# Patient Record
Sex: MALE | Race: WHITE | NOT HISPANIC OR LATINO | Employment: OTHER | URBAN - METROPOLITAN AREA
[De-identification: names, ages, dates, MRNs, and addresses within clinical notes are randomized per-mention and may not be internally consistent; named-entity substitution may affect disease eponyms.]

---

## 2017-01-03 ENCOUNTER — ANESTHESIA EVENT (OUTPATIENT)
Dept: ENDOSCOPY | Facility: HOSPITAL | Age: 62
End: 2017-01-03
Payer: COMMERCIAL

## 2017-01-03 ENCOUNTER — SURGERY (OUTPATIENT)
Age: 62
End: 2017-01-03

## 2017-01-03 ENCOUNTER — ANESTHESIA (OUTPATIENT)
Dept: ENDOSCOPY | Facility: HOSPITAL | Age: 62
End: 2017-01-03
Payer: COMMERCIAL

## 2017-01-03 VITALS — RESPIRATION RATE: 15 BRPM

## 2017-01-03 PROBLEM — Z13.9 SCREENING: Status: ACTIVE | Noted: 2017-01-03

## 2017-01-03 PROCEDURE — D9220A PRA ANESTHESIA: Mod: 33,CRNA,, | Performed by: NURSE ANESTHETIST, CERTIFIED REGISTERED

## 2017-01-03 PROCEDURE — D9220A PRA ANESTHESIA: Mod: 33,ANES,, | Performed by: ANESTHESIOLOGY

## 2017-01-03 PROCEDURE — 25000003 PHARM REV CODE 250: Performed by: NURSE ANESTHETIST, CERTIFIED REGISTERED

## 2017-01-03 PROCEDURE — 63600175 PHARM REV CODE 636 W HCPCS: Performed by: NURSE ANESTHETIST, CERTIFIED REGISTERED

## 2017-01-03 RX ORDER — LIDOCAINE HYDROCHLORIDE 10 MG/ML
INJECTION, SOLUTION INTRAVENOUS
Status: DISCONTINUED | OUTPATIENT
Start: 2017-01-03 | End: 2017-01-03

## 2017-01-03 RX ORDER — PROPOFOL 10 MG/ML
VIAL (ML) INTRAVENOUS
Status: DISCONTINUED | OUTPATIENT
Start: 2017-01-03 | End: 2017-01-03

## 2017-01-03 RX ADMIN — PROPOFOL 100 MG: 10 INJECTION, EMULSION INTRAVENOUS at 09:01

## 2017-01-03 RX ADMIN — PROPOFOL 50 MG: 10 INJECTION, EMULSION INTRAVENOUS at 09:01

## 2017-01-03 RX ADMIN — LIDOCAINE HYDROCHLORIDE 50 MG: 10 INJECTION, SOLUTION INTRAVENOUS at 09:01

## 2017-01-03 NOTE — ANESTHESIA PREPROCEDURE EVALUATION
01/03/2017  Cleveland Capps is a 61 y.o., male.    [facility] Anesthesia Evaluation    I have reviewed the Patient Summary Reports.    I have reviewed the Nursing Notes.      Review of Systems  Anesthesia Hx:  No problems with previous Anesthesia Denies Hx of Anesthetic complications    Cardiovascular:   Hypertension, well controlled Past MI CAD asymptomatic CABG/stent  ECG has been reviewed.    Pulmonary:   COPD, moderate    Renal/:   Chronic Renal Disease, ARF    Hepatic/GI:   Bowel Prep.    Neurological:  Neurology Normal    Endocrine:   Hypothyroidism        Physical Exam  General:  Obesity    Airway/Jaw/Neck:  Airway Findings: Mouth Opening: Small, but > 3cm Tongue: Large  General Airway Assessment: Adult, Possible difficult intubation  Mallampati: IV  TM Distance: 4 - 6 cm  Jaw/Neck Findings:  Neck ROM: Extension Decreased, Mild  Neck Findings:  Girth Increased      Dental:  Dental Findings:    Chest/Lungs:  Chest/Lungs Clear    Heart/Vascular:  Heart Findings: Normal            Anesthesia Plan  Type of Anesthesia, risks & benefits discussed:  Anesthesia Type:  general  Patient's Preference:   Intra-op Monitoring Plan:   Intra-op Monitoring Plan Comments:   Post Op Pain Control Plan:   Post Op Pain Control Plan Comments:   Induction:   IV  Beta Blocker:  Patient is on a Beta-Blocker and has received one dose within the past 24 hours (No further documentation required).       Informed Consent: Patient understands risks and agrees with Anesthesia plan.  Questions answered. Anesthesia consent signed with patient.  ASA Score: 3     Day of Surgery Review of History & Physical:    H&P update referred to the provider.         Ready For Surgery From Anesthesia Perspective.

## 2017-01-03 NOTE — TRANSFER OF CARE
"Anesthesia Transfer of Care Note    Patient: Cleveland Capps    Procedure(s) Performed: Procedure(s):  COLONOSCOPY    Patient location: PACU    Anesthesia Type: general    Transport from OR: Transported from OR on room air with adequate spontaneous ventilation    Post pain: adequate analgesia    Post assessment: no apparent anesthetic complications    Post vital signs: stable    Level of consciousness: awake    Nausea/Vomiting: no nausea/vomiting    Complications: none          Last vitals:   Visit Vitals    BP (!) 90/55    Pulse 60    Temp 36.7 °C (98.1 °F) (Oral)    Resp 12    Ht 5' 2" (1.575 m)    Wt 74.8 kg (165 lb)    SpO2 98%    BMI 30.18 kg/m2     "

## 2017-01-03 NOTE — ANESTHESIA POSTPROCEDURE EVALUATION
"Anesthesia Post Evaluation    Patient: Cleveland Capps    Procedure(s) Performed: Procedure(s):  COLONOSCOPY    Final Anesthesia Type: general  Patient location during evaluation: PACU  Patient participation: Yes- Able to Participate  Level of consciousness: awake and alert  Post-procedure vital signs: reviewed and stable  Pain management: adequate  Airway patency: patent  PONV status at discharge: No PONV  Anesthetic complications: no      Cardiovascular status: blood pressure returned to baseline  Respiratory status: unassisted  Hydration status: euvolemic  Follow-up not needed.        Visit Vitals    /65    Pulse (!) 52    Temp 36.7 °C (98.1 °F)    Resp 12    Ht 5' 2" (1.575 m)    Wt 74.8 kg (165 lb)    SpO2 98%    BMI 30.18 kg/m2       Pain/Marilyn Score: Pain Assessment Performed: Yes (1/3/2017  9:40 AM)  Presence of Pain: denies (1/3/2017  9:58 AM)  Marilyn Score: 10 (1/3/2017  9:55 AM)      "

## 2017-01-04 ENCOUNTER — TELEPHONE (OUTPATIENT)
Dept: GASTROENTEROLOGY | Facility: CLINIC | Age: 62
End: 2017-01-04

## 2020-01-06 PROBLEM — Z13.9 ENCOUNTER FOR HEALTH-RELATED SCREENING: Status: RESOLVED | Noted: 2017-01-03 | Resolved: 2020-01-06

## 2020-05-16 ENCOUNTER — HOSPITAL ENCOUNTER (OUTPATIENT)
Facility: HOSPITAL | Age: 65
Discharge: HOME | End: 2020-05-19
Attending: EMERGENCY MEDICINE | Admitting: INTERNAL MEDICINE
Payer: MEDICARE

## 2020-05-16 DIAGNOSIS — R07.9 CHEST PAIN: ICD-10-CM

## 2020-05-16 DIAGNOSIS — R06.00 DYSPNEA, UNSPECIFIED TYPE: ICD-10-CM

## 2020-05-16 DIAGNOSIS — U07.1 COVID-19: ICD-10-CM

## 2020-05-16 DIAGNOSIS — I10 HYPERTENSION, UNSPECIFIED TYPE: ICD-10-CM

## 2020-05-16 DIAGNOSIS — R06.02 SOB (SHORTNESS OF BREATH): Primary | ICD-10-CM

## 2020-05-16 LAB
ALBUMIN SERPL BCP-MCNC: 4 G/DL (ref 3.5–5.2)
ALP SERPL-CCNC: 105 U/L (ref 55–135)
ALT SERPL W/O P-5'-P-CCNC: 29 U/L (ref 10–44)
ANION GAP SERPL CALC-SCNC: 18 MMOL/L (ref 8–16)
AST SERPL-CCNC: 39 U/L (ref 10–40)
BASOPHILS # BLD AUTO: 0.04 K/UL (ref 0–0.2)
BASOPHILS NFR BLD: 0.4 % (ref 0–1.9)
BILIRUB SERPL-MCNC: 1.8 MG/DL (ref 0.1–1)
BNP SERPL-MCNC: 454 PG/ML (ref 0–99)
BUN SERPL-MCNC: 20 MG/DL (ref 8–23)
CALCIUM SERPL-MCNC: 8.5 MG/DL (ref 8.7–10.5)
CHLORIDE SERPL-SCNC: 103 MMOL/L (ref 95–110)
CO2 SERPL-SCNC: 17 MMOL/L (ref 23–29)
CREAT SERPL-MCNC: 1.5 MG/DL (ref 0.5–1.4)
DIFFERENTIAL METHOD: ABNORMAL
EOSINOPHIL # BLD AUTO: 0.2 K/UL (ref 0–0.5)
EOSINOPHIL NFR BLD: 2.1 % (ref 0–8)
ERYTHROCYTE [DISTWIDTH] IN BLOOD BY AUTOMATED COUNT: 13.4 % (ref 11.5–14.5)
EST. GFR  (AFRICAN AMERICAN): 56.5 ML/MIN/1.73 M^2
EST. GFR  (NON AFRICAN AMERICAN): 48.8 ML/MIN/1.73 M^2
GLUCOSE SERPL-MCNC: 270 MG/DL (ref 70–110)
HCT VFR BLD AUTO: 47.2 % (ref 40–54)
HGB BLD-MCNC: 15.5 G/DL (ref 14–18)
IMM GRANULOCYTES # BLD AUTO: 0.11 K/UL (ref 0–0.04)
IMM GRANULOCYTES NFR BLD AUTO: 1 % (ref 0–0.5)
INR PPP: 1.1 (ref 0.8–1.2)
LACTATE SERPL-SCNC: 2.5 MMOL/L (ref 0.5–2.2)
LYMPHOCYTES # BLD AUTO: 2.9 K/UL (ref 1–4.8)
LYMPHOCYTES NFR BLD: 27.4 % (ref 18–48)
MCH RBC QN AUTO: 33.7 PG (ref 27–31)
MCHC RBC AUTO-ENTMCNC: 32.8 G/DL (ref 32–36)
MCV RBC AUTO: 103 FL (ref 82–98)
MONOCYTES # BLD AUTO: 1.2 K/UL (ref 0.3–1)
MONOCYTES NFR BLD: 10.8 % (ref 4–15)
NEUTROPHILS # BLD AUTO: 6.2 K/UL (ref 1.8–7.7)
NEUTROPHILS NFR BLD: 58.3 % (ref 38–73)
NRBC BLD-RTO: 0 /100 WBC
PLATELET # BLD AUTO: 239 K/UL (ref 150–350)
PMV BLD AUTO: 8.9 FL (ref 9.2–12.9)
POTASSIUM SERPL-SCNC: 4.6 MMOL/L (ref 3.5–5.1)
PROT SERPL-MCNC: 7.9 G/DL (ref 6–8.4)
PROTHROMBIN TIME: 11.6 SEC (ref 9–12.5)
RBC # BLD AUTO: 4.6 M/UL (ref 4.6–6.2)
SARS-COV-2 RDRP RESP QL NAA+PROBE: NEGATIVE
SODIUM SERPL-SCNC: 138 MMOL/L (ref 136–145)
TROPONIN I SERPL DL<=0.01 NG/ML-MCNC: 0.04 NG/ML (ref 0.02–0.5)
WBC # BLD AUTO: 10.68 K/UL (ref 3.9–12.7)

## 2020-05-16 PROCEDURE — G0378 HOSPITAL OBSERVATION PER HR: HCPCS

## 2020-05-16 PROCEDURE — 27000190 HC CPAP FULL FACE MASK W/VALVE

## 2020-05-16 PROCEDURE — 96374 THER/PROPH/DIAG INJ IV PUSH: CPT

## 2020-05-16 PROCEDURE — 71045 X-RAY EXAM CHEST 1 VIEW: CPT | Mod: 26,,, | Performed by: RADIOLOGY

## 2020-05-16 PROCEDURE — 96375 TX/PRO/DX INJ NEW DRUG ADDON: CPT

## 2020-05-16 PROCEDURE — 80053 COMPREHEN METABOLIC PANEL: CPT

## 2020-05-16 PROCEDURE — 94660 CPAP INITIATION&MGMT: CPT

## 2020-05-16 PROCEDURE — 36415 COLL VENOUS BLD VENIPUNCTURE: CPT

## 2020-05-16 PROCEDURE — 25000242 PHARM REV CODE 250 ALT 637 W/ HCPCS: Performed by: EMERGENCY MEDICINE

## 2020-05-16 PROCEDURE — 71045 X-RAY EXAM CHEST 1 VIEW: CPT | Mod: TC,FY

## 2020-05-16 PROCEDURE — U0002 COVID-19 LAB TEST NON-CDC: HCPCS

## 2020-05-16 PROCEDURE — 85610 PROTHROMBIN TIME: CPT

## 2020-05-16 PROCEDURE — 71045 XR CHEST AP PORTABLE: ICD-10-PCS | Mod: 26,,, | Performed by: RADIOLOGY

## 2020-05-16 PROCEDURE — 93005 ELECTROCARDIOGRAM TRACING: CPT

## 2020-05-16 PROCEDURE — 87040 BLOOD CULTURE FOR BACTERIA: CPT

## 2020-05-16 PROCEDURE — 84484 ASSAY OF TROPONIN QUANT: CPT

## 2020-05-16 PROCEDURE — 85025 COMPLETE CBC W/AUTO DIFF WBC: CPT

## 2020-05-16 PROCEDURE — 63600175 PHARM REV CODE 636 W HCPCS: Performed by: EMERGENCY MEDICINE

## 2020-05-16 PROCEDURE — 83605 ASSAY OF LACTIC ACID: CPT

## 2020-05-16 PROCEDURE — 83880 ASSAY OF NATRIURETIC PEPTIDE: CPT

## 2020-05-16 PROCEDURE — 99285 EMERGENCY DEPT VISIT HI MDM: CPT | Mod: 25

## 2020-05-16 PROCEDURE — 99900035 HC TECH TIME PER 15 MIN (STAT)

## 2020-05-16 RX ORDER — VANCOMYCIN HCL IN 5 % DEXTROSE 1G/250ML
1000 PLASTIC BAG, INJECTION (ML) INTRAVENOUS
Status: COMPLETED | OUTPATIENT
Start: 2020-05-17 | End: 2020-05-17

## 2020-05-16 RX ORDER — MEROPENEM AND SODIUM CHLORIDE 1 G/50ML
1 INJECTION, SOLUTION INTRAVENOUS
Status: DISCONTINUED | OUTPATIENT
Start: 2020-05-17 | End: 2020-05-19 | Stop reason: HOSPADM

## 2020-05-16 RX ORDER — FUROSEMIDE 10 MG/ML
80 INJECTION INTRAMUSCULAR; INTRAVENOUS
Status: COMPLETED | OUTPATIENT
Start: 2020-05-16 | End: 2020-05-16

## 2020-05-16 RX ORDER — NITROGLYCERIN 0.4 MG/1
0.4 TABLET SUBLINGUAL
Status: COMPLETED | OUTPATIENT
Start: 2020-05-16 | End: 2020-05-16

## 2020-05-16 RX ADMIN — VANCOMYCIN HYDROCHLORIDE 1000 MG: 1 INJECTION, POWDER, LYOPHILIZED, FOR SOLUTION INTRAVENOUS at 11:05

## 2020-05-16 RX ADMIN — NITROGLYCERIN 0.4 MG: 0.4 TABLET SUBLINGUAL at 09:05

## 2020-05-16 RX ADMIN — FUROSEMIDE 80 MG: 10 INJECTION, SOLUTION INTRAMUSCULAR; INTRAVENOUS at 10:05

## 2020-05-17 PROBLEM — J18.9 PNEUMONIA: Status: ACTIVE | Noted: 2020-05-17

## 2020-05-17 PROBLEM — N18.30 CKD (CHRONIC KIDNEY DISEASE) STAGE 3, GFR 30-59 ML/MIN: Status: ACTIVE | Noted: 2020-05-17

## 2020-05-17 LAB
ALBUMIN SERPL BCP-MCNC: 3.9 G/DL (ref 3.5–5.2)
ALP SERPL-CCNC: 81 U/L (ref 55–135)
ALT SERPL W/O P-5'-P-CCNC: 34 U/L (ref 10–44)
ANION GAP SERPL CALC-SCNC: 12 MMOL/L (ref 8–16)
AST SERPL-CCNC: 91 U/L (ref 10–40)
BASOPHILS # BLD AUTO: 0.03 K/UL (ref 0–0.2)
BASOPHILS NFR BLD: 0.3 % (ref 0–1.9)
BILIRUB SERPL-MCNC: 1.4 MG/DL (ref 0.1–1)
BUN SERPL-MCNC: 19 MG/DL (ref 8–23)
CALCIUM SERPL-MCNC: 8.5 MG/DL (ref 8.7–10.5)
CHLORIDE SERPL-SCNC: 100 MMOL/L (ref 95–110)
CO2 SERPL-SCNC: 25 MMOL/L (ref 23–29)
CREAT SERPL-MCNC: 1.1 MG/DL (ref 0.5–1.4)
DIFFERENTIAL METHOD: ABNORMAL
EOSINOPHIL # BLD AUTO: 0 K/UL (ref 0–0.5)
EOSINOPHIL NFR BLD: 0.4 % (ref 0–8)
ERYTHROCYTE [DISTWIDTH] IN BLOOD BY AUTOMATED COUNT: 13.4 % (ref 11.5–14.5)
EST. GFR  (AFRICAN AMERICAN): >60 ML/MIN/1.73 M^2
EST. GFR  (NON AFRICAN AMERICAN): >60 ML/MIN/1.73 M^2
GLUCOSE SERPL-MCNC: 141 MG/DL (ref 70–110)
HCT VFR BLD AUTO: 41.4 % (ref 40–54)
HGB BLD-MCNC: 14 G/DL (ref 14–18)
IMM GRANULOCYTES # BLD AUTO: 0.04 K/UL (ref 0–0.04)
IMM GRANULOCYTES NFR BLD AUTO: 0.4 % (ref 0–0.5)
LACTATE SERPL-SCNC: 1.4 MMOL/L (ref 0.5–2.2)
LYMPHOCYTES # BLD AUTO: 1.1 K/UL (ref 1–4.8)
LYMPHOCYTES NFR BLD: 10.9 % (ref 18–48)
MAGNESIUM SERPL-MCNC: 2.1 MG/DL (ref 1.6–2.6)
MCH RBC QN AUTO: 33.3 PG (ref 27–31)
MCHC RBC AUTO-ENTMCNC: 33.8 G/DL (ref 32–36)
MCV RBC AUTO: 98 FL (ref 82–98)
MONOCYTES # BLD AUTO: 0.9 K/UL (ref 0.3–1)
MONOCYTES NFR BLD: 9 % (ref 4–15)
NEUTROPHILS # BLD AUTO: 7.9 K/UL (ref 1.8–7.7)
NEUTROPHILS NFR BLD: 79 % (ref 38–73)
NRBC BLD-RTO: 0 /100 WBC
PHOSPHATE SERPL-MCNC: 4.4 MG/DL (ref 2.7–4.5)
PLATELET # BLD AUTO: 194 K/UL (ref 150–350)
PMV BLD AUTO: 8.7 FL (ref 9.2–12.9)
POTASSIUM SERPL-SCNC: 3.9 MMOL/L (ref 3.5–5.1)
PROT SERPL-MCNC: 7.1 G/DL (ref 6–8.4)
RBC # BLD AUTO: 4.21 M/UL (ref 4.6–6.2)
SODIUM SERPL-SCNC: 137 MMOL/L (ref 136–145)
WBC # BLD AUTO: 10 K/UL (ref 3.9–12.7)

## 2020-05-17 PROCEDURE — 80053 COMPREHEN METABOLIC PANEL: CPT

## 2020-05-17 PROCEDURE — 84100 ASSAY OF PHOSPHORUS: CPT

## 2020-05-17 PROCEDURE — 63600175 PHARM REV CODE 636 W HCPCS: Performed by: INTERNAL MEDICINE

## 2020-05-17 PROCEDURE — 25000003 PHARM REV CODE 250: Performed by: HOSPITALIST

## 2020-05-17 PROCEDURE — G0378 HOSPITAL OBSERVATION PER HR: HCPCS

## 2020-05-17 PROCEDURE — 83735 ASSAY OF MAGNESIUM: CPT

## 2020-05-17 PROCEDURE — 25000003 PHARM REV CODE 250: Performed by: INTERNAL MEDICINE

## 2020-05-17 PROCEDURE — 25000003 PHARM REV CODE 250: Performed by: EMERGENCY MEDICINE

## 2020-05-17 PROCEDURE — 99220 PR INITIAL OBSERVATION CARE,LEVL III: ICD-10-PCS | Mod: ,,, | Performed by: INTERNAL MEDICINE

## 2020-05-17 PROCEDURE — 96375 TX/PRO/DX INJ NEW DRUG ADDON: CPT

## 2020-05-17 PROCEDURE — 63600175 PHARM REV CODE 636 W HCPCS: Performed by: HOSPITALIST

## 2020-05-17 PROCEDURE — 85025 COMPLETE CBC W/AUTO DIFF WBC: CPT

## 2020-05-17 PROCEDURE — 83605 ASSAY OF LACTIC ACID: CPT

## 2020-05-17 PROCEDURE — 96374 THER/PROPH/DIAG INJ IV PUSH: CPT | Mod: 59

## 2020-05-17 PROCEDURE — 94760 N-INVAS EAR/PLS OXIMETRY 1: CPT

## 2020-05-17 PROCEDURE — 96372 THER/PROPH/DIAG INJ SC/IM: CPT

## 2020-05-17 PROCEDURE — 83036 HEMOGLOBIN GLYCOSYLATED A1C: CPT

## 2020-05-17 PROCEDURE — 96376 TX/PRO/DX INJ SAME DRUG ADON: CPT

## 2020-05-17 PROCEDURE — 63600175 PHARM REV CODE 636 W HCPCS: Performed by: EMERGENCY MEDICINE

## 2020-05-17 PROCEDURE — 36415 COLL VENOUS BLD VENIPUNCTURE: CPT

## 2020-05-17 PROCEDURE — 27000221 HC OXYGEN, UP TO 24 HOURS

## 2020-05-17 PROCEDURE — 99220 PR INITIAL OBSERVATION CARE,LEVL III: CPT | Mod: ,,, | Performed by: INTERNAL MEDICINE

## 2020-05-17 RX ORDER — IPRATROPIUM BROMIDE AND ALBUTEROL SULFATE 2.5; .5 MG/3ML; MG/3ML
3 SOLUTION RESPIRATORY (INHALATION) EVERY 4 HOURS PRN
Status: DISCONTINUED | OUTPATIENT
Start: 2020-05-17 | End: 2020-05-19 | Stop reason: HOSPADM

## 2020-05-17 RX ORDER — LISINOPRIL 10 MG/1
20 TABLET ORAL DAILY
Status: DISCONTINUED | OUTPATIENT
Start: 2020-05-17 | End: 2020-05-19 | Stop reason: HOSPADM

## 2020-05-17 RX ORDER — ATORVASTATIN CALCIUM 40 MG/1
40 TABLET, FILM COATED ORAL DAILY
Status: DISCONTINUED | OUTPATIENT
Start: 2020-05-17 | End: 2020-05-19 | Stop reason: HOSPADM

## 2020-05-17 RX ORDER — SODIUM CHLORIDE 0.9 % (FLUSH) 0.9 %
10 SYRINGE (ML) INJECTION
Status: DISCONTINUED | OUTPATIENT
Start: 2020-05-17 | End: 2020-05-19 | Stop reason: HOSPADM

## 2020-05-17 RX ORDER — CYCLOBENZAPRINE HCL 5 MG
10 TABLET ORAL 2 TIMES DAILY PRN
Status: DISCONTINUED | OUTPATIENT
Start: 2020-05-17 | End: 2020-05-19 | Stop reason: HOSPADM

## 2020-05-17 RX ORDER — CARVEDILOL 12.5 MG/1
12.5 TABLET ORAL 2 TIMES DAILY
Status: DISCONTINUED | OUTPATIENT
Start: 2020-05-17 | End: 2020-05-19 | Stop reason: HOSPADM

## 2020-05-17 RX ORDER — FUROSEMIDE 10 MG/ML
40 INJECTION INTRAMUSCULAR; INTRAVENOUS
Status: DISCONTINUED | OUTPATIENT
Start: 2020-05-17 | End: 2020-05-19 | Stop reason: HOSPADM

## 2020-05-17 RX ORDER — HEPARIN SODIUM 5000 [USP'U]/ML
5000 INJECTION, SOLUTION INTRAVENOUS; SUBCUTANEOUS EVERY 8 HOURS
Status: DISCONTINUED | OUTPATIENT
Start: 2020-05-17 | End: 2020-05-19 | Stop reason: HOSPADM

## 2020-05-17 RX ORDER — CLOPIDOGREL BISULFATE 75 MG/1
75 TABLET ORAL DAILY
Status: DISCONTINUED | OUTPATIENT
Start: 2020-05-17 | End: 2020-05-19 | Stop reason: HOSPADM

## 2020-05-17 RX ORDER — ASPIRIN 81 MG/1
81 TABLET ORAL DAILY
Status: DISCONTINUED | OUTPATIENT
Start: 2020-05-17 | End: 2020-05-19 | Stop reason: HOSPADM

## 2020-05-17 RX ORDER — IBUPROFEN 200 MG
24 TABLET ORAL
Status: DISCONTINUED | OUTPATIENT
Start: 2020-05-17 | End: 2020-05-19 | Stop reason: HOSPADM

## 2020-05-17 RX ORDER — IBUPROFEN 200 MG
16 TABLET ORAL
Status: DISCONTINUED | OUTPATIENT
Start: 2020-05-17 | End: 2020-05-19 | Stop reason: HOSPADM

## 2020-05-17 RX ORDER — ONDANSETRON 4 MG/1
4 TABLET, ORALLY DISINTEGRATING ORAL EVERY 6 HOURS PRN
Status: DISCONTINUED | OUTPATIENT
Start: 2020-05-17 | End: 2020-05-19 | Stop reason: HOSPADM

## 2020-05-17 RX ORDER — ACETAMINOPHEN 325 MG/1
650 TABLET ORAL EVERY 4 HOURS PRN
Status: DISCONTINUED | OUTPATIENT
Start: 2020-05-17 | End: 2020-05-19 | Stop reason: HOSPADM

## 2020-05-17 RX ORDER — GLUCAGON 1 MG
1 KIT INJECTION
Status: DISCONTINUED | OUTPATIENT
Start: 2020-05-17 | End: 2020-05-19 | Stop reason: HOSPADM

## 2020-05-17 RX ADMIN — VANCOMYCIN HYDROCHLORIDE 1250 MG: 1.25 INJECTION, POWDER, LYOPHILIZED, FOR SOLUTION INTRAVENOUS at 09:05

## 2020-05-17 RX ADMIN — MEROPENEM AND SODIUM CHLORIDE 1 G: 1 INJECTION, SOLUTION INTRAVENOUS at 05:05

## 2020-05-17 RX ADMIN — MEROPENEM AND SODIUM CHLORIDE 1 G: 1 INJECTION, SOLUTION INTRAVENOUS at 08:05

## 2020-05-17 RX ADMIN — LISINOPRIL 20 MG: 10 TABLET ORAL at 08:05

## 2020-05-17 RX ADMIN — CYCLOBENZAPRINE HYDROCHLORIDE 10 MG: 5 TABLET, FILM COATED ORAL at 09:05

## 2020-05-17 RX ADMIN — HEPARIN SODIUM 5000 UNITS: 5000 INJECTION, SOLUTION INTRAVENOUS; SUBCUTANEOUS at 06:05

## 2020-05-17 RX ADMIN — HEPARIN SODIUM 5000 UNITS: 5000 INJECTION, SOLUTION INTRAVENOUS; SUBCUTANEOUS at 01:05

## 2020-05-17 RX ADMIN — FUROSEMIDE 40 MG: 10 INJECTION, SOLUTION INTRAMUSCULAR; INTRAVENOUS at 06:05

## 2020-05-17 RX ADMIN — ASPIRIN 81 MG: 81 TABLET, COATED ORAL at 08:05

## 2020-05-17 RX ADMIN — CARVEDILOL 12.5 MG: 12.5 TABLET, FILM COATED ORAL at 08:05

## 2020-05-17 RX ADMIN — FUROSEMIDE 40 MG: 10 INJECTION, SOLUTION INTRAMUSCULAR; INTRAVENOUS at 05:05

## 2020-05-17 RX ADMIN — ATORVASTATIN CALCIUM 40 MG: 40 TABLET, FILM COATED ORAL at 08:05

## 2020-05-17 RX ADMIN — CARVEDILOL 12.5 MG: 12.5 TABLET, FILM COATED ORAL at 09:05

## 2020-05-17 RX ADMIN — CLOPIDOGREL BISULFATE 75 MG: 75 TABLET ORAL at 08:05

## 2020-05-17 RX ADMIN — MEROPENEM AND SODIUM CHLORIDE 1 G: 1 INJECTION, SOLUTION INTRAVENOUS at 01:05

## 2020-05-17 NOTE — ASSESSMENT & PLAN NOTE
CXR concerning for possible infiltrate  Lactic acid of 2.5  Repeat WNL  Continue with vanc and meropenem; de-escalate as appropriate   Afebrile and no leukocytosis  O2 and nebs PRN  Clinically stable

## 2020-05-17 NOTE — HOSPITAL COURSE
"Patient has been comfortable this morning lying supine without difficulty.  Vital signs are stable with glucose 141  Magnesium phosphorus normal and CBC white blood cell count 10 hemoglobin 14 hematocrit 41 lactate was initially 2.5 and this morning is 1.4 normal    BP (!) 143/75 (BP Location: Right arm, Patient Position: Lying)   Pulse 60   Temp 97.2 °F (36.2 °C) (Oral)   Resp 18   Ht 5' 2" (1.575 m)   Wt 77.1 kg (170 lb)   SpO2 99%   BMI 31.09 kg/m²      05/18/2020:  Patient Vitals for the past 24 hrs:   BP Temp Temp src Pulse Resp SpO2 Weight   05/18/20 1042 (!) 111/55 96.6 °F (35.9 °C) Oral (!) 58 16 98 % --   05/18/20 0800 -- -- -- (!) 58 -- -- --   05/18/20 0722 -- -- -- (!) 55 18 100 % --   05/18/20 0719 119/63 96.8 °F (36 °C) Oral (!) 55 18 100 % --   05/18/20 0600 -- -- -- -- -- -- 77.5 kg (170 lb 13.7 oz)   05/18/20 0421 124/67 97.8 °F (36.6 °C) Oral 62 18 99 % --   05/17/20 2349 (!) 110/59 98.1 °F (36.7 °C) Oral (!) 52 16 100 % --   05/17/20 2109 136/66 -- -- 60 -- -- --   05/17/20 2105 118/63 -- -- (!) 57 -- -- --   05/17/20 1932 126/68 97.4 °F (36.3 °C) Oral 60 18 96 % --   05/17/20 1509 (!) 140/71 98.4 °F (36.9 °C) Oral 64 17 98 % --     Patient is lying supine no problems with no orthopnea.  Labs show normal white blood cell count H&H is 14.5 and 44 differential is normal.  Chem profile:  Sodium 137 potassium 3.8 chloride 100 bicarb 29 BUN 30 creatinine 1.3  Echocardiogram is pending on this patient.  Will keep admitted today and follow-up on echocardiogram and recheck BNP in the a.m. otherwise continue current medications  Blood cultures were called today regarding positive g positive cocci in clusters resembling Staph will follow cultures as needed  "

## 2020-05-17 NOTE — ASSESSMENT & PLAN NOTE
Most likely secondary to flash pulmonary edema secondary to uncontrolled BP  Diuresed 1.2 L since admit with lasix 80 mg IV x 1  Continue with diuresis lasix 40 mg IV BID  Strict Is and Os and daily weights  2D echo  Repeat CXR in am   Fluid restriction to 1.5 L  Continue BP management with lasix, carvedilol, and ACEI; titrate as needed  Initially on BIPAP but now on 3L NC and stable; continue to wean as tolerated  Nebs PRN

## 2020-05-17 NOTE — H&P
[facility] Medicine  History & Physical    Patient Name: Cleveland Capps  MRN: [id number]  Admission Date: 5/16/2020  Attending Physician: Durga Chandler MD   Primary Care Provider: Romero Snyder MD         Patient information was obtained from patient, Epic chart review and ER records.     Start time: 12:45 AM  Chief complaint: SOB  The patient location is: [facility]  The patient arrived at: yesterday evening  Present with the patient at the time of the telemed/virtual assessment: OLIVIER Hightower    Subjective:     Principal Problem:SOB (shortness of breath)    Chief Complaint:   Chief Complaint   Patient presents with    Shortness of Breath        HPI: The patient is a 65 y/o male with PMH of CAD, NSTEMI, CABG, and HTN who presented with SOB. The patient reported acute onset about 2 hours prior to ER presentation. He was in his usual state of health but after he woke from a nap at around 5 PM, he started to feel SOB. He appeared cyanotic and quite dyspneic and using accessory muscles per ER reports. He had diminished breath sounds with rales  was immediately placed on BIPAP and given lasix 80 mg IV x 1 with 1.2L UOP. He has since improved and is currently down to 4L NC and reports significant improvement in this symptoms. BP on presentation was in the 170s-190s systolic. He was also given nitro x 2. The patient reports having intermittent spikes in his BPs and this had occurred once before about 6 years ago where he sought hospitalization but he doesn't always become SOB like this. He denies any fevers, chills, sick contacts, chest pain, palpitations, LE swelling, or other symptoms.     Past Medical History:   Diagnosis Date    Acute kidney injury     COPD (chronic obstructive pulmonary disease)     Coronary artery disease     Encounter for blood transfusion     Former smoker     Hypertension     Myocardial infarction     Thyroid disease        Past Surgical  History:   Procedure Laterality Date    COLONOSCOPY N/A 1/3/2017    Procedure: COLONOSCOPY;  Surgeon: Marcus Green MD;  Location: [facility];  Service: Endoscopy;  Laterality: N/A;    CORONARY STENT PLACEMENT      GASTROSTOMY TUBE PLACEMENT      PEG TUBE REMOVAL      TRACHEOSTOMY CLOSURE      TRACHEOSTOMY TUBE PLACEMENT      UPPER GASTROINTESTINAL ENDOSCOPY  05/2016    Dr. Green with PEG tube placement       Review of patient's allergies indicates:   Allergen Reactions    Penicillins Hives       No current facility-administered medications on file prior to encounter.      Current Outpatient Medications on File Prior to Encounter   Medication Sig    aspirin (ECOTRIN) 81 MG EC tablet Take 81 mg by mouth once daily.    atorvastatin (LIPITOR) 40 MG tablet Take 1 tablet (40 mg total) by mouth once daily.    carvedilol (COREG) 6.25 MG tablet Take 2 tablets (12.5 mg total) by mouth 2 (two) times daily.    clopidogrel (PLAVIX) 75 mg tablet Take 75 mg by mouth once daily.    enalapril (VASOTEC) 20 MG tablet TAKE ONE TABLET BY MOUTH EVERYDAY AS NEEDED    travoprost (TRAVATAN Z) 0.004 % Drop Place 1 drop into both eyes every evening. (Patient taking differently: Place 1 drop into both eyes nightly as needed. )    [DISCONTINUED] acetaminophen (TYLENOL) 325 MG tablet Take 2 tablets (650 mg total) by mouth every 6 (six) hours as needed.    [DISCONTINUED] artificial tears (ISOPTO TEARS) 0.5 % ophthalmic solution Place 1 drop into both eyes as needed.    [DISCONTINUED] budesonide-formoterol 160-4.5 mcg (SYMBICORT) 160-4.5 mcg/actuation HFAA Inhale 2 puffs into the lungs every 12 (twelve) hours. This is a a controller medication and should be taken every day as directed by your doctor    [DISCONTINUED] levothyroxine (SYNTHROID) 50 MCG tablet Take 50 mcg by mouth once daily.     Family History     None        Tobacco Use    Smoking status: Former Smoker     Types: Cigarettes     Last attempt to quit:  2005     Years since quittin.4    Tobacco comment: quit    Substance and Sexual Activity    Alcohol use: No    Drug use: No    Sexual activity: Yes     Review of Systems   Constitutional: Negative for activity change, chills and fever.   HENT: Negative.    Eyes: Negative for visual disturbance.   Respiratory:        Per HPI   Cardiovascular: Negative for chest pain, palpitations and leg swelling.   Gastrointestinal: Negative for abdominal pain, diarrhea, nausea and vomiting.   Genitourinary: Negative for difficulty urinating, flank pain and frequency.   Musculoskeletal: Negative.    Skin: Negative.    Neurological: Negative for dizziness and headaches.   Hematological: Does not bruise/bleed easily.   Psychiatric/Behavioral: The patient is not nervous/anxious.      Objective:     Vital Signs (Most Recent):  Temp: 97.7 °F (36.5 °C) (20)  Pulse: 64 (20)  Resp: 14 (20)  BP: 133/74 (20 0047)  SpO2: 95 % (20) Vital Signs (24h Range):  Temp:  [97.7 °F (36.5 °C)] 97.7 °F (36.5 °C)  Pulse:  [] 64  Resp:  [14-28] 14  SpO2:  [40 %-98 %] 95 %  BP: (118-174)/() 133/74     Weight: 77.1 kg (170 lb)  Body mass index is 31.09 kg/m².    Physical Exam   Constitutional: He appears well-developed and well-nourished. No distress.   Obese male sitting upright in stretcher. Appears comfortable.   Cardiovascular: Normal rate.   Pulmonary/Chest:   Appears comfortable on 4L NC  No dyspnea noted with speech; able to complete sentences  Diminished rales on exam per RN reports compared to admit   Abdominal: Soft.   obese   Musculoskeletal: He exhibits no edema.   Psychiatric: He has a normal mood and affect.   Vitals reviewed.          Significant Labs: All pertinent labs within the past 24 hours have been reviewed.    Significant Imaging: I have reviewed all pertinent imaging results/findings within the past 24 hours.    Assessment/Plan:     * SOB (shortness of  breath)  Most likely secondary to flash pulmonary edema secondary to uncontrolled BP  Diuresed 1.2 L since admit with lasix 80 mg IV x 1  Continue with diuresis lasix 40 mg IV BID  Strict Is and Os and daily weights  2D echo  Repeat CXR in am   Fluid restriction to 1.5 L  Continue BP management with lasix, carvedilol, and ACEI; titrate as needed  Initially on BIPAP but now on 3L NC and stable; continue to wean as tolerated  Nebs PRN      Pneumonia  CXR concerning for possible infiltrate  Lactic acid of 2.5  Repeat WNL  Continue with vanc and meropenem; de-escalate as appropriate   Afebrile and no leukocytosis  O2 and nebs PRN  Clinically stable       CKD (chronic kidney disease) stage 3, GFR 30-59 ml/min  Lab Results   Component Value Date    CREATININE 1.5 (H) 05/16/2020     Last set of labs from 2016 when patient was admitted for NSTEMI and CABG; required HD on DC but no longer HD dependent  Unclear of his baseline   Cont to monitor with daily labs   Avoid nephrotoxins.   Renally dose all medications   Monitor events that may lead to decreased renal perfusion (hypovolemia, hypotension, sepsis).   Monitor urine output (goal 0.5 mL/kg/hr) to assure that no obstruction precipitates worsening in GFR.  Cont ACE inhibitor for craig protection.   Serum bicarb level 17. Monitor for need to initiate sodium bicarb.         Benign essential HTN  Patient reports intermittent spikes in BPs but otherwise well controlled  Continue with carvedilol, lasix, and lisinopril (takes enalapril 20 mg daily at home but not on formulary)  Titrate as needed   May need addition of diuretic therapy on DC  Echo pending         Coronary artery disease  Per NSTEMI      NSTEMI (non-ST elevated myocardial infarction)  Continue ASA, plavix, carvedilol, ACEI, and atorvastatin         VTE Risk Mitigation (From admission, onward)    None             End time:  1 AM    Total time spent with patient: 15 mins    The attending portion of this evaluation,  treatment, and documentation was performed per Yris Ferguson MD via audiovisual.      Yris Ferguson MD  Department of [place] [facility]   [facility]

## 2020-05-17 NOTE — HPI
The patient is a 65 y/o male with PMH of CAD, NSTEMI, CABG, and HTN who presented with SOB. The patient reported acute onset about 2 hours prior to ER presentation. He was in his usual state of health but after he woke from a nap at around 5 PM, he started to feel SOB. He appeared cyanotic and quite dyspneic and using accessory muscles per ER reports. He had diminished breath sounds with rales  was immediately placed on BIPAP and given lasix 80 mg IV x 1 with 1.2L UOP. He has since improved and is currently down to 4L NC and reports significant improvement in this symptoms. BP on presentation was in the 170s-190s systolic. He was also given nitro x 2. The patient reports having intermittent spikes in his BPs and this had occurred once before about 6 years ago where he sought hospitalization but he doesn't always become SOB like this. He denies any fevers, chills, sick contacts, chest pain, palpitations, LE swelling, or other symptoms.

## 2020-05-17 NOTE — SUBJECTIVE & OBJECTIVE
Past Medical History:   Diagnosis Date    Acute kidney injury     COPD (chronic obstructive pulmonary disease)     Coronary artery disease     Encounter for blood transfusion     Former smoker     Hypertension     Myocardial infarction     Thyroid disease        Past Surgical History:   Procedure Laterality Date    COLONOSCOPY N/A 1/3/2017    Procedure: COLONOSCOPY;  Surgeon: Marcus Green MD;  Location: [facility];  Service: Endoscopy;  Laterality: N/A;    CORONARY STENT PLACEMENT      GASTROSTOMY TUBE PLACEMENT      PEG TUBE REMOVAL      TRACHEOSTOMY CLOSURE      TRACHEOSTOMY TUBE PLACEMENT      UPPER GASTROINTESTINAL ENDOSCOPY  05/2016    Dr. Green with PEG tube placement       Review of patient's allergies indicates:   Allergen Reactions    Penicillins Hives       No current facility-administered medications on file prior to encounter.      Current Outpatient Medications on File Prior to Encounter   Medication Sig    aspirin (ECOTRIN) 81 MG EC tablet Take 81 mg by mouth once daily.    atorvastatin (LIPITOR) 40 MG tablet Take 1 tablet (40 mg total) by mouth once daily.    carvedilol (COREG) 6.25 MG tablet Take 2 tablets (12.5 mg total) by mouth 2 (two) times daily.    clopidogrel (PLAVIX) 75 mg tablet Take 75 mg by mouth once daily.    enalapril (VASOTEC) 20 MG tablet TAKE ONE TABLET BY MOUTH EVERYDAY AS NEEDED    travoprost (TRAVATAN Z) 0.004 % Drop Place 1 drop into both eyes every evening. (Patient taking differently: Place 1 drop into both eyes nightly as needed. )    [DISCONTINUED] acetaminophen (TYLENOL) 325 MG tablet Take 2 tablets (650 mg total) by mouth every 6 (six) hours as needed.    [DISCONTINUED] artificial tears (ISOPTO TEARS) 0.5 % ophthalmic solution Place 1 drop into both eyes as needed.    [DISCONTINUED] budesonide-formoterol 160-4.5 mcg (SYMBICORT) 160-4.5 mcg/actuation HFAA Inhale 2 puffs into the lungs every 12 (twelve) hours. This is a a controller medication  and should be taken every day as directed by your doctor    [DISCONTINUED] levothyroxine (SYNTHROID) 50 MCG tablet Take 50 mcg by mouth once daily.     Family History     None        Tobacco Use    Smoking status: Former Smoker     Types: Cigarettes     Last attempt to quit: 2005     Years since quittin.4    Tobacco comment: quit    Substance and Sexual Activity    Alcohol use: No    Drug use: No    Sexual activity: Yes     Review of Systems   Constitutional: Positive for fatigue. Negative for activity change, appetite change and fever.   HENT: Positive for congestion and sinus pain. Negative for ear discharge, mouth sores, nosebleeds, rhinorrhea, sinus pressure and tinnitus.    Eyes: Negative.  Negative for pain, redness and itching.   Respiratory: Positive for cough, chest tightness, shortness of breath and wheezing. Negative for apnea, choking and stridor.    Cardiovascular: Positive for leg swelling. Negative for chest pain and palpitations.   Gastrointestinal: Negative for abdominal distention, abdominal pain, anal bleeding, blood in stool, constipation and diarrhea.   Endocrine: Negative.    Genitourinary: Negative for difficulty urinating, flank pain, frequency and urgency.   Musculoskeletal: Negative for arthralgias, back pain, gait problem and myalgias.   Skin: Negative for color change and pallor.   Allergic/Immunologic: Negative.    Neurological: Positive for weakness. Negative for dizziness, facial asymmetry, light-headedness and headaches.   Hematological: Negative for adenopathy. Does not bruise/bleed easily.   Psychiatric/Behavioral: The patient is nervous/anxious.      Objective:     Vital Signs (Most Recent):  Temp: 97.2 °F (36.2 °C) (20 1119)  Pulse: 60 (20 1119)  Resp: 18 (20 111)  BP: (!) 143/75 (20 1119)  SpO2: 99 % (20 111) Vital Signs (24h Range):  Temp:  [97.2 °F (36.2 °C)-98.6 °F (37 °C)] 97.2 °F (36.2 °C)  Pulse:  [] 60  Resp:   [14-28] 18  SpO2:  [40 %-99 %] 99 %  BP: (118-174)/() 143/75     Weight: 77.1 kg (170 lb)  Body mass index is 31.09 kg/m².    Physical Exam   Constitutional: He is oriented to person, place, and time. He appears well-developed and well-nourished.   HENT:   Head: Normocephalic and atraumatic.   Eyes: Pupils are equal, round, and reactive to light. EOM are normal.   Neck: Normal range of motion. Neck supple. No tracheal deviation present. No thyromegaly present.   Cardiovascular: Normal rate, regular rhythm and normal heart sounds.   Pulmonary/Chest: He is in respiratory distress. He has wheezes.   Abdominal: Soft. Bowel sounds are normal. He exhibits no distension. There is no tenderness. There is no rebound and no guarding.   Musculoskeletal: Normal range of motion.   Lymphadenopathy:     He has no cervical adenopathy.   Neurological: He is alert and oriented to person, place, and time.   Skin: Skin is warm and dry. Capillary refill takes less than 2 seconds.   Psychiatric: He has a normal mood and affect. His behavior is normal. Judgment and thought content normal.   Nursing note and vitals reviewed.        CRANIAL NERVES     CN III, IV, VI   Pupils are equal, round, and reactive to light.  Extraocular motions are normal.        Significant Labs:   Recent Lab Results       05/17/20  0647   05/17/20  0646   05/17/20  0026   05/16/20  2220   05/16/20  2209        Albumin   3.9           Alkaline Phosphatase   81           ALT   34           Anion Gap   12           AST   91           Baso # 0.03             Basophil% 0.3             BILIRUBIN TOTAL   1.4  Comment:  For infants and newborns, interpretation of results should be based  on gestational age, weight and in agreement with clinical  observations.  Premature Infant recommended reference ranges:  Up to 24 hours.............<8.0 mg/dL  Up to 48 hours............<12.0 mg/dL  3-5 days..................<15.0 mg/dL  6-29 days.................<15.0 mg/dL              BNP               BUN, Bld   19           Calcium   8.5           Chloride   100           CO2   25           Creatinine   1.1           Differential Method Automated             eGFR if    >60.0           eGFR if non    >60.0  Comment:  Calculation used to obtain the estimated glomerular filtration  rate (eGFR) is the CKD-EPI equation.              Eos # 0.0             Eosinophil% 0.4             Glucose   141           Gran # (ANC) 7.9             Gran% 79.0             Hematocrit 41.4             Hemoglobin 14.0             Immature Grans (Abs) 0.04  Comment:  Mild elevation in immature granulocytes is non specific and   can be seen in a variety of conditions including stress response,   acute inflammation, trauma and pregnancy. Correlation with other   laboratory and clinical findings is essential.               Immature Granulocytes 0.4             INR       1.1  Comment:  Coumadin Therapy:  2.0 - 3.0 for INR for all indicators except mechanical heart valves  and antiphospholipid syndromes which should use 2.5 - 3.5.         Lactate, Kalpesh     1.4  Comment:  Falsely low lactic acid results can be found in samples   containing >=13.0 mg/dL total bilirubin and/or >=3.5 mg/dL   direct bilirubin.     2.5  Comment:  Falsely low lactic acid results can be found in samples   containing >=13.0 mg/dL total bilirubin and/or >=3.5 mg/dL   direct bilirubin.       Lymph # 1.1             Lymph% 10.9             Magnesium   2.1           MCH 33.3             MCHC 33.8             MCV 98             Mono # 0.9             Mono% 9.0             MPV 8.7             nRBC 0             Phosphorus   4.4           Platelets 194             Potassium   3.9           PROTEIN TOTAL   7.1           Protime       11.6       RBC 4.21             RDW 13.4             SARS-CoV-2 RNA, Amplification, Qual               Sodium   137           Troponin I               WBC 10.00                               05/16/20 2134 05/16/20 2122        Albumin 4.0       Alkaline Phosphatase 105       ALT 29       Anion Gap 18       AST 39       Baso # 0.04       Basophil% 0.4       BILIRUBIN TOTAL 1.8  Comment:  For infants and newborns, interpretation of results should be based  on gestational age, weight and in agreement with clinical  observations.  Premature Infant recommended reference ranges:  Up to 24 hours.............<8.0 mg/dL  Up to 48 hours............<12.0 mg/dL  3-5 days..................<15.0 mg/dL  6-29 days.................<15.0 mg/dL           Comment:  Values of less than 100 pg/ml are consistent with non-CHF populations.       BUN, Bld 20       Calcium 8.5       Chloride 103       CO2 17       Creatinine 1.5       Differential Method Automated       eGFR if African American 56.5       eGFR if non  48.8  Comment:  Calculation used to obtain the estimated glomerular filtration  rate (eGFR) is the CKD-EPI equation.          Eos # 0.2       Eosinophil% 2.1       Glucose 270       Gran # (ANC) 6.2       Gran% 58.3       Hematocrit 47.2       Hemoglobin 15.5       Immature Grans (Abs) 0.11  Comment:  Mild elevation in immature granulocytes is non specific and   can be seen in a variety of conditions including stress response,   acute inflammation, trauma and pregnancy. Correlation with other   laboratory and clinical findings is essential.         Immature Granulocytes 1.0       INR         Lactate, Kalpesh         Lymph # 2.9       Lymph% 27.4       Magnesium         MCH 33.7       MCHC 32.8              Mono # 1.2       Mono% 10.8       MPV 8.9       nRBC 0       Phosphorus         Platelets 239       Potassium 4.6       PROTEIN TOTAL 7.9       Protime         RBC 4.60       RDW 13.4       SARS-CoV-2 RNA, Amplification, Qual   Negative  Comment:  This test utilizes isothermal nucleic acid amplification   technology to detect the SARS-CoV-2 RdRp nucleic acid segment.   The analytical  sensitivity (limit of detection) is 125 genome   equivalents/mL.   A POSITIVE result implies infection with the SARS-CoV-2 virus;  the patient is presumed to be contagious.    A NEGATIVE result means that SARS-CoV-2 nucleic acids are not  present above the limit of detection. It does not rule out the   possibility of COVID-19 and should not be the sole basis for   treatment decisions. If COVID-19 is strongly suspected based on  clinical and exposure history, re-testing should be considered.   This test is only for use under the Food and Drug   Administration s Emergency Use Authorization (EUA).   Commercial kits are provided by [employer].   Performance characteristics of the EUA have been independently  verified by [facility] Department of  Pathology and Laboratory Medicine.   _________________________________________________________________  The [employer] NOW COVID-19 Letter of Authorization, along with the   authorized Fact Sheet for Healthcare Providers, the authorized Fact  Sheet for Patients, and authorized labeling are available on the FDA   website:  www.fda.gov/MedicalDevices/Safety/EmergencySituations/jnp398846.htm       Sodium 138       Troponin I 0.04       WBC 10.68           All pertinent labs within the past 24 hours have been reviewed.    Significant Imaging: EKG: I have reviewed all pertinent results/findings within the past 24 hours and my personal findings are: no acute findings

## 2020-05-17 NOTE — PLAN OF CARE
Problem: Fall Injury Risk  Goal: Absence of Fall and Fall-Related Injury  Outcome: Ongoing, Progressing     Problem: Adult Inpatient Plan of Care  Goal: Plan of Care Review  Outcome: Ongoing, Progressing     Problem: Adult Inpatient Plan of Care  Goal: Optimal Comfort and Wellbeing  Outcome: Ongoing, Progressing     Problem: Adult Inpatient Plan of Care  Goal: Rounds/Family Conference  Outcome: Ongoing, Progressing     Problem: Electrolyte Imbalance (Acute Kidney Injury/Impairment)  Goal: Serum Electrolyte Balance  Outcome: Ongoing, Progressing     Problem: Renal Function Impairment (Acute Kidney Injury/Impairment)  Goal: Effective Renal Function  Outcome: Ongoing, Progressing     Problem: Fluid Imbalance (Pneumonia)  Goal: Fluid Balance  Outcome: Ongoing, Progressing     Problem: Infection (Pneumonia)  Goal: Resolution of Infection Signs/Symptoms  Outcome: Ongoing, Progressing     Problem: Respiratory Compromise (Pneumonia)  Goal: Effective Oxygenation and Ventilation  Outcome: Ongoing, Progressing

## 2020-05-17 NOTE — H&P
[facility] Medicine  History & Physical    Patient Name: Cleveland Capps  MRN: [id number]  Admission Date: 5/16/2020  Attending Physician: Durga Chandler MD  Primary Care Provider: Romero Snyder MD         Patient information was obtained from patient and ER records.     Subjective:     Principal Problem:SOB (shortness of breath)    Chief Complaint:   Chief Complaint   Patient presents with    Shortness of Breath        HPI: The patient is a 63 y/o male with PMH of CAD, NSTEMI, CABG, and HTN who presented with SOB. The patient reported acute onset about 2 hours prior to ER presentation. He was in his usual state of health but after he woke from a nap at around 5 PM, he started to feel SOB. He appeared cyanotic and quite dyspneic and using accessory muscles per ER reports. He had diminished breath sounds with rales  was immediately placed on BIPAP and given lasix 80 mg IV x 1 with 1.2L UOP. He has since improved and is currently down to 4L NC and reports significant improvement in this symptoms. BP on presentation was in the 170s-190s systolic. He was also given nitro x 2. The patient reports having intermittent spikes in his BPs and this had occurred once before about 6 years ago where he sought hospitalization but he doesn't always become SOB like this. He denies any fevers, chills, sick contacts, chest pain, palpitations, LE swelling, or other symptoms.     Past Medical History:   Diagnosis Date    Acute kidney injury     COPD (chronic obstructive pulmonary disease)     Coronary artery disease     Encounter for blood transfusion     Former smoker     Hypertension     Myocardial infarction     Thyroid disease        Past Surgical History:   Procedure Laterality Date    COLONOSCOPY N/A 1/3/2017    Procedure: COLONOSCOPY;  Surgeon: Marcus Green MD;  Location: [facility];  Service: Endoscopy;  Laterality: N/A;    CORONARY STENT PLACEMENT      GASTROSTOMY  TUBE PLACEMENT      PEG TUBE REMOVAL      TRACHEOSTOMY CLOSURE      TRACHEOSTOMY TUBE PLACEMENT      UPPER GASTROINTESTINAL ENDOSCOPY  2016    Dr. Zarco with PEG tube placement       Review of patient's allergies indicates:   Allergen Reactions    Penicillins Hives       No current facility-administered medications on file prior to encounter.      Current Outpatient Medications on File Prior to Encounter   Medication Sig    aspirin (ECOTRIN) 81 MG EC tablet Take 81 mg by mouth once daily.    atorvastatin (LIPITOR) 40 MG tablet Take 1 tablet (40 mg total) by mouth once daily.    carvedilol (COREG) 6.25 MG tablet Take 2 tablets (12.5 mg total) by mouth 2 (two) times daily.    clopidogrel (PLAVIX) 75 mg tablet Take 75 mg by mouth once daily.    enalapril (VASOTEC) 20 MG tablet TAKE ONE TABLET BY MOUTH EVERYDAY AS NEEDED    travoprost (TRAVATAN Z) 0.004 % Drop Place 1 drop into both eyes every evening. (Patient taking differently: Place 1 drop into both eyes nightly as needed. )    [DISCONTINUED] acetaminophen (TYLENOL) 325 MG tablet Take 2 tablets (650 mg total) by mouth every 6 (six) hours as needed.    [DISCONTINUED] artificial tears (ISOPTO TEARS) 0.5 % ophthalmic solution Place 1 drop into both eyes as needed.    [DISCONTINUED] budesonide-formoterol 160-4.5 mcg (SYMBICORT) 160-4.5 mcg/actuation HFAA Inhale 2 puffs into the lungs every 12 (twelve) hours. This is a a controller medication and should be taken every day as directed by your doctor    [DISCONTINUED] levothyroxine (SYNTHROID) 50 MCG tablet Take 50 mcg by mouth once daily.     Family History     None        Tobacco Use    Smoking status: Former Smoker     Types: Cigarettes     Last attempt to quit: 2005     Years since quittin.4    Tobacco comment: quit    Substance and Sexual Activity    Alcohol use: No    Drug use: No    Sexual activity: Yes     Review of Systems   Constitutional: Positive for fatigue. Negative for  activity change, appetite change and fever.   HENT: Positive for congestion and sinus pain. Negative for ear discharge, mouth sores, nosebleeds, rhinorrhea, sinus pressure and tinnitus.    Eyes: Negative.  Negative for pain, redness and itching.   Respiratory: Positive for cough, chest tightness, shortness of breath and wheezing. Negative for apnea, choking and stridor.    Cardiovascular: Positive for leg swelling. Negative for chest pain and palpitations.   Gastrointestinal: Negative for abdominal distention, abdominal pain, anal bleeding, blood in stool, constipation and diarrhea.   Endocrine: Negative.    Genitourinary: Negative for difficulty urinating, flank pain, frequency and urgency.   Musculoskeletal: Negative for arthralgias, back pain, gait problem and myalgias.   Skin: Negative for color change and pallor.   Allergic/Immunologic: Negative.    Neurological: Positive for weakness. Negative for dizziness, facial asymmetry, light-headedness and headaches.   Hematological: Negative for adenopathy. Does not bruise/bleed easily.   Psychiatric/Behavioral: The patient is nervous/anxious.      Objective:     Vital Signs (Most Recent):  Temp: 97.2 °F (36.2 °C) (05/17/20 1119)  Pulse: 60 (05/17/20 1119)  Resp: 18 (05/17/20 1119)  BP: (!) 143/75 (05/17/20 1119)  SpO2: 99 % (05/17/20 1119) Vital Signs (24h Range):  Temp:  [97.2 °F (36.2 °C)-98.6 °F (37 °C)] 97.2 °F (36.2 °C)  Pulse:  [] 60  Resp:  [14-28] 18  SpO2:  [40 %-99 %] 99 %  BP: (118-174)/() 143/75     Weight: 77.1 kg (170 lb)  Body mass index is 31.09 kg/m².    Physical Exam   Constitutional: He is oriented to person, place, and time. He appears well-developed and well-nourished.   HENT:   Head: Normocephalic and atraumatic.   Eyes: Pupils are equal, round, and reactive to light. EOM are normal.   Neck: Normal range of motion. Neck supple. No tracheal deviation present. No thyromegaly present.   Cardiovascular: Normal rate, regular rhythm and  normal heart sounds.   Pulmonary/Chest: He is in respiratory distress. He has wheezes.   Abdominal: Soft. Bowel sounds are normal. He exhibits no distension. There is no tenderness. There is no rebound and no guarding.   Musculoskeletal: Normal range of motion.   Lymphadenopathy:     He has no cervical adenopathy.   Neurological: He is alert and oriented to person, place, and time.   Skin: Skin is warm and dry. Capillary refill takes less than 2 seconds.   Psychiatric: He has a normal mood and affect. His behavior is normal. Judgment and thought content normal.   Nursing note and vitals reviewed.        CRANIAL NERVES     CN III, IV, VI   Pupils are equal, round, and reactive to light.  Extraocular motions are normal.        Significant Labs:   Recent Lab Results       05/17/20  0647   05/17/20  0646   05/17/20  0026   05/16/20  2220   05/16/20  2209        Albumin   3.9           Alkaline Phosphatase   81           ALT   34           Anion Gap   12           AST   91           Baso # 0.03             Basophil% 0.3             BILIRUBIN TOTAL   1.4  Comment:  For infants and newborns, interpretation of results should be based  on gestational age, weight and in agreement with clinical  observations.  Premature Infant recommended reference ranges:  Up to 24 hours.............<8.0 mg/dL  Up to 48 hours............<12.0 mg/dL  3-5 days..................<15.0 mg/dL  6-29 days.................<15.0 mg/dL             BNP               BUN, Bld   19           Calcium   8.5           Chloride   100           CO2   25           Creatinine   1.1           Differential Method Automated             eGFR if    >60.0           eGFR if non    >60.0  Comment:  Calculation used to obtain the estimated glomerular filtration  rate (eGFR) is the CKD-EPI equation.              Eos # 0.0             Eosinophil% 0.4             Glucose   141           Gran # (ANC) 7.9             Gran% 79.0              Hematocrit 41.4             Hemoglobin 14.0             Immature Grans (Abs) 0.04  Comment:  Mild elevation in immature granulocytes is non specific and   can be seen in a variety of conditions including stress response,   acute inflammation, trauma and pregnancy. Correlation with other   laboratory and clinical findings is essential.               Immature Granulocytes 0.4             INR       1.1  Comment:  Coumadin Therapy:  2.0 - 3.0 for INR for all indicators except mechanical heart valves  and antiphospholipid syndromes which should use 2.5 - 3.5.         Lactate, Kalpesh     1.4  Comment:  Falsely low lactic acid results can be found in samples   containing >=13.0 mg/dL total bilirubin and/or >=3.5 mg/dL   direct bilirubin.     2.5  Comment:  Falsely low lactic acid results can be found in samples   containing >=13.0 mg/dL total bilirubin and/or >=3.5 mg/dL   direct bilirubin.       Lymph # 1.1             Lymph% 10.9             Magnesium   2.1           MCH 33.3             MCHC 33.8             MCV 98             Mono # 0.9             Mono% 9.0             MPV 8.7             nRBC 0             Phosphorus   4.4           Platelets 194             Potassium   3.9           PROTEIN TOTAL   7.1           Protime       11.6       RBC 4.21             RDW 13.4             SARS-CoV-2 RNA, Amplification, Qual               Sodium   137           Troponin I               WBC 10.00                              05/16/20  2134   05/16/20  2122        Albumin 4.0       Alkaline Phosphatase 105       ALT 29       Anion Gap 18       AST 39       Baso # 0.04       Basophil% 0.4       BILIRUBIN TOTAL 1.8  Comment:  For infants and newborns, interpretation of results should be based  on gestational age, weight and in agreement with clinical  observations.  Premature Infant recommended reference ranges:  Up to 24 hours.............<8.0 mg/dL  Up to 48 hours............<12.0 mg/dL  3-5 days..................<15.0 mg/dL  6-29  days.................<15.0 mg/dL           Comment:  Values of less than 100 pg/ml are consistent with non-CHF populations.       BUN, Bld 20       Calcium 8.5       Chloride 103       CO2 17       Creatinine 1.5       Differential Method Automated       eGFR if African American 56.5       eGFR if non  48.8  Comment:  Calculation used to obtain the estimated glomerular filtration  rate (eGFR) is the CKD-EPI equation.          Eos # 0.2       Eosinophil% 2.1       Glucose 270       Gran # (ANC) 6.2       Gran% 58.3       Hematocrit 47.2       Hemoglobin 15.5       Immature Grans (Abs) 0.11  Comment:  Mild elevation in immature granulocytes is non specific and   can be seen in a variety of conditions including stress response,   acute inflammation, trauma and pregnancy. Correlation with other   laboratory and clinical findings is essential.         Immature Granulocytes 1.0       INR         Lactate, Kalpesh         Lymph # 2.9       Lymph% 27.4       Magnesium         MCH 33.7       MCHC 32.8              Mono # 1.2       Mono% 10.8       MPV 8.9       nRBC 0       Phosphorus         Platelets 239       Potassium 4.6       PROTEIN TOTAL 7.9       Protime         RBC 4.60       RDW 13.4       SARS-CoV-2 RNA, Amplification, Qual   Negative  Comment:  This test utilizes isothermal nucleic acid amplification   technology to detect the SARS-CoV-2 RdRp nucleic acid segment.   The analytical sensitivity (limit of detection) is 125 genome   equivalents/mL.   A POSITIVE result implies infection with the SARS-CoV-2 virus;  the patient is presumed to be contagious.    A NEGATIVE result means that SARS-CoV-2 nucleic acids are not  present above the limit of detection. It does not rule out the   possibility of COVID-19 and should not be the sole basis for   treatment decisions. If COVID-19 is strongly suspected based on  clinical and exposure history, re-testing should be considered.   This test is only  for use under the Food and Drug   Administration s Emergency Use Authorization (EUA).   Commercial kits are provided by [employer].   Performance characteristics of the EUA have been independently  verified by [facility] Department of  Pathology and Laboratory Medicine.   _________________________________________________________________  The ID NOW COVID-19 Letter of Authorization, along with the   authorized Fact Sheet for Healthcare Providers, the authorized Fact  Sheet for Patients, and authorized labeling are available on the FDA   website:  www.fda.gov/MedicalDevices/Safety/EmergencySituations/tyt863664.htm       Sodium 138       Troponin I 0.04       WBC 10.68           All pertinent labs within the past 24 hours have been reviewed.    Significant Imaging: EKG: I have reviewed all pertinent results/findings within the past 24 hours and my personal findings are: no acute findings    Assessment/Plan:     * SOB (shortness of breath)  Most likely secondary to flash pulmonary edema secondary to uncontrolled BP  Diuresed 1.2 L since admit with lasix 80 mg IV x 1  Continue with diuresis lasix 40 mg IV BID  Strict Is and Os and daily weights  2D echo  Repeat CXR in am   Fluid restriction to 1.5 L  Continue BP management with lasix, carvedilol, and ACEI; titrate as needed  Initially on BIPAP but now on 3L NC and stable; continue to wean as tolerated  Nebs PRN      Pneumonia  CXR concerning for possible infiltrate  Lactic acid of 2.5  Repeat WNL  Continue with vanc and meropenem; de-escalate as appropriate   Afebrile and no leukocytosis  O2 and nebs PRN  Clinically stable       CKD (chronic kidney disease) stage 3, GFR 30-59 ml/min  Lab Results   Component Value Date    CREATININE 1.5 (H) 05/16/2020     Last set of labs from 2016 when patient was admitted for NSTEMI and CABG; required HD on DC but no longer HD dependent  Unclear of his baseline   Cont to monitor with daily labs   Avoid nephrotoxins.    Renally dose all medications   Monitor events that may lead to decreased renal perfusion (hypovolemia, hypotension, sepsis).   Monitor urine output (goal 0.5 mL/kg/hr) to assure that no obstruction precipitates worsening in GFR.  Cont ACE inhibitor for craig protection.   Serum bicarb level 17. Monitor for need to initiate sodium bicarb.         Benign essential HTN  Patient reports intermittent spikes in BPs but otherwise well controlled  Continue with carvedilol, lasix, and lisinopril (takes enalapril 20 mg daily at home but not on formulary)  Titrate as needed   May need addition of diuretic therapy on DC  Echo pending         Coronary artery disease  Per NSTEMI      NSTEMI (non-ST elevated myocardial infarction)  Continue ASA, plavix, carvedilol, ACEI, and atorvastatin         VTE Risk Mitigation (From admission, onward)         Ordered     heparin (porcine) injection 5,000 Units  Every 8 hours      05/17/20 0129     IP VTE HIGH RISK PATIENT  Once      05/17/20 0135     Place sequential compression device  Until discontinued      05/17/20 0135     Place KEN hose  Until discontinued      05/17/20 0135                   Durga Chandler MD  Department of Hospital Medicine   [facility]

## 2020-05-17 NOTE — ED PROVIDER NOTES
Encounter Date: 2020       History     Chief Complaint   Patient presents with    Shortness of Breath     Well-developed 64-year-old male was outside the hospital attempting to walk in the hospital when he EMS personnel saw him and helped him get into the hospital.  Patient extremely short of breath.  Cyanotic.  States that he started with shortness of breath approximately 1 and half to 2 hr ago.  Rapid onset.  Denies any chest pain.  No recent fevers or chills.  No cough.  Able to maintain some sentences.  Has had similar episodes in the distant past.        Review of patient's allergies indicates:   Allergen Reactions    Penicillins Hives     Past Medical History:   Diagnosis Date    Acute kidney injury     COPD (chronic obstructive pulmonary disease)     Coronary artery disease     Encounter for blood transfusion     Former smoker     Hypertension     Myocardial infarction     Thyroid disease      Past Surgical History:   Procedure Laterality Date    COLONOSCOPY N/A 1/3/2017    Procedure: COLONOSCOPY;  Surgeon: Marcus Green MD;  Location: [facility];  Service: Endoscopy;  Laterality: N/A;    CORONARY STENT PLACEMENT      GASTROSTOMY TUBE PLACEMENT      PEG TUBE REMOVAL      TRACHEOSTOMY CLOSURE      TRACHEOSTOMY TUBE PLACEMENT      UPPER GASTROINTESTINAL ENDOSCOPY  2016    Dr. Green with PEG tube placement     Family History   Problem Relation Age of Onset    Colon cancer Neg Hx     Colon polyps Neg Hx     Crohn's disease Neg Hx     Ulcerative colitis Neg Hx      Social History     Tobacco Use    Smoking status: Former Smoker     Types: Cigarettes     Last attempt to quit: 2005     Years since quittin.4    Tobacco comment: quit    Substance Use Topics    Alcohol use: No    Drug use: No     Review of Systems   Constitutional: Negative.  Negative for fatigue and fever.   Respiratory: Negative.  Negative for cough, chest tightness, shortness of breath and  wheezing.    Cardiovascular: Negative.  Negative for chest pain.   Gastrointestinal: Negative.  Negative for abdominal distention and abdominal pain.   Genitourinary: Negative for difficulty urinating.   All other systems reviewed and are negative.      Physical Exam     Initial Vitals   BP Pulse Resp Temp SpO2   05/16/20 2115 05/16/20 2113 05/16/20 2113 05/16/20 2113 05/16/20 2113   (!) 174/94 (!) 115 (!) 24 97.7 °F (36.5 °C) (!) 40 %      MAP       --                Physical Exam    Nursing note and vitals reviewed.  Constitutional: He appears well-developed and well-nourished.   Obese.   HENT:   Head: Normocephalic.   Neck: Normal range of motion. Neck supple.   Cardiovascular: Normal rate and normal heart sounds.   Pulmonary/Chest: Accessory muscle usage present. He is in respiratory distress. He has decreased breath sounds in the right middle field and the right lower field. He has no wheezes. He has no rhonchi. He has rales in the right middle field, the right lower field and the left lower field. He exhibits no tenderness, no edema and no swelling.   Abdominal: Soft. He exhibits distension. He exhibits no mass. There is no tenderness. There is no rebound and no guarding.   Musculoskeletal: Normal range of motion.   Neurological: He is alert and oriented to person, place, and time.   Skin: Skin is warm and dry. Capillary refill takes less than 2 seconds.         ED Course   Procedures  Labs Reviewed   CBC W/ AUTO DIFFERENTIAL - Abnormal; Notable for the following components:       Result Value    Mean Corpuscular Volume 103 (*)     Mean Corpuscular Hemoglobin 33.7 (*)     MPV 8.9 (*)     Immature Granulocytes 1.0 (*)     Immature Grans (Abs) 0.11 (*)     Mono # 1.2 (*)     All other components within normal limits   COMPREHENSIVE METABOLIC PANEL - Abnormal; Notable for the following components:    CO2 17 (*)     Glucose 270 (*)     Creatinine 1.5 (*)     Calcium 8.5 (*)     Total Bilirubin 1.8 (*)     Anion  Gap 18 (*)     eGFR if  56.5 (*)     eGFR if non  48.8 (*)     All other components within normal limits   B-TYPE NATRIURETIC PEPTIDE - Abnormal; Notable for the following components:     (*)     All other components within normal limits   LACTIC ACID, PLASMA - Abnormal; Notable for the following components:    Lactate (Lactic Acid) 2.5 (*)     All other components within normal limits   CULTURE, BLOOD   CULTURE, BLOOD   SARS-COV-2 RNA AMPLIFICATION, QUAL    Narrative:     What symptom criteria does the patient meet?->Shortness of  breath or difficulty breathing   TROPONIN I   PROTIME-INR   PROTIME-INR          Imaging Results          X-Ray Chest AP Portable (In process)                  Medical Decision Making:   Differential Diagnosis:   Pneumonia, pulmonary bruising, asthma exacerbation, bronchitis, MI, pneumothorax, foreign body aspiration, CHF, pulmonary edema.    ED Management:  The patient is in moderate distress upon arrival.  100% non-rebreather has helped.  The patient has now been placed on BiPAP with marked improvement.  I have also given the patient some sublingual nitroglycerin x2 which is decreased his hypertension from the 190s down into the 150s.  Also marked breathing improvement with that.  After approximately an hour and a half the patient has settle down substantially.  Vital signs are quite stable.  Blood pressure 124 systolic.  Heart rate normal at a rate of approximately 60 per.  I have spoken with the hospitalist, Dr. Ferguson.  Will admit this patient to her.  His chest x-ray is concerning for what is likely flash pulmonary edema but cannot rule out a infiltrate.  He will be placed on antibiotics hospital-acquired pneumonia, vanc and meropenem.  I explained the plan to the patient and he agrees.                                 Clinical Impression:       ICD-10-CM ICD-9-CM   1. Flash pulmonary edema J81.0 518.4   2. SOB (shortness of breath) R06.02 786.05    3. Dyspnea, unspecified type R06.00 786.09   4. Hypertension, unspecified type I10 401.9         Disposition:   Disposition: Admitted  Condition: Stable     ED Disposition Condition    Observation                           Bryant Loredo MD  05/17/20 0026

## 2020-05-17 NOTE — ED NOTES
Patient resting comfortably on bipap. VSS. Patient reports that he feels better. Able to speak in full sentences with ease at this time.

## 2020-05-17 NOTE — SUBJECTIVE & OBJECTIVE
Past Medical History:   Diagnosis Date    Acute kidney injury     COPD (chronic obstructive pulmonary disease)     Coronary artery disease     Encounter for blood transfusion     Former smoker     Hypertension     Myocardial infarction     Thyroid disease        Past Surgical History:   Procedure Laterality Date    COLONOSCOPY N/A 1/3/2017    Procedure: COLONOSCOPY;  Surgeon: Marcus Green MD;  Location: [facility];  Service: Endoscopy;  Laterality: N/A;    CORONARY STENT PLACEMENT      GASTROSTOMY TUBE PLACEMENT      PEG TUBE REMOVAL      TRACHEOSTOMY CLOSURE      TRACHEOSTOMY TUBE PLACEMENT      UPPER GASTROINTESTINAL ENDOSCOPY  05/2016    Dr. Green with PEG tube placement       Review of patient's allergies indicates:   Allergen Reactions    Penicillins Hives       No current facility-administered medications on file prior to encounter.      Current Outpatient Medications on File Prior to Encounter   Medication Sig    aspirin (ECOTRIN) 81 MG EC tablet Take 81 mg by mouth once daily.    atorvastatin (LIPITOR) 40 MG tablet Take 1 tablet (40 mg total) by mouth once daily.    carvedilol (COREG) 6.25 MG tablet Take 2 tablets (12.5 mg total) by mouth 2 (two) times daily.    clopidogrel (PLAVIX) 75 mg tablet Take 75 mg by mouth once daily.    enalapril (VASOTEC) 20 MG tablet TAKE ONE TABLET BY MOUTH EVERYDAY AS NEEDED    travoprost (TRAVATAN Z) 0.004 % Drop Place 1 drop into both eyes every evening. (Patient taking differently: Place 1 drop into both eyes nightly as needed. )    [DISCONTINUED] acetaminophen (TYLENOL) 325 MG tablet Take 2 tablets (650 mg total) by mouth every 6 (six) hours as needed.    [DISCONTINUED] artificial tears (ISOPTO TEARS) 0.5 % ophthalmic solution Place 1 drop into both eyes as needed.    [DISCONTINUED] budesonide-formoterol 160-4.5 mcg (SYMBICORT) 160-4.5 mcg/actuation HFAA Inhale 2 puffs into the lungs every 12 (twelve) hours. This is a a controller medication  and should be taken every day as directed by your doctor    [DISCONTINUED] levothyroxine (SYNTHROID) 50 MCG tablet Take 50 mcg by mouth once daily.     Family History     None        Tobacco Use    Smoking status: Former Smoker     Types: Cigarettes     Last attempt to quit: 2005     Years since quittin.4    Tobacco comment: quit    Substance and Sexual Activity    Alcohol use: No    Drug use: No    Sexual activity: Yes     Review of Systems   Constitutional: Negative for activity change, chills and fever.   HENT: Negative.    Eyes: Negative for visual disturbance.   Respiratory:        Per HPI   Cardiovascular: Negative for chest pain, palpitations and leg swelling.   Gastrointestinal: Negative for abdominal pain, diarrhea, nausea and vomiting.   Genitourinary: Negative for difficulty urinating, flank pain and frequency.   Musculoskeletal: Negative.    Skin: Negative.    Neurological: Negative for dizziness and headaches.   Hematological: Does not bruise/bleed easily.   Psychiatric/Behavioral: The patient is not nervous/anxious.      Objective:     Vital Signs (Most Recent):  Temp: 97.7 °F (36.5 °C) (20)  Pulse: 64 (20 0052)  Resp: 14 (20 0052)  BP: 133/74 (20 0047)  SpO2: 95 % (20 0052) Vital Signs (24h Range):  Temp:  [97.7 °F (36.5 °C)] 97.7 °F (36.5 °C)  Pulse:  [] 64  Resp:  [14-28] 14  SpO2:  [40 %-98 %] 95 %  BP: (118-174)/() 133/74     Weight: 77.1 kg (170 lb)  Body mass index is 31.09 kg/m².    Physical Exam   Constitutional: He appears well-developed and well-nourished. No distress.   Obese male sitting upright in stretcher. Appears comfortable.   Cardiovascular: Normal rate.   Pulmonary/Chest:   Appears comfortable on 4L NC  No dyspnea noted with speech; able to complete sentences  Diminished rales on exam per RN reports compared to admit   Abdominal: Soft.   obese   Musculoskeletal: He exhibits no edema.   Psychiatric: He has a normal  mood and affect.   Vitals reviewed.          Significant Labs: All pertinent labs within the past 24 hours have been reviewed.    Significant Imaging: I have reviewed all pertinent imaging results/findings within the past 24 hours.

## 2020-05-17 NOTE — ED NOTES
Patient resting comfortably off of bipap. Tolerating NC well at 4 liters/min. O2 saturation 93-97% on 4 liters/min.

## 2020-05-17 NOTE — ED NOTES
Patient presented to ED POV with c/o acute SOB that began 30 minutes PTA. Upon arrival the patient ambulated into the ED. He was noted to be dyspneic and was having difficulty speaking. Skin warm, warm, diaphoretic, pale. Cyanosis noted around lips. The patient was immediately taken to ED room 2 and placed on the cardiac monitor, BP cuff, and continuous pulse ox. His O2 saturation was noted to be 40% on room air. He was placed on a nonrebreather at 15 liters/min and Dr. Esparza was called to the bedside. The patients O2 saturation cailin to 88% on the nonrebreather. RT Amado was called to the bedside and the patient was changed to bipap as ordered by Dr. Esparza. SOB and dyspnea improving. Skin no longer moist and is now pink. The patient reports that he is beginning to feel better. RT Amado remains at bedside. Will continue to monitor.

## 2020-05-17 NOTE — ASSESSMENT & PLAN NOTE
Lab Results   Component Value Date    CREATININE 1.5 (H) 05/16/2020     Last set of labs from 2016 when patient was admitted for NSTEMI and CABG; required HD on DC but no longer HD dependent  Unclear of his baseline   Cont to monitor with daily labs   Avoid nephrotoxins.   Renally dose all medications   Monitor events that may lead to decreased renal perfusion (hypovolemia, hypotension, sepsis).   Monitor urine output (goal 0.5 mL/kg/hr) to assure that no obstruction precipitates worsening in GFR.  Cont ACE inhibitor for craig protection.   Serum bicarb level 17. Monitor for need to initiate sodium bicarb.

## 2020-05-17 NOTE — ASSESSMENT & PLAN NOTE
Patient reports intermittent spikes in BPs but otherwise well controlled  Continue with carvedilol, lasix, and lisinopril (takes enalapril 20 mg daily at home but not on formulary)  Titrate as needed   May need addition of diuretic therapy on DC  Echo pending

## 2020-05-17 NOTE — ED NOTES
Dr. Esparza at bedside. Per Dr. Esparza we are to take the patient off of bipap and place him on NC. RT Amado notified.

## 2020-05-18 LAB
ALBUMIN SERPL BCP-MCNC: 3.8 G/DL (ref 3.5–5.2)
ALP SERPL-CCNC: 61 U/L (ref 55–135)
ALT SERPL W/O P-5'-P-CCNC: 30 U/L (ref 10–44)
ANION GAP SERPL CALC-SCNC: 8 MMOL/L (ref 8–16)
AST SERPL-CCNC: 48 U/L (ref 10–40)
BASOPHILS # BLD AUTO: 0.03 K/UL (ref 0–0.2)
BASOPHILS NFR BLD: 0.4 % (ref 0–1.9)
BILIRUB SERPL-MCNC: 1.4 MG/DL (ref 0.1–1)
BUN SERPL-MCNC: 30 MG/DL (ref 8–23)
CALCIUM SERPL-MCNC: 8.2 MG/DL (ref 8.7–10.5)
CHLORIDE SERPL-SCNC: 100 MMOL/L (ref 95–110)
CO2 SERPL-SCNC: 29 MMOL/L (ref 23–29)
CREAT SERPL-MCNC: 1.3 MG/DL (ref 0.5–1.4)
DIFFERENTIAL METHOD: ABNORMAL
EOSINOPHIL # BLD AUTO: 0.2 K/UL (ref 0–0.5)
EOSINOPHIL NFR BLD: 2.9 % (ref 0–8)
ERYTHROCYTE [DISTWIDTH] IN BLOOD BY AUTOMATED COUNT: 13.3 % (ref 11.5–14.5)
EST. GFR  (AFRICAN AMERICAN): >60 ML/MIN/1.73 M^2
EST. GFR  (NON AFRICAN AMERICAN): 58.1 ML/MIN/1.73 M^2
ESTIMATED AVG GLUCOSE: 126 MG/DL (ref 68–131)
GLUCOSE SERPL-MCNC: 118 MG/DL (ref 70–110)
HBA1C MFR BLD HPLC: 6 % (ref 4.5–6.2)
HCT VFR BLD AUTO: 43.9 % (ref 40–54)
HGB BLD-MCNC: 14.5 G/DL (ref 14–18)
IMM GRANULOCYTES # BLD AUTO: 0.02 K/UL (ref 0–0.04)
IMM GRANULOCYTES NFR BLD AUTO: 0.3 % (ref 0–0.5)
LYMPHOCYTES # BLD AUTO: 1.2 K/UL (ref 1–4.8)
LYMPHOCYTES NFR BLD: 15.8 % (ref 18–48)
MAGNESIUM SERPL-MCNC: 2.2 MG/DL (ref 1.6–2.6)
MCH RBC QN AUTO: 32.7 PG (ref 27–31)
MCHC RBC AUTO-ENTMCNC: 33 G/DL (ref 32–36)
MCV RBC AUTO: 99 FL (ref 82–98)
MONOCYTES # BLD AUTO: 0.6 K/UL (ref 0.3–1)
MONOCYTES NFR BLD: 7.8 % (ref 4–15)
NEUTROPHILS # BLD AUTO: 5.3 K/UL (ref 1.8–7.7)
NEUTROPHILS NFR BLD: 72.8 % (ref 38–73)
NRBC BLD-RTO: 0 /100 WBC
PHOSPHATE SERPL-MCNC: 4.1 MG/DL (ref 2.7–4.5)
PLATELET # BLD AUTO: 173 K/UL (ref 150–350)
PMV BLD AUTO: 8.5 FL (ref 9.2–12.9)
POTASSIUM SERPL-SCNC: 3.8 MMOL/L (ref 3.5–5.1)
PROT SERPL-MCNC: 7.3 G/DL (ref 6–8.4)
RBC # BLD AUTO: 4.43 M/UL (ref 4.6–6.2)
SODIUM SERPL-SCNC: 137 MMOL/L (ref 136–145)
VANCOMYCIN TROUGH SERPL-MCNC: 10.9 UG/ML (ref 10–22)
WBC # BLD AUTO: 7.32 K/UL (ref 3.9–12.7)

## 2020-05-18 PROCEDURE — 80202 ASSAY OF VANCOMYCIN: CPT

## 2020-05-18 PROCEDURE — 36415 COLL VENOUS BLD VENIPUNCTURE: CPT

## 2020-05-18 PROCEDURE — 94761 N-INVAS EAR/PLS OXIMETRY MLT: CPT

## 2020-05-18 PROCEDURE — 85025 COMPLETE CBC W/AUTO DIFF WBC: CPT

## 2020-05-18 PROCEDURE — 27000221 HC OXYGEN, UP TO 24 HOURS

## 2020-05-18 PROCEDURE — 63600175 PHARM REV CODE 636 W HCPCS: Performed by: INTERNAL MEDICINE

## 2020-05-18 PROCEDURE — G0378 HOSPITAL OBSERVATION PER HR: HCPCS

## 2020-05-18 PROCEDURE — 99225 PR SUBSEQUENT OBSERVATION CARE,LEVEL II: CPT | Mod: ,,, | Performed by: INTERNAL MEDICINE

## 2020-05-18 PROCEDURE — 84100 ASSAY OF PHOSPHORUS: CPT

## 2020-05-18 PROCEDURE — 80053 COMPREHEN METABOLIC PANEL: CPT

## 2020-05-18 PROCEDURE — 99225 PR SUBSEQUENT OBSERVATION CARE,LEVEL II: ICD-10-PCS | Mod: ,,, | Performed by: INTERNAL MEDICINE

## 2020-05-18 PROCEDURE — 83735 ASSAY OF MAGNESIUM: CPT

## 2020-05-18 PROCEDURE — 96376 TX/PRO/DX INJ SAME DRUG ADON: CPT | Mod: 59

## 2020-05-18 PROCEDURE — 25000003 PHARM REV CODE 250: Performed by: INTERNAL MEDICINE

## 2020-05-18 RX ADMIN — VANCOMYCIN HYDROCHLORIDE 1250 MG: 1.25 INJECTION, POWDER, LYOPHILIZED, FOR SOLUTION INTRAVENOUS at 09:05

## 2020-05-18 RX ADMIN — MEROPENEM AND SODIUM CHLORIDE 1 G: 1 INJECTION, SOLUTION INTRAVENOUS at 05:05

## 2020-05-18 RX ADMIN — ASPIRIN 81 MG: 81 TABLET, COATED ORAL at 08:05

## 2020-05-18 RX ADMIN — CLOPIDOGREL BISULFATE 75 MG: 75 TABLET ORAL at 08:05

## 2020-05-18 RX ADMIN — MEROPENEM AND SODIUM CHLORIDE 1 G: 1 INJECTION, SOLUTION INTRAVENOUS at 08:05

## 2020-05-18 RX ADMIN — MEROPENEM AND SODIUM CHLORIDE 1 G: 1 INJECTION, SOLUTION INTRAVENOUS at 01:05

## 2020-05-18 RX ADMIN — FUROSEMIDE 40 MG: 10 INJECTION, SOLUTION INTRAMUSCULAR; INTRAVENOUS at 05:05

## 2020-05-18 RX ADMIN — CARVEDILOL 12.5 MG: 12.5 TABLET, FILM COATED ORAL at 08:05

## 2020-05-18 RX ADMIN — FUROSEMIDE 40 MG: 10 INJECTION, SOLUTION INTRAMUSCULAR; INTRAVENOUS at 06:05

## 2020-05-18 RX ADMIN — LISINOPRIL 20 MG: 10 TABLET ORAL at 08:05

## 2020-05-18 RX ADMIN — ATORVASTATIN CALCIUM 40 MG: 40 TABLET, FILM COATED ORAL at 08:05

## 2020-05-18 RX ADMIN — CARVEDILOL 12.5 MG: 12.5 TABLET, FILM COATED ORAL at 09:05

## 2020-05-18 NOTE — SUBJECTIVE & OBJECTIVE
Interval History:     Review of Systems   Constitutional: Negative for activity change, appetite change, fatigue and fever.   HENT: Negative for congestion, ear discharge, mouth sores, nosebleeds, rhinorrhea, sinus pressure, sinus pain and tinnitus.    Eyes: Negative.  Negative for pain, redness and itching.   Respiratory: Positive for cough and shortness of breath. Negative for apnea, choking, chest tightness, wheezing and stridor.    Cardiovascular: Negative for chest pain, palpitations and leg swelling.   Gastrointestinal: Negative for abdominal distention, abdominal pain, anal bleeding, blood in stool, constipation and diarrhea.   Endocrine: Negative.    Genitourinary: Negative for difficulty urinating, flank pain, frequency and urgency.   Musculoskeletal: Positive for myalgias. Negative for arthralgias, back pain and gait problem.   Skin: Negative for color change and pallor.   Allergic/Immunologic: Negative.    Neurological: Positive for dizziness. Negative for facial asymmetry, weakness, light-headedness and headaches.   Hematological: Negative for adenopathy. Does not bruise/bleed easily.   Psychiatric/Behavioral: The patient is nervous/anxious.      Objective:     Vital Signs (Most Recent):  Temp: 96.6 °F (35.9 °C) (05/18/20 1042)  Pulse: (!) 58 (05/18/20 1042)  Resp: 16 (05/18/20 1042)  BP: (!) 111/55 (05/18/20 1042)  SpO2: 98 % (05/18/20 1042) Vital Signs (24h Range):  Temp:  [96.6 °F (35.9 °C)-98.4 °F (36.9 °C)] 96.6 °F (35.9 °C)  Pulse:  [52-64] 58  Resp:  [16-18] 16  SpO2:  [96 %-100 %] 98 %  BP: (110-140)/(55-71) 111/55     Weight: 77.5 kg (170 lb 13.7 oz)  Body mass index is 31.25 kg/m².    Intake/Output Summary (Last 24 hours) at 5/18/2020 1223  Last data filed at 5/18/2020 0819  Gross per 24 hour   Intake 1410 ml   Output 3225 ml   Net -1815 ml      Physical Exam   Constitutional: He is oriented to person, place, and time. He appears well-developed and well-nourished.   HENT:   Head: Normocephalic  and atraumatic.   Eyes: Pupils are equal, round, and reactive to light. EOM are normal.   Neck: Normal range of motion. Neck supple. No tracheal deviation present. No thyromegaly present.   Cardiovascular: Normal rate, regular rhythm and normal heart sounds.   Pulmonary/Chest: He is in respiratory distress. He has wheezes.   Abdominal: Soft. Bowel sounds are normal. He exhibits no distension. There is no tenderness. There is no rebound and no guarding.   Musculoskeletal: Normal range of motion.   Lymphadenopathy:     He has no cervical adenopathy.   Neurological: He is alert and oriented to person, place, and time.   Skin: Skin is warm and dry. Capillary refill takes less than 2 seconds.   Psychiatric: He has a normal mood and affect. His behavior is normal. Judgment and thought content normal.   Nursing note and vitals reviewed.      Significant Labs:   Recent Lab Results       05/18/20  0548        Albumin 3.8     Alkaline Phosphatase 61     ALT 30     Anion Gap 8     AST 48     Baso # 0.03     Basophil% 0.4     BILIRUBIN TOTAL 1.4  Comment:  For infants and newborns, interpretation of results should be based  on gestational age, weight and in agreement with clinical  observations.  Premature Infant recommended reference ranges:  Up to 24 hours.............<8.0 mg/dL  Up to 48 hours............<12.0 mg/dL  3-5 days..................<15.0 mg/dL  6-29 days.................<15.0 mg/dL       BUN, Bld 30     Calcium 8.2     Chloride 100     CO2 29     Creatinine 1.3     Differential Method Automated     eGFR if African American >60.0     eGFR if non  58.1  Comment:  Calculation used to obtain the estimated glomerular filtration  rate (eGFR) is the CKD-EPI equation.        Eos # 0.2     Eosinophil% 2.9     Glucose 118     Gran # (ANC) 5.3     Gran% 72.8     Hematocrit 43.9     Hemoglobin 14.5     Immature Grans (Abs) 0.02  Comment:  Mild elevation in immature granulocytes is non specific and   can be  seen in a variety of conditions including stress response,   acute inflammation, trauma and pregnancy. Correlation with other   laboratory and clinical findings is essential.       Immature Granulocytes 0.3     Lymph # 1.2     Lymph% 15.8     Magnesium 2.2     MCH 32.7     MCHC 33.0     MCV 99     Mono # 0.6     Mono% 7.8     MPV 8.5     nRBC 0     Phosphorus 4.1     Platelets 173     Potassium 3.8     PROTEIN TOTAL 7.3     RBC 4.43     RDW 13.3     Sodium 137     WBC 7.32         All pertinent labs within the past 24 hours have been reviewed.    Significant Imaging: I have reviewed and interpreted all pertinent imaging results/findings within the past 24 hours.

## 2020-05-18 NOTE — PLAN OF CARE
05/18/20 0830   Discharge Assessment   Assessment Type Discharge Planning Assessment   Confirmed/corrected address and phone number on facesheet? Yes   Assessment information obtained from? Patient   Expected Length of Stay (days) 2   Communicated expected length of stay with patient/caregiver yes   Prior to hospitilization cognitive status: Alert/Oriented   Prior to hospitalization functional status: Independent   Current cognitive status: Alert/Oriented   Current Functional Status: Independent   Facility Arrived From: Home   Lives With alone   Able to Return to Prior Arrangements yes   Is patient able to care for self after discharge? Yes   Who are your caregiver(s) and their phone number(s)? Anthony Capps - [employer] [phone]   Patient's perception of discharge disposition home or selfcare   Readmission Within the Last 30 Days no previous admission in last 30 days   Patient currently being followed by outpatient case management? No   Patient currently receives any other outside agency services? No   Equipment Currently Used at Home wheelchair;walker, standard;cane, straight;bedside commode  (Pt has DME at home from open heart surgery in 2016. He does not currently use this equipment. )   Do you have any problems affording any of your prescribed medications? No   Is the patient taking medications as prescribed? yes   Does the patient have transportation home? Yes   Transportation Anticipated car, drives self;family or friend will provide   Dialysis Name and Scheduled days Pt not currently receiving dialysis. Pt states he did have dialysis treatments in 2016 for about 5 weeks.    Does the patient receive services at the [facility]? No   Discharge Plan A Home   DME Needed Upon Discharge  none   Patient/Family in Agreement with Plan yes     Pt is a 65 yo admitted with SOB. He informs SW during the discharge planning assessment he lives home alone and is independent with his care. Pt currently drives and  takes care of household and personal care needed. He did report in 2016 he underwent open heart surgery and received dialysis for approximately 5 weeks. His last dialysis treatment was in May of 2016 and he reports no further problems since this time. Pt does not identify any current discharge needs at the time of this assessment. SW will follow and assist as needed during this admission.

## 2020-05-18 NOTE — PLAN OF CARE
05/18/20 0830   LAW Message   Medicare Outpatient and Observation Notification regarding financial responsibility Given to patient/caregiver;Explained to patient/caregiver;Signed/date by patient/caregiver   Date LAW was signed 05/11/20   Time LAW was signed 0830

## 2020-05-18 NOTE — ASSESSMENT & PLAN NOTE
Most likely secondary to flash pulmonary edema secondary to uncontrolled BP  Diuresed 1.2 L since admit with lasix 80 mg IV x 1  Continue with diuresis lasix 40 mg IV BID  Strict Is and Os and daily weights  2D echo  Repeat CXR in am   Fluid restriction to 1.5 L  Continue BP management with lasix, carvedilol, and ACEI; titrate as needed  Initially on BIPAP but now on 3L NC and stable; continue to wean as tolerated  Nebs PRN    05/18/2020:  Follow-up on echo results.  Repeat labs in the a.m. to include CBC BMP and BNP  Follow-up chest x-ray today.  Blood cultures positive for Staph aureus will await sensitivity and culture results.

## 2020-05-18 NOTE — PLAN OF CARE
Problem: Adult Inpatient Plan of Care  Goal: Absence of Hospital-Acquired Illness or Injury  Outcome: Ongoing, Progressing     Problem: Adult Inpatient Plan of Care  Goal: Optimal Comfort and Wellbeing  Outcome: Ongoing, Progressing     Problem: Adult Inpatient Plan of Care  Goal: Readiness for Transition of Care  Outcome: Ongoing, Progressing     Problem: Fluid Imbalance (Acute Kidney Injury/Impairment)  Goal: Optimal Fluid Balance  Outcome: Ongoing, Progressing

## 2020-05-18 NOTE — PROGRESS NOTES
"[facility] Medicine  Progress Note    Patient Name: Cleveland Capps  MRN: [id number]  Patient Class: OP- Observation   Admission Date: 5/16/2020  Length of Stay: 0 days  Attending Physician: Durga Chandler MD  Primary Care Provider: Romero Snyder MD        Subjective:     Principal Problem:SOB (shortness of breath)        HPI:  The patient is a 63 y/o male with PMH of CAD, NSTEMI, CABG, and HTN who presented with SOB. The patient reported acute onset about 2 hours prior to ER presentation. He was in his usual state of health but after he woke from a nap at around 5 PM, he started to feel SOB. He appeared cyanotic and quite dyspneic and using accessory muscles per ER reports. He had diminished breath sounds with rales  was immediately placed on BIPAP and given lasix 80 mg IV x 1 with 1.2L UOP. He has since improved and is currently down to 4L NC and reports significant improvement in this symptoms. BP on presentation was in the 170s-190s systolic. He was also given nitro x 2. The patient reports having intermittent spikes in his BPs and this had occurred once before about 6 years ago where he sought hospitalization but he doesn't always become SOB like this. He denies any fevers, chills, sick contacts, chest pain, palpitations, LE swelling, or other symptoms.     Overview/Hospital Course:  Patient has been comfortable this morning lying supine without difficulty.  Vital signs are stable with glucose 141  Magnesium phosphorus normal and CBC white blood cell count 10 hemoglobin 14 hematocrit 41 lactate was initially 2.5 and this morning is 1.4 normal    BP (!) 143/75 (BP Location: Right arm, Patient Position: Lying)   Pulse 60   Temp 97.2 °F (36.2 °C) (Oral)   Resp 18   Ht 5' 2" (1.575 m)   Wt 77.1 kg (170 lb)   SpO2 99%   BMI 31.09 kg/m²       05/18/2020:  Patient Vitals for the past 24 hrs:   BP Temp Temp src Pulse Resp SpO2 Weight   05/18/20 1042 (!) 111/55 " 96.6 °F (35.9 °C) Oral (!) 58 16 98 % --   05/18/20 0800 -- -- -- (!) 58 -- -- --   05/18/20 0722 -- -- -- (!) 55 18 100 % --   05/18/20 0719 119/63 96.8 °F (36 °C) Oral (!) 55 18 100 % --   05/18/20 0600 -- -- -- -- -- -- 77.5 kg (170 lb 13.7 oz)   05/18/20 0421 124/67 97.8 °F (36.6 °C) Oral 62 18 99 % --   05/17/20 2349 (!) 110/59 98.1 °F (36.7 °C) Oral (!) 52 16 100 % --   05/17/20 2109 136/66 -- -- 60 -- -- --   05/17/20 2105 118/63 -- -- (!) 57 -- -- --   05/17/20 1932 126/68 97.4 °F (36.3 °C) Oral 60 18 96 % --   05/17/20 1509 (!) 140/71 98.4 °F (36.9 °C) Oral 64 17 98 % --     Patient is lying supine no problems with no orthopnea.  Labs show normal white blood cell count H&H is 14.5 and 44 differential is normal.  Chem profile:  Sodium 137 potassium 3.8 chloride 100 bicarb 29 BUN 30 creatinine 1.3  Echocardiogram is pending on this patient.  Will keep admitted today and follow-up on echocardiogram and recheck BNP in the a.m. otherwise continue current medications  Blood cultures were called today regarding positive g positive cocci in clusters resembling Staph will follow cultures as needed    Interval History:     Review of Systems   Constitutional: Negative for activity change, appetite change, fatigue and fever.   HENT: Negative for congestion, ear discharge, mouth sores, nosebleeds, rhinorrhea, sinus pressure, sinus pain and tinnitus.    Eyes: Negative.  Negative for pain, redness and itching.   Respiratory: Positive for cough and shortness of breath. Negative for apnea, choking, chest tightness, wheezing and stridor.    Cardiovascular: Negative for chest pain, palpitations and leg swelling.   Gastrointestinal: Negative for abdominal distention, abdominal pain, anal bleeding, blood in stool, constipation and diarrhea.   Endocrine: Negative.    Genitourinary: Negative for difficulty urinating, flank pain, frequency and urgency.   Musculoskeletal: Positive for myalgias. Negative for arthralgias, back pain  and gait problem.   Skin: Negative for color change and pallor.   Allergic/Immunologic: Negative.    Neurological: Positive for dizziness. Negative for facial asymmetry, weakness, light-headedness and headaches.   Hematological: Negative for adenopathy. Does not bruise/bleed easily.   Psychiatric/Behavioral: The patient is nervous/anxious.      Objective:     Vital Signs (Most Recent):  Temp: 96.6 °F (35.9 °C) (05/18/20 1042)  Pulse: (!) 58 (05/18/20 1042)  Resp: 16 (05/18/20 1042)  BP: (!) 111/55 (05/18/20 1042)  SpO2: 98 % (05/18/20 1042) Vital Signs (24h Range):  Temp:  [96.6 °F (35.9 °C)-98.4 °F (36.9 °C)] 96.6 °F (35.9 °C)  Pulse:  [52-64] 58  Resp:  [16-18] 16  SpO2:  [96 %-100 %] 98 %  BP: (110-140)/(55-71) 111/55     Weight: 77.5 kg (170 lb 13.7 oz)  Body mass index is 31.25 kg/m².    Intake/Output Summary (Last 24 hours) at 5/18/2020 1223  Last data filed at 5/18/2020 0819  Gross per 24 hour   Intake 1410 ml   Output 3225 ml   Net -1815 ml      Physical Exam   Constitutional: He is oriented to person, place, and time. He appears well-developed and well-nourished.   HENT:   Head: Normocephalic and atraumatic.   Eyes: Pupils are equal, round, and reactive to light. EOM are normal.   Neck: Normal range of motion. Neck supple. No tracheal deviation present. No thyromegaly present.   Cardiovascular: Normal rate, regular rhythm and normal heart sounds.   Pulmonary/Chest: He is in respiratory distress. He has wheezes.   Abdominal: Soft. Bowel sounds are normal. He exhibits no distension. There is no tenderness. There is no rebound and no guarding.   Musculoskeletal: Normal range of motion.   Lymphadenopathy:     He has no cervical adenopathy.   Neurological: He is alert and oriented to person, place, and time.   Skin: Skin is warm and dry. Capillary refill takes less than 2 seconds.   Psychiatric: He has a normal mood and affect. His behavior is normal. Judgment and thought content normal.   Nursing note and  vitals reviewed.      Significant Labs:   Recent Lab Results       05/18/20  0548        Albumin 3.8     Alkaline Phosphatase 61     ALT 30     Anion Gap 8     AST 48     Baso # 0.03     Basophil% 0.4     BILIRUBIN TOTAL 1.4  Comment:  For infants and newborns, interpretation of results should be based  on gestational age, weight and in agreement with clinical  observations.  Premature Infant recommended reference ranges:  Up to 24 hours.............<8.0 mg/dL  Up to 48 hours............<12.0 mg/dL  3-5 days..................<15.0 mg/dL  6-29 days.................<15.0 mg/dL       BUN, Bld 30     Calcium 8.2     Chloride 100     CO2 29     Creatinine 1.3     Differential Method Automated     eGFR if African American >60.0     eGFR if non  58.1  Comment:  Calculation used to obtain the estimated glomerular filtration  rate (eGFR) is the CKD-EPI equation.        Eos # 0.2     Eosinophil% 2.9     Glucose 118     Gran # (ANC) 5.3     Gran% 72.8     Hematocrit 43.9     Hemoglobin 14.5     Immature Grans (Abs) 0.02  Comment:  Mild elevation in immature granulocytes is non specific and   can be seen in a variety of conditions including stress response,   acute inflammation, trauma and pregnancy. Correlation with other   laboratory and clinical findings is essential.       Immature Granulocytes 0.3     Lymph # 1.2     Lymph% 15.8     Magnesium 2.2     MCH 32.7     MCHC 33.0     MCV 99     Mono # 0.6     Mono% 7.8     MPV 8.5     nRBC 0     Phosphorus 4.1     Platelets 173     Potassium 3.8     PROTEIN TOTAL 7.3     RBC 4.43     RDW 13.3     Sodium 137     WBC 7.32         All pertinent labs within the past 24 hours have been reviewed.    Significant Imaging: I have reviewed and interpreted all pertinent imaging results/findings within the past 24 hours.      Assessment/Plan:      * SOB (shortness of breath)  Most likely secondary to flash pulmonary edema secondary to uncontrolled BP  Diuresed 1.2 L since  admit with lasix 80 mg IV x 1  Continue with diuresis lasix 40 mg IV BID  Strict Is and Os and daily weights  2D echo  Repeat CXR in am   Fluid restriction to 1.5 L  Continue BP management with lasix, carvedilol, and ACEI; titrate as needed  Initially on BIPAP but now on 3L NC and stable; continue to wean as tolerated  Nebs PRN    05/18/2020:  Follow-up on echo results.  Repeat labs in the a.m. to include CBC BMP and BNP  Follow-up chest x-ray today.  Blood cultures positive for Staph aureus will await sensitivity and culture results.    Pneumonia  CXR concerning for possible infiltrate  Lactic acid of 2.5  Repeat WNL  Continue with vanc and meropenem; de-escalate as appropriate   Afebrile and no leukocytosis  O2 and nebs PRN  Clinically stable       CKD (chronic kidney disease) stage 3, GFR 30-59 ml/min  Lab Results   Component Value Date    CREATININE 1.5 (H) 05/16/2020     Last set of labs from 2016 when patient was admitted for NSTEMI and CABG; required HD on DC but no longer HD dependent  Unclear of his baseline   Cont to monitor with daily labs   Avoid nephrotoxins.   Renally dose all medications   Monitor events that may lead to decreased renal perfusion (hypovolemia, hypotension, sepsis).   Monitor urine output (goal 0.5 mL/kg/hr) to assure that no obstruction precipitates worsening in GFR.  Cont ACE inhibitor for craig protection.   Serum bicarb level 17. Monitor for need to initiate sodium bicarb.         Benign essential HTN  Patient reports intermittent spikes in BPs but otherwise well controlled  Continue with carvedilol, lasix, and lisinopril (takes enalapril 20 mg daily at home but not on formulary)  Titrate as needed   May need addition of diuretic therapy on DC  Echo pending         Coronary artery disease  Per NSTEMI      NSTEMI (non-ST elevated myocardial infarction)  Continue ASA, plavix, carvedilol, ACEI, and atorvastatin         VTE Risk Mitigation (From admission, onward)         Ordered      heparin (porcine) injection 5,000 Units  Every 8 hours      05/17/20 0129     IP VTE HIGH RISK PATIENT  Once      05/17/20 0135     Place sequential compression device  Until discontinued      05/17/20 0135     Place KEN hose  Until discontinued      05/17/20 0135                      Durga Chandler MD  Department of Hospital Medicine   [facility]

## 2020-05-19 VITALS
SYSTOLIC BLOOD PRESSURE: 135 MMHG | DIASTOLIC BLOOD PRESSURE: 60 MMHG | RESPIRATION RATE: 18 BRPM | OXYGEN SATURATION: 97 % | BODY MASS INDEX: 31.52 KG/M2 | HEIGHT: 62 IN | HEART RATE: 67 BPM | WEIGHT: 171.31 LBS | TEMPERATURE: 99 F

## 2020-05-19 PROBLEM — I50.9 CHF (CONGESTIVE HEART FAILURE): Status: ACTIVE | Noted: 2020-05-19

## 2020-05-19 LAB
ALBUMIN SERPL BCP-MCNC: 3.6 G/DL (ref 3.5–5.2)
ALP SERPL-CCNC: 71 U/L (ref 55–135)
ALT SERPL W/O P-5'-P-CCNC: 26 U/L (ref 10–44)
ANION GAP SERPL CALC-SCNC: 7 MMOL/L (ref 8–16)
AORTIC ROOT ANNULUS: 2.7 CM
AORTIC VALVE CUSP SEPERATION: 1.61 CM
AST SERPL-CCNC: 29 U/L (ref 10–40)
AV INDEX (PROSTH): 0.58
AV MEAN GRADIENT: 4 MMHG
AV PEAK GRADIENT: 7 MMHG
AV VALVE AREA: 2.07 CM2
AV VELOCITY RATIO: 0.6
BASOPHILS # BLD AUTO: 0.02 K/UL (ref 0–0.2)
BASOPHILS NFR BLD: 0.3 % (ref 0–1.9)
BILIRUB SERPL-MCNC: 1.9 MG/DL (ref 0.1–1)
BNP SERPL-MCNC: 85 PG/ML (ref 0–99)
BSA FOR ECHO PROCEDURE: 1.84 M2
BUN SERPL-MCNC: 31 MG/DL (ref 8–23)
CALCIUM SERPL-MCNC: 7.8 MG/DL (ref 8.7–10.5)
CHLORIDE SERPL-SCNC: 100 MMOL/L (ref 95–110)
CO2 SERPL-SCNC: 27 MMOL/L (ref 23–29)
CREAT SERPL-MCNC: 1.3 MG/DL (ref 0.5–1.4)
CV ECHO LV RWT: 0.34 CM
DIFFERENTIAL METHOD: ABNORMAL
DOP CALC AO PEAK VEL: 1.35 M/S
DOP CALC AO VTI: 25.05 CM
DOP CALC LVOT AREA: 3.6 CM2
DOP CALC LVOT DIAMETER: 2.13 CM
DOP CALC LVOT PEAK VEL: 0.81 M/S
DOP CALC LVOT STROKE VOLUME: 51.96 CM3
DOP CALC MV VTI: 154 CM
DOP CALCLVOT PEAK VEL VTI: 14.59 CM
E WAVE DECELERATION TIME: 182.26 MSEC
E/A RATIO: 1.22
E/E' RATIO: 15.8 M/S
ECHO LV POSTERIOR WALL: 1 CM (ref 0.6–1.1)
EOSINOPHIL # BLD AUTO: 0.2 K/UL (ref 0–0.5)
EOSINOPHIL NFR BLD: 2.9 % (ref 0–8)
ERYTHROCYTE [DISTWIDTH] IN BLOOD BY AUTOMATED COUNT: 13.2 % (ref 11.5–14.5)
EST. GFR  (AFRICAN AMERICAN): >60 ML/MIN/1.73 M^2
EST. GFR  (NON AFRICAN AMERICAN): 58.1 ML/MIN/1.73 M^2
FRACTIONAL SHORTENING: 16 % (ref 28–44)
GLUCOSE SERPL-MCNC: 114 MG/DL (ref 70–110)
HCT VFR BLD AUTO: 42.2 % (ref 40–54)
HGB BLD-MCNC: 14.3 G/DL (ref 14–18)
IMM GRANULOCYTES # BLD AUTO: 0.03 K/UL (ref 0–0.04)
IMM GRANULOCYTES NFR BLD AUTO: 0.4 % (ref 0–0.5)
INTERVENTRICULAR SEPTUM: 0.89 CM (ref 0.6–1.1)
IVRT: 57.09 MSEC
LA MAJOR: 4.8 CM
LA MINOR: 3.95 CM
LEFT ATRIUM SIZE: 5 CM
LEFT INTERNAL DIMENSION IN SYSTOLE: 5 CM (ref 2.1–4)
LEFT VENTRICLE DIASTOLIC VOLUME INDEX: 98.96 ML/M2
LEFT VENTRICLE DIASTOLIC VOLUME: 176.54 ML
LEFT VENTRICLE MASS INDEX: 127 G/M2
LEFT VENTRICLE SYSTOLIC VOLUME INDEX: 66.4 ML/M2
LEFT VENTRICLE SYSTOLIC VOLUME: 118.42 ML
LEFT VENTRICULAR INTERNAL DIMENSION IN DIASTOLE: 5.95 CM (ref 3.5–6)
LEFT VENTRICULAR MASS: 226.28 G
LV LATERAL E/E' RATIO: 13.17 M/S
LV SEPTAL E/E' RATIO: 19.75 M/S
LYMPHOCYTES # BLD AUTO: 1 K/UL (ref 1–4.8)
LYMPHOCYTES NFR BLD: 14.3 % (ref 18–48)
MAGNESIUM SERPL-MCNC: 2 MG/DL (ref 1.6–2.6)
MCH RBC QN AUTO: 33 PG (ref 27–31)
MCHC RBC AUTO-ENTMCNC: 33.9 G/DL (ref 32–36)
MCV RBC AUTO: 98 FL (ref 82–98)
MONOCYTES # BLD AUTO: 0.7 K/UL (ref 0.3–1)
MONOCYTES NFR BLD: 9.7 % (ref 4–15)
MV MEAN GRADIENT: 51 MMHG
MV PEAK A VEL: 0.65 M/S
MV PEAK E VEL: 0.79 M/S
MV PEAK GRADIENT: 90 MMHG
MV STENOSIS PRESSURE HALF TIME: 74 MS
MV VALVE AREA BY CONTINUITY EQUATION: 0.34 CM2
MV VALVE AREA P 1/2 METHOD: 2.97 CM2
NEUTROPHILS # BLD AUTO: 5.2 K/UL (ref 1.8–7.7)
NEUTROPHILS NFR BLD: 72.4 % (ref 38–73)
NRBC BLD-RTO: 0 /100 WBC
PHOSPHATE SERPL-MCNC: 3.8 MG/DL (ref 2.7–4.5)
PISA TR MAX VEL: 1.85 M/S
PLATELET # BLD AUTO: 179 K/UL (ref 150–350)
PMV BLD AUTO: 8.2 FL (ref 9.2–12.9)
POTASSIUM SERPL-SCNC: 4.1 MMOL/L (ref 3.5–5.1)
PROT SERPL-MCNC: 6.9 G/DL (ref 6–8.4)
PV MEAN GRADIENT: 2 MMHG
PV PEAK VELOCITY: 0.88 CM/S
RA MAJOR: 4.21 CM
RA PRESSURE: 3 MMHG
RA WIDTH: 1.9 CM
RBC # BLD AUTO: 4.33 M/UL (ref 4.6–6.2)
RIGHT VENTRICULAR END-DIASTOLIC DIMENSION: 2.54 CM
SODIUM SERPL-SCNC: 134 MMOL/L (ref 136–145)
TDI LATERAL: 0.06 M/S
TDI SEPTAL: 0.04 M/S
TDI: 0.05 M/S
TR MAX PG: 14 MMHG
TRICUSPID ANNULAR PLANE SYSTOLIC EXCURSION: 1.08 CM
TV REST PULMONARY ARTERY PRESSURE: 17 MMHG
WBC # BLD AUTO: 7.18 K/UL (ref 3.9–12.7)

## 2020-05-19 PROCEDURE — 85025 COMPLETE CBC W/AUTO DIFF WBC: CPT

## 2020-05-19 PROCEDURE — 36415 COLL VENOUS BLD VENIPUNCTURE: CPT

## 2020-05-19 PROCEDURE — 99217 PR OBSERVATION CARE DISCHARGE: CPT | Mod: ,,, | Performed by: INTERNAL MEDICINE

## 2020-05-19 PROCEDURE — 25000003 PHARM REV CODE 250: Performed by: INTERNAL MEDICINE

## 2020-05-19 PROCEDURE — 83735 ASSAY OF MAGNESIUM: CPT

## 2020-05-19 PROCEDURE — 80053 COMPREHEN METABOLIC PANEL: CPT

## 2020-05-19 PROCEDURE — 96376 TX/PRO/DX INJ SAME DRUG ADON: CPT

## 2020-05-19 PROCEDURE — 99217 PR OBSERVATION CARE DISCHARGE: ICD-10-PCS | Mod: ,,, | Performed by: INTERNAL MEDICINE

## 2020-05-19 PROCEDURE — G0378 HOSPITAL OBSERVATION PER HR: HCPCS

## 2020-05-19 PROCEDURE — 83880 ASSAY OF NATRIURETIC PEPTIDE: CPT

## 2020-05-19 PROCEDURE — 63600175 PHARM REV CODE 636 W HCPCS: Performed by: INTERNAL MEDICINE

## 2020-05-19 PROCEDURE — 84100 ASSAY OF PHOSPHORUS: CPT

## 2020-05-19 RX ORDER — FUROSEMIDE 20 MG/1
20 TABLET ORAL DAILY
Qty: 30 TABLET | Refills: 11 | Status: SHIPPED | OUTPATIENT
Start: 2020-05-19 | End: 2021-05-16 | Stop reason: CLARIF

## 2020-05-19 RX ORDER — POTASSIUM CHLORIDE 750 MG/1
10 CAPSULE, EXTENDED RELEASE ORAL ONCE
Qty: 30 CAPSULE | Refills: 5 | Status: SHIPPED | OUTPATIENT
Start: 2020-05-19 | End: 2020-05-19

## 2020-05-19 RX ADMIN — ASPIRIN 81 MG: 81 TABLET, COATED ORAL at 08:05

## 2020-05-19 RX ADMIN — CARVEDILOL 12.5 MG: 12.5 TABLET, FILM COATED ORAL at 08:05

## 2020-05-19 RX ADMIN — FUROSEMIDE 40 MG: 10 INJECTION, SOLUTION INTRAMUSCULAR; INTRAVENOUS at 05:05

## 2020-05-19 RX ADMIN — LISINOPRIL 20 MG: 10 TABLET ORAL at 08:05

## 2020-05-19 RX ADMIN — MEROPENEM AND SODIUM CHLORIDE 1 G: 1 INJECTION, SOLUTION INTRAVENOUS at 12:05

## 2020-05-19 RX ADMIN — CLOPIDOGREL BISULFATE 75 MG: 75 TABLET ORAL at 08:05

## 2020-05-19 RX ADMIN — MEROPENEM AND SODIUM CHLORIDE 1 G: 1 INJECTION, SOLUTION INTRAVENOUS at 08:05

## 2020-05-19 RX ADMIN — ATORVASTATIN CALCIUM 40 MG: 40 TABLET, FILM COATED ORAL at 08:05

## 2020-05-19 NOTE — PLAN OF CARE
05/19/20 1428   Final Note   Assessment Type Final Discharge Note   Anticipated Discharge Disposition Home   What phone number can be called within the next 1-3 days to see how you are doing after discharge? [phone]   Hospital Follow Up  Appt(s) scheduled? Yes   Discharge plans and expectations educations in teach back method with documentation complete? Yes   Post-Acute Status   Discharge Delays None known at this time     Pt discharged home to self care. Follow up appointments were scheduled with Dr. Florez (cardiology) for 05/27/20 at 10:00am and Dr Snyder (PCP) for 05/28/20 at 10:00am. Pt provided this information prior to discharge. There are no other tasks identified. This plan is complete.

## 2020-05-19 NOTE — DISCHARGE SUMMARY
"[facility] Medicine  Discharge Summary      Patient Name: Cleveland Capps  MRN: [id number]  Admission Date: 5/16/2020  Hospital Length of Stay: 0 days  Discharge Date and Time:  05/19/2020 12:12 PM  Attending Physician: Durga Chandler MD   Discharging Provider: Durga Chandler MD  Primary Care Provider: Romero Snyder MD      HPI:   The patient is a 63 y/o male with PMH of CAD, NSTEMI, CABG, and HTN who presented with SOB. The patient reported acute onset about 2 hours prior to ER presentation. He was in his usual state of health but after he woke from a nap at around 5 PM, he started to feel SOB. He appeared cyanotic and quite dyspneic and using accessory muscles per ER reports. He had diminished breath sounds with rales  was immediately placed on BIPAP and given lasix 80 mg IV x 1 with 1.2L UOP. He has since improved and is currently down to 4L NC and reports significant improvement in this symptoms. BP on presentation was in the 170s-190s systolic. He was also given nitro x 2. The patient reports having intermittent spikes in his BPs and this had occurred once before about 6 years ago where he sought hospitalization but he doesn't always become SOB like this. He denies any fevers, chills, sick contacts, chest pain, palpitations, LE swelling, or other symptoms.     * No surgery found *      Hospital Course:   Patient has been comfortable this morning lying supine without difficulty.  Vital signs are stable with glucose 141  Magnesium phosphorus normal and CBC white blood cell count 10 hemoglobin 14 hematocrit 41 lactate was initially 2.5 and this morning is 1.4 normal    BP (!) 143/75 (BP Location: Right arm, Patient Position: Lying)   Pulse 60   Temp 97.2 °F (36.2 °C) (Oral)   Resp 18   Ht 5' 2" (1.575 m)   Wt 77.1 kg (170 lb)   SpO2 99%   BMI 31.09 kg/m²       05/18/2020:  Patient Vitals for the past 24 hrs:   BP Temp Temp src Pulse Resp SpO2 Weight "   05/18/20 1042 (!) 111/55 96.6 °F (35.9 °C) Oral (!) 58 16 98 % --   05/18/20 0800 -- -- -- (!) 58 -- -- --   05/18/20 0722 -- -- -- (!) 55 18 100 % --   05/18/20 0719 119/63 96.8 °F (36 °C) Oral (!) 55 18 100 % --   05/18/20 0600 -- -- -- -- -- -- 77.5 kg (170 lb 13.7 oz)   05/18/20 0421 124/67 97.8 °F (36.6 °C) Oral 62 18 99 % --   05/17/20 2349 (!) 110/59 98.1 °F (36.7 °C) Oral (!) 52 16 100 % --   05/17/20 2109 136/66 -- -- 60 -- -- --   05/17/20 2105 118/63 -- -- (!) 57 -- -- --   05/17/20 1932 126/68 97.4 °F (36.3 °C) Oral 60 18 96 % --   05/17/20 1509 (!) 140/71 98.4 °F (36.9 °C) Oral 64 17 98 % --     Patient is lying supine no problems with no orthopnea.  Labs show normal white blood cell count H&H is 14.5 and 44 differential is normal.  Chem profile:  Sodium 137 potassium 3.8 chloride 100 bicarb 29 BUN 30 creatinine 1.3  Echocardiogram is pending on this patient.  Will keep admitted today and follow-up on echocardiogram and recheck BNP in the a.m. otherwise continue current medications  Blood cultures were called today regarding positive g positive cocci in clusters resembling Staph will follow cultures as needed     Consults:   Consults (From admission, onward)        Status Ordering Provider     Pharmacy to dose Vancomycin consult  Once     Provider:  (Not yet assigned)    DAQUAN Menjivar          CHF (congestive heart failure)  05/19/2020  Begin Lasix 20 mg p.o. daily  Potassium 10 mEq p.o. daily  Continue home medications as previously prescribed prior to this admission  Echocardiogram reveals ejection fraction 30-35%.  This is consistent with previous readings according this patient.  Patient should follow-up in with his cardiologist Dr. Woods in 1 week        Final Active Diagnoses:    Diagnosis Date Noted POA    PRINCIPAL PROBLEM:  SOB (shortness of breath) [R06.02] 05/16/2020 Yes    CHF (congestive heart failure) [I50.9] 05/19/2020 Yes    Pneumonia [J18.9] 05/17/2020 Yes    CKD  (chronic kidney disease) stage 3, GFR 30-59 ml/min [N18.3] 05/17/2020 Yes    NSTEMI (non-ST elevated myocardial infarction) [I21.4] 04/25/2016 Yes    Coronary artery disease [I25.10] 04/25/2016 Yes    Benign essential HTN [I10] 04/25/2016 Yes      Problems Resolved During this Admission:       Discharged Condition: good    Disposition:     Follow Up:    Patient Instructions:   No discharge procedures on file.    Significant Diagnostic Studies: Labs: All labs within the past 24 hours have been reviewed    Pending Diagnostic Studies:     Procedure Component Value Units Date/Time    EKG 12-lead [[id number]]     Order Status:  Sent Lab Status:  No result          Medications:  Reconciled Home Medications:      Medication List      START taking these medications    furosemide 20 MG tablet  Commonly known as:  LASIX  Take 1 tablet (20 mg total) by mouth once daily.     potassium chloride 10 MEQ Cpsr  Commonly known as:  MICRO-K  Take 1 capsule (10 mEq total) by mouth once. for 1 dose        CHANGE how you take these medications    travoprost 0.004 % ophthalmic solution  Commonly known as:  TRAVATAN Z  Place 1 drop into both eyes every evening.  What changed:    · when to take this  · reasons to take this        CONTINUE taking these medications    aspirin 81 MG EC tablet  Commonly known as:  ECOTRIN  Take 81 mg by mouth once daily.     atorvastatin 40 MG tablet  Commonly known as:  LIPITOR  Take 1 tablet (40 mg total) by mouth once daily.     carvediloL 6.25 MG tablet  Commonly known as:  COREG  Take 2 tablets (12.5 mg total) by mouth 2 (two) times daily.     clopidogreL 75 mg tablet  Commonly known as:  PLAVIX  Take 75 mg by mouth once daily.     enalapril 20 MG tablet  Commonly known as:  VASOTEC  TAKE ONE TABLET BY MOUTH EVERYDAY AS NEEDED            Indwelling Lines/Drains at time of discharge:   Lines/Drains/Airways     None                 Time spent on the discharge of patient: 40 minutes  Patient was seen and  examined on the date of discharge and determined to be suitable for discharge.         Durga Chandler MD  Department of Hospital Medicine  [facility]

## 2020-05-19 NOTE — PROGRESS NOTES
Pharmacokinetic Assessment Follow Up: IV Vancomycin    Vancomycin serum concentration assessment(s):    Vancomycin trough 10.9    Vancomycin Regimen Plan:    Will increase vancomycin to 1250mg q18hrs    Drug levels (last 3 results):  Recent Labs   Lab Result Units 05/18/20 2055   Vancomycin-Trough ug/mL 10.9       Pharmacy will continue to follow and monitor vancomycin.    Please contact pharmacy at extension [phone] for questions regarding this assessment.    Thank you for the consult,   Cobynaima Rivera       Patient brief summary:  Cleveland Capps is a 64 y.o. male initiated on antimicrobial therapy with IV Vancomycin for treatment of pneumonia. The patient's current regimen is 1250mg q18hrs    Drug Allergies:   Review of patient's allergies indicates:   Allergen Reactions    Penicillins Hives       Actual Body Weight:   77.7kg    Renal Function:   Estimated Creatinine Clearance: 52.5 mL/min (based on SCr of 1.3 mg/dL).,         CBC (last 72 hours):  Recent Labs   Lab Result Units 05/16/20 2134 05/17/20 0646 05/17/20 0647 05/18/20  0548 05/19/20  0547   WBC K/uL 10.68  --  10.00 7.32 7.18   Hemoglobin g/dL 15.5  --  14.0 14.5 14.3   Hemoglobin A1C %  --  6.0  --   --   --    Hematocrit % 47.2  --  41.4 43.9 42.2   Platelets K/uL 239  --  194 173 179   Gran% % 58.3  --  79.0* 72.8 72.4   Lymph% % 27.4  --  10.9* 15.8* 14.3*   Mono% % 10.8  --  9.0 7.8 9.7   Eosinophil% % 2.1  --  0.4 2.9 2.9   Basophil% % 0.4  --  0.3 0.4 0.3   Differential Method  Automated  --  Automated Automated Automated       Metabolic Panel (last 72 hours):  Recent Labs   Lab Result Units 05/16/20 2134 05/17/20 0646 05/18/20  0548 05/19/20  0547   Sodium mmol/L 138 137 137 134*   Potassium mmol/L 4.6 3.9 3.8 4.1   Chloride mmol/L 103 100 100 100   CO2 mmol/L 17* 25 29 27   Glucose mg/dL 270* 141* 118* 114*   BUN, Bld mg/dL 20 19 30* 31*   Creatinine mg/dL 1.5* 1.1 1.3 1.3   Albumin g/dL 4.0 3.9 3.8 3.6   Total Bilirubin mg/dL 1.8* 1.4* 1.4*  1.9*   Alkaline Phosphatase U/L 105 81 61 71   AST U/L 39 91* 48* 29   ALT U/L 29 34 30 26   Magnesium mg/dL  --  2.1 2.2 2.0   Phosphorus mg/dL  --  4.4 4.1 3.8       Vancomycin Administrations:  vancomycin given in the last 96 hours                     vancomycin 1.25 g in dextrose 5% 250 mL IVPB (ready to mix) (mg) 1,250 mg New Bag 05/18/20 2135     1,250 mg New Bag 05/17/20 2105                      Microbiologic Results:  Microbiology Results (last 7 days)       Procedure Component Value Units Date/Time    Blood Culture #2 **CANNOT BE ORDERED STAT** [[id number]] Collected:  05/16/20 2205    Order Status:  Completed Specimen:  Blood from Peripheral, Antecubital, Left Updated:  05/19/20 0748     Blood Culture, Routine Gram stain aer bottle: Gram positive cocci in clusters resembling Staph       Results called to and read back by:Ashlyn Diaz 05/18/2020  09:18    Blood Culture #1 **CANNOT BE ORDERED STAT** [[id number]] Collected:  05/16/20 2125    Order Status:  Completed Specimen:  Blood from Peripheral, Hand, Right Updated:  05/18/20 1212     Blood Culture, Routine No Growth to date      No Growth to date

## 2020-05-19 NOTE — PLAN OF CARE
Problem: Fall Injury Risk  Goal: Absence of Fall and Fall-Related Injury  Outcome: Ongoing, Progressing     Problem: Adult Inpatient Plan of Care  Goal: Absence of Hospital-Acquired Illness or Injury  Outcome: Ongoing, Progressing     Problem: Adult Inpatient Plan of Care  Goal: Optimal Comfort and Wellbeing  Outcome: Ongoing, Progressing

## 2020-05-19 NOTE — ASSESSMENT & PLAN NOTE
05/19/2020  Begin Lasix 20 mg p.o. daily  Potassium 10 mEq p.o. daily  Continue home medications as previously prescribed prior to this admission  Echocardiogram reveals ejection fraction 30-35%.  This is consistent with previous readings according this patient.  Patient should follow-up in with his cardiologist Dr. Woods in 1 week

## 2020-05-19 NOTE — NURSING
Discharge packet, new prescriptions, and follow up appointment reviewed with patient, verbalized understanding.  Patient walked out to private vehicle upon discharge, no distress noted or verbalized by patient at this time.

## 2020-05-20 LAB
BACTERIA BLD CULT: ABNORMAL

## 2020-05-21 LAB — BACTERIA BLD CULT: NORMAL

## 2020-10-23 ENCOUNTER — HOSPITAL ENCOUNTER (EMERGENCY)
Facility: HOSPITAL | Age: 65
Discharge: HOME | End: 2020-10-23
Attending: EMERGENCY MEDICINE
Payer: MEDICARE

## 2020-10-23 VITALS
BODY MASS INDEX: 31.47 KG/M2 | HEIGHT: 62 IN | TEMPERATURE: 98 F | OXYGEN SATURATION: 98 % | HEART RATE: 56 BPM | RESPIRATION RATE: 18 BRPM | WEIGHT: 171 LBS | DIASTOLIC BLOOD PRESSURE: 71 MMHG | SYSTOLIC BLOOD PRESSURE: 133 MMHG

## 2020-10-23 DIAGNOSIS — R53.1 GENERALIZED WEAKNESS: Primary | ICD-10-CM

## 2020-10-23 DIAGNOSIS — Z86.73 OLD LACUNAR STROKE WITHOUT LATE EFFECT: ICD-10-CM

## 2020-10-23 DIAGNOSIS — G45.9 TRANSIENT ISCHEMIC ATTACK: ICD-10-CM

## 2020-10-23 DIAGNOSIS — R20.2 PARESTHESIAS: ICD-10-CM

## 2020-10-23 DIAGNOSIS — I10 ESSENTIAL HYPERTENSION: ICD-10-CM

## 2020-10-23 DIAGNOSIS — R29.90 STROKE-LIKE SYMPTOMS: ICD-10-CM

## 2020-10-23 PROBLEM — I63.9 STROKE: Status: ACTIVE | Noted: 2020-10-23

## 2020-10-23 LAB
ALBUMIN SERPL BCP-MCNC: 4.2 G/DL (ref 3.5–5.2)
ALP SERPL-CCNC: 73 U/L (ref 55–135)
ALT SERPL W/O P-5'-P-CCNC: 25 U/L (ref 10–44)
AMPHET+METHAMPHET UR QL: NEGATIVE
ANION GAP SERPL CALC-SCNC: 11 MMOL/L (ref 8–16)
APTT BLDCRRT: 25.5 SEC (ref 21–32)
AST SERPL-CCNC: 23 U/L (ref 10–40)
BARBITURATES UR QL SCN>200 NG/ML: NEGATIVE
BASOPHILS # BLD AUTO: 0.03 K/UL (ref 0–0.2)
BASOPHILS NFR BLD: 0.4 % (ref 0–1.9)
BENZODIAZ UR QL SCN>200 NG/ML: NEGATIVE
BILIRUB SERPL-MCNC: 1.5 MG/DL (ref 0.1–1)
BILIRUB UR QL STRIP: NEGATIVE
BNP SERPL-MCNC: 261 PG/ML (ref 0–99)
BUN SERPL-MCNC: 20 MG/DL (ref 8–23)
BZE UR QL SCN: NEGATIVE
CALCIUM SERPL-MCNC: 8.8 MG/DL (ref 8.7–10.5)
CANNABINOIDS UR QL SCN: NEGATIVE
CHLORIDE SERPL-SCNC: 103 MMOL/L (ref 95–110)
CHOLEST SERPL-MCNC: 123 MG/DL (ref 120–199)
CHOLEST/HDLC SERPL: 4.6 {RATIO} (ref 2–5)
CLARITY UR: CLEAR
CO2 SERPL-SCNC: 25 MMOL/L (ref 23–29)
COLOR UR: YELLOW
CREAT SERPL-MCNC: 1.3 MG/DL (ref 0.5–1.4)
CREAT UR-MCNC: 22.1 MG/DL (ref 23–375)
DIFFERENTIAL METHOD: ABNORMAL
EOSINOPHIL # BLD AUTO: 0.2 K/UL (ref 0–0.5)
EOSINOPHIL NFR BLD: 3.2 % (ref 0–8)
ERYTHROCYTE [DISTWIDTH] IN BLOOD BY AUTOMATED COUNT: 13.5 % (ref 11.5–14.5)
EST. GFR  (AFRICAN AMERICAN): >60 ML/MIN/1.73 M^2
EST. GFR  (NON AFRICAN AMERICAN): 58.1 ML/MIN/1.73 M^2
ETHANOL SERPL-MCNC: <5 MG/DL
GLUCOSE SERPL-MCNC: 141 MG/DL (ref 70–110)
GLUCOSE UR QL STRIP: NEGATIVE
HCT VFR BLD AUTO: 43.3 % (ref 40–54)
HDLC SERPL-MCNC: 27 MG/DL (ref 40–75)
HDLC SERPL: 22 % (ref 20–50)
HGB BLD-MCNC: 14.7 G/DL (ref 14–18)
HGB UR QL STRIP: NEGATIVE
IMM GRANULOCYTES # BLD AUTO: 0.03 K/UL (ref 0–0.04)
IMM GRANULOCYTES NFR BLD AUTO: 0.4 % (ref 0–0.5)
INR PPP: 1.1 (ref 0.8–1.2)
KETONES UR QL STRIP: NEGATIVE
LDLC SERPL CALC-MCNC: 47.2 MG/DL (ref 63–159)
LEUKOCYTE ESTERASE UR QL STRIP: NEGATIVE
LYMPHOCYTES # BLD AUTO: 1.4 K/UL (ref 1–4.8)
LYMPHOCYTES NFR BLD: 19.2 % (ref 18–48)
MAGNESIUM SERPL-MCNC: 2.3 MG/DL (ref 1.6–2.6)
MCH RBC QN AUTO: 33.4 PG (ref 27–31)
MCHC RBC AUTO-ENTMCNC: 33.9 G/DL (ref 32–36)
MCV RBC AUTO: 98 FL (ref 82–98)
MONOCYTES # BLD AUTO: 0.8 K/UL (ref 0.3–1)
MONOCYTES NFR BLD: 10.8 % (ref 4–15)
NEUTROPHILS # BLD AUTO: 4.8 K/UL (ref 1.8–7.7)
NEUTROPHILS NFR BLD: 66 % (ref 38–73)
NITRITE UR QL STRIP: NEGATIVE
NONHDLC SERPL-MCNC: 96 MG/DL
NRBC BLD-RTO: 0 /100 WBC
OPIATES UR QL SCN: NEGATIVE
PCP UR QL SCN>25 NG/ML: NEGATIVE
PH UR STRIP: 7 [PH] (ref 5–8)
PLATELET # BLD AUTO: 218 K/UL (ref 150–350)
PMV BLD AUTO: 8.5 FL (ref 9.2–12.9)
POCT GLUCOSE: 133 MG/DL (ref 70–110)
POTASSIUM SERPL-SCNC: 4.2 MMOL/L (ref 3.5–5.1)
PROT SERPL-MCNC: 7.9 G/DL (ref 6–8.4)
PROT UR QL STRIP: NEGATIVE
PROTHROMBIN TIME: 10.8 SEC (ref 9–12.5)
RBC # BLD AUTO: 4.4 M/UL (ref 4.6–6.2)
SARS-COV-2 RDRP RESP QL NAA+PROBE: NEGATIVE
SODIUM SERPL-SCNC: 139 MMOL/L (ref 136–145)
SP GR UR STRIP: 1.01 (ref 1–1.03)
T4 FREE SERPL-MCNC: 0.79 NG/DL (ref 0.71–1.51)
TOXICOLOGY INFORMATION: ABNORMAL
TRIGL SERPL-MCNC: 244 MG/DL (ref 30–150)
TROPONIN I SERPL DL<=0.01 NG/ML-MCNC: 0.12 NG/ML (ref 0.02–0.5)
TSH SERPL DL<=0.005 MIU/L-ACNC: 10.39 UIU/ML (ref 0.34–5.6)
URN SPEC COLLECT METH UR: NORMAL
UROBILINOGEN UR STRIP-ACNC: NEGATIVE EU/DL
WBC # BLD AUTO: 7.29 K/UL (ref 3.9–12.7)

## 2020-10-23 PROCEDURE — 80320 DRUG SCREEN QUANTALCOHOLS: CPT

## 2020-10-23 PROCEDURE — 84439 ASSAY OF FREE THYROXINE: CPT

## 2020-10-23 PROCEDURE — 93005 ELECTROCARDIOGRAM TRACING: CPT

## 2020-10-23 PROCEDURE — 84443 ASSAY THYROID STIM HORMONE: CPT

## 2020-10-23 PROCEDURE — 82962 GLUCOSE BLOOD TEST: CPT

## 2020-10-23 PROCEDURE — 99291 CRITICAL CARE FIRST HOUR: CPT | Mod: 25

## 2020-10-23 PROCEDURE — 70450 CT HEAD WITHOUT CONTRAST: ICD-10-PCS | Mod: 26,59,, | Performed by: RADIOLOGY

## 2020-10-23 PROCEDURE — 99292 CRITICAL CARE ADDL 30 MIN: CPT

## 2020-10-23 PROCEDURE — 71045 X-RAY EXAM CHEST 1 VIEW: CPT | Mod: 26,,, | Performed by: RADIOLOGY

## 2020-10-23 PROCEDURE — 84484 ASSAY OF TROPONIN QUANT: CPT

## 2020-10-23 PROCEDURE — 96374 THER/PROPH/DIAG INJ IV PUSH: CPT | Mod: 59

## 2020-10-23 PROCEDURE — 80053 COMPREHEN METABOLIC PANEL: CPT

## 2020-10-23 PROCEDURE — 80061 LIPID PANEL: CPT

## 2020-10-23 PROCEDURE — 71045 XR CHEST AP PORTABLE: ICD-10-PCS | Mod: 26,,, | Performed by: RADIOLOGY

## 2020-10-23 PROCEDURE — 81003 URINALYSIS AUTO W/O SCOPE: CPT | Mod: 59

## 2020-10-23 PROCEDURE — 70450 CT HEAD/BRAIN W/O DYE: CPT | Mod: TC,59

## 2020-10-23 PROCEDURE — 70450 CT HEAD/BRAIN W/O DYE: CPT | Mod: 26,59,, | Performed by: RADIOLOGY

## 2020-10-23 PROCEDURE — 93010 EKG 12-LEAD: ICD-10-PCS | Mod: ,,, | Performed by: INTERNAL MEDICINE

## 2020-10-23 PROCEDURE — 85730 THROMBOPLASTIN TIME PARTIAL: CPT

## 2020-10-23 PROCEDURE — 83735 ASSAY OF MAGNESIUM: CPT

## 2020-10-23 PROCEDURE — 63600175 PHARM REV CODE 636 W HCPCS: Performed by: EMERGENCY MEDICINE

## 2020-10-23 PROCEDURE — 93010 ELECTROCARDIOGRAM REPORT: CPT | Mod: ,,, | Performed by: INTERNAL MEDICINE

## 2020-10-23 PROCEDURE — 85025 COMPLETE CBC W/AUTO DIFF WBC: CPT

## 2020-10-23 PROCEDURE — 80307 DRUG TEST PRSMV CHEM ANLYZR: CPT

## 2020-10-23 PROCEDURE — 71045 X-RAY EXAM CHEST 1 VIEW: CPT | Mod: TC,FY

## 2020-10-23 PROCEDURE — U0002 COVID-19 LAB TEST NON-CDC: HCPCS

## 2020-10-23 PROCEDURE — 85610 PROTHROMBIN TIME: CPT

## 2020-10-23 PROCEDURE — 83880 ASSAY OF NATRIURETIC PEPTIDE: CPT

## 2020-10-23 RX ORDER — FUROSEMIDE 10 MG/ML
80 INJECTION INTRAMUSCULAR; INTRAVENOUS
Status: COMPLETED | OUTPATIENT
Start: 2020-10-23 | End: 2020-10-23

## 2020-10-23 RX ADMIN — FUROSEMIDE 80 MG: 10 INJECTION, SOLUTION INTRAMUSCULAR; INTRAVENOUS at 03:10

## 2020-10-23 NOTE — ED PROVIDER NOTES
Encounter Date: 10/23/2020       History     Chief Complaint   Patient presents with    Weakness     left sided, began yesterday at 1700hr     64-year-old male with past medical history significant for CVA on ASA and Plavix, COPD, CAD with MI, hypertension, acute kidney injury, thyroid disease presents to the ED as a walk-in for emergent evaluation of stroke-like symptoms that began yesterday at approximately 1700.  Symptoms include intermittent slurred speech and numbness/tingling/weakness of the left upper extremity.  All symptoms have resolved prior to arrival. Denies any recent falls, head trauma, headache, blurred vision, nausea, incontinence, gait abnormality, chest pain, dyspnea, palpitations diaphoresis.        Review of patient's allergies indicates:   Allergen Reactions    Penicillins Hives     Past Medical History:   Diagnosis Date    Acute kidney injury     COPD (chronic obstructive pulmonary disease)     Coronary artery disease     Encounter for blood transfusion     Former smoker     Hypertension     Myocardial infarction     Thyroid disease      Past Surgical History:   Procedure Laterality Date    COLONOSCOPY N/A 1/3/2017    Procedure: COLONOSCOPY;  Surgeon: Marcus Green MD;  Location: [facility];  Service: Endoscopy;  Laterality: N/A;    CORONARY STENT PLACEMENT      GASTROSTOMY TUBE PLACEMENT      PEG TUBE REMOVAL      TRACHEOSTOMY CLOSURE      TRACHEOSTOMY TUBE PLACEMENT      UPPER GASTROINTESTINAL ENDOSCOPY  2016    Dr. Green with PEG tube placement     Family History   Problem Relation Age of Onset    Colon cancer Neg Hx     Colon polyps Neg Hx     Crohn's disease Neg Hx     Ulcerative colitis Neg Hx      Social History     Tobacco Use    Smoking status: Former Smoker     Types: Cigarettes     Quit date: 2005     Years since quittin.9    Tobacco comment: quit    Substance Use Topics    Alcohol use: No    Drug use: No     Review of Systems    Constitutional: Negative for appetite change, chills, diaphoresis, fatigue and fever.   HENT: Negative for congestion, ear pain, rhinorrhea, sinus pressure, sinus pain, sore throat and tinnitus.    Eyes: Negative for photophobia and visual disturbance.   Respiratory: Negative for cough, chest tightness, shortness of breath and wheezing.    Cardiovascular: Negative for chest pain, palpitations and leg swelling.   Gastrointestinal: Negative for abdominal pain, constipation, diarrhea, nausea and vomiting.   Endocrine: Negative for cold intolerance, heat intolerance, polydipsia, polyphagia and polyuria.   Genitourinary: Negative for decreased urine volume, difficulty urinating, dysuria, flank pain, frequency, hematuria and urgency.   Musculoskeletal: Negative for arthralgias, back pain, gait problem, joint swelling, myalgias, neck pain and neck stiffness.   Skin: Negative for color change, pallor, rash and wound.   Allergic/Immunologic: Negative for immunocompromised state.   Neurological: Positive for speech difficulty, weakness and numbness. Negative for dizziness, syncope, light-headedness and headaches.        Tingling   Hematological: Negative for adenopathy. Does not bruise/bleed easily.   Psychiatric/Behavioral: Negative for decreased concentration, dysphoric mood and sleep disturbance. The patient is not nervous/anxious.    All other systems reviewed and are negative.      Physical Exam     Initial Vitals [10/23/20 1439]   BP Pulse Resp Temp SpO2   (!) 174/104 91 18 98.4 °F (36.9 °C) 95 %      MAP       --         Physical Exam    Nursing note and vitals reviewed.  Constitutional: He appears well-developed and well-nourished. He is not diaphoretic. No distress.   HENT:   Head: Normocephalic and atraumatic.   Right Ear: External ear normal.   Left Ear: External ear normal.   Nose: Nose normal.   Mouth/Throat: Oropharynx is clear and moist.   Eyes: Conjunctivae are normal. Pupils are equal, round, and reactive  to light. No scleral icterus.   Neck: Normal range of motion. Neck supple. No JVD present.   Cardiovascular: Normal rate, regular rhythm, normal heart sounds and intact distal pulses.   Pulmonary/Chest: No respiratory distress. He has no wheezes. He has rhonchi. He has rales. He exhibits no tenderness.   Abdominal: Soft. Bowel sounds are normal. He exhibits no distension. There is no abdominal tenderness. There is no rebound and no guarding.   Musculoskeletal: Normal range of motion. No tenderness or edema.   Lymphadenopathy:     He has no cervical adenopathy.   Neurological: He is alert and oriented to person, place, and time. He has normal strength and normal reflexes. He displays normal reflexes. No cranial nerve deficit or sensory deficit. GCS score is 15. GCS eye subscore is 4. GCS verbal subscore is 5. GCS motor subscore is 6.   Skin: Skin is warm and dry. Capillary refill takes less than 2 seconds. No rash and no abscess noted. No erythema. No pallor.   Psychiatric: He has a normal mood and affect. His behavior is normal. Judgment and thought content normal.         ED Course   Critical Care    Date/Time: 10/23/2020 2:32 PM  Performed by: Linda Graves MD  Authorized by: Linda Graves MD   Direct patient critical care time: 35 minutes  Additional history critical care time: 25 minutes  Ordering / reviewing critical care time: 30 minutes  Documentation critical care time: 35 minutes  Consult with family critical care time: 15 minutes  Total critical care time (exclusive of procedural time) : 140 minutes  Critical care time was exclusive of separately billable procedures and treating other patients and teaching time.  Critical care was necessary to treat or prevent imminent or life-threatening deterioration of the following conditions: CNS failure or compromise.  Critical care was time spent personally by me on the following activities: evaluation of patient's response to treatment, obtaining history from  patient or surrogate, ordering and review of laboratory studies, pulse oximetry, review of old charts, re-evaluation of patient's condition, ordering and review of radiographic studies, ordering and performing treatments and interventions, examination of patient, interpretation of cardiac output measurements and development of treatment plan with patient or surrogate.        Labs Reviewed   CBC W/ AUTO DIFFERENTIAL - Abnormal; Notable for the following components:       Result Value    RBC 4.40 (*)     Mean Corpuscular Hemoglobin 33.4 (*)     MPV 8.5 (*)     All other components within normal limits   COMPREHENSIVE METABOLIC PANEL - Abnormal; Notable for the following components:    Glucose 141 (*)     Total Bilirubin 1.5 (*)     eGFR if non  58.1 (*)     All other components within normal limits   DRUG SCREEN PANEL, URINE EMERGENCY - Abnormal; Notable for the following components:    Creatinine, Random Ur 22.1 (*)     All other components within normal limits    Narrative:     Specimen Source->Urine   TSH - Abnormal; Notable for the following components:    TSH 10.394 (*)     All other components within normal limits   B-TYPE NATRIURETIC PEPTIDE - Abnormal; Notable for the following components:     (*)     All other components within normal limits   LIPID PANEL - Abnormal; Notable for the following components:    Triglycerides 244 (*)     HDL 27 (*)     LDL Cholesterol 47.2 (*)     All other components within normal limits   POCT GLUCOSE - Abnormal; Notable for the following components:    POCT Glucose 133 (*)     All other components within normal limits   APTT   URINALYSIS, REFLEX TO URINE CULTURE    Narrative:     Specimen Source->Urine   TROPONIN I   PROTIME-INR   MAGNESIUM   SARS-COV-2 RNA AMPLIFICATION, QUAL   ALCOHOL,MEDICAL (ETHANOL)   T4, FREE     EKG Readings: (Independently Interpreted)   Initial Reading: No STEMI. Rhythm: Normal Sinus Rhythm. Heart Rate: 87. Ectopy: No Ectopy.  Conduction: Normal. ST Segments: Normal ST Segments. T Waves: Normal. Axis: Normal. Clinical Impression: Normal Sinus Rhythm       Imaging Results          X-Ray Chest AP Portable (Final result)  Result time 10/23/20 15:18:27    Final result by Miracle Domínguez MD (10/23/20 15:18:27)                 Impression:      Stable cardiomegaly.  Mildly improved interstitial opacities, likely on the basis of recurrent or chronic CHF      Electronically signed by: Miracle Domínguez  Date:    10/23/2020  Time:    15:18             Narrative:    EXAMINATION:  XR CHEST AP PORTABLE    CLINICAL HISTORY:  Suspected Covid-19 Virus Infection;    TECHNIQUE:  Single frontal view of the chest was performed.    COMPARISON:  05/17/2020    FINDINGS:  The heart is enlarged and unchanged, status post median sternotomy.  A defibrillator is present.  The interstitial changes throughout the lungs, which were attributed to CHF previously, are mildly improved.  There is no focal consolidation.  No pleural effusion.                               CT Head Without Contrast (Final result)  Result time 10/23/20 15:05:58    Final result by Cathy Castaneda MD (10/23/20 15:05:58)                 Impression:      1. No acute intracranial abnormality appreciated.  2. 6 mm focus of remote lacunar infarction within the right basal ganglia.      Electronically signed by: Nick Castaneda MD  Date:    10/23/2020  Time:    15:05             Narrative:    EXAMINATION:  CT HEAD WITHOUT CONTRAST    CLINICAL HISTORY:  Neuro deficit, acute, stroke suspected;    TECHNIQUE:  5 mm noncontrast axial images were acquired through the head.    COMPARISON:  None    FINDINGS:  The brain is normally formed with preserved gray-white matter junction differentiation. No evidence of acute/recent major vascular territory cerebral infarction, parenchymal hemorrhage, or intra-axial mass.  There is a 6 mm focus of remote lacunar infarction present within the right basal ganglia,  unchanged when compared with the prior maxillofacial CT performed 02/26/2011.  There is age-appropriate cerebral volume loss with associated compensatory enlargement of the ventricular system and widening of the CSF spaces over both cerebral convexities.  There are areas of periventricular white matter hypoattenuation compatible with age-appropriate chronic microvascular ischemic changes.    No hydrocephalus.  No effacement of the skull-base cisterns.  No extra-axial fluid collections or blood products.    The paranasal sinuses and mastoid air cells are clear.  There is leftward bony nasal septal deviation.  The visualized orbits are unremarkable.  The bony calvarium and visualized facial bones show no acute abnormality.  The visualized upper cervical spine/craniocervical junction is not well evaluated due to patient motion.                                 Medical Decision Making:   Differential Diagnosis:   Acute cerebrovascular accident, transient ischemic attack, hypoglycemia, syncope, near syncope, acute renal failure, acute myocardial infarction, coronavirus, pneumonia, sepsis, severe sepsis, septic shock, acute intracranial hemorrhage, increased intracranial pressure, orthostatic hypotension, seizure disorder, hypoxic brain injury  ED Management:  Last known normal: 1500 10/22/2020 but currently asymptomatic  Provider contact time: 1432  Head CT Read by MD: [id number]  Neurology consult ordered by ED MD: Not applicable  VAN positive? No  Weakness: No  Visual changes: No  Aphasia/Dysarthria: No  Neglect: No  NIH score: 0  TPa administered: Not indicated    This is an urgent evaluation a patient with stroke-like symptoms that resolved prior to arrival and not recurred.  Transfer for Neurology consultation offered; however, as the patient is currently asymptomatic he has elected to go home and return if symptoms recur or worsen.  Patient's brother is at bedside and we have discussed very strict return precautions.  He  will be discharged as he is in stable medical condition and ambulatory without any neuro deficits.        Additional MDM:     NIH Stroke Scale:   Interval = baseline (upon arrival/admit)  Level of consciousness = 0 - alert  LOC questions = 0 - answers both correctly  LOC commands = 0 - performs both correctly  Best gaze = 0 - normal  Visual = 0 - no visual loss  Facial palsy = 0 - normal  Motor left arm =  0 - no drift  Motor right arm =  0 - no drift  Motor left leg = 0 - no drift  Motor right leg =  0 - no drift  Limb ataxia = 0 - absent  Sensory = 0 - normal  Best language = 0 - no aphasia  Dysarthria = 0 - normal articulation  Extinction and inattention = 0 - no neglect  NIH Stroke Scale Total = 0                ED Course as of Oct 23 1437   Fri Oct 23, 2020   1433 C/o slurred speech & L sided numbness    I have performed the rapid medical exam (RME) portion of the medical screening exam (MSE) & have determined that this patient requires further evaluation and/or testing to determine if an emergent medical condition exists     [DH]      ED Course User Index  [DH] Huey Lynch NP            Clinical Impression:       ICD-10-CM ICD-9-CM   1. Generalized weakness  R53.1 780.79   2. Stroke-like symptoms  R29.90 781.99   3. Paresthesias  R20.2 782.0   4. Transient ischemic attack  G45.9 435.9   5. Old lacunar stroke without late effect  Z86.73 V12.54   6. Essential hypertension  I10 401.9                      Disposition:   Disposition: Discharged  Condition: Stable                          Linda Graves MD  10/24/20 [id number]

## 2020-10-23 NOTE — ED TRIAGE NOTES
Pt c/o slurred speech, drooling from left side of mouth, and left sided weakness. States symptoms began at 1700hr yesterday.

## 2020-10-23 NOTE — ED NOTES
Pt states that he is feeling better educated on stroke like symptoms and importance is early intervention stated that he understood

## 2020-10-24 ENCOUNTER — HOSPITAL ENCOUNTER (INPATIENT)
Facility: HOSPITAL | Age: 65
LOS: 1 days | Discharge: HOME | DRG: 064 | End: 2020-10-24
Attending: HOSPITALIST | Admitting: HOSPITALIST
Payer: MEDICARE

## 2020-10-24 VITALS
WEIGHT: 167.13 LBS | HEIGHT: 62 IN | OXYGEN SATURATION: 96 % | TEMPERATURE: 98 F | HEART RATE: 70 BPM | SYSTOLIC BLOOD PRESSURE: 142 MMHG | DIASTOLIC BLOOD PRESSURE: 77 MMHG | BODY MASS INDEX: 30.76 KG/M2 | RESPIRATION RATE: 18 BRPM

## 2020-10-24 DIAGNOSIS — R79.89 ELEVATED TROPONIN: ICD-10-CM

## 2020-10-24 DIAGNOSIS — I63.9 STROKE: Primary | ICD-10-CM

## 2020-10-24 LAB
ALBUMIN SERPL BCP-MCNC: 3.7 G/DL (ref 3.5–5.2)
ALP SERPL-CCNC: 78 U/L (ref 55–135)
ALT SERPL W/O P-5'-P-CCNC: 23 U/L (ref 10–44)
ANION GAP SERPL CALC-SCNC: 11 MMOL/L (ref 8–16)
AORTIC ROOT ANNULUS: 2.82 CM
AORTIC VALVE CUSP SEPERATION: 2.11 CM
APTT BLDCRRT: 26.5 SEC (ref 21–32)
AST SERPL-CCNC: 19 U/L (ref 10–40)
AV INDEX (PROSTH): 0.66
AV MEAN GRADIENT: 7 MMHG
AV PEAK GRADIENT: 12 MMHG
AV VALVE AREA: 2.39 CM2
AV VELOCITY RATIO: 0.65
BASOPHILS # BLD AUTO: 0.03 K/UL (ref 0–0.2)
BASOPHILS NFR BLD: 0.4 % (ref 0–1.9)
BILIRUB SERPL-MCNC: 1 MG/DL (ref 0.1–1)
BSA FOR ECHO PROCEDURE: 1.82 M2
BUN SERPL-MCNC: 21 MG/DL (ref 8–23)
CALCIUM SERPL-MCNC: 8.7 MG/DL (ref 8.7–10.5)
CHLORIDE SERPL-SCNC: 102 MMOL/L (ref 95–110)
CO2 SERPL-SCNC: 27 MMOL/L (ref 23–29)
CREAT SERPL-MCNC: 1.3 MG/DL (ref 0.5–1.4)
CV ECHO LV RWT: 0.34 CM
DIFFERENTIAL METHOD: ABNORMAL
DOP CALC AO PEAK VEL: 1.7 M/S
DOP CALC AO VTI: 34.33 CM
DOP CALC LVOT AREA: 3.6 CM2
DOP CALC LVOT DIAMETER: 2.15 CM
DOP CALC LVOT PEAK VEL: 1.11 M/S
DOP CALC LVOT STROKE VOLUME: 81.97 CM3
DOP CALCLVOT PEAK VEL VTI: 22.59 CM
E WAVE DECELERATION TIME: 198.98 MSEC
E/A RATIO: 2.44
E/E' RATIO: 16.15 M/S
ECHO LV POSTERIOR WALL: 1.02 CM (ref 0.6–1.1)
EOSINOPHIL # BLD AUTO: 0.2 K/UL (ref 0–0.5)
EOSINOPHIL NFR BLD: 2.4 % (ref 0–8)
ERYTHROCYTE [DISTWIDTH] IN BLOOD BY AUTOMATED COUNT: 13.8 % (ref 11.5–14.5)
EST. GFR  (AFRICAN AMERICAN): >60 ML/MIN/1.73 M^2
EST. GFR  (NON AFRICAN AMERICAN): 58 ML/MIN/1.73 M^2
ESTIMATED AVG GLUCOSE: 134 MG/DL (ref 68–131)
FRACTIONAL SHORTENING: 19 % (ref 28–44)
GLUCOSE SERPL-MCNC: 149 MG/DL (ref 70–110)
HBA1C MFR BLD HPLC: 6.3 % (ref 4–5.6)
HCT VFR BLD AUTO: 41.6 % (ref 40–54)
HGB BLD-MCNC: 14 G/DL (ref 14–18)
IMM GRANULOCYTES # BLD AUTO: 0.03 K/UL (ref 0–0.04)
IMM GRANULOCYTES NFR BLD AUTO: 0.4 % (ref 0–0.5)
INR PPP: 1 (ref 0.8–1.2)
INTERVENTRICULAR SEPTUM: 1.04 CM (ref 0.6–1.1)
IVRT: 110.37 MSEC
LA MAJOR: 6.41 CM
LA MINOR: 6.73 CM
LA WIDTH: 4.3 CM
LEFT ATRIUM SIZE: 5.12 CM
LEFT ATRIUM VOLUME INDEX: 69.4 ML/M2
LEFT ATRIUM VOLUME: 122.88 CM3
LEFT INTERNAL DIMENSION IN SYSTOLE: 4.81 CM (ref 2.1–4)
LEFT VENTRICLE DIASTOLIC VOLUME INDEX: 100.2 ML/M2
LEFT VENTRICLE DIASTOLIC VOLUME: 177.46 ML
LEFT VENTRICLE MASS INDEX: 143 G/M2
LEFT VENTRICLE SYSTOLIC VOLUME INDEX: 60.9 ML/M2
LEFT VENTRICLE SYSTOLIC VOLUME: 107.93 ML
LEFT VENTRICULAR INTERNAL DIMENSION IN DIASTOLE: 5.96 CM (ref 3.5–6)
LEFT VENTRICULAR MASS: 253.65 G
LV LATERAL E/E' RATIO: 15 M/S
LV SEPTAL E/E' RATIO: 17.5 M/S
LYMPHOCYTES # BLD AUTO: 1.7 K/UL (ref 1–4.8)
LYMPHOCYTES NFR BLD: 24.4 % (ref 18–48)
MAGNESIUM SERPL-MCNC: 2.2 MG/DL (ref 1.6–2.6)
MAGNESIUM SERPL-MCNC: 2.2 MG/DL (ref 1.6–2.6)
MCH RBC QN AUTO: 33.7 PG (ref 27–31)
MCHC RBC AUTO-ENTMCNC: 33.7 G/DL (ref 32–36)
MCV RBC AUTO: 100 FL (ref 82–98)
MONOCYTES # BLD AUTO: 0.6 K/UL (ref 0.3–1)
MONOCYTES NFR BLD: 8.9 % (ref 4–15)
MV A" WAVE DURATION": 145 MSEC
MV PEAK A VEL: 0.43 M/S
MV PEAK E VEL: 1.05 M/S
MV STENOSIS PRESSURE HALF TIME: 56.39 MS
MV VALVE AREA P 1/2 METHOD: 3.9 CM2
NEUTROPHILS # BLD AUTO: 4.4 K/UL (ref 1.8–7.7)
NEUTROPHILS NFR BLD: 63.5 % (ref 38–73)
NRBC BLD-RTO: 0 /100 WBC
PHOSPHATE SERPL-MCNC: 4.2 MG/DL (ref 2.7–4.5)
PISA MRMAX VEL: 0.05 M/S
PLATELET # BLD AUTO: 195 K/UL (ref 150–350)
PMV BLD AUTO: 8.4 FL (ref 9.2–12.9)
POCT GLUCOSE: 118 MG/DL (ref 70–110)
POTASSIUM SERPL-SCNC: 4.2 MMOL/L (ref 3.5–5.1)
PROT SERPL-MCNC: 7.2 G/DL (ref 6–8.4)
PROTHROMBIN TIME: 10.9 SEC (ref 9–12.5)
PULM VEIN A" WAVE DURATION": 118 MSEC
PV PEAK VELOCITY: 0.91 CM/S
RA MAJOR: 4.66 CM
RA PRESSURE: 3 MMHG
RA WIDTH: 3.22 CM
RBC # BLD AUTO: 4.15 M/UL (ref 4.6–6.2)
RIGHT VENTRICULAR END-DIASTOLIC DIMENSION: 1.66 CM
SODIUM SERPL-SCNC: 140 MMOL/L (ref 136–145)
TDI LATERAL: 0.07 M/S
TDI SEPTAL: 0.06 M/S
TDI: 0.07 M/S
TRICUSPID ANNULAR PLANE SYSTOLIC EXCURSION: 1.54 CM
TROPONIN I SERPL DL<=0.01 NG/ML-MCNC: 0.13 NG/ML (ref 0–0.03)
TROPONIN I SERPL DL<=0.01 NG/ML-MCNC: 0.23 NG/ML (ref 0–0.03)
WBC # BLD AUTO: 7 K/UL (ref 3.9–12.7)

## 2020-10-24 PROCEDURE — 84484 ASSAY OF TROPONIN QUANT: CPT

## 2020-10-24 PROCEDURE — 97161 PT EVAL LOW COMPLEX 20 MIN: CPT

## 2020-10-24 PROCEDURE — 99223 1ST HOSP IP/OBS HIGH 75: CPT | Mod: ,,, | Performed by: INTERNAL MEDICINE

## 2020-10-24 PROCEDURE — 25000003 PHARM REV CODE 250: Performed by: NURSE PRACTITIONER

## 2020-10-24 PROCEDURE — 85025 COMPLETE CBC W/AUTO DIFF WBC: CPT

## 2020-10-24 PROCEDURE — 36415 COLL VENOUS BLD VENIPUNCTURE: CPT

## 2020-10-24 PROCEDURE — 93005 ELECTROCARDIOGRAM TRACING: CPT

## 2020-10-24 PROCEDURE — 99223 PR INITIAL HOSPITAL CARE,LEVL III: ICD-10-PCS | Mod: ,,, | Performed by: INTERNAL MEDICINE

## 2020-10-24 PROCEDURE — 85730 THROMBOPLASTIN TIME PARTIAL: CPT

## 2020-10-24 PROCEDURE — 97110 THERAPEUTIC EXERCISES: CPT

## 2020-10-24 PROCEDURE — 92610 EVALUATE SWALLOWING FUNCTION: CPT

## 2020-10-24 PROCEDURE — 94761 N-INVAS EAR/PLS OXIMETRY MLT: CPT

## 2020-10-24 PROCEDURE — 25000003 PHARM REV CODE 250: Performed by: HOSPITALIST

## 2020-10-24 PROCEDURE — 85610 PROTHROMBIN TIME: CPT

## 2020-10-24 PROCEDURE — 80053 COMPREHEN METABOLIC PANEL: CPT

## 2020-10-24 PROCEDURE — 83036 HEMOGLOBIN GLYCOSYLATED A1C: CPT

## 2020-10-24 PROCEDURE — 93010 EKG 12-LEAD: ICD-10-PCS | Mod: ,,, | Performed by: SPECIALIST

## 2020-10-24 PROCEDURE — 92523 SPEECH SOUND LANG COMPREHEN: CPT

## 2020-10-24 PROCEDURE — 97165 OT EVAL LOW COMPLEX 30 MIN: CPT

## 2020-10-24 PROCEDURE — 99900035 HC TECH TIME PER 15 MIN (STAT)

## 2020-10-24 PROCEDURE — 84100 ASSAY OF PHOSPHORUS: CPT

## 2020-10-24 PROCEDURE — 93010 ELECTROCARDIOGRAM REPORT: CPT | Mod: ,,, | Performed by: SPECIALIST

## 2020-10-24 PROCEDURE — 11000001 HC ACUTE MED/SURG PRIVATE ROOM

## 2020-10-24 PROCEDURE — 83735 ASSAY OF MAGNESIUM: CPT | Mod: 91

## 2020-10-24 RX ORDER — ONDANSETRON 2 MG/ML
4 INJECTION INTRAMUSCULAR; INTRAVENOUS EVERY 6 HOURS PRN
Status: DISCONTINUED | OUTPATIENT
Start: 2020-10-24 | End: 2020-10-24 | Stop reason: HOSPADM

## 2020-10-24 RX ORDER — TRAVOPROST OPHTHALMIC SOLUTION 0.04 MG/ML
1 SOLUTION OPHTHALMIC NIGHTLY
Status: DISCONTINUED | OUTPATIENT
Start: 2020-10-24 | End: 2020-10-24 | Stop reason: HOSPADM

## 2020-10-24 RX ORDER — CLOPIDOGREL BISULFATE 75 MG/1
75 TABLET ORAL DAILY
Status: DISCONTINUED | OUTPATIENT
Start: 2020-10-24 | End: 2020-10-24 | Stop reason: HOSPADM

## 2020-10-24 RX ORDER — SODIUM CHLORIDE 0.9 % (FLUSH) 0.9 %
10 SYRINGE (ML) INJECTION
Status: DISCONTINUED | OUTPATIENT
Start: 2020-10-24 | End: 2020-10-24 | Stop reason: HOSPADM

## 2020-10-24 RX ORDER — LABETALOL HYDROCHLORIDE 5 MG/ML
10 INJECTION, SOLUTION INTRAVENOUS EVERY 6 HOURS PRN
Status: DISCONTINUED | OUTPATIENT
Start: 2020-10-24 | End: 2020-10-24 | Stop reason: HOSPADM

## 2020-10-24 RX ORDER — ASPIRIN 81 MG/1
81 TABLET ORAL DAILY
Status: DISCONTINUED | OUTPATIENT
Start: 2020-10-24 | End: 2020-10-24 | Stop reason: HOSPADM

## 2020-10-24 RX ORDER — LEVOTHYROXINE SODIUM 50 UG/1
50 TABLET ORAL
Status: DISCONTINUED | OUTPATIENT
Start: 2020-10-24 | End: 2020-10-24 | Stop reason: HOSPADM

## 2020-10-24 RX ORDER — MUPIROCIN 20 MG/G
OINTMENT TOPICAL 2 TIMES DAILY
Status: DISCONTINUED | OUTPATIENT
Start: 2020-10-24 | End: 2020-10-24 | Stop reason: HOSPADM

## 2020-10-24 RX ORDER — IPRATROPIUM BROMIDE AND ALBUTEROL SULFATE 2.5; .5 MG/3ML; MG/3ML
3 SOLUTION RESPIRATORY (INHALATION) EVERY 4 HOURS PRN
Status: DISCONTINUED | OUTPATIENT
Start: 2020-10-24 | End: 2020-10-24 | Stop reason: HOSPADM

## 2020-10-24 RX ORDER — ATORVASTATIN CALCIUM 40 MG/1
40 TABLET, FILM COATED ORAL DAILY
Status: DISCONTINUED | OUTPATIENT
Start: 2020-10-24 | End: 2020-10-24 | Stop reason: HOSPADM

## 2020-10-24 RX ADMIN — ATORVASTATIN CALCIUM 40 MG: 40 TABLET, FILM COATED ORAL at 09:10

## 2020-10-24 RX ADMIN — LEVOTHYROXINE SODIUM 50 MCG: 50 TABLET ORAL at 06:10

## 2020-10-24 RX ADMIN — MUPIROCIN: 20 OINTMENT TOPICAL at 09:10

## 2020-10-24 RX ADMIN — CLOPIDOGREL BISULFATE 75 MG: 75 TABLET, FILM COATED ORAL at 09:10

## 2020-10-24 RX ADMIN — ASPIRIN 81 MG: 81 TABLET, COATED ORAL at 09:10

## 2020-10-24 NOTE — RESPIRATORY THERAPY
10/24/20 0744   Patient Assessment/Suction   Level of Consciousness (AVPU) alert   PRE-TX-O2   O2 Device (Oxygen Therapy) room air   SpO2 98 %   Pulse Oximetry Type Intermittent   $ Pulse Oximetry - Multiple Charge Pulse Oximetry - Multiple   Aerosol Therapy   $ Aerosol Therapy Charges PRN treatment not required

## 2020-10-24 NOTE — SUBJECTIVE & OBJECTIVE
Past Medical History:   Diagnosis Date    Acute kidney injury     COPD (chronic obstructive pulmonary disease)     Coronary artery disease     Encounter for blood transfusion     Former smoker     Hypertension     Myocardial infarction     Thyroid disease        Past Surgical History:   Procedure Laterality Date    COLONOSCOPY N/A 1/3/2017    Procedure: COLONOSCOPY;  Surgeon: Marcus Green MD;  Location: [facility];  Service: Endoscopy;  Laterality: N/A;    CORONARY STENT PLACEMENT      GASTROSTOMY TUBE PLACEMENT      PEG TUBE REMOVAL      TRACHEOSTOMY CLOSURE      TRACHEOSTOMY TUBE PLACEMENT      UPPER GASTROINTESTINAL ENDOSCOPY  2016    Dr. Green with PEG tube placement       Review of patient's allergies indicates:   Allergen Reactions    Penicillins Hives       Current Facility-Administered Medications on File Prior to Encounter   Medication    [COMPLETED] furosemide injection 80 mg    [COMPLETED] iohexoL (OMNIPAQUE 350) injection 75 mL     Current Outpatient Medications on File Prior to Encounter   Medication Sig    aspirin (ECOTRIN) 81 MG EC tablet Take 81 mg by mouth once daily.    clopidogrel (PLAVIX) 75 mg tablet Take 75 mg by mouth once daily.    enalapril (VASOTEC) 20 MG tablet TAKE ONE TABLET BY MOUTH EVERYDAY AS NEEDED    furosemide (LASIX) 20 MG tablet Take 1 tablet (20 mg total) by mouth once daily.    atorvastatin (LIPITOR) 40 MG tablet Take 1 tablet (40 mg total) by mouth once daily.    carvedilol (COREG) 6.25 MG tablet Take 2 tablets (12.5 mg total) by mouth 2 (two) times daily.    travoprost (TRAVATAN Z) 0.004 % Drop Place 1 drop into both eyes every evening. (Patient taking differently: Place 1 drop into both eyes nightly as needed. )     Family History     None        Tobacco Use    Smoking status: Former Smoker     Types: Cigarettes     Quit date: 2005     Years since quittin.9    Tobacco comment: quit    Substance and Sexual Activity     Alcohol use: No    Drug use: No    Sexual activity: Yes     Review of Systems   Constitutional: Negative for chills and fever.   HENT: Negative for congestion and rhinorrhea.    Eyes: Negative for photophobia and visual disturbance.   Respiratory: Positive for chest tightness (1 week ago; now resolved) and shortness of breath (about 1 week ago; now resolved). Negative for cough.    Cardiovascular: Negative for chest pain and palpitations.   Gastrointestinal: Negative for abdominal pain, diarrhea, nausea and vomiting.   Endocrine: Negative for polydipsia and polyuria.   Genitourinary: Negative for dysuria and flank pain.   Musculoskeletal: Negative for arthralgias and gait problem.   Skin: Negative for rash and wound.   Neurological: Positive for dizziness, speech difficulty (now resolved) and weakness (left upper extremity).   Hematological: Negative for adenopathy.   Psychiatric/Behavioral: Negative for agitation and confusion.   All other systems reviewed and are negative.    Objective:     Vital Signs (Most Recent):  Temp: 96.8 °F (36 °C) (10/24/20 0046)  Pulse: 61 (10/24/20 0231)  Resp: 17 (10/24/20 0231)  BP: 135/74 (10/24/20 0231)  SpO2: 98 % (10/24/20 0231) Vital Signs (24h Range):  Temp:  [96.8 °F (36 °C)-98.7 °F (37.1 °C)] 96.8 °F (36 °C)  Pulse:  [56-91] 61  Resp:  [11-22] 17  SpO2:  [92 %-99 %] 98 %  BP: (109-174)/() 135/74     Weight: 75.8 kg (167 lb 1.7 oz)  Body mass index is 30.56 kg/m².    Physical Exam  Vitals signs and nursing note reviewed.   Constitutional:       General: He is not in acute distress.     Appearance: He is well-developed.   HENT:      Head: Normocephalic and atraumatic.   Eyes:      Conjunctiva/sclera: Conjunctivae normal.      Pupils: Pupils are equal, round, and reactive to light.   Neck:      Musculoskeletal: Normal range of motion and neck supple.   Cardiovascular:      Rate and Rhythm: Normal rate and regular rhythm.      Pulses: Normal pulses.   Pulmonary:       Effort: Pulmonary effort is normal. No respiratory distress.      Breath sounds: Normal breath sounds.   Abdominal:      General: Bowel sounds are normal. There is no distension.      Palpations: Abdomen is soft.      Tenderness: There is no abdominal tenderness.   Genitourinary:     Comments: deferred  Musculoskeletal: Normal range of motion.      Right lower leg: No edema.      Left lower leg: No edema.   Skin:     General: Skin is warm and dry.      Capillary Refill: Capillary refill takes 2 to 3 seconds.   Neurological:      General: No focal deficit present.      Mental Status: He is alert and oriented to person, place, and time.   Psychiatric:         Mood and Affect: Mood normal.         Behavior: Behavior normal.         Thought Content: Thought content normal.         Judgment: Judgment normal.           CRANIAL NERVES     CN III, IV, VI   Pupils are equal, round, and reactive to light.       Significant Labs:   CBC:   Recent Labs   Lab 10/23/20  1441   WBC 7.29   HGB 14.7   HCT 43.3        CMP:   Recent Labs   Lab 10/23/20  1441      K 4.2      CO2 25   *   BUN 20   CREATININE 1.3   CALCIUM 8.8   PROT 7.9   ALBUMIN 4.2   BILITOT 1.5*   ALKPHOS 73   AST 23   ALT 25   ANIONGAP 11   EGFRNONAA 58.1*     Cardiac Markers:   Recent Labs   Lab 10/23/20  1441   *     TSH:   Recent Labs   Lab 10/23/20  1441   TSH 10.394*     Urine Studies:   Recent Labs   Lab 10/23/20  1557   COLORU Yellow   APPEARANCEUA Clear   PHUR 7.0   SPECGRAV 1.015   PROTEINUA Negative   GLUCUA Negative   KETONESU Negative   BILIRUBINUA Negative   OCCULTUA Negative   NITRITE Negative   UROBILINOGEN Negative   LEUKOCYTESUR Negative       Significant Imaging: CXR: Stable cardiomegaly.  Mildly improved interstitial opacities, likely on the basis of recurrent or chronic CHF.

## 2020-10-24 NOTE — NURSING
IV access removed, catheter intact, patient tolerated well, gauze and tape applied to site. Telemetry removed and returned to monitor room. Educated patient on discharge instructions regarding new/change/continued/discontinued medications, follow-up appointments, diet, activity,  patient states understanding, no further questions needing answered. Pt transferred off unit via WC by nurse.

## 2020-10-24 NOTE — PROGRESS NOTES
Evaluated patient.  He is currently undergoing an echocardiogram.  He reports left upper extremity numbness and tingling which has improved.  He had reported previous slurred speech which he states has improved as well.  Awaiting neurological evaluation.  Reviewed all imaging.      Awaiting neurology, PT OT and ST recommendations.

## 2020-10-24 NOTE — CONSULTS
[facility]  Neurology  Consult Note    Patient Name: Cleveland Capps  MRN: [id number]  Admission Date: 10/24/2020  Hospital Length of Stay: 0 days  Code Status: Full Code   Attending Provider: Vane Kumar MD   Consulting NP Radha Ortega NP   Consulting Provider: Jeremie Gomes MD  Primary Care Physician: Romero Snyder MD  Principal Problem:Stroke    Inpatient consult to Neurology  Consult performed by: Radha Ortega NP  Consult ordered by: MK Burroughs        Subjective:     Chief Complaint:    Chief Complaint   Patient presents with    Extremity Weakness      HPI: Cleveland Capps is a 63 y/o male with past medical history significant for CVA on ASA and Plavix, COPD, CAD with MI, hypertension, acute kidney injury, thyroid disease who was transferred from [facility] to [facility] for further evaluation of stroke-like symptoms that began Thursday at approximately 1700.  Pt reports that he had slurred speech and drooling Thursday evening as well as weakness in the LUE.  Pt states that he first noticed symptoms when he went to check his mail and could not hold onto it. Symptoms improved, but returned the following morning.  Pt went to the ED [facility] for evaluation, but symptoms had resolved.  He was discharged home; however, shortly after arriving home, symptoms returned yet again, accompanied by tingling to the LUE.  Denies any recent falls, head trauma, headache, blurred vision, nausea, incontinence, gait abnormality, palpitations or diaphoresis.  Pt reports that he did have some increased SOB and chest tightness about a week ago, but this resolved. CT head was performed at [facility] with no acute process identified.  CTA was later obtained with evidence of acute/subacute CVA now present.  EKG is noted with T wave inversion in the lateral leads; however, pt denies chest pain with normal troponin level.  He is transferred to [facility] for further neurology and  cardiology evaluation.       Neurology consult Note: Patient seen and examined with Dr. Chao. Discussed plan of care, Patient brother at bedside. Patient is a 64 year old white male with a PMHx: CVA, COPD, CAD with MI, with Implantable cardioverter,  hypertension, acute kidney injury, thyroid disease. Patient states that he developed left arm extremity weakness and was unable to control his movement, he states his left arm was just flopping. The patient the patient reported that his s/s resolved after coming to the hospital. Patient not a TPA candidate. The patient is AOC x4, NADN, No SOB noted patient [place] with exception of mild dysarthria. NHISS 1. Patient follow commands, and verbalizes understanding. Patient work up CT of head w/o contrast showed New small focus of edema in the deep white matter of the right frontal parietal lobe which may represent a small ischemic infarct.  Old lacunar infarct of the right basal ganglion.  Mild brain atrophy. CTA Head and Neck Complete occlusion of the right internal carotid artery at its origin secondary to heavily calcified atherosclerotic plaque.  Additional atherosclerotic plaque and stenosis of 64% noted at the origin of the left internal carotid artery.  At the mjargu-ma-Ysiomz no aneurysm is seen and there is flow in the anterior, middle and posterior cerebral arteries without abrupt cut off or stenosis demonstrated peripherally. Recommend consulting Vascular Surgery. Patient unable to have MRI due to Implantable Cardioverter Defibrillator that was placed in 2017.  TTE with bubble pending. TSH level 10.394 ^, LDL 47.2, Hemoglobin A 1c 6.3, CR 1.3, Recommend stroke prevention, PT/OT/ST to evaluate and treat.  Continue ASA 81 mg po daily, Plavix 75 mg po daily. Continue low dose statin. Stroke work up in progress will continue to follow.         Past Medical History:   Diagnosis Date    Acute kidney injury     COPD (chronic obstructive pulmonary disease)      Coronary artery disease     Encounter for blood transfusion     Former smoker     Hypertension     Myocardial infarction     Thyroid disease        Past Surgical History:   Procedure Laterality Date    COLONOSCOPY N/A 1/3/2017    Procedure: COLONOSCOPY;  Surgeon: Marcus Green MD;  Location: [facility];  Service: Endoscopy;  Laterality: N/A;    CORONARY STENT PLACEMENT      GASTROSTOMY TUBE PLACEMENT      PEG TUBE REMOVAL      TRACHEOSTOMY CLOSURE      TRACHEOSTOMY TUBE PLACEMENT      UPPER GASTROINTESTINAL ENDOSCOPY  2016    Dr. Green with PEG tube placement       Review of patient's allergies indicates:   Allergen Reactions    Penicillins Hives       Current Neurological Medications: ASA, Plavix,     Current Facility-Administered Medications on File Prior to Encounter   Medication    [COMPLETED] furosemide injection 80 mg    [COMPLETED] iohexoL (OMNIPAQUE 350) injection 75 mL     Current Outpatient Medications on File Prior to Encounter   Medication Sig    aspirin (ECOTRIN) 81 MG EC tablet Take 81 mg by mouth once daily.    clopidogrel (PLAVIX) 75 mg tablet Take 75 mg by mouth once daily.    enalapril (VASOTEC) 20 MG tablet TAKE ONE TABLET BY MOUTH EVERYDAY AS NEEDED    furosemide (LASIX) 20 MG tablet Take 1 tablet (20 mg total) by mouth once daily.    atorvastatin (LIPITOR) 40 MG tablet Take 1 tablet (40 mg total) by mouth once daily.    carvedilol (COREG) 6.25 MG tablet Take 2 tablets (12.5 mg total) by mouth 2 (two) times daily.    travoprost (TRAVATAN Z) 0.004 % Drop Place 1 drop into both eyes every evening. (Patient taking differently: Place 1 drop into both eyes nightly as needed. )      Family History     None        Tobacco Use    Smoking status: Former Smoker     Types: Cigarettes     Quit date: 2005     Years since quittin.9    Tobacco comment: quit    Substance and Sexual Activity    Alcohol use: No    Drug use: No    Sexual activity: Yes     Review  of Systems   Constitutional: Positive for activity change.   HENT: Negative for congestion, dental problem, drooling, ear discharge, ear pain and facial swelling.    Eyes: Negative.    Respiratory: Negative.  Negative for apnea, cough, choking and chest tightness.    Cardiovascular: Negative.  Negative for chest pain and leg swelling.   Gastrointestinal: Negative.    Endocrine: Negative.  Negative for cold intolerance, heat intolerance and polydipsia.   Genitourinary: Negative.  Negative for difficulty urinating, dysuria, enuresis and frequency.   Musculoskeletal: Negative.  Negative for back pain, gait problem, joint swelling, myalgias and neck pain.   Allergic/Immunologic: Negative.  Negative for environmental allergies and immunocompromised state.   Neurological: Positive for weakness. Negative for dizziness, tremors, seizures, syncope, facial asymmetry, speech difficulty, light-headedness, numbness and headaches.        Uncoordinated movement LUE resolved at this time  Mild dysarthria    Hematological: Negative.  Negative for adenopathy. Does not bruise/bleed easily.   Psychiatric/Behavioral: Negative.  Negative for agitation, behavioral problems, confusion and dysphoric mood. The patient is not hyperactive.      Objective:     Vital Signs (Most Recent):  Temp: 98.1 °F (36.7 °C) (10/24/20 0414)  Pulse: 63 (10/24/20 0414)  Resp: 18 (10/24/20 0414)  BP: 125/68 (10/24/20 0414)  SpO2: (!) 94 % (10/24/20 0414) Vital Signs (24h Range):  Temp:  [96.8 °F (36 °C)-98.7 °F (37.1 °C)] 98.1 °F (36.7 °C)  Pulse:  [56-91] 63  Resp:  [11-22] 18  SpO2:  [92 %-99 %] 94 %  BP: (109-174)/() 125/68     Weight: 75.8 kg (167 lb 1.7 oz)  Body mass index is 30.56 kg/m².    Physical Exam  Vitals signs and nursing note reviewed.   Constitutional:       General: He is awake.      Appearance: Normal appearance. He is well-developed and normal weight.   HENT:      Head: Normocephalic and atraumatic.      Mouth/Throat:      Mouth:  Mucous membranes are moist.      Pharynx: Oropharynx is clear.   Eyes:      General: Lids are normal.      Conjunctiva/sclera: Conjunctivae normal.      Pupils: Pupils are equal, round, and reactive to light.   Neck:      Musculoskeletal: Full passive range of motion without pain, normal range of motion and neck supple.   Cardiovascular:      Rate and Rhythm: Normal rate.      Pulses: Normal pulses.      Heart sounds: Normal heart sounds.   Pulmonary:      Effort: Pulmonary effort is normal.      Breath sounds: Normal breath sounds.   Abdominal:      General: Bowel sounds are normal.      Palpations: Abdomen is soft.   Musculoskeletal: Normal range of motion.   Skin:     General: Skin is warm and dry.      Capillary Refill: Capillary refill takes less than 2 seconds.   Neurological:      General: No focal deficit present.      Mental Status: He is alert and oriented to person, place, and time. Mental status is at baseline.      GCS: GCS eye subscore is 4 - spontaneous. GCS verbal subscore is 5 - oriented. GCS motor subscore is 6 - obeys commands.      Cranial Nerves: Dysarthria present. No cranial nerve deficit or facial asymmetry.      Sensory: Sensation is intact. No sensory deficit.      Motor: No weakness, tremor, atrophy, abnormal muscle tone, seizure activity or pronator drift.      Coordination: Coordination is intact. Coordination normal. Finger-Nose-Finger Test normal.      Deep Tendon Reflexes: Strength normal and reflexes are normal and symmetric. Reflexes normal.      Reflex Scores:       Tricep reflexes are 2+ on the right side and 2+ on the left side.       Bicep reflexes are 2+ on the right side and 2+ on the left side.       Brachioradialis reflexes are 2+ on the right side and 2+ on the left side.       Patellar reflexes are 2+ on the left side.     Comments: Mild dysarthria   ROSARIO gait    Psychiatric:         Attention and Perception: Attention and perception normal.         Mood and Affect: Mood  and affect normal.         Behavior: Behavior normal. Behavior is cooperative.         Thought Content: Thought content normal.         Cognition and Memory: Cognition and memory normal.         Judgment: Judgment normal.         NEUROLOGICAL EXAMINATION:     MENTAL STATUS   Oriented to person, place, and time.   Registration: recalls 3 of 3 objects. Recall at 5 minutes: recalls 3 of 3 objects.   Attention: normal. Concentration: normal.   Level of consciousness: alert  Knowledge: good and consistent with education.   Able to name object. Able to repeat. Normal comprehension. Praxis: normal     CRANIAL NERVES     CN II   Visual fields full to confrontation.     CN III, IV, VI   Pupils are equal, round, and reactive to light.  Right pupil: Shape: regular. Reactivity: brisk. Consensual response: intact.   Left pupil: Size: 3 mm. Shape: regular. Reactivity: brisk. Consensual response: intact.   Nystagmus: none   Diplopia: none  Ophthalmoparesis: none  Upgaze: normal  Downgaze: normal  Conjugate gaze: present  Vestibulo-ocular reflex: present    CN V   Facial sensation intact.   Right facial sensation deficit: none  Left facial sensation deficit: none    CN VII   Facial expression full, symmetric.   Right facial weakness: none  Left facial weakness: none    CN VIII   CN VIII normal.   Hearing: intact    CN IX, X   CN IX normal.   Palate: symmetric    CN XI   CN XI normal.   Right sternocleidomastoid strength: normal    CN XII   Tongue: not atrophic  Fasciculations: absent  Tongue deviation: none    MOTOR EXAM   Muscle bulk: normal  Overall muscle tone: normal  Right arm tone: normal  Left arm tone: normal  Right leg tone: normal  Left leg tone: normal    Strength   Strength 5/5 throughout.     REFLEXES     Reflexes   Right brachioradialis: 2+  Left brachioradialis: 2+  Right biceps: 2+  Left biceps: 2+  Right triceps: 2+  Left triceps: 2+  Left patellar: 2+    SENSORY EXAM   Light touch normal.   Right arm light touch:  normal  Vibration normal.   Proprioception normal.     GAIT AND COORDINATION      Coordination   Finger to nose coordination: normal    Tremor   Resting tremor: absent  Intention tremor: absent  Action tremor: absent       ROSARIO      NIH Stroke Scale:    Level of Consciousness: 0 - alert  LOC Questions: 0 - answers both correctly  LOC Commands: 0 - performs both correctly  Best Gaze: 0 - normal  Visual: 0 - no visual loss  Facial Palsy: 0 - normal  Motor Left Arm: 0 - no drift  Motor Right Arm: 0 - no drift  Motor Left Le - no drift  Motor Right Le - no drift  Limb Ataxia: 0 - absent  Sensory: 0 - normal  Best Language: 0 - no aphasia  Dysarthria: 1 - mild to moderate dysarthria  Extinction and Inattention: 0 - no neglect  NIH Stroke Scale Total: 1  Christal Coma Scale:  Best Eye Response: 4 - spontaneous  Best Motor Response: 6 - obeys commands  Best Verbal Response: 5 - oriented  [place] Coma Scale Total: 15        Significant Labs:   CMP  Sodium   Date Value Ref Range Status   10/24/2020 140 136 - 145 mmol/L Final     Potassium   Date Value Ref Range Status   10/24/2020 4.2 3.5 - 5.1 mmol/L Final     Chloride   Date Value Ref Range Status   10/24/2020 102 95 - 110 mmol/L Final     CO2   Date Value Ref Range Status   10/24/2020 27 23 - 29 mmol/L Final     Glucose   Date Value Ref Range Status   10/24/2020 149 (H) 70 - 110 mg/dL Final     BUN, Bld   Date Value Ref Range Status   10/24/2020 21 8 - 23 mg/dL Final     Creatinine   Date Value Ref Range Status   10/24/2020 1.3 0.5 - 1.4 mg/dL Final     Calcium   Date Value Ref Range Status   10/24/2020 8.7 8.7 - 10.5 mg/dL Final     Total Protein   Date Value Ref Range Status   10/24/2020 7.2 6.0 - 8.4 g/dL Final     Albumin   Date Value Ref Range Status   10/24/2020 3.7 3.5 - 5.2 g/dL Final     Total Bilirubin   Date Value Ref Range Status   10/24/2020 1.0 0.1 - 1.0 mg/dL Final     Comment:     For infants and newborns, interpretation of results should be  based  on gestational age, weight and in agreement with clinical  observations.  Premature Infant recommended reference ranges:  Up to 24 hours.............<8.0 mg/dL  Up to 48 hours............<12.0 mg/dL  3-5 days..................<15.0 mg/dL  6-29 days.................<15.0 mg/dL       Alkaline Phosphatase   Date Value Ref Range Status   10/24/2020 78 55 - 135 U/L Final     AST   Date Value Ref Range Status   10/24/2020 19 10 - 40 U/L Final     ALT   Date Value Ref Range Status   10/24/2020 23 10 - 44 U/L Final     Anion Gap   Date Value Ref Range Status   10/24/2020 11 8 - 16 mmol/L Final     eGFR if    Date Value Ref Range Status   10/24/2020 >60 >60 mL/min/1.73 m^2 Final     eGFR if non    Date Value Ref Range Status   10/24/2020 58 (A) >60 mL/min/1.73 m^2 Final     Comment:     Calculation used to obtain the estimated glomerular filtration  rate (eGFR) is the CKD-EPI equation.        BMP  Lab Results   Component Value Date     10/24/2020    K 4.2 10/24/2020     10/24/2020    CO2 27 10/24/2020    BUN 21 10/24/2020    CREATININE 1.3 10/24/2020    CALCIUM 8.7 10/24/2020    ANIONGAP 11 10/24/2020    ESTGFRAFRICA >60 10/24/2020    EGFRNONAA 58 (A) 10/24/2020     Lab Results   Component Value Date    WBC 7.00 10/24/2020    HGB 14.0 10/24/2020    HCT 41.6 10/24/2020     (H) 10/24/2020     10/24/2020       Lab Results   Component Value Date    HGBA1C 6.3 (H) 10/24/2020       Lab Results   Component Value Date    LDLCALC 47.2 (L) 10/23/2020     Lab Results   Component Value Date    TSH 10.394 (H) 10/23/2020       Significant Imaging:   Narrative & Impression     EXAMINATION:  CT HEAD WITHOUT CONTRAST     CLINICAL HISTORY:  Neuro deficit, acute, stroke suspected;     TECHNIQUE:  5 mm noncontrast axial images were acquired through the head.     COMPARISON:  None     FINDINGS:  The brain is normally formed with preserved gray-white matter junction differentiation.  No evidence of acute/recent major vascular territory cerebral infarction, parenchymal hemorrhage, or intra-axial mass.  There is a 6 mm focus of remote lacunar infarction present within the right basal ganglia, unchanged when compared with the prior maxillofacial CT performed 02/26/2011.  There is age-appropriate cerebral volume loss with associated compensatory enlargement of the ventricular system and widening of the CSF spaces over both cerebral convexities.  There are areas of periventricular white matter hypoattenuation compatible with age-appropriate chronic microvascular ischemic changes.     No hydrocephalus.  No effacement of the skull-base cisterns.  No extra-axial fluid collections or blood products.     The paranasal sinuses and mastoid air cells are clear.  There is leftward bony nasal septal deviation.  The visualized orbits are unremarkable.  The bony calvarium and visualized facial bones show no acute abnormality.  The visualized upper cervical spine/craniocervical junction is not well evaluated due to patient motion.     Impression:     1. No acute intracranial abnormality appreciated.  2. 6 mm focus of remote lacunar infarction within the right basal ganglia.        [phone] 10/24/2020 STAT      Narrative & Impression     EXAMINATION:  CT HEAD WITHOUT CONTRAST     CLINICAL HISTORY:  TIA, initial exam;Neuro deficit, acute, stroke suspected;     TECHNIQUE:  Low dose axial images were obtained through the head.  Coronal and sagittal reformations were also performed. Contrast was not administered.     COMPARISON:  Head CT of 23 October 2020 at 14:50     FINDINGS:  No cranial fracture is identified.  Scalp edema or hematoma is not noted.     The basal cisterns are clear and symmetric.  There is no mass effect or midline shift.  The ventricles are mildly enlarged as are the cerebral sulci consistent with mild brain atrophy.  Mild decreased density of the cerebral white matter is noted.  There is an  old lacunar infarct of the right basal ganglion measuring 5 mm.  In the deep white matter of the right frontal parietal lobe is a focus of hypodensity, a probable area of edema.  This may represent an ischemic infarct.  This is not matched on the left.  Other focal areas of increased or decreased CT density consistent with tumor, edema, CVA or hemorrhage is not seen.  Intracranial hemorrhage or hematoma is not noted.     Impression:     New small focus of edema in the deep white matter of the right frontal parietal lobe which may represent a small ischemic infarct.  Old lacunar infarct of the right basal ganglion.  Mild brain atrophy.        Electronically signed by: Huey Escalante MD  Date:                                            10/24/2020  Time:                                           08:51        Narrative & Impression     EXAMINATION:  CTA HEAD AND NECK (XPD)     CLINICAL HISTORY:  TIA, initial exam;Neuro deficit, acute, stroke suspected;     TECHNIQUE:  Non contrast low dose axial images were obtained through the head.  CT angiogram was performed from the level of the mesha to the top of the head following the IV administration of 75mL of Omnipaque 350.   Sagittal and coronal reconstructions and maximum intensity projection reconstructions were performed. Arterial stenosis percentages are based on NASCET measurement criteria.     COMPARISON:  None     FINDINGS:  Heavy out atherosclerotic calcification in the arch of the aorta or proximal great vessels is not seen.  On the right there is then seen to be a large heavily calcified plaque in the distal common carotid artery and additional severe atherosclerotic plaque and calcification at the carotid bifurcation with occlusion of the right internal carotid artery.  There is flow into the external carotid branches.  Small amount of contrast is seen in the right carotid siphon probably retrograde from the vdzlrc-zf-Ycvplm.  Atherosclerotic calcification is  noted at the carotid siphons bilaterally.     On the left there is atherosclerotic plaque with calcification in the distal common carotid artery and in the carotid bifurcation.  This produces 64% stenosis in the origin of the left internal carotid artery.  There is flow in the left internal carotid artery to the carotid siphon and to the zqnfcv-pu-Vubxnm.     There is flow in both vertebral arteries to the basilar artery and then flow in the basilar artery to the kvflez-ea-Ifmmlc.  At the ipubzq-kd-Jlcpcr an aneurysm is not identified.  There is flow demonstrated in both anterior cerebral arteries, both middle cerebral arteries and both posterior cerebral arteries.  Peripherally an AVM is not identified.  The right middle cerebral artery is smaller than the left.  Abrupt cut off of the trifurcation vessels on either side is not seen.  There is normal flow peripherally in the anterior and posterior circulations as well.     Impression:     Complete occlusion of the right internal carotid artery at its origin secondary to heavily calcified atherosclerotic plaque.  Additional atherosclerotic plaque and stenosis of 64% noted at the origin of the left internal carotid artery.  At the ykzlok-lw-Xbyxwm no aneurysm is seen and there is flow in the anterior, middle and posterior cerebral arteries without abrupt cut off or stenosis demonstrated peripherally.        Electronically signed by: Huey Escalante MD  Date:                                            10/24/2020  Time:                                           08:57             Last         TTE with bubble pending     Assessment and Plan:  64 year old White male with impression  1. Right Frontal Parietal Lobe Ischemic stroke   2. Left sided weakness  3. Ataxia resolved  4. Mild Dysarthria   5. CHF  6. CKD  7. CAD   8. PAF (Paroxysmal Atrial Fibrillation) with Implantable Cardioverter Defibrillator   Patient work up in Progress:  -CT of head w/o contrast showed New  small focus of edema in the deep white matter of the right frontal parietal lobe which may represent a small ischemic infarct.  Old lacunar infarct of the right basal ganglion.  Mild brain atrophy.   -CTA Head and Neck Complete occlusion of the right internal carotid artery at its origin secondary to heavily calcified atherosclerotic plaque.  Additional atherosclerotic plaque and stenosis of 64% noted at the origin of the left internal carotid artery.  At the wozjvf-wh-Uqvbhi no aneurysm is seen and there is flow in the anterior, middle and posterior cerebral arteries without abrupt cut off or stenosis demonstrated peripherally.   -Recommend consulting Vascular Surgery.   -TTE w/bubble pending Echo shows no evidence on intracardiac shunting per Dr. Coppola   -Patient unable to have MRI due to Implantable Cardioverter Defibrillator that was placed in 2017.  NOT tPA candidate s/s resolved   - TSH level 10.394 ^,   -LDL 47.2  - Hemoglobin A 1c 6.3  - CR 1.3  - Recommend stroke prevention  -PT/OT/ST to evaluate and treat.    -Continue ASA 81 mg po daily, Plavix 75 mg po daily. Continue statin. Stroke work up in progress will continue to follow.   -Risk factor modification: HTN, DM   8.Hypertension  -Allow for permissive HTN 24-48 hours  -Management per internal medicine      Plan: Recommend Repeating CT of Head w/o contrast in 2 weeks, if no intracranial hemorrhage recommend starting patient on Eliquis with ASA combination due to cardiac history outpatient. Continue ASA 81 mg po daily, and Plavix 75 mg po daily for now. Continue statin. Follow up with Vascular Surgery as directed. TTE w bubble . Work up in progress.      [phone] within 3 days from discharge.    Stroke education was provided including stroke risk factors modification and any acute neurological changes including weakness, confusion, visual changes to come straight to the ER.  Seizure education was  provided including no driving, no swimming by self, no operation of heavy machinery or climbing on ladders.     All side effects of new medications were discussed with patient and/or next of kin and all questions were answered.                Active Diagnoses:    Diagnosis Date Noted POA    PRINCIPAL PROBLEM:  Stroke [I63.9] 10/23/2020 Yes    CHF (congestive heart failure) [I50.9] 05/19/2020 Yes    CKD (chronic kidney disease) stage 3, GFR 30-59 ml/min [N18.30] 05/17/2020 Yes    Coronary artery disease [I25.10] 04/25/2016 Yes      Problems Resolved During this Admission:       VTE Risk Mitigation (From admission, onward)         Ordered     IP VTE HIGH RISK PATIENT  Once      10/24/20 0033     Place sequential compression device  Until discontinued      10/24/20 0033     Reason for No Pharmacological VTE Prophylaxis  Once     Question:  Reasons:  Answer:  Physician Provided (leave comment)    10/24/20 0033                Thank you for your consult. I will follow-up with patient. Please contact us if you have any additional questions.    Radha Ortega NP  Neurology  [facility]

## 2020-10-24 NOTE — CONSULTS
[facility]  Cardiology  Consult Note    Patient Name: Cleveland Capps  MRN: [id number]  Admission Date: 10/24/2020  Hospital Length of Stay: 0 days  Code Status: Full Code   Attending Provider: Vane Kumar MD   Consulting Provider: Luis Miguel Jarrett MD PhD  Primary Care Physician: Romero Snyder MD  Principal Problem:Stroke    Patient information was obtained from patient, relative(s) and ER records.     Consults  Subjective:     Chief Complaint:  Nonspecific EKG Changes     HPI:   Cleveland Capps is a 65 y/o male with PMH of CAD s/p CABG in 2016, HFrEF (33%) s/p pacemaker/AICD placement, Afib (not on anticoagulation), HTN, CVA, COPD, obesity, who presents with new CVA.  Cardiology consulted for evaluation of non-specific EKG changes in this setting.  Pertinent history/events are as follows:    Mr. Capps was transferred from [facility] to [facility] for further evaluation of stroke-like symptoms that began Thursday at approximately 1700.  Pt reports that he had slurred speech and drooling Thursday evening as well as weakness in the LUE.  Pt states that he first noticed symptoms when he went to check his mail and could not hold onto it. Symptoms improved, but returned the following morning.  Pt went to the ED at [facility] for evaluation, but symptoms had resolved.  He was discharged home; however, shortly after arriving home, symptoms returned yet again, accompanied by tingling to the LUE.  Denies any recent falls, head trauma, headache, blurred vision, nausea, incontinence, gait abnormality, palpitations or diaphoresis.  Pt reports that he did have some increased SOB and chest tightness about a week ago, but this resolved. CT head was performed at [facility] with no acute process identified.  CTA was later obtained with evidence of acute/subacute CVA now present.  EKG is noted with T wave inversion in the inferolateral leads; however, pt denies chest pain with normal troponin level.  He  is transferred to [facility] for further neurology and cardiology evaluation.     Mr. Capps reports no chest pain or lower extremity edema.  States he has noted increased shortness of breath over the past 3 days.  Review of EKG's from current admission show inferolateral TWI's which are present on EKG from 5/15/2016.  No other significant EKG changes noted.  BNP elevated at 261.  Troponin mildly elevated at 0.12-->0.21-->0.225.  Chest x-ray shows mild pulmonary edema.  Echo done today shows:  Conclusion:  · Severe left atrial enlargement.  · There is left ventricular eccentric hypertrophy.  · Moderately decreased left ventricular systolic function. The estimated ejection fraction is 39%.  · Grade III diastolic dysfunction.  · Mildly reduced right ventricular systolic function.  · Mild aortic regurgitation.  · Mild mitral regurgitation.  · Normal central venous pressure (3 mmHg).  · There is no evidence of intracardiac shunting.    Mr. Capps has remained hemodynamically stable.    Past Medical History:   Diagnosis Date    Acute kidney injury     COPD (chronic obstructive pulmonary disease)     Coronary artery disease     Encounter for blood transfusion     Former smoker     Hypertension     Myocardial infarction     Thyroid disease        Past Surgical History:   Procedure Laterality Date    COLONOSCOPY N/A 1/3/2017    Procedure: COLONOSCOPY;  Surgeon: Marcus Green MD;  Location: [facility];  Service: Endoscopy;  Laterality: N/A;    CORONARY STENT PLACEMENT      GASTROSTOMY TUBE PLACEMENT      PEG TUBE REMOVAL      TRACHEOSTOMY CLOSURE      TRACHEOSTOMY TUBE PLACEMENT      UPPER GASTROINTESTINAL ENDOSCOPY  05/2016    Dr. Green with PEG tube placement       Review of patient's allergies indicates:   Allergen Reactions    Penicillins Hives       Current Facility-Administered Medications on File Prior to Encounter   Medication    [COMPLETED] furosemide injection 80 mg    [COMPLETED] iohexoL  (OMNIPAQUE 350) injection 75 mL     Current Outpatient Medications on File Prior to Encounter   Medication Sig    aspirin (ECOTRIN) 81 MG EC tablet Take 81 mg by mouth once daily.    clopidogrel (PLAVIX) 75 mg tablet Take 75 mg by mouth once daily.    enalapril (VASOTEC) 20 MG tablet TAKE ONE TABLET BY MOUTH EVERYDAY AS NEEDED    furosemide (LASIX) 20 MG tablet Take 1 tablet (20 mg total) by mouth once daily.    atorvastatin (LIPITOR) 40 MG tablet Take 1 tablet (40 mg total) by mouth once daily.    carvedilol (COREG) 6.25 MG tablet Take 2 tablets (12.5 mg total) by mouth 2 (two) times daily.    travoprost (TRAVATAN Z) 0.004 % Drop Place 1 drop into both eyes every evening. (Patient taking differently: Place 1 drop into both eyes nightly as needed. )     Family History     None        Tobacco Use    Smoking status: Former Smoker     Types: Cigarettes     Quit date: 2005     Years since quittin.9    Tobacco comment: quit    Substance and Sexual Activity    Alcohol use: No    Drug use: No    Sexual activity: Yes     Review of Systems   Constitution: Negative.   HENT: Negative.    Eyes: Negative.    Cardiovascular: Negative.    Respiratory: Positive for shortness of breath.    Endocrine: Negative.    Skin: Negative.    Musculoskeletal: Positive for muscle weakness.   Gastrointestinal: Negative.    Genitourinary: Negative.    Neurological: Positive for weakness.   Psychiatric/Behavioral: Negative.      Objective:     Vital Signs (Most Recent):  Temp: 98.7 °F (37.1 °C) (10/24/20 1123)  Pulse: 69 (10/24/20 1123)  Resp: 12 (10/24/20 1123)  BP: (!) 151/79 (10/24/20 1123)  SpO2: 95 % (10/24/20 1123) Vital Signs (24h Range):  Temp:  [96.2 °F (35.7 °C)-98.7 °F (37.1 °C)] 98.7 °F (37.1 °C)  Pulse:  [56-91] 69  Resp:  [11-22] 12  SpO2:  [92 %-99 %] 95 %  BP: (109-174)/() 151/79     Weight: 75.8 kg (167 lb 1.7 oz)  Body mass index is 30.56 kg/m².    SpO2: 95 %  O2 Device (Oxygen Therapy): room  air    No intake or output data in the 24 hours ending 10/24/20 1402    Lines/Drains/Airways     Peripheral Intravenous Line                 Peripheral IV - Single Lumen 10/23/20 2050 20 G Left Antecubital less than 1 day                Physical Exam   Constitutional: He is oriented to person, place, and time. He appears well-developed and well-nourished.   HENT:   Head: Normocephalic and atraumatic.   Eyes: Pupils are equal, round, and reactive to light. EOM are normal.   Neck: Normal range of motion. Neck supple. No JVD present.   Cardiovascular: Normal rate and regular rhythm.   Pulmonary/Chest:   Few crackles at right base   Abdominal: Soft. Bowel sounds are normal.   Musculoskeletal:         General: No edema.   Neurological: He is alert and oriented to person, place, and time.   Positive for left-sided weakness and mild facial droop to the left.   Skin: Skin is warm and dry.   Psychiatric: He has a normal mood and affect. His behavior is normal.   Nursing note and vitals reviewed.      Significant Labs:   BMP:   Recent Labs   Lab 10/23/20  1441 10/24/20  0240 10/24/20  0507   * 149*  --     140  --    K 4.2 4.2  --     102  --    CO2 25 27  --    BUN 20 21  --    CREATININE 1.3 1.3  --    CALCIUM 8.8 8.7  --    MG 2.3 2.2 2.2   , CMP   Recent Labs   Lab 10/23/20  1441 10/24/20  0240    140   K 4.2 4.2    102   CO2 25 27   * 149*   BUN 20 21   CREATININE 1.3 1.3   CALCIUM 8.8 8.7   PROT 7.9 7.2   ALBUMIN 4.2 3.7   BILITOT 1.5* 1.0   ALKPHOS 73 78   AST 23 19   ALT 25 23   ANIONGAP 11 11   ESTGFRAFRICA >60.0 >60   EGFRNONAA 58.1* 58*   , CBC   Recent Labs   Lab 10/23/20  1441 10/24/20  0240   WBC 7.29 7.00   HGB 14.7 14.0   HCT 43.3 41.6    195   , INR   Recent Labs   Lab 10/23/20  1441 10/24/20  0240   INR 1.1 1.0   , Lipid Panel   Recent Labs   Lab 10/23/20  1441   CHOL 123   HDL 27*   LDLCALC 47.2*   TRIG 244*   CHOLHDL 22.0   , Troponin   Recent Labs   Lab  10/23/20  2053 10/24/20  0240 10/24/20  1119   TROPONINI 0.21 0.225* 0.132*    and All pertinent lab results from the last 24 hours have been reviewed.    Significant Imaging:     Echo 10/24/2020  Conclusion:  · Severe left atrial enlargement.  · There is left ventricular eccentric hypertrophy.  · Moderately decreased left ventricular systolic   function. The estimated ejection fraction is 39%.  · Grade III diastolic dysfunction.  · Mildly reduced right ventricular systolic function.  · Mild aortic regurgitation.  · Mild mitral regurgitation.  · Normal central venous pressure (3 mmHg).  · There is no evidence of intracardiac shunting.      CTA Head and Neck 10/23/2020:  Complete occlusion of the right internal carotid artery at its origin secondary to heavily calcified atherosclerotic plaque.  Additional atherosclerotic plaque and stenosis of 64% noted at the origin of the left internal carotid artery.  At the [id number] no aneurysm is seen and there is flow in the anterior, middle and posterior cerebral arteries without abrupt cut off or stenosis demonstrated peripherally    CXR 10/23/2020:  Stable cardiomegaly.  Mildly improved interstitial opacities, likely on the basis of recurrent or chronic CHF    Assessment and Plan:     Coronary artery disease with Non-specific EKG Changes  Mr. Capps has no angina currently.  EKG shows no new ischemic changes.  Mild troponin elevation likely due to component of heart failure.  Resume home dose of Lasix 20 mg daily for diuresis when safe from a neuro perspective..  Recommend continued medical management of coronary artery disease with patient's home regimen of aspirin, high-intensity statin, Plavix.  Resume beta-blocker and ACE inhibitor when safe from a neuro perspective.    Acute on chronic combined systolic and diastolic heart failure   has known history of HFrEF.  Elevated BNP and pulmonary edema seen on chest x-ray are consistent with a component of mild  heart failure.  He has no shortness of breath currently and is not acutely decompensated.  Resume home dose of Lasix 20 mg daily when safe from a neuro perspective.  Also currently holding enalapril 20 mg daily and carvedilol 6.25 mg bid due to new stroke.      PAF (paroxysmal atrial fibrillation)  [id number] score is 5, which confers a high risk of stroke.  Echo shows no evidence on intracardiac shunting.  Recommend starting full-dose anticoagulation (DOAC) when safe from a neuro perspective.      VTE Risk Mitigation (From admission, onward)         Ordered     IP VTE HIGH RISK PATIENT  Once      10/24/20 0033     Place sequential compression device  Until discontinued      10/24/20 0033     Reason for No Pharmacological VTE Prophylaxis  Once     Question:  Reasons:  Answer:  Physician Provided (leave comment)    10/24/20 0033                Thank you for your consult. I will follow-up with patient. Please contact us if you have any additional questions.    Luis Miguel Jarrett MD PhD  Cardiology   [facility]

## 2020-10-24 NOTE — PT/OT/SLP EVAL
"Physical Therapy Evaluation and Discharge Note    Patient Name:  Cleveland Capps   MRN:  [id number]    Recommendations:     Discharge Recommendations:  home   Discharge Equipment Recommendations: none   Barriers to discharge: None    Assessment:     Cleveland Capps is a 64 y.o. male admitted with a medical diagnosis of Stroke. .  At this time, patient is functioning at their prior level of function and does not require further acute PT services.     Recent Surgery: * No surgery found *      Plan:     During this hospitalization, patient does not require further acute PT services.  Please re-consult if situation changes.      Subjective     Chief Complaint: none stated  Patient/Family Comments/goals: does not feel any weakness or physical difficulty since stroke    Living Environment:  1 story house with 5 steps (with rail) to enter  Prior to admission, patients level of function was independent.  Equipment used at home: none.  DME owned (not currently used): rolling walker, bedside commode and wheelchair.  Upon discharge, patient will have assistance from "brother lives next door".    Objective:     Communicated with nurse (Nancy) prior to session.  Patient found HOB elevated with telemetry, bed alarm, SCD upon PT entry to room.    General Precautions: Standard,     Orthopedic Precautions:N/A   Braces: N/A     Functional Mobility:  · Bed Mobility:     · Rolling Right: modified independence  · Supine to Sit: modified independence  · Transfers:     · Sit to Stand:  modified independence with no AD  · Gait: 225' with mod Indep.    AM-PAC 6 CLICK MOBILITY  Total Score:24     AM-PAC 6 CLICK MOBILITY  Total Score:24     Patient left patient in bathroom all lines intact and call button in reach.    GOALS:   Multidisciplinary Problems     Physical Therapy Goals     Not on file                History:     Past Medical History:   Diagnosis Date    Acute kidney injury     COPD (chronic obstructive pulmonary disease)     " Coronary artery disease     Encounter for blood transfusion     Former smoker     Hypertension     Myocardial infarction     Thyroid disease        Past Surgical History:   Procedure Laterality Date    COLONOSCOPY N/A 1/3/2017    Procedure: COLONOSCOPY;  Surgeon: Marcus Green MD;  Location: [facility];  Service: Endoscopy;  Laterality: N/A;    CORONARY STENT PLACEMENT      GASTROSTOMY TUBE PLACEMENT      PEG TUBE REMOVAL      TRACHEOSTOMY CLOSURE      TRACHEOSTOMY TUBE PLACEMENT      UPPER GASTROINTESTINAL ENDOSCOPY  05/2016    Dr. Green with PEG tube placement       Time Tracking:     PT Received On: 10/24/20  PT Start Time: 0920     PT Stop Time: 0933  PT Total Time (min): 13 min     Billable Minutes: Evaluation 13      Fausto Slaughter, PT  10/24/2020

## 2020-10-24 NOTE — ASSESSMENT & PLAN NOTE
Patient with known CAD s/p PPM,, stent placement and CABG, which is stable Will continue ASA, Plavix and Statin and monitor for S/Sx of angina/ACS. Continue to monitor on telemetry. Cardiology consult for nonspecific EKG changes as compared to most previous. Trend troponins.

## 2020-10-24 NOTE — NURSING
Notified Neurology physician that CTA of Head and Neck results- Complete occlusion of the right internal carotid artery and stenosis of 64% left internal carotid artery. She ordered to place order for vascular surgery consult. Consult called to Dr Bledsoe and updated him of above, He said he will be by to see pt later today,but if pt becomes symptomatic or new onset of distress or change in status to notify him.

## 2020-10-24 NOTE — SUBJECTIVE & OBJECTIVE
Past Medical History:   Diagnosis Date    Acute kidney injury     COPD (chronic obstructive pulmonary disease)     Coronary artery disease     Encounter for blood transfusion     Former smoker     Hypertension     Myocardial infarction     Thyroid disease        Past Surgical History:   Procedure Laterality Date    COLONOSCOPY N/A 1/3/2017    Procedure: COLONOSCOPY;  Surgeon: Marcus Green MD;  Location: [facility];  Service: Endoscopy;  Laterality: N/A;    CORONARY STENT PLACEMENT      GASTROSTOMY TUBE PLACEMENT      PEG TUBE REMOVAL      TRACHEOSTOMY CLOSURE      TRACHEOSTOMY TUBE PLACEMENT      UPPER GASTROINTESTINAL ENDOSCOPY  2016    Dr. Green with PEG tube placement       Review of patient's allergies indicates:   Allergen Reactions    Penicillins Hives       Current Facility-Administered Medications on File Prior to Encounter   Medication    [COMPLETED] furosemide injection 80 mg    [COMPLETED] iohexoL (OMNIPAQUE 350) injection 75 mL     Current Outpatient Medications on File Prior to Encounter   Medication Sig    aspirin (ECOTRIN) 81 MG EC tablet Take 81 mg by mouth once daily.    clopidogrel (PLAVIX) 75 mg tablet Take 75 mg by mouth once daily.    enalapril (VASOTEC) 20 MG tablet TAKE ONE TABLET BY MOUTH EVERYDAY AS NEEDED    furosemide (LASIX) 20 MG tablet Take 1 tablet (20 mg total) by mouth once daily.    atorvastatin (LIPITOR) 40 MG tablet Take 1 tablet (40 mg total) by mouth once daily.    carvedilol (COREG) 6.25 MG tablet Take 2 tablets (12.5 mg total) by mouth 2 (two) times daily.    travoprost (TRAVATAN Z) 0.004 % Drop Place 1 drop into both eyes every evening. (Patient taking differently: Place 1 drop into both eyes nightly as needed. )     Family History     None        Tobacco Use    Smoking status: Former Smoker     Types: Cigarettes     Quit date: 2005     Years since quittin.9    Tobacco comment: quit    Substance and Sexual Activity     Alcohol use: No    Drug use: No    Sexual activity: Yes     Review of Systems   Constitution: Negative.   HENT: Negative.    Eyes: Negative.    Cardiovascular: Negative.    Respiratory: Positive for shortness of breath.    Endocrine: Negative.    Skin: Negative.    Musculoskeletal: Positive for muscle weakness.   Gastrointestinal: Negative.    Genitourinary: Negative.    Neurological: Positive for weakness.   Psychiatric/Behavioral: Negative.      Objective:     Vital Signs (Most Recent):  Temp: 98.7 °F (37.1 °C) (10/24/20 1123)  Pulse: 69 (10/24/20 1123)  Resp: 12 (10/24/20 1123)  BP: (!) 151/79 (10/24/20 1123)  SpO2: 95 % (10/24/20 1123) Vital Signs (24h Range):  Temp:  [96.2 °F (35.7 °C)-98.7 °F (37.1 °C)] 98.7 °F (37.1 °C)  Pulse:  [56-91] 69  Resp:  [11-22] 12  SpO2:  [92 %-99 %] 95 %  BP: (109-174)/() 151/79     Weight: 75.8 kg (167 lb 1.7 oz)  Body mass index is 30.56 kg/m².    SpO2: 95 %  O2 Device (Oxygen Therapy): room air    No intake or output data in the 24 hours ending 10/24/20 1402    Lines/Drains/Airways     Peripheral Intravenous Line                 Peripheral IV - Single Lumen 10/23/20 2050 20 G Left Antecubital less than 1 day                Physical Exam   Constitutional: He is oriented to person, place, and time. He appears well-developed and well-nourished.   HENT:   Head: Normocephalic and atraumatic.   Eyes: Pupils are equal, round, and reactive to light. EOM are normal.   Neck: Normal range of motion. Neck supple. No JVD present.   Cardiovascular: Normal rate and regular rhythm.   Pulmonary/Chest:   Few crackles at right base   Abdominal: Soft. Bowel sounds are normal.   Musculoskeletal:         General: No edema.   Neurological: He is alert and oriented to person, place, and time.   Positive for left-sided weakness and mild facial droop to the left.   Skin: Skin is warm and dry.   Psychiatric: He has a normal mood and affect. His behavior is normal.   Nursing note and vitals  reviewed.      Significant Labs:   BMP:   Recent Labs   Lab 10/23/20  1441 10/24/20  0240 10/24/20  0507   * 149*  --     140  --    K 4.2 4.2  --     102  --    CO2 25 27  --    BUN 20 21  --    CREATININE 1.3 1.3  --    CALCIUM 8.8 8.7  --    MG 2.3 2.2 2.2   , CMP   Recent Labs   Lab 10/23/20  1441 10/24/20  0240    140   K 4.2 4.2    102   CO2 25 27   * 149*   BUN 20 21   CREATININE 1.3 1.3   CALCIUM 8.8 8.7   PROT 7.9 7.2   ALBUMIN 4.2 3.7   BILITOT 1.5* 1.0   ALKPHOS 73 78   AST 23 19   ALT 25 23   ANIONGAP 11 11   ESTGFRAFRICA >60.0 >60   EGFRNONAA 58.1* 58*   , CBC   Recent Labs   Lab 10/23/20  1441 10/24/20  0240   WBC 7.29 7.00   HGB 14.7 14.0   HCT 43.3 41.6    195   , INR   Recent Labs   Lab 10/23/20  1441 10/24/20  0240   INR 1.1 1.0   , Lipid Panel   Recent Labs   Lab 10/23/20  1441   CHOL 123   HDL 27*   LDLCALC 47.2*   TRIG 244*   CHOLHDL 22.0   , Troponin   Recent Labs   Lab 10/23/20  2053 10/24/20  0240 10/24/20  1119   TROPONINI 0.21 0.225* 0.132*    and All pertinent lab results from the last 24 hours have been reviewed.    Significant Imaging:     CTA Head and Neck 10/23/2020:  Complete occlusion of the right internal carotid artery at its origin secondary to heavily calcified atherosclerotic plaque.  Additional atherosclerotic plaque and stenosis of 64% noted at the origin of the left internal carotid artery.  At the [id number] no aneurysm is seen and there is flow in the anterior, middle and posterior cerebral arteries without abrupt cut off or stenosis demonstrated peripherally    CXR 10/23/2020:  Stable cardiomegaly.  Mildly improved interstitial opacities, likely on the basis of recurrent or chronic CHF

## 2020-10-24 NOTE — PT/OT/SLP EVAL
Occupational Therapy   Evaluation and Discharge Note    Name: Cleveland Capps  MRN: [id number]  Admitting Diagnosis:  Stroke      Recommendations:     Discharge Recommendations: home  Discharge Equipment Recommendations:  none  Barriers to discharge:  None    Assessment:     Cleveland Capps is a 64 y.o. male with a medical diagnosis of Stroke. No neurological deficits noted. Pt did report subjective decreased L pinch strength. L thumb arthritic deformities noted at MCP  & IP joints. Pt given a ball and educated on performance of  pinch and  strength exercises.  At this time, patient is functioning at their prior level of function and does not require further acute OT services.     Plan:     During this hospitalization, patient does not require further acute OT services.  Please re-consult if situation changes.    · Plan of Care Reviewed with: patient, sibling    Subjective     Chief Complaint: I had trouble opening my orange juice this mormning. I couldn't pinch well with my left hand.  Patient/Family Comments/goals: To go home today.    Occupational Profile:  Living Environment: Pt lives alone in a [facility] with 6 [address] and B railings and a tub/shower. His brother lives next door.  Previous level of function: Patient was Independent with all I/ADL's at home and in the community and driving.  Roles and Routines: Pt is retired and enjoys fishing and working on hot pradeep vehicles.  Equipment Used at home:  none  Assistance upon Discharge: Family will assist pt at home if needed.    Pain/Comfort:  · Pain Rating 1: 0/10  · Pain Rating Post-Intervention 1: 0/10    Patients cultural, spiritual, Anabaptism conflicts given the current situation:      Objective:     Communicated with: Azeb nurse prior to session.  Patient found standing in his room with telemetry upon OT entry to room.    General Precautions: Standard,     Orthopedic Precautions:N/A   Braces: N/A     Occupational Performance:    Bed Mobility:    · Patient completed  Supine to Sit with independence  · Patient completed Sit to Supine with independence    Functional Mobility/Transfers:  · Patient completed Sit <> Stand Transfer with independence  with  no assistive device   · Patient completed Toilet Transfer Stand Pivot technique with independence with  no AD  · Functional Mobility: Pt walked independently in room and bathroom with good balance and safety without an assistive device.    Activities of Daily Living:  · Feeding:  modified independence with increased time needed to open juice container  · Grooming: independence to brush teeth and wash hands at sink  · Upper Body Dressing: independence to don and fasten gown standing at bedside  · Lower Body Dressing: independence to don/doff socks at EOB  · Toileting: independence at toilet for all task components    Cognitive/Visual Perceptual:  Cognitive/Psychosocial Skills:     -       Oriented to: Person, Place, Time and Situation   -       Follows Commands/attention:Follows multistep  commands  -       Communication: clear/fluent  -       Safety awareness/insight to disability: intact   -       Mood/Affect/Coping skills/emotional control: Appropriate to situation, Cooperative and Pleasant  Visual/Perceptual:      -Intact  tracking, acuity, R/L discrimination and visual field      Physical Exam:  Postural examination/scapula alignment:    -       Rounded shoulders  Skin integrity: Visible skin intact  Edema:  None noted  Sensation:    -       Intact  light/touch B UE's and proprioception B UE's  Motor Planning:    -       Intact  Dominant hand:    -       right  Upper Extremity Range of Motion:     -       Right Upper Extremity: WFL  -       Left Upper Extremity: WFL  Upper Extremity Strength:    -       Right Upper Extremity: WNL  -       Left Upper Extremity: WNL   Strength:    -       Right Upper Extremity: WNL  -       Left Upper Extremity: WNL  Fine Motor Coordination:    -       Intact  Left hand, finger to nose, Right  hand, finger to nose, Left hand thumb/finger opposition skills, Right hand thumb/finger opposition skills, Left hand, manipulation of objects and Right hand, manipulation of objects  Gross motor coordination:   WFL    AMPAC 6 Click ADL:  AMPAC Total Score: 24    Treatment & Education:  OT ed pt and his brother  on OT role &  discharge recommendations.  OT ed pt on keeping HOB raised and sitting up in chair as pt will tolerate to counteract effects of bedrest.    OT issued rubber squeeze ball to pt & educated pt on hand resistive gross grasp and pinch HEP with instructions to squeeze ball 25 times every two hours while awake. Pt performed 25 reps each exercise with cues for proper form.    Education:    Patient left HOB elevated with all lines intact, call button in reach, Azeb, nurse notified and brother present    GOALS:   Multidisciplinary Problems     Occupational Therapy Goals     Not on file                History:     Past Medical History:   Diagnosis Date    Acute kidney injury     COPD (chronic obstructive pulmonary disease)     Coronary artery disease     Encounter for blood transfusion     Former smoker     Hypertension     Myocardial infarction     Thyroid disease        Past Surgical History:   Procedure Laterality Date    COLONOSCOPY N/A 1/3/2017    Procedure: COLONOSCOPY;  Surgeon: Marcus Green MD;  Location: [facility];  Service: Endoscopy;  Laterality: N/A;    CORONARY STENT PLACEMENT      GASTROSTOMY TUBE PLACEMENT      PEG TUBE REMOVAL      TRACHEOSTOMY CLOSURE      TRACHEOSTOMY TUBE PLACEMENT      UPPER GASTROINTESTINAL ENDOSCOPY  05/2016    Dr. Green with PEG tube placement       Time Tracking:     OT Date of Treatment: 10/24/20  OT Start Time: 0934  OT Stop Time: 0958  OT Total Time (min): 24 min    Billable Minutes:Evaluation 16  Therapeutic Exercise 8    CHEIKH Womack  10/24/2020

## 2020-10-24 NOTE — ASSESSMENT & PLAN NOTE
Patient is identified as having Combined Systolic and Diastolic heart failure that is Chronic. CHF is currently controlled. Latest ECHO performed and demonstrates-   Results for orders placed during the hospital encounter of 05/16/20   Echo Color Flow Doppler? Yes    Narrative · Eccentric left ventricular hypertrophy.  · Mild left ventricular enlargement.  · Severely decreased left ventricular systolic function. The estimated   ejection fraction is 33%.  · Grade I (mild) left ventricular diastolic dysfunction consistent with   impaired relaxation.  · Severely reduced right ventricular systolic function.  · Mild right atrial enlargement.  · Mild aortic regurgitation.  · Mild mitral regurgitation.  · The mean diastolic gradient across the mitral valve is 51 mmHg at a   heart rate of bpm.  · Normal central venous pressure (3 mmHg).  · The estimated PA systolic pressure is 17 mmHg.  · Trivial pericardial effusion.  · Mild left atrial enlargement.      . Hold Beta Blocker ACE/ARB Furosemide 2/2 acute CVA and monitor clinical status closely. Monitor on telemetry. Patient is off CHF pathway.  Monitor strict Is&Os and daily weights.  Place on fluid restriction of 1500. Continue to stress to patient importance of self efficacy and  on diet for CHF. Last BNP reviewed- and noted below   Recent Labs   Lab 10/23/20  1441   *   .  Per report, pt was administered IV lasix 80mg while at [facility] with 3L diuresis noted.

## 2020-10-24 NOTE — ASSESSMENT & PLAN NOTE
Creatine stable for now. BMP reviewed- noted Estimated Creatinine Clearance: 51.9 mL/min (based on SCr of 1.3 mg/dL). according to latest data. Monitor UOP and serial BMP and adjust therapy as needed. Renally dose meds.

## 2020-10-24 NOTE — ASSESSMENT & PLAN NOTE
Consult Neurology  Neuro checks q4h  Permissive HTN <220/<120 - will hold home antihypertensives and restart when clinically appropriate  MRI of brain-defer to neurology/attending as pt has pacemaker.  CTA head and neck  ECHO  Trend CBC, CMP, Mg and Phos  Fasting lipid panel  HgA1C  Consult PT/OT  ASA and Plavix  DVT prophylaxis with KNE's, SCD's.  Consider Lovenox if ok with neurology  Aspiration precautions, fall precautions

## 2020-10-24 NOTE — NURSING
Pt arrived to the floor via [facility] from [facility]. Pt alert and oriented able to make needs and wants known. No complaints or distress noted at this time. Pt ambulated from stretcher to bed with slight shifts in his gait

## 2020-10-24 NOTE — ASSESSMENT & PLAN NOTE
[id number] score is 5, which confers a high risk of stroke.  Echo pending.  Recommend starting full-dose anticoagulation (DOAC) when safe from a neuro perspective.

## 2020-10-24 NOTE — PLAN OF CARE
Pt clear for discharge from case management standpoint. Pt discharging home with no DME/HH/infusion needs. SW remains available.       10/24/20 1555   Final Note   Assessment Type Final Discharge Note   Anticipated Discharge Disposition Home   Right Care Referral Info   Post Acute Recommendation No Care

## 2020-10-24 NOTE — H&P
[facility] Hospital Medicine  History & Physical    Patient Name: Cleveland Capps  MRN: [id number]  Admission Date: 10/24/2020  Attending Physician: Vane Kumar MD   Primary Care Provider: Romero Snyder MD         Patient information was obtained from patient, past medical records and ER records.     Subjective:     Principal Problem:Stroke    Chief Complaint:   Chief Complaint   Patient presents with    Extremity Weakness        HPI: Cleveland Capps is a 63 y/o male with past medical history significant for CVA on ASA and Plavix, COPD, CAD with MI, hypertension, acute kidney injury, thyroid disease who was transferred from [facility] to [facility] for further evaluation of stroke-like symptoms that began Thursday at approximately 1700.  Pt reports that he had slurred speech and drooling Thursday evening as well as weakness in the LUE.  Pt states that he first noticed symptoms when he went to check his mail and could not hold onto it. Symptoms improved, but returned the following morning.  Pt went to the ED at [facility] for evaluation, but symptoms had resolved.  He was discharged home; however, shortly after arriving home, symptoms returned yet again, accompanied by tingling to the LUE.  Denies any recent falls, head trauma, headache, blurred vision, nausea, incontinence, gait abnormality, palpitations or diaphoresis.  Pt reports that he did have some increased SOB and chest tightness about a week ago, but this resolved. CT head was performed at [facility] with no acute process identified.  CTA was later obtained with evidence of acute/subacute CVA now present.  EKG is noted with T wave inversion in the lateral leads; however, pt denies chest pain with normal troponin level.  He is transferred to [facility] for further neurology and cardiology evaluation.      Past Medical History:   Diagnosis Date    Acute kidney injury     COPD (chronic obstructive pulmonary disease)     Coronary  artery disease     Encounter for blood transfusion     Former smoker     Hypertension     Myocardial infarction     Thyroid disease        Past Surgical History:   Procedure Laterality Date    COLONOSCOPY N/A 1/3/2017    Procedure: COLONOSCOPY;  Surgeon: Marcus Green MD;  Location: [facility];  Service: Endoscopy;  Laterality: N/A;    CORONARY STENT PLACEMENT      GASTROSTOMY TUBE PLACEMENT      PEG TUBE REMOVAL      TRACHEOSTOMY CLOSURE      TRACHEOSTOMY TUBE PLACEMENT      UPPER GASTROINTESTINAL ENDOSCOPY  2016    Dr. Green with PEG tube placement       Review of patient's allergies indicates:   Allergen Reactions    Penicillins Hives       Current Facility-Administered Medications on File Prior to Encounter   Medication    [COMPLETED] furosemide injection 80 mg    [COMPLETED] iohexoL (OMNIPAQUE 350) injection 75 mL     Current Outpatient Medications on File Prior to Encounter   Medication Sig    aspirin (ECOTRIN) 81 MG EC tablet Take 81 mg by mouth once daily.    clopidogrel (PLAVIX) 75 mg tablet Take 75 mg by mouth once daily.    enalapril (VASOTEC) 20 MG tablet TAKE ONE TABLET BY MOUTH EVERYDAY AS NEEDED    furosemide (LASIX) 20 MG tablet Take 1 tablet (20 mg total) by mouth once daily.    atorvastatin (LIPITOR) 40 MG tablet Take 1 tablet (40 mg total) by mouth once daily.    carvedilol (COREG) 6.25 MG tablet Take 2 tablets (12.5 mg total) by mouth 2 (two) times daily.    travoprost (TRAVATAN Z) 0.004 % Drop Place 1 drop into both eyes every evening. (Patient taking differently: Place 1 drop into both eyes nightly as needed. )     Family History     None        Tobacco Use    Smoking status: Former Smoker     Types: Cigarettes     Quit date: 2005     Years since quittin.9    Tobacco comment: quit    Substance and Sexual Activity    Alcohol use: No    Drug use: No    Sexual activity: Yes     Review of Systems   Constitutional: Negative for chills and fever.    HENT: Negative for congestion and rhinorrhea.    Eyes: Negative for photophobia and visual disturbance.   Respiratory: Positive for chest tightness (1 week ago; now resolved) and shortness of breath (about 1 week ago; now resolved). Negative for cough.    Cardiovascular: Negative for chest pain and palpitations.   Gastrointestinal: Negative for abdominal pain, diarrhea, nausea and vomiting.   Endocrine: Negative for polydipsia and polyuria.   Genitourinary: Negative for dysuria and flank pain.   Musculoskeletal: Negative for arthralgias and gait problem.   Skin: Negative for rash and wound.   Neurological: Positive for dizziness, speech difficulty (now resolved) and weakness (left upper extremity).   Hematological: Negative for adenopathy.   Psychiatric/Behavioral: Negative for agitation and confusion.   All other systems reviewed and are negative.    Objective:     Vital Signs (Most Recent):  Temp: 96.8 °F (36 °C) (10/24/20 0046)  Pulse: 61 (10/24/20 0231)  Resp: 17 (10/24/20 0231)  BP: 135/74 (10/24/20 0231)  SpO2: 98 % (10/24/20 0231) Vital Signs (24h Range):  Temp:  [96.8 °F (36 °C)-98.7 °F (37.1 °C)] 96.8 °F (36 °C)  Pulse:  [56-91] 61  Resp:  [11-22] 17  SpO2:  [92 %-99 %] 98 %  BP: (109-174)/() 135/74     Weight: 75.8 kg (167 lb 1.7 oz)  Body mass index is 30.56 kg/m².    Physical Exam  Vitals signs and nursing note reviewed.   Constitutional:       General: He is not in acute distress.     Appearance: He is well-developed.   HENT:      Head: Normocephalic and atraumatic.   Eyes:      Conjunctiva/sclera: Conjunctivae normal.      Pupils: Pupils are equal, round, and reactive to light.   Neck:      Musculoskeletal: Normal range of motion and neck supple.   Cardiovascular:      Rate and Rhythm: Normal rate and regular rhythm.      Pulses: Normal pulses.   Pulmonary:      Effort: Pulmonary effort is normal. No respiratory distress.      Breath sounds: Normal breath sounds.   Abdominal:      General:  Bowel sounds are normal. There is no distension.      Palpations: Abdomen is soft.      Tenderness: There is no abdominal tenderness.   Genitourinary:     Comments: deferred  Musculoskeletal: Normal range of motion.      Right lower leg: No edema.      Left lower leg: No edema.   Skin:     General: Skin is warm and dry.      Capillary Refill: Capillary refill takes 2 to 3 seconds.   Neurological:      General: No focal deficit present.      Mental Status: He is alert and oriented to person, place, and time.   Psychiatric:         Mood and Affect: Mood normal.         Behavior: Behavior normal.         Thought Content: Thought content normal.         Judgment: Judgment normal.           CRANIAL NERVES     CN III, IV, VI   Pupils are equal, round, and reactive to light.       Significant Labs:   CBC:   Recent Labs   Lab 10/23/20  1441   WBC 7.29   HGB 14.7   HCT 43.3        CMP:   Recent Labs   Lab 10/23/20  1441      K 4.2      CO2 25   *   BUN 20   CREATININE 1.3   CALCIUM 8.8   PROT 7.9   ALBUMIN 4.2   BILITOT 1.5*   ALKPHOS 73   AST 23   ALT 25   ANIONGAP 11   EGFRNONAA 58.1*     Cardiac Markers:   Recent Labs   Lab 10/23/20  1441   *     TSH:   Recent Labs   Lab 10/23/20  1441   TSH 10.394*     Urine Studies:   Recent Labs   Lab 10/23/20  1557   COLORU Yellow   APPEARANCEUA Clear   PHUR 7.0   SPECGRAV 1.015   PROTEINUA Negative   GLUCUA Negative   KETONESU Negative   BILIRUBINUA Negative   OCCULTUA Negative   NITRITE Negative   UROBILINOGEN Negative   LEUKOCYTESUR Negative       Significant Imaging: CXR: Stable cardiomegaly.  Mildly improved interstitial opacities, likely on the basis of recurrent or chronic CHF.    Assessment/Plan:     * Stroke  Consult Neurology  Neuro checks q4h  Permissive HTN <220/<120 - will hold home antihypertensives and restart when clinically appropriate  MRI of brain-defer to neurology/attending as pt has pacemaker.  CTA head and neck  ECHO  Trend CBC,  CMP, Mg and Phos  Fasting lipid panel  HgA1C  Consult PT/OT  ASA and Plavix  DVT prophylaxis with KEN's, SCD's.  Consider Lovenox if ok with neurology  Aspiration precautions, fall precautions        CHF (congestive heart failure)  Patient is identified as having Combined Systolic and Diastolic heart failure that is Chronic. CHF is currently controlled. Latest ECHO performed and demonstrates-   Results for orders placed during the hospital encounter of 05/16/20   Echo Color Flow Doppler? Yes    Narrative · Eccentric left ventricular hypertrophy.  · Mild left ventricular enlargement.  · Severely decreased left ventricular systolic function. The estimated   ejection fraction is 33%.  · Grade I (mild) left ventricular diastolic dysfunction consistent with   impaired relaxation.  · Severely reduced right ventricular systolic function.  · Mild right atrial enlargement.  · Mild aortic regurgitation.  · Mild mitral regurgitation.  · The mean diastolic gradient across the mitral valve is 51 mmHg at a   heart rate of bpm.  · Normal central venous pressure (3 mmHg).  · The estimated PA systolic pressure is 17 mmHg.  · Trivial pericardial effusion.  · Mild left atrial enlargement.      . Hold Beta Blocker ACE/ARB Furosemide 2/2 acute CVA and monitor clinical status closely. Monitor on telemetry. Patient is off CHF pathway.  Monitor strict Is&Os and daily weights.  Place on fluid restriction of 1500. Continue to stress to patient importance of self efficacy and  on diet for CHF. Last BNP reviewed- and noted below   Recent Labs   Lab 10/23/20  1441   *   .  Per report, pt was administered IV lasix 80mg while at [facility] with 3L diuresis noted.          CKD (chronic kidney disease) stage 3, GFR 30-59 ml/min  Creatine stable for now. BMP reviewed- noted Estimated Creatinine Clearance: 51.9 mL/min (based on SCr of 1.3 mg/dL). according to latest data. Monitor UOP and serial BMP and adjust therapy as needed. Renally dose  meds.            Coronary artery disease  Patient with known CAD s/p PPM,, stent placement and CABG, which is stable Will continue ASA, Plavix and Statin and monitor for S/Sx of angina/ACS. Continue to monitor on telemetry. Cardiology consult for nonspecific EKG changes as compared to most previous.  Trend troponins.        VTE Risk Mitigation (From admission, onward)         Ordered     IP VTE HIGH RISK PATIENT  Once      10/24/20 0033     Place sequential compression device  Until discontinued      10/24/20 0033     Reason for No Pharmacological VTE Prophylaxis  Once     Question:  Reasons:  Answer:  Physician Provided (leave comment)    10/24/20 0033                   MK Christie  Department of Hospital Medicine   [facility]

## 2020-10-24 NOTE — ASSESSMENT & PLAN NOTE
Mr. Capps has no angina currently.  EKG shows no new ischemic changes.  Mild troponin elevation likely due to component of heart failure.  Resume home dose of Lasix 20 mg daily for diuresis when safe from a neuro perspective..  Recommend continued medical management of coronary artery disease with patient's home regimen of aspirin, high-intensity statin, Plavix.  Resume beta-blocker and ACE inhibitor when safe from a neuro perspective.

## 2020-10-24 NOTE — HPI
Cleveland Capps is a 63 y/o male with PMH of CAD s/p CABG in 2016, HFrEF (33%) s/p pacemaker/AICD placement, Afib (not on anticoagulation), HTN, CVA, COPD, obesity, who presents with new CVA.  Cardiology consulted for evaluation of non-specific EKG changes.  Pertinent history/events are as follows:    Mr. Capps was transferred from [facility] to [facility] for further evaluation of stroke-like symptoms that began Thursday at approximately 1700.  Pt reports that he had slurred speech and drooling Thursday evening as well as weakness in the LUE.  Pt states that he first noticed symptoms when he went to check his mail and could not hold onto it. Symptoms improved, but returned the following morning.  Pt went to [place] [facility] [place], but symptoms had resolved.  He was discharged home; however, shortly after arriving home, symptoms returned yet again, accompanied by tingling to the LUE.  Denies any recent falls, head trauma, headache, blurred vision, nausea, incontinence, gait abnormality, palpitations or diaphoresis.  Pt reports that he did have some increased SOB and chest tightness about a week ago, but this resolved. CT head was performed at [facility] with no acute process identified.  CTA was later obtained with evidence of acute/subacute CVA now present.  EKG is noted with T wave inversion in the inferolateral leads; however, pt denies chest pain with normal troponin level.  He is transferred to [facility] for further neurology and cardiology evaluation.     Mr. Capps reports no chest pain or lower extremity edema.  States he has noted increased shortness of breath over the past 3 days.  Review of EKG's from current admission show inferolateral TWI's which are present on EKG from 5/15/2016.  No other significant EKG changes noted.  BNP elevated at 261.  Troponin mildly elevated at 0.12-->0.21-->0.225.  Chest x-ray shows mild pulmonary edema.  Echo pending.  Mr. Capps has remained hemodynamically  stable.

## 2020-10-24 NOTE — ASSESSMENT & PLAN NOTE
Patient with known CAD s/p PPM,, stent placement and CABG, which is stable Will continue ASA, Plavix and Statin and monitor for S/Sx of angina/ACS. Continue to monitor on telemetry. Cardiology consult for nonspecific EKG changes as compared to most previous.

## 2020-10-24 NOTE — ASSESSMENT & PLAN NOTE
has known history of HFrEF.  Elevated BNP and pulmonary edema seen on chest x-ray are consistent with a component of mild heart failure.  He has no shortness of breath currently and is not acutely decompensated.  Resume home dose of Lasix 20 mg daily when safe from a neuro perspective.  Also currently holding enalapril 20 mg daily and carvedilol 6.25 mg bid due to new stroke.

## 2020-10-24 NOTE — PLAN OF CARE
Pt in bed resting i with eyes closed. Pt alert and oriented. Able to make needs and wants known. No complaints or distress noted at this time. Pt denies feeling any chest pain nor headache at this time.  Pt ambulatory with stand by assist. Bed low, call light in reach, free of falls, safety maintained, VSS, will continue to monitor.

## 2020-10-24 NOTE — PLAN OF CARE
SW spoke with patient to complete a discharge planning assessment. Patient presents as alert and oriented x 4, pleasant, good eye contact, well groomed, recall good, concentration/judgement good, average intelligence, calm, communicative, cooperative, and asking and answering questions appropriately. SW verified all information on demographic sheet is correct. Patient reports living with  self  and that he is independent with ADLs at this time and does drive. Patient reports brother will transport pt home.    Patient reports does not have home health. Patient reports that he is not receiving outside resources. Patient reports having a rolling walker, wheelchair, and nebulizer. Patient reports Romero Snyder MD as pt's PCP and has [employer] Medicare and Medicaid as their insurance. Patient reports receiving medications from [facility] in [place], [place] and reports that he is able to afford medications at this time and at time of discharge. Patient reports that he is not on dialysis at this time. Patient reports that he is not receiving services with the coumadin clinic. Patient reports has not been admitted to the hospital within the last 30 days.    Patient reports does not have a Living Will or Healthcare Power of . Patient denies mental health issues.    Patient expressed no other needs at this time. SW remains available.         10/24/20 6383   Discharge Assessment   Assessment Type Discharge Planning Assessment   Confirmed/corrected address and phone number on facesheet? Yes   Assessment information obtained from? Patient   Communicated expected length of stay with patient/caregiver no   Prior to hospitilization cognitive status: Alert/Oriented   Prior to hospitalization functional status: Independent   Current cognitive status: Alert/Oriented   Current Functional Status: Independent   Lives With alone   Able to Return to Prior Arrangements yes   Is patient able to care for self after  discharge? Yes   Who are your caregiver(s) and their phone number(s)? Anthony Capps (brother) [phone]   Patient's perception of discharge disposition home or selfcare   Readmission Within the Last 30 Days no previous admission in last 30 days   Patient currently being followed by outpatient case management? No   Patient currently receives any other outside agency services? No   Equipment Currently Used at Home walker, rolling;wheelchair;nebulizer   Part D Coverage Pt has Managed Medicare and Medicaid   Do you have any problems affording any of your prescribed medications? No   Is the patient taking medications as prescribed? yes   Does the patient have transportation home? Yes   Transportation Anticipated car, drives self;family or friend will provide   Dialysis Name and Scheduled days N/A   Does the patient receive services at the Coumadin Clinic? No   Discharge Plan A Home   Discharge Plan B Home   Patient/Family in Agreement with Plan yes

## 2020-10-24 NOTE — PT/OT/SLP EVAL
Speech Language Pathology Evaluation  Cognitive/Bedside Swallow    Patient Name:  Cleveland Capps   MRN:  [id number]  Admitting Diagnosis: Stroke    Recommendations:                  General Recommendations:  Cognitive-linguistic therapy  Diet recommendations:  Regular, Thin   Aspiration Precautions: Standard aspiration precautions   General Precautions: Standard, fall  Communication strategies:  none    History:     Past Medical History:   Diagnosis Date    Acute kidney injury     COPD (chronic obstructive pulmonary disease)     Coronary artery disease     Encounter for blood transfusion     Former smoker     Hypertension     Myocardial infarction     Thyroid disease        Past Surgical History:   Procedure Laterality Date    COLONOSCOPY N/A 1/3/2017    Procedure: COLONOSCOPY;  Surgeon: Marcus Green MD;  Location: [facility];  Service: Endoscopy;  Laterality: N/A;    CORONARY STENT PLACEMENT      GASTROSTOMY TUBE PLACEMENT      PEG TUBE REMOVAL      TRACHEOSTOMY CLOSURE      TRACHEOSTOMY TUBE PLACEMENT      UPPER GASTROINTESTINAL ENDOSCOPY  05/2016    Dr. Green with PEG tube placement       Social History: Patient lives in the community.    Prior Intubation HX:  None this admission.  Hx trach placement 4 yrs ago; removed    Modified Barium Swallow: none in Epic hx    Chest X-Rays: Mildly improved interstitial opacities, likely on the basis of recurrent or chronic CHF    CT Head - New small focus of edema in the deep white matter of the right frontal parietal lobe which may represent a small ischemic infarct.  Old lacunar infarct of the right basal ganglion.  Mild brain atrophy.     Prior diet: regular textures & liquids.    Subjective     I feel the same as before.  Patient goals: get some sleep      Objective:     Cognitive Status:    Arousal/Alertness Appropriate response to stimuli  Attention Sustained attention deficit mild  Orientation Oriented x4  Memory Immediate Recall mild deficit with  increasing complexity of info, Delayedmoderate-severe deficit, long term recall intact and recall general information intact  Problem Solving Conclusions intact, Categories intact and Sequencing mild deficit  Safety awareness intact  Simple calculation mild deficit      Jalil Cognitive Assessment (MoCA) score - 15 out of 30 indicating moderate cognitive-linguistic deficits    Receptive Language/Comprehension:    mild deficit with increasing complexity of info    Pragmatics:  intact    Expressive Language: moderate divergent naming deficit, all other tasks WFL      Motor Speech: WFL    Voice:  WFL    Visual-Spatial: WFL    Reading:  WFL     Written Expression:  WFL    Oral Musculature Evaluation  · Oral Musculature: WNL  · Dentition: present and adequate, other (see comments)(a few in poor condition)  · Secretion Management: adequate  · Mucosal Quality: good  · Mandibular Strength and Mobility: WNL  · Oral Labial Strength and Mobility: WNL  · Lingual Strength and Mobility: WNL(slight deviation to left on protrusion)  · Velar Elevation: WNL  · Buccal Strength and Mobility: WNL  · Voice Prior to PO Intake: clear, no dysarthria    Bedside Swallow Eval:   Consistencies Assessed:  · Thin liquids via cup & straw sip, & 3 oz water challenge  · Puree via spoon  · Mixed consistencies peaches in thin juice  · Solids cookie     Oral Phase:   · WNL    Pharyngeal Phase:   · no overt clinical signs/symptoms of aspiration  · no overt clinical signs/symptoms of pharyngeal dysphagia    Compensatory Strategies  · None    Treatment: Education re impressions & recommendations.  Pt & friend stated understanding.    Assessment:     Cleveland Capps is a 64 y.o. male with an SLP diagnosis of mild Cognitive-Linguistic Impairment.  He presented with problems with sustained attention to complex info, immediate & delayed memory & need for extra time to process.  He would benefit from outpatient cognitive-linguistic tx if deficits persist  after d/c.       Goals:   Multidisciplinary Problems     SLP Goals     Not on file                Plan:     · Patient to be seen: as outpatient     · Plan of Care expires:     · Plan of Care reviewed with:  patient   · SLP Follow-Up:  Yes       Discharge recommendations:  Discharge Facility/Level of Care Needs: outpatient speech therapy   Barriers to Discharge:  None    Time Tracking:     SLP Treatment Date:   10/24/20  Speech Start Time:  1020  Speech Stop Time:  1104     Speech Total Time (min):  44 min    Billable Minutes: Eval 35  and Eval Swallow and Oral Function 9    Claudine Zeng CCC-SLP/A  10/24/2020

## 2020-10-24 NOTE — CONSULTS
Food & Nutrition  Education    Diet Education: Cardiac diet       Nutrition Education provided with handouts:   Therapeutic lifestyle changes      Comments:  Admits with stroke. RD covering remotely (due to pink eye). Educated patient on cardiac diet (omega 3 fats, increasing fiber, limiting sodium, ect) via telephone - cell phone number found in chart. Pt verbalized understanding. Answered all questions.

## 2020-10-24 NOTE — HPI
Cleveland Capps is a 65 y/o male with past medical history significant for CVA on ASA and Plavix, COPD, CAD with MI, hypertension, acute kidney injury, thyroid disease who was transferred from [facility] to [facility] for further evaluation of stroke-like symptoms that began Thursday at approximately 1700.  Pt reports that he had slurred speech and drooling Thursday evening as well as weakness in the LUE.  Pt states that he first noticed symptoms when he went to check his mail and could not hold onto it. Symptoms improved, but returned the following morning.  Pt went to the ED at [facility] for evaluation, but symptoms had resolved.  He was discharged home; however, shortly after arriving home, symptoms returned yet again, accompanied by tingling to the LUE.  Denies any recent falls, head trauma, headache, blurred vision, nausea, incontinence, gait abnormality, palpitations or diaphoresis.  Pt reports that he did have some increased SOB and chest tightness about a week ago, but this resolved. CT head was performed at [facility] with no acute process identified.  CTA was later obtained with evidence of acute/subacute CVA now present.  EKG is noted with T wave inversion in the lateral leads; however, pt denies chest pain with normal troponin level.  He is transferred to [facility] for further neurology and cardiology evaluation.

## 2020-10-25 NOTE — HOSPITAL COURSE
Patient monitor closely during hospitalization.  He underwent CTA of the head the neck which demonstrated right parietal subacute CVA.  He underwent echocardiogram which was negative for shunting and EF approximately 45%.  Neurology consulted.  Patient underwent complete neurological workup including PT OT and ST.  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  Vascular surgery consulted.  Discussed with Dr. Bledsoe who recommended outpatient follow-up next week for possible endarterectomy in the near future.  Discussed results of all imaging with patient and brother.  States understanding.  Patient to continue aspirin and Plavix upon discharge.  Cardiology was consulted for history of paroxysmal atrial fibrillation and is recommended he start oral anticoagulation.  Per Neurology recommendations will wait 2 weeks for initiation of oral anticoagulation.  He will follow-up with neurology next week.  Patient is medically stable for discharge from a cardiology and neurology standpoint.    Physical exam  Chest clear auscultation heart regular rate and rhythm telemetry sinus rhythm  Patient with right hand weakness 4+ out of 5.  Intermittent dysarthria during conversation.

## 2020-10-25 NOTE — DISCHARGE SUMMARY
[facility] Hospital Medicine  Discharge Summary      Patient Name: Cleveland Capps  MRN: [id number]  Admission Date: 10/24/2020  Hospital Length of Stay: 1 days  Discharge Date and Time: 10/24/2020  6:19 PM  Attending Physician: No att. providers found   Discharging Provider: Cheryl Henderson NP  Primary Care Provider: Romero Snyder MD      HPI:   Cleveland Capps is a 63 y/o male with past medical history significant for CVA on ASA and Plavix, COPD, CAD with MI, hypertension, acute kidney injury, thyroid disease who was transferred from [facility] to [facility] for further evaluation of stroke-like symptoms that began Thursday at approximately 1700.  Pt reports that he had slurred speech and drooling Thursday evening as well as weakness in the LUE.  Pt states that he first noticed symptoms when he went to check his mail and could not hold onto it. Symptoms improved, but returned the following morning.  Pt went to the ED at [facility] for evaluation, but symptoms had resolved.  He was discharged home; however, shortly after arriving home, symptoms returned yet again, accompanied by tingling to the LUE.  Denies any recent falls, head trauma, headache, blurred vision, nausea, incontinence, gait abnormality, palpitations or diaphoresis.  Pt reports that he did have some increased SOB and chest tightness about a week ago, but this resolved. CT head was performed at [facility] with no acute process identified.  CTA was later obtained with evidence of acute/subacute CVA now present.  EKG is noted with T wave inversion in the lateral leads; however, pt denies chest pain with normal troponin level.  [facility] is [facility] further neurology and cardiology evaluation.      * No surgery found *      Hospital Course:   Patient monitor closely during hospitalization.  He underwent CTA of the head the neck which demonstrated right parietal subacute CVA.  He underwent echocardiogram which was  negative for shunting and EF approximately 45%.  Neurology consulted.  Patient underwent complete neurological workup including PT OT and ST.  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  Vascular surgery consulted.  Discussed with Dr. Bledsoe who recommended outpatient follow-up next week for possible endarterectomy in the near future.  Discussed results of all imaging with patient and brother.  States understanding.  Patient to continue aspirin and Plavix upon discharge.  Cardiology was consulted for history of paroxysmal atrial fibrillation and is recommended he start oral anticoagulation.  Per Neurology recommendations will wait 2 weeks for initiation of oral anticoagulation.  He will follow-up with neurology next week.  Patient is medically stable for discharge from a cardiology and neurology standpoint.    Physical exam  Chest clear auscultation heart regular rate and rhythm telemetry sinus rhythm  Patient with right hand weakness 4+ out of 5.  Intermittent dysarthria during conversation.     Consults:   Consults (From admission, onward)        Status Ordering Provider     Inpatient consult to Neurology  Once     Provider:  Vikas Richards MD    Completed YEHUDA ROA     Inpatient consult to Registered Dietitian/Nutritionist  Once     Provider:  (Not yet assigned)    YEHUDA Nash     IP consult to case management/social work  Once     Provider:  (Not yet assigned)    YEHUDA Nash          Coronary artery disease  Patient with known CAD s/p PPM,, stent placement and CABG, which is stable Will continue ASA, Plavix and Statin and monitor for S/Sx of angina/ACS. Continue to monitor on telemetry. Cardiology consult for nonspecific EKG changes as compared to most previous. Trend troponins.          CTA Head and neck Complete occlusion of the right internal carotid artery at its  origin secondary to heavily calcified atherosclerotic plaque.  Additional atherosclerotic plaque and stenosis of 64% noted at the origin of the left internal carotid artery.  At the agipju-[id number] no aneurysm is seen and there is flow in the anterior, middle and posterior cerebral arteries without abrupt cut off or stenosis demonstrated peripherally.       CT head New small focus of edema in the deep white matter of the right frontal parietal lobe which may represent a small ischemic infarct.  Old lacunar infarct of the right basal ganglion.  Mild brain atrophy.    Final Active Diagnoses:    Diagnosis Date Noted POA    PRINCIPAL PROBLEM:  Stroke [I63.9] 10/23/2020 Yes    CHF (congestive heart failure) [I50.9] 05/19/2020 Yes    CKD (chronic kidney disease) stage 3, GFR 30-59 ml/min [N18.30] 05/17/2020 Yes    Coronary artery disease [I25.10] 04/25/2016 Yes    PAF (paroxysmal atrial fibrillation) [I48.0] 04/25/2016 Yes      Problems Resolved During this Admission:       Discharged Condition: stable    Disposition: Home or Self Care    Follow Up:  Follow-up Information     Romero Snyder MD In 1 week.    Specialty: Family Medicine  Contact information:  [address]  [facility] [address]  [phone]             Vikas Richards MD In 1 week.    Specialties: Vascular Neurology, Neurology  Contact information:  [address]  [address]  [facility] [address]  [phone]             Andrea Bledsoe MD In 1 week.    Specialty: Cardiothoracic Surgery  Contact information:  [address] [facility] [address]  [phone]                 Patient Instructions:      Diet Cardiac   Order Comments: See Stroke Patient Education Guide Booklet for details.     Call 911 for any of the following:   Order Comments: Call 911  right away if any of the following warning signs come on suddenly, even if the symptoms only last for a few minutes. With stroke, timing is very important.   - Warning Signs of Stroke:  - Weakness: You may  feel a sudden weakness, tingling or loss of feeling on one side of your face or body.  - Vision Problems: You may have sudden double vision or trouble seeing in one or both eyes.  - Speech Problems: You may have sudden trouble talking, slured speech, or problems understanding others.  - Headache: You may have sudden, severe headache.  - Movement Problems: You may experience dizziness, a feeling of spinning, a loss of balance, a feeling of falling or blackouts.       Significant Diagnostic Studies: Labs:   BMP:   Recent Labs   Lab 10/23/20  1441 10/24/20  0240 10/24/20  0507   * 149*  --     140  --    K 4.2 4.2  --     102  --    CO2 25 27  --    BUN 20 21  --    CREATININE 1.3 1.3  --    CALCIUM 8.8 8.7  --    MG 2.3 2.2 2.2   , CMP   Recent Labs   Lab 10/23/20  1441 10/24/20  0240    140   K 4.2 4.2    102   CO2 25 27   * 149*   BUN 20 21   CREATININE 1.3 1.3   CALCIUM 8.8 8.7   PROT 7.9 7.2   ALBUMIN 4.2 3.7   BILITOT 1.5* 1.0   ALKPHOS 73 78   AST 23 19   ALT 25 23   ANIONGAP 11 11   ESTGFRAFRICA >60.0 >60   EGFRNONAA 58.1* 58*   , Lipid Panel   Lab Results   Component Value Date    CHOL 123 10/23/2020    HDL 27 (L) 10/23/2020    LDLCALC 47.2 (L) 10/23/2020    TRIG 244 (H) 10/23/2020    CHOLHDL 22.0 10/23/2020    and Troponin   Recent Labs   Lab 10/23/20  2053 10/24/20  0240 10/24/20  1119   TROPONINI 0.21 0.225* 0.132*       Pending Diagnostic Studies:     Procedure Component Value Units Date/Time    CBC auto differential [[id number]] Collected: 10/24/20 0239    Order Status: Sent Lab Status: In process Updated: 10/24/20 0240    Specimen: Blood     EKG 12-lead [[id number]]     Order Status: Sent Lab Status: No result          Medications:  Reconciled Home Medications:      Medication List      CHANGE how you take these medications    travoprost 0.004 % ophthalmic solution  Commonly known as: TRAVATAN Z  Place 1 drop into both eyes every evening.  What changed:   · when to  take this  · reasons to take this        CONTINUE taking these medications    aspirin 81 MG EC tablet  Commonly known as: ECOTRIN  Take 81 mg by mouth once daily.     atorvastatin 40 MG tablet  Commonly known as: LIPITOR  Take 1 tablet (40 mg total) by mouth once daily.     carvediloL 6.25 MG tablet  Commonly known as: COREG  Take 2 tablets (12.5 mg total) by mouth 2 (two) times daily.     clopidogreL 75 mg tablet  Commonly known as: PLAVIX  Take 75 mg by mouth once daily.     enalapril 20 MG tablet  Commonly known as: VASOTEC  TAKE ONE TABLET BY MOUTH EVERYDAY AS NEEDED     furosemide 20 MG tablet  Commonly known as: LASIX  Take 1 tablet (20 mg total) by mouth once daily.            Indwelling Lines/Drains at time of discharge:   Lines/Drains/Airways     None                 Time spent on the discharge of patient: 60 minutes  Patient was seen and examined on the date of discharge and determined to be suitable for discharge.         Cheryl Henderson NP  Department of Hospital Medicine  [facility]

## 2020-10-26 ENCOUNTER — PATIENT OUTREACH (OUTPATIENT)
Dept: ADMINISTRATIVE | Facility: CLINIC | Age: 65
End: 2020-10-26

## 2020-10-26 ENCOUNTER — TELEPHONE (OUTPATIENT)
Dept: MEDSURG UNIT | Facility: HOSPITAL | Age: 65
End: 2020-10-26

## 2020-10-26 RX ORDER — ISOSORBIDE MONONITRATE 30 MG/1
30 TABLET, EXTENDED RELEASE ORAL DAILY
Status: ON HOLD | COMMUNITY
End: 2022-11-28 | Stop reason: HOSPADM

## 2020-10-26 RX ORDER — POTASSIUM CHLORIDE 750 MG/1
10 CAPSULE, EXTENDED RELEASE ORAL ONCE
COMMUNITY
End: 2021-03-29 | Stop reason: ALTCHOICE

## 2020-10-26 NOTE — PATIENT INSTRUCTIONS
Stroke (Completed)    You have had a mild stroke, or cerebrovascular accident (CVA). This is caused by a loss of blood flow to part of your brain. This can occur when a blood clot forms inside the carotid artery (main artery from the heart to the brain) or inside the heart. When the clot travels to the brain, it can lodge in a blood vessel and block blood flow. The other common cause of stroke is a gradual narrowing of the arteries in the brain due to buildup of fatty deposits (plaque).  Symptoms  Blocked blood flow in different areas of the brain can cause different symptoms. If you have had a stroke before, a new one may be different. A memory aid for the basic signs of a stroke is F.A.S.T.  F.A.S.T.  · F: Face drooping, or numbness on one side. This may be more noticeable when you ask the affected person to smile.  · A: Arm weakness or numbness. The affected person may have trouble using or lifting one side.  · S: Speech difficulty. Speech may be slurred or hard to understand. The affected person may also use the wrong words.  · T: Time to call 911. Time is critical in treating a stroke. Call 911 as soon as you suspect a stroke has happened--even a small one. The sooner treatment is started the better, even if the symptoms go away.  Other common symptoms of a stroke include:  · Having difficulty getting the right words to come out  · Weakness in one leg  · Numbness on one side  · Difficulty walking  · Trouble with coordination  · Trouble with vision  · Headache  · Confusion  · Dizziness  Treatment  After you have had a stroke, you are at risk of having another. Be sure to follow up with your healthcare provider for further evaluation and treatment. If problems are found, your healthcare provider will recommend treatment with medicines and/or procedures.  To reduce your chance of having another stroke, you may be prescribed medicines. These include medicines to prevent blood clots, such as antiplatelet or  anticoagulant medicines.  Home care  · Rest at home and avoid exertion for the next few days.  · If your healthcare provider has prescribed medicines, take them as directed.  Follow-up care  Follow up with your healthcare provider, or as advised. Additional tests may be needed. If you had an X-ray, CT scan, MRI, or ECG (electrocardiogram), it will be reviewed by a specialist. You will be notified of any new findings that will affect your care.  Call 911  Contact emergency services right away if any of these occur:  · Any of your stroke symptoms worsen  · New problems with speech, confusion, vision, walking, coordination, facial droop, or weakness or numbness on one side of your body  · Severe headache, fainting spell, dizziness, or seizure  · Chest pain or shortness of breath  Remember F.A.S.T. (described above). If you notice warning signs and symptoms of stroke, CALL 911 without delay.  Date Last Reviewed: 9/21/2015  © 4888-2925 [employer]. [address] [place] [address], [place] [address]. All rights reserved. This information is not intended as a substitute for professional medical care. Always follow your healthcare professional's instructions.

## 2020-11-09 ENCOUNTER — HOSPITAL ENCOUNTER (OUTPATIENT)
Dept: RADIOLOGY | Facility: HOSPITAL | Age: 65
Discharge: HOME | End: 2020-11-09
Attending: PSYCHIATRY & NEUROLOGY
Payer: MEDICARE

## 2020-11-09 DIAGNOSIS — I63.9 CARDIOEMBOLIC STROKE: ICD-10-CM

## 2020-11-09 DIAGNOSIS — I63.9 CARDIOEMBOLIC STROKE: Primary | ICD-10-CM

## 2020-11-09 PROCEDURE — 70450 CT HEAD WITHOUT CONTRAST: ICD-10-PCS | Mod: 26,,, | Performed by: RADIOLOGY

## 2020-11-09 PROCEDURE — 70450 CT HEAD/BRAIN W/O DYE: CPT | Mod: TC

## 2020-11-09 PROCEDURE — 70450 CT HEAD/BRAIN W/O DYE: CPT | Mod: 26,,, | Performed by: RADIOLOGY

## 2020-11-19 ENCOUNTER — CLINICAL SUPPORT (OUTPATIENT)
Dept: REHABILITATION | Facility: HOSPITAL | Age: 65
End: 2020-11-19
Payer: MEDICARE

## 2020-11-19 DIAGNOSIS — I63.9 CEREBROVASCULAR ACCIDENT (CVA), UNSPECIFIED MECHANISM: ICD-10-CM

## 2020-11-19 DIAGNOSIS — I63.9 CEREBRAL VASCULAR ACCIDENT: Primary | ICD-10-CM

## 2020-11-19 PROCEDURE — 97161 PT EVAL LOW COMPLEX 20 MIN: CPT | Mod: PN

## 2020-11-19 NOTE — PLAN OF CARE
[facility]  Physical Therapy Initial Evaluation    Name: Cleveland Capps  Clinic Number: [id number]    Therapy Diagnosis:   Encounter Diagnosis   Name Primary?    Cerebral vascular accident Yes     Physician: Kwasi Chao MD    Physician Orders: PT Eval and Treat   Medical Diagnosis: CVA with LUE weakness   Evaluation Date: 11/19/2020  Authorization period Expiration: 12/31/2020  Plan of Care Certification Period: 12/31/2020    Visit #: 1/ Visits authorized: up to 6  Time In:8:30 am   Time Out: 9:15 am   Total Billable Time: 45 minutes    Precautions: Standard    Subjective   Date of onset: 10/24/2020   Date of Surgery: n/a    Past Medical History:   Diagnosis Date    Acute kidney injury     COPD (chronic obstructive pulmonary disease)     Coronary artery disease     Encounter for blood transfusion     Former smoker     Hypertension     Myocardial infarction     Thyroid disease      Cleveland Capps  has a past surgical history that includes Coronary stent placement; Gastrostomy tube placement; Tracheostomy tube placement; Upper gastrointestinal endoscopy (05/2016); Tracheostomy closure; PEG tube removal; and Colonoscopy (N/A, 1/3/2017).    Cleveland has a current medication list which includes the following prescription(s): aspirin, atorvastatin, carvedilol, clopidogrel, enalapril, furosemide, isosorbide mononitrate, potassium chloride, and travoprost.    Review of patient's allergies indicates:   Allergen Reactions    Penicillins Hives        Imaging, : CT Scan - Carotid's - stated that he has 100% blockage on Right; 65% on Left - plans for surgery on Left sometime next year. Currently on Elliquis; quit taking Plavix.     Prior Therapy: No physical therapy for current condition following his CVA; he did have therapy in Acute setting; Physical Therapy did not pick-up this patient has he was at his PLOF.   Social History: Patient lives in a elevated  home with 10 steps to enter;  "has a ramp; house is handicapped accessible; lives alone; family next door;    Occupation: retired; was a  - last 10 years he worked in [employer] in [employer] department;   Prior Level of Function: Independent ,fishing is a hobby - makes his own flys,  CABG in 2016 - developed organ failure - spent multiple days in ICU - trach, feeding tube;  Recovered from this - had Pacemaker in 2017   Current Level of Function: Independent at home; manages all of his own needs at home;       Pain:  Current 0/10, worst 0/10, best 0/10     Onset/REBECCA: sudden 10/23/2020 -     History of current condition - Cleveland reports: about 1 month history of symptoms, including recent CVA resulting in Left UE weakness and coordination deficits.  Patient is now feeling like he is "back to normal". He stated that he still gets tired easily, but finds that he is now able to do everything around the house that he was able to do before his stroke.  Cleveland has significant cardiac and organ failure history following his CABG.  He has made a great recovery from this.  Cleveland would like physical therapy to develop a good HEP for him so that he can continue with this progress.     Pts goals: To improve my strength and endurance         Objective   Posture:  No acute postural deficits noted     Gait: Normal  Turns: able to make 360 degree turns without any LOB     Range of Motion:   BUE:  WNL  BLE: WFL  Spine: able to bend forward and touch hands to feet; can perform some extension in standing, lateral bending good;   Cervical: no pain, no deficits noted in ROM     Upper Extremity Strength  (R) UE  (L) UE    Shoulder flexion: 5/5 Shoulder flexion: 5/5   Shoulder Abduction: 5/5 Shoulder abduction: 5/5   Shoulder ER 5/5 Shoulder ER 5/5   Shoulder IR 5/5 Shoulder IR 5/5   Elbow Flexion 5/5 Elbow Flexion 5/5   Elbow Extension 5/5 Elbow Extension 5/5   Wrist Flexion 5/5 Wrist Flexion 5/5   Wrist Extension 5/5 Wrist Extension 5/5       Left: 65#    " Right: 80#    Patient is right handed    Lower Extremity Strength  Right LE  Left LE    Knee extension: 5/5 Knee extension: 5/5   Knee flexion: 5/5 Knee flexion: 5/5   Hip flexion: 5/5 Hip flexion: 5/5   Hip extension:  5/5 Hip extension: 5/5   Hip abduction: 5/5 Hip abduction: 5/5   Hip adduction: 5/5 Hip adduction 5/5   Ankle dorsiflexion: 5/5 Ankle dorsiflexion: 5/5   Ankle plantarflexion: 5/5 Ankle plantarflexion: 5/5     Sensation: Intact to light touch BUE/BLE    Flexibility:     Hamstring = R moderate restriction, L moderate restriction   Dillan: R no restriction, L no restriction    Functional Mobility:  30 sec sit <>stand: 19 reps  SLS: able to perform with light hand touch on rail x 30 secs each leg  Rhomberg: negative on firm and foam   Toe-Taps: firm and foam: able to perform x 8 reps in 20 secs; slight need for hand hold while standing on foam  Step-Ups: able to step up/down 6 inch step without use of hands x 3 mins with slight SOB noted; required seated rest-break following.      PT Evaluation Completed? Yes  Discussed Plan of Care with patient: Yes    Home Exercises and Patient Education Provided    Education provided re:   - progress towards goals   - role of therapy in multi - disciplinary team, goals for therapy  Pt educated on condition, POC, and expectations in therapy.  No spiritual or educational barriers to learning provided    Home exercises:  Pt will be provided HEP during course of treatment with progressions as appropriate. Pt was advised to perform these exercises free of pain, and to stop performing them if pain occurs.   Cleveland demonstrated good  understanding of the education provided.       Functional Limitations Reports -   Tool: LEFS   Score: 19% limitation in function         Assessment   Cleveland is a 65 y.o. male referred to outpatient physical therapy with a medical diagnosis of CVA with LUE weakness , and presents to PT with only very mild endurance deficits, no strength deficits,  patient appears to be at his PLOF.  Patient would like a good HEP so that he can continue to exercise at home.  He would benefit from 1-6 visits to establish a good HEP to address strength, endurance and balance following his recent CVA.  The following goals were discussed with the patient and patient is in agreement with them as to be addressed in the treatment plan.   Pt prognosis is Good.   Pt will benefit from skilled outpatient Physical Therapy to address the deficits stated above and in the chart below, provide pt/family education, and to maximize pt's level of independence.     Plan of care discussed with patient: Yes  Pt's spiritual, cultural and educational needs considered and pt agreeable to plan of care and goals as stated below:     Anticipated Barriers for therapy: none    Medical necessity is demonstrated by the following IMPAIRMENTS/PROBLEM LIST:    impaired endurance and impaired balance    Low  complexity      Goals     Long Term Goals: 4 weeks  Walking: Increase walking distance/duration to 1/4 mile without SOB and on variety of surfaces without LOB   Exercise: demonstrate independence with home exercise program to maintain gains made in therapy.          Plan   Certification Period: 11/19/2020 to 12/31/2020.    Outpatient Physical Therapy 1-2 times weekly for 4 weeks to include the following interventions: patient education, Patient Education and Therapeutic Exercise and development of HEP.   Pt may be seen by PTA as part of the rehabilitation team.     I certify the need for these services furnished under this plan of treatment and while under my care.    Brissa Buck, PT        Attestation:   I have seen the patient, reviewed the therapist's plan of care, and I agree with the plan of care.   I certify the need for these services furnished under this plan of treatment and while under my care.         _______________            ________                                                _____________________  Physician/Referring Practitioner                                                            Date of Signature

## 2020-12-02 ENCOUNTER — CLINICAL SUPPORT (OUTPATIENT)
Dept: REHABILITATION | Facility: HOSPITAL | Age: 65
End: 2020-12-02
Payer: MEDICARE

## 2020-12-02 DIAGNOSIS — I63.89 CEREBROVASCULAR ACCIDENT (CVA) DUE TO OTHER MECHANISM: Primary | ICD-10-CM

## 2020-12-02 PROCEDURE — 97110 THERAPEUTIC EXERCISES: CPT | Mod: PN

## 2020-12-02 NOTE — PROGRESS NOTES
"                                                    Physical Therapy Daily Note     Name: Cleveland Capps  Clinic Number: [id number]  Diagnosis:   Encounter Diagnosis   Name Primary?    Cerebrovascular accident (CVA) due to other mechanism Yes     Physician: Kwasi Chao MD  Precautions: standard   Visit #: 2 of 6  PTA Visit #: 0  Time In: 8:30 am   Time Out: 9:30 am     Subjective     Pt reports: I feel like I'm getting better; I keep practicing with my bow - can't quite do a full "pull" on it; I'm just not strong enough.   Pain Scale: Cleveland rates pain on a scale of 0-10 to be n/a currently.    Objective     Cleveland received individual therapeutic exercises to develop strength, endurance and core stabilization for 40 minutes including:  Nu-Step - Level 6 x 10 mins   Bridges x 20  SLR x 15 each  Ball Squeezes x 3 mins   S/L hip abduction x 20   Sit <> Stand x 15  Scapular retraction with gray/handle band x 30  Standing PNF diagonal with Red band x 20 each arm   Step-ups: green with 2 risers x 3 mins - front and lateral  Heel Raises:  Single x 15 each     Written Home Exercises Provided: Jeanne was given theraband for exercises above;   Pt demo good understanding of the education provided. Cleveland demonstrated good return demonstration of activities.     Education provided re:  Cleveland verbalized good understanding of education provided.   No spiritual or educational barriers to learning provided    Assessment     Patient tolerated treatment very well; He fells like he is improving every day; wants to start exercising at home when PT is finished.  Will work toward HEP over the next couple of visits.   This is a 65 y.o. male referred to outpatient physical therapy and presents with a medical diagnosis of CVA with Left joss and demonstrates limitations as described in the problem list. Pt prognosis is Excellent. Pt will continue to benefit from skilled outpatient physical therapy to address the deficits listed in the " problem list, provide pt/family education and to maximize pt's level of independence in the home and community environment.     Goals as follows:    Long Term Goals: 4 weeks  Walking: Increase walking distance/duration to 1/4 mile without SOB and on variety of surfaces without LOB   Exercise: demonstrate independence with home exercise program to maintain gains made in therapy.          Plan     Continue with established Plan of Care towards PT goals.    Therapist: Brissa Buck, PT  12/2/2020

## 2020-12-07 ENCOUNTER — CLINICAL SUPPORT (OUTPATIENT)
Dept: REHABILITATION | Facility: HOSPITAL | Age: 65
End: 2020-12-07
Payer: MEDICARE

## 2020-12-07 DIAGNOSIS — M62.81 MUSCLE WEAKNESS (GENERALIZED): Primary | ICD-10-CM

## 2020-12-07 PROCEDURE — 97110 THERAPEUTIC EXERCISES: CPT | Mod: PN

## 2020-12-07 NOTE — PROGRESS NOTES
Physical Therapy Daily Note     Name: Cleveland Capps  Clinic Number: [id number]  Diagnosis:   Encounter Diagnosis   Name Primary?    Muscle weakness (generalized) Yes     Physician: Kwasi Chao MD  Precautions: standard   Visit #: 3 of 6  PTA Visit #: 0  Time In: 8:45 am   Time Out: 9:35 am     Subjective     Pt reports: I feel like I'm getting better; Pain on a scale of 0-10 to be n/a currently.    Objective     Cleveland received individual therapeutic exercises to develop strength, endurance and core stabilization for 40 minutes including:  Nu-Step - Level 6 x 10 mins   Bridges x 20  SLR x 20 each  Ball Squeezes x 3 mins   S/L hip abduction x 20   Sit <> Stand x 20  Scapular retraction with gray/handle band x 30  Standing PNF diagonal with Red band x 20 each arm   Step-ups: green with 2 risers x 3 mins - front and lateral  Heel Raises:  Single x 20 each   ADD next visit*- prone hip extension SLR, shoulder horizontal abduction, ER Tband  Written Home Exercises Provided: Jeanne was given theraband for exercises above;   Pt demo good understanding of the education provided. Cleveland demonstrated good return demonstration of activities.     Education provided re:  Cleveland verbalized good understanding of education provided. Home exercise program provided today.  No spiritual or educational barriers to learning provided    Assessment     Patient tolerated treatment very well; He fells like he is improving every day; wants to start exercising at home when PT is finished.  Pt becomes fatigued easily during therex [place] fwd and lat step ups but is motivated to push himself. Pt demonstrates improvements in strength as per increase in therex.   This is a 65 y.o. male referred to outpatient physical therapy and presents with a medical diagnosis of CVA with Left joss and demonstrates limitations as described in the problem list. Pt prognosis is Excellent. Pt will continue to  benefit from skilled outpatient physical therapy to address the deficits listed in the problem list, provide pt/family education and to maximize pt's level of independence in the home and community environment.     Goals as follows:    Long Term Goals: 4 weeks  Walking: Increase walking distance/duration to 1/4 mile without SOB and on variety of surfaces without LOB   Exercise: demonstrate independence with home exercise program to maintain gains made in therapy.          Plan     Continue with established Plan of Care towards PT goals.    Therapist: Gabrielle Gaffney, PT  12/7/2020

## 2020-12-07 NOTE — PATIENT INSTRUCTIONS
Strengthening: External Rotation - in 90° of Abduction        Facing anchor, tubing around right hand, elbow bent 90°, forearm forward, pull forearm back, keeping elbow bent.  Repeat ____ times per set. Do ____ sets per session. Do ____ sessions per day.     [email or web address]     Copyright © [employer]. All rights reserved.   PNF Strengthening: Resisted        Standing with resistive band around each hand, bring right arm up and away, thumb back.  Repeat ____ times per set. Do ____ sets per session. Do ____ sessions per day.     [email or web address]://[email or web address]     Copyright © [employer]. All rights reserved.   Resisted Horizontal Abduction: Bilateral        Sit or stand, tubing in both hands, arms out in front. Keeping arms straight, pinch shoulder blades together and stretch arms out.  Repeat ____ times per set. Do ____ sets per session. Do ____ sessions per day.     [email or web address]://[email or web address]     Copyright © [employer]. All rights reserved.   Sitting to Standing        With straight back, tighten stomach, place right leg back under chair, lean slightly forward and stand.  Repeat ____ times per set. Do ____ sets per session. Do ____ sessions per day.     https://[email or web address]     Copyright © [employer]. All rights reserved.   Bridging        Slowly raise buttocks from floor, keeping stomach tight.  Repeat ____ times per set. Do ____ sets per session. Do ____ sessions per day.     https://[employer]/1096     Copyright © [employer]. All rights reserved.   Straight Leg Raise (Prone)        Abdomen and head supported, keep left knee locked and raise leg at hip. Avoid arching low back.  Repeat ____ times per set. Do ____ sets per session. Do ____ sessions per day.     [email or web address]://[email or web address]     Copyright © [employer]. All rights reserved.   Straight Leg Raise        Tighten stomach and slowly raise locked right leg ____ inches from floor.  Repeat ____ times per set. Do ____ sets per session. Do ____ sessions per day.     https://[employer]/1102      Copyright © [employer]. All rights reserved.   (Clinic) Retraction: Row - Bilateral (Pulley)        Facing pulley, arms reaching forward, pull hands toward stomach, pinching shoulder blades together.  Repeat ____ times per set. Do ____ sets per session. Do ____ sessions per week.  Use ____ lb weights.    Copyright © [employer]. All rights reserved.   PNF Strengthening: Resisted        Standing with resistive band around each hand, bring right arm up and away, thumb back.  Repeat ____ times per set. Do ____ sets per session. Do ____ sessions per day.     [email or web address]     Copyright © [employer]. All rights reserved.   Arm / Leg Lift: Opposite (Prone)        Lift right leg and opposite arm ____ inches from floor, keeping knee locked.  Repeat ____ times per set. Do ____ sets per session. Do ____ sessions per day.     [email or web address]     Copyright © [employer]. All rights reserved.

## 2020-12-11 ENCOUNTER — CLINICAL SUPPORT (OUTPATIENT)
Dept: REHABILITATION | Facility: HOSPITAL | Age: 65
End: 2020-12-11
Attending: PSYCHIATRY & NEUROLOGY
Payer: MEDICARE

## 2020-12-11 DIAGNOSIS — M62.81 MUSCLE WEAKNESS (GENERALIZED): Primary | ICD-10-CM

## 2020-12-11 DIAGNOSIS — Z86.73 STATUS POST CVA: ICD-10-CM

## 2020-12-11 PROCEDURE — 97110 THERAPEUTIC EXERCISES: CPT | Mod: PN

## 2020-12-11 NOTE — PROGRESS NOTES
Physical Therapy Daily Note     Name: Cleveland Capps  Clinic Number: [id number]  Diagnosis:   Encounter Diagnoses   Name Primary?    Muscle weakness (generalized) Yes    Status post CVA      Physician: Kwasi Chao MD  Precautions: standard   Visit #: 4 of 6  PTA Visit #: 0  Time In: 8:55 am   Time Out: 9:55 am     Subjective     Pt reports: I feel like I'm getting better; Pain on a scale of 0-10 to be n/a currently. I know I am getting stronger because I am sore after therapy.     Objective     Cleveland received individual therapeutic exercises to develop strength, endurance and core stabilization for 60 minutes including:  Nu-Step - Level 7 x 15mins   Bridges x 25  SLR x 20 each  Ball Squeezes x 3 mins   S/L hip abduction x 20 1.5#  S/LProne hip extension x 20 1.5#  Sit <> Stand x 20  Scapular retraction with gray/handle band x 30  Shoulder horizontal abduction B TB x 20  ER Tband B TB x 20  Standing PNF diagonal R TB x 20 each arm   Step-ups: green with 2 risers x 3 mins each - front and lateral  Heel Raises:  Single x 20 each       Written Home Exercises Provided: Jeanne was given theraband for exercises above;   Pt demo good understanding of the education provided. Cleveland demonstrated good return demonstration of activities.     Education provided re:  Cleveland verbalized good understanding of education provided. Home exercise program provided today.  No spiritual or educational barriers to learning provided    Assessment     Pt tolerated treatment well with fatigue during therapeutic exercises. Pt was able to complete knew therex including prone hip extension and resisted shoulder horizontal abduction and ER without difficulty or pain. Nustep duration increased to 15 min and pt was very tired after completion. Pt has been compliant with HEP and reports it is helping to get him stronger due to soreness after completion.     This is a 65 y.o. male referred to  outpatient physical therapy and presents with a medical diagnosis of CVA with Left joss and demonstrates limitations as described in the problem list. Pt prognosis is Excellent. Pt will continue to benefit from skilled outpatient physical therapy to address the deficits listed in the problem list, provide pt/family education and to maximize pt's level of independence in the home and community environment.     Goals as follows:    Long Term Goals: 4 weeks  Walking: Increase walking distance/duration to 1/4 mile without SOB and on variety of surfaces without LOB   Exercise: demonstrate independence with home exercise program to maintain gains made in therapy.          Plan     Continue with established Plan of Care towards PT goals.    Therapist: Gabrielle Gaffney, PT  12/11/2020

## 2020-12-29 ENCOUNTER — CLINICAL SUPPORT (OUTPATIENT)
Dept: REHABILITATION | Facility: HOSPITAL | Age: 65
End: 2020-12-29
Payer: MEDICARE

## 2020-12-29 DIAGNOSIS — I63.89 CEREBROVASCULAR ACCIDENT (CVA) DUE TO OTHER MECHANISM: Primary | ICD-10-CM

## 2020-12-29 DIAGNOSIS — R06.02 SOB (SHORTNESS OF BREATH): ICD-10-CM

## 2020-12-29 DIAGNOSIS — M62.81 MUSCLE WEAKNESS (GENERALIZED): ICD-10-CM

## 2020-12-29 PROCEDURE — 97110 THERAPEUTIC EXERCISES: CPT | Mod: PN,CQ

## 2020-12-29 NOTE — PROGRESS NOTES
Physical Therapy Daily Note     Name: Cleveland Capps  Clinic Number: [id number]  Diagnosis:   Encounter Diagnoses   Name Primary?    Cerebrovascular accident (CVA) due to other mechanism Yes    SOB (shortness of breath)     Muscle weakness (generalized)      Physician: Kwasi Chao MD  Precautions: standard   Visit #: 5 of 6  PTA Visit #: 1  Time In: 8:45 am   Time Out: 9:35 am     Subjective     Pt reports: that he went hunting yesterday and walked 2 miles. Patient reports being sore; Pain on a scale of 0-10 to be n/a currently.     Objective     Cleveland received individual therapeutic exercises to develop strength, endurance and core stabilization for 60 minutes including:  Nu-Step - Level 7 x 15mins ( rest break every 5 min)  Bridges x 25  SLR x 20 each  Ball Squeezes x 3 mins   S/L hip abduction x 20 1.5#  S/LProne hip extension x 20 1.5#  Sit <> Stand x 20  Scapular retraction with gray/handle band x 30  Shoulder horizontal abduction B TB x 20  ER Tband B TB x 20  Standing PNF diagonal R TB x 20 each arm   Step-ups: green with 2 risers x 3 mins each - front and lateral  Heel Raises:  Single x 20 each       Written Home Exercises Provided: Jeanne was given theraband for exercises above;   Pt demo good understanding of the education provided. [place] demonstrated good return demonstration of activities.     Education provided re:  Cleveland verbalized good understanding of education provided. Home exercise program provided today.  No spiritual or educational barriers to learning provided    Assessment     Pt tolerated treatment well with fatigue during therapeutic exercises. Pt was able to complete knew therex including prone hip extension and resisted shoulder horizontal abduction and ER without difficulty or pain. Nustep duration increased to 15 min and pt was very tired after completion. Pt has been compliant with HEP and reports it is helping to get him stronger  due to soreness after completion.     This is a 65 y.o. male referred to outpatient physical therapy and presents with a medical diagnosis of CVA with Left joss and demonstrates limitations as described in the problem list. Pt prognosis is Excellent. Pt will continue to benefit from skilled outpatient physical therapy to address the deficits listed in the problem list, provide pt/family education and to maximize pt's level of independence in the home and community environment.     Goals as follows:    Long Term Goals: 4 weeks  Walking: Increase walking distance/duration to 1/4 mile without SOB and on variety of surfaces without LOB   Exercise: demonstrate independence with home exercise program to maintain gains made in therapy.          Plan     Continue with established Plan of Care towards PT goals.    Therapist: Javid Strong, PTA  12/29/2020

## 2020-12-31 ENCOUNTER — CLINICAL SUPPORT (OUTPATIENT)
Dept: REHABILITATION | Facility: HOSPITAL | Age: 65
End: 2020-12-31
Attending: PSYCHIATRY & NEUROLOGY
Payer: MEDICARE

## 2020-12-31 DIAGNOSIS — I63.89 CEREBROVASCULAR ACCIDENT (CVA) DUE TO OTHER MECHANISM: Primary | ICD-10-CM

## 2020-12-31 DIAGNOSIS — M62.81 MUSCLE WEAKNESS OF LOWER EXTREMITY: ICD-10-CM

## 2020-12-31 PROCEDURE — 97110 THERAPEUTIC EXERCISES: CPT | Mod: PN,CQ

## 2020-12-31 NOTE — PROGRESS NOTES
Physical Therapy Daily Note     Name: Cleveland Capps  Clinic Number: [id number]  Diagnosis:   Encounter Diagnoses   Name Primary?    Cerebrovascular accident (CVA) due to other mechanism Yes    Muscle weakness of lower extremity      Physician: Kwasi Chao MD  Precautions: standard   Visit #: 6 of 6  PTA Visit #: 2  Time In: 8:5o am   Time Out: 9:35 am     Subjective     Pt reports Patient reports being sore; Pain on a scale of 0-10 to be 3 currently.     Objective     Cleveland received individual therapeutic exercises to develop strength, endurance and core stabilization for 60 minutes including:  Nu-Step - Level 7 x 15mins ( rest break every 5 min)  Bridges x 25  SLR x 20 each  Ball Squeezes x 3 mins   S/L hip abduction x 20 1.5#  S/LProne hip extension x 20 1.5#  Sit <> Stand x 20  Scapular retraction with gray/handle band x 30  Shoulder horizontal abduction B TB x 20  ER Tband B TB x 20  Standing PNF diagonal R TB x 20 each arm   Step-ups: green with 2 risers x 3 mins each - front and lateral  Heel Raises:  Single x 20 each       Written Home Exercises Provided: Jeanne was given theraband for exercises above;   Pt demo good understanding of the education provided. Cleveland demonstrated good return demonstration of activities.     Education provided re:  Cleveland verbalized good understanding of education provided. Home exercise program provided today.  No spiritual or educational barriers to learning provided    Assessment     Pt tolerated treatment well with fatigue during therapeutic exercises. Pt was able to complete knew therex including prone hip extension and resisted shoulder horizontal abduction and ER without difficulty or pain. Nustep duration increased to 15 min and pt was very tired after completion. Pt has been compliant with HEP and reports it is helping to get him stronger due to soreness after completion.     This is a 65 y.o. male referred to  outpatient physical therapy and presents with a medical diagnosis of CVA with Left joss and demonstrates limitations as described in the problem list. Pt prognosis is Excellent. Pt will continue to benefit from skilled outpatient physical therapy to address the deficits listed in the problem list, provide pt/family education and to maximize pt's level of independence in the home and community environment.     Goals as follows:    Long Term Goals: 4 weeks  Walking: Increase walking distance/duration to 1/4 mile without SOB and on variety of surfaces without LOB   Exercise: demonstrate independence with home exercise program to maintain gains made in therapy.          Plan     Continue with established Plan of Care towards PT goals.    Therapist: Javid Strong, PTA  12/31/2020

## 2021-01-05 ENCOUNTER — CLINICAL SUPPORT (OUTPATIENT)
Dept: REHABILITATION | Facility: HOSPITAL | Age: 66
End: 2021-01-05
Payer: MEDICARE

## 2021-01-05 DIAGNOSIS — M54.50 LUMBAR PAIN: ICD-10-CM

## 2021-01-05 DIAGNOSIS — I63.9 CEREBROVASCULAR ACCIDENT (CVA), UNSPECIFIED MECHANISM: Primary | ICD-10-CM

## 2021-01-05 DIAGNOSIS — M62.81 MUSCLE WEAKNESS (GENERALIZED): ICD-10-CM

## 2021-01-05 PROCEDURE — 97110 THERAPEUTIC EXERCISES: CPT | Mod: PN,CQ

## 2021-01-12 ENCOUNTER — CLINICAL SUPPORT (OUTPATIENT)
Dept: REHABILITATION | Facility: HOSPITAL | Age: 66
End: 2021-01-12
Payer: MEDICARE

## 2021-01-12 DIAGNOSIS — M62.81 MUSCLE WEAKNESS (GENERALIZED): ICD-10-CM

## 2021-01-12 DIAGNOSIS — I63.89 CEREBROVASCULAR ACCIDENT (CVA) DUE TO OTHER MECHANISM: Primary | ICD-10-CM

## 2021-01-14 ENCOUNTER — CLINICAL SUPPORT (OUTPATIENT)
Dept: REHABILITATION | Facility: HOSPITAL | Age: 66
End: 2021-01-14
Payer: MEDICARE

## 2021-01-14 DIAGNOSIS — I63.9 CEREBROVASCULAR ACCIDENT (CVA), UNSPECIFIED MECHANISM: Primary | ICD-10-CM

## 2021-01-14 DIAGNOSIS — M62.81 MUSCLE WEAKNESS (GENERALIZED): ICD-10-CM

## 2021-01-14 PROCEDURE — 97110 THERAPEUTIC EXERCISES: CPT | Mod: PN,CQ

## 2021-01-19 ENCOUNTER — CLINICAL SUPPORT (OUTPATIENT)
Dept: REHABILITATION | Facility: HOSPITAL | Age: 66
End: 2021-01-19
Payer: MEDICARE

## 2021-01-19 DIAGNOSIS — I63.9 CEREBROVASCULAR ACCIDENT (CVA), UNSPECIFIED MECHANISM: Primary | ICD-10-CM

## 2021-01-19 DIAGNOSIS — M62.81 MUSCLE WEAKNESS (GENERALIZED): ICD-10-CM

## 2021-01-19 PROCEDURE — 97110 THERAPEUTIC EXERCISES: CPT | Mod: PN,CQ

## 2021-03-17 DIAGNOSIS — I50.43 ACUTE ON CHRONIC COMBINED SYSTOLIC AND DIASTOLIC CONGESTIVE HEART FAILURE: ICD-10-CM

## 2021-03-17 DIAGNOSIS — R94.39 ABNORMAL CARDIOVASCULAR STRESS TEST: ICD-10-CM

## 2021-03-17 DIAGNOSIS — I25.810 CORONARY ARTERY DISEASE INVOLVING CORONARY BYPASS GRAFT OF NATIVE HEART WITHOUT ANGINA PECTORIS: ICD-10-CM

## 2021-03-17 DIAGNOSIS — I25.10 CORONARY ARTERY DISEASE, ANGINA PRESENCE UNSPECIFIED, UNSPECIFIED VESSEL OR LESION TYPE, UNSPECIFIED WHETHER NATIVE OR TRANSPLANTED HEART: Primary | ICD-10-CM

## 2021-03-17 DIAGNOSIS — I21.4 NSTEMI (NON-ST ELEVATED MYOCARDIAL INFARCTION): Primary | ICD-10-CM

## 2021-03-17 RX ORDER — DIPHENHYDRAMINE HCL 50 MG
50 CAPSULE ORAL ONCE
Status: CANCELLED | OUTPATIENT
Start: 2021-03-17 | End: 2021-03-17

## 2021-03-17 RX ORDER — SODIUM CHLORIDE 9 MG/ML
INJECTION, SOLUTION INTRAVENOUS CONTINUOUS
Status: CANCELLED | OUTPATIENT
Start: 2021-03-17 | End: 2021-03-17

## 2021-03-29 ENCOUNTER — LAB VISIT (OUTPATIENT)
Dept: LAB | Facility: HOSPITAL | Age: 66
End: 2021-03-29
Attending: INTERNAL MEDICINE
Payer: MEDICARE

## 2021-03-29 ENCOUNTER — TELEPHONE (OUTPATIENT)
Dept: CARDIOLOGY | Facility: CLINIC | Age: 66
End: 2021-03-29

## 2021-03-29 ENCOUNTER — OFFICE VISIT (OUTPATIENT)
Dept: CARDIOLOGY | Facility: CLINIC | Age: 66
End: 2021-03-29
Payer: MEDICARE

## 2021-03-29 ENCOUNTER — LAB VISIT (OUTPATIENT)
Dept: INTERNAL MEDICINE | Facility: CLINIC | Age: 66
End: 2021-03-29
Payer: MEDICARE

## 2021-03-29 VITALS
SYSTOLIC BLOOD PRESSURE: 139 MMHG | HEIGHT: 62 IN | WEIGHT: 169.75 LBS | DIASTOLIC BLOOD PRESSURE: 65 MMHG | HEART RATE: 55 BPM | OXYGEN SATURATION: 99 % | BODY MASS INDEX: 31.24 KG/M2

## 2021-03-29 DIAGNOSIS — I25.810 CORONARY ARTERY DISEASE INVOLVING CORONARY BYPASS GRAFT OF NATIVE HEART WITHOUT ANGINA PECTORIS: ICD-10-CM

## 2021-03-29 DIAGNOSIS — I21.4 NSTEMI (NON-ST ELEVATED MYOCARDIAL INFARCTION): ICD-10-CM

## 2021-03-29 DIAGNOSIS — I50.43 ACUTE ON CHRONIC COMBINED SYSTOLIC AND DIASTOLIC CONGESTIVE HEART FAILURE: ICD-10-CM

## 2021-03-29 DIAGNOSIS — N18.9 CHRONIC KIDNEY DISEASE, UNSPECIFIED CKD STAGE: ICD-10-CM

## 2021-03-29 DIAGNOSIS — I25.10 CORONARY ARTERY DISEASE, ANGINA PRESENCE UNSPECIFIED, UNSPECIFIED VESSEL OR LESION TYPE, UNSPECIFIED WHETHER NATIVE OR TRANSPLANTED HEART: Primary | ICD-10-CM

## 2021-03-29 DIAGNOSIS — I48.0 PAROXYSMAL ATRIAL FIBRILLATION: Primary | ICD-10-CM

## 2021-03-29 LAB
ANION GAP SERPL CALC-SCNC: 9 MMOL/L (ref 8–16)
APTT BLDCRRT: 27 SEC (ref 21–32)
BUN SERPL-MCNC: 19 MG/DL (ref 8–23)
CALCIUM SERPL-MCNC: 9.5 MG/DL (ref 8.7–10.5)
CHLORIDE SERPL-SCNC: 104 MMOL/L (ref 95–110)
CO2 SERPL-SCNC: 30 MMOL/L (ref 23–29)
CREAT SERPL-MCNC: 1.2 MG/DL (ref 0.5–1.4)
ERYTHROCYTE [DISTWIDTH] IN BLOOD BY AUTOMATED COUNT: 13.5 % (ref 11.5–14.5)
EST. GFR  (AFRICAN AMERICAN): >60 ML/MIN/1.73 M^2
EST. GFR  (NON AFRICAN AMERICAN): >60 ML/MIN/1.73 M^2
GLUCOSE SERPL-MCNC: 115 MG/DL (ref 70–110)
HCT VFR BLD AUTO: 44.1 % (ref 40–54)
HGB BLD-MCNC: 14.2 G/DL (ref 14–18)
INR PPP: 1 (ref 0.8–1.2)
MCH RBC QN AUTO: 31.9 PG (ref 27–31)
MCHC RBC AUTO-ENTMCNC: 32.2 G/DL (ref 32–36)
MCV RBC AUTO: 99 FL (ref 82–98)
PLATELET # BLD AUTO: 199 K/UL (ref 150–450)
PMV BLD AUTO: 8.5 FL (ref 9.2–12.9)
POTASSIUM SERPL-SCNC: 4.3 MMOL/L (ref 3.5–5.1)
PROTHROMBIN TIME: 11 SEC (ref 9–12.5)
RBC # BLD AUTO: 4.45 M/UL (ref 4.6–6.2)
SODIUM SERPL-SCNC: 143 MMOL/L (ref 136–145)
WBC # BLD AUTO: 7.48 K/UL (ref 3.9–12.7)

## 2021-03-29 PROCEDURE — 1126F AMNT PAIN NOTED NONE PRSNT: CPT | Mod: S$GLB,,, | Performed by: INTERNAL MEDICINE

## 2021-03-29 PROCEDURE — 1126F PR PAIN SEVERITY QUANTIFIED, NO PAIN PRESENT: ICD-10-PCS | Mod: S$GLB,,, | Performed by: INTERNAL MEDICINE

## 2021-03-29 PROCEDURE — 80048 BASIC METABOLIC PNL TOTAL CA: CPT | Performed by: INTERNAL MEDICINE

## 2021-03-29 PROCEDURE — 85027 COMPLETE CBC AUTOMATED: CPT | Performed by: INTERNAL MEDICINE

## 2021-03-29 PROCEDURE — 3078F DIAST BP <80 MM HG: CPT | Mod: CPTII,S$GLB,, | Performed by: INTERNAL MEDICINE

## 2021-03-29 PROCEDURE — 99999 PR PBB SHADOW E&M-EST. PATIENT-LVL III: ICD-10-PCS | Mod: PBBFAC,,, | Performed by: INTERNAL MEDICINE

## 2021-03-29 PROCEDURE — 3008F BODY MASS INDEX DOCD: CPT | Mod: CPTII,S$GLB,, | Performed by: INTERNAL MEDICINE

## 2021-03-29 PROCEDURE — 99215 OFFICE O/P EST HI 40 MIN: CPT | Mod: S$GLB,,, | Performed by: INTERNAL MEDICINE

## 2021-03-29 PROCEDURE — 85610 PROTHROMBIN TIME: CPT | Performed by: INTERNAL MEDICINE

## 2021-03-29 PROCEDURE — 85730 THROMBOPLASTIN TIME PARTIAL: CPT | Performed by: INTERNAL MEDICINE

## 2021-03-29 PROCEDURE — U0005 INFEC AGEN DETEC AMPLI PROBE: HCPCS | Performed by: INTERNAL MEDICINE

## 2021-03-29 PROCEDURE — 3075F SYST BP GE 130 - 139MM HG: CPT | Mod: CPTII,S$GLB,, | Performed by: INTERNAL MEDICINE

## 2021-03-29 PROCEDURE — U0003 INFECTIOUS AGENT DETECTION BY NUCLEIC ACID (DNA OR RNA); SEVERE ACUTE RESPIRATORY SYNDROME CORONAVIRUS 2 (SARS-COV-2) (CORONAVIRUS DISEASE [COVID-19]), AMPLIFIED PROBE TECHNIQUE, MAKING USE OF HIGH THROUGHPUT TECHNOLOGIES AS DESCRIBED BY CMS-2020-01-R: HCPCS | Performed by: INTERNAL MEDICINE

## 2021-03-29 PROCEDURE — 36415 COLL VENOUS BLD VENIPUNCTURE: CPT | Performed by: INTERNAL MEDICINE

## 2021-03-29 PROCEDURE — 99999 PR PBB SHADOW E&M-EST. PATIENT-LVL III: CPT | Mod: PBBFAC,,, | Performed by: INTERNAL MEDICINE

## 2021-03-29 PROCEDURE — 3078F PR MOST RECENT DIASTOLIC BLOOD PRESSURE < 80 MM HG: ICD-10-PCS | Mod: CPTII,S$GLB,, | Performed by: INTERNAL MEDICINE

## 2021-03-29 PROCEDURE — 3008F PR BODY MASS INDEX (BMI) DOCUMENTED: ICD-10-PCS | Mod: CPTII,S$GLB,, | Performed by: INTERNAL MEDICINE

## 2021-03-29 PROCEDURE — 99215 PR OFFICE/OUTPT VISIT, EST, LEVL V, 40-54 MIN: ICD-10-PCS | Mod: S$GLB,,, | Performed by: INTERNAL MEDICINE

## 2021-03-29 PROCEDURE — 3075F PR MOST RECENT SYSTOLIC BLOOD PRESS GE 130-139MM HG: ICD-10-PCS | Mod: CPTII,S$GLB,, | Performed by: INTERNAL MEDICINE

## 2021-03-29 RX ORDER — ATORVASTATIN CALCIUM 80 MG/1
80 TABLET, FILM COATED ORAL DAILY
Status: ON HOLD | COMMUNITY
End: 2023-04-05 | Stop reason: HOSPADM

## 2021-03-29 RX ORDER — CLOPIDOGREL BISULFATE 75 MG/1
TABLET ORAL
Qty: 30 TABLET | Refills: 11 | Status: ON HOLD | OUTPATIENT
Start: 2021-03-29 | End: 2021-03-30 | Stop reason: HOSPADM

## 2021-03-30 ENCOUNTER — CLINICAL SUPPORT (OUTPATIENT)
Dept: CARDIOLOGY | Facility: HOSPITAL | Age: 66
End: 2021-03-30
Attending: INTERNAL MEDICINE
Payer: MEDICARE

## 2021-03-30 ENCOUNTER — HOSPITAL ENCOUNTER (OUTPATIENT)
Facility: HOSPITAL | Age: 66
Discharge: HOME | End: 2021-03-30
Attending: INTERNAL MEDICINE | Admitting: INTERNAL MEDICINE
Payer: MEDICARE

## 2021-03-30 VITALS
OXYGEN SATURATION: 97 % | TEMPERATURE: 98 F | HEIGHT: 62 IN | DIASTOLIC BLOOD PRESSURE: 67 MMHG | BODY MASS INDEX: 31.05 KG/M2 | RESPIRATION RATE: 18 BRPM | HEART RATE: 56 BPM | SYSTOLIC BLOOD PRESSURE: 139 MMHG

## 2021-03-30 DIAGNOSIS — R94.39 ABNORMAL CARDIOVASCULAR STRESS TEST: ICD-10-CM

## 2021-03-30 DIAGNOSIS — I48.0 PAROXYSMAL ATRIAL FIBRILLATION: ICD-10-CM

## 2021-03-30 DIAGNOSIS — I25.10 CORONARY ARTERY DISEASE, ANGINA PRESENCE UNSPECIFIED, UNSPECIFIED VESSEL OR LESION TYPE, UNSPECIFIED WHETHER NATIVE OR TRANSPLANTED HEART: ICD-10-CM

## 2021-03-30 DIAGNOSIS — I50.43 ACUTE ON CHRONIC COMBINED SYSTOLIC AND DIASTOLIC CONGESTIVE HEART FAILURE: ICD-10-CM

## 2021-03-30 LAB
ABO + RH BLD: NORMAL
ANION GAP SERPL CALC-SCNC: 8 MMOL/L (ref 8–16)
APTT BLDCRRT: 27.4 SEC (ref 21–32)
BLD GP AB SCN CELLS X3 SERPL QL: NORMAL
BUN SERPL-MCNC: 24 MG/DL (ref 8–23)
CALCIUM SERPL-MCNC: 8.9 MG/DL (ref 8.7–10.5)
CHLORIDE SERPL-SCNC: 108 MMOL/L (ref 95–110)
CO2 SERPL-SCNC: 25 MMOL/L (ref 23–29)
CREAT SERPL-MCNC: 1.2 MG/DL (ref 0.5–1.4)
ERYTHROCYTE [DISTWIDTH] IN BLOOD BY AUTOMATED COUNT: 13.2 % (ref 11.5–14.5)
EST. GFR  (AFRICAN AMERICAN): >60 ML/MIN/1.73 M^2
EST. GFR  (NON AFRICAN AMERICAN): >60 ML/MIN/1.73 M^2
GLUCOSE SERPL-MCNC: 120 MG/DL (ref 70–110)
HCT VFR BLD AUTO: 39.4 % (ref 40–54)
HGB BLD-MCNC: 13.1 G/DL (ref 14–18)
INR PPP: 1 (ref 0.8–1.2)
MCH RBC QN AUTO: 32.6 PG (ref 27–31)
MCHC RBC AUTO-ENTMCNC: 33.2 G/DL (ref 32–36)
MCV RBC AUTO: 98 FL (ref 82–98)
PLATELET # BLD AUTO: 175 K/UL (ref 150–450)
PLATELET RESPONSE PLAVIX: 223 PRU (ref 194–418)
PMV BLD AUTO: 8.7 FL (ref 9.2–12.9)
POTASSIUM SERPL-SCNC: 4.6 MMOL/L (ref 3.5–5.1)
PROTHROMBIN TIME: 10.8 SEC (ref 9–12.5)
RBC # BLD AUTO: 4.02 M/UL (ref 4.6–6.2)
SARS-COV-2 RDRP RESP QL NAA+PROBE: NEGATIVE
SARS-COV-2 RNA RESP QL NAA+PROBE: NOT DETECTED
SODIUM SERPL-SCNC: 141 MMOL/L (ref 136–145)
WBC # BLD AUTO: 6.68 K/UL (ref 3.9–12.7)

## 2021-03-30 PROCEDURE — 25000003 PHARM REV CODE 250: Performed by: STUDENT IN AN ORGANIZED HEALTH CARE EDUCATION/TRAINING PROGRAM

## 2021-03-30 PROCEDURE — 85730 THROMBOPLASTIN TIME PARTIAL: CPT | Performed by: INTERNAL MEDICINE

## 2021-03-30 PROCEDURE — 63600175 PHARM REV CODE 636 W HCPCS: Performed by: INTERNAL MEDICINE

## 2021-03-30 PROCEDURE — 99152 MOD SED SAME PHYS/QHP 5/>YRS: CPT | Performed by: INTERNAL MEDICINE

## 2021-03-30 PROCEDURE — 93455 CORONARY ART/GRFT ANGIO S&I: CPT | Mod: GC | Performed by: INTERNAL MEDICINE

## 2021-03-30 PROCEDURE — 93455 CORONARY ART/GRFT ANGIO S&I: CPT | Mod: 26,GC,, | Performed by: INTERNAL MEDICINE

## 2021-03-30 PROCEDURE — 93271 ECG/MONITORING AND ANALYSIS: CPT

## 2021-03-30 PROCEDURE — C1769 GUIDE WIRE: HCPCS | Performed by: INTERNAL MEDICINE

## 2021-03-30 PROCEDURE — 86900 BLOOD TYPING SEROLOGIC ABO: CPT | Performed by: INTERNAL MEDICINE

## 2021-03-30 PROCEDURE — 85576 BLOOD PLATELET AGGREGATION: CPT | Performed by: INTERNAL MEDICINE

## 2021-03-30 PROCEDURE — 99152 PR MOD CONSCIOUS SEDATION, SAME PHYS, 5+ YRS, FIRST 15 MIN: ICD-10-PCS | Mod: GC,,, | Performed by: INTERNAL MEDICINE

## 2021-03-30 PROCEDURE — 93010 EKG 12-LEAD: ICD-10-PCS | Mod: ,,, | Performed by: INTERNAL MEDICINE

## 2021-03-30 PROCEDURE — 93272 CARDIAC EVENT MONITOR (CUPID ONLY): ICD-10-PCS | Mod: ,,, | Performed by: INTERNAL MEDICINE

## 2021-03-30 PROCEDURE — 93005 ELECTROCARDIOGRAM TRACING: CPT

## 2021-03-30 PROCEDURE — 80048 BASIC METABOLIC PNL TOTAL CA: CPT | Performed by: INTERNAL MEDICINE

## 2021-03-30 PROCEDURE — 93455 PR CATH PLACE/CORONARY ANGIO, IMG SUPER/INTERP, BYPASS ANGIO: ICD-10-PCS | Mod: 26,GC,, | Performed by: INTERNAL MEDICINE

## 2021-03-30 PROCEDURE — U0002 COVID-19 LAB TEST NON-CDC: HCPCS | Performed by: INTERNAL MEDICINE

## 2021-03-30 PROCEDURE — 25500020 PHARM REV CODE 255: Performed by: INTERNAL MEDICINE

## 2021-03-30 PROCEDURE — C1894 INTRO/SHEATH, NON-LASER: HCPCS | Performed by: INTERNAL MEDICINE

## 2021-03-30 PROCEDURE — 85027 COMPLETE CBC AUTOMATED: CPT | Performed by: INTERNAL MEDICINE

## 2021-03-30 PROCEDURE — 93272 ECG/REVIEW INTERPRET ONLY: CPT | Mod: ,,, | Performed by: INTERNAL MEDICINE

## 2021-03-30 PROCEDURE — 85610 PROTHROMBIN TIME: CPT | Performed by: INTERNAL MEDICINE

## 2021-03-30 PROCEDURE — 93010 ELECTROCARDIOGRAM REPORT: CPT | Mod: ,,, | Performed by: INTERNAL MEDICINE

## 2021-03-30 PROCEDURE — 99153 MOD SED SAME PHYS/QHP EA: CPT | Performed by: INTERNAL MEDICINE

## 2021-03-30 PROCEDURE — 99152 MOD SED SAME PHYS/QHP 5/>YRS: CPT | Mod: GC,,, | Performed by: INTERNAL MEDICINE

## 2021-03-30 PROCEDURE — 25000003 PHARM REV CODE 250: Performed by: INTERNAL MEDICINE

## 2021-03-30 PROCEDURE — C1760 CLOSURE DEV, VASC: HCPCS | Performed by: INTERNAL MEDICINE

## 2021-03-30 RX ORDER — HEPARIN SOD,PORCINE/0.9 % NACL 1000/500ML
INTRAVENOUS SOLUTION INTRAVENOUS
Status: DISCONTINUED | OUTPATIENT
Start: 2021-03-30 | End: 2021-03-30 | Stop reason: HOSPADM

## 2021-03-30 RX ORDER — FENTANYL CITRATE 50 UG/ML
INJECTION, SOLUTION INTRAMUSCULAR; INTRAVENOUS
Status: DISCONTINUED | OUTPATIENT
Start: 2021-03-30 | End: 2021-03-30 | Stop reason: HOSPADM

## 2021-03-30 RX ORDER — CLINDAMYCIN PHOSPHATE 900 MG/50ML
INJECTION, SOLUTION INTRAVENOUS
Status: DISCONTINUED | OUTPATIENT
Start: 2021-03-30 | End: 2021-03-30 | Stop reason: HOSPADM

## 2021-03-30 RX ORDER — MIDAZOLAM HYDROCHLORIDE 1 MG/ML
INJECTION, SOLUTION INTRAMUSCULAR; INTRAVENOUS
Status: DISCONTINUED | OUTPATIENT
Start: 2021-03-30 | End: 2021-03-30 | Stop reason: HOSPADM

## 2021-03-30 RX ORDER — ACETAMINOPHEN 325 MG/1
650 TABLET ORAL EVERY 4 HOURS PRN
Status: DISCONTINUED | OUTPATIENT
Start: 2021-03-30 | End: 2021-03-30 | Stop reason: HOSPADM

## 2021-03-30 RX ORDER — ONDANSETRON 8 MG/1
8 TABLET, ORALLY DISINTEGRATING ORAL EVERY 8 HOURS PRN
Status: DISCONTINUED | OUTPATIENT
Start: 2021-03-30 | End: 2021-03-30 | Stop reason: HOSPADM

## 2021-03-30 RX ORDER — SODIUM CHLORIDE 9 MG/ML
INJECTION, SOLUTION INTRAVENOUS CONTINUOUS
Status: ACTIVE | OUTPATIENT
Start: 2021-03-30 | End: 2021-03-30

## 2021-03-30 RX ORDER — NITROGLYCERIN 5 MG/ML
INJECTION, SOLUTION INTRAVENOUS
Status: DISCONTINUED | OUTPATIENT
Start: 2021-03-30 | End: 2021-03-30 | Stop reason: HOSPADM

## 2021-03-30 RX ORDER — DIPHENHYDRAMINE HCL 50 MG
50 CAPSULE ORAL ONCE
Status: COMPLETED | OUTPATIENT
Start: 2021-03-30 | End: 2021-03-30

## 2021-03-30 RX ORDER — LIDOCAINE HYDROCHLORIDE 20 MG/ML
INJECTION, SOLUTION EPIDURAL; INFILTRATION; INTRACAUDAL; PERINEURAL
Status: DISCONTINUED | OUTPATIENT
Start: 2021-03-30 | End: 2021-03-30 | Stop reason: HOSPADM

## 2021-03-30 RX ADMIN — SODIUM CHLORIDE: 0.9 INJECTION, SOLUTION INTRAVENOUS at 03:03

## 2021-03-30 RX ADMIN — SODIUM CHLORIDE: 0.9 INJECTION, SOLUTION INTRAVENOUS at 08:03

## 2021-03-30 RX ADMIN — DIPHENHYDRAMINE HYDROCHLORIDE 50 MG: 50 CAPSULE ORAL at 08:03

## 2021-04-13 ENCOUNTER — TELEPHONE (OUTPATIENT)
Dept: CARDIOLOGY | Facility: HOSPITAL | Age: 66
End: 2021-04-13

## 2021-05-13 ENCOUNTER — DOCUMENTATION ONLY (OUTPATIENT)
Dept: CARDIOLOGY | Facility: CLINIC | Age: 66
End: 2021-05-13

## 2021-05-16 ENCOUNTER — HOSPITAL ENCOUNTER (EMERGENCY)
Facility: HOSPITAL | Age: 66
Discharge: HOME | End: 2021-05-17
Attending: EMERGENCY MEDICINE
Payer: MEDICARE

## 2021-05-16 DIAGNOSIS — I48.91 ATRIAL FIBRILLATION WITH RVR: Primary | ICD-10-CM

## 2021-05-16 DIAGNOSIS — R07.9 CHEST PAIN, UNSPECIFIED TYPE: ICD-10-CM

## 2021-05-16 DIAGNOSIS — R07.9 CHEST PAIN: ICD-10-CM

## 2021-05-16 DIAGNOSIS — R79.89 ABNORMAL THYROID BLOOD TEST: ICD-10-CM

## 2021-05-16 LAB
BASOPHILS # BLD AUTO: 0.04 K/UL (ref 0–0.2)
BASOPHILS NFR BLD: 0.4 % (ref 0–1.9)
DIFFERENTIAL METHOD: ABNORMAL
EOSINOPHIL # BLD AUTO: 0.2 K/UL (ref 0–0.5)
EOSINOPHIL NFR BLD: 2.5 % (ref 0–8)
ERYTHROCYTE [DISTWIDTH] IN BLOOD BY AUTOMATED COUNT: 14.2 % (ref 11.5–14.5)
HCT VFR BLD AUTO: 42 % (ref 40–54)
HGB BLD-MCNC: 13.7 G/DL (ref 14–18)
IMM GRANULOCYTES # BLD AUTO: 0.02 K/UL (ref 0–0.04)
IMM GRANULOCYTES NFR BLD AUTO: 0.2 % (ref 0–0.5)
LYMPHOCYTES # BLD AUTO: 1.5 K/UL (ref 1–4.8)
LYMPHOCYTES NFR BLD: 16.6 % (ref 18–48)
MCH RBC QN AUTO: 32.3 PG (ref 27–31)
MCHC RBC AUTO-ENTMCNC: 32.6 G/DL (ref 32–36)
MCV RBC AUTO: 99 FL (ref 82–98)
MONOCYTES # BLD AUTO: 0.7 K/UL (ref 0.3–1)
MONOCYTES NFR BLD: 7.6 % (ref 4–15)
NEUTROPHILS # BLD AUTO: 6.7 K/UL (ref 1.8–7.7)
NEUTROPHILS NFR BLD: 72.7 % (ref 38–73)
NRBC BLD-RTO: 0 /100 WBC
PLATELET # BLD AUTO: 223 K/UL (ref 150–450)
PMV BLD AUTO: 8.6 FL (ref 9.2–12.9)
RBC # BLD AUTO: 4.24 M/UL (ref 4.6–6.2)
WBC # BLD AUTO: 9.2 K/UL (ref 3.9–12.7)

## 2021-05-16 PROCEDURE — 93010 EKG 12-LEAD: ICD-10-PCS | Mod: ,,, | Performed by: INTERNAL MEDICINE

## 2021-05-16 PROCEDURE — 25000003 PHARM REV CODE 250: Performed by: EMERGENCY MEDICINE

## 2021-05-16 PROCEDURE — 84443 ASSAY THYROID STIM HORMONE: CPT | Performed by: EMERGENCY MEDICINE

## 2021-05-16 PROCEDURE — 83735 ASSAY OF MAGNESIUM: CPT | Performed by: EMERGENCY MEDICINE

## 2021-05-16 PROCEDURE — 71045 X-RAY EXAM CHEST 1 VIEW: CPT | Mod: 26,,, | Performed by: RADIOLOGY

## 2021-05-16 PROCEDURE — 85025 COMPLETE CBC W/AUTO DIFF WBC: CPT | Performed by: EMERGENCY MEDICINE

## 2021-05-16 PROCEDURE — 93010 ELECTROCARDIOGRAM REPORT: CPT | Mod: ,,, | Performed by: INTERNAL MEDICINE

## 2021-05-16 PROCEDURE — 96374 THER/PROPH/DIAG INJ IV PUSH: CPT

## 2021-05-16 PROCEDURE — 84439 ASSAY OF FREE THYROXINE: CPT | Performed by: EMERGENCY MEDICINE

## 2021-05-16 PROCEDURE — 71045 X-RAY EXAM CHEST 1 VIEW: CPT | Mod: TC,FY

## 2021-05-16 PROCEDURE — 83880 ASSAY OF NATRIURETIC PEPTIDE: CPT | Performed by: EMERGENCY MEDICINE

## 2021-05-16 PROCEDURE — 80053 COMPREHEN METABOLIC PANEL: CPT | Performed by: EMERGENCY MEDICINE

## 2021-05-16 PROCEDURE — 93005 ELECTROCARDIOGRAM TRACING: CPT

## 2021-05-16 PROCEDURE — 85610 PROTHROMBIN TIME: CPT | Performed by: EMERGENCY MEDICINE

## 2021-05-16 PROCEDURE — 99285 EMERGENCY DEPT VISIT HI MDM: CPT | Mod: 25

## 2021-05-16 PROCEDURE — 71045 XR CHEST AP PORTABLE: ICD-10-PCS | Mod: 26,,, | Performed by: RADIOLOGY

## 2021-05-16 PROCEDURE — 84484 ASSAY OF TROPONIN QUANT: CPT | Performed by: EMERGENCY MEDICINE

## 2021-05-16 RX ORDER — DILTIAZEM HYDROCHLORIDE 5 MG/ML
15 INJECTION INTRAVENOUS
Status: COMPLETED | OUTPATIENT
Start: 2021-05-16 | End: 2021-05-16

## 2021-05-16 RX ADMIN — DILTIAZEM HYDROCHLORIDE 15 MG: 5 INJECTION, SOLUTION INTRAVENOUS at 11:05

## 2021-05-17 VITALS
TEMPERATURE: 98 F | HEIGHT: 62 IN | RESPIRATION RATE: 13 BRPM | WEIGHT: 169 LBS | SYSTOLIC BLOOD PRESSURE: 114 MMHG | OXYGEN SATURATION: 97 % | BODY MASS INDEX: 31.1 KG/M2 | DIASTOLIC BLOOD PRESSURE: 89 MMHG | HEART RATE: 81 BPM

## 2021-05-17 LAB
ALBUMIN SERPL BCP-MCNC: 4.3 G/DL (ref 3.5–5.2)
ALP SERPL-CCNC: 85 U/L (ref 55–135)
ALT SERPL W/O P-5'-P-CCNC: 39 U/L (ref 10–44)
ANION GAP SERPL CALC-SCNC: 12 MMOL/L (ref 8–16)
AST SERPL-CCNC: 28 U/L (ref 10–40)
BILIRUB SERPL-MCNC: 2.1 MG/DL (ref 0.1–1)
BNP SERPL-MCNC: 485 PG/ML (ref 0–99)
BUN SERPL-MCNC: 23 MG/DL (ref 8–23)
CALCIUM SERPL-MCNC: 9 MG/DL (ref 8.7–10.5)
CHLORIDE SERPL-SCNC: 106 MMOL/L (ref 95–110)
CO2 SERPL-SCNC: 21 MMOL/L (ref 23–29)
CREAT SERPL-MCNC: 1.2 MG/DL (ref 0.5–1.4)
EST. GFR  (AFRICAN AMERICAN): >60 ML/MIN/1.73 M^2
EST. GFR  (NON AFRICAN AMERICAN): >60 ML/MIN/1.73 M^2
GLUCOSE SERPL-MCNC: 145 MG/DL (ref 70–110)
INR PPP: 1.1 (ref 0.8–1.2)
MAGNESIUM SERPL-MCNC: 2.1 MG/DL (ref 1.6–2.6)
POTASSIUM SERPL-SCNC: 4.4 MMOL/L (ref 3.5–5.1)
PROT SERPL-MCNC: 7.7 G/DL (ref 6–8.4)
PROTHROMBIN TIME: 11.1 SEC (ref 9–12.5)
SODIUM SERPL-SCNC: 139 MMOL/L (ref 136–145)
T4 FREE SERPL-MCNC: 0.72 NG/DL (ref 0.71–1.51)
TROPONIN I SERPL DL<=0.01 NG/ML-MCNC: 0.05 NG/ML (ref 0.02–0.5)
TROPONIN I SERPL DL<=0.01 NG/ML-MCNC: 0.14 NG/ML (ref 0.02–0.5)
TSH SERPL DL<=0.005 MIU/L-ACNC: 23.12 UIU/ML (ref 0.34–5.6)

## 2021-05-17 PROCEDURE — 84484 ASSAY OF TROPONIN QUANT: CPT | Performed by: EMERGENCY MEDICINE

## 2021-05-17 RX ORDER — NITROGLYCERIN 0.4 MG/1
0.4 TABLET SUBLINGUAL
Status: CANCELLED | OUTPATIENT
Start: 2021-05-17 | End: 2021-05-17

## 2021-05-17 RX ORDER — FUROSEMIDE 20 MG/1
20 TABLET ORAL DAILY
Qty: 10 TABLET | Refills: 0 | Status: ON HOLD | OUTPATIENT
Start: 2021-05-17 | End: 2022-11-28

## 2021-05-26 ENCOUNTER — LAB VISIT (OUTPATIENT)
Dept: LAB | Facility: HOSPITAL | Age: 66
End: 2021-05-26
Attending: FAMILY MEDICINE
Payer: MEDICARE

## 2021-05-26 DIAGNOSIS — R07.89 OTHER CHEST PAIN: Primary | ICD-10-CM

## 2021-05-26 LAB
ALBUMIN SERPL BCP-MCNC: 3.8 G/DL (ref 3.5–5.2)
ALP SERPL-CCNC: 88 U/L (ref 55–135)
ALT SERPL W/O P-5'-P-CCNC: 18 U/L (ref 10–44)
ANION GAP SERPL CALC-SCNC: 10 MMOL/L (ref 8–16)
AST SERPL-CCNC: 14 U/L (ref 10–40)
BILIRUB SERPL-MCNC: 1.4 MG/DL (ref 0.1–1)
BNP SERPL-MCNC: 159 PG/ML (ref 0–99)
BUN SERPL-MCNC: 15 MG/DL (ref 8–23)
CALCIUM SERPL-MCNC: 8.8 MG/DL (ref 8.7–10.5)
CHLORIDE SERPL-SCNC: 104 MMOL/L (ref 95–110)
CO2 SERPL-SCNC: 23 MMOL/L (ref 23–29)
CREAT SERPL-MCNC: 1.1 MG/DL (ref 0.5–1.4)
EST. GFR  (AFRICAN AMERICAN): >60 ML/MIN/1.73 M^2
EST. GFR  (NON AFRICAN AMERICAN): >60 ML/MIN/1.73 M^2
GLUCOSE SERPL-MCNC: 157 MG/DL (ref 70–110)
MAGNESIUM SERPL-MCNC: 2 MG/DL (ref 1.6–2.6)
POTASSIUM SERPL-SCNC: 4.3 MMOL/L (ref 3.5–5.1)
PROT SERPL-MCNC: 7 G/DL (ref 6–8.4)
SODIUM SERPL-SCNC: 137 MMOL/L (ref 136–145)

## 2021-05-26 PROCEDURE — 36415 COLL VENOUS BLD VENIPUNCTURE: CPT | Performed by: SPECIALIST

## 2021-05-26 PROCEDURE — 83735 ASSAY OF MAGNESIUM: CPT | Performed by: SPECIALIST

## 2021-05-26 PROCEDURE — 83880 ASSAY OF NATRIURETIC PEPTIDE: CPT | Performed by: SPECIALIST

## 2021-05-26 PROCEDURE — 80053 COMPREHEN METABOLIC PANEL: CPT | Performed by: SPECIALIST

## 2021-05-28 ENCOUNTER — LAB VISIT (OUTPATIENT)
Dept: LAB | Facility: HOSPITAL | Age: 66
End: 2021-05-28
Attending: SPECIALIST
Payer: MEDICARE

## 2021-05-28 DIAGNOSIS — R06.02 SHORTNESS OF BREATH: ICD-10-CM

## 2021-05-28 DIAGNOSIS — R07.89 OTHER CHEST PAIN: Primary | ICD-10-CM

## 2021-05-28 DIAGNOSIS — E87.4 MIXED ACID-BASE BALANCE DISORDER: ICD-10-CM

## 2021-05-28 LAB
ALBUMIN SERPL BCP-MCNC: 4 G/DL (ref 3.5–5.2)
ALP SERPL-CCNC: 84 U/L (ref 55–135)
ALT SERPL W/O P-5'-P-CCNC: 19 U/L (ref 10–44)
ANION GAP SERPL CALC-SCNC: 10 MMOL/L (ref 8–16)
AST SERPL-CCNC: 17 U/L (ref 10–40)
BILIRUB SERPL-MCNC: 1.3 MG/DL (ref 0.1–1)
BNP SERPL-MCNC: 207 PG/ML (ref 0–99)
BUN SERPL-MCNC: 16 MG/DL (ref 8–23)
CALCIUM SERPL-MCNC: 8.5 MG/DL (ref 8.7–10.5)
CHLORIDE SERPL-SCNC: 106 MMOL/L (ref 95–110)
CO2 SERPL-SCNC: 22 MMOL/L (ref 23–29)
CREAT SERPL-MCNC: 1.3 MG/DL (ref 0.5–1.4)
EST. GFR  (AFRICAN AMERICAN): >60 ML/MIN/1.73 M^2
EST. GFR  (NON AFRICAN AMERICAN): 57.7 ML/MIN/1.73 M^2
GLUCOSE SERPL-MCNC: 113 MG/DL (ref 70–110)
MAGNESIUM SERPL-MCNC: 2 MG/DL (ref 1.6–2.6)
POTASSIUM SERPL-SCNC: 4.4 MMOL/L (ref 3.5–5.1)
PROT SERPL-MCNC: 7.1 G/DL (ref 6–8.4)
SODIUM SERPL-SCNC: 138 MMOL/L (ref 136–145)

## 2021-05-28 PROCEDURE — 83880 ASSAY OF NATRIURETIC PEPTIDE: CPT | Performed by: SPECIALIST

## 2021-05-28 PROCEDURE — 83735 ASSAY OF MAGNESIUM: CPT | Performed by: SPECIALIST

## 2021-05-28 PROCEDURE — 80053 COMPREHEN METABOLIC PANEL: CPT | Performed by: SPECIALIST

## 2021-05-28 PROCEDURE — 36415 COLL VENOUS BLD VENIPUNCTURE: CPT | Performed by: SPECIALIST

## 2022-04-14 DIAGNOSIS — Q20.8 ABNORMALITY OF LEFT ATRIAL APPENDAGE: Primary | ICD-10-CM

## 2022-04-27 ENCOUNTER — LAB VISIT (OUTPATIENT)
Dept: LAB | Facility: HOSPITAL | Age: 67
End: 2022-04-27
Attending: FAMILY MEDICINE
Payer: MEDICARE

## 2022-04-27 DIAGNOSIS — Q20.8 ABNORMALITY OF LEFT ATRIAL APPENDAGE: Primary | ICD-10-CM

## 2022-04-27 DIAGNOSIS — R93.1 ABNORMAL FINDINGS ON DIAGNOSTIC IMAGING OF HEART AND CORONARY CIRCULATION: ICD-10-CM

## 2022-04-27 DIAGNOSIS — I25.10 CORONARY ARTERY DISEASE, UNSPECIFIED VESSEL OR LESION TYPE, UNSPECIFIED WHETHER ANGINA PRESENT, UNSPECIFIED WHETHER NATIVE OR TRANSPLANTED HEART: ICD-10-CM

## 2022-04-27 DIAGNOSIS — I25.10 CORONARY ARTERY DISEASE: ICD-10-CM

## 2022-04-27 DIAGNOSIS — N18.9 CHRONIC KIDNEY DISEASE, UNSPECIFIED CKD STAGE: ICD-10-CM

## 2022-04-27 LAB — APTT BLDCRRT: 29.9 SEC (ref 21–32)

## 2022-04-27 PROCEDURE — 85730 THROMBOPLASTIN TIME PARTIAL: CPT | Performed by: INTERNAL MEDICINE

## 2022-04-27 PROCEDURE — 36415 COLL VENOUS BLD VENIPUNCTURE: CPT | Performed by: INTERNAL MEDICINE

## 2022-05-02 ENCOUNTER — EDUCATION (OUTPATIENT)
Dept: CARDIOLOGY | Facility: CLINIC | Age: 67
End: 2022-05-02
Payer: MEDICARE

## 2022-05-02 ENCOUNTER — HOSPITAL ENCOUNTER (OUTPATIENT)
Dept: RADIOLOGY | Facility: HOSPITAL | Age: 67
Discharge: HOME | End: 2022-05-02
Attending: INTERNAL MEDICINE
Payer: MEDICARE

## 2022-05-02 ENCOUNTER — OFFICE VISIT (OUTPATIENT)
Dept: CARDIOLOGY | Facility: CLINIC | Age: 67
End: 2022-05-02
Payer: MEDICARE

## 2022-05-02 VITALS
DIASTOLIC BLOOD PRESSURE: 66 MMHG | OXYGEN SATURATION: 98 % | HEIGHT: 62 IN | WEIGHT: 147.5 LBS | HEART RATE: 91 BPM | SYSTOLIC BLOOD PRESSURE: 122 MMHG | BODY MASS INDEX: 27.14 KG/M2

## 2022-05-02 DIAGNOSIS — I10 BENIGN ESSENTIAL HTN: ICD-10-CM

## 2022-05-02 DIAGNOSIS — I50.42 CHRONIC COMBINED SYSTOLIC AND DIASTOLIC CONGESTIVE HEART FAILURE: ICD-10-CM

## 2022-05-02 DIAGNOSIS — I25.10 CORONARY ARTERY DISEASE INVOLVING NATIVE CORONARY ARTERY OF NATIVE HEART WITHOUT ANGINA PECTORIS: ICD-10-CM

## 2022-05-02 DIAGNOSIS — Q20.8 ABNORMALITY OF LEFT ATRIAL APPENDAGE: ICD-10-CM

## 2022-05-02 DIAGNOSIS — I48.0 PAF (PAROXYSMAL ATRIAL FIBRILLATION): Primary | ICD-10-CM

## 2022-05-02 DIAGNOSIS — Z95.5 HISTORY OF CORONARY ARTERY STENT PLACEMENT: ICD-10-CM

## 2022-05-02 DIAGNOSIS — K92.1 MELENA: ICD-10-CM

## 2022-05-02 DIAGNOSIS — N18.30 STAGE 3 CHRONIC KIDNEY DISEASE, UNSPECIFIED WHETHER STAGE 3A OR 3B CKD: ICD-10-CM

## 2022-05-02 DIAGNOSIS — I48.11 LONGSTANDING PERSISTENT ATRIAL FIBRILLATION: Primary | ICD-10-CM

## 2022-05-02 DIAGNOSIS — D64.9 ANEMIA, UNSPECIFIED TYPE: ICD-10-CM

## 2022-05-02 LAB
CREAT SERPL-MCNC: 1.1 MG/DL (ref 0.5–1.4)
SAMPLE: NORMAL

## 2022-05-02 PROCEDURE — 3078F PR MOST RECENT DIASTOLIC BLOOD PRESSURE < 80 MM HG: ICD-10-PCS | Mod: CPTII,S$GLB,, | Performed by: INTERNAL MEDICINE

## 2022-05-02 PROCEDURE — 3288F PR FALLS RISK ASSESSMENT DOCUMENTED: ICD-10-PCS | Mod: CPTII,S$GLB,, | Performed by: INTERNAL MEDICINE

## 2022-05-02 PROCEDURE — 1100F PR PT FALLS ASSESS DOC 2+ FALLS/FALL W/INJURY/YR: ICD-10-PCS | Mod: CPTII,S$GLB,, | Performed by: INTERNAL MEDICINE

## 2022-05-02 PROCEDURE — 71275 CT ANGIOGRAPHY CHEST: CPT | Mod: TC

## 2022-05-02 PROCEDURE — 3074F PR MOST RECENT SYSTOLIC BLOOD PRESSURE < 130 MM HG: ICD-10-PCS | Mod: CPTII,S$GLB,, | Performed by: INTERNAL MEDICINE

## 2022-05-02 PROCEDURE — 99999 PR PBB SHADOW E&M-EST. PATIENT-LVL III: CPT | Mod: PBBFAC,,, | Performed by: INTERNAL MEDICINE

## 2022-05-02 PROCEDURE — 71275 CT ANGIOGRAPHY CHEST: CPT | Mod: 26,,, | Performed by: RADIOLOGY

## 2022-05-02 PROCEDURE — 3008F PR BODY MASS INDEX (BMI) DOCUMENTED: ICD-10-PCS | Mod: CPTII,S$GLB,, | Performed by: INTERNAL MEDICINE

## 2022-05-02 PROCEDURE — 1126F PR PAIN SEVERITY QUANTIFIED, NO PAIN PRESENT: ICD-10-PCS | Mod: CPTII,S$GLB,, | Performed by: INTERNAL MEDICINE

## 2022-05-02 PROCEDURE — 99214 PR OFFICE/OUTPT VISIT, EST, LEVL IV, 30-39 MIN: ICD-10-PCS | Mod: S$GLB,,, | Performed by: INTERNAL MEDICINE

## 2022-05-02 PROCEDURE — 1126F AMNT PAIN NOTED NONE PRSNT: CPT | Mod: CPTII,S$GLB,, | Performed by: INTERNAL MEDICINE

## 2022-05-02 PROCEDURE — 3008F BODY MASS INDEX DOCD: CPT | Mod: CPTII,S$GLB,, | Performed by: INTERNAL MEDICINE

## 2022-05-02 PROCEDURE — 1159F MED LIST DOCD IN RCRD: CPT | Mod: CPTII,S$GLB,, | Performed by: INTERNAL MEDICINE

## 2022-05-02 PROCEDURE — 1159F PR MEDICATION LIST DOCUMENTED IN MEDICAL RECORD: ICD-10-PCS | Mod: CPTII,S$GLB,, | Performed by: INTERNAL MEDICINE

## 2022-05-02 PROCEDURE — 74174 CTA ABD&PLVS W/CONTRAST: CPT | Mod: 26,,, | Performed by: RADIOLOGY

## 2022-05-02 PROCEDURE — 99214 OFFICE O/P EST MOD 30 MIN: CPT | Mod: S$GLB,,, | Performed by: INTERNAL MEDICINE

## 2022-05-02 PROCEDURE — 74174 CTA CARDIAC TAVR_PARTNERS (XPD): ICD-10-PCS | Mod: 26,,, | Performed by: RADIOLOGY

## 2022-05-02 PROCEDURE — 71275 CTA CARDIAC TAVR_PARTNERS (XPD): ICD-10-PCS | Mod: 26,,, | Performed by: RADIOLOGY

## 2022-05-02 PROCEDURE — 4010F PR ACE/ARB THEARPY RXD/TAKEN: ICD-10-PCS | Mod: CPTII,S$GLB,, | Performed by: INTERNAL MEDICINE

## 2022-05-02 PROCEDURE — 99999 PR PBB SHADOW E&M-EST. PATIENT-LVL III: ICD-10-PCS | Mod: PBBFAC,,, | Performed by: INTERNAL MEDICINE

## 2022-05-02 PROCEDURE — 4010F ACE/ARB THERAPY RXD/TAKEN: CPT | Mod: CPTII,S$GLB,, | Performed by: INTERNAL MEDICINE

## 2022-05-02 PROCEDURE — 3078F DIAST BP <80 MM HG: CPT | Mod: CPTII,S$GLB,, | Performed by: INTERNAL MEDICINE

## 2022-05-02 PROCEDURE — 25500020 PHARM REV CODE 255: Performed by: INTERNAL MEDICINE

## 2022-05-02 PROCEDURE — 3288F FALL RISK ASSESSMENT DOCD: CPT | Mod: CPTII,S$GLB,, | Performed by: INTERNAL MEDICINE

## 2022-05-02 PROCEDURE — 3074F SYST BP LT 130 MM HG: CPT | Mod: CPTII,S$GLB,, | Performed by: INTERNAL MEDICINE

## 2022-05-02 PROCEDURE — 1100F PTFALLS ASSESS-DOCD GE2>/YR: CPT | Mod: CPTII,S$GLB,, | Performed by: INTERNAL MEDICINE

## 2022-05-02 RX ORDER — SODIUM CHLORIDE 9 MG/ML
INJECTION, SOLUTION INTRAVENOUS CONTINUOUS
Status: CANCELLED | OUTPATIENT
Start: 2022-05-02 | End: 2022-05-02

## 2022-05-02 RX ORDER — DIPHENHYDRAMINE HCL 50 MG
50 CAPSULE ORAL ONCE
Status: CANCELLED | OUTPATIENT
Start: 2022-05-02 | End: 2022-05-02

## 2022-05-02 RX ADMIN — IOHEXOL 85 ML: 350 INJECTION, SOLUTION INTRAVENOUS at 09:05

## 2022-05-02 NOTE — PROGRESS NOTES
You have been scheduled for your Watchman procedure on Tuesday, May 17, 2022.  Please report to the Cardiology Waiting Area on the Third floor of the hospital and check in at 10 AM. You will then be taken to the SSCU (Short Stay Cardiac Unit) and prepared for your procedure. Please be aware that this is not the time of your procedure but the time that you are to arrive.     Preperations for your procedure:  1. Shower with Dial soap the night before and the morning of your procedure.  2. No solid foods 8 hours prior to your procedure.  You may have clear liquids until the time of your admission which should be 2 hours prior to your procedure.  You are encouraged to have at least 8 ounces of clear liquids prior to your admission to SSCU.  Patients with gastric emptying issues should be fasting for 6- 8 hours prior to the procedure.  Clear liquids include water, black coffee, clear juices, and performance drinks - no pulp or milk.    3. Heart failure or dialysis patients will be limited to 8 ounces (1 cup) of clear liquids up until 2 hours of the procedure.  You may take your regular morning medications         with as much water as necessary.   4. Bring your cane and/or walker with you to the hospital.  5. Bring CPAP/BiPAP, or oxygen if you are on oxygen at home.  6. Call the office for any signs of infection ( fever, cough, pneumonia, urinary tract, etc.) We cannot implant a device in an infected patient.  7. If you are on Pradaxa, Eliquis, Xeralto, or Coumadin, you do not have to stop them.    The entire procedure may take up to three hours. After the procedure, you will return to your room on the third floor where you will be monitored closely for the next several hours.     Your length of stay in the hospital will be determined by your physician. You may be discharged the same day if your physician is satisfied with your progress.     Follow up After Your Procedure  About 45 days after your procedure you will be  required to have a Transesophageal echo and a clinic visit with your physician at [facility] in [place].   At 6 months, 1 year, and 2 years after your procedure, you will receive an appointment for a virtual visit for your follow up.  You can follow up with your regular Cardiologist regarding any other heart issues.     If you should have any questions, concerns, or need to change the date of your procedure, please call  OLIVIER Vu @ [phone]    Special Instructions:  Continue on meds      THE ABOVE INSTRUCTIONS WERE GIVEN TO THE PATIENT VERBALLY AND THEY VERBALIZED UNDERSTANDING.  THEY DO NOT REQUIRE ANY SPECIAL NEEDS AND DO NOT HAVE ANY LEARNING BARRIERS.          Directions for Reporting to Cardiology Waiting Area in the Hospital  If you park in the Parking Garage:  Take elevators to the1st floor of the parking garage.  Continue past the gift shop, coffee shop, and piano.  Take a right and go to the gold elevators. (Elevator B)  Take the elevator to the 3rd floor.  Follow the arrow on the sign on the wall that says Cath Lab Registration/EP Lab Registration.  Follow the long hallway all the way around until you come to a big open area.  This is the registration area.  Check in at Reception Desk.    OR    If family is dropping you off:  Have them drop you off at the front of the Hospital under the green overhang.  Enter through the doors and take a right.  Take the E elevators to the 3rd floor Cardiology Waiting Area.  Check in at the Reception Desk in the waiting room.

## 2022-05-02 NOTE — ASSESSMENT & PLAN NOTE
65 yo M with pAF and prior history of stroke who presents today for watchman evaluation after having frequent melena on eliquis. Watchman procedure was explained to him in detail, and he wishes to proceed with watchman procedure.     The patient has undergone the following work-up:    [id number] 6 (9.8%/yr) and HAS-BLED of 5 (12.5%/yr)   STANLEY measurements by CTA (Date:05/02/22): Avg Diameter 26.8mm, width 25.1-26.1mmm, depth 25.8-25.7mm.   Current anticoagulation/antiplatelet regimen: Aspirin and Eliquis   Increased risk of falls: No   Clinically relevant bleeding event: yes Gastrointestinal   Attempt at atrial fibrillation termination: no   Additional history/risk factors: Cardiomyopathy (Ischemic), Chronic Lung Disease and Coronary Artery Disease   Modified Verdi Scale: : Vic Score of 1 - No significant disability despite symptoms; able to carry out all usual duties and activities      Plans for STANLEY closure with Watchman Device:   Device: Watchman FLX 35mm   Post procedure antithrombotic plans: Resume ASA/eliquis. SANTHOSH after 45 days, if < 5 mm rose-device leak, then discontinue eliquis and start ASA/Plavix. Discontinue Plavix 4.5 months following SANTHOSH and continue on ASA therapy alone.

## 2022-05-02 NOTE — H&P (VIEW-ONLY)
"Referring physician: Dr. Arturo Grimaldo     Patient ID:  Cleveland Capps is a 66 y.o. y.o. male who presents for evaluation No chief complaint on file.    Cleveland Capps is a 66 y.o. M retired  with a history of non valvular, Paroxysmal Atrial Fibrillation referred by Dr. Ynug Grimaldo for evaluation of LAAO with Watchman Device. He has a history of stroke in 2019, carotid artery disease, HLD, HTN, pAF, ischemic CHF, CAD s/p cabg/pci with chronic stable angina, previous silvestre requiring dialysis before renal recovery and previous tracheostomy. He states he has been having melena for the past 1-2 months and is currently undergoing GI workup. He has still been compliant with eliquis, however, he went to see his primary cardiologist who then referred him here as there was concern he would need to come off eliquis due to melena. Per outside report, his last device interrogation showed he was in AF infrequently and currently is in NSR. Denies any other complaints.      The patient has undergone the following work-up:    [id number] 6 (9.8%/yr) and HAS-BLED of 5 (12.5%/yr)   STANLEY measurements by CTA (Date:05/02/22): Avg Diameter 26.8mm, width 25.1-26.1mmm, depth 25.8-25.7mm.   Current anticoagulation/antiplatelet regimen: Aspirin and Eliquis   Increased risk of falls: No   Clinically relevant bleeding event: yes Gastrointestinal   Attempt at atrial fibrillation termination: no   Additional history/risk factors: Cardiomyopathy (Ischemic), Chronic Lung Disease and Coronary Artery Disease   Modified Dol[place] Scale: : [place] Score of 1 - No significant disability despite symptoms; able to carry out all usual duties and activities          Review of Systems   Cardiovascular: Negative for chest pain, orthopnea, leg swelling and PND.   Gastrointestinal: Positive for abdominal pain, blood in stool, diarrhea and melena.   All other systems reviewed and are negative.       Objective:     Ht 5' 2" (1.575 m)   Wt " 66.9 kg (147 lb 7.8 oz)   BMI 26.98 kg/m²     Physical Exam  Vitals and nursing note reviewed.   Constitutional:       Appearance: Normal appearance.   HENT:      Head: Normocephalic and atraumatic.      Right Ear: External ear normal.      Left Ear: External ear normal.      Nose: Nose normal.      Mouth/Throat:      Mouth: Mucous membranes are moist.   Eyes:      Pupils: Pupils are equal, round, and reactive to light.   Cardiovascular:      Rate and Rhythm: Normal rate and regular rhythm.      Pulses: Normal pulses.   Pulmonary:      Effort: Pulmonary effort is normal.   Abdominal:      General: Abdomen is flat.   Musculoskeletal:         General: Normal range of motion.      Cervical back: Normal range of motion.      Right lower leg: No edema.      Left lower leg: No edema.   Skin:     General: Skin is warm and dry.      Capillary Refill: Capillary refill takes less than 2 seconds.   Neurological:      General: No focal deficit present.      Mental Status: He is alert and oriented to person, place, and time.         Labs:     Lab Results   Component Value Date     05/28/2021    K 4.4 05/28/2021     05/28/2021    CO2 22 (L) 05/28/2021    BUN 16 05/28/2021    CREATININE 1.3 05/28/2021    ANIONGAP 10 05/28/2021     Lab Results   Component Value Date    HGBA1C 6.3 (H) 10/24/2020     Lab Results   Component Value Date     (H) 05/28/2021     (H) 05/26/2021     (H) 05/16/2021       Lab Results   Component Value Date    WBC 8.90 05/28/2021    HGB 13.4 (L) 05/28/2021    HCT 39.7 (L) 05/28/2021    HCT 22 (L) 04/26/2016     05/28/2021    GRAN 6.7 05/16/2021    GRAN 72.7 05/16/2021     Lab Results   Component Value Date    CHOL 123 10/23/2020    HDL 27 (L) 10/23/2020    LDLCALC 47.2 (L) 10/23/2020    TRIG 244 (H) 10/23/2020       Meds:     Current Outpatient Medications:     apixaban (ELIQUIS) 5 mg Tab, Take 5 mg by mouth 2 (two) times daily., Disp: , Rfl:     aspirin (ECOTRIN) 81  MG EC tablet, Take 81 mg by mouth once daily., Disp: , Rfl:     atorvastatin (LIPITOR) 80 MG tablet, Take 80 mg by mouth once daily., Disp: , Rfl:     carvedilol (COREG) 6.25 MG tablet, Take 2 tablets (12.5 mg total) by mouth 2 (two) times daily., Disp: 120 tablet, Rfl: 11    enalapril (VASOTEC) 20 MG tablet, 20 mg 2 (two) times daily. , Disp: , Rfl: 5    furosemide (LASIX) 20 MG tablet, Take 1 tablet (20 mg total) by mouth once daily., Disp: 10 tablet, Rfl: 0    isosorbide mononitrate (IMDUR) 30 MG 24 hr tablet, Take 30 mg by mouth once daily., Disp: , Rfl:   No current facility-administered medications for this visit.      Assessment & Plan:     PAF (paroxysmal atrial fibrillation)  65 yo M with pAF and prior history of stroke who presents today for watchman evaluation after having frequent melena on eliquis. Watchman procedure was explained to him in detail, and he wishes to proceed with watchman procedure.     The patient has undergone the following work-up:    [id number] 6 (9.8%/yr) and HAS-BLED of 5 (12.5%/yr)   STANLEY measurements by CTA (Date:05/02/22): Avg Diameter 26.8mm, width 25.1-26.1mmm, depth 25.8-25.7mm.   Current anticoagulation/antiplatelet regimen: Aspirin and Eliquis   Increased risk of falls: No   Clinically relevant bleeding event: yes Gastrointestinal   Attempt at atrial fibrillation termination: no   Additional history/risk factors: Cardiomyopathy (Ischemic), Chronic Lung Disease and Coronary Artery Disease   Modified Vic Scale: : [place] Score of 1 - No significant disability despite symptoms; able to carry out all usual duties and activities      Plans for STANLEY closure with Watchman Device:   Device: Watchman FLX 35mm   Post procedure antithrombotic plans: Resume ASA/eliquis. SANTHOSH after 45 days, if < 5 mm rose-device leak, then discontinue eliquis and start ASA/Plavix. Discontinue Plavix 4.5 months following SANTHOSH and continue on ASA therapy alone.         History of coronary  artery stent placement  -asa, statin     Coronary artery disease  -s/p CABG and pci  -asa, lipitor     CHF (congestive heart failure)  -compensated   -continue GDMT per primary cardiologist     Benign essential HTN  -well-controlled     CKD (chronic kidney disease) stage 3, GFR 30-59 ml/min  -stable and appears to have improved  -most recent creatinine of 1 at OSH    Anemia  -see melena     Melena  -complicating compliance with DOAC  -see AF     Staff:  I have personally taken the history and examined this patient and agree with the fellow's note as stated above and amended it accordingly :-)  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  He has a large STANLEY by CTA and will need a 31-35mm Watchman device.

## 2022-05-02 NOTE — PROGRESS NOTES
"Referring physician: Dr. Arturo Grimaldo     Patient ID:  Cleveland Capps is a 66 y.o. y.o. male who presents for evaluation No chief complaint on file.    Cleveland Capps is a 66 y.o. M retired  with a history of non valvular, Paroxysmal Atrial Fibrillation referred by Dr. Yung Grimaldo for evaluation of LAAO with Watchman Device. He has a history of stroke in 2019, carotid artery disease, HLD, HTN, pAF, ischemic CHF, CAD s/p cabg/pci with chronic stable angina, previous silvestre requiring dialysis before renal recovery and previous tracheostomy. He states he has been having melena for the past 1-2 months and is currently undergoing GI workup. He has still been compliant with eliquis, however, he went to see his primary cardiologist who then referred him here as there was concern he would need to come off eliquis due to melena. Per outside report, his last device interrogation showed he was in AF infrequently and currently is in NSR. Denies any other complaints.      The patient has undergone the following work-up:    [id number] 6 (9.8%/yr) and HAS-BLED of 5 (12.5%/yr)   STANLEY measurements by CTA (Date:05/02/22): Avg Diameter 26.8mm, width 25.1-26.1mmm, depth 25.8-25.7mm.   Current anticoagulation/antiplatelet regimen: Aspirin and Eliquis   Increased risk of falls: No   Clinically relevant bleeding event: yes Gastrointestinal   Attempt at atrial fibrillation termination: no   Additional history/risk factors: Cardiomyopathy (Ischemic), Chronic Lung Disease and Coronary Artery Disease   Modified [place] Scale: : [place] Score of 1 - No significant disability despite symptoms; able to carry out all usual duties and activities          Review of Systems   Cardiovascular: Negative for chest pain, orthopnea, leg swelling and PND.   Gastrointestinal: Positive for abdominal pain, blood in stool, diarrhea and melena.   All other systems reviewed and are negative.       Objective:     Ht 5' 2" (1.575 m)   Wt " 66.9 kg (147 lb 7.8 oz)   BMI 26.98 kg/m²     Physical Exam  Vitals and nursing note reviewed.   Constitutional:       Appearance: Normal appearance.   HENT:      Head: Normocephalic and atraumatic.      Right Ear: External ear normal.      Left Ear: External ear normal.      Nose: Nose normal.      Mouth/Throat:      Mouth: Mucous membranes are moist.   Eyes:      Pupils: Pupils are equal, round, and reactive to light.   Cardiovascular:      Rate and Rhythm: Normal rate and regular rhythm.      Pulses: Normal pulses.   Pulmonary:      Effort: Pulmonary effort is normal.   Abdominal:      General: Abdomen is flat.   Musculoskeletal:         General: Normal range of motion.      Cervical back: Normal range of motion.      Right lower leg: No edema.      Left lower leg: No edema.   Skin:     General: Skin is warm and dry.      Capillary Refill: Capillary refill takes less than 2 seconds.   Neurological:      General: No focal deficit present.      Mental Status: He is alert and oriented to person, place, and time.         Labs:     Lab Results   Component Value Date     05/28/2021    K 4.4 05/28/2021     05/28/2021    CO2 22 (L) 05/28/2021    BUN 16 05/28/2021    CREATININE 1.3 05/28/2021    ANIONGAP 10 05/28/2021     Lab Results   Component Value Date    HGBA1C 6.3 (H) 10/24/2020     Lab Results   Component Value Date     (H) 05/28/2021     (H) 05/26/2021     (H) 05/16/2021       Lab Results   Component Value Date    WBC 8.90 05/28/2021    HGB 13.4 (L) 05/28/2021    HCT 39.7 (L) 05/28/2021    HCT 22 (L) 04/26/2016     05/28/2021    GRAN 6.7 05/16/2021    GRAN 72.7 05/16/2021     Lab Results   Component Value Date    CHOL 123 10/23/2020    HDL 27 (L) 10/23/2020    LDLCALC 47.2 (L) 10/23/2020    TRIG 244 (H) 10/23/2020       Meds:     Current Outpatient Medications:     apixaban (ELIQUIS) 5 mg Tab, Take 5 mg by mouth 2 (two) times daily., Disp: , Rfl:     aspirin (ECOTRIN) 81  MG EC tablet, Take 81 mg by mouth once daily., Disp: , Rfl:     atorvastatin (LIPITOR) 80 MG tablet, Take 80 mg by mouth once daily., Disp: , Rfl:     carvedilol (COREG) 6.25 MG tablet, Take 2 tablets (12.5 mg total) by mouth 2 (two) times daily., Disp: 120 tablet, Rfl: 11    enalapril (VASOTEC) 20 MG tablet, 20 mg 2 (two) times daily. , Disp: , Rfl: 5    furosemide (LASIX) 20 MG tablet, Take 1 tablet (20 mg total) by mouth once daily., Disp: 10 tablet, Rfl: 0    isosorbide mononitrate (IMDUR) 30 MG 24 hr tablet, Take 30 mg by mouth once daily., Disp: , Rfl:   No current facility-administered medications for this visit.      Assessment & Plan:     PAF (paroxysmal atrial fibrillation)  67 yo M with pAF and prior history of stroke who presents today for watchman evaluation after having frequent melena on eliquis. Watchman procedure was explained to him in detail, and he wishes to proceed with watchman procedure.     The patient has undergone the following work-up:    [id number] 6 (9.8%/yr) and HAS-BLED of 5 (12.5%/yr)   STANLEY measurements by CTA (Date:05/02/22): Avg Diameter 26.8mm, width 25.1-26.1mmm, depth 25.8-25.7mm.   Current anticoagulation/antiplatelet regimen: Aspirin and Eliquis   Increased risk of falls: No   Clinically relevant bleeding event: yes Gastrointestinal   Attempt at atrial fibrillation termination: no   Additional history/risk factors: Cardiomyopathy (Ischemic), Chronic Lung Disease and Coronary Artery Disease   Modified Vic Scale: : [place] Score of 1 - No significant disability despite symptoms; able to carry out all usual duties and activities      Plans for STANLEY closure with Watchman Device:   Device: Watchman FLX 35mm   Post procedure antithrombotic plans: Resume ASA/eliquis. SANTHOSH after 45 days, if < 5 mm rose-device leak, then discontinue eliquis and start ASA/Plavix. Discontinue Plavix 4.5 months following SANTHOSH and continue on ASA therapy alone.         History of coronary  artery stent placement  -asa, statin     Coronary artery disease  -s/p CABG and pci  -asa, lipitor     CHF (congestive heart failure)  -compensated   -continue GDMT per primary cardiologist     Benign essential HTN  -well-controlled     CKD (chronic kidney disease) stage 3, GFR 30-59 ml/min  -stable and appears to have improved  -most recent creatinine of 1 at OSH    Anemia  -see melena     Melena  -complicating compliance with DOAC  -see AF     Staff:  I have personally taken the history and examined this patient and agree with the fellow's note as stated above and amended it accordingly :-)  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  He has a large STANLEY by CTA and will need a 31-35mm Watchman device.

## 2022-05-16 ENCOUNTER — ANESTHESIA EVENT (OUTPATIENT)
Dept: MEDSURG UNIT | Facility: HOSPITAL | Age: 67
DRG: 274 | End: 2022-05-16
Payer: MEDICARE

## 2022-05-17 ENCOUNTER — HOSPITAL ENCOUNTER (INPATIENT)
Facility: HOSPITAL | Age: 67
LOS: 1 days | Discharge: HOME | DRG: 274 | End: 2022-05-17
Attending: INTERNAL MEDICINE | Admitting: INTERNAL MEDICINE
Payer: MEDICARE

## 2022-05-17 ENCOUNTER — ANESTHESIA (OUTPATIENT)
Dept: MEDSURG UNIT | Facility: HOSPITAL | Age: 67
DRG: 274 | End: 2022-05-17
Payer: MEDICARE

## 2022-05-17 VITALS
HEIGHT: 61 IN | DIASTOLIC BLOOD PRESSURE: 76 MMHG | RESPIRATION RATE: 16 BRPM | TEMPERATURE: 98 F | BODY MASS INDEX: 27.56 KG/M2 | WEIGHT: 146 LBS | HEART RATE: 87 BPM | OXYGEN SATURATION: 96 % | SYSTOLIC BLOOD PRESSURE: 108 MMHG

## 2022-05-17 DIAGNOSIS — I48.91 ATRIAL FIBRILLATION: ICD-10-CM

## 2022-05-17 DIAGNOSIS — I48.11 LONGSTANDING PERSISTENT ATRIAL FIBRILLATION: ICD-10-CM

## 2022-05-17 LAB
ABO + RH BLD: NORMAL
ANION GAP SERPL CALC-SCNC: 8 MMOL/L (ref 8–16)
APTT BLDCRRT: 29.5 SEC (ref 21–32)
BLD GP AB SCN CELLS X3 SERPL QL: NORMAL
BUN SERPL-MCNC: 13 MG/DL (ref 8–23)
CALCIUM SERPL-MCNC: 9.4 MG/DL (ref 8.7–10.5)
CHLORIDE SERPL-SCNC: 106 MMOL/L (ref 95–110)
CO2 SERPL-SCNC: 24 MMOL/L (ref 23–29)
CREAT SERPL-MCNC: 1 MG/DL (ref 0.5–1.4)
ERYTHROCYTE [DISTWIDTH] IN BLOOD BY AUTOMATED COUNT: 14.2 % (ref 11.5–14.5)
EST. GFR  (AFRICAN AMERICAN): >60 ML/MIN/1.73 M^2
EST. GFR  (NON AFRICAN AMERICAN): >60 ML/MIN/1.73 M^2
GLUCOSE SERPL-MCNC: 114 MG/DL (ref 70–110)
HCT VFR BLD AUTO: 35.4 % (ref 40–54)
HGB BLD-MCNC: 11.7 G/DL (ref 14–18)
INR PPP: 1.1 (ref 0.8–1.2)
MCH RBC QN AUTO: 31.8 PG (ref 27–31)
MCHC RBC AUTO-ENTMCNC: 33.1 G/DL (ref 32–36)
MCV RBC AUTO: 96 FL (ref 82–98)
PLATELET # BLD AUTO: 211 K/UL (ref 150–450)
PMV BLD AUTO: 8.6 FL (ref 9.2–12.9)
POCT GLUCOSE: 128 MG/DL (ref 70–110)
POTASSIUM SERPL-SCNC: 4 MMOL/L (ref 3.5–5.1)
PROTHROMBIN TIME: 11.4 SEC (ref 9–12.5)
RBC # BLD AUTO: 3.68 M/UL (ref 4.6–6.2)
SODIUM SERPL-SCNC: 138 MMOL/L (ref 136–145)
WBC # BLD AUTO: 5.09 K/UL (ref 3.9–12.7)

## 2022-05-17 PROCEDURE — 93005 ELECTROCARDIOGRAM TRACING: CPT

## 2022-05-17 PROCEDURE — 25000003 PHARM REV CODE 250: Performed by: INTERNAL MEDICINE

## 2022-05-17 PROCEDURE — 12000002 HC ACUTE/MED SURGE SEMI-PRIVATE ROOM

## 2022-05-17 PROCEDURE — 25000003 PHARM REV CODE 250: Performed by: STUDENT IN AN ORGANIZED HEALTH CARE EDUCATION/TRAINING PROGRAM

## 2022-05-17 PROCEDURE — 33340 PR TRANSCATH CLOSURE, PERC, LEFT ATRIAL APPENDAGE, W/IMPLANT: ICD-10-PCS | Mod: Q0,,, | Performed by: INTERNAL MEDICINE

## 2022-05-17 PROCEDURE — D9220A PRA ANESTHESIA: Mod: Q0,,, | Performed by: ANESTHESIOLOGY

## 2022-05-17 PROCEDURE — 25500020 PHARM REV CODE 255: Performed by: INTERNAL MEDICINE

## 2022-05-17 PROCEDURE — 85027 COMPLETE CBC AUTOMATED: CPT | Performed by: INTERNAL MEDICINE

## 2022-05-17 PROCEDURE — 33340 PERQ CLSR TCAT L ATR APNDGE: CPT | Mod: Q0,,, | Performed by: INTERNAL MEDICINE

## 2022-05-17 PROCEDURE — 36620 INSERTION CATHETER ARTERY: CPT | Performed by: STUDENT IN AN ORGANIZED HEALTH CARE EDUCATION/TRAINING PROGRAM

## 2022-05-17 PROCEDURE — 85610 PROTHROMBIN TIME: CPT | Performed by: INTERNAL MEDICINE

## 2022-05-17 PROCEDURE — 27201423 OPTIME MED/SURG SUP & DEVICES STERILE SUPPLY: Performed by: INTERNAL MEDICINE

## 2022-05-17 PROCEDURE — 37000008 HC ANESTHESIA 1ST 15 MINUTES: Performed by: INTERNAL MEDICINE

## 2022-05-17 PROCEDURE — 93010 ELECTROCARDIOGRAM REPORT: CPT | Mod: ,,, | Performed by: INTERNAL MEDICINE

## 2022-05-17 PROCEDURE — 63600175 PHARM REV CODE 636 W HCPCS: Performed by: STUDENT IN AN ORGANIZED HEALTH CARE EDUCATION/TRAINING PROGRAM

## 2022-05-17 PROCEDURE — 80048 BASIC METABOLIC PNL TOTAL CA: CPT | Performed by: INTERNAL MEDICINE

## 2022-05-17 PROCEDURE — C1769 GUIDE WIRE: HCPCS | Performed by: INTERNAL MEDICINE

## 2022-05-17 PROCEDURE — 93010 EKG 12-LEAD: ICD-10-PCS | Mod: ,,, | Performed by: INTERNAL MEDICINE

## 2022-05-17 PROCEDURE — 37000009 HC ANESTHESIA EA ADD 15 MINS: Performed by: INTERNAL MEDICINE

## 2022-05-17 PROCEDURE — 85730 THROMBOPLASTIN TIME PARTIAL: CPT | Performed by: INTERNAL MEDICINE

## 2022-05-17 PROCEDURE — 27800903 OPTIME MED/SURG SUP & DEVICES OTHER IMPLANTS: Performed by: INTERNAL MEDICINE

## 2022-05-17 PROCEDURE — C1894 INTRO/SHEATH, NON-LASER: HCPCS | Performed by: INTERNAL MEDICINE

## 2022-05-17 PROCEDURE — C1760 CLOSURE DEV, VASC: HCPCS | Performed by: INTERNAL MEDICINE

## 2022-05-17 PROCEDURE — 36620 INSERTION CATHETER ARTERY: CPT | Mod: 59,,, | Performed by: ANESTHESIOLOGY

## 2022-05-17 PROCEDURE — D9220A PRA ANESTHESIA: ICD-10-PCS | Mod: Q0,,, | Performed by: ANESTHESIOLOGY

## 2022-05-17 PROCEDURE — C1753 CATH, INTRAVAS ULTRASOUND: HCPCS | Performed by: INTERNAL MEDICINE

## 2022-05-17 PROCEDURE — 33340 PERQ CLSR TCAT L ATR APNDGE: CPT | Performed by: INTERNAL MEDICINE

## 2022-05-17 PROCEDURE — 36620 ARTERIAL: ICD-10-PCS | Mod: 59,,, | Performed by: ANESTHESIOLOGY

## 2022-05-17 PROCEDURE — 99499 NO LOS: ICD-10-PCS | Mod: ,,, | Performed by: INTERNAL MEDICINE

## 2022-05-17 PROCEDURE — 99499 UNLISTED E&M SERVICE: CPT | Mod: ,,, | Performed by: INTERNAL MEDICINE

## 2022-05-17 PROCEDURE — 86901 BLOOD TYPING SEROLOGIC RH(D): CPT | Performed by: INTERNAL MEDICINE

## 2022-05-17 DEVICE — LEFT ATRIAL APPENDAGE CLOSURE DEVICE WITH DELIVERY SYSTEM
Type: IMPLANTABLE DEVICE | Site: HEART | Status: FUNCTIONAL
Brand: WATCHMAN FLX™

## 2022-05-17 RX ORDER — HYDROMORPHONE HYDROCHLORIDE 1 MG/ML
0.2 INJECTION, SOLUTION INTRAMUSCULAR; INTRAVENOUS; SUBCUTANEOUS EVERY 5 MIN PRN
Status: DISCONTINUED | OUTPATIENT
Start: 2022-05-17 | End: 2022-05-17 | Stop reason: HOSPADM

## 2022-05-17 RX ORDER — FENTANYL CITRATE 50 UG/ML
INJECTION, SOLUTION INTRAMUSCULAR; INTRAVENOUS
Status: DISCONTINUED | OUTPATIENT
Start: 2022-05-17 | End: 2022-05-17

## 2022-05-17 RX ORDER — FENTANYL CITRATE 50 UG/ML
25 INJECTION, SOLUTION INTRAMUSCULAR; INTRAVENOUS EVERY 5 MIN PRN
Status: DISCONTINUED | OUTPATIENT
Start: 2022-05-17 | End: 2022-05-17 | Stop reason: HOSPADM

## 2022-05-17 RX ORDER — ACETAMINOPHEN 325 MG/1
650 TABLET ORAL EVERY 4 HOURS PRN
Status: DISCONTINUED | OUTPATIENT
Start: 2022-05-17 | End: 2022-05-17 | Stop reason: HOSPADM

## 2022-05-17 RX ORDER — CEFAZOLIN SODIUM 1 G/3ML
INJECTION, POWDER, FOR SOLUTION INTRAMUSCULAR; INTRAVENOUS
Status: DISCONTINUED | OUTPATIENT
Start: 2022-05-17 | End: 2022-05-17

## 2022-05-17 RX ORDER — DIPHENHYDRAMINE HCL 50 MG
50 CAPSULE ORAL ONCE
Status: COMPLETED | OUTPATIENT
Start: 2022-05-17 | End: 2022-05-17

## 2022-05-17 RX ORDER — SODIUM CHLORIDE 9 MG/ML
INJECTION, SOLUTION INTRAVENOUS CONTINUOUS
Status: ACTIVE | OUTPATIENT
Start: 2022-05-17 | End: 2022-05-17

## 2022-05-17 RX ORDER — MIDAZOLAM HYDROCHLORIDE 5 MG/ML
INJECTION INTRAMUSCULAR; INTRAVENOUS
Status: DISCONTINUED | OUTPATIENT
Start: 2022-05-17 | End: 2022-05-17

## 2022-05-17 RX ORDER — DEXMEDETOMIDINE HYDROCHLORIDE 100 UG/ML
INJECTION, SOLUTION INTRAVENOUS CONTINUOUS PRN
Status: DISCONTINUED | OUTPATIENT
Start: 2022-05-17 | End: 2022-05-17

## 2022-05-17 RX ORDER — PROTAMINE SULFATE 10 MG/ML
INJECTION, SOLUTION INTRAVENOUS
Status: DISCONTINUED | OUTPATIENT
Start: 2022-05-17 | End: 2022-05-17

## 2022-05-17 RX ORDER — HEPARIN SODIUM 1000 [USP'U]/ML
INJECTION, SOLUTION INTRAVENOUS; SUBCUTANEOUS
Status: DISCONTINUED | OUTPATIENT
Start: 2022-05-17 | End: 2022-05-17

## 2022-05-17 RX ORDER — LEVOTHYROXINE SODIUM 25 UG/1
25 TABLET ORAL
Status: ON HOLD | COMMUNITY
End: 2023-02-12 | Stop reason: SDUPTHER

## 2022-05-17 RX ORDER — KETAMINE HCL IN 0.9 % NACL 50 MG/5 ML
SYRINGE (ML) INTRAVENOUS
Status: DISCONTINUED | OUTPATIENT
Start: 2022-05-17 | End: 2022-05-17

## 2022-05-17 RX ORDER — DIPHENHYDRAMINE HYDROCHLORIDE 50 MG/ML
25 INJECTION INTRAMUSCULAR; INTRAVENOUS EVERY 6 HOURS PRN
Status: DISCONTINUED | OUTPATIENT
Start: 2022-05-17 | End: 2022-05-17 | Stop reason: HOSPADM

## 2022-05-17 RX ORDER — LIDOCAINE HYDROCHLORIDE 20 MG/ML
INJECTION, SOLUTION INFILTRATION; PERINEURAL
Status: DISCONTINUED | OUTPATIENT
Start: 2022-05-17 | End: 2022-05-17 | Stop reason: HOSPADM

## 2022-05-17 RX ORDER — LIDOCAINE HYDROCHLORIDE 20 MG/ML
SOLUTION OROPHARYNGEAL
Status: DISCONTINUED | OUTPATIENT
Start: 2022-05-17 | End: 2022-05-17 | Stop reason: HOSPADM

## 2022-05-17 RX ORDER — ONDANSETRON 8 MG/1
8 TABLET, ORALLY DISINTEGRATING ORAL EVERY 8 HOURS PRN
Status: DISCONTINUED | OUTPATIENT
Start: 2022-05-17 | End: 2022-05-17 | Stop reason: HOSPADM

## 2022-05-17 RX ORDER — ONDANSETRON 2 MG/ML
4 INJECTION INTRAMUSCULAR; INTRAVENOUS ONCE AS NEEDED
Status: DISCONTINUED | OUTPATIENT
Start: 2022-05-17 | End: 2022-05-17 | Stop reason: HOSPADM

## 2022-05-17 RX ADMIN — FENTANYL CITRATE 25 MCG: 50 INJECTION, SOLUTION INTRAMUSCULAR; INTRAVENOUS at 01:05

## 2022-05-17 RX ADMIN — MIDAZOLAM 2 MG: 5 INJECTION INTRAMUSCULAR; INTRAVENOUS at 01:05

## 2022-05-17 RX ADMIN — FENTANYL CITRATE 25 MCG: 50 INJECTION, SOLUTION INTRAMUSCULAR; INTRAVENOUS at 02:05

## 2022-05-17 RX ADMIN — DIPHENHYDRAMINE HYDROCHLORIDE 50 MG: 50 CAPSULE ORAL at 10:05

## 2022-05-17 RX ADMIN — MIDAZOLAM 1 MG: 5 INJECTION INTRAMUSCULAR; INTRAVENOUS at 01:05

## 2022-05-17 RX ADMIN — SODIUM CHLORIDE, SODIUM GLUCONATE, SODIUM ACETATE, POTASSIUM CHLORIDE, MAGNESIUM CHLORIDE, SODIUM PHOSPHATE, DIBASIC, AND POTASSIUM PHOSPHATE: .53; .5; .37; .037; .03; .012; .00082 INJECTION, SOLUTION INTRAVENOUS at 01:05

## 2022-05-17 RX ADMIN — DEXMEDETOMIDINE HYDROCHLORIDE 0.5 MCG/KG/HR: 100 INJECTION, SOLUTION INTRAVENOUS at 01:05

## 2022-05-17 RX ADMIN — SODIUM CHLORIDE: 0.9 INJECTION, SOLUTION INTRAVENOUS at 09:05

## 2022-05-17 RX ADMIN — Medication 40 MG: at 02:05

## 2022-05-17 RX ADMIN — PROTAMINE SULFATE 50 MG: 10 INJECTION, SOLUTION INTRAVENOUS at 02:05

## 2022-05-17 RX ADMIN — Medication 10 MG: at 03:05

## 2022-05-17 RX ADMIN — HEPARIN SODIUM 5000 UNITS: 1000 INJECTION INTRAVENOUS; SUBCUTANEOUS at 02:05

## 2022-05-17 RX ADMIN — CEFAZOLIN SODIUM 2 G: 1 INJECTION, POWDER, FOR SOLUTION INTRAMUSCULAR; INTRAVENOUS at 01:05

## 2022-05-17 NOTE — INTERVAL H&P NOTE
The patient has been examined and the H&P has been reviewed:    I concur with the findings and no changes have occurred since H&P was written.    Procedure risks, benefits and alternative options discussed and understood by patient/family.          Active Hospital Problems    Diagnosis  POA    PAF (paroxysmal atrial fibrillation) [I48.0]  Yes      Resolved Hospital Problems   No resolved problems to display.

## 2022-05-17 NOTE — PROGRESS NOTES
Patient ambulated around unit with standby assist. R groin remained CDI. No bleeding or hematoma noted. Patient tolerated well.

## 2022-05-17 NOTE — PLAN OF CARE
Received report from Frances. Patient s/p Watchman device, AAOx3. VSS, no c/o pain or discomfort at this time, resp even and unlabored. Gauze/tegaderm dressing to R groin is CDI. No active bleeding. No hematoma noted. Post procedure protocol reviewed with patient and patient's family. Understanding verbalized. Family members at bedside. Nurse call bell within reach. Will continue to monitor per post procedure protocol.

## 2022-05-17 NOTE — Clinical Note
5 ml of contrast were injected throughout the case. 5 mL of contrast was the total wasted during the case. 10 mL was the total amount used during the case.

## 2022-05-17 NOTE — ANESTHESIA PROCEDURE NOTES
Arterial    Diagnosis: afib    Patient location during procedure: done out of OR    Staffing  Authorizing Provider: Aníbal Stratton MD  Performing Provider: Gisel Francisco MD    Anesthesiologist was present at the time of the procedure.    Preanesthetic Checklist  Completed: patient identified, IV checked, site marked, risks and benefits discussed, surgical consent, monitors and equipment checked, pre-op evaluation, timeout performed and anesthesia consent givenArterial  Skin Prep: chlorhexidine gluconate  Local Infiltration: lidocaine  Orientation: right  Location: radial    Catheter Size: 20 G  Catheter placement by Ultrasound guidance. Heme positive aspiration all ports.   Vessel Caliber: patent  Needle advanced into vessel with real time Ultrasound guidance.Insertion Attempts: 1  Assessment  Dressing: secured with tape and tegaderm  Patient: Tolerated well

## 2022-05-17 NOTE — TRANSFER OF CARE
"Anesthesia Transfer of Care Note    Patient: Cleveland Capps    Procedure(s) Performed: Procedure(s) (LRB):  Left atrial appendage closure device (N/A)  ECHOCARDIOGRAM, TRANSESOPHAGEAL (N/A)  Intravascular Ultrasound, Non-Coronary    Patient location: PACU    Anesthesia Type: MAC    Transport from OR: Transported from OR on 6-10 L/min O2 by face mask with adequate spontaneous ventilation    Post pain: adequate analgesia    Post assessment: no apparent anesthetic complications    Post vital signs: stable    Level of consciousness: awake    Nausea/Vomiting: no nausea/vomiting    Complications: none    Transfer of care protocol was followed      Last vitals:   Visit Vitals  /72 (BP Location: Left arm, Patient Position: Lying)   Pulse 79   Temp 37.1 °C (98.8 °F) (Temporal)   Resp 16   Ht 5' 1" (1.549 m)   Wt 66.2 kg (146 lb)   SpO2 96%   BMI 27.59 kg/m²     "

## 2022-05-17 NOTE — Clinical Note
The catheter was inserted into the and was removed from the LA APPENDAGE . An angiography was performed of the LA. The angiography was performed via hand injection with 5 mL of contrast.

## 2022-05-17 NOTE — ASSESSMENT & PLAN NOTE
Here today for [facility] for LAAO/Watchman with plan for 31 - 35 FLX.      -No absolute contraindications of esophageal stricture, tumor, perforation, laceration,or diverticulum and/or active GI bleed  -The risks, benefits & alternatives of the procedure were explained to the patient.   -The risks of transesophageal echo include but are not limited to:  Dental trauma, esophageal trauma/perforation, bleeding, laryngospasm/brochospasm, aspiration, sore throat/hoarseness, & dislodgement of the endotracheal tube/nasogastric tube (where applicable).    -The risks of ANES monitored sedation include hypotension, respiratory depression, arrhythmias, bronchospasm, & death.    -Informed consent was obtained. The patient is agreeable to proceed with the procedure and all questions and concerns addressed.    Case discussed with an attending in echocardiography lab.    Further recommendations per attending addendum

## 2022-05-17 NOTE — NURSING TRANSFER
Nursing Transfer Note      5/17/2022     Reason patient is being transferred: post procedure    Transfer To: [facility]    Transfer via stretcher    Transfer with cardiac monitoring    Transported by pct    Medicines sent: none    Any special needs or follow-up needed: routine/discharge    Chart send with patient: Yes    Notified: Anthony    Patient reassessed at: 3965 5/17/2022

## 2022-05-17 NOTE — DISCHARGE INSTRUCTIONS
Watchman (Left Atrial Appendage Closure)  Discharge Instructions    Preventing Infection of Your Left Atrial Appendage device:  - You have been given a card:  PREVENTION OF INFECTIVE BACTERIAL ENDOCARDITIS  -Provide a copy of this card to your Dentist and Gastroenterologist to keep in   your chart.  - You DO need to take antibiotics prior to any routine dental cleaning, GI   procedures (colonoscopy, endoscopy),  (cystoscopy, bladder procedures),   or respiratory procedures (bronchoscopy).  - You need antibiotics if you are having a procedure that requires cutting into   infected skin(lancing a boil).  - Your Dentist or Gastroenterologist will prescribe these for you prior to your   appointment. Should you have any issues getting these prescribed, please   call our office.  2. General Post-op Instructions:   - Continue on aspirin and Eliquis (for 45 days). Do not stop either   medicine unless you speak with our office.   - No lifting >5 pounds for 5 days.   - No driving for 5 days   - You may shower as soon as you get home but no bathing or submerging in   water (lakes, pools, tub, etc.) for 1 week. You may remove bandage and   replace it with a band-aid.   - Keep incision clean and dry. No lotions, oils or creams. You may change the   band-aid daily until a scab has formed over.  3. Follow Up:   You can continue to follow up with your general cardiologist.   The following are requirements after your procedure:    1. In 45 days, we will see you at the [facility] and you will need a       Transesophageal echo. After it is reviewed by your physician, we       will call you with the results to inform you if you can switch from       Eliquis to Plavix.    2. In 6 months, we will call you to stop your Plavix but you must       remain on aspirin.  4. Discharge:  - If you have any questions or concerns after discharge, please do not hesitate   to call our office and speak with a health care representative.  [phone]  - if you have any problems or concerns related to your procedure, please call our office. Please address any other concerns with your primary Cardiologist.

## 2022-05-17 NOTE — CONSULTS
[facility] - Short Stay Cardiac Unit  [facility]  Consult Note    Patient Name: Cleveland Capps  MRN: [id number]  Admission Date: 5/17/2022  Hospital Length of Stay: 0 days  Code Status: Prior   Attending Provider: Tre Schmidt MD   Consulting Provider: Teresita Scott MD  Primary Care Physician: Romero Snyder MD  Principal Problem:<principal problem not specified>    Patient information was obtained from patient and ER records.     Consults  Subjective:         HPI:      Cleveland Capps is a 66 y.o. M retired  with a history of non valvular, Paroxysmal Atrial Fibrillation referred by Dr. Yung Grimaldo for evaluation of LAAO with Watchman Device. He has a history of stroke in 2019, carotid artery disease, HLD, HTN, pAF, ischemic CHF, CAD s/p cabg/pci with chronic stable angina, previous silvestre requiring dialysis before renal recovery and previous tracheostomy. He states he has been having melena for the past 1-2 months and is currently undergoing GI workup. He has still been compliant with eliquis, however, he went to see his primary cardiologist who then referred him here as there was concern he would need to come off eliquis due to melena. Per outside report, his last device interrogation showed he was in AF infrequently and currently is in NSR. Denies any other complaints.       The patient has undergone the following work-up:   · [id number] 6 (9.8%/yr) and HAS-BLED of 5 (12.5%/yr)  · STANLEY measurements by CTA (Date:05/02/22): Avg Diameter 26.8mm, width 25.1-26.1mmm, depth 25.8-25.7mm.  · Current anticoagulation/antiplatelet regimen: Aspirin and Eliquis  · Increased risk of falls: No  · Clinically relevant bleeding event: yes Gastrointestinal  · Attempt at atrial fibrillation termination: no  · Additional history/risk factors: Cardiomyopathy (Ischemic), Chronic Lung Disease and Coronary Artery Disease  · Modified Iron Scale: : Iron Score of 1 - No significant disability despite  symptoms; able to carry out all usual duties and activities      TTE 2020  · Severe left atrial enlargement.  · There is left ventricular eccentric hypertrophy.  · Moderately decreased left ventricular systolic function. The estimated ejection fraction is 39%.  · Grade III diastolic dysfunction.  · Mildly reduced right ventricular systolic function.  · Mild aortic regurgitation.  · Mild mitral regurgitation.  · Normal central venous pressure (3 mmHg).  · There is no evidence of intracardiac shunting.    ECG: pending    Dysphagia or odynophagia:  No  Liver Disease, esophageal disease, or known varices:  No  Upper GI Bleeding: No  Snoring:  Yes  Sleep Apnea:  No  Prior neck surgery or radiation:  No  History of anesthetic difficulties:  No  Family history of anesthetic difficulties:  No  Last oral intake: yesterday before midnight  Able to move neck in all directions:  Yes             Past Medical History:   Diagnosis Date    A-fib     Acute kidney injury     COPD (chronic obstructive pulmonary disease)     Coronary artery disease     Encounter for blood transfusion     Former smoker     Hypertension     Myocardial infarction     Thyroid disease        Past Surgical History:   Procedure Laterality Date    COLONOSCOPY N/A 1/3/2017    Procedure: COLONOSCOPY;  Surgeon: Marcus Green MD;  Location: [facility];  Service: Endoscopy;  Laterality: N/A;    CORONARY BYPASS GRAFT ANGIOGRAPHY  3/30/2021    Procedure: Bypass graft study;  Surgeon: Tre Schmidt MD;  Location: [facility] CATH LAB;  Service: Cardiology;;    CORONARY STENT PLACEMENT      GASTROSTOMY TUBE PLACEMENT      INSERTION OF PACEMAKER  04/2017    LEFT HEART CATHETERIZATION N/A 3/30/2021    Procedure: Left heart cath;  Surgeon: Tre Schmidt MD;  Location: [facility] CATH LAB;  Service: Cardiology;  Laterality: N/A;    PEG TUBE REMOVAL      TRACHEOSTOMY CLOSURE      TRACHEOSTOMY TUBE PLACEMENT      UPPER GASTROINTESTINAL ENDOSCOPY  05/2016     Dr. Zarco with PEG tube placement       Review of patient's allergies indicates:   Allergen Reactions    Penicillins Hives       No current facility-administered medications on file prior to encounter.     Current Outpatient Medications on File Prior to Encounter   Medication Sig    apixaban (ELIQUIS) 5 mg Tab Take 5 mg by mouth 2 (two) times daily.    aspirin (ECOTRIN) 81 MG EC tablet Take 81 mg by mouth once daily.    atorvastatin (LIPITOR) 80 MG tablet Take 80 mg by mouth once daily.    carvedilol (COREG) 6.25 MG tablet Take 2 tablets (12.5 mg total) by mouth 2 (two) times daily.    enalapril (VASOTEC) 20 MG tablet 20 mg 2 (two) times daily.     furosemide (LASIX) 20 MG tablet Take 1 tablet (20 mg total) by mouth once daily.    isosorbide mononitrate (IMDUR) 30 MG 24 hr tablet Take 30 mg by mouth once daily.    levothyroxine (SYNTHROID) 25 MCG tablet Take 25 mcg by mouth before breakfast.     Family History       Problem Relation (Age of Onset)    Diabetes Mother, Brother    Glaucoma Father, Brother    Heart disease Mother    Macular degeneration Father, Brother    No Known Problems Sister, Maternal Aunt, Maternal Uncle, Paternal Aunt, Paternal Uncle, Maternal Grandmother, Maternal Grandfather, Paternal Grandmother, Paternal Grandfather          Tobacco Use    Smoking status: Former Smoker     Types: Cigarettes     Quit date: 2005     Years since quittin.4    Smokeless tobacco: Never Used    Tobacco comment: quit    Substance and Sexual Activity    Alcohol use: No    Drug use: No    Sexual activity: Yes     ROS see HPI   Objective:     Vital Signs (Most Recent):  BP: 116/72 (22 0951) Vital Signs (24h Range):  BP: (116)/(72) 116/72        There is no height or weight on file to calculate BMI.            No intake or output data in the 24 hours ending 22 1008    Lines/Drains/Airways       None                   Physical Exam    Significant Labs: All pertinent lab results  from the last 24 hours have been reviewed.    Significant Imaging: CT scan: CT ABDOMEN PELVIS WITH CONTRAST: No results found for this visit on 05/17/22. and CT ABDOMEN PELVIS WITHOUT CONTRAST: No results found for this visit on 05/17/22. and Echocardiogram: 2D echo with color flow doppler:   Results for orders placed or performed during the hospital encounter of 04/25/16   2D echo with color flow doppler    Narrative    This study is in progress....    and Transthoracic echo (TTE) complete (Cupid Only):   Results for orders placed or performed during the hospital encounter of 10/24/20   Echo Color Flow Doppler? Yes   Result Value Ref Range    AV mean gradient 7 mmHg    Ao peak nikhil 1.70 m/s    Ao VTI 34.33 cm    IVRT 110.37 msec    IVS 1.04 0.6 - 1.1 cm    LA size 5.12 cm    Left Atrium Major Axis 6.41 cm    Left Atrium Minor Axis 6.73 cm    LVIDd 5.96 3.5 - 6.0 cm    LVIDs 4.81 (A) 2.1 - 4.0 cm    LVOT diameter 2.15 cm    LVOT peak VTI 22.59 cm    Posterior Wall 1.02 0.6 - 1.1 cm    MV Peak A Nikhil 0.43 m/s    E wave deceleration time 198.98 msec    MV Peak E Nikhil 1.05 m/s    RA Major Axis 4.66 cm    RA Width 3.22 cm    RVDD 1.66 cm    TAPSE 1.54 cm    LA WIDTH 4.30 cm    Ao root annulus 2.82 cm    AORTIC VALVE CUSP SEPERATION 2.11 cm    PV PEAK VELOCITY 0.91 cm/s    MV stenosis pressure 1/2 time 56.39 ms    LV Diastolic Volume 177.46 mL    LV Systolic Volume 107.93 mL    LVOT peak nikhil 1.11 m/s    Mr max nikhil 0.05 m/s    FS 19 %    LA volume 122.88 cm3    LV mass 253.65 g    Left Ventricle Relative Wall Thickness 0.34 cm    AV valve area 2.39 cm2    AV Velocity Ratio 0.65     AV index (prosthetic) 0.66     MV valve area p 1/2 method 3.90 cm2    E/A ratio 2.44     LVOT area 3.6 cm2    LVOT stroke volume 81.97 cm3    AV peak gradient 12 mmHg    LV Systolic Volume Index 60.9 mL/m2    LV Diastolic Volume Index 100.20 mL/m2    LA Volume Index 69.4 mL/m2    LV Mass Index 143 g/m2    BSA 1.82 m2    TDI SEPTAL 0.06 m/s    LV  "LATERAL E/E' RATIO 15.00 m/s    LV SEPTAL E/E' RATIO 17.50 m/s    TDI LATERAL 0.07 m/s    Mean e' 0.07 m/s    MV "A" wave duration 145.00 msec    Pulm vein "A" wave 118.00 msec    E/E' ratio 16.15 m/s    Right Atrial Pressure (from IVC) 3 mmHg    Narrative    · Severe left atrial enlargement.  · There is left ventricular eccentric hypertrophy.  · Moderately decreased left ventricular systolic function. The estimated   ejection fraction is 39%.  · Grade III diastolic dysfunction.  · Mildly reduced right ventricular systolic function.  · Mild aortic regurgitation.  · Mild mitral regurgitation.  · Normal central venous pressure (3 mmHg).  · There is no evidence of intracardiac shunting.        Assessment and Plan:     PAF (paroxysmal atrial fibrillation)  Here today for [facility] for LAAO/Watchman with plan for 31 - 35 FLX.      -No absolute contraindications of esophageal stricture, tumor, perforation, laceration,or diverticulum and/or active GI bleed  -The risks, benefits & alternatives of the procedure were explained to the patient.   -The risks of transesophageal echo include but are not limited to:  Dental trauma, esophageal trauma/perforation, bleeding, laryngospasm/brochospasm, aspiration, sore throat/hoarseness, & dislodgement of the endotracheal tube/nasogastric tube (where applicable).    -The risks of ANES monitored sedation include hypotension, respiratory depression, arrhythmias, bronchospasm, & death.    -Informed consent was obtained. The patient is agreeable to proceed with the procedure and all questions and concerns addressed.    Case discussed with an attending in echocardiography lab.    Further recommendations per attending addendum            VTE Risk Mitigation (From admission, onward)    None          Thank you for your consult.     Teresita Scott MD  Cardiology   [facility] - Short Stay Cardiac Unit    "

## 2022-05-17 NOTE — Clinical Note
The catheter was inserted into the right atrium. DILATOR FROM SHEATH INSERTED INTO RA WITH TRANSEPTAL NEEDLE

## 2022-05-17 NOTE — HPI
Cleveland Capps is a 66 y.o. M retired  with a history of non valvular, Paroxysmal Atrial Fibrillation referred by Dr. Yung Grimaldo for evaluation of LAAO with Watchman Device. He has a history of stroke in 2019, carotid artery disease, HLD, HTN, pAF, ischemic CHF, CAD s/p cabg/pci with chronic stable angina, previous silvestre requiring dialysis before renal recovery and previous tracheostomy. He states he has been having melena for the past 1-2 months and is currently undergoing GI workup. He has still been compliant with eliquis, however, he went to see his primary cardiologist who then referred him here as there was concern he would need to come off eliquis due to melena. Per outside report, his last device interrogation showed he was in AF infrequently and currently is in NSR. Denies any other complaints.       The patient has undergone the following work-up:   [id number] 6 (9.8%/yr) and HAS-BLED of 5 (12.5%/yr)  STANLEY measurements by CTA (Date:05/02/22): Avg Diameter 26.8mm, width 25.1-26.1mmm, depth 25.8-25.7mm.  Current anticoagulation/antiplatelet regimen: Aspirin and Eliquis  Increased risk of falls: No  Clinically relevant bleeding event: yes Gastrointestinal  Attempt at atrial fibrillation termination: no  Additional history/risk factors: Cardiomyopathy (Ischemic), Chronic Lung Disease and Coronary Artery Disease  Modified Vic Scale: : [place] Score of 1 - No significant disability despite symptoms; able to carry out all usual duties and activities      TTE 2020  Severe left atrial enlargement.  There is left ventricular eccentric hypertrophy.  Moderately decreased left ventricular systolic function. The estimated ejection fraction is 39%.  Grade III diastolic dysfunction.  Mildly reduced right ventricular systolic function.  Mild aortic regurgitation.  Mild mitral regurgitation.  Normal central venous pressure (3 mmHg).  There is no evidence of intracardiac shunting.    ECG:  pending    Dysphagia or odynophagia:  No  Liver Disease, esophageal disease, or known varices:  No  Upper GI Bleeding: No  Snoring:  Yes  Sleep Apnea:  No  Prior neck surgery or radiation:  No  History of anesthetic difficulties:  No  Family history of anesthetic difficulties:  No  Last oral intake: yesterday before midnight  Able to move neck in all directions:  Yes

## 2022-05-17 NOTE — OP NOTE
"    Post Cath Note  Referring Physician: Tre Schmidt MD  Procedure: Left atrial appendage closure device (N/A), ECHOCARDIOGRAM, TRANSESOPHAGEAL (N/A), Intravascular Ultrasound, Non-Coronary      : Tre Schmidt MD     Referring Physician: Romero Snyder     All Operators: Surgeon(s):  MD Tre Espinoza MD  [facility] Diagnostic Provider     Preoperative Diagnosis: Longstanding persistent atrial fibrillation [I48.11]     Postop Diagnosis: Longstanding persistent atrial fibrillation [I48.11]    Treatments/Procedures: Procedure(s) (LRB):  Left atrial appendage closure device (N/A)  ECHOCARDIOGRAM, TRANSESOPHAGEAL (N/A)  Intravascular Ultrasound, Non-Coronary    Estimated Blood loss: <50 cc         Access: Right CFV      See full report for further details    Intervention:   S/p 31 MM Watchman Device  Closure device: Perclosure    Post Cath Exam:   /72 (BP Location: Left arm, Patient Position: Lying)   Pulse 79   Temp 98.8 °F (37.1 °C) (Temporal)   Resp 16   Ht 5' 1" (1.549 m)   Wt 66.2 kg (146 lb)   SpO2 96%   BMI 27.59 kg/m²   No unusual pain, hematoma, thrill or bruit at vascular access site.  Distal pulse present without signs of ischemia.    Recommendations:   - Routine post-cath care  - IVF at 150 cc/hr for 2 hrs  -Patient with severe MR-Will see in clinic for Mitraclip evaluation    Guzman Calderon  "

## 2022-05-17 NOTE — ANESTHESIA PREPROCEDURE EVALUATION
05/17/2022  Cleveland Capps is a 66 y.o., male.  Patient Active Problem List   Diagnosis    NSTEMI (non-ST elevated myocardial infarction)    Coronary artery disease    Benign essential HTN    History of coronary artery stent placement    PAF (paroxysmal atrial fibrillation)    MEIR (acute kidney injury)    Anemia    Thrombocytopenia due to blood loss    Acute respiratory failure with hypoxia and hypercarbia    Tracheostomy infection    Oral phase dysphagia    SOB (shortness of breath)    CKD (chronic kidney disease) stage 3, GFR 30-59 ml/min    Pneumonia    CHF (congestive heart failure)    Stroke    Melena           Pre-op Assessment          Review of Systems      Physical Exam    Airway:  Mallampati: II / II  Mouth Opening: Small, but > 3cm  Tongue: Normal  Large beard  Dental:No active dental issues noted  Chest/Lungs:  Clear to auscultation    Heart:  Rate: Normal  Rhythm: Regular Rhythm  Sounds: Normal        Anesthesia Plan  Type of Anesthesia, risks & benefits discussed:    Anesthesia Type: Gen ETT, Gen Natural Airway  Intra-op Monitoring Plan: Art Line  Informed Consent: Informed consent signed with the Patient and all parties understand the risks and agree with anesthesia plan.  All questions answered.   ASA Score: 4  Anesthesia Plan Notes: Chart reviewed. Patient seen and examined. Anesthesia plan discussed and questions answered. E-consent signed. Aníbal Stratton MD    Ready For Surgery From Anesthesia Perspective.     .

## 2022-05-17 NOTE — SUBJECTIVE & OBJECTIVE
Past Medical History:   Diagnosis Date    A-fib     Acute kidney injury     COPD (chronic obstructive pulmonary disease)     Coronary artery disease     Encounter for blood transfusion     Former smoker     Hypertension     Myocardial infarction     Thyroid disease        Past Surgical History:   Procedure Laterality Date    COLONOSCOPY N/A 1/3/2017    Procedure: COLONOSCOPY;  Surgeon: Marcus Green MD;  Location: [facility] ENDO;  Service: Endoscopy;  Laterality: N/A;    CORONARY BYPASS GRAFT ANGIOGRAPHY  3/30/2021    Procedure: Bypass graft study;  Surgeon: Tre Schmidt MD;  Location: [facility] CATH LAB;  Service: Cardiology;;    CORONARY STENT PLACEMENT      GASTROSTOMY TUBE PLACEMENT      INSERTION OF PACEMAKER  04/2017    LEFT HEART CATHETERIZATION N/A 3/30/2021    Procedure: Left heart cath;  Surgeon: Tre Schmidt MD;  Location: [facility] CATH LAB;  Service: Cardiology;  Laterality: N/A;    PEG TUBE REMOVAL      TRACHEOSTOMY CLOSURE      TRACHEOSTOMY TUBE PLACEMENT      UPPER GASTROINTESTINAL ENDOSCOPY  05/2016    Dr. Green with PEG tube placement       Review of patient's allergies indicates:   Allergen Reactions    Penicillins Hives       No current facility-administered medications on file prior to encounter.     Current Outpatient Medications on File Prior to Encounter   Medication Sig    apixaban (ELIQUIS) 5 mg Tab Take 5 mg by mouth 2 (two) times daily.    aspirin (ECOTRIN) 81 MG EC tablet Take 81 mg by mouth once daily.    atorvastatin (LIPITOR) 80 MG tablet Take 80 mg by mouth once daily.    carvedilol (COREG) 6.25 MG tablet Take 2 tablets (12.5 mg total) by mouth 2 (two) times daily.    enalapril (VASOTEC) 20 MG tablet 20 mg 2 (two) times daily.     furosemide (LASIX) 20 MG tablet Take 1 tablet (20 mg total) by mouth once daily.    isosorbide mononitrate (IMDUR) 30 MG 24 hr tablet Take 30 mg by mouth once daily.    levothyroxine (SYNTHROID) 25 MCG tablet Take 25 mcg by mouth before breakfast.      Family History       Problem Relation (Age of Onset)    Diabetes Mother, Brother    Glaucoma Father, Brother    Heart disease Mother    Macular degeneration Father, Brother    No Known Problems Sister, Maternal Aunt, Maternal Uncle, Paternal Aunt, Paternal Uncle, Maternal Grandmother, Maternal Grandfather, Paternal Grandmother, Paternal Grandfather          Tobacco Use    Smoking status: Former Smoker     Types: Cigarettes     Quit date: 2005     Years since quittin.4    Smokeless tobacco: Never Used    Tobacco comment: quit    Substance and Sexual Activity    Alcohol use: No    Drug use: No    Sexual activity: Yes     ROS see HPI   Objective:     Vital Signs (Most Recent):  BP: 116/72 (22 0951) Vital Signs (24h Range):  BP: (116)/(72) 116/72        There is no height or weight on file to calculate BMI.            No intake or output data in the 24 hours ending 22 1008    Lines/Drains/Airways       None                   Physical Exam    Significant Labs: All pertinent lab results from the last 24 hours have been reviewed.    Significant Imaging: CT scan: CT ABDOMEN PELVIS WITH CONTRAST: No results found for this visit on 22. and CT ABDOMEN PELVIS WITHOUT CONTRAST: No results found for this visit on 22. and Echocardiogram: 2D echo with color flow doppler:   Results for orders placed or performed during the hospital encounter of 16   2D echo with color flow doppler    Narrative    This study is in progress....    and Transthoracic echo (TTE) complete (Cupid Only):   Results for orders placed or performed during the hospital encounter of 10/24/20   Echo Color Flow Doppler? Yes   Result Value Ref Range    AV mean gradient 7 mmHg    Ao peak shonda 1.70 m/s    Ao VTI 34.33 cm    IVRT 110.37 msec    IVS 1.04 0.6 - 1.1 cm    LA size 5.12 cm    Left Atrium Major Axis 6.41 cm    Left Atrium Minor Axis 6.73 cm    LVIDd 5.96 3.5 - 6.0 cm    LVIDs 4.81 (A) 2.1 - 4.0 cm    LVOT  "diameter 2.15 cm    LVOT peak VTI 22.59 cm    Posterior Wall 1.02 0.6 - 1.1 cm    MV Peak A Nikhil 0.43 m/s    E wave deceleration time 198.98 msec    MV Peak E Nikhil 1.05 m/s    RA Major Axis 4.66 cm    RA Width 3.22 cm    RVDD 1.66 cm    TAPSE 1.54 cm    LA WIDTH 4.30 cm    Ao root annulus 2.82 cm    AORTIC VALVE CUSP SEPERATION 2.11 cm    PV PEAK VELOCITY 0.91 cm/s    MV stenosis pressure 1/2 time 56.39 ms    LV Diastolic Volume 177.46 mL    LV Systolic Volume 107.93 mL    LVOT peak nikhil 1.11 m/s    Mr max nikhil 0.05 m/s    FS 19 %    LA volume 122.88 cm3    LV mass 253.65 g    Left Ventricle Relative Wall Thickness 0.34 cm    AV valve area 2.39 cm2    AV Velocity Ratio 0.65     AV index (prosthetic) 0.66     MV valve area p 1/2 method 3.90 cm2    E/A ratio 2.44     LVOT area 3.6 cm2    LVOT stroke volume 81.97 cm3    AV peak gradient 12 mmHg    LV Systolic Volume Index 60.9 mL/m2    LV Diastolic Volume Index 100.20 mL/m2    LA Volume Index 69.4 mL/m2    LV Mass Index 143 g/m2    BSA 1.82 m2    TDI SEPTAL 0.06 m/s    LV LATERAL E/E' RATIO 15.00 m/s    LV SEPTAL E/E' RATIO 17.50 m/s    TDI LATERAL 0.07 m/s    Mean e' 0.07 m/s    MV "A" wave duration 145.00 msec    Pulm vein "A" wave 118.00 msec    E/E' ratio 16.15 m/s    Right Atrial Pressure (from IVC) 3 mmHg    Narrative    · Severe left atrial enlargement.  · There is left ventricular eccentric hypertrophy.  · Moderately decreased left ventricular systolic function. The estimated   ejection fraction is 39%.  · Grade III diastolic dysfunction.  · Mildly reduced right ventricular systolic function.  · Mild aortic regurgitation.  · Mild mitral regurgitation.  · Normal central venous pressure (3 mmHg).  · There is no evidence of intracardiac shunting.        "

## 2022-05-17 NOTE — PLAN OF CARE
Patient arrived to room. PIV placed x2. Admit assessment completed. Plan of care discussed with patient. Will monitor. Call light within reach, instructed in use

## 2022-05-17 NOTE — Clinical Note
An airway assessment has been completed by rn, md and anesthesia. All medications an vital signs taken per anesthesia

## 2022-05-18 DIAGNOSIS — Q20.8 ABNORMALITY OF LEFT ATRIAL APPENDAGE: Primary | ICD-10-CM

## 2022-05-18 DIAGNOSIS — I50.42 CHRONIC COMBINED SYSTOLIC AND DIASTOLIC CONGESTIVE HEART FAILURE: Primary | ICD-10-CM

## 2022-05-18 NOTE — ANESTHESIA POSTPROCEDURE EVALUATION
Anesthesia Post Evaluation    Patient: Cleveland Capps    Procedure(s) Performed: Procedure(s) (LRB):  Left atrial appendage closure device (N/A)  ECHOCARDIOGRAM, TRANSESOPHAGEAL (N/A)  Intravascular Ultrasound, Non-Coronary    Final Anesthesia Type: general      Patient location during evaluation: PACU  Patient participation: Yes- Able to Participate  Level of consciousness: awake and alert, awake and oriented  Post-procedure vital signs: reviewed and stable  Pain management: adequate  Airway patency: patent    PONV status at discharge: No PONV  Anesthetic complications: no      Cardiovascular status: blood pressure returned to baseline, hemodynamically stable and stable  Respiratory status: unassisted, spontaneous ventilation and room air  Hydration status: euvolemic  Follow-up not needed.          Vitals Value Taken Time   /76 05/17/22 1730   Temp 36.7 °C (98.1 °F) 05/17/22 1700   Pulse 87 05/17/22 1730   Resp 16 05/17/22 1700   SpO2 96 % 05/17/22 1700         Event Time   Out of Recovery 16:53:00         Pain/Marilyn Score: Marilyn Score: 10 (5/17/2022  5:30 PM)

## 2022-05-19 NOTE — PHYSICIAN QUERY
PT Name: Cleveland Capps  MR #: [id number]     DOCUMENTATION CLARIFICATION     CDS/: Raegan Holly RN CDIS           Contact information: [email or web address]    This form is a permanent document in the medical record.     Query Date: May 19, 2022    By submitting this query, we are merely seeking further clarification of documentation.  Please utilize your independent clinical judgment when addressing the question(s) below.    The Medical Record contains the following   Indicators Supporting Clinical Findings Location in Medical Record   x Heart Failure documented Past medical history -ischemic CHF H&P     BNP     x EF/Echo · SANTHOSH for intra-procedural guidance during Watchman implantation.  · S/p placement of 31mm Watchman FLX. The device is well seated without rose-device leak. There is a small iatrogenic ASD in the superior interatrial septum due to trans-septal puncture. No pericardial effusion.  · Normal appearing left atrial appendage. No thrombus is present in the appendage. Abnormal appendage velocities of 0.15m/s.  · The left ventricle is normal in size with severely decreased systolic function.  · The estimated ejection fraction is 20%.  · Severe functional mitral regurgitation with largest MR jet between A2-P2.  · Normal right ventricular size with mildly reduced right ventricular systolic function.  · Mild tricuspid regurgitation.  · Grade 2 plaque present in the descending aorta.  · Biatrial enlargement.     TTE 5/17     Radiology findings      Subjective/Objective Respiratory Conditions      Recent/Current MI      Heart Transplant, LVAD      Edema, JVD      Ascites      Diuretics/Meds      Other Treatment      Other       Heart failure is a clinical diagnosis which includes symptomatic fluid retention, elevated intracardiac pressures, and/or the inability of the heart to deliver adequate blood flow.     Heart Failure with reduced Ejection Fraction (HFrEF) or Systolic Heart Failure (loses  ability to contract normally, EF is <40%)     Heart Failure with preserved Ejection Fraction (HFpEF) or Diastolic Heart Failure (stiff ventricles, does not relax properly, EF is >50%)      Heart Failure with Combined Systolic and Diastolic Failure (stiff ventricles, does not relax properly and EF is <50%)     Mid-range or mildly reduced ejection fraction (HFmrEF) is classified as systolic heart failure.   Common clues to acute exacerbation:  Rapidly progressive symptoms (w/in 2 weeks of presentation), using IV diuretics, using supplemental O2, pulmonary edema on Xray, new or worsening pleural effusion, +JVD or other signs of volume overload, MI w/in 4 weeks, and/or BNP >500  The clinical guidelines noted are only system guidelines, and do not replace the providers clinical judgment.    Provider, please specify the diagnosis associated with the above clinical findings.    [  x ]  Chronic Systolic Heart Failure (HFrEF or HFmrEF) - preexisting and stable   [   ]  Chronic Combined Systolic and Diastolic Heart Failure - pre-existing and stable   [   ]  Other (please specify): ___________________________________   [  ]  Clinically Undetermined       Please document in your progress notes daily for the duration of treatment until resolved and include in your discharge summary.    References:  American Heart Association editorial staff. (2017, May). Ejection Fraction Heart Failure Measurement. American Heart Association. https://www.heart.org/en/health-topics/heart-failure/diagnosing-heart-failure/ejection-fraction-heart-failure-measurement#:~:text=Ejection%20fraction%20(EF)%20is%20a,pushed%20out%20with%20each%20heartbeat  KYLE Andrade (2020, December 15). Heart failure with preserved ejection fraction: Clinical manifestations and diagnosis. [employer]. https://www.[employer].com/contents/heart-failure-with-preserved-ejection-fraction-clinical-manifestations-and-diagnosis.  ICD-10-CM/PCS Coding Clinic Third Quarter  ICD-10, Effective with discharges: September 8, 2020 [facility] § Heart failure with mid-range or mildly reduced ejection fraction (2020).  Form No. 90244

## 2022-05-19 NOTE — PHYSICIAN QUERY
PT Name: Cleveland Capps  MR #: [id number]    DOCUMENTATION CLARIFICATION     CDS/: Raegan Holly RN CDIS            Contact information: [email or web address]       This form is a permanent document in the medical record.     Query Date: May 19, 2022    By submitting this query, we are merely seeking further clarification of documentation. Please utilize your independent clinical judgment when addressing the question(s) below.    The Medical Record contains the following:   Indicators Supporting Clinical Findings Location in Medical Record   x Atrial Fibrillation Postop Diagnosis: Longstanding persistent atrial fibrillation    PAF (paroxysmal atrial fibrillation)  Here today for SANTHOSH for LAAO/Watchman with plan for 31 - 35 FLX. Op note 5/17     Cards consult    x EKG Results Atrial fibrillation with occasional ventricular-paced complexes   Anterolateral infarct ,age undetermined   Abnormal ECG   When compared with ECG of 17-MAY-2021 02:44,   Some pacing present   Confirmed by Rodger ARECHIGA MD ([id number]) on 5/17/2022 11:57:24 AM  EKG 5/17     Medication     x Treatment Treatments/Procedures: Procedure(s) (LRB):  Left atrial appendage closure device (N/A)  ECHOCARDIOGRAM, TRANSESOPHAGEAL (N/A)  Intravascular Ultrasound, Non-Coronary Op note     Other       The clinical guidelines noted are only a system guideline. It does not replace the provider's clinical judgment.    Please clarify  the type of Atrial Fibrillation (AF) due to conflicting documentation:    [ x ] Paroxysmal - defined as AF that terminates spontaneously or with intervention within < 7 days of onset, episodes may recur with variable frequency   [  ] Long-standing persistent - AF that has lasted for > 12 months    [  ] Other cardiac diagnosis (please specify): ____________   [  ] Clinically Undetermined           Please document in your progress notes daily for the duration of treatment until resolved, and include in your discharge  summary.    Reference:  HERNANDEZ Li MD. (2020, September 14). Overview of atrial fibrillation (ONDINA Renee MD & BRIAN Dhaliwal MD, Eds.). Retrieved October 22, 2020, from https://www.[employer]ify/contents/dyjcccaq-cl-xrtxbm-fibrillation?search=atrial%20fibrillation&source=search_result&selectedTitle=1~150&usage_type=default&display_rank=1    Form No. 18784

## 2022-05-20 NOTE — HOSPITAL COURSE
Indication     66 y.o. male referred by Arturo Grimaldo for Watchman Device implantation for permanent afib. Patient underwent successful implantation of 31 mm Watchman device. Difficult esophageal intubation requiring a scope because of prior throat surgery.     Recommendations:     1.  Consider surface echo instead of SANTHOSH at one month because of difficult intubation with the SANTHOSH probe requiring anesthesia scope support.  2.  Continue anticoagulation until then.  3.  Consider mitraclip for FMR if the patient still has severe MR and has failed OMT.  Dr. Wiggins did all of the measurements for Mitraclip during this SANTHOSH today. Patient currently denies any symptoms of heart failure and is able to do all the activities he wants to. He will follow up in clinic and will evaluate further.

## 2022-05-20 NOTE — DISCHARGE SUMMARY
[facility] - Short Stay Cardiac Unit  Interventional Cardiology  Discharge Summary      Patient Name: Cleveland Capps  MRN: [id number]  Admission Date: 5/17/2022  Hospital Length of Stay: 1 days  Discharge Date and Time: 5/17/2022  6:15 PM  Attending Physician: No att. providers found  Discharging Provider: Guzman Calderon MD  Primary Care Physician: Romero Snyder MD    HPI:  No notes on file    Procedure(s) (LRB):  Left atrial appendage closure device (N/A)  ECHOCARDIOGRAM, TRANSESOPHAGEAL (N/A)  Intravascular Ultrasound, Non-Coronary     Indwelling Lines/Drains at time of discharge:  Lines/Drains/Airways     None                 Hospital Course:  Indication     66 y.o. male referred by Arturo Grimaldo for Watchman Device implantation for permanent afib. Patient underwent successful implantation of 31 mm Watchman device. Difficult esophageal intubation requiring a scope because of prior throat surgery.     Recommendations:     1.  Consider surface echo instead of SANTHOSH at one month because of difficult intubation with the SANTHOSH probe requiring anesthesia scope support.  2.  Continue anticoagulation until then.  3.  Consider mitraclip for FMR if the patient still has severe MR and has failed OMT.  Dr. Wiggins did all of the measurements for Mitraclip during this SANTHOSH today. Patient currently denies any symptoms of heart failure and is able to do all the activities he wants to. He will follow up in clinic and will evaluate further.          Goals of Care Treatment Preferences:  Code Status: Full Code          Significant Diagnostic Studies: Labs: BMP: No results for input(s): GLU, NA, K, CL, CO2, BUN, CREATININE, CALCIUM, MG in the last 48 hours. and CBC No results for input(s): WBC, HGB, HCT, PLT in the last 48 hours.    Pending Diagnostic Studies:     None        No new Assessment & Plan notes have been filed under this hospital service since the last note was generated.  Service: Interventional  Cardiology      Discharged Condition: good    Follow Up:   Follow-up Information     Tre Schmidt MD Follow up.    Specialty: Interventional Cardiology  Why: As needed  Contact information:  [address]  [facility] [id number] [phone]                       Patient Instructions:   No discharge procedures on file.  Medications:  Reconciled Home Medications:      Medication List      CONTINUE taking these medications    apixaban 5 mg Tab  Commonly known as: ELIQUIS  Take 5 mg by mouth 2 (two) times daily.     aspirin 81 MG EC tablet  Commonly known as: ECOTRIN  Take 81 mg by mouth once daily.     atorvastatin 80 MG tablet  Commonly known as: LIPITOR  Take 80 mg by mouth once daily.     carvediloL 6.25 MG tablet  Commonly known as: COREG  Take 2 tablets (12.5 mg total) by mouth 2 (two) times daily.     enalapril 20 MG tablet  Commonly known as: VASOTEC  20 mg 2 (two) times daily.     furosemide 20 MG tablet  Commonly known as: LASIX  Take 1 tablet (20 mg total) by mouth once daily.     isosorbide mononitrate 30 MG 24 hr tablet  Commonly known as: IMDUR  Take 30 mg by mouth once daily.     levothyroxine 25 MCG tablet  Commonly known as: SYNTHROID  Take 25 mcg by mouth before breakfast.            Time spent on the discharge of patient: 35 minutes    Guzman Calderon MD  Interventional Cardiology  [facility] - Short Stay Cardiac Unit

## 2022-05-26 LAB
POC ACTIVATED CLOTTING TIME K: 166 SEC (ref 74–137)
POC ACTIVATED CLOTTING TIME K: 255 SEC (ref 74–137)
SAMPLE: ABNORMAL
SAMPLE: ABNORMAL

## 2022-06-23 PROBLEM — I34.0 MITRAL REGURGITATION: Status: ACTIVE | Noted: 2022-06-23

## 2022-06-23 PROBLEM — I70.0 AORTIC ATHEROSCLEROSIS: Status: ACTIVE | Noted: 2022-06-23

## 2022-06-23 PROBLEM — N18.30 CKD (CHRONIC KIDNEY DISEASE) STAGE 3, GFR 30-59 ML/MIN: Status: RESOLVED | Noted: 2020-05-17 | Resolved: 2022-06-23

## 2022-06-23 PROBLEM — I50.42 CHRONIC COMBINED SYSTOLIC AND DIASTOLIC CONGESTIVE HEART FAILURE: Status: ACTIVE | Noted: 2020-05-19

## 2022-06-23 NOTE — ASSESSMENT & PLAN NOTE
S/p Watchman device with Dr Schmidt on 5/17/22. Consider surface echo instead of SANTHOSH at 45 days because of difficult intubation with the SANTHOSH probe requiring anesthesia scope support.

## 2022-06-23 NOTE — PROGRESS NOTES
Subjective:    Patient ID:  Cleveland Capps is a 66 y.o. male who presents for evaluation of mitral regurgitation.     Referring Physician: Dr. Arturo Grimaldo     HPI  Cleveland Capps is a 66 y.o. male  retired  with a history of non valvular, Paroxysmal Atrial Fibrillation originally referred by Dr. Yung Grimaldo for evaluation of severe mitral regurgitation. He has a history of stroke in 2019, carotid artery disease, HLD, HTN, pAF, ischemic CHF, CAD s/p cabg/pci with chronic stable angina, previous silvestre requiring dialysis before renal recovery and previous tracheostomy.  He was originally sent to Dr Schmidt in 2021 for a postivie stress test. LHC showed Severe three-vessel coronary disease.  Status post CABG with incomplete but excellent revascularization. Holter monitor He underwent successful Watchman device with Dr Schmidt on 5/17/22. It was decided to consider surface echo instead of SANTHOSH at 45 days because of difficult intubation with the SANTHOSH probe requiring anesthesia scope support. SANTHOSH showed severe FMR which has failed medical therapy. He presents today for reevaluation of MR and possible MitraClip.     Today he is feeling well. He does not experience SOB with ADLs. He is very active during the day. He only notices SOB if he pushes himself heavily.      Cleveland Capps is a 66 y.o. male referred by Dr Grimaldo for evaluation of severe MR (NYHA Class II sx).    Mitral Valve Disease Etiology: FMR    The patient has undergone the following MitraClip work-up:    SANTHOSH (Date 5/17/22): Severe functional mitral insufficiency with the largest jet being between A2-P2 EF= 20%.    LHC (Date 3/30/21): Severe three-vessel coronary disease.  Status post CABG with incomplete but excellent revascularization.   STS: 4%    Frailty: 2/3 (failed  and walk, needs albumin)    CT Surgery risk assessment: not needed, functional MR   PFTs: not done   Comorbidities: Afib, CVA hx,        NYHA: II CCS:  0    Review of Systems   Constitutional: Negative for chills and fever.   HENT: Negative for sore throat.    Eyes: Negative for blurred vision.   Cardiovascular: Negative for chest pain, claudication, cyanosis, dyspnea on exertion, irregular heartbeat, leg swelling, near-syncope, orthopnea, palpitations, paroxysmal nocturnal dyspnea and syncope.   Respiratory: Negative for cough and sputum production.    Hematologic/Lymphatic: Does not bruise/bleed easily.   Skin: Negative for itching, rash and suspicious lesions.   Musculoskeletal: Negative for falls.   Gastrointestinal: Negative for abdominal pain and change in bowel habit.   Genitourinary: Negative for dysuria.   Neurological: Negative for disturbances in coordination, dizziness and loss of balance.   Psychiatric/Behavioral: Negative for altered mental status.          Past Medical History:   Diagnosis Date    A-fib     Acute kidney injury     COPD (chronic obstructive pulmonary disease)     Coronary artery disease     Diabetes mellitus     Encounter for blood transfusion     Former smoker     Hypertension     Myocardial infarction     Thyroid disease        Current Outpatient Medications:     apixaban (ELIQUIS) 5 mg Tab, Take 5 mg by mouth 2 (two) times daily., Disp: , Rfl:     aspirin (ECOTRIN) 81 MG EC tablet, Take 81 mg by mouth once daily., Disp: , Rfl:     atorvastatin (LIPITOR) 80 MG tablet, Take 80 mg by mouth once daily., Disp: , Rfl:     carvedilol (COREG) 6.25 MG tablet, Take 2 tablets (12.5 mg total) by mouth 2 (two) times daily., Disp: 120 tablet, Rfl: 11    enalapril (VASOTEC) 20 MG tablet, 20 mg 2 (two) times daily. , Disp: , Rfl: 5    furosemide (LASIX) 20 MG tablet, Take 1 tablet (20 mg total) by mouth once daily., Disp: 10 tablet, Rfl: 0    isosorbide mononitrate (IMDUR) 30 MG 24 hr tablet, Take 30 mg by mouth once daily., Disp: , Rfl:     levothyroxine (SYNTHROID) 25 MCG tablet, Take 25 mcg by mouth before breakfast., Disp: ,  "Rfl:     Objective:    Physical Exam  Vitals reviewed.   Constitutional:       General: He is not in acute distress.     Appearance: He is well-developed. He is not diaphoretic.   HENT:      Head: Normocephalic and atraumatic.   Neck:      Vascular: No JVD.   Cardiovascular:      Rate and Rhythm: Normal rate and regular rhythm.      Pulses: Intact distal pulses.      Heart sounds: Murmur heard.   High-pitched blowing holosystolic murmur is present at the apex.  Pulmonary:      Effort: Pulmonary effort is normal. No respiratory distress.      Breath sounds: Normal breath sounds.   Musculoskeletal:      Cervical back: Normal range of motion.      Right lower leg: No edema.      Left lower leg: No edema.   Skin:     General: Skin is warm and dry.   Neurological:      Mental Status: He is alert and oriented to person, place, and time.             Vitals:    06/27/22 1412 06/27/22 1419   BP: 115/69 117/71   BP Location: Right arm Left arm   Patient Position: Sitting Sitting   BP Method: Large (Automatic) Large (Automatic)   Pulse: 93 93   SpO2: 99%    Weight: 67 kg (147 lb 11.3 oz)    Height: 5' 3.39" (1.61 m)      Body mass index is 25.85 kg/m².    Test(s) Reviewed  I have reviewed the following in detail:  [] Stress test   [] Angiography   [] Echocardiogram   [] Labs   [] Other       Chemistry        Component Value Date/Time     05/17/2022 0945    K 4.0 05/17/2022 0945     05/17/2022 0945    CO2 24 05/17/2022 0945    BUN 13 05/17/2022 0945    CREATININE 1.0 05/17/2022 0945     (H) 05/17/2022 0945        Component Value Date/Time    CALCIUM 9.4 05/17/2022 0945    ALKPHOS 84 05/28/2021 1604    AST 17 05/28/2021 1604    ALT 19 05/28/2021 1604    BILITOT 1.3 (H) 05/28/2021 1604    ESTGFRAFRICA >60.0 05/17/2022 0945    EGFRNONAA >60.0 05/17/2022 0945            TTE 6/27/22  · The left ventricle is severely enlarged with mild eccentric hypertrophy and severely decreased systolic function.  · The estimated " ejection fraction is 25%.  · There are segmental left ventricular wall motion abnormalities.  · Left ventricular diastolic dysfunction.  · Mild right ventricular enlargement with mildly to moderately reduced right ventricular systolic function.  · Severe left atrial enlargement.  · Mild right atrial enlargement.  · The MR is moderate to moderate -severe ( 2-3+).  · Mild to moderate tricuspid regurgitation.  · Mild pulmonic regurgitation.  · Intermediate central venous pressure (8 mmHg).  · The estimated PA systolic pressure is 47 mmHg.  · There is pulmonary hypertension.      Assessment:   Mitral regurgitation  Cleveland Capps is a 66 y.o. male referred by Dr Grimaldo for evaluation of severe MR (NYHA Class II sx).    Mitral Valve Disease Etiology: FMR    The patient has undergone the following MitraClip work-up:    SANTHOSH (Date 5/17/22): Severe functional mitral insufficiency with the largest jet being between A2-P2 EF= 20%.    LHC (Date 3/30/21): Severe three-vessel coronary disease.  Status post CABG with incomplete but excellent revascularization.   STS: 4%    Frailty: 2/3 (failed  and walk, needs albumin)    CT Surgery risk assessment: not needed, functional MR   PFTs: not done   Comorbidities: Afib, CVA hx,     PAF (paroxysmal atrial fibrillation)  S/p Watchman device with Dr Schmidt on 5/17/22. No need for 45 day SANTHOSH per Dr Schmidt due to difficult intubation. OK to stop Eliquis now and start ASA/Plavix.      Coronary artery disease  S/p CABG and PCI. Asymptomatic.     Chronic combined systolic and diastolic congestive heart failure  NYHA    Aortic atherosclerosis  See CTA    Plan:     1. Patient's symptoms are not yet severe enough to warrant MitraClip. If he develops NYHA IV symptoms refractory to medical therapy in the future, we can offer him MitraClip.  2. Regarding his Watchman, OK to discontinue Eliquis today and start ASA/Plavix. He is a difficult SANTHOSH due to history of tracheostomy complication.  Will stop Plavix after 4.5 months.   3. Virtual Watchman follow up at 6 months, 1 year and 2 years.          Ani Aleman PA-C  Valve and Structural Heart Disease  [facility]    Shared Decision Making:  This patient was seen by a multidisciplinary heart team involving both a Structural Heart Specialist (Interventional Cardiologist) and a Cardiothoracic Surgeon. The patient was involved in shared decision-making to define clearer goals for treatment and align health decisions with their current values.  The patient understands the risks and expected benefits of their treatment options.    Staff:  I have personally taken the history and examined this patient and agree with the fellow's note as stated above and amended it accordingly :-)  He has mild RUDOLPH which rarely occurs and is not symptom-limiting.  Agree with above.

## 2022-06-27 ENCOUNTER — OFFICE VISIT (OUTPATIENT)
Dept: CARDIOLOGY | Facility: CLINIC | Age: 67
End: 2022-06-27
Payer: MEDICARE

## 2022-06-27 ENCOUNTER — HOSPITAL ENCOUNTER (OUTPATIENT)
Dept: CARDIOLOGY | Facility: HOSPITAL | Age: 67
Discharge: HOME | End: 2022-06-27
Attending: INTERNAL MEDICINE
Payer: MEDICARE

## 2022-06-27 VITALS
HEIGHT: 63 IN | SYSTOLIC BLOOD PRESSURE: 117 MMHG | WEIGHT: 147.69 LBS | HEART RATE: 93 BPM | OXYGEN SATURATION: 99 % | BODY MASS INDEX: 26.17 KG/M2 | DIASTOLIC BLOOD PRESSURE: 71 MMHG

## 2022-06-27 VITALS
SYSTOLIC BLOOD PRESSURE: 100 MMHG | WEIGHT: 146 LBS | HEART RATE: 89 BPM | BODY MASS INDEX: 27.56 KG/M2 | HEIGHT: 61 IN | DIASTOLIC BLOOD PRESSURE: 62 MMHG

## 2022-06-27 DIAGNOSIS — I70.0 AORTIC ATHEROSCLEROSIS: ICD-10-CM

## 2022-06-27 DIAGNOSIS — I50.42 CHRONIC COMBINED SYSTOLIC AND DIASTOLIC CONGESTIVE HEART FAILURE: ICD-10-CM

## 2022-06-27 DIAGNOSIS — I25.10 CORONARY ARTERY DISEASE INVOLVING NATIVE CORONARY ARTERY OF NATIVE HEART WITHOUT ANGINA PECTORIS: ICD-10-CM

## 2022-06-27 DIAGNOSIS — I34.0 MITRAL VALVE INSUFFICIENCY, UNSPECIFIED ETIOLOGY: ICD-10-CM

## 2022-06-27 DIAGNOSIS — I48.0 PAF (PAROXYSMAL ATRIAL FIBRILLATION): ICD-10-CM

## 2022-06-27 LAB
ASCENDING AORTA: 2.85 CM
AV INDEX (PROSTH): 0.65
AV MEAN GRADIENT: 3 MMHG
AV PEAK GRADIENT: 5 MMHG
AV VALVE AREA: 2.05 CM2
AV VELOCITY RATIO: 0.61
BSA FOR ECHO PROCEDURE: 1.69 M2
CV ECHO LV RWT: 0.29 CM
DOP CALC AO PEAK VEL: 1.11 M/S
DOP CALC AO VTI: 18.66 CM
DOP CALC LVOT AREA: 3.2 CM2
DOP CALC LVOT DIAMETER: 2.01 CM
DOP CALC LVOT PEAK VEL: 0.68 M/S
DOP CALC LVOT STROKE VOLUME: 38.34 CM3
DOP CALC MV VTI: 23.74 CM
DOP CALCLVOT PEAK VEL VTI: 12.09 CM
E/E' RATIO: 22.2 M/S
ECHO LV POSTERIOR WALL: 0.94 CM (ref 0.6–1.1)
EJECTION FRACTION: 25 %
FRACTIONAL SHORTENING: 11 % (ref 28–44)
INTERVENTRICULAR SEPTUM: 0.55 CM (ref 0.6–1.1)
LA MAJOR: 6.25 CM
LA MINOR: 6.15 CM
LA WIDTH: 4.77 CM
LEFT ATRIUM SIZE: 5.32 CM
LEFT ATRIUM VOLUME INDEX MOD: 62.5 ML/M2
LEFT ATRIUM VOLUME INDEX: 81 ML/M2
LEFT ATRIUM VOLUME MOD: 103.17 CM3
LEFT ATRIUM VOLUME: 133.72 CM3
LEFT INTERNAL DIMENSION IN SYSTOLE: 5.78 CM (ref 2.1–4)
LEFT VENTRICLE DIASTOLIC VOLUME INDEX: 132.17 ML/M2
LEFT VENTRICLE DIASTOLIC VOLUME: 218.08 ML
LEFT VENTRICLE MASS INDEX: 120 G/M2
LEFT VENTRICLE SYSTOLIC VOLUME INDEX: 100 ML/M2
LEFT VENTRICLE SYSTOLIC VOLUME: 164.96 ML
LEFT VENTRICULAR INTERNAL DIMENSION IN DIASTOLE: 6.53 CM (ref 3.5–6)
LEFT VENTRICULAR MASS: 198.12 G
LV LATERAL E/E' RATIO: 15.86 M/S
LV SEPTAL E/E' RATIO: 37 M/S
MV MEAN GRADIENT: 1 MMHG
MV PEAK E VEL: 1.11 M/S
MV PEAK GRADIENT: 7 MMHG
MV VALVE AREA BY CONTINUITY EQUATION: 1.62 CM2
PISA MRMAX VEL: 0.04 M/S
PISA RADIUS: 0.92 CM
PISA TR MAX VEL: 3.13 M/S
PISA TR VN NYQUIST: 0 M/S
RA MAJOR: 5.19 CM
RA PRESSURE: 8 MMHG
RA WIDTH: 4.17 CM
RIGHT VENTRICULAR END-DIASTOLIC DIMENSION: 4.49 CM
RV TISSUE DOPPLER FREE WALL SYSTOLIC VELOCITY 1 (APICAL 4 CHAMBER VIEW): 5.4 CM/S
SINUS: 2.86 CM
STJ: 2.45 CM
TDI LATERAL: 0.07 M/S
TDI SEPTAL: 0.03 M/S
TDI: 0.05 M/S
TR MAX PG: 39 MMHG
TRICUSPID ANNULAR PLANE SYSTOLIC EXCURSION: 0.89 CM
TV REST PULMONARY ARTERY PRESSURE: 47 MMHG

## 2022-06-27 PROCEDURE — 1126F AMNT PAIN NOTED NONE PRSNT: CPT | Mod: CPTII,S$GLB,, | Performed by: INTERNAL MEDICINE

## 2022-06-27 PROCEDURE — 1159F MED LIST DOCD IN RCRD: CPT | Mod: CPTII,S$GLB,, | Performed by: INTERNAL MEDICINE

## 2022-06-27 PROCEDURE — 3074F PR MOST RECENT SYSTOLIC BLOOD PRESSURE < 130 MM HG: ICD-10-PCS | Mod: CPTII,S$GLB,, | Performed by: INTERNAL MEDICINE

## 2022-06-27 PROCEDURE — 99214 OFFICE O/P EST MOD 30 MIN: CPT | Mod: S$GLB,,, | Performed by: INTERNAL MEDICINE

## 2022-06-27 PROCEDURE — 4010F ACE/ARB THERAPY RXD/TAKEN: CPT | Mod: CPTII,S$GLB,, | Performed by: INTERNAL MEDICINE

## 2022-06-27 PROCEDURE — 99999 PR PBB SHADOW E&M-EST. PATIENT-LVL III: ICD-10-PCS | Mod: PBBFAC,,, | Performed by: INTERNAL MEDICINE

## 2022-06-27 PROCEDURE — 3008F PR BODY MASS INDEX (BMI) DOCUMENTED: ICD-10-PCS | Mod: CPTII,S$GLB,, | Performed by: INTERNAL MEDICINE

## 2022-06-27 PROCEDURE — 3078F DIAST BP <80 MM HG: CPT | Mod: CPTII,S$GLB,, | Performed by: INTERNAL MEDICINE

## 2022-06-27 PROCEDURE — 3078F PR MOST RECENT DIASTOLIC BLOOD PRESSURE < 80 MM HG: ICD-10-PCS | Mod: CPTII,S$GLB,, | Performed by: INTERNAL MEDICINE

## 2022-06-27 PROCEDURE — 1126F PR PAIN SEVERITY QUANTIFIED, NO PAIN PRESENT: ICD-10-PCS | Mod: CPTII,S$GLB,, | Performed by: INTERNAL MEDICINE

## 2022-06-27 PROCEDURE — 93306 TTE W/DOPPLER COMPLETE: CPT

## 2022-06-27 PROCEDURE — 3008F BODY MASS INDEX DOCD: CPT | Mod: CPTII,S$GLB,, | Performed by: INTERNAL MEDICINE

## 2022-06-27 PROCEDURE — 4010F PR ACE/ARB THEARPY RXD/TAKEN: ICD-10-PCS | Mod: CPTII,S$GLB,, | Performed by: INTERNAL MEDICINE

## 2022-06-27 PROCEDURE — 1159F PR MEDICATION LIST DOCUMENTED IN MEDICAL RECORD: ICD-10-PCS | Mod: CPTII,S$GLB,, | Performed by: INTERNAL MEDICINE

## 2022-06-27 PROCEDURE — 99999 PR PBB SHADOW E&M-EST. PATIENT-LVL III: CPT | Mod: PBBFAC,,, | Performed by: INTERNAL MEDICINE

## 2022-06-27 PROCEDURE — 3074F SYST BP LT 130 MM HG: CPT | Mod: CPTII,S$GLB,, | Performed by: INTERNAL MEDICINE

## 2022-06-27 PROCEDURE — 93306 TTE W/DOPPLER COMPLETE: CPT | Mod: 26,,, | Performed by: INTERNAL MEDICINE

## 2022-06-27 PROCEDURE — 99214 PR OFFICE/OUTPT VISIT, EST, LEVL IV, 30-39 MIN: ICD-10-PCS | Mod: S$GLB,,, | Performed by: INTERNAL MEDICINE

## 2022-06-27 PROCEDURE — 93306 ECHO (CUPID ONLY): ICD-10-PCS | Mod: 26,,, | Performed by: INTERNAL MEDICINE

## 2022-06-27 RX ORDER — CLOPIDOGREL BISULFATE 75 MG/1
75 TABLET ORAL DAILY
Qty: 30 TABLET | Refills: 11 | Status: ON HOLD | OUTPATIENT
Start: 2022-06-27 | End: 2023-02-12 | Stop reason: HOSPADM

## 2022-07-27 ENCOUNTER — HOSPITAL ENCOUNTER (EMERGENCY)
Facility: HOSPITAL | Age: 67
Discharge: HOME | End: 2022-07-27
Attending: EMERGENCY MEDICINE
Payer: MEDICARE

## 2022-07-27 VITALS
HEIGHT: 61 IN | RESPIRATION RATE: 16 BRPM | WEIGHT: 146 LBS | BODY MASS INDEX: 27.56 KG/M2 | OXYGEN SATURATION: 95 % | TEMPERATURE: 98 F | HEART RATE: 92 BPM | DIASTOLIC BLOOD PRESSURE: 76 MMHG | SYSTOLIC BLOOD PRESSURE: 125 MMHG

## 2022-07-27 DIAGNOSIS — R06.02 SHORTNESS OF BREATH: ICD-10-CM

## 2022-07-27 DIAGNOSIS — J44.1 COPD WITH ACUTE EXACERBATION: Primary | ICD-10-CM

## 2022-07-27 LAB
ALBUMIN SERPL BCP-MCNC: 3.4 G/DL (ref 3.5–5.2)
ALP SERPL-CCNC: 101 U/L (ref 55–135)
ALT SERPL W/O P-5'-P-CCNC: 26 U/L (ref 10–44)
ANION GAP SERPL CALC-SCNC: 13 MMOL/L (ref 8–16)
AST SERPL-CCNC: 20 U/L (ref 10–40)
BASOPHILS # BLD AUTO: 0.03 K/UL (ref 0–0.2)
BASOPHILS NFR BLD: 0.5 % (ref 0–1.9)
BILIRUB SERPL-MCNC: 2.7 MG/DL (ref 0.1–1)
BNP SERPL-MCNC: 473 PG/ML (ref 0–99)
BUN SERPL-MCNC: 21 MG/DL (ref 8–23)
CALCIUM SERPL-MCNC: 8.6 MG/DL (ref 8.7–10.5)
CHLORIDE SERPL-SCNC: 104 MMOL/L (ref 95–110)
CO2 SERPL-SCNC: 22 MMOL/L (ref 23–29)
CREAT SERPL-MCNC: 1.4 MG/DL (ref 0.5–1.4)
DIFFERENTIAL METHOD: ABNORMAL
EOSINOPHIL # BLD AUTO: 0.1 K/UL (ref 0–0.5)
EOSINOPHIL NFR BLD: 2.2 % (ref 0–8)
ERYTHROCYTE [DISTWIDTH] IN BLOOD BY AUTOMATED COUNT: 16.2 % (ref 11.5–14.5)
EST. GFR  (AFRICAN AMERICAN): >60 ML/MIN/1.73 M^2
EST. GFR  (NON AFRICAN AMERICAN): 52.4 ML/MIN/1.73 M^2
GLUCOSE SERPL-MCNC: 125 MG/DL (ref 70–110)
HCT VFR BLD AUTO: 36 % (ref 40–54)
HGB BLD-MCNC: 11.9 G/DL (ref 14–18)
IMM GRANULOCYTES # BLD AUTO: 0.02 K/UL (ref 0–0.04)
IMM GRANULOCYTES NFR BLD AUTO: 0.3 % (ref 0–0.5)
INFLUENZA A, MOLECULAR: NEGATIVE
INFLUENZA B, MOLECULAR: NEGATIVE
LYMPHOCYTES # BLD AUTO: 1.3 K/UL (ref 1–4.8)
LYMPHOCYTES NFR BLD: 21.3 % (ref 18–48)
MCH RBC QN AUTO: 32.7 PG (ref 27–31)
MCHC RBC AUTO-ENTMCNC: 33.1 G/DL (ref 32–36)
MCV RBC AUTO: 99 FL (ref 82–98)
MONOCYTES # BLD AUTO: 0.6 K/UL (ref 0.3–1)
MONOCYTES NFR BLD: 9.7 % (ref 4–15)
NEUTROPHILS # BLD AUTO: 4.2 K/UL (ref 1.8–7.7)
NEUTROPHILS NFR BLD: 66 % (ref 38–73)
NRBC BLD-RTO: 0 /100 WBC
PLATELET # BLD AUTO: 181 K/UL (ref 150–450)
PMV BLD AUTO: 8.6 FL (ref 9.2–12.9)
POTASSIUM SERPL-SCNC: 4.2 MMOL/L (ref 3.5–5.1)
PROT SERPL-MCNC: 7.2 G/DL (ref 6–8.4)
RBC # BLD AUTO: 3.64 M/UL (ref 4.6–6.2)
SARS-COV-2 RDRP RESP QL NAA+PROBE: NEGATIVE
SODIUM SERPL-SCNC: 139 MMOL/L (ref 136–145)
SPECIMEN SOURCE: NORMAL
TROPONIN I SERPL DL<=0.01 NG/ML-MCNC: 0.02 NG/ML (ref 0–0.03)
WBC # BLD AUTO: 6.29 K/UL (ref 3.9–12.7)

## 2022-07-27 PROCEDURE — 84484 ASSAY OF TROPONIN QUANT: CPT | Performed by: EMERGENCY MEDICINE

## 2022-07-27 PROCEDURE — 71045 XR CHEST AP PORTABLE: ICD-10-PCS | Mod: 26,,, | Performed by: RADIOLOGY

## 2022-07-27 PROCEDURE — 71045 X-RAY EXAM CHEST 1 VIEW: CPT | Mod: TC

## 2022-07-27 PROCEDURE — 93010 EKG 12-LEAD: ICD-10-PCS | Mod: ,,, | Performed by: INTERNAL MEDICINE

## 2022-07-27 PROCEDURE — 93010 ELECTROCARDIOGRAM REPORT: CPT | Mod: ,,, | Performed by: INTERNAL MEDICINE

## 2022-07-27 PROCEDURE — 80053 COMPREHEN METABOLIC PANEL: CPT | Performed by: EMERGENCY MEDICINE

## 2022-07-27 PROCEDURE — U0002 COVID-19 LAB TEST NON-CDC: HCPCS | Performed by: EMERGENCY MEDICINE

## 2022-07-27 PROCEDURE — 85025 COMPLETE CBC W/AUTO DIFF WBC: CPT | Performed by: EMERGENCY MEDICINE

## 2022-07-27 PROCEDURE — 99285 EMERGENCY DEPT VISIT HI MDM: CPT | Mod: 25

## 2022-07-27 PROCEDURE — 93005 ELECTROCARDIOGRAM TRACING: CPT

## 2022-07-27 PROCEDURE — 87502 INFLUENZA DNA AMP PROBE: CPT | Performed by: EMERGENCY MEDICINE

## 2022-07-27 PROCEDURE — 83880 ASSAY OF NATRIURETIC PEPTIDE: CPT | Performed by: EMERGENCY MEDICINE

## 2022-07-27 PROCEDURE — 71045 X-RAY EXAM CHEST 1 VIEW: CPT | Mod: 26,,, | Performed by: RADIOLOGY

## 2022-07-27 RX ORDER — DOXYCYCLINE 100 MG/1
100 CAPSULE ORAL 2 TIMES DAILY
Qty: 14 CAPSULE | Refills: 0 | Status: SHIPPED | OUTPATIENT
Start: 2022-07-27 | End: 2022-08-03

## 2022-07-28 NOTE — DISCHARGE INSTRUCTIONS
Continue previously prescribed medications and treatments.  Take doxycycline for COPD exacerbation.  Follow-up with Dr. Snyder within the next several days, and return here for any worsening signs or symptoms.

## 2022-08-18 NOTE — Clinical Note
History: This is a patient with the following problems. 1. Mitral valve repair for acute mitral regurgitation in 1995. Surgery was performed at [facility].  2. Extremely irregular cardiology follow-up thereafter. 3. Left bundle-branch block on electrocardiogram now, which is possibly a year old. Transurethral resection procedure performed in July 2018 with recurrent complications related to persistent stricture at the bladder neck and multiple UTIs. Most recent urological procedure was performed in October 2018 at [facility].  4. Multiple episodes of ventricular ectopy were noted during that procedure. The patient was thereafter seen by his primary care physician who performed a Holter monitor, which revealed frequent premature ventricular contractions, but there was also nonsustained ventricular tachycardia with one episode lasting for 6 beats noted. The patient was therefore referred to my office urgently for evaluation. 5. The patient has had episodes of near syncope, several weeks prior to his last evaluation by me in December 2018. 6.   Implant of a Medtronic [id number]  Evera [id number], atrial lead is a Medtronic [id number], ventricular lead is a Medtronic [id number] on 4-2-2019. PT brought in due to alert transmission showing atrial fibrillation. Pt is not on any anticoagulation. Patient will start Eliquis 5 mg BID        Vaccinated        ICD INTERROGATION:  MVPR   1. Battery voltage: 2.97 volts    4.9 years  2. Charge time: 3.8 seconds  3. Impedances:  A= 399 ohms    RV= 342 ohms    HV= 65 ohms  4. P-wave:  2.9 mV        RV-wave:  13 mV  5. Thresholds:   A= 0.75 V @ 0.4 ms     RV= 1.0V @ 0.4  ms   6. AP 68.2 %        2.0 %  7. Atrial fibrillation episode detected lasting 6 hours. 8. Vascular Hemodynamic Monitor:  No indication of fluid retention     ASSESSMENT:  Appropriate ICD function. Reprogrammed lower rate to 50/minute. PLAN:  1. The wire was removed from the LA. ICD follow up in 3 months. 2.   Carelink transmission in 6 months. 3.  Pt is aware that continuous home monitoring is strongly recommended. Adrian Huitron M.D., F.A.C.C.

## 2022-09-19 ENCOUNTER — HOSPITAL ENCOUNTER (EMERGENCY)
Facility: HOSPITAL | Age: 67
Discharge: HOME | End: 2022-09-19
Attending: EMERGENCY MEDICINE
Payer: MEDICARE

## 2022-09-19 VITALS
RESPIRATION RATE: 22 BRPM | DIASTOLIC BLOOD PRESSURE: 99 MMHG | SYSTOLIC BLOOD PRESSURE: 126 MMHG | HEIGHT: 61 IN | WEIGHT: 146 LBS | TEMPERATURE: 98 F | OXYGEN SATURATION: 93 % | HEART RATE: 99 BPM | BODY MASS INDEX: 27.56 KG/M2

## 2022-09-19 DIAGNOSIS — R06.02 SHORTNESS OF BREATH: ICD-10-CM

## 2022-09-19 DIAGNOSIS — I50.9 CONGESTIVE HEART FAILURE, UNSPECIFIED HF CHRONICITY, UNSPECIFIED HEART FAILURE TYPE: Primary | ICD-10-CM

## 2022-09-19 LAB
ALBUMIN SERPL BCP-MCNC: 3.3 G/DL (ref 3.5–5.2)
ALP SERPL-CCNC: 125 U/L (ref 55–135)
ALT SERPL W/O P-5'-P-CCNC: 50 U/L (ref 10–44)
ANION GAP SERPL CALC-SCNC: 16 MMOL/L (ref 8–16)
AST SERPL-CCNC: 43 U/L (ref 10–40)
BASOPHILS # BLD AUTO: 0.03 K/UL (ref 0–0.2)
BASOPHILS NFR BLD: 0.4 % (ref 0–1.9)
BILIRUB SERPL-MCNC: 2.7 MG/DL (ref 0.1–1)
BNP SERPL-MCNC: 576 PG/ML (ref 0–99)
BUN SERPL-MCNC: 19 MG/DL (ref 8–23)
CALCIUM SERPL-MCNC: 8.7 MG/DL (ref 8.7–10.5)
CHLORIDE SERPL-SCNC: 100 MMOL/L (ref 95–110)
CO2 SERPL-SCNC: 23 MMOL/L (ref 23–29)
CREAT SERPL-MCNC: 1.2 MG/DL (ref 0.5–1.4)
DIFFERENTIAL METHOD: ABNORMAL
EOSINOPHIL # BLD AUTO: 0.2 K/UL (ref 0–0.5)
EOSINOPHIL NFR BLD: 2.6 % (ref 0–8)
ERYTHROCYTE [DISTWIDTH] IN BLOOD BY AUTOMATED COUNT: 15.9 % (ref 11.5–14.5)
EST. GFR  (NO RACE VARIABLE): >60 ML/MIN/1.73 M^2
GLUCOSE SERPL-MCNC: 167 MG/DL (ref 70–110)
HCT VFR BLD AUTO: 41.2 % (ref 40–54)
HGB BLD-MCNC: 13 G/DL (ref 14–18)
IMM GRANULOCYTES # BLD AUTO: 0.04 K/UL (ref 0–0.04)
IMM GRANULOCYTES NFR BLD AUTO: 0.5 % (ref 0–0.5)
LYMPHOCYTES # BLD AUTO: 1.3 K/UL (ref 1–4.8)
LYMPHOCYTES NFR BLD: 15.8 % (ref 18–48)
MCH RBC QN AUTO: 32.7 PG (ref 27–31)
MCHC RBC AUTO-ENTMCNC: 31.6 G/DL (ref 32–36)
MCV RBC AUTO: 104 FL (ref 82–98)
MONOCYTES # BLD AUTO: 0.7 K/UL (ref 0.3–1)
MONOCYTES NFR BLD: 8.2 % (ref 4–15)
NEUTROPHILS # BLD AUTO: 5.8 K/UL (ref 1.8–7.7)
NEUTROPHILS NFR BLD: 72.5 % (ref 38–73)
NRBC BLD-RTO: 0 /100 WBC
PLATELET # BLD AUTO: 190 K/UL (ref 150–450)
PMV BLD AUTO: 8.9 FL (ref 9.2–12.9)
POTASSIUM SERPL-SCNC: 4.2 MMOL/L (ref 3.5–5.1)
PROT SERPL-MCNC: 7.2 G/DL (ref 6–8.4)
RBC # BLD AUTO: 3.98 M/UL (ref 4.6–6.2)
SARS-COV-2 RDRP RESP QL NAA+PROBE: NEGATIVE
SODIUM SERPL-SCNC: 139 MMOL/L (ref 136–145)
TROPONIN I SERPL DL<=0.01 NG/ML-MCNC: 0.01 NG/ML (ref 0–0.03)
WBC # BLD AUTO: 7.96 K/UL (ref 3.9–12.7)

## 2022-09-19 PROCEDURE — U0002 COVID-19 LAB TEST NON-CDC: HCPCS | Performed by: EMERGENCY MEDICINE

## 2022-09-19 PROCEDURE — 93010 EKG 12-LEAD: ICD-10-PCS | Mod: ,,, | Performed by: INTERNAL MEDICINE

## 2022-09-19 PROCEDURE — 85025 COMPLETE CBC W/AUTO DIFF WBC: CPT | Performed by: EMERGENCY MEDICINE

## 2022-09-19 PROCEDURE — 93005 ELECTROCARDIOGRAM TRACING: CPT

## 2022-09-19 PROCEDURE — 93010 ELECTROCARDIOGRAM REPORT: CPT | Mod: ,,, | Performed by: INTERNAL MEDICINE

## 2022-09-19 PROCEDURE — 71045 XR CHEST 1 VIEW: ICD-10-PCS | Mod: 26,,, | Performed by: RADIOLOGY

## 2022-09-19 PROCEDURE — 99285 EMERGENCY DEPT VISIT HI MDM: CPT | Mod: 25

## 2022-09-19 PROCEDURE — 84484 ASSAY OF TROPONIN QUANT: CPT | Performed by: EMERGENCY MEDICINE

## 2022-09-19 PROCEDURE — 83880 ASSAY OF NATRIURETIC PEPTIDE: CPT | Performed by: EMERGENCY MEDICINE

## 2022-09-19 PROCEDURE — 71045 X-RAY EXAM CHEST 1 VIEW: CPT | Mod: TC

## 2022-09-19 PROCEDURE — 71045 X-RAY EXAM CHEST 1 VIEW: CPT | Mod: 26,,, | Performed by: RADIOLOGY

## 2022-09-19 PROCEDURE — 80053 COMPREHEN METABOLIC PANEL: CPT | Performed by: EMERGENCY MEDICINE

## 2022-09-19 RX ORDER — POTASSIUM CHLORIDE 750 MG/1
10 TABLET, EXTENDED RELEASE ORAL DAILY
Status: ON HOLD | COMMUNITY
Start: 2022-08-03 | End: 2022-11-28 | Stop reason: HOSPADM

## 2022-09-19 NOTE — ED PROVIDER NOTES
Encounter Date: 9/19/2022       History     Chief Complaint   Patient presents with    Shortness of Breath     Shortness of breath that has been getting worse over the past 3 days.       66-year-old male with extensive cardiac history, also with COPD and CHF, as well as paroxysmal atrial fibrillation comes complaining of worsening shortness of breath for the last several days.  He states he became significantly short of breath when descending some stairs recently.  Denies chest pain or palpitations.  He has been taking his medications, including diuretics, but has noted some mild increase in his chronic lower leg edema.  He has been coughing occasionally, but denies any increase in sputum production.  No fever or chills.  O2 saturations here 97% on room air.    Review of patient's allergies indicates:   Allergen Reactions    Penicillins Hives     Past Medical History:   Diagnosis Date    A-fib     Acute kidney injury     COPD (chronic obstructive pulmonary disease)     Coronary artery disease     Diabetes mellitus     Encounter for blood transfusion     Former smoker     Hypertension     Myocardial infarction     Thyroid disease      Past Surgical History:   Procedure Laterality Date    CLOSURE OF LEFT ATRIAL APPENDAGE USING DEVICE N/A 5/17/2022    Procedure: Left atrial appendage closure device;  Surgeon: Tre Schmidt MD;  Location: [facility] CATH LAB;  Service: Cardiology;  Laterality: N/A;    COLONOSCOPY N/A 1/3/2017    Procedure: COLONOSCOPY;  Surgeon: Marcus Green MD;  Location: [facility];  Service: Endoscopy;  Laterality: N/A;    CORONARY BYPASS GRAFT ANGIOGRAPHY  3/30/2021    Procedure: Bypass graft study;  Surgeon: Tre Schmidt MD;  Location: [facility] CATH LAB;  Service: Cardiology;;    CORONARY STENT PLACEMENT      GASTROSTOMY TUBE PLACEMENT      INSERTION OF PACEMAKER  04/2017    INTRAVASCULAR ULTRASOUND, NON-CORONARY  5/17/2022    Procedure: Intravascular Ultrasound, Non-Coronary;  Surgeon: Tre  KWESI Schmidt MD;  Location: [facility] CATH LAB;  Service: Cardiology;;    LEFT HEART CATHETERIZATION N/A 3/30/2021    Procedure: Left heart cath;  Surgeon: Tre Schmidt MD;  Location: [facility] CATH LAB;  Service: Cardiology;  Laterality: N/A;    PEG TUBE REMOVAL      TRACHEOSTOMY CLOSURE      TRACHEOSTOMY TUBE PLACEMENT      TRANSESOPHAGEAL ECHOCARDIOGRAPHY N/A 2022    Procedure: ECHOCARDIOGRAM, TRANSESOPHAGEAL;  Surgeon: [facility] Diagnostic Provider;  Location: [facility] CATH LAB;  Service: Anesthesiology;  Laterality: N/A;    UPPER GASTROINTESTINAL ENDOSCOPY  2016    Dr. Zarco with PEG tube placement     Family History   Problem Relation Age of Onset    Diabetes Mother     Heart disease Mother     Glaucoma Father     Macular degeneration Father     No Known Problems Sister     Diabetes Brother     Glaucoma Brother     Macular degeneration Brother     No Known Problems Maternal Aunt     No Known Problems Maternal Uncle     No Known Problems Paternal Aunt     No Known Problems Paternal Uncle     No Known Problems Maternal Grandmother     No Known Problems Maternal Grandfather     No Known Problems Paternal Grandmother     No Known Problems Paternal Grandfather     Colon cancer Neg Hx     Colon polyps Neg Hx     Crohn's disease Neg Hx     Ulcerative colitis Neg Hx     Anemia Neg Hx     Arrhythmia Neg Hx     Asthma Neg Hx     Clotting disorder Neg Hx     Fainting Neg Hx     Heart attack Neg Hx     Heart failure Neg Hx     Hyperlipidemia Neg Hx     Hypertension Neg Hx     Stroke Neg Hx     Atrial Septal Defect Neg Hx      Social History     Tobacco Use    Smoking status: Former     Types: Cigarettes     Quit date: 2005     Years since quittin.8    Smokeless tobacco: Never    Tobacco comments:     quit    Substance Use Topics    Alcohol use: No    Drug use: No     Review of Systems   Constitutional: Negative.    HENT: Negative.     Eyes: Negative.    Respiratory:  Positive for cough and shortness of breath.     Cardiovascular:  Positive for leg swelling.   Gastrointestinal: Negative.    Endocrine: Negative.    Genitourinary: Negative.    Musculoskeletal: Negative.    Allergic/Immunologic: Negative.    Neurological: Negative.    Psychiatric/Behavioral: Negative.       Physical Exam     Initial Vitals [09/19/22 0752]   BP Pulse Resp Temp SpO2   (!) 124/94 108 (!) 28 97.8 °F (36.6 °C) 97 %      MAP       --         Physical Exam    Nursing note and vitals reviewed.  Constitutional: He appears well-developed and well-nourished. He is not diaphoretic. No distress.   HENT:   Head: Normocephalic and atraumatic.   Nose: Nose normal.   Mouth/Throat: Oropharynx is clear and moist. No oropharyngeal exudate.   Eyes: Conjunctivae and EOM are normal. No scleral icterus.   Neck: Neck supple. No JVD present.   Normal range of motion.  Cardiovascular:  Normal rate, normal heart sounds and intact distal pulses.     Exam reveals no gallop.       No murmur heard.  Irregularly irregular   Pulmonary/Chest: No stridor. No respiratory distress. He has no wheezes. He has rhonchi (slight, scattered). He has no rales.   Midline sternotomy scar, pacemaker/defibrilator device left upper chest wall. Non-tender.    Abdominal: Abdomen is soft. Bowel sounds are normal. He exhibits no distension. There is no abdominal tenderness. There is no rebound and no guarding.   Musculoskeletal:         General: No tenderness or edema. Normal range of motion.      Cervical back: Normal range of motion and neck supple.     Neurological: He is alert and oriented to person, place, and time. He has normal strength and normal reflexes. He displays normal reflexes. No cranial nerve deficit. GCS score is 15. GCS eye subscore is 4. GCS verbal subscore is 5. GCS motor subscore is 6.   Skin: Skin is warm and dry. Capillary refill takes less than 2 seconds. No rash noted. No erythema.   Psychiatric: He has a normal mood and affect. His behavior is normal.       ED Course    Procedures  Labs Reviewed   CBC W/ AUTO DIFFERENTIAL - Abnormal; Notable for the following components:       Result Value    RBC 3.98 (*)     Hemoglobin 13.0 (*)      (*)     MCH 32.7 (*)     MCHC 31.6 (*)     RDW 15.9 (*)     MPV 8.9 (*)     Lymph % 15.8 (*)     All other components within normal limits   COMPREHENSIVE METABOLIC PANEL - Abnormal; Notable for the following components:    Glucose 167 (*)     Albumin 3.3 (*)     Total Bilirubin 2.7 (*)     AST 43 (*)     ALT 50 (*)     All other components within normal limits   B-TYPE NATRIURETIC PEPTIDE - Abnormal; Notable for the following components:     (*)     All other components within normal limits   SARS-COV-2 RNA AMPLIFICATION, QUAL    Narrative:     Is the patient symptomatic?->No   TROPONIN I     EKG Readings: (Independently Interpreted)   EKG personally reviewed by me shows atrial fibrillation at 99 beats per minute.  Evidence for old infarct, no acute ST and T-wave changes, QRS 92, .      Imaging Results               X-Ray Chest 1 View (Final result)  Result time 09/19/22 09:28:25      Final result by Travon Campbell MD (09/19/22 09:28:25)                   Impression:      1. Findings consistent with congestive heart failure with small bilateral pleural effusions and bibasilar dependent atelectasis.  2. Prior CABG.  3. Pacemaker.  Close interval follow-up is recommended.  This report was flagged in Epic as abnormal.      Electronically signed by: Travon Campbell  Date:    09/19/2022  Time:    09:28               Narrative:    EXAMINATION:  XR CHEST 1 VIEW    CLINICAL HISTORY:  shortness of breath;    TECHNIQUE:  Single frontal view of the chest was performed.    COMPARISON:  07/27/2022.    FINDINGS:  There are diffuse bilateral pulmonary infiltrates suspicious for pulmonary edema.  Small bilateral pleural effusions with dependent atelectasis at the lung bases.    Heart size is enlarged.  Prior CABG.  Trachea midline.    Bony  thorax intact.  Left axillary pacemaker in place.                                    X-Rays:   Independently Interpreted Readings:   Other Readings:  Chest x-ray personally reviewed by me shows increased interstitial markings consistent with pulmonary edema, with bilateral pleural effusions.  Cardiac silhouette slightly enlarged, pacemaker device in place, leads in place.  Normal skeletal structures.  Medications - No data to display  Medical Decision Making:   Differential Diagnosis:   Congestive heart failure, COPD, pneumonia, pneumothorax, etc.  ED Management:  Lab work shows elevated BNP, slightly above what appears to be his baseline.  Chest x-ray shows bilateral pulmonary edema.  While here, patient's O2 saturations have been good, above 95% for most time.  No evidence of infectious process.  Troponin normal.  I believe patient is safe for discharge home  for few days ago, and has been putting salt on his food.  In the meantime he will follow-up with his PCP and his cardiologist.  Return here for any where he will take extra doses of Lasix for the next 5 days.  He will also limit his oral intake of liquids and also sodium intake.  He admits that he ate some boiled shrimp a few days ago, and has not been watching how much he drinks.  He will return here for any worsening signs or symptoms.                          Clinical Impression:   Final diagnoses:  [R06.02] Shortness of breath  [I50.9] Congestive heart failure, unspecified HF chronicity, unspecified heart failure type (Primary)        ED Disposition Condition    Discharge Stable          ED Prescriptions    None       Follow-up Information       Follow up With Specialties Details Why Contact Info    Romero Snyder MD Family Medicine Call today  [phone]  [facility] [place] [address]  [phone]      Your cardiologist  Call today      [place] - Emergency Dept Emergency Medicine  As needed, If symptoms worsen [address]  [facility]  [address]  [phone]             Richie Umaña MD  09/19/22 0950       Richie Umaña MD  09/19/22 0956

## 2022-09-19 NOTE — DISCHARGE INSTRUCTIONS
The worsening shortness of breath is likely because of increased fluid retention, which builds up in your lungs.  Take an extra Lasix tablet daily for the next 4 or 5 days.  Call your primary care provider and your cardiologist today after leaving here.  Return here if worse in any way.

## 2022-09-27 ENCOUNTER — TELEPHONE (OUTPATIENT)
Dept: ELECTROPHYSIOLOGY | Facility: CLINIC | Age: 67
End: 2022-09-27
Payer: MEDICARE

## 2022-09-27 NOTE — TELEPHONE ENCOUNTER
----- Message from Lidya Ortega MA sent at 9/27/2022  3:04 PM CDT -----  Please call Jan Hurtado at [phone]  primary care she need to talk to you about schedule an appointment  for the patient Dx. Afib .  Thank you.

## 2022-09-27 NOTE — TELEPHONE ENCOUNTER
Called Jan Hurtado as stated below for this pt. No answer, left a message with this information and call back #.

## 2022-09-28 ENCOUNTER — TELEPHONE (OUTPATIENT)
Dept: ELECTROPHYSIOLOGY | Facility: CLINIC | Age: 67
End: 2022-09-28
Payer: MEDICARE

## 2022-09-28 NOTE — TELEPHONE ENCOUNTER
----- Message from Vane Castro sent at 9/28/2022  1:42 PM CDT -----  Contact: self  Type: Needs Medical Advice    Who Called:  patient    Best Call Back Number: [phone]   Additional Information: The pt stated that he would like to speak with someone in the office regarding an appt. The pt stated that Dr. Grimaldo referred him to get his heart checked by Dr. Frazier because it was out of rhythm. Please call pt back to verify.  Thanks.

## 2022-09-29 ENCOUNTER — TELEPHONE (OUTPATIENT)
Dept: ELECTROPHYSIOLOGY | Facility: CLINIC | Age: 67
End: 2022-09-29
Payer: MEDICARE

## 2022-09-29 NOTE — TELEPHONE ENCOUNTER
Called pt to nighat a NP appointment with Dr. James. I offered pt to be seen sooner in [place], but he wanted to see Dr. Frazier in [place]. I asked NP questions, pt has a BIO/ICD, outside records requested from [facility] in [place] with Dr. Grimaldo, pt had prior surgery for Watchman implant. After asking these questions, pt verbally confirmed appointment date, time, and location, and thanked me for calling.

## 2022-10-19 ENCOUNTER — TELEPHONE (OUTPATIENT)
Dept: CARDIAC CATH/INVASIVE PROCEDURES | Facility: HOSPITAL | Age: 67
End: 2022-10-19
Payer: MEDICARE

## 2022-10-19 NOTE — TELEPHONE ENCOUNTER
Patient called 6 months s/p Watchman. No answer. Left voicemail instructing him to stop taking Plavix.

## 2022-11-13 ENCOUNTER — HOSPITAL ENCOUNTER (EMERGENCY)
Facility: HOSPITAL | Age: 67
Discharge: HOME | End: 2022-11-13
Attending: FAMILY MEDICINE
Payer: MEDICARE

## 2022-11-13 VITALS
DIASTOLIC BLOOD PRESSURE: 83 MMHG | RESPIRATION RATE: 16 BRPM | HEART RATE: 102 BPM | BODY MASS INDEX: 25.03 KG/M2 | TEMPERATURE: 98 F | SYSTOLIC BLOOD PRESSURE: 116 MMHG | WEIGHT: 136 LBS | HEIGHT: 62 IN | OXYGEN SATURATION: 96 %

## 2022-11-13 DIAGNOSIS — R06.02 SOB (SHORTNESS OF BREATH): ICD-10-CM

## 2022-11-13 DIAGNOSIS — U07.1 COVID-19: Primary | ICD-10-CM

## 2022-11-13 DIAGNOSIS — J98.4 PNEUMONITIS: ICD-10-CM

## 2022-11-13 DIAGNOSIS — U07.1 COVID-19 VIRUS DETECTED: ICD-10-CM

## 2022-11-13 LAB
ALBUMIN SERPL BCP-MCNC: 3.1 G/DL (ref 3.5–5.2)
ALP SERPL-CCNC: 110 U/L (ref 55–135)
ALT SERPL W/O P-5'-P-CCNC: 35 U/L (ref 10–44)
ANION GAP SERPL CALC-SCNC: 13 MMOL/L (ref 8–16)
AST SERPL-CCNC: 46 U/L (ref 10–40)
BASOPHILS # BLD AUTO: 0.02 K/UL (ref 0–0.2)
BASOPHILS NFR BLD: 0.3 % (ref 0–1.9)
BILIRUB SERPL-MCNC: 1.8 MG/DL (ref 0.1–1)
BNP SERPL-MCNC: 610 PG/ML (ref 0–99)
BUN SERPL-MCNC: 19 MG/DL (ref 8–23)
CALCIUM SERPL-MCNC: 8.4 MG/DL (ref 8.7–10.5)
CHLORIDE SERPL-SCNC: 98 MMOL/L (ref 95–110)
CO2 SERPL-SCNC: 23 MMOL/L (ref 23–29)
CREAT SERPL-MCNC: 1.2 MG/DL (ref 0.5–1.4)
D DIMER PPP IA.FEU-MCNC: 1.2 MG/L FEU
DIFFERENTIAL METHOD: ABNORMAL
EOSINOPHIL # BLD AUTO: 0.1 K/UL (ref 0–0.5)
EOSINOPHIL NFR BLD: 1.3 % (ref 0–8)
ERYTHROCYTE [DISTWIDTH] IN BLOOD BY AUTOMATED COUNT: 16.3 % (ref 11.5–14.5)
EST. GFR  (NO RACE VARIABLE): >60 ML/MIN/1.73 M^2
GLUCOSE SERPL-MCNC: 176 MG/DL (ref 70–110)
GROUP A STREP, MOLECULAR: NEGATIVE
HCT VFR BLD AUTO: 38.7 % (ref 40–54)
HCV AB SERPL QL IA: NORMAL
HGB BLD-MCNC: 12.7 G/DL (ref 14–18)
HIV 1+2 AB+HIV1 P24 AG SERPL QL IA: NORMAL
IMM GRANULOCYTES # BLD AUTO: 0.03 K/UL (ref 0–0.04)
IMM GRANULOCYTES NFR BLD AUTO: 0.5 % (ref 0–0.5)
INFLUENZA A, MOLECULAR: NEGATIVE
INFLUENZA B, MOLECULAR: NEGATIVE
LYMPHOCYTES # BLD AUTO: 1.2 K/UL (ref 1–4.8)
LYMPHOCYTES NFR BLD: 19.7 % (ref 18–48)
MCH RBC QN AUTO: 33.2 PG (ref 27–31)
MCHC RBC AUTO-ENTMCNC: 32.8 G/DL (ref 32–36)
MCV RBC AUTO: 101 FL (ref 82–98)
MONOCYTES # BLD AUTO: 0.3 K/UL (ref 0.3–1)
MONOCYTES NFR BLD: 5.2 % (ref 4–15)
NEUTROPHILS # BLD AUTO: 4.4 K/UL (ref 1.8–7.7)
NEUTROPHILS NFR BLD: 73 % (ref 38–73)
NRBC BLD-RTO: 0 /100 WBC
PLATELET # BLD AUTO: 145 K/UL (ref 150–450)
PMV BLD AUTO: 9.4 FL (ref 9.2–12.9)
POTASSIUM SERPL-SCNC: 4.8 MMOL/L (ref 3.5–5.1)
PROT SERPL-MCNC: 6.5 G/DL (ref 6–8.4)
RBC # BLD AUTO: 3.83 M/UL (ref 4.6–6.2)
SARS-COV-2 RDRP RESP QL NAA+PROBE: POSITIVE
SODIUM SERPL-SCNC: 134 MMOL/L (ref 136–145)
SPECIMEN SOURCE: NORMAL
WBC # BLD AUTO: 6 K/UL (ref 3.9–12.7)

## 2022-11-13 PROCEDURE — 85379 FIBRIN DEGRADATION QUANT: CPT | Performed by: FAMILY MEDICINE

## 2022-11-13 PROCEDURE — 80053 COMPREHEN METABOLIC PANEL: CPT | Performed by: FAMILY MEDICINE

## 2022-11-13 PROCEDURE — 83880 ASSAY OF NATRIURETIC PEPTIDE: CPT | Performed by: FAMILY MEDICINE

## 2022-11-13 PROCEDURE — 71045 XR CHEST AP PORTABLE: ICD-10-PCS | Mod: 26,,, | Performed by: RADIOLOGY

## 2022-11-13 PROCEDURE — 36000 PLACE NEEDLE IN VEIN: CPT

## 2022-11-13 PROCEDURE — 25000242 PHARM REV CODE 250 ALT 637 W/ HCPCS: Performed by: FAMILY MEDICINE

## 2022-11-13 PROCEDURE — 87389 HIV-1 AG W/HIV-1&-2 AB AG IA: CPT | Performed by: FAMILY MEDICINE

## 2022-11-13 PROCEDURE — 71045 X-RAY EXAM CHEST 1 VIEW: CPT | Mod: TC

## 2022-11-13 PROCEDURE — 71045 X-RAY EXAM CHEST 1 VIEW: CPT | Mod: 26,,, | Performed by: RADIOLOGY

## 2022-11-13 PROCEDURE — 93010 ELECTROCARDIOGRAM REPORT: CPT | Mod: ,,, | Performed by: INTERNAL MEDICINE

## 2022-11-13 PROCEDURE — 85025 COMPLETE CBC W/AUTO DIFF WBC: CPT | Performed by: FAMILY MEDICINE

## 2022-11-13 PROCEDURE — 99285 EMERGENCY DEPT VISIT HI MDM: CPT | Mod: 25

## 2022-11-13 PROCEDURE — 86803 HEPATITIS C AB TEST: CPT | Performed by: FAMILY MEDICINE

## 2022-11-13 PROCEDURE — 87651 STREP A DNA AMP PROBE: CPT | Performed by: FAMILY MEDICINE

## 2022-11-13 PROCEDURE — 87502 INFLUENZA DNA AMP PROBE: CPT | Performed by: FAMILY MEDICINE

## 2022-11-13 PROCEDURE — U0002 COVID-19 LAB TEST NON-CDC: HCPCS | Performed by: FAMILY MEDICINE

## 2022-11-13 PROCEDURE — 93005 ELECTROCARDIOGRAM TRACING: CPT

## 2022-11-13 PROCEDURE — 93010 EKG 12-LEAD: ICD-10-PCS | Mod: ,,, | Performed by: INTERNAL MEDICINE

## 2022-11-13 PROCEDURE — 94640 AIRWAY INHALATION TREATMENT: CPT | Mod: XB

## 2022-11-13 RX ORDER — IPRATROPIUM BROMIDE AND ALBUTEROL SULFATE 2.5; .5 MG/3ML; MG/3ML
3 SOLUTION RESPIRATORY (INHALATION)
Status: COMPLETED | OUTPATIENT
Start: 2022-11-13 | End: 2022-11-13

## 2022-11-13 RX ORDER — AZITHROMYCIN 250 MG/1
TABLET, FILM COATED ORAL
Qty: 6 TABLET | Refills: 0 | Status: SHIPPED | OUTPATIENT
Start: 2022-11-13 | End: 2022-11-13 | Stop reason: SDUPTHER

## 2022-11-13 RX ORDER — AZITHROMYCIN 250 MG/1
TABLET, FILM COATED ORAL
Qty: 6 TABLET | Refills: 0 | Status: ON HOLD | OUTPATIENT
Start: 2022-11-13 | End: 2022-11-20 | Stop reason: HOSPADM

## 2022-11-13 RX ADMIN — IPRATROPIUM BROMIDE AND ALBUTEROL SULFATE 3 ML: .5; 2.5 SOLUTION RESPIRATORY (INHALATION) at 12:11

## 2022-11-13 RX ADMIN — IPRATROPIUM BROMIDE AND ALBUTEROL SULFATE 3 ML: .5; 2.5 SOLUTION RESPIRATORY (INHALATION) at 10:11

## 2022-11-13 NOTE — ED PROVIDER NOTES
"Encounter Date: 5/16/2020       History     Chief Complaint   Patient presents with    Shortness of Breath     67-year-old male presents to the ED complaining of shortness of breath per EMS, he has a history of MEIR AFib COPD is not a current cigarette smoker has a past history diabetes states he is had nasal congestion cough sneezing myalgias the past 48 hours has 2 family members who have confirmed COVID-19 cases "I have not been over to visit"    Review of patient's allergies indicates:   Allergen Reactions    Penicillins Hives     Past Medical History:   Diagnosis Date    A-fib     Acute kidney injury     COPD (chronic obstructive pulmonary disease)     Coronary artery disease     Diabetes mellitus     Encounter for blood transfusion     Former smoker     Hypertension     Myocardial infarction     Thyroid disease      Past Surgical History:   Procedure Laterality Date    CLOSURE OF LEFT ATRIAL APPENDAGE USING DEVICE N/A 5/17/2022    Procedure: Left atrial appendage closure device;  Surgeon: Tre Schmidt MD;  Location: [facility] CATH LAB;  Service: Cardiology;  Laterality: N/A;    COLONOSCOPY N/A 1/3/2017    Procedure: COLONOSCOPY;  Surgeon: Marcus Green MD;  Location: [facility] ENDO;  Service: Endoscopy;  Laterality: N/A;    CORONARY BYPASS GRAFT ANGIOGRAPHY  3/30/2021    Procedure: Bypass graft study;  Surgeon: Tre Schmidt MD;  Location: [facility] CATH LAB;  Service: Cardiology;;    CORONARY STENT PLACEMENT      GASTROSTOMY TUBE PLACEMENT      INSERTION OF PACEMAKER  04/2017    INTRAVASCULAR ULTRASOUND, NON-CORONARY  5/17/2022    Procedure: Intravascular Ultrasound, Non-Coronary;  Surgeon: Tre Schmidt MD;  Location: [facility] CATH LAB;  Service: Cardiology;;    LEFT HEART CATHETERIZATION N/A 3/30/2021    Procedure: Left heart cath;  Surgeon: Tre Schmidt MD;  Location: [facility] CATH LAB;  Service: Cardiology;  Laterality: N/A;    PEG TUBE REMOVAL      TRACHEOSTOMY CLOSURE      TRACHEOSTOMY TUBE PLACEMENT      " TRANSESOPHAGEAL ECHOCARDIOGRAPHY N/A 2022    Procedure: ECHOCARDIOGRAM, TRANSESOPHAGEAL;  Surgeon: Yudi Diagnostic Provider;  Location: [facility] CATH LAB;  Service: Anesthesiology;  Laterality: N/A;    UPPER GASTROINTESTINAL ENDOSCOPY  2016    Dr. Zarco with PEG tube placement     Family History   Problem Relation Age of Onset    Diabetes Mother     Heart disease Mother     Glaucoma Father     Macular degeneration Father     No Known Problems Sister     Diabetes Brother     Glaucoma Brother     Macular degeneration Brother     No Known Problems Maternal Aunt     No Known Problems Maternal Uncle     No Known Problems Paternal Aunt     No Known Problems Paternal Uncle     No Known Problems Maternal Grandmother     No Known Problems Maternal Grandfather     No Known Problems Paternal Grandmother     No Known Problems Paternal Grandfather     Colon cancer Neg Hx     Colon polyps Neg Hx     Crohn's disease Neg Hx     Ulcerative colitis Neg Hx     Anemia Neg Hx     Arrhythmia Neg Hx     Asthma Neg Hx     Clotting disorder Neg Hx     Fainting Neg Hx     Heart attack Neg Hx     Heart failure Neg Hx     Hyperlipidemia Neg Hx     Hypertension Neg Hx     Stroke Neg Hx     Atrial Septal Defect Neg Hx      Social History     Tobacco Use    Smoking status: Former     Types: Cigarettes     Quit date: 2005     Years since quittin.9    Smokeless tobacco: Never    Tobacco comments:     quit    Substance Use Topics    Alcohol use: No    Drug use: No     Review of Systems   Constitutional:  Positive for fatigue and fever.   HENT:  Negative for sore throat.    Respiratory:  Positive for cough and shortness of breath.    Cardiovascular:  Negative for chest pain.   Gastrointestinal:  Negative for nausea.   Genitourinary:  Negative for dysuria.   Musculoskeletal:  Negative for back pain.   Skin:  Negative for rash.   Neurological:  Negative for weakness.   Hematological:  Does not bruise/bleed easily.     Physical Exam      Initial Vitals   BP Pulse Resp Temp SpO2   05/16/20 2115 05/16/20 2113 05/16/20 2113 05/16/20 2113 05/16/20 2113   (!) 174/94 (!) 115 (!) 24 97.7 °F (36.5 °C) (!) 40 %      MAP       --                Physical Exam    Nursing note and vitals reviewed.  Constitutional: He appears well-developed and well-nourished. He is not diaphoretic. No distress.   HENT:   Head: Normocephalic and atraumatic.   Nose: Nose normal.   Mouth/Throat: Oropharynx is clear and moist. No oropharyngeal exudate.   Eyes: EOM are normal.   Neck: Neck supple. No tracheal deviation present.   Normal range of motion.  Cardiovascular:  Normal rate and regular rhythm.           No murmur heard.  Pulmonary/Chest: No stridor. No respiratory distress. He has wheezes. He has no rales.   Abdominal: Abdomen is soft. He exhibits no distension and no mass. no abdominal tenderness There is no rebound.   Musculoskeletal:         General: No edema. Normal range of motion.      Cervical back: Normal range of motion and neck supple.     Lymphadenopathy:     He has no cervical adenopathy.   Neurological: He is alert and oriented to person, place, and time. He has normal strength.   Skin: Skin is warm and dry. Capillary refill takes less than 2 seconds. No pallor.   Psychiatric: He has a normal mood and affect.       ED Course   Procedures  Labs Reviewed   CBC W/ AUTO DIFFERENTIAL - Abnormal; Notable for the following components:       Result Value     (*)     MCH 33.7 (*)     MPV 8.9 (*)     Immature Granulocytes 1.0 (*)     Immature Grans (Abs) 0.11 (*)     Mono # 1.2 (*)     All other components within normal limits   COMPREHENSIVE METABOLIC PANEL - Abnormal; Notable for the following components:    CO2 17 (*)     Glucose 270 (*)     Creatinine 1.5 (*)     Calcium 8.5 (*)     Total Bilirubin 1.8 (*)     Anion Gap 18 (*)     eGFR if  56.5 (*)     eGFR if non  48.8 (*)     All other components within normal limits    B-TYPE NATRIURETIC PEPTIDE - Abnormal; Notable for the following components:     (*)     All other components within normal limits   LACTIC ACID, PLASMA - Abnormal; Notable for the following components:    Lactate (Lactic Acid) 2.5 (*)     All other components within normal limits   SARS-COV-2 RNA AMPLIFICATION, QUAL    Narrative:     What symptom criteria does the patient meet?->Shortness of  breath or difficulty breathing   TROPONIN I   PROTIME-INR   PROTIME-INR   LACTIC ACID, PLASMA        ECG Results              EKG 12-lead (Final result)  Result time 05/19/20 06:56:29      Final result by Interface, Lab In [facility] (05/19/20 06:56:29)                   Narrative:    Test Reason : R06.02,    Vent. Rate : 088 BPM     Atrial Rate : 088 BPM     P-R Int : 180 ms          QRS Dur : 098 ms      QT Int : 372 ms       P-R-T Axes : 055 071 060 degrees     QTc Int : 450 ms    Normal sinus rhythm  Possible Left atrial enlargement  Anterolateral infarct (cited on or before 01-MAY-2016)  Abnormal ECG  When compared with ECG of 16-MAY-2016 18:10,  Premature ventricular complexes are no longer Present  Questionable change in initial forces of Lateral leads  T wave inversion no longer evident in Inferior leads  Nonspecific T wave abnormality, improved in Anterior-lateral leads  QT has lengthened  Confirmed by Uma RICE, Jaguar ([id number]) on 5/19/2020 6:56:19 AM    Referred By: [email or web address]   SELF           Confirmed By:Jaguar Eaton MD                                  Imaging Results              X-Ray Chest AP Portable (Final result)  Result time 05/17/20 09:30:48      Final result by Travon Campbell MD (05/17/20 09:30:48)                   Impression:      1. Findings consistent with congestive heart failure.  Superimposed pulmonary infiltrate is not excludable.  2. Prior CABG.  3. Pacemaker.  Close interval follow-up is recommended.      Electronically signed by: Travon Campbell  Date:    05/17/2020  Time:    09:30                Narrative:    EXAMINATION:  XR CHEST AP PORTABLE    CLINICAL HISTORY:  Shortness of breath    TECHNIQUE:  Single frontal view of the chest was performed.    COMPARISON:  04/10/2017.    FINDINGS:  There is been interval development of diffuse bilateral interstitial and alveolar pulmonary infiltrates.  This is suspicious for pulmonary edema.  There are small bilateral pleural effusions with bibasilar dependent atelectasis.    Heart size is enlarged.  Mediastinal contours unremarkable.  Trachea midline.  Prior CABG.    Bony thorax intact.  Left axillary pacemaker in place.                                       Medications   nitroGLYCERIN SL tablet 0.4 mg (0.4 mg Sublingual Given 5/16/20 2115)   furosemide injection 80 mg (80 mg Intravenous Given 5/16/20 2206)   vancomycin in dextrose 5 % 1 gram/250 mL IVPB 1,000 mg (1,000 mg Intravenous Handoff 5/17/20 0127)     Medical Decision Making:   ED Management:  Patient did well in the ED maintaining his O2 sat in the mid to upper 90% range on room air throughout his ED stay                        Clinical Impression:   Final diagnoses:  [R06.02] SOB (shortness of breath)  [J81.0] Flash pulmonary edema (Primary)  [R06.00] Dyspnea, unspecified type  [I10] Hypertension, unspecified type        ED Disposition Condition    Observation                 Luis Miguel Diaz MD  11/14/22 1503

## 2022-11-16 ENCOUNTER — HOSPITAL ENCOUNTER (INPATIENT)
Facility: HOSPITAL | Age: 67
LOS: 7 days | Discharge: HOME | DRG: 291 | End: 2022-11-28
Attending: EMERGENCY MEDICINE | Admitting: HOSPITALIST
Payer: MEDICARE

## 2022-11-16 DIAGNOSIS — U07.1 COVID-19: Primary | ICD-10-CM

## 2022-11-16 DIAGNOSIS — R06.02 SHORTNESS OF BREATH: ICD-10-CM

## 2022-11-16 DIAGNOSIS — I48.91 A-FIB: ICD-10-CM

## 2022-11-16 DIAGNOSIS — I48.91 ATRIAL FIBRILLATION: ICD-10-CM

## 2022-11-16 DIAGNOSIS — I95.9 HYPOTENSION, UNSPECIFIED HYPOTENSION TYPE: ICD-10-CM

## 2022-11-16 DIAGNOSIS — E86.0 DEHYDRATION: ICD-10-CM

## 2022-11-16 DIAGNOSIS — I48.91 ATRIAL FIBRILLATION WITH RVR: ICD-10-CM

## 2022-11-16 DIAGNOSIS — I50.43 ACUTE ON CHRONIC COMBINED SYSTOLIC AND DIASTOLIC CONGESTIVE HEART FAILURE: ICD-10-CM

## 2022-11-16 DIAGNOSIS — I49.9 ABNORMAL HEART RHYTHM: ICD-10-CM

## 2022-11-16 LAB
ALBUMIN SERPL BCP-MCNC: 2.9 G/DL (ref 3.5–5.2)
ALP SERPL-CCNC: 97 U/L (ref 55–135)
ALT SERPL W/O P-5'-P-CCNC: 645 U/L (ref 10–44)
ANION GAP SERPL CALC-SCNC: 12 MMOL/L (ref 8–16)
ANION GAP SERPL CALC-SCNC: 12 MMOL/L (ref 8–16)
AST SERPL-CCNC: 479 U/L (ref 10–40)
BASOPHILS # BLD AUTO: 0.01 K/UL (ref 0–0.2)
BASOPHILS NFR BLD: 0.1 % (ref 0–1.9)
BILIRUB SERPL-MCNC: 2.1 MG/DL (ref 0.1–1)
BUN SERPL-MCNC: 34 MG/DL (ref 8–23)
BUN SERPL-MCNC: 34 MG/DL (ref 8–23)
CALCIUM SERPL-MCNC: 7.7 MG/DL (ref 8.7–10.5)
CALCIUM SERPL-MCNC: 7.7 MG/DL (ref 8.7–10.5)
CHLORIDE SERPL-SCNC: 97 MMOL/L (ref 95–110)
CHLORIDE SERPL-SCNC: 98 MMOL/L (ref 95–110)
CO2 SERPL-SCNC: 20 MMOL/L (ref 23–29)
CO2 SERPL-SCNC: 22 MMOL/L (ref 23–29)
CREAT SERPL-MCNC: 1.3 MG/DL (ref 0.5–1.4)
CREAT SERPL-MCNC: 1.4 MG/DL (ref 0.5–1.4)
DIFFERENTIAL METHOD: ABNORMAL
EOSINOPHIL # BLD AUTO: 0 K/UL (ref 0–0.5)
EOSINOPHIL NFR BLD: 0 % (ref 0–8)
ERYTHROCYTE [DISTWIDTH] IN BLOOD BY AUTOMATED COUNT: 16.2 % (ref 11.5–14.5)
EST. GFR  (NO RACE VARIABLE): 55 ML/MIN/1.73 M^2
EST. GFR  (NO RACE VARIABLE): >60 ML/MIN/1.73 M^2
GLUCOSE SERPL-MCNC: 85 MG/DL (ref 70–110)
GLUCOSE SERPL-MCNC: 97 MG/DL (ref 70–110)
HCT VFR BLD AUTO: 36.9 % (ref 40–54)
HGB BLD-MCNC: 12.1 G/DL (ref 14–18)
IMM GRANULOCYTES # BLD AUTO: 0.13 K/UL (ref 0–0.04)
IMM GRANULOCYTES NFR BLD AUTO: 1.3 % (ref 0–0.5)
LACTATE SERPL-SCNC: 1.7 MMOL/L (ref 0.5–2.2)
LACTATE SERPL-SCNC: 2.6 MMOL/L (ref 0.5–2.2)
LYMPHOCYTES # BLD AUTO: 1.2 K/UL (ref 1–4.8)
LYMPHOCYTES NFR BLD: 11.6 % (ref 18–48)
MCH RBC QN AUTO: 33.3 PG (ref 27–31)
MCHC RBC AUTO-ENTMCNC: 32.8 G/DL (ref 32–36)
MCV RBC AUTO: 102 FL (ref 82–98)
MONOCYTES # BLD AUTO: 0.8 K/UL (ref 0.3–1)
MONOCYTES NFR BLD: 7.8 % (ref 4–15)
NEUTROPHILS # BLD AUTO: 8 K/UL (ref 1.8–7.7)
NEUTROPHILS NFR BLD: 79.2 % (ref 38–73)
NRBC BLD-RTO: 1 /100 WBC
PLATELET # BLD AUTO: 177 K/UL (ref 150–450)
PMV BLD AUTO: 10.2 FL (ref 9.2–12.9)
POCT GLUCOSE: 97 MG/DL (ref 70–110)
POTASSIUM SERPL-SCNC: 5.2 MMOL/L (ref 3.5–5.1)
POTASSIUM SERPL-SCNC: 5.9 MMOL/L (ref 3.5–5.1)
PROT SERPL-MCNC: 6 G/DL (ref 6–8.4)
RBC # BLD AUTO: 3.63 M/UL (ref 4.6–6.2)
SODIUM SERPL-SCNC: 130 MMOL/L (ref 136–145)
SODIUM SERPL-SCNC: 131 MMOL/L (ref 136–145)
TROPONIN I SERPL DL<=0.01 NG/ML-MCNC: 0.03 NG/ML (ref 0–0.03)
WBC # BLD AUTO: 10.14 K/UL (ref 3.9–12.7)

## 2022-11-16 PROCEDURE — G0378 HOSPITAL OBSERVATION PER HR: HCPCS

## 2022-11-16 PROCEDURE — 83605 ASSAY OF LACTIC ACID: CPT | Performed by: EMERGENCY MEDICINE

## 2022-11-16 PROCEDURE — 25000003 PHARM REV CODE 250: Performed by: EMERGENCY MEDICINE

## 2022-11-16 PROCEDURE — 85025 COMPLETE CBC W/AUTO DIFF WBC: CPT | Performed by: EMERGENCY MEDICINE

## 2022-11-16 PROCEDURE — 93010 ELECTROCARDIOGRAM REPORT: CPT | Mod: ,,, | Performed by: SPECIALIST

## 2022-11-16 PROCEDURE — 84484 ASSAY OF TROPONIN QUANT: CPT | Performed by: EMERGENCY MEDICINE

## 2022-11-16 PROCEDURE — 83605 ASSAY OF LACTIC ACID: CPT | Mod: 91 | Performed by: NURSE PRACTITIONER

## 2022-11-16 PROCEDURE — 93005 ELECTROCARDIOGRAM TRACING: CPT

## 2022-11-16 PROCEDURE — 36415 COLL VENOUS BLD VENIPUNCTURE: CPT | Performed by: EMERGENCY MEDICINE

## 2022-11-16 PROCEDURE — 80048 BASIC METABOLIC PNL TOTAL CA: CPT | Mod: XB | Performed by: EMERGENCY MEDICINE

## 2022-11-16 PROCEDURE — 25000003 PHARM REV CODE 250: Performed by: HOSPITALIST

## 2022-11-16 PROCEDURE — 99285 EMERGENCY DEPT VISIT HI MDM: CPT | Mod: 25

## 2022-11-16 PROCEDURE — 25000242 PHARM REV CODE 250 ALT 637 W/ HCPCS: Performed by: HOSPITALIST

## 2022-11-16 PROCEDURE — 96360 HYDRATION IV INFUSION INIT: CPT

## 2022-11-16 PROCEDURE — 25000003 PHARM REV CODE 250: Performed by: NURSE PRACTITIONER

## 2022-11-16 PROCEDURE — 80053 COMPREHEN METABOLIC PANEL: CPT | Performed by: NURSE PRACTITIONER

## 2022-11-16 PROCEDURE — 63600175 PHARM REV CODE 636 W HCPCS: Performed by: NURSE PRACTITIONER

## 2022-11-16 PROCEDURE — 94760 N-INVAS EAR/PLS OXIMETRY 1: CPT

## 2022-11-16 PROCEDURE — 36415 COLL VENOUS BLD VENIPUNCTURE: CPT | Performed by: NURSE PRACTITIONER

## 2022-11-16 PROCEDURE — 93010 EKG 12-LEAD: ICD-10-PCS | Mod: ,,, | Performed by: SPECIALIST

## 2022-11-16 PROCEDURE — 94640 AIRWAY INHALATION TREATMENT: CPT

## 2022-11-16 PROCEDURE — 96372 THER/PROPH/DIAG INJ SC/IM: CPT | Performed by: NURSE PRACTITIONER

## 2022-11-16 RX ORDER — IPRATROPIUM BROMIDE AND ALBUTEROL SULFATE 2.5; .5 MG/3ML; MG/3ML
3 SOLUTION RESPIRATORY (INHALATION)
Status: DISCONTINUED | OUTPATIENT
Start: 2022-11-16 | End: 2022-11-21

## 2022-11-16 RX ORDER — TALC
6 POWDER (GRAM) TOPICAL NIGHTLY PRN
Status: DISCONTINUED | OUTPATIENT
Start: 2022-11-16 | End: 2022-11-28 | Stop reason: HOSPADM

## 2022-11-16 RX ORDER — CARVEDILOL 6.25 MG/1
25 TABLET ORAL 2 TIMES DAILY
Status: DISCONTINUED | OUTPATIENT
Start: 2022-11-16 | End: 2022-11-24

## 2022-11-16 RX ORDER — CLOPIDOGREL BISULFATE 75 MG/1
75 TABLET ORAL DAILY
Status: DISCONTINUED | OUTPATIENT
Start: 2022-11-17 | End: 2022-11-28 | Stop reason: HOSPADM

## 2022-11-16 RX ORDER — SODIUM CHLORIDE 9 MG/ML
INJECTION, SOLUTION INTRAVENOUS CONTINUOUS
Status: DISCONTINUED | OUTPATIENT
Start: 2022-11-16 | End: 2022-11-17

## 2022-11-16 RX ORDER — LEVOTHYROXINE SODIUM 25 UG/1
25 TABLET ORAL
Status: DISCONTINUED | OUTPATIENT
Start: 2022-11-17 | End: 2022-11-20

## 2022-11-16 RX ORDER — IBUPROFEN 200 MG
24 TABLET ORAL
Status: DISCONTINUED | OUTPATIENT
Start: 2022-11-16 | End: 2022-11-28 | Stop reason: HOSPADM

## 2022-11-16 RX ORDER — TRAVOPROST OPHTHALMIC SOLUTION 0.04 MG/ML
1 SOLUTION OPHTHALMIC NIGHTLY
Status: DISCONTINUED | OUTPATIENT
Start: 2022-11-16 | End: 2022-11-28 | Stop reason: HOSPADM

## 2022-11-16 RX ORDER — ONDANSETRON 4 MG/1
8 TABLET, ORALLY DISINTEGRATING ORAL EVERY 8 HOURS PRN
Status: DISCONTINUED | OUTPATIENT
Start: 2022-11-16 | End: 2022-11-28 | Stop reason: HOSPADM

## 2022-11-16 RX ORDER — ASPIRIN 81 MG/1
81 TABLET ORAL DAILY
Status: DISCONTINUED | OUTPATIENT
Start: 2022-11-17 | End: 2022-11-28

## 2022-11-16 RX ORDER — GLUCAGON 1 MG
1 KIT INJECTION
Status: DISCONTINUED | OUTPATIENT
Start: 2022-11-16 | End: 2022-11-28 | Stop reason: HOSPADM

## 2022-11-16 RX ORDER — SODIUM CHLORIDE 0.9 % (FLUSH) 0.9 %
10 SYRINGE (ML) INJECTION EVERY 12 HOURS PRN
Status: DISCONTINUED | OUTPATIENT
Start: 2022-11-16 | End: 2022-11-28 | Stop reason: HOSPADM

## 2022-11-16 RX ORDER — INSULIN ASPART 100 [IU]/ML
0-5 INJECTION, SOLUTION INTRAVENOUS; SUBCUTANEOUS
Status: DISCONTINUED | OUTPATIENT
Start: 2022-11-16 | End: 2022-11-28 | Stop reason: HOSPADM

## 2022-11-16 RX ORDER — ACETAMINOPHEN 500 MG
1000 TABLET ORAL EVERY 6 HOURS PRN
Status: DISCONTINUED | OUTPATIENT
Start: 2022-11-16 | End: 2022-11-28 | Stop reason: HOSPADM

## 2022-11-16 RX ORDER — PROMETHAZINE HYDROCHLORIDE 25 MG/1
25 TABLET ORAL EVERY 6 HOURS PRN
Status: DISCONTINUED | OUTPATIENT
Start: 2022-11-16 | End: 2022-11-28 | Stop reason: HOSPADM

## 2022-11-16 RX ORDER — ENOXAPARIN SODIUM 150 MG/ML
1 INJECTION SUBCUTANEOUS
Status: DISCONTINUED | OUTPATIENT
Start: 2022-11-16 | End: 2022-11-17

## 2022-11-16 RX ORDER — ATORVASTATIN CALCIUM 40 MG/1
80 TABLET, FILM COATED ORAL DAILY
Status: DISCONTINUED | OUTPATIENT
Start: 2022-11-17 | End: 2022-11-28 | Stop reason: HOSPADM

## 2022-11-16 RX ORDER — IBUPROFEN 200 MG
16 TABLET ORAL
Status: DISCONTINUED | OUTPATIENT
Start: 2022-11-16 | End: 2022-11-28 | Stop reason: HOSPADM

## 2022-11-16 RX ORDER — ISOSORBIDE MONONITRATE 30 MG/1
30 TABLET, EXTENDED RELEASE ORAL DAILY
Status: DISCONTINUED | OUTPATIENT
Start: 2022-11-17 | End: 2022-11-18

## 2022-11-16 RX ADMIN — IPRATROPIUM BROMIDE AND ALBUTEROL SULFATE 3 ML: 2.5; .5 SOLUTION RESPIRATORY (INHALATION) at 07:11

## 2022-11-16 RX ADMIN — TRAVOPROST 1 DROP: 0.04 SOLUTION/ DROPS OPHTHALMIC at 09:11

## 2022-11-16 RX ADMIN — ENOXAPARIN SODIUM 60 MG: 150 INJECTION SUBCUTANEOUS at 10:11

## 2022-11-16 RX ADMIN — SODIUM CHLORIDE: 0.9 INJECTION, SOLUTION INTRAVENOUS at 08:11

## 2022-11-16 RX ADMIN — SODIUM CHLORIDE 500 ML: 0.9 INJECTION, SOLUTION INTRAVENOUS at 04:11

## 2022-11-16 RX ADMIN — SODIUM ZIRCONIUM CYCLOSILICATE 5 G: 5 POWDER, FOR SUSPENSION ORAL at 08:11

## 2022-11-16 RX ADMIN — CARVEDILOL 25 MG: 25 TABLET, FILM COATED ORAL at 08:11

## 2022-11-16 NOTE — ED PROVIDER NOTES
Chief complaint:  Shortness of Breath (Started at noon today, has COPD and is covid positive )      HPI:  C[place] Capps is a 67 y.o. male with hx htn, CAD, prior tobacco use, COPD, CAD s/p MI, DM, and known COVID-19 dx 11/13/22 d/c on paxlovid presenting with shortness of breath now improved.  It started earlier today at rest.  It was not associated with exertion or position.  He has had symptoms for 3 days since day of diagnosis.  Associated symptoms including nonproductive cough as well as congestion.  Denies hemoptysis.  He denies chest pain.  No new leg swelling.  He is taking antiviral medication recently prescribed other medicines unchanged.    ROS: As per HPI and below:  No recent measured fever, headache, confusion, chest pain, hemoptysis, abdominal pain, vomiting, diarrhea, blood in the stools, dark stools, oliguria, dysuria, new swelling, rashes. The patient/family denies blurry vision, dysphagia,  joint swelling, easy bruising.    Review of patient's allergies indicates:   Allergen Reactions    Penicillins Hives       Current Discharge Medication List        CONTINUE these medications which have NOT CHANGED    Details   apixaban (ELIQUIS) 5 mg Tab Take 5 mg by mouth 2 (two) times daily.      aspirin (ECOTRIN) 81 MG EC tablet Take 81 mg by mouth once daily.      atorvastatin (LIPITOR) 80 MG tablet Take 80 mg by mouth once daily.      azithromycin (Z-ARNOLDO) 250 MG tablet Take 2 tablets by mouth on day 1; Take 1 tablet by mouth on days 2-5  Qty: 6 tablet, Refills: 0    Associated Diagnoses: Pneumonitis      carvedilol (COREG) 6.25 MG tablet Take 2 tablets (12.5 mg total) by mouth 2 (two) times daily.  Qty: 120 tablet, Refills: 11      clopidogreL (PLAVIX) 75 mg tablet Take 1 tablet (75 mg total) by mouth once daily.  Qty: 30 tablet, Refills: 11      enalapril (VASOTEC) 20 MG tablet 20 mg 2 (two) times daily.   Refills: 5      furosemide (LASIX) 20 MG tablet Take 1 tablet (20 mg total) by mouth once  daily.  Qty: 10 tablet, Refills: 0    Associated Diagnoses: Atrial fibrillation with RVR      isosorbide mononitrate (IMDUR) 30 MG 24 hr tablet Take 30 mg by mouth once daily.      levothyroxine (SYNTHROID) 25 MCG tablet Take 25 mcg by mouth before breakfast.      nirmatrelvir-ritonavir 300 mg (150 mg x 2)-100 mg copackaged tablets (EUA) Take 3 tablets by mouth 2 (two) times daily for 5 days. Each dose contains 2 nirmatrelvir (pink tablets) and 1 ritonavir (white tablet). Take all 3 tablets together  Qty: 30 tablet, Refills: 0    Associated Diagnoses: COVID-19      potassium chloride SA (K-DUR,KLOR-CON M) 10 MEQ tablet Take 10 mEq by mouth once daily.      travoprost, benzalkonium, (TRAVATAN) 0.004 % ophthalmic solution Place 1 drop into both eyes nightly.             PMH:  As per HPI and below:  Past Medical History:   Diagnosis Date    A-fib     Acute kidney injury     COPD (chronic obstructive pulmonary disease)     Coronary artery disease     Diabetes mellitus     Encounter for blood transfusion     Former smoker     Hypertension     Myocardial infarction     Thyroid disease      Past Surgical History:   Procedure Laterality Date    CLOSURE OF LEFT ATRIAL APPENDAGE USING DEVICE N/A 5/17/2022    Procedure: Left atrial appendage closure device;  Surgeon: Tre Schmidt MD;  Location: [facility] CATH LAB;  Service: Cardiology;  Laterality: N/A;    COLONOSCOPY N/A 1/3/2017    Procedure: COLONOSCOPY;  Surgeon: Marcus Green MD;  Location: [facility] ENDO;  Service: Endoscopy;  Laterality: N/A;    CORONARY BYPASS GRAFT ANGIOGRAPHY  3/30/2021    Procedure: Bypass graft study;  Surgeon: Tre Schmidt MD;  Location: [facility] CATH LAB;  Service: Cardiology;;    CORONARY STENT PLACEMENT      GASTROSTOMY TUBE PLACEMENT      INSERTION OF PACEMAKER  04/2017    INTRAVASCULAR ULTRASOUND, NON-CORONARY  5/17/2022    Procedure: Intravascular Ultrasound, Non-Coronary;  Surgeon: Tre Schmidt MD;  Location: [facility] CATH LAB;  Service:  Cardiology;;    LEFT HEART CATHETERIZATION N/A 3/30/2021    Procedure: Left heart cath;  Surgeon: Tre Schmidt MD;  Location: [facility] CATH LAB;  Service: Cardiology;  Laterality: N/A;    PEG TUBE REMOVAL      TRACHEOSTOMY CLOSURE      TRACHEOSTOMY TUBE PLACEMENT      TRANSESOPHAGEAL ECHOCARDIOGRAPHY N/A 2022    Procedure: ECHOCARDIOGRAM, TRANSESOPHAGEAL;  Surgeon: Yudi Diagnostic Provider;  Location: [facility] CATH LAB;  Service: Anesthesiology;  Laterality: N/A;    UPPER GASTROINTESTINAL ENDOSCOPY  2016    Dr. Zarco with PEG tube placement       Social History     Socioeconomic History    Marital status: Single   Tobacco Use    Smoking status: Former     Types: Cigarettes     Quit date: 2005     Years since quittin.9    Smokeless tobacco: Never    Tobacco comments:     quit    Substance and Sexual Activity    Alcohol use: No    Drug use: No    Sexual activity: Yes       Family History   Problem Relation Age of Onset    Diabetes Mother     Heart disease Mother     Glaucoma Father     Macular degeneration Father     No Known Problems Sister     Diabetes Brother     Glaucoma Brother     Macular degeneration Brother     No Known Problems Maternal Aunt     No Known Problems Maternal Uncle     No Known Problems Paternal Aunt     No Known Problems Paternal Uncle     No Known Problems Maternal Grandmother     No Known Problems Maternal Grandfather     No Known Problems Paternal Grandmother     No Known Problems Paternal Grandfather     Colon cancer Neg Hx     Colon polyps Neg Hx     Crohn's disease Neg Hx     Ulcerative colitis Neg Hx     Anemia Neg Hx     Arrhythmia Neg Hx     Asthma Neg Hx     Clotting disorder Neg Hx     Fainting Neg Hx     Heart attack Neg Hx     Heart failure Neg Hx     Hyperlipidemia Neg Hx     Hypertension Neg Hx     Stroke Neg Hx     Atrial Septal Defect Neg Hx        Physical Exam:    Vitals:    22   BP: 111/74   Pulse: (!) 116   Resp: 15   Temp:      GENERAL:  No  apparent distress.  Alert.    HEENT:  Moist mucous membranes.  Normocephalic and atraumatic.    NECK:  No swelling.  Midline trachea.   CARDIOVASCULAR:  Regular rate and rhythm.  2+ radial pulses.  No murmur auscultated.  PULMONARY:  Prominent upper airway sounds.  No wheezes, rales, or rhonci.  Unlabored respirations.  Speaks in complete sentences without difficulty.  ABDOMEN:  Non-tender and non-distended.    EXTREMITIES:  Warm and well perfused.  Brisk capillary refill.  Trace pitting pedal edema.  Legs are symmetric and nontender to palpation.  NEUROLOGICAL:  Normal mental status.  Appropriate and conversant.    SKIN:  No rashes or ecchymoses.    BACK:  Atraumatic.  No CVA tenderness to palpation.      Labs Reviewed   CBC W/ AUTO DIFFERENTIAL - Abnormal; Notable for the following components:       Result Value    RBC 3.63 (*)     Hemoglobin 12.1 (*)     Hematocrit 36.9 (*)      (*)     MCH 33.3 (*)     RDW 16.2 (*)     Immature Granulocytes 1.3 (*)     Gran # (ANC) 8.0 (*)     Immature Grans (Abs) 0.13 (*)     nRBC 1 (*)     Gran % 79.2 (*)     Lymph % 11.6 (*)     All other components within normal limits   BASIC METABOLIC PANEL - Abnormal; Notable for the following components:    Sodium 130 (*)     Potassium 5.9 (*)     CO2 20 (*)     BUN 34 (*)     Calcium 7.7 (*)     All other components within normal limits   TROPONIN I - Abnormal; Notable for the following components:    Troponin I 0.028 (*)     All other components within normal limits   LACTIC ACID, PLASMA - Abnormal; Notable for the following components:    Lactate (Lactic Acid) 2.6 (*)     All other components within normal limits   POCT GLUCOSE, HAND-HELD DEVICE       Current Discharge Medication List        CONTINUE these medications which have NOT CHANGED    Details   apixaban (ELIQUIS) 5 mg Tab Take 5 mg by mouth 2 (two) times daily.      aspirin (ECOTRIN) 81 MG EC tablet Take 81 mg by mouth once daily.      atorvastatin (LIPITOR) 80 MG  tablet Take 80 mg by mouth once daily.      azithromycin (Z-ARNOLDO) 250 MG tablet Take 2 tablets by mouth on day 1; Take 1 tablet by mouth on days 2-5  Qty: 6 tablet, Refills: 0    Associated Diagnoses: Pneumonitis      carvedilol (COREG) 6.25 MG tablet Take 2 tablets (12.5 mg total) by mouth 2 (two) times daily.  Qty: 120 tablet, Refills: 11      clopidogreL (PLAVIX) 75 mg tablet Take 1 tablet (75 mg total) by mouth once daily.  Qty: 30 tablet, Refills: 11      enalapril (VASOTEC) 20 MG tablet 20 mg 2 (two) times daily.   Refills: 5      furosemide (LASIX) 20 MG tablet Take 1 tablet (20 mg total) by mouth once daily.  Qty: 10 tablet, Refills: 0    Associated Diagnoses: Atrial fibrillation with RVR      isosorbide mononitrate (IMDUR) 30 MG 24 hr tablet Take 30 mg by mouth once daily.      levothyroxine (SYNTHROID) 25 MCG tablet Take 25 mcg by mouth before breakfast.      nirmatrelvir-ritonavir 300 mg (150 mg x 2)-100 mg copackaged tablets (EUA) Take 3 tablets by mouth 2 (two) times daily for 5 days. Each dose contains 2 nirmatrelvir (pink tablets) and 1 ritonavir (white tablet). Take all 3 tablets together  Qty: 30 tablet, Refills: 0    Associated Diagnoses: COVID-19      potassium chloride SA (K-DUR,KLOR-CON M) 10 MEQ tablet Take 10 mEq by mouth once daily.      travoprost, benzalkonium, (TRAVATAN) 0.004 % ophthalmic solution Place 1 drop into both eyes nightly.             Orders Placed This Encounter   Procedures    X-Ray Chest 1 View    CBC Auto Differential    Basic Metabolic Panel    Troponin I    Lactic Acid, Plasma    Basic Metabolic Panel (BMP)    Comprehensive Metabolic Panel    Lactic Acid, Plasma    Diet diabetic [facility]; 2000 Calorie; [facility]    Cardiac Monitoring - Adult    Vital Signs    Notify Physician    Place sequential compression device    Recheck Blood Glucose:    Full code    Airborne and Contact and Droplet Isolation Status    Inhalation Treatment Q4H WAKE    Pulse  Oximetry Q Shift    POCT glucose    EKG 12-lead    Place in Observation       Imaging Results              X-Ray Chest 1 View (Final result)  Result time 11/16/22 16:46:12      Final result by Huey Escalante Jr., MD (11/16/22 16:46:12)                   Impression:      Cardiomegaly, prominence of the pulmonary vasculature and possible mild pulmonary edema suggesting congestive heart failure.  Small right pleural effusion.  Prior sternotomy.  AICD in position.      Electronically signed by: Huey Escalante MD  Date:    11/16/2022  Time:    16:46               Narrative:    EXAMINATION:  XR CHEST 1 VIEW    CLINICAL HISTORY:  SOB;    TECHNIQUE:  Single frontal view of the chest was performed.    COMPARISON:  Chest x-ray of November 13, 2022    FINDINGS:  An AICD is noted in place on the left.  Patient has had a prior sternotomy.  There is mild cardiomegaly in a left ventricular predominant pattern.  There is prominence of the pulmonary vasculature and possible mild pulmonary edema.  A small right pleural effusion is noted.  No pneumothorax is seen.                                  (radiology reading, visualized by me)    ED Course as of 11/16/22 2159 Wed Nov 16, 2022   1642 CXR:  Cardiomegaly, NAD. (my read) [MR]   1643 EKG:  Atrial fibrillation, rate of 83, R axis, narrow QRS.  LPFB.  There are no acute ST or T wave changes suggestive of acute ischemia or infarction.  No U-waves or sign of hyperkalemia. [MR]      ED Course User Index  [MR] Sixto Avila MD       MDM:    67 y.o. male with improved shortness of breath in the setting of known COVID-19 as well as multiple other comorbidities.  Possible mild COPD exacerbation considered although no wheezing audible here and no increased work of breathing including no tachypnea.  He is not tachycardic or hypoxic here.  I have very low suspicion for PE and do not think D-dimer or CT angiography of the chest are indicated.  Low suspicion for ACS with EKG and  cardiac biomarker sent for further risk stratification.  No initial sign of pulmonary edema on auscultation with chest x-ray further ordered to exclude pulmonary edema related to CHF.  Initial hypotension in triage resolved at the time of my initial evaluation without intervention with small fluid bolus ordered here with consideration of possible increased insensible osses with viral illness.  Laboratories reviewed with mildly elevated lactic acid, possibly related to dehydration that may be trended with further resuscitation.  Hypotension is not recurrent.  Minimally positive troponin that may be trended as well with overall low suspicion for ACS.  I have discussed with hospital medicine who will assume care.    Diagnoses:    1. COVID-19  2. SOB       Sixto Avila MD  11/16/22 [id number]

## 2022-11-16 NOTE — ED PROVIDER NOTES
Encounter Date: 11/13/2022       History     Chief Complaint   Patient presents with    Cough     Cough x 3 days    Shortness of Breath     Since this morning    Nasal Congestion     67-year-old male presents the ED complaining of cough shortness of breath myalgias nasal congestion he has a history of COPD atrial fib and MEIR no history of nausea vomiting he has been fatigued    Review of patient's allergies indicates:   Allergen Reactions    Penicillins Hives     Past Medical History:   Diagnosis Date    A-fib     Acute kidney injury     COPD (chronic obstructive pulmonary disease)     Coronary artery disease     Diabetes mellitus     Encounter for blood transfusion     Former smoker     Hypertension     Myocardial infarction     Thyroid disease      Past Surgical History:   Procedure Laterality Date    CLOSURE OF LEFT ATRIAL APPENDAGE USING DEVICE N/A 5/17/2022    Procedure: Left atrial appendage closure device;  Surgeon: Tre Schmidt MD;  Location: [facility] CATH LAB;  Service: Cardiology;  Laterality: N/A;    COLONOSCOPY N/A 1/3/2017    Procedure: COLONOSCOPY;  Surgeon: Marcus Green MD;  Location: [facility] ENDO;  Service: Endoscopy;  Laterality: N/A;    CORONARY BYPASS GRAFT ANGIOGRAPHY  3/30/2021    Procedure: Bypass graft study;  Surgeon: Tre cShmidt MD;  Location: [facility] CATH LAB;  Service: Cardiology;;    CORONARY STENT PLACEMENT      GASTROSTOMY TUBE PLACEMENT      INSERTION OF PACEMAKER  04/2017    INTRAVASCULAR ULTRASOUND, NON-CORONARY  5/17/2022    Procedure: Intravascular Ultrasound, Non-Coronary;  Surgeon: Tre Schmidt MD;  Location: [facility] CATH LAB;  Service: Cardiology;;    LEFT HEART CATHETERIZATION N/A 3/30/2021    Procedure: Left heart cath;  Surgeon: Tre Schmidt MD;  Location: [facility] CATH LAB;  Service: Cardiology;  Laterality: N/A;    PEG TUBE REMOVAL      TRACHEOSTOMY CLOSURE      TRACHEOSTOMY TUBE PLACEMENT      TRANSESOPHAGEAL ECHOCARDIOGRAPHY N/A 5/17/2022    Procedure:  ECHOCARDIOGRAM, TRANSESOPHAGEAL;  Surgeon: [facility] Diagnostic Provider;  Location: [facility] CATH LAB;  Service: Anesthesiology;  Laterality: N/A;    UPPER GASTROINTESTINAL ENDOSCOPY  2016    Dr. Zarco with PEG tube placement     Family History   Problem Relation Age of Onset    Diabetes Mother     Heart disease Mother     Glaucoma Father     Macular degeneration Father     No Known Problems Sister     Diabetes Brother     Glaucoma Brother     Macular degeneration Brother     No Known Problems Maternal Aunt     No Known Problems Maternal Uncle     No Known Problems Paternal Aunt     No Known Problems Paternal Uncle     No Known Problems Maternal Grandmother     No Known Problems Maternal Grandfather     No Known Problems Paternal Grandmother     No Known Problems Paternal Grandfather     Colon cancer Neg Hx     Colon polyps Neg Hx     Crohn's disease Neg Hx     Ulcerative colitis Neg Hx     Anemia Neg Hx     Arrhythmia Neg Hx     Asthma Neg Hx     Clotting disorder Neg Hx     Fainting Neg Hx     Heart attack Neg Hx     Heart failure Neg Hx     Hyperlipidemia Neg Hx     Hypertension Neg Hx     Stroke Neg Hx     Atrial Septal Defect Neg Hx      Social History     Tobacco Use    Smoking status: Former     Types: Cigarettes     Quit date: 2005     Years since quittin.9    Smokeless tobacco: Never    Tobacco comments:     quit    Substance Use Topics    Alcohol use: No    Drug use: No     Review of Systems   Constitutional:  Positive for fatigue. Negative for fever.   HENT:  Negative for sore throat.    Respiratory:  Negative for shortness of breath.    Cardiovascular:  Negative for chest pain.   Gastrointestinal:  Negative for nausea.   Genitourinary:  Negative for dysuria.   Musculoskeletal:  Negative for back pain.   Skin:  Negative for rash.   Neurological:  Negative for weakness.   Hematological:  Does not bruise/bleed easily.     Physical Exam     Initial Vitals [22 0958]   BP Pulse Resp Temp SpO2    116/83 94 20 97.7 °F (36.5 °C) 98 %      MAP       --         Physical Exam    Nursing note and vitals reviewed.  Constitutional: He appears well-developed and well-nourished. He is not diaphoretic. No distress.   HENT:   Head: Normocephalic and atraumatic.   Right Ear: External ear normal.   Left Ear: External ear normal.   Nose: Nose normal.   Mouth/Throat: Oropharynx is clear and moist. No oropharyngeal exudate.   Eyes: EOM are normal.   Neck: Neck supple. No tracheal deviation present.   Normal range of motion.  Cardiovascular:  Normal rate and regular rhythm.           No murmur heard.  Pulmonary/Chest: Breath sounds normal. No stridor. No respiratory distress. He has no rales.   Abdominal: Abdomen is soft. He exhibits no distension and no mass. There is no abdominal tenderness. There is no rebound.   Musculoskeletal:         General: No edema. Normal range of motion.      Cervical back: Normal range of motion and neck supple.     Lymphadenopathy:     He has no cervical adenopathy.   Neurological: He is alert and oriented to person, place, and time. He has normal strength.   Skin: Skin is warm and dry. Capillary refill takes less than 2 seconds. No pallor.   Psychiatric: He has a normal mood and affect.       ED Course   Procedures  Labs Reviewed   CBC W/ AUTO DIFFERENTIAL - Abnormal; Notable for the following components:       Result Value    RBC 3.83 (*)     Hemoglobin 12.7 (*)     Hematocrit 38.7 (*)      (*)     MCH 33.2 (*)     RDW 16.3 (*)     Platelets 145 (*)     All other components within normal limits    Narrative:     Release to patient->Immediate   COMPREHENSIVE METABOLIC PANEL - Abnormal; Notable for the following components:    Sodium 134 (*)     Glucose 176 (*)     Calcium 8.4 (*)     Albumin 3.1 (*)     Total Bilirubin 1.8 (*)     AST 46 (*)     All other components within normal limits    Narrative:     Release to patient->Immediate   B-TYPE NATRIURETIC PEPTIDE - Abnormal; Notable for  the following components:     (*)     All other components within normal limits    Narrative:     Release to patient->Immediate   D DIMER, QUANTITATIVE - Abnormal; Notable for the following components:    D-Dimer 1.20 (*)     All other components within normal limits    Narrative:     Release to patient->Immediate   SARS-COV-2 RNA AMPLIFICATION, QUAL - Abnormal; Notable for the following components:    SARS-CoV-2 RNA, Amplification, Qual Positive (*)     All other components within normal limits    Narrative:     Is the patient symptomatic?->Yes   INFLUENZA A & B BY MOLECULAR   GROUP A STREP, MOLECULAR   HIV 1 / 2 ANTIBODY    Narrative:     Release to patient->Immediate   HEPATITIS C ANTIBODY    Narrative:     Release to patient->Immediate        ECG Results              EKG 12-lead (Final result)  Result time 11/14/22 08:22:29      Final result by Interface, Lab In [facility] (11/14/22 08:22:29)                   Narrative:    Test Reason : R06.02,    Vent. Rate : 101 BPM     Atrial Rate : 096 BPM     P-R Int : 000 ms          QRS Dur : 094 ms      QT Int : 348 ms       P-R-T Axes : 000 132 061 degrees     QTc Int : 451 ms    Atrial fibrillation with rapid ventricular response  Anterolateral infarct (cited on or before 01-MAY-2016)  Abnormal ECG  When compared with ECG of 13-NOV-2022 10:01,  No significant change was found  Confirmed by Jaguar Lennon MD ([id number]) on 11/14/2022 8:22:23 AM    Referred By: [email or web address]   SELF           Confirmed By:Jaguar Lennon MD                                  Imaging Results              X-Ray Chest AP Portable (Final result)  Result time 11/13/22 10:36:01      Final result by Sixto Ordonez MD (11/13/22 10:36:01)                   Impression:      Cardiomegaly.  No new airspace disease.      Electronically signed by: Sixto Ordonez  Date:    11/13/2022  Time:    10:36               Narrative:    EXAMINATION:  XR CHEST AP PORTABLE    CLINICAL HISTORY:  sob;    TECHNIQUE:  Single  frontal view of the chest was performed.    COMPARISON:  09/19/2022    FINDINGS:  There is scar or platelike atelectasis in the right midlung zone.  Mild chronic elevation right hemidiaphragm.  Remaining lungs clear.  Enlarged cardiac silhouette with conduction device.  No pleural effusion or pneumothorax.  Sternal wires.                                       Medications   albuterol-ipratropium 2.5 mg-0.5 mg/3 mL nebulizer solution 3 mL (3 mLs Nebulization Given 11/13/22 1011)   albuterol-ipratropium 2.5 mg-0.5 mg/3 mL nebulizer solution 3 mL (3 mLs Nebulization Given 11/13/22 1236)                              Clinical Impression:   Final diagnoses:  [R06.02] SOB (shortness of breath)  [U07.1] COVID-19 (Primary)  [J18.9] Pneumonitis        ED Disposition Condition    Discharge Stable          ED Prescriptions       Medication Sig Dispense Start Date End Date Auth. Provider    azithromycin (Z-ARNOLDO) 250 MG tablet  (Status: Discontinued) Take 2 tablets by mouth on day 1; Take 1 tablet by mouth on days 2-5 6 tablet 11/13/2022 11/13/2022 Luis Miguel Diaz MD    nirmatrelvir-ritonavir 300 mg (150 mg x 2)-100 mg copackaged tablets (EUA)  (Status: Discontinued) Take 3 tablets by mouth 2 (two) times daily for 5 days. Each dose contains 2 nirmatrelvir (pink tablets) and 1 ritonavir (white tablet). Take all 3 tablets together 30 tablet 11/13/2022 11/13/2022 Luis Miguel Diaz MD    nirmatrelvir-ritonavir 300 mg (150 mg x 2)-100 mg copackaged tablets (EUA) Take 3 tablets by mouth 2 (two) times daily for 5 days. Each dose contains 2 nirmatrelvir (pink tablets) and 1 ritonavir (white tablet). Take all 3 tablets together 30 tablet 11/13/2022 11/18/2022 Luis Miguel Diaz MD    azithromycin (Z-ARNOLDO) 250 MG tablet Take 2 tablets by mouth on day 1; Take 1 tablet by mouth on days 2-5 6 tablet 11/13/2022 11/18/2022 Luis Miguel Diaz MD          Follow-up Information    None          Luis Miguel Diaz MD  11/16/22  1617       Luis Miguel Diaz MD  12/02/22 4037

## 2022-11-17 PROBLEM — J44.1 COPD EXACERBATION: Status: ACTIVE | Noted: 2022-11-17

## 2022-11-17 PROBLEM — E11.9 TYPE 2 DIABETES MELLITUS WITHOUT COMPLICATION, WITHOUT LONG-TERM CURRENT USE OF INSULIN: Status: ACTIVE | Noted: 2022-11-17

## 2022-11-17 PROBLEM — E87.1 HYPONATREMIA: Status: ACTIVE | Noted: 2022-11-17

## 2022-11-17 PROBLEM — E87.5 HYPERKALEMIA: Status: ACTIVE | Noted: 2022-11-17

## 2022-11-17 PROBLEM — E03.9 HYPOTHYROID: Status: ACTIVE | Noted: 2022-11-17

## 2022-11-17 PROBLEM — J98.4: Status: ACTIVE | Noted: 2022-11-17

## 2022-11-17 PROBLEM — U07.1: Status: ACTIVE | Noted: 2022-11-17

## 2022-11-17 LAB
ALBUMIN SERPL BCP-MCNC: 2.8 G/DL (ref 3.5–5.2)
ALP SERPL-CCNC: 94 U/L (ref 55–135)
ALT SERPL W/O P-5'-P-CCNC: 580 U/L (ref 10–44)
ANION GAP SERPL CALC-SCNC: 11 MMOL/L (ref 8–16)
AST SERPL-CCNC: 386 U/L (ref 10–40)
BILIRUB DIRECT SERPL-MCNC: 0.9 MG/DL (ref 0.1–0.3)
BILIRUB SERPL-MCNC: 1.4 MG/DL (ref 0.1–1)
BUN SERPL-MCNC: 33 MG/DL (ref 8–23)
CALCIUM SERPL-MCNC: 7.4 MG/DL (ref 8.7–10.5)
CHLORIDE SERPL-SCNC: 101 MMOL/L (ref 95–110)
CO2 SERPL-SCNC: 21 MMOL/L (ref 23–29)
CREAT SERPL-MCNC: 1.3 MG/DL (ref 0.5–1.4)
EST. GFR  (NO RACE VARIABLE): >60 ML/MIN/1.73 M^2
GLUCOSE SERPL-MCNC: 103 MG/DL (ref 70–110)
POCT GLUCOSE: 116 MG/DL (ref 70–110)
POCT GLUCOSE: 145 MG/DL (ref 70–110)
POTASSIUM SERPL-SCNC: 4.5 MMOL/L (ref 3.5–5.1)
PROT SERPL-MCNC: 5.7 G/DL (ref 6–8.4)
SODIUM SERPL-SCNC: 133 MMOL/L (ref 136–145)

## 2022-11-17 PROCEDURE — G0378 HOSPITAL OBSERVATION PER HR: HCPCS

## 2022-11-17 PROCEDURE — 80076 HEPATIC FUNCTION PANEL: CPT | Performed by: HOSPITALIST

## 2022-11-17 PROCEDURE — 94640 AIRWAY INHALATION TREATMENT: CPT | Mod: XB

## 2022-11-17 PROCEDURE — 96372 THER/PROPH/DIAG INJ SC/IM: CPT | Performed by: NURSE PRACTITIONER

## 2022-11-17 PROCEDURE — 25000003 PHARM REV CODE 250: Performed by: HOSPITALIST

## 2022-11-17 PROCEDURE — 25000242 PHARM REV CODE 250 ALT 637 W/ HCPCS: Performed by: HOSPITALIST

## 2022-11-17 PROCEDURE — 63600175 PHARM REV CODE 636 W HCPCS: Performed by: HOSPITALIST

## 2022-11-17 PROCEDURE — 25000003 PHARM REV CODE 250: Performed by: NURSE PRACTITIONER

## 2022-11-17 PROCEDURE — 94761 N-INVAS EAR/PLS OXIMETRY MLT: CPT

## 2022-11-17 PROCEDURE — 36415 COLL VENOUS BLD VENIPUNCTURE: CPT | Performed by: HOSPITALIST

## 2022-11-17 PROCEDURE — 63600175 PHARM REV CODE 636 W HCPCS: Performed by: NURSE PRACTITIONER

## 2022-11-17 PROCEDURE — 80048 BASIC METABOLIC PNL TOTAL CA: CPT | Performed by: HOSPITALIST

## 2022-11-17 RX ORDER — BENZONATATE 100 MG/1
100 CAPSULE ORAL 3 TIMES DAILY PRN
Status: DISCONTINUED | OUTPATIENT
Start: 2022-11-17 | End: 2022-11-28 | Stop reason: HOSPADM

## 2022-11-17 RX ORDER — PREDNISONE 20 MG/1
40 TABLET ORAL DAILY
Status: DISCONTINUED | OUTPATIENT
Start: 2022-11-17 | End: 2022-11-18

## 2022-11-17 RX ORDER — GUAIFENESIN 100 MG/5ML
200 SOLUTION ORAL EVERY 4 HOURS PRN
Status: DISCONTINUED | OUTPATIENT
Start: 2022-11-17 | End: 2022-11-28 | Stop reason: HOSPADM

## 2022-11-17 RX ADMIN — CLOPIDOGREL BISULFATE 75 MG: 75 TABLET, FILM COATED ORAL at 09:11

## 2022-11-17 RX ADMIN — IPRATROPIUM BROMIDE AND ALBUTEROL SULFATE 3 ML: 2.5; .5 SOLUTION RESPIRATORY (INHALATION) at 07:11

## 2022-11-17 RX ADMIN — ISOSORBIDE MONONITRATE 30 MG: 30 TABLET, EXTENDED RELEASE ORAL at 09:11

## 2022-11-17 RX ADMIN — APIXABAN 5 MG: 2.5 TABLET, FILM COATED ORAL at 09:11

## 2022-11-17 RX ADMIN — LEVOTHYROXINE SODIUM 25 MCG: 25 TABLET ORAL at 05:11

## 2022-11-17 RX ADMIN — ATORVASTATIN CALCIUM 80 MG: 40 TABLET, FILM COATED ORAL at 09:11

## 2022-11-17 RX ADMIN — PREDNISONE 40 MG: 20 TABLET ORAL at 01:11

## 2022-11-17 RX ADMIN — ASPIRIN 81 MG: 81 TABLET, COATED ORAL at 09:11

## 2022-11-17 RX ADMIN — IPRATROPIUM BROMIDE AND ALBUTEROL SULFATE 3 ML: 2.5; .5 SOLUTION RESPIRATORY (INHALATION) at 03:11

## 2022-11-17 RX ADMIN — TRAVOPROST 1 DROP: 0.04 SOLUTION/ DROPS OPHTHALMIC at 09:11

## 2022-11-17 RX ADMIN — GUAIFENESIN 200 MG: 200 SOLUTION ORAL at 04:11

## 2022-11-17 RX ADMIN — IPRATROPIUM BROMIDE AND ALBUTEROL SULFATE 3 ML: 2.5; .5 SOLUTION RESPIRATORY (INHALATION) at 11:11

## 2022-11-17 RX ADMIN — BENZONATATE 100 MG: 100 CAPSULE ORAL at 09:11

## 2022-11-17 RX ADMIN — ENOXAPARIN SODIUM 60 MG: 150 INJECTION SUBCUTANEOUS at 09:11

## 2022-11-17 RX ADMIN — CARVEDILOL 25 MG: 25 TABLET, FILM COATED ORAL at 09:11

## 2022-11-17 RX ADMIN — BENZONATATE 100 MG: 100 CAPSULE ORAL at 01:11

## 2022-11-17 NOTE — CARE UPDATE
11/16/22 1948   Patient Assessment/Suction   Level of Consciousness (AVPU) alert   Respiratory Effort Normal;Unlabored   Expansion/Accessory Muscles/Retractions expansion symmetric;no retractions;no use of accessory muscles   All Lung Fields Breath Sounds wheezes, expiratory   Cough Frequency infrequent   Cough Type good;nonproductive   PRE-TX-O2   O2 Device (Oxygen Therapy) room air   SpO2 98 %   Pulse Oximetry Type Continuous   $ Pulse Oximetry - Single Charge Pulse Oximetry - Single   Pulse 84   Resp 20   Aerosol Therapy   $ Aerosol Therapy Charges Aerosol Treatment   Respiratory Treatment Status (SVN) given   Treatment Route (SVN) mask;oxygen   Patient Position (SVN) HOB elevated   Signs of Intolerance (SVN) none   Breath Sounds Post-Respiratory Treatment   Throughout All Fields Post-Treatment All Fields   Throughout All Fields Post-Treatment no change   Post-treatment Heart Rate (beats/min) 91   Post-treatment Resp Rate (breaths/min) 18

## 2022-11-17 NOTE — PLAN OF CARE
[facility]  Initial Discharge Assessment       Primary Care Provider: Romero Snyder MD    Admission Diagnosis: Shortness of breath [R06.02]  Dehydration [E86.0]  Hypotension, unspecified hypotension type [I95.9]  COVID-19 [U07.1]    Admission Date: 11/16/2022  Expected Discharge Date:     DC assessment completed with pt over the phone. Pt in covid iso. Pt confirms demographics are current. Pt lives at listed address alone but his friend lives next door. PCP Dr Synder and pharmacy [facility]. DME rw, wc, shower chair, pt states his entire home is handicap accessible. Denies HD/DM/coumadin. Pt takes eliquis. Denies POA/LW. Denies recent admission. Anthony to give ride home. No new needs anticipated at DC. CM following.      Discharge Barriers Identified: None    Payor: HU[employer] MANAGED MEDICARE / Plan: [employer] SNP (SPECIAL NEEDS PLAN) / Product Type: Medicare Advantage /     Extended Emergency Contact Information  Primary Emergency Contact: Anthony Capps  Address: Unknown   [place]  Mobile Phone: [phone]  Relation: Relative  Preferred language: English   needed? No    Discharge Plan A: Home  Discharge Plan B: Home with [facility] - [facility] [address]  [address] [address]  Phone: [phone] Fax: [phone]    [id number] - [place], [place] - [address]  [address] [address]  Phone: [phone] Fax: [phone]    [facility] [address] [facility], [place] - [address] [address]  Phone: [phone] Fax: [phone]      Initial Assessment (most recent)       Adult Discharge Assessment - 11/17/22 1504          Discharge Assessment    Assessment Type Discharge Planning Assessment     Confirmed/corrected address, phone number and insurance Yes     Confirmed Demographics Correct on Facesheet     Source of Information patient     Lives  With alone     Do you expect to return to your current living situation? Yes     Do you have help at home or someone to help you manage your care at home? No     Prior to hospitilization cognitive status: Alert/Oriented     Current cognitive status: Alert/Oriented     Walking or Climbing Stairs Difficulty ambulation difficulty, requires equipment     Dressing/Bathing Difficulty bathing difficulty, requires equipment     Equipment Currently Used at Home walker, rolling;wheelchair;bath bench;bedside commode     Readmission within 30 days? No     Patient currently being followed by outpatient case management? No     Do you currently have service(s) that help you manage your care at home? No     Do you take prescription medications? Yes     Do you have prescription coverage? Yes     Do you have any problems affording any of your prescribed medications? No     Is the patient taking medications as prescribed? yes     How do you get to doctors appointments? family or friend will provide     Are you on dialysis? No     Do you take coumadin? No     Discharge Plan A Home     Discharge Plan B Home with family     DME Needed Upon Discharge  none     Discharge Plan discussed with: Patient     Discharge Barriers Identified None

## 2022-11-17 NOTE — PLAN OF CARE
POC reviewed with pt, verbalized understanding. Pt AAO x 4, able to make needs known. Meds given per MAR. IVF infusing as ordered. Tele in place and being monitored. Cough managed with PRN medications. Safety maintained with side rails up x2, bed locked and in lowest positioned, call light in reach. Pt educated to call for assistance with ambulation, verbalized understanding. Pt remains free of falls. No further needs expressed at this time. Will continue to monitor.

## 2022-11-17 NOTE — ED NOTES
Report called to ISAAK Kemp. Floor needs to clarify orders with hospital medicine r/t pt's potassium prior to accepting transfer.

## 2022-11-17 NOTE — PLAN OF CARE
Verbal obtained for LAW. Pt in covid isolation       11/17/22 1502   LAW Message   Medicare Outpatient and Observation Notification regarding financial responsibility Explained to patient/caregiver;Signed/date by patient/caregiver   Date LAW was signed 11/17/22   Time LAW was signed 1507

## 2022-11-17 NOTE — ED NOTES
Dieter notified pt on his way to the floor. Potassium treated. Tele [phone] called to monitor room. Pt transported via wheelchair.

## 2022-11-17 NOTE — PLAN OF CARE
11/17/22 0716   Patient Assessment/Suction   Level of Consciousness (AVPU) alert   Respiratory Effort Normal;Unlabored   Expansion/Accessory Muscles/Retractions no retractions;no use of accessory muscles   All Lung Fields Breath Sounds coarse;Anterior:;Lateral:   Rhythm/Pattern, Respiratory depth regular;pattern regular;unlabored   Cough Frequency infrequent   Cough Type productive   PRE-TX-O2   O2 Device (Oxygen Therapy) room air   SpO2 (!) 94 %   Pulse Oximetry Type Intermittent   $ Pulse Oximetry - Multiple Charge Pulse Oximetry - Multiple   Pulse 84   Resp 18   Aerosol Therapy   $ Aerosol Therapy Charges Aerosol Treatment   Daily Review of Necessity (SVN) completed   Respiratory Treatment Status (SVN) given   Treatment Route (SVN) mask   Patient Position (SVN) HOB elevated   Post Treatment Assessment (SVN) breath sounds unchanged   Signs of Intolerance (SVN) none   Breath Sounds Post-Respiratory Treatment   Throughout All Fields Post-Treatment All Fields   Throughout All Fields Post-Treatment no change   Post-treatment Heart Rate (beats/min) 86   Post-treatment Resp Rate (breaths/min) 18

## 2022-11-18 PROBLEM — J18.9 PNEUMONIA: Status: RESOLVED | Noted: 2020-05-17 | Resolved: 2022-11-18

## 2022-11-18 PROBLEM — R79.89 ELEVATED LFTS: Status: ACTIVE | Noted: 2022-11-18

## 2022-11-18 PROBLEM — K92.1 MELENA: Status: RESOLVED | Noted: 2022-05-02 | Resolved: 2022-11-18

## 2022-11-18 PROBLEM — I50.43 ACUTE ON CHRONIC COMBINED SYSTOLIC AND DIASTOLIC CONGESTIVE HEART FAILURE: Status: ACTIVE | Noted: 2020-05-19

## 2022-11-18 PROBLEM — E87.5 HYPERKALEMIA: Status: RESOLVED | Noted: 2022-11-17 | Resolved: 2022-11-18

## 2022-11-18 PROBLEM — Z95.810 AICD (AUTOMATIC CARDIOVERTER/DEFIBRILLATOR) PRESENT: Status: ACTIVE | Noted: 2022-11-18

## 2022-11-18 LAB
ANION GAP SERPL CALC-SCNC: 8 MMOL/L (ref 8–16)
BUN SERPL-MCNC: 23 MG/DL (ref 8–23)
CALCIUM SERPL-MCNC: 7.5 MG/DL (ref 8.7–10.5)
CHLORIDE SERPL-SCNC: 104 MMOL/L (ref 95–110)
CO2 SERPL-SCNC: 22 MMOL/L (ref 23–29)
CREAT SERPL-MCNC: 1 MG/DL (ref 0.5–1.4)
CRP SERPL-MCNC: 7.2 MG/L (ref 0–8.2)
D DIMER PPP IA.FEU-MCNC: 1.35 MG/L FEU
EST. GFR  (NO RACE VARIABLE): >60 ML/MIN/1.73 M^2
FERRITIN SERPL-MCNC: 338 NG/ML (ref 20–300)
GLUCOSE SERPL-MCNC: 126 MG/DL (ref 70–110)
LDH SERPL L TO P-CCNC: 307 U/L (ref 110–260)
POCT GLUCOSE: 123 MG/DL (ref 70–110)
POTASSIUM SERPL-SCNC: 4.4 MMOL/L (ref 3.5–5.1)
SODIUM SERPL-SCNC: 134 MMOL/L (ref 136–145)

## 2022-11-18 PROCEDURE — 63600175 PHARM REV CODE 636 W HCPCS: Performed by: STUDENT IN AN ORGANIZED HEALTH CARE EDUCATION/TRAINING PROGRAM

## 2022-11-18 PROCEDURE — 97116 GAIT TRAINING THERAPY: CPT

## 2022-11-18 PROCEDURE — 94640 AIRWAY INHALATION TREATMENT: CPT | Mod: XB

## 2022-11-18 PROCEDURE — 25000003 PHARM REV CODE 250: Performed by: NURSE PRACTITIONER

## 2022-11-18 PROCEDURE — 83615 LACTATE (LD) (LDH) ENZYME: CPT | Performed by: STUDENT IN AN ORGANIZED HEALTH CARE EDUCATION/TRAINING PROGRAM

## 2022-11-18 PROCEDURE — 85379 FIBRIN DEGRADATION QUANT: CPT | Performed by: STUDENT IN AN ORGANIZED HEALTH CARE EDUCATION/TRAINING PROGRAM

## 2022-11-18 PROCEDURE — 80048 BASIC METABOLIC PNL TOTAL CA: CPT | Performed by: HOSPITALIST

## 2022-11-18 PROCEDURE — 36415 COLL VENOUS BLD VENIPUNCTURE: CPT | Performed by: STUDENT IN AN ORGANIZED HEALTH CARE EDUCATION/TRAINING PROGRAM

## 2022-11-18 PROCEDURE — 25000003 PHARM REV CODE 250: Performed by: HOSPITALIST

## 2022-11-18 PROCEDURE — 94761 N-INVAS EAR/PLS OXIMETRY MLT: CPT

## 2022-11-18 PROCEDURE — 25000242 PHARM REV CODE 250 ALT 637 W/ HCPCS: Performed by: HOSPITALIST

## 2022-11-18 PROCEDURE — 86140 C-REACTIVE PROTEIN: CPT | Performed by: STUDENT IN AN ORGANIZED HEALTH CARE EDUCATION/TRAINING PROGRAM

## 2022-11-18 PROCEDURE — G0378 HOSPITAL OBSERVATION PER HR: HCPCS

## 2022-11-18 PROCEDURE — 82728 ASSAY OF FERRITIN: CPT | Performed by: STUDENT IN AN ORGANIZED HEALTH CARE EDUCATION/TRAINING PROGRAM

## 2022-11-18 PROCEDURE — 63600175 PHARM REV CODE 636 W HCPCS: Performed by: HOSPITALIST

## 2022-11-18 PROCEDURE — 97161 PT EVAL LOW COMPLEX 20 MIN: CPT

## 2022-11-18 PROCEDURE — 36415 COLL VENOUS BLD VENIPUNCTURE: CPT | Performed by: HOSPITALIST

## 2022-11-18 RX ORDER — FUROSEMIDE 10 MG/ML
20 INJECTION INTRAMUSCULAR; INTRAVENOUS
Status: DISCONTINUED | OUTPATIENT
Start: 2022-11-18 | End: 2022-11-19

## 2022-11-18 RX ORDER — LISINOPRIL 40 MG/1
40 TABLET ORAL DAILY
Status: DISCONTINUED | OUTPATIENT
Start: 2022-11-19 | End: 2022-11-21

## 2022-11-18 RX ADMIN — IPRATROPIUM BROMIDE AND ALBUTEROL SULFATE 3 ML: 2.5; .5 SOLUTION RESPIRATORY (INHALATION) at 07:11

## 2022-11-18 RX ADMIN — ISOSORBIDE MONONITRATE 30 MG: 30 TABLET, EXTENDED RELEASE ORAL at 08:11

## 2022-11-18 RX ADMIN — FUROSEMIDE 20 MG: 10 INJECTION, SOLUTION INTRAMUSCULAR; INTRAVENOUS at 12:11

## 2022-11-18 RX ADMIN — PREDNISONE 40 MG: 20 TABLET ORAL at 08:11

## 2022-11-18 RX ADMIN — ATORVASTATIN CALCIUM 80 MG: 40 TABLET, FILM COATED ORAL at 08:11

## 2022-11-18 RX ADMIN — LEVOTHYROXINE SODIUM 25 MCG: 25 TABLET ORAL at 05:11

## 2022-11-18 RX ADMIN — GUAIFENESIN 200 MG: 200 SOLUTION ORAL at 08:11

## 2022-11-18 RX ADMIN — APIXABAN 5 MG: 2.5 TABLET, FILM COATED ORAL at 08:11

## 2022-11-18 RX ADMIN — BENZONATATE 100 MG: 100 CAPSULE ORAL at 08:11

## 2022-11-18 RX ADMIN — CARVEDILOL 25 MG: 25 TABLET, FILM COATED ORAL at 08:11

## 2022-11-18 RX ADMIN — IPRATROPIUM BROMIDE AND ALBUTEROL SULFATE 3 ML: 2.5; .5 SOLUTION RESPIRATORY (INHALATION) at 03:11

## 2022-11-18 RX ADMIN — GUAIFENESIN 200 MG: 200 SOLUTION ORAL at 01:11

## 2022-11-18 RX ADMIN — TRAVOPROST 1 DROP: 0.04 SOLUTION/ DROPS OPHTHALMIC at 08:11

## 2022-11-18 RX ADMIN — BENZONATATE 100 MG: 100 CAPSULE ORAL at 11:11

## 2022-11-18 RX ADMIN — ASPIRIN 81 MG: 81 TABLET, COATED ORAL at 08:11

## 2022-11-18 RX ADMIN — FUROSEMIDE 20 MG: 10 INJECTION, SOLUTION INTRAMUSCULAR; INTRAVENOUS at 11:11

## 2022-11-18 RX ADMIN — CLOPIDOGREL BISULFATE 75 MG: 75 TABLET, FILM COATED ORAL at 08:11

## 2022-11-18 RX ADMIN — IPRATROPIUM BROMIDE AND ALBUTEROL SULFATE 3 ML: 2.5; .5 SOLUTION RESPIRATORY (INHALATION) at 11:11

## 2022-11-18 NOTE — SUBJECTIVE & OBJECTIVE
Past Medical History:   Diagnosis Date    A-fib     Acute kidney injury     COPD (chronic obstructive pulmonary disease)     Coronary artery disease     Diabetes mellitus     Encounter for blood transfusion     Former smoker     Hypertension     Myocardial infarction     Thyroid disease        Past Surgical History:   Procedure Laterality Date    CLOSURE OF LEFT ATRIAL APPENDAGE USING DEVICE N/A 5/17/2022    Procedure: Left atrial appendage closure device;  Surgeon: Tre Schmidt MD;  Location: [facility] CATH LAB;  Service: Cardiology;  Laterality: N/A;    COLONOSCOPY N/A 1/3/2017    Procedure: COLONOSCOPY;  Surgeon: Marcus Zarco MD;  Location: [facility] ENDO;  Service: Endoscopy;  Laterality: N/A;    CORONARY BYPASS GRAFT ANGIOGRAPHY  3/30/2021    Procedure: Bypass graft study;  Surgeon: Tre Schmidt MD;  Location: [facility] CATH LAB;  Service: Cardiology;;    CORONARY STENT PLACEMENT      GASTROSTOMY TUBE PLACEMENT      INSERTION OF PACEMAKER  04/2017    INTRAVASCULAR ULTRASOUND, NON-CORONARY  5/17/2022    Procedure: Intravascular Ultrasound, Non-Coronary;  Surgeon: Tre Schmidt MD;  Location: [facility] CATH LAB;  Service: Cardiology;;    LEFT HEART CATHETERIZATION N/A 3/30/2021    Procedure: Left heart cath;  Surgeon: Tre Schmidt MD;  Location: [facility] CATH LAB;  Service: Cardiology;  Laterality: N/A;    PEG TUBE REMOVAL      TRACHEOSTOMY CLOSURE      TRACHEOSTOMY TUBE PLACEMENT      TRANSESOPHAGEAL ECHOCARDIOGRAPHY N/A 5/17/2022    Procedure: ECHOCARDIOGRAM, TRANSESOPHAGEAL;  Surgeon: [facility] Provider;  Location: [facility] CATH LAB;  Service: Anesthesiology;  Laterality: N/A;    UPPER GASTROINTESTINAL ENDOSCOPY  05/2016    Dr. Zarco with PEG tube placement       Review of patient's allergies indicates:   Allergen Reactions    Penicillins Hives       No current facility-administered medications on file prior to encounter.     Current Outpatient Medications on File Prior to Encounter   Medication Sig     apixaban (ELIQUIS) 5 mg Tab Take 5 mg by mouth 2 (two) times daily.    aspirin (ECOTRIN) 81 MG EC tablet Take 81 mg by mouth once daily.    atorvastatin (LIPITOR) 80 MG tablet Take 80 mg by mouth once daily.    azithromycin (Z-ARNOLDO) 250 MG tablet Take 2 tablets by mouth on day 1; Take 1 tablet by mouth on days 2-5    carvedilol (COREG) 6.25 MG tablet Take 2 tablets (12.5 mg total) by mouth 2 (two) times daily.    clopidogreL (PLAVIX) 75 mg tablet Take 1 tablet (75 mg total) by mouth once daily.    enalapril (VASOTEC) 20 MG tablet 20 mg 2 (two) times daily.     furosemide (LASIX) 20 MG tablet Take 1 tablet (20 mg total) by mouth once daily.    isosorbide mononitrate (IMDUR) 30 MG 24 hr tablet Take 30 mg by mouth once daily.    levothyroxine (SYNTHROID) 25 MCG tablet Take 25 mcg by mouth before breakfast.    nirmatrelvir-ritonavir 300 mg (150 mg x 2)-100 mg copackaged tablets (EUA) Take 3 tablets by mouth 2 (two) times daily for 5 days. Each dose contains 2 nirmatrelvir (pink tablets) and 1 ritonavir (white tablet). Take all 3 tablets together    potassium chloride SA (K-DUR,KLOR-CON M) 10 MEQ tablet Take 10 mEq by mouth once daily.    travoprost, benzalkonium, (TRAVATAN) 0.004 % ophthalmic solution Place 1 drop into both eyes nightly.     Family History       Problem Relation (Age of Onset)    Diabetes Mother, Brother    Glaucoma Father, Brother    Heart disease Mother    Macular degeneration Father, Brother    No Known Problems Sister, Maternal Aunt, Maternal Uncle, Paternal Aunt, Paternal Uncle, Maternal Grandmother, Maternal Grandfather, Paternal Grandmother, Paternal Grandfather          Tobacco Use    Smoking status: Former     Types: Cigarettes     Quit date: 2005     Years since quittin.9    Smokeless tobacco: Never    Tobacco comments:     quit    Substance and Sexual Activity    Alcohol use: No    Drug use: No    Sexual activity: Yes     Review of Systems   Constitutional:  Negative for chills,  fatigue and fever.   HENT:  Positive for congestion. Negative for sinus pressure.    Eyes:  Negative for pain and visual disturbance.   Respiratory:  Positive for cough, shortness of breath and wheezing.    Cardiovascular:  Negative for chest pain, palpitations and leg swelling.   Gastrointestinal:  Negative for abdominal pain, diarrhea, nausea and vomiting.   Genitourinary:  Negative for difficulty urinating, dysuria and hematuria.   Musculoskeletal:  Negative for arthralgias and myalgias.   Skin:  Negative for rash and wound.   Neurological:  Negative for dizziness, weakness, light-headedness and headaches.   Hematological:  Negative for adenopathy. Does not bruise/bleed easily.   Psychiatric/Behavioral:  Negative for confusion and dysphoric mood. The patient is not nervous/anxious.    Objective:     Vital Signs (Most Recent):  Temp: 97.3 °F (36.3 °C) (11/17/22 1124)  Pulse: 103 (11/17/22 1942)  Resp: 20 (11/17/22 1942)  BP: 115/74 (11/17/22 1124)  SpO2: (!) 94 % (11/17/22 1942)   Vital Signs (24h Range):  Temp:  [97 °F (36.1 °C)-98.5 °F (36.9 °C)] 97.3 °F (36.3 °C)  Pulse:  [] 103  Resp:  [15-20] 20  SpO2:  [94 %-98 %] 94 %  BP: (104-117)/(57-89) 115/74     Weight: 60.6 kg (133 lb 9.6 oz)  Body mass index is 25.24 kg/m².    Physical Exam  Constitutional:       General: He is not in acute distress.     Appearance: He is not ill-appearing.   HENT:      Head: Normocephalic and atraumatic.      Right Ear: External ear normal.      Left Ear: External ear normal.      Mouth/Throat:      Pharynx: No oropharyngeal exudate.   Eyes:      General: No scleral icterus.        Right eye: No discharge.         Left eye: No discharge.      Conjunctiva/sclera: Conjunctivae normal.   Neck:      Thyroid: No thyromegaly.      Vascular: No JVD.   Cardiovascular:      Rate and Rhythm: Normal rate and regular rhythm.      Heart sounds:     No gallop.   Pulmonary:      Effort: Pulmonary effort is normal.      Breath sounds:  Transmitted upper airway sounds present. Wheezing present.   Abdominal:      General: Bowel sounds are normal. There is no distension.      Palpations: Abdomen is soft. There is no mass.      Tenderness: There is no abdominal tenderness.   Musculoskeletal:         General: No deformity.      Cervical back: Normal range of motion and neck supple.   Lymphadenopathy:      Cervical: No cervical adenopathy.   Skin:     General: Skin is warm and dry.      Capillary Refill: Capillary refill takes less than 2 seconds.      Findings: No rash.   Neurological:      Mental Status: He is alert and oriented to person, place, and time.   Psychiatric:         Behavior: Behavior normal.           Significant Labs: BMP:   Recent Labs   Lab 11/17/22  0355      *   K 4.5      CO2 21*   BUN 33*   CREATININE 1.3   CALCIUM 7.4*     CBC:   Recent Labs   Lab 11/16/22  1648   WBC 10.14   HGB 12.1*   HCT 36.9*            Significant Imaging:   Results for orders placed during the hospital encounter of 11/16/22    X-Ray Chest 1 View    Narrative  EXAMINATION:  XR CHEST 1 VIEW    CLINICAL HISTORY:  SOB;    TECHNIQUE:  Single frontal view of the chest was performed.    COMPARISON:  Chest x-ray of November 13, 2022    FINDINGS:  An AICD is noted in place on the left.  Patient has had a prior sternotomy.  There is mild cardiomegaly in a left ventricular predominant pattern.  There is prominence of the pulmonary vasculature and possible mild pulmonary edema.  A small right pleural effusion is noted.  No pneumothorax is seen.    Impression  Cardiomegaly, prominence of the pulmonary vasculature and possible mild pulmonary edema suggesting congestive heart failure.  Small right pleural effusion.  Prior sternotomy.  AICD in position.      Electronically signed by: Huey Escalante MD  Date:    11/16/2022  Time:    16:46

## 2022-11-18 NOTE — PROGRESS NOTES
"[facility] Hospital Medicine  Progress Note    Patient Name: Cleveland Capps  MRN: [id number]  Patient Class: OP- Observation   Admission Date: 11/16/2022  Length of Stay: 0 days  Attending Physician: Fernando Bryson MD  Primary Care Provider: Romero Snyder MD        Subjective:     Principal Problem:COPD exacerbation        HPI:  Pt presented to ED with shortness of breath.  Began a few days prior.  Associated with cough and congestion.   He sought evaluation from his primary care provider and was diagnosed with SARS-CoV-2 infection.  He was put on Paxlovid, of which he took a couple of days' worth.  He didn't improve.  Cough is nonproductive.  No chest pain.  No fevers.  No chills. No orthopnea.  He has CAD, COPD, and is a former smoker.  Upon presenting to the ED, he was hypotensive but responded to crystalloid infusions.  Hospital observation requested for more hydration and treatment of mild COPD exacerbation.      Overview/Hospital Course:  Cleveland Capps is a 67 year old male with a past medical history of combined CHF s/p AICD, CAD s/p PCI, Afib, HTN, HLD, DM, and hypothyroidism, who presented with shortness of breath secondary to an acute COPD exacerbation in the setting of COVID-19 infection as well a mild acute on chronic CHF exacerbation. He is stable on room air. He is on Decadron and scheduled Duonebs. He is on IV Lasix. Remdesivir has not been started given elevated LFTs for which a RUQ ultrasound has been ordered. Inflammatory markers are being trended.      Interval History: see "Hospital Course"    Review of Systems   Constitutional:  Negative for chills, fatigue and fever.   HENT:  Positive for congestion. Negative for sinus pressure.    Eyes:  Negative for pain and visual disturbance.   Respiratory:  Positive for cough, shortness of breath and wheezing.    Cardiovascular:  Negative for chest pain, palpitations and leg swelling.   Gastrointestinal:  Negative for abdominal " pain, diarrhea, nausea and vomiting.   Genitourinary:  Negative for difficulty urinating, dysuria and hematuria.   Musculoskeletal:  Negative for arthralgias and myalgias.   Skin:  Negative for rash and wound.   Allergic/Immunologic: Positive for immunocompromised state.   Neurological:  Negative for dizziness, weakness, light-headedness and headaches.   Hematological:  Negative for adenopathy. Does not bruise/bleed easily.   Psychiatric/Behavioral:  Negative for confusion and dysphoric mood. The patient is not nervous/anxious.    All other systems reviewed and are negative.  Objective:     Vital Signs (Most Recent):  Temp: 98.4 °F (36.9 °C) (11/18/22 0812)  Pulse: 101 (11/18/22 0812)  Resp: 18 (11/18/22 0812)  BP: 129/79 (11/18/22 0812)  SpO2: 95 % (11/18/22 0812) Vital Signs (24h Range):  Temp:  [97.1 °F (36.2 °C)-98.4 °F (36.9 °C)] 98.4 °F (36.9 °C)  Pulse:  [] 101  Resp:  [18-20] 18  SpO2:  [94 %-97 %] 95 %  BP: (101-129)/(59-79) 129/79     Weight: 60.6 kg (133 lb 9.6 oz)  Body mass index is 25.24 kg/m².    Intake/Output Summary (Last 24 hours) at 11/18/2022 0948  Last data filed at 11/18/2022 0547  Gross per 24 hour   Intake 1260.83 ml   Output 900 ml   Net 360.83 ml      Physical Exam  Vitals and nursing note reviewed.   Constitutional:       General: He is not in acute distress.     Appearance: He is ill-appearing.   HENT:      Head: Normocephalic and atraumatic.      Right Ear: External ear normal.      Left Ear: External ear normal.      Nose: Nose normal.      Mouth/Throat:      Mouth: Mucous membranes are moist.      Pharynx: Oropharynx is clear. No oropharyngeal exudate.   Eyes:      General: No scleral icterus.        Right eye: No discharge.         Left eye: No discharge.      Extraocular Movements: Extraocular movements intact.      Conjunctiva/sclera: Conjunctivae normal.   Neck:      Thyroid: No thyromegaly.      Vascular: No JVD.   Cardiovascular:      Rate and Rhythm: Normal rate and  regular rhythm.      Heart sounds: Normal heart sounds.     No gallop.   Pulmonary:      Effort: Pulmonary effort is normal. No respiratory distress.      Breath sounds: Transmitted upper airway sounds present. Wheezing present.   Abdominal:      General: Bowel sounds are normal. There is no distension.      Palpations: Abdomen is soft. There is no mass.      Tenderness: There is no abdominal tenderness.   Musculoskeletal:         General: No deformity. Normal range of motion.      Cervical back: Normal range of motion and neck supple.   Lymphadenopathy:      Cervical: No cervical adenopathy.   Skin:     General: Skin is warm and dry.      Findings: No rash.   Neurological:      General: No focal deficit present.      Mental Status: He is alert and oriented to person, place, and time. Mental status is at baseline.   Psychiatric:         Mood and Affect: Mood normal.         Behavior: Behavior normal.       Significant Labs: All pertinent labs within the past 24 hours have been reviewed.    Significant Imaging: I have reviewed all pertinent imaging results/findings within the past 24 hours.      Assessment/Plan:      * COPD exacerbation  In setting of COVID-19 infection.  -Decadron 6 daily  -Hold remdesivir given LFT elevation  -Trend inflammatory markers  -Airborne and isolation precautions  -O2 PRN  -Duonebs Q4H WA      Acute on chronic combined systolic and diastolic congestive heart failure  -Telemetry  -Lisinopril  -Coreg  -IV Lasix  -Monitor I's and O's  -Keep K > 4 and Mg > 2    Elevated LFTs  Unclear etiology. Possible hepatic congestion.  -Follow up RUQ ultrasound      AICD (automatic cardioverter/defibrillator) present  Noted.      Hyponatremia  -Continue IV diuresis  -Trend Na      Hypothyroid  -Continue home Synthroid    Type 2 diabetes mellitus without complication, without long-term current use of insulin  -SSI  -Hypoglycemic precautions  -AC HS glucose checks  -Diabetic diet    COVID-19  -Airborne and  isolation precautions  -Decadron 6  -Eliquis  -Hold remdesivir  -Trend inflammatory markers    PAF (paroxysmal atrial fibrillation)  -Eliquis  -Coreg  -Telemetry        Benign essential HTN  -Continue Coreg and lisinopril  -Hold Imdur; consider Aldactone if possible  -Continue to monitor    Coronary artery disease  -Telemetry  -Coreg  -ASA and Plavix  -Statin        VTE Risk Mitigation (From admission, onward)         Ordered     apixaban tablet 5 mg  2 times daily         11/17/22 2011     IP VTE HIGH RISK PATIENT  Once         11/16/22 1935     Place sequential compression device  Until discontinued         11/16/22 1935     Reason for No Pharmacological VTE Prophylaxis  Once        Question:  Reasons:  Answer:  Already adequately anticoagulated on oral Anticoagulants    11/16/22 1935                Discharge Planning   MACKENZIE: 11/20/2022     Code Status: Full Code   Is the patient medically ready for discharge?:     Reason for patient still in hospital (select all that apply): Patient trending condition, Laboratory test, Treatment, Imaging and PT / OT recommendations  Discharge Plan A: Home                  Fernando Bryson MD  Department of Hospital Medicine   [facility]

## 2022-11-18 NOTE — ASSESSMENT & PLAN NOTE
He has chronic atrial fibrillation.  Right now, his rhythm is atrial fibrillation.  Rate is controlled.  Continue BB.  Continue apixaban.

## 2022-11-18 NOTE — ASSESSMENT & PLAN NOTE
Monitor potassium    Potassium   Date Value Ref Range Status   11/17/2022 4.5 3.5 - 5.1 mmol/L Final   11/16/2022 5.2 (H) 3.5 - 5.1 mmol/L Final

## 2022-11-18 NOTE — HOSPITAL COURSE
Cleveland Capps is a 67 year old male with a past medical history of combined CHF s/p AICD, CAD s/p PCI, Afib, HTN, HLD, DM, and hypothyroidism who presented with shortness of breath secondary to an acute COPD exacerbation in the setting of COVID-19 infection as well an acute on chronic CHF exacerbation. He is currently on one liter NC. He completed a course of prednisone; levalbuterol and ipratropium nebulizers are ordered PRN. He was also placed on IV Lasix which has since been changed to PO dosing. Remdesivir was not started given elevated LFTs for which a RUQ ultrasound was unremarkable for acute pathology. His liver enzymes improved with diuresis as they were likely elevated secondary to hepatic congestion. He was monitored for an episode of NSVT without any shock of his AICD. He was given a 250 cc NS bolus. He developed Afib with RVR 11/22 for which Cardiology has been consulted. Entresto and Aldactone have also been started; Entresto was held for hypotension. Patient started on amiodarone and was cardioverted 11/25/22.  Amiodarone discontinued 11/26/22. Patient remains on isolation per infection control policy (x 20 days), no fever an no symptoms. PT/OT consulted for assistance in dc planning. Bloody sputum and blood streaked bowel movement reported. Plavix and eliquis held.    11/28- patient did not have bloody BM or hemoptysis, eliquis and and plavix resumed; aspirin dc'd. Afib rate 60-70s. He will continue on coreg, entresto and aldactone. Lasix 3 times a week if needed. He did not qualify for home oxygen. He was counseled on low salt diet. Follow up PCP and cardiology as scheduled.

## 2022-11-18 NOTE — CARE UPDATE
11/17/22 1942   Patient Assessment/Suction   Level of Consciousness (AVPU) alert   Respiratory Effort Normal;Unlabored   Expansion/Accessory Muscles/Retractions no retractions;no use of accessory muscles   All Lung Fields Breath Sounds wheezes, expiratory;wheezes, inspiratory   Rhythm/Pattern, Respiratory no shortness of breath reported;depth regular;pattern regular   Cough Frequency frequent   Cough Type nonproductive   PRE-TX-O2   O2 Device (Oxygen Therapy) room air   SpO2 (!) 94 %   Pulse Oximetry Type Intermittent   Pulse 103   Resp 20   Aerosol Therapy   $ Aerosol Therapy Charges Aerosol Treatment   Daily Review of Necessity (SVN) completed   Respiratory Treatment Status (SVN) given   Treatment Route (SVN) mask   Patient Position (SVN) HOB elevated   Post Treatment Assessment (SVN) breath sounds unchanged   Signs of Intolerance (SVN) none   Breath Sounds Post-Respiratory Treatment   Throughout All Fields Post-Treatment All Fields   Throughout All Fields Post-Treatment no change   Post-treatment Heart Rate (beats/min) 102   Post-treatment Resp Rate (breaths/min) 20

## 2022-11-18 NOTE — ASSESSMENT & PLAN NOTE
Monitor sodium.    Sodium   Date Value Ref Range Status   11/17/2022 133 (L) 136 - 145 mmol/L Final   11/16/2022 131 (L) 136 - 145 mmol/L Final

## 2022-11-18 NOTE — ASSESSMENT & PLAN NOTE
In setting of COVID-19 infection.  -Decadron 6 daily  -Hold remdesivir given LFT elevation  -Trend inflammatory markers  -Airborne and isolation precautions  -O2 PRN  -Duonebs Q4H WA

## 2022-11-18 NOTE — PLAN OF CARE
POC reviewed with pt, verbalized understanding. Pt AAO x 4, able to make needs known. Meds given per MAR. IV intact and patent. Cough managed with prn medications. VSS. Patient states hi SOB is getting better and he is feeling a lot better today. Tele in place and being monitored. Safety maintained with side rails up x2, bed locked and in lowest positioned, call light in reach. Pt educated to call for assistance with ambulation, verbalized understanding. Pt remains free of falls. No further needs expressed at this time. Will continue to monitor.

## 2022-11-18 NOTE — SUBJECTIVE & OBJECTIVE
"Interval History: see "Hospital Course"    Review of Systems   Constitutional:  Negative for chills, fatigue and fever.   HENT:  Positive for congestion. Negative for sinus pressure.    Eyes:  Negative for pain and visual disturbance.   Respiratory:  Positive for cough, shortness of breath and wheezing.    Cardiovascular:  Negative for chest pain, palpitations and leg swelling.   Gastrointestinal:  Negative for abdominal pain, diarrhea, nausea and vomiting.   Genitourinary:  Negative for difficulty urinating, dysuria and hematuria.   Musculoskeletal:  Negative for arthralgias and myalgias.   Skin:  Negative for rash and wound.   Allergic/Immunologic: Positive for immunocompromised state.   Neurological:  Negative for dizziness, weakness, light-headedness and headaches.   Hematological:  Negative for adenopathy. Does not bruise/bleed easily.   Psychiatric/Behavioral:  Negative for confusion and dysphoric mood. The patient is not nervous/anxious.    All other systems reviewed and are negative.  Objective:     Vital Signs (Most Recent):  Temp: 98.4 °F (36.9 °C) (11/18/22 0812)  Pulse: 101 (11/18/22 0812)  Resp: 18 (11/18/22 0812)  BP: 129/79 (11/18/22 0812)  SpO2: 95 % (11/18/22 0812) Vital Signs (24h Range):  Temp:  [97.1 °F (36.2 °C)-98.4 °F (36.9 °C)] 98.4 °F (36.9 °C)  Pulse:  [] 101  Resp:  [18-20] 18  SpO2:  [94 %-97 %] 95 %  BP: (101-129)/(59-79) 129/79     Weight: 60.6 kg (133 lb 9.6 oz)  Body mass index is 25.24 kg/m².    Intake/Output Summary (Last 24 hours) at 11/18/2022 0948  Last data filed at 11/18/2022 0547  Gross per 24 hour   Intake 1260.83 ml   Output 900 ml   Net 360.83 ml      Physical Exam  Vitals and nursing note reviewed.   Constitutional:       General: He is not in acute distress.     Appearance: He is ill-appearing.   HENT:      Head: Normocephalic and atraumatic.      Right Ear: External ear normal.      Left Ear: External ear normal.      Nose: Nose normal.      Mouth/Throat:      " Mouth: Mucous membranes are moist.      Pharynx: Oropharynx is clear. No oropharyngeal exudate.   Eyes:      General: No scleral icterus.        Right eye: No discharge.         Left eye: No discharge.      Extraocular Movements: Extraocular movements intact.      Conjunctiva/sclera: Conjunctivae normal.   Neck:      Thyroid: No thyromegaly.      Vascular: No JVD.   Cardiovascular:      Rate and Rhythm: Normal rate and regular rhythm.      Heart sounds: Normal heart sounds.     No gallop.   Pulmonary:      Effort: Pulmonary effort is normal. No respiratory distress.      Breath sounds: Transmitted upper airway sounds present. Wheezing present.   Abdominal:      General: Bowel sounds are normal. There is no distension.      Palpations: Abdomen is soft. There is no mass.      Tenderness: There is no abdominal tenderness.   Musculoskeletal:         General: No deformity. Normal range of motion.      Cervical back: Normal range of motion and neck supple.   Lymphadenopathy:      Cervical: No cervical adenopathy.   Skin:     General: Skin is warm and dry.      Findings: No rash.   Neurological:      General: No focal deficit present.      Mental Status: He is alert and oriented to person, place, and time. Mental status is at baseline.   Psychiatric:         Mood and Affect: Mood normal.         Behavior: Behavior normal.       Significant Labs: All pertinent labs within the past 24 hours have been reviewed.    Significant Imaging: I have reviewed all pertinent imaging results/findings within the past 24 hours.

## 2022-11-18 NOTE — HPI
Pt presented to ED with shortness of breath.  Began a few days prior.  Associated with cough and congestion.   He sought evaluation from his primary care provider and was diagnosed with SARS-CoV-2 infection.  He was put on Paxlovid, of which he took a couple of days' worth.  He didn't improve.  Cough is nonproductive.  No chest pain.  No fevers.  No chills. No orthopnea.  He has CAD, COPD, and is a former smoker.  Upon presenting to the ED, he was hypotensive but responded to crystalloid infusions.  Hospital observation requested for more hydration and treatment of mild COPD exacerbation.

## 2022-11-18 NOTE — ASSESSMENT & PLAN NOTE
-Airborne and isolation precautions  -Decadron 6  -Eliquis  -Hold remdesivir  -Trend inflammatory markers

## 2022-11-18 NOTE — ASSESSMENT & PLAN NOTE
Patient's FSGs will be monitored.  Last A1c reviewed-   Lab Results   Component Value Date    HGBA1C 6.3 (H) 10/24/2020     Most recent fingerstick glucose reviewed-   Recent Labs   Lab 11/16/22 2117 11/17/22  1725   POCTGLUCOSE 97 116*     Current correctional scale  Low  Maintain anti-hyperglycemic dose as follows-   Antihyperglycemics (From admission, onward)    Start     Stop Route Frequency Ordered    11/16/22 1935  insulin aspart U-100 pen 0-5 Units         -- SubQ Before meals & nightly PRN 11/16/22 1935        Hold Oral hypoglycemics while patient is in the hospital.

## 2022-11-18 NOTE — PT/OT/SLP EVAL
Physical Therapy Evaluation    Patient Name:  Cleveland Capps   MRN:  [id number]    Recommendations:     Discharge Recommendations:  home   Discharge Equipment Recommendations: none   Barriers to discharge: None    Assessment:     Cleveland Capps is a 67 y.o. 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  He presents with the following impairments/functional limitations:  weakness, gait instability, impaired balance, impaired cardiopulmonary response to activity .  Patient agreeable to a PT evaluation this afternoon.  Patient presented supine in bed and was able to transfer to sitting and then standing with SBA.  Patient then ambulated x 50 feet in room with no AD with SBA.    Rehab Prognosis: Good; patient would benefit from acute skilled PT services to address these deficits and reach maximum level of function.    Recent Surgery: * No surgery found *      Plan:     During this hospitalization, patient to be seen 5 x/week to address the identified rehab impairments via gait training, therapeutic activities, therapeutic exercises and progress toward the following goals:    Plan of Care Expires:  12/16/22    Subjective     Chief Complaint: being thirsty and hungary  Patient/Family Comments/goals: get something to eat and drink  Pain/Comfort:       Patients cultural, spiritual, Yazidism conflicts given the current situation:      Living Environment:  Currently lives alone in a 1 story home elevated but with a ramp as well as stairs.  Prior to admission, patients level of function was independent.  Equipment used at home: none.  DME owned (not currently used): rolling walker, single point cane, bedside commode, and wheelchair.  Upon discharge, patient will have assistance from friends.    Objective:     Communicated with nurse prior to session.  Patient found supine with bed alarm  upon PT entry to room.    General Precautions: Standard,  fall, droplet, airborne, contact   Orthopedic Precautions:N/A   Braces: N/A  Respiratory Status: Room air    Exams:  RLE ROM: WFL  RLE Strength: WNL  LLE ROM: WFL  LLE Strength: WNL    Functional Mobility:  Bed Mobility:     Supine to Sit: stand by assistance  Sit to Supine: stand by assistance  Transfers:     Sit to Stand:  stand by assistance with no AD  Gait: x 50 feet no AD SBA      AM-PAC 6 CLICK MOBILITY  Total Score:        Treatment & Education:  Gait training x 50 feet no AD SBA.    Patient left supine with call button in reach, bed alarm on, and nurse notified.    GOALS:   Multidisciplinary Problems       Physical Therapy Goals          Problem: Physical Therapy    Goal Priority Disciplines Outcome Goal Variances Interventions   Physical Therapy Goal     PT, PT/OT Ongoing, Progressing     Description: Goals to be met by: 22     Patient will increase functional independence with mobility by performin. Supine to sit with [place]  2. Sit to supine with [place]  3. Sit to stand transfer with [place]  4. Bed to chair transfer with [place] using No Assistive Device  5. Gait  x 150 feet with [place] using No Assistive Device.                          History:     Past Medical History:   Diagnosis Date    A-fib     Acute kidney injury     COPD (chronic obstructive pulmonary disease)     Coronary artery disease     Diabetes mellitus     Encounter for blood transfusion     Former smoker     Hypertension     Myocardial infarction     Thyroid disease        Past Surgical History:   Procedure Laterality Date    CLOSURE OF LEFT ATRIAL APPENDAGE USING DEVICE N/A 2022    Procedure: Left atrial appendage closure device;  Surgeon: Tre Schmidt MD;  Location: [facility] CATH LAB;  Service: Cardiology;  Laterality: N/A;    COLONOSCOPY N/A 1/3/2017    Procedure: COLONOSCOPY;  Surgeon: Marcus Green MD;  Location: [facility];  Service: Endoscopy;  Laterality: N/A;    CORONARY BYPASS  GRAFT ANGIOGRAPHY  3/30/2021    Procedure: Bypass graft study;  Surgeon: Tre Schmidt MD;  Location: [facility] CATH LAB;  Service: Cardiology;;    CORONARY STENT PLACEMENT      GASTROSTOMY TUBE PLACEMENT      INSERTION OF PACEMAKER  04/2017    INTRAVASCULAR ULTRASOUND, NON-CORONARY  5/17/2022    Procedure: Intravascular Ultrasound, Non-Coronary;  Surgeon: Tre Schmidt MD;  Location: [facility] CATH LAB;  Service: Cardiology;;    LEFT HEART CATHETERIZATION N/A 3/30/2021    Procedure: Left heart cath;  Surgeon: Tre Schmidt MD;  Location: [facility] CATH LAB;  Service: Cardiology;  Laterality: N/A;    PEG TUBE REMOVAL      TRACHEOSTOMY CLOSURE      TRACHEOSTOMY TUBE PLACEMENT      TRANSESOPHAGEAL ECHOCARDIOGRAPHY N/A 5/17/2022    Procedure: ECHOCARDIOGRAM, TRANSESOPHAGEAL;  Surgeon: [facility] Diagnostic Provider;  Location: [facility] CATH LAB;  Service: Anesthesiology;  Laterality: N/A;    UPPER GASTROINTESTINAL ENDOSCOPY  05/2016    Dr. Zarco with PEG tube placement       Time Tracking:     PT Received On: 11/18/22  PT Start Time: 1231     PT Stop Time: 1258  PT Total Time (min): 27 min     Billable Minutes: Evaluation 18 and Gait Training 9      11/22/2022

## 2022-11-18 NOTE — H&P
[facility] Hospital Medicine  History & Physical    Patient Name: Cleveland Capps  MRN: [id number]  Patient Class: OP- Observation  Admission Date: 11/16/2022  Attending Physician: Adriano Suero MD   Primary Care Provider: Romero Snyder MD         Patient information was obtained from patient, past medical records and ER records.     Subjective:     Principal Problem:COPD exacerbation    Chief Complaint:   Chief Complaint   Patient presents with    Shortness of Breath     Started at noon today, has COPD and is covid positive         HPI: Pt presented to ED with shortness of breath.  Began a few days prior.  Associated with cough and congestion.   He sought evaluation from his primary care provider and was diagnosed with SARS-CoV-2 infection.  He was put on Paxlovid, of which he took a couple of days' worth.  He didn't improve.  Cough is nonproductive.  No chest pain.  No fevers.  No chills. No orthopnea.  He has CAD, COPD, and is a former smoker.  Upon presenting to the ED, he was hypotensive but responded to crystalloid infusions.  Hospital observation requested for more hydration and treatment of mild COPD exacerbation.      Past Medical History:   Diagnosis Date    A-fib     Acute kidney injury     COPD (chronic obstructive pulmonary disease)     Coronary artery disease     Diabetes mellitus     Encounter for blood transfusion     Former smoker     Hypertension     Myocardial infarction     Thyroid disease        Past Surgical History:   Procedure Laterality Date    CLOSURE OF LEFT ATRIAL APPENDAGE USING DEVICE N/A 5/17/2022    Procedure: Left atrial appendage closure device;  Surgeon: Tre Schmidt MD;  Location: [facility] CATH LAB;  Service: Cardiology;  Laterality: N/A;    COLONOSCOPY N/A 1/3/2017    Procedure: COLONOSCOPY;  Surgeon: Marcus Green MD;  Location: [facility];  Service: Endoscopy;  Laterality: N/A;    CORONARY BYPASS GRAFT ANGIOGRAPHY  3/30/2021     Procedure: Bypass graft study;  Surgeon: Tre Schmidt MD;  Location: [facility] CATH LAB;  Service: Cardiology;;    CORONARY STENT PLACEMENT      GASTROSTOMY TUBE PLACEMENT      INSERTION OF PACEMAKER  04/2017    INTRAVASCULAR ULTRASOUND, NON-CORONARY  5/17/2022    Procedure: Intravascular Ultrasound, Non-Coronary;  Surgeon: Tre Schmidt MD;  Location: [facility] CATH LAB;  Service: Cardiology;;    LEFT HEART CATHETERIZATION N/A 3/30/2021    Procedure: Left heart cath;  Surgeon: Tre Schmidt MD;  Location: [facility] CATH LAB;  Service: Cardiology;  Laterality: N/A;    PEG TUBE REMOVAL      TRACHEOSTOMY CLOSURE      TRACHEOSTOMY TUBE PLACEMENT      TRANSESOPHAGEAL ECHOCARDIOGRAPHY N/A 5/17/2022    Procedure: ECHOCARDIOGRAM, TRANSESOPHAGEAL;  Surgeon: [facility] Diagnostic Provider;  Location: [facility] CATH LAB;  Service: Anesthesiology;  Laterality: N/A;    UPPER GASTROINTESTINAL ENDOSCOPY  05/2016    Dr. Zarco with PEG tube placement       Review of patient's allergies indicates:   Allergen Reactions    Penicillins Hives       No current facility-administered medications on file prior to encounter.     Current Outpatient Medications on File Prior to Encounter   Medication Sig    apixaban (ELIQUIS) 5 mg Tab Take 5 mg by mouth 2 (two) times daily.    aspirin (ECOTRIN) 81 MG EC tablet Take 81 mg by mouth once daily.    atorvastatin (LIPITOR) 80 MG tablet Take 80 mg by mouth once daily.    azithromycin (Z-ARNOLDO) 250 MG tablet Take 2 tablets by mouth on day 1; Take 1 tablet by mouth on days 2-5    carvedilol (COREG) 6.25 MG tablet Take 2 tablets (12.5 mg total) by mouth 2 (two) times daily.    clopidogreL (PLAVIX) 75 mg tablet Take 1 tablet (75 mg total) by mouth once daily.    enalapril (VASOTEC) 20 MG tablet 20 mg 2 (two) times daily.     furosemide (LASIX) 20 MG tablet Take 1 tablet (20 mg total) by mouth once daily.    isosorbide mononitrate (IMDUR) 30 MG 24 hr tablet Take 30 mg by mouth once daily.     levothyroxine (SYNTHROID) 25 MCG tablet Take 25 mcg by mouth before breakfast.    nirmatrelvir-ritonavir 300 mg (150 mg x 2)-100 mg copackaged tablets (EUA) Take 3 tablets by mouth 2 (two) times daily for 5 days. Each dose contains 2 nirmatrelvir (pink tablets) and 1 ritonavir (white tablet). Take all 3 tablets together    potassium chloride SA (K-DUR,KLOR-CON M) 10 MEQ tablet Take 10 mEq by mouth once daily.    travoprost, benzalkonium, (TRAVATAN) 0.004 % ophthalmic solution Place 1 drop into both eyes nightly.     Family History       Problem Relation (Age of Onset)    Diabetes Mother, Brother    Glaucoma Father, Brother    Heart disease Mother    Macular degeneration Father, Brother    No Known Problems Sister, Maternal Aunt, Maternal Uncle, Paternal Aunt, Paternal Uncle, Maternal Grandmother, Maternal Grandfather, Paternal Grandmother, Paternal Grandfather          Tobacco Use    Smoking status: Former     Types: Cigarettes     Quit date: 2005     Years since quittin.9    Smokeless tobacco: Never    Tobacco comments:     quit    Substance and Sexual Activity    Alcohol use: No    Drug use: No    Sexual activity: Yes     Review of Systems   Constitutional:  Negative for chills, fatigue and fever.   HENT:  Positive for congestion. Negative for sinus pressure.    Eyes:  Negative for pain and visual disturbance.   Respiratory:  Positive for cough, shortness of breath and wheezing.    Cardiovascular:  Negative for chest pain, palpitations and leg swelling.   Gastrointestinal:  Negative for abdominal pain, diarrhea, nausea and vomiting.   Genitourinary:  Negative for difficulty urinating, dysuria and hematuria.   Musculoskeletal:  Negative for arthralgias and myalgias.   Skin:  Negative for rash and wound.   Neurological:  Negative for dizziness, weakness, light-headedness and headaches.   Hematological:  Negative for adenopathy. Does not bruise/bleed easily.   Psychiatric/Behavioral:  Negative  for confusion and dysphoric mood. The patient is not nervous/anxious.    Objective:     Vital Signs (Most Recent):  Temp: 97.3 °F (36.3 °C) (11/17/22 1124)  Pulse: 103 (11/17/22 1942)  Resp: 20 (11/17/22 1942)  BP: 115/74 (11/17/22 1124)  SpO2: (!) 94 % (11/17/22 1942)   Vital Signs (24h Range):  Temp:  [97 °F (36.1 °C)-98.5 °F (36.9 °C)] 97.3 °F (36.3 °C)  Pulse:  [] 103  Resp:  [15-20] 20  SpO2:  [94 %-98 %] 94 %  BP: (104-117)/(57-89) 115/74     Weight: 60.6 kg (133 lb 9.6 oz)  Body mass index is 25.24 kg/m².    Physical Exam  Constitutional:       General: He is not in acute distress.     Appearance: He is not ill-appearing.   HENT:      Head: Normocephalic and atraumatic.      Right Ear: External ear normal.      Left Ear: External ear normal.      Mouth/Throat:      Pharynx: No oropharyngeal exudate.   Eyes:      General: No scleral icterus.        Right eye: No discharge.         Left eye: No discharge.      Conjunctiva/sclera: Conjunctivae normal.   Neck:      Thyroid: No thyromegaly.      Vascular: No JVD.   Cardiovascular:      Rate and Rhythm: Normal rate and regular rhythm.      Heart sounds:     No gallop.   Pulmonary:      Effort: Pulmonary effort is normal.      Breath sounds: Transmitted upper airway sounds present. Wheezing present.   Abdominal:      General: Bowel sounds are normal. There is no distension.      Palpations: Abdomen is soft. There is no mass.      Tenderness: There is no abdominal tenderness.   Musculoskeletal:         General: No deformity.      Cervical back: Normal range of motion and neck supple.   Lymphadenopathy:      Cervical: No cervical adenopathy.   Skin:     General: Skin is warm and dry.      Capillary Refill: Capillary refill takes less than 2 seconds.      Findings: No rash.   Neurological:      Mental Status: He is alert and oriented to person, place, and time.   Psychiatric:         Behavior: Behavior normal.           Significant Labs: BMP:   Recent Labs   Lab  11/17/22  0355      *   K 4.5      CO2 21*   BUN 33*   CREATININE 1.3   CALCIUM 7.4*     CBC:   Recent Labs   Lab 11/16/22  1648   WBC 10.14   HGB 12.1*   HCT 36.9*            Significant Imaging:   Results for orders placed during the hospital encounter of 11/16/22    X-Ray Chest 1 View    Narrative  EXAMINATION:  XR CHEST 1 VIEW    CLINICAL HISTORY:  SOB;    TECHNIQUE:  Single frontal view of the chest was performed.    COMPARISON:  Chest x-ray of November 13, 2022    FINDINGS:  An AICD is noted in place on the left.  Patient has had a prior sternotomy.  There is mild cardiomegaly in a left ventricular predominant pattern.  There is prominence of the pulmonary vasculature and possible mild pulmonary edema.  A small right pleural effusion is noted.  No pneumothorax is seen.    Impression  Cardiomegaly, prominence of the pulmonary vasculature and possible mild pulmonary edema suggesting congestive heart failure.  Small right pleural effusion.  Prior sternotomy.  AICD in position.      Electronically signed by: Huey Escalante MD  Date:    11/16/2022  Time:    16:46      Assessment/Plan:     * COPD exacerbation  Aerosol treatments.  Prednisone 40 mg daily.  No need for NIPPV.  No need for antibiotics.      Hyperkalemia  Monitor potassium    Potassium   Date Value Ref Range Status   11/17/2022 4.5 3.5 - 5.1 mmol/L Final   11/16/2022 5.2 (H) 3.5 - 5.1 mmol/L Final           Hyponatremia  Monitor sodium.    Sodium   Date Value Ref Range Status   11/17/2022 133 (L) 136 - 145 mmol/L Final   11/16/2022 131 (L) 136 - 145 mmol/L Final           Hypothyroid  Continue usual dose of levothyroxine.    Type 2 diabetes mellitus without complication, without long-term current use of insulin  Patient's FSGs will be monitored.  Last A1c reviewed-   Lab Results   Component Value Date    HGBA1C 6.3 (H) 10/24/2020     Most recent fingerstick glucose reviewed-   Recent Labs   Lab 11/16/22 2117 11/17/22  5    POCTGLUCOSE 97 116*     Current correctional scale  Low  Maintain anti-hyperglycemic dose as follows-   Antihyperglycemics (From admission, onward)    Start     Stop Route Frequency Ordered    11/16/22 1935  insulin aspart U-100 pen 0-5 Units         -- SubQ Before meals & nightly PRN 11/16/22 1935        Hold Oral hypoglycemics while patient is in the hospital.    Disease due to severe acute respiratory syndrome coronavirus 2 (SARS-CoV-2) with comorbid pulmonary disease  Not requiring oxygen.  Monitor symptoms and respiratory effort.    PAF (paroxysmal atrial fibrillation)  He has chronic atrial fibrillation.  Right now, his rhythm is atrial fibrillation.  Rate is controlled.  Continue BB.  Continue apixaban.        Benign essential HTN  Monitor blood pressure.  Continue outpatient BP meds except for diuretics.    Coronary artery disease  Stable.  Continue aspirin, beta blocker, and statin.        VTE Risk Mitigation (From admission, onward)         Ordered     apixaban tablet 5 mg  2 times daily         11/17/22 2011     IP VTE HIGH RISK PATIENT  Once         11/16/22 1935     Place sequential compression device  Until discontinued         11/16/22 1935     Reason for No Pharmacological VTE Prophylaxis  Once        Question:  Reasons:  Answer:  Already adequately anticoagulated on oral Anticoagulants    11/16/22 1935                   Adriano Suero MD  Department of Hospital Medicine   [facility]

## 2022-11-18 NOTE — ASSESSMENT & PLAN NOTE
Monitor potassium    Potassium   Date Value Ref Range Status   11/18/2022 4.4 3.5 - 5.1 mmol/L Final   11/17/2022 4.5 3.5 - 5.1 mmol/L Final

## 2022-11-19 PROBLEM — Z95.1 STATUS POST CORONARY ARTERY BYPASS GRAFT: Status: ACTIVE | Noted: 2022-11-19

## 2022-11-19 PROBLEM — Z95.818 PRESENCE OF WATCHMAN LEFT ATRIAL APPENDAGE CLOSURE DEVICE: Status: ACTIVE | Noted: 2022-11-19

## 2022-11-19 PROBLEM — D68.59 HYPERCOAGULABLE STATE: Status: ACTIVE | Noted: 2022-11-19

## 2022-11-19 PROBLEM — I65.21 OCCLUSION OF RIGHT CAROTID ARTERY: Status: ACTIVE | Noted: 2022-11-19

## 2022-11-19 PROBLEM — E78.5 DYSLIPIDEMIA: Status: ACTIVE | Noted: 2022-11-19

## 2022-11-19 PROBLEM — E87.1 HYPONATREMIA: Status: RESOLVED | Noted: 2022-11-17 | Resolved: 2022-11-19

## 2022-11-19 LAB
ALBUMIN SERPL BCP-MCNC: 2.7 G/DL (ref 3.5–5.2)
ALP SERPL-CCNC: 77 U/L (ref 55–135)
ALT SERPL W/O P-5'-P-CCNC: 355 U/L (ref 10–44)
ANION GAP SERPL CALC-SCNC: 8 MMOL/L (ref 8–16)
AST SERPL-CCNC: 132 U/L (ref 10–40)
BASOPHILS # BLD AUTO: 0 K/UL (ref 0–0.2)
BASOPHILS NFR BLD: 0 % (ref 0–1.9)
BILIRUB SERPL-MCNC: 0.8 MG/DL (ref 0.1–1)
BUN SERPL-MCNC: 22 MG/DL (ref 8–23)
CALCIUM SERPL-MCNC: 7.5 MG/DL (ref 8.7–10.5)
CHLORIDE SERPL-SCNC: 105 MMOL/L (ref 95–110)
CO2 SERPL-SCNC: 23 MMOL/L (ref 23–29)
CREAT SERPL-MCNC: 1 MG/DL (ref 0.5–1.4)
DIFFERENTIAL METHOD: ABNORMAL
EOSINOPHIL # BLD AUTO: 0 K/UL (ref 0–0.5)
EOSINOPHIL NFR BLD: 0 % (ref 0–8)
ERYTHROCYTE [DISTWIDTH] IN BLOOD BY AUTOMATED COUNT: 17.1 % (ref 11.5–14.5)
EST. GFR  (NO RACE VARIABLE): >60 ML/MIN/1.73 M^2
GLUCOSE SERPL-MCNC: 157 MG/DL (ref 70–110)
HCT VFR BLD AUTO: 31.6 % (ref 40–54)
HGB BLD-MCNC: 10.1 G/DL (ref 14–18)
IMM GRANULOCYTES # BLD AUTO: 0.04 K/UL (ref 0–0.04)
IMM GRANULOCYTES NFR BLD AUTO: 0.6 % (ref 0–0.5)
LYMPHOCYTES # BLD AUTO: 0.5 K/UL (ref 1–4.8)
LYMPHOCYTES NFR BLD: 6.9 % (ref 18–48)
MAGNESIUM SERPL-MCNC: 2.2 MG/DL (ref 1.6–2.6)
MCH RBC QN AUTO: 33.3 PG (ref 27–31)
MCHC RBC AUTO-ENTMCNC: 32 G/DL (ref 32–36)
MCV RBC AUTO: 104 FL (ref 82–98)
MONOCYTES # BLD AUTO: 0.4 K/UL (ref 0.3–1)
MONOCYTES NFR BLD: 6.8 % (ref 4–15)
NEUTROPHILS # BLD AUTO: 5.6 K/UL (ref 1.8–7.7)
NEUTROPHILS NFR BLD: 85.7 % (ref 38–73)
NRBC BLD-RTO: 1 /100 WBC
PHOSPHATE SERPL-MCNC: 2.6 MG/DL (ref 2.7–4.5)
PLATELET # BLD AUTO: 148 K/UL (ref 150–450)
PMV BLD AUTO: 9.5 FL (ref 9.2–12.9)
POCT GLUCOSE: 133 MG/DL (ref 70–110)
POCT GLUCOSE: 149 MG/DL (ref 70–110)
POCT GLUCOSE: 157 MG/DL (ref 70–110)
POCT GLUCOSE: 173 MG/DL (ref 70–110)
POTASSIUM SERPL-SCNC: 4.3 MMOL/L (ref 3.5–5.1)
PROT SERPL-MCNC: 5.4 G/DL (ref 6–8.4)
RBC # BLD AUTO: 3.03 M/UL (ref 4.6–6.2)
SODIUM SERPL-SCNC: 136 MMOL/L (ref 136–145)
WBC # BLD AUTO: 6.5 K/UL (ref 3.9–12.7)

## 2022-11-19 PROCEDURE — 84100 ASSAY OF PHOSPHORUS: CPT | Performed by: STUDENT IN AN ORGANIZED HEALTH CARE EDUCATION/TRAINING PROGRAM

## 2022-11-19 PROCEDURE — 25000003 PHARM REV CODE 250: Performed by: HOSPITALIST

## 2022-11-19 PROCEDURE — 83735 ASSAY OF MAGNESIUM: CPT | Mod: 91 | Performed by: NURSE PRACTITIONER

## 2022-11-19 PROCEDURE — 93005 ELECTROCARDIOGRAM TRACING: CPT

## 2022-11-19 PROCEDURE — 83735 ASSAY OF MAGNESIUM: CPT | Performed by: STUDENT IN AN ORGANIZED HEALTH CARE EDUCATION/TRAINING PROGRAM

## 2022-11-19 PROCEDURE — 63600175 PHARM REV CODE 636 W HCPCS: Performed by: STUDENT IN AN ORGANIZED HEALTH CARE EDUCATION/TRAINING PROGRAM

## 2022-11-19 PROCEDURE — 36415 COLL VENOUS BLD VENIPUNCTURE: CPT | Performed by: NURSE PRACTITIONER

## 2022-11-19 PROCEDURE — 94761 N-INVAS EAR/PLS OXIMETRY MLT: CPT

## 2022-11-19 PROCEDURE — 93010 EKG 12-LEAD: ICD-10-PCS | Mod: ,,, | Performed by: SPECIALIST

## 2022-11-19 PROCEDURE — G0378 HOSPITAL OBSERVATION PER HR: HCPCS

## 2022-11-19 PROCEDURE — 85025 COMPLETE CBC W/AUTO DIFF WBC: CPT | Performed by: STUDENT IN AN ORGANIZED HEALTH CARE EDUCATION/TRAINING PROGRAM

## 2022-11-19 PROCEDURE — 25000003 PHARM REV CODE 250: Performed by: STUDENT IN AN ORGANIZED HEALTH CARE EDUCATION/TRAINING PROGRAM

## 2022-11-19 PROCEDURE — 80053 COMPREHEN METABOLIC PANEL: CPT | Mod: 91 | Performed by: NURSE PRACTITIONER

## 2022-11-19 PROCEDURE — 94640 AIRWAY INHALATION TREATMENT: CPT | Mod: XB

## 2022-11-19 PROCEDURE — 84100 ASSAY OF PHOSPHORUS: CPT | Mod: 91 | Performed by: NURSE PRACTITIONER

## 2022-11-19 PROCEDURE — 25000003 PHARM REV CODE 250: Performed by: NURSE PRACTITIONER

## 2022-11-19 PROCEDURE — 36415 COLL VENOUS BLD VENIPUNCTURE: CPT | Performed by: STUDENT IN AN ORGANIZED HEALTH CARE EDUCATION/TRAINING PROGRAM

## 2022-11-19 PROCEDURE — 93010 ELECTROCARDIOGRAM REPORT: CPT | Mod: ,,, | Performed by: SPECIALIST

## 2022-11-19 PROCEDURE — 80053 COMPREHEN METABOLIC PANEL: CPT | Performed by: STUDENT IN AN ORGANIZED HEALTH CARE EDUCATION/TRAINING PROGRAM

## 2022-11-19 PROCEDURE — 25000242 PHARM REV CODE 250 ALT 637 W/ HCPCS: Performed by: HOSPITALIST

## 2022-11-19 RX ORDER — SODIUM,POTASSIUM PHOSPHATES 280-250MG
1 POWDER IN PACKET (EA) ORAL
Status: DISCONTINUED | OUTPATIENT
Start: 2022-11-19 | End: 2022-11-21

## 2022-11-19 RX ORDER — FUROSEMIDE 10 MG/ML
40 INJECTION INTRAMUSCULAR; INTRAVENOUS
Status: DISCONTINUED | OUTPATIENT
Start: 2022-11-19 | End: 2022-11-20

## 2022-11-19 RX ADMIN — TRAVOPROST 1 DROP: 0.04 SOLUTION/ DROPS OPHTHALMIC at 09:11

## 2022-11-19 RX ADMIN — CARVEDILOL 25 MG: 25 TABLET, FILM COATED ORAL at 09:11

## 2022-11-19 RX ADMIN — BENZONATATE 100 MG: 100 CAPSULE ORAL at 09:11

## 2022-11-19 RX ADMIN — POTASSIUM & SODIUM PHOSPHATES POWDER PACK 280-160-250 MG 1 PACKET: 280-160-250 PACK at 05:11

## 2022-11-19 RX ADMIN — APIXABAN 5 MG: 2.5 TABLET, FILM COATED ORAL at 09:11

## 2022-11-19 RX ADMIN — POTASSIUM & SODIUM PHOSPHATES POWDER PACK 280-160-250 MG 1 PACKET: 280-160-250 PACK at 11:11

## 2022-11-19 RX ADMIN — CLOPIDOGREL BISULFATE 75 MG: 75 TABLET, FILM COATED ORAL at 09:11

## 2022-11-19 RX ADMIN — LEVOTHYROXINE SODIUM 25 MCG: 25 TABLET ORAL at 06:11

## 2022-11-19 RX ADMIN — GUAIFENESIN 200 MG: 200 SOLUTION ORAL at 09:11

## 2022-11-19 RX ADMIN — FUROSEMIDE 40 MG: 10 INJECTION, SOLUTION INTRAMUSCULAR; INTRAVENOUS at 11:11

## 2022-11-19 RX ADMIN — FUROSEMIDE 40 MG: 10 INJECTION, SOLUTION INTRAMUSCULAR; INTRAVENOUS at 10:11

## 2022-11-19 RX ADMIN — DEXAMETHASONE 6 MG: 4 TABLET ORAL at 09:11

## 2022-11-19 RX ADMIN — IPRATROPIUM BROMIDE AND ALBUTEROL SULFATE 3 ML: 2.5; .5 SOLUTION RESPIRATORY (INHALATION) at 11:11

## 2022-11-19 RX ADMIN — ASPIRIN 81 MG: 81 TABLET, COATED ORAL at 09:11

## 2022-11-19 RX ADMIN — IPRATROPIUM BROMIDE AND ALBUTEROL SULFATE 3 ML: 2.5; .5 SOLUTION RESPIRATORY (INHALATION) at 03:11

## 2022-11-19 RX ADMIN — LISINOPRIL 40 MG: 40 TABLET ORAL at 09:11

## 2022-11-19 RX ADMIN — IPRATROPIUM BROMIDE AND ALBUTEROL SULFATE 3 ML: 2.5; .5 SOLUTION RESPIRATORY (INHALATION) at 07:11

## 2022-11-19 RX ADMIN — ATORVASTATIN CALCIUM 80 MG: 40 TABLET, FILM COATED ORAL at 09:11

## 2022-11-19 RX ADMIN — IPRATROPIUM BROMIDE AND ALBUTEROL SULFATE 3 ML: 2.5; .5 SOLUTION RESPIRATORY (INHALATION) at 06:11

## 2022-11-19 RX ADMIN — POTASSIUM & SODIUM PHOSPHATES POWDER PACK 280-160-250 MG 1 PACKET: 280-160-250 PACK at 09:11

## 2022-11-19 NOTE — PLAN OF CARE
Pt AAOx4. VSS on RA. Pt remained free of falls this shift. No complaints of pain or discomfort. Medications administered as ordered. Pt is a-fib on monitor. Cont pulse ox monitor in use.  PIV intact, flushed and SL. Purposeful hourly rounding completed. Isolation precautions in place. Pt instructed to call for assistance. POC updated/reviewed. Pt verbalized understanding. No further needs expressed at this time.       Problem: Adult Inpatient Plan of Care  Goal: Plan of Care Review  Outcome: Ongoing, Progressing     Problem: Respiratory Compromise (Pneumonia)  Goal: Effective Oxygenation and Ventilation  Outcome: Ongoing, Progressing     Problem: Diabetes Comorbidity  Goal: Blood Glucose Level Within Targeted Range  Outcome: Ongoing, Progressing

## 2022-11-19 NOTE — PLAN OF CARE
POC reviewed w/ patient. Verbalized understanding. Remains in isolation for COVID. RA. Telemetry w/ continous pulse ox remains on A-FIB controlled rate. 20 beat run of V-tach overnight with no symptoms. Labs ordered for this AM. BG being monitored. No SSI needed. Cough medicines PRN given with effectiveness. U/S abd done yesterday d/t elevated LFT. Slowly progressing toward d/c goals.

## 2022-11-19 NOTE — ASSESSMENT & PLAN NOTE
Monitor potassium    Potassium   Date Value Ref Range Status   11/19/2022 4.3 3.5 - 5.1 mmol/L Final   11/18/2022 4.4 3.5 - 5.1 mmol/L Final

## 2022-11-19 NOTE — PROGRESS NOTES
"[facility] Medicine  Progress Note    Patient Name: Cleveland Capps  MRN: [id number]  Patient Class: OP- Observation   Admission Date: 11/16/2022  Length of Stay: 0 days  Attending Physician: Fernando Bryson MD  Primary Care Provider: Romero Snyder MD        Subjective:     Principal Problem:COPD exacerbation        HPI:  Pt presented to ED with shortness of breath.  Began a few days prior.  Associated with cough and congestion.   He sought evaluation from his primary care provider and was diagnosed with SARS-CoV-2 infection.  He was put on Paxlovid, of which he took a couple of days' worth.  He didn't improve.  Cough is nonproductive.  No chest pain.  No fevers.  No chills. No orthopnea.  He has CAD, COPD, and is a former smoker.  Upon presenting to the ED, he was hypotensive but responded to crystalloid infusions.  Hospital observation requested for more hydration and treatment of mild COPD exacerbation.      Overview/Hospital Course:  Cleveland Capps is a 67 year old male with a past medical history of combined CHF s/p AICD, CAD s/p PCI, Afib, HTN, HLD, DM, and hypothyroidism, who presented with shortness of breath secondary to an acute COPD exacerbation in the setting of COVID-19 infection as well a mild acute on chronic CHF exacerbation. He is stable on room air. He is on Decadron and scheduled Duonebs. He is on IV Lasix. Remdesivir has not been started given elevated LFTs for which a RUQ ultrasound was unremarkable for acute pathology. Inflammatory markers are being trended.      Interval History: see "Hospital Course"    Review of Systems   Constitutional:  Negative for chills, fatigue and fever.   HENT:  Positive for congestion. Negative for sinus pressure.    Eyes:  Negative for pain and visual disturbance.   Respiratory:  Positive for cough, shortness of breath and wheezing.    Cardiovascular:  Negative for chest pain, palpitations and leg swelling.   Gastrointestinal:  " Negative for abdominal pain, diarrhea, nausea and vomiting.   Genitourinary:  Negative for difficulty urinating, dysuria and hematuria.   Musculoskeletal:  Negative for arthralgias and myalgias.   Skin:  Negative for rash and wound.   Allergic/Immunologic: Positive for immunocompromised state.   Neurological:  Negative for dizziness, weakness, light-headedness and headaches.   Hematological:  Negative for adenopathy. Does not bruise/bleed easily.   Psychiatric/Behavioral:  Negative for confusion and dysphoric mood. The patient is not nervous/anxious.    All other systems reviewed and are negative.  Objective:     Vital Signs (Most Recent):  Temp: 97.2 °F (36.2 °C) (11/19/22 0700)  Pulse: 80 (11/19/22 0700)  Resp: 18 (11/19/22 0700)  BP: 116/76 (11/19/22 0700)  SpO2: (!) 91 % (11/19/22 0700)   Vital Signs (24h Range):  Temp:  [97.2 °F (36.2 °C)-98.4 °F (36.9 °C)] 97.2 °F (36.2 °C)  Pulse:  [] 80  Resp:  [16-20] 18  SpO2:  [91 %-99 %] 91 %  BP: ()/(55-76) 116/76     Weight: 60.6 kg (133 lb 9.6 oz)  Body mass index is 25.24 kg/m².    Intake/Output Summary (Last 24 hours) at 11/19/2022 1014  Last data filed at 11/19/2022 0714  Gross per 24 hour   Intake 900 ml   Output 1825 ml   Net -925 ml      Physical Exam  Vitals and nursing note reviewed.   Constitutional:       General: He is not in acute distress.     Appearance: He is ill-appearing.   HENT:      Head: Normocephalic and atraumatic.      Right Ear: External ear normal.      Left Ear: External ear normal.      Nose: Nose normal.      Mouth/Throat:      Mouth: Mucous membranes are moist.      Pharynx: Oropharynx is clear. No oropharyngeal exudate.   Eyes:      General: No scleral icterus.        Right eye: No discharge.         Left eye: No discharge.      Extraocular Movements: Extraocular movements intact.      Conjunctiva/sclera: Conjunctivae normal.   Neck:      Thyroid: No thyromegaly.      Vascular: No JVD.   Cardiovascular:      Rate and Rhythm:  Normal rate and regular rhythm.      Heart sounds: Normal heart sounds.     No gallop.   Pulmonary:      Effort: Pulmonary effort is normal. No respiratory distress.      Breath sounds: Wheezing and rales present.   Abdominal:      General: Bowel sounds are normal. There is no distension.      Palpations: Abdomen is soft. There is no mass.      Tenderness: There is no abdominal tenderness.   Musculoskeletal:         General: No deformity. Normal range of motion.      Cervical back: Normal range of motion and neck supple.   Lymphadenopathy:      Cervical: No cervical adenopathy.   Skin:     General: Skin is warm and dry.      Findings: No rash.   Neurological:      General: No focal deficit present.      Mental Status: He is alert and oriented to person, place, and time. Mental status is at baseline.   Psychiatric:         Mood and Affect: Mood normal.         Behavior: Behavior normal.       Significant Labs: All pertinent labs within the past 24 hours have been reviewed.    Significant Imaging: I have reviewed all pertinent imaging results/findings within the past 24 hours.      Assessment/Plan:      * COPD exacerbation  In setting of COVID-19 infection.  -Decadron 6 daily  -Hold remdesivir given LFT elevation  -Trend inflammatory markers  -Airborne and isolation precautions  -O2 PRN  -Duonebs Q4H WA      Acute on chronic combined systolic and diastolic congestive heart failure  -Telemetry  -Lisinopril  -Coreg  -IV Lasix  -Monitor I's and O's  -Keep K > 4 and Mg > 2    Dyslipidemia  -Continue ASA and statin      Elevated LFTs  Unclear etiology. Possible hepatic congestion. Improving.  -Trend LFTs with CMP    AICD (automatic cardioverter/defibrillator) present  Noted.      Hypothyroid  -Continue home Synthroid    Type 2 diabetes mellitus without complication, without long-term current use of insulin  -SSI  -Hypoglycemic precautions  -AC HS glucose checks  -Diabetic diet    COVID-19  -Airborne and isolation  precautions  -Decadron 6  -Eliquis  -Hold remdesivir  -Trend inflammatory markers    PAF (paroxysmal atrial fibrillation)  -Eliquis  -Coreg  -Telemetry        Benign essential HTN  -Continue Coreg and lisinopril  -Hold Imdur; consider Aldactone if possible  -Continue to monitor    Coronary artery disease  -Telemetry  -Coreg  -ASA and Plavix  -Statin        VTE Risk Mitigation (From admission, onward)         Ordered     apixaban tablet 5 mg  2 times daily         11/17/22 2011     IP VTE HIGH RISK PATIENT  Once         11/16/22 1935     Place sequential compression device  Until discontinued         11/16/22 1935     Reason for No Pharmacological VTE Prophylaxis  Once        Question:  Reasons:  Answer:  Already adequately anticoagulated on oral Anticoagulants    11/16/22 1935                Discharge Planning   MACKENZIE: 11/19/2022     Code Status: Full Code   Is the patient medically ready for discharge?:     Reason for patient still in hospital (select all that apply): Patient trending condition and Treatment  Discharge Plan A: Home                  Fernando Bryson MD  Department of Hospital Medicine   [facility]

## 2022-11-19 NOTE — PLAN OF CARE
11/18/22 1914   Patient Assessment/Suction   Level of Consciousness (AVPU) alert   Respiratory Effort Normal;Unlabored   Expansion/Accessory Muscles/Retractions no retractions;no use of accessory muscles   All Lung Fields Breath Sounds coarse;Anterior:;Lateral:   Rhythm/Pattern, Respiratory depth regular;pattern regular;unlabored   Cough Frequency no cough   PRE-TX-O2   O2 Device (Oxygen Therapy) room air   SpO2 95 %   Pulse Oximetry Type Continuous   $ Pulse Oximetry - Multiple Charge Pulse Oximetry - Multiple   Pulse 95   Resp 18   Aerosol Therapy   $ Aerosol Therapy Charges Aerosol Treatment   Daily Review of Necessity (SVN) completed   Respiratory Treatment Status (SVN) given   Treatment Route (SVN) mask   Patient Position (SVN) HOB elevated   Post Treatment Assessment (SVN) breath sounds unchanged   Signs of Intolerance (SVN) none   Breath Sounds Post-Respiratory Treatment   Throughout All Fields Post-Treatment All Fields   Throughout All Fields Post-Treatment no change   Post-treatment Heart Rate (beats/min) 96   Post-treatment Resp Rate (breaths/min) 18

## 2022-11-19 NOTE — NURSING
Pt had 20 beat run of v-tach. Asymptomatic. NP, Aleyda notified. Labs already ordered for AM. WCTM.

## 2022-11-19 NOTE — PLAN OF CARE
11/19/22 0645   Patient Assessment/Suction   Level of Consciousness (AVPU) alert   Respiratory Effort Normal;Unlabored   Expansion/Accessory Muscles/Retractions no use of accessory muscles;no retractions   All Lung Fields Breath Sounds coarse;wheezes, expiratory   Rhythm/Pattern, Respiratory unlabored;pattern regular;depth regular;no shortness of breath reported   PRE-TX-O2   O2 Device (Oxygen Therapy) room air   SpO2 99 %   Pulse Oximetry Type Continuous   $ Pulse Oximetry - Multiple Charge Pulse Oximetry - Multiple   Pulse 80   Resp 20   Aerosol Therapy   $ Aerosol Therapy Charges Aerosol Treatment   Daily Review of Necessity (SVN) completed   Respiratory Treatment Status (SVN) given   Treatment Route (SVN) mask;oxygen   Patient Position (SVN) semi-Basilio's   Post Treatment Assessment (SVN) patient reports breathing improved;vital signs unchanged   Signs of Intolerance (SVN) none

## 2022-11-19 NOTE — SUBJECTIVE & OBJECTIVE
"Interval History: see "Hospital Course"    Review of Systems   Constitutional:  Negative for chills, fatigue and fever.   HENT:  Positive for congestion. Negative for sinus pressure.    Eyes:  Negative for pain and visual disturbance.   Respiratory:  Positive for cough, shortness of breath and wheezing.    Cardiovascular:  Negative for chest pain, palpitations and leg swelling.   Gastrointestinal:  Negative for abdominal pain, diarrhea, nausea and vomiting.   Genitourinary:  Negative for difficulty urinating, dysuria and hematuria.   Musculoskeletal:  Negative for arthralgias and myalgias.   Skin:  Negative for rash and wound.   Allergic/Immunologic: Positive for immunocompromised state.   Neurological:  Negative for dizziness, weakness, light-headedness and headaches.   Hematological:  Negative for adenopathy. Does not bruise/bleed easily.   Psychiatric/Behavioral:  Negative for confusion and dysphoric mood. The patient is not nervous/anxious.    All other systems reviewed and are negative.  Objective:     Vital Signs (Most Recent):  Temp: 97.2 °F (36.2 °C) (11/19/22 0700)  Pulse: 80 (11/19/22 0700)  Resp: 18 (11/19/22 0700)  BP: 116/76 (11/19/22 0700)  SpO2: (!) 91 % (11/19/22 0700)   Vital Signs (24h Range):  Temp:  [97.2 °F (36.2 °C)-98.4 °F (36.9 °C)] 97.2 °F (36.2 °C)  Pulse:  [] 80  Resp:  [16-20] 18  SpO2:  [91 %-99 %] 91 %  BP: ()/(55-76) 116/76     Weight: 60.6 kg (133 lb 9.6 oz)  Body mass index is 25.24 kg/m².    Intake/Output Summary (Last 24 hours) at 11/19/2022 1014  Last data filed at 11/19/2022 0714  Gross per 24 hour   Intake 900 ml   Output 1825 ml   Net -925 ml      Physical Exam  Vitals and nursing note reviewed.   Constitutional:       General: He is not in acute distress.     Appearance: He is ill-appearing.   HENT:      Head: Normocephalic and atraumatic.      Right Ear: External ear normal.      Left Ear: External ear normal.      Nose: Nose normal.      Mouth/Throat:      Mouth: " Mucous membranes are moist.      Pharynx: Oropharynx is clear. No oropharyngeal exudate.   Eyes:      General: No scleral icterus.        Right eye: No discharge.         Left eye: No discharge.      Extraocular Movements: Extraocular movements intact.      Conjunctiva/sclera: Conjunctivae normal.   Neck:      Thyroid: No thyromegaly.      Vascular: No JVD.   Cardiovascular:      Rate and Rhythm: Normal rate and regular rhythm.      Heart sounds: Normal heart sounds.     No gallop.   Pulmonary:      Effort: Pulmonary effort is normal. No respiratory distress.      Breath sounds: Wheezing and rales present.   Abdominal:      General: Bowel sounds are normal. There is no distension.      Palpations: Abdomen is soft. There is no mass.      Tenderness: There is no abdominal tenderness.   Musculoskeletal:         General: No deformity. Normal range of motion.      Cervical back: Normal range of motion and neck supple.   Lymphadenopathy:      Cervical: No cervical adenopathy.   Skin:     General: Skin is warm and dry.      Findings: No rash.   Neurological:      General: No focal deficit present.      Mental Status: He is alert and oriented to person, place, and time. Mental status is at baseline.   Psychiatric:         Mood and Affect: Mood normal.         Behavior: Behavior normal.       Significant Labs: All pertinent labs within the past 24 hours have been reviewed.    Significant Imaging: I have reviewed all pertinent imaging results/findings within the past 24 hours.

## 2022-11-20 PROBLEM — E11.9 TYPE 2 DIABETES MELLITUS WITHOUT COMPLICATION, WITHOUT LONG-TERM CURRENT USE OF INSULIN: Status: RESOLVED | Noted: 2022-11-17 | Resolved: 2022-11-20

## 2022-11-20 PROBLEM — I50.43 ACUTE ON CHRONIC COMBINED SYSTOLIC AND DIASTOLIC CONGESTIVE HEART FAILURE: Status: RESOLVED | Noted: 2020-05-19 | Resolved: 2022-11-20

## 2022-11-20 PROBLEM — J44.1 COPD EXACERBATION: Status: RESOLVED | Noted: 2022-11-17 | Resolved: 2022-11-20

## 2022-11-20 PROBLEM — I50.42 CHRONIC COMBINED SYSTOLIC AND DIASTOLIC CONGESTIVE HEART FAILURE: Status: RESOLVED | Noted: 2020-05-19 | Resolved: 2022-11-20

## 2022-11-20 LAB
ALBUMIN SERPL BCP-MCNC: 3 G/DL (ref 3.5–5.2)
ALBUMIN SERPL BCP-MCNC: 3 G/DL (ref 3.5–5.2)
ALP SERPL-CCNC: 82 U/L (ref 55–135)
ALP SERPL-CCNC: 85 U/L (ref 55–135)
ALT SERPL W/O P-5'-P-CCNC: 308 U/L (ref 10–44)
ALT SERPL W/O P-5'-P-CCNC: 332 U/L (ref 10–44)
ANION GAP SERPL CALC-SCNC: 11 MMOL/L (ref 8–16)
ANION GAP SERPL CALC-SCNC: 9 MMOL/L (ref 8–16)
AST SERPL-CCNC: 85 U/L (ref 10–40)
AST SERPL-CCNC: 99 U/L (ref 10–40)
BASOPHILS # BLD AUTO: 0 K/UL (ref 0–0.2)
BASOPHILS NFR BLD: 0 % (ref 0–1.9)
BILIRUB SERPL-MCNC: 1 MG/DL (ref 0.1–1)
BILIRUB SERPL-MCNC: 1.1 MG/DL (ref 0.1–1)
BUN SERPL-MCNC: 26 MG/DL (ref 8–23)
BUN SERPL-MCNC: 29 MG/DL (ref 8–23)
CALCIUM SERPL-MCNC: 7.7 MG/DL (ref 8.7–10.5)
CALCIUM SERPL-MCNC: 7.9 MG/DL (ref 8.7–10.5)
CHLORIDE SERPL-SCNC: 101 MMOL/L (ref 95–110)
CHLORIDE SERPL-SCNC: 102 MMOL/L (ref 95–110)
CO2 SERPL-SCNC: 25 MMOL/L (ref 23–29)
CO2 SERPL-SCNC: 27 MMOL/L (ref 23–29)
CREAT SERPL-MCNC: 1.1 MG/DL (ref 0.5–1.4)
CREAT SERPL-MCNC: 1.2 MG/DL (ref 0.5–1.4)
DIFFERENTIAL METHOD: ABNORMAL
EOSINOPHIL # BLD AUTO: 0 K/UL (ref 0–0.5)
EOSINOPHIL NFR BLD: 0 % (ref 0–8)
ERYTHROCYTE [DISTWIDTH] IN BLOOD BY AUTOMATED COUNT: 17.8 % (ref 11.5–14.5)
EST. GFR  (NO RACE VARIABLE): >60 ML/MIN/1.73 M^2
EST. GFR  (NO RACE VARIABLE): >60 ML/MIN/1.73 M^2
GLUCOSE SERPL-MCNC: 162 MG/DL (ref 70–110)
GLUCOSE SERPL-MCNC: 173 MG/DL (ref 70–110)
HCT VFR BLD AUTO: 33.7 % (ref 40–54)
HGB BLD-MCNC: 11.1 G/DL (ref 14–18)
IMM GRANULOCYTES # BLD AUTO: 0.05 K/UL (ref 0–0.04)
IMM GRANULOCYTES NFR BLD AUTO: 0.6 % (ref 0–0.5)
LYMPHOCYTES # BLD AUTO: 0.4 K/UL (ref 1–4.8)
LYMPHOCYTES NFR BLD: 5 % (ref 18–48)
MAGNESIUM SERPL-MCNC: 2.2 MG/DL (ref 1.6–2.6)
MAGNESIUM SERPL-MCNC: 2.3 MG/DL (ref 1.6–2.6)
MCH RBC QN AUTO: 33.5 PG (ref 27–31)
MCHC RBC AUTO-ENTMCNC: 32.9 G/DL (ref 32–36)
MCV RBC AUTO: 102 FL (ref 82–98)
MONOCYTES # BLD AUTO: 0.7 K/UL (ref 0.3–1)
MONOCYTES NFR BLD: 7.3 % (ref 4–15)
NEUTROPHILS # BLD AUTO: 7.7 K/UL (ref 1.8–7.7)
NEUTROPHILS NFR BLD: 87.1 % (ref 38–73)
NRBC BLD-RTO: 0 /100 WBC
PHOSPHATE SERPL-MCNC: 3.8 MG/DL (ref 2.7–4.5)
PHOSPHATE SERPL-MCNC: 4.4 MG/DL (ref 2.7–4.5)
PLATELET # BLD AUTO: 177 K/UL (ref 150–450)
PMV BLD AUTO: 9.7 FL (ref 9.2–12.9)
POCT GLUCOSE: 133 MG/DL (ref 70–110)
POCT GLUCOSE: 153 MG/DL (ref 70–110)
POCT GLUCOSE: 154 MG/DL (ref 70–110)
POCT GLUCOSE: 160 MG/DL (ref 70–110)
POTASSIUM SERPL-SCNC: 4.2 MMOL/L (ref 3.5–5.1)
POTASSIUM SERPL-SCNC: 4.5 MMOL/L (ref 3.5–5.1)
PROT SERPL-MCNC: 6 G/DL (ref 6–8.4)
PROT SERPL-MCNC: 6.1 G/DL (ref 6–8.4)
RBC # BLD AUTO: 3.31 M/UL (ref 4.6–6.2)
SODIUM SERPL-SCNC: 136 MMOL/L (ref 136–145)
SODIUM SERPL-SCNC: 139 MMOL/L (ref 136–145)
WBC # BLD AUTO: 8.88 K/UL (ref 3.9–12.7)

## 2022-11-20 PROCEDURE — 25000003 PHARM REV CODE 250: Performed by: HOSPITALIST

## 2022-11-20 PROCEDURE — 83735 ASSAY OF MAGNESIUM: CPT | Performed by: STUDENT IN AN ORGANIZED HEALTH CARE EDUCATION/TRAINING PROGRAM

## 2022-11-20 PROCEDURE — 94640 AIRWAY INHALATION TREATMENT: CPT | Mod: XB

## 2022-11-20 PROCEDURE — 84100 ASSAY OF PHOSPHORUS: CPT | Performed by: STUDENT IN AN ORGANIZED HEALTH CARE EDUCATION/TRAINING PROGRAM

## 2022-11-20 PROCEDURE — 25000003 PHARM REV CODE 250: Performed by: STUDENT IN AN ORGANIZED HEALTH CARE EDUCATION/TRAINING PROGRAM

## 2022-11-20 PROCEDURE — 80053 COMPREHEN METABOLIC PANEL: CPT | Performed by: STUDENT IN AN ORGANIZED HEALTH CARE EDUCATION/TRAINING PROGRAM

## 2022-11-20 PROCEDURE — 36415 COLL VENOUS BLD VENIPUNCTURE: CPT | Performed by: STUDENT IN AN ORGANIZED HEALTH CARE EDUCATION/TRAINING PROGRAM

## 2022-11-20 PROCEDURE — 85025 COMPLETE CBC W/AUTO DIFF WBC: CPT | Performed by: STUDENT IN AN ORGANIZED HEALTH CARE EDUCATION/TRAINING PROGRAM

## 2022-11-20 PROCEDURE — 63600175 PHARM REV CODE 636 W HCPCS: Performed by: STUDENT IN AN ORGANIZED HEALTH CARE EDUCATION/TRAINING PROGRAM

## 2022-11-20 PROCEDURE — 27000221 HC OXYGEN, UP TO 24 HOURS

## 2022-11-20 PROCEDURE — G0378 HOSPITAL OBSERVATION PER HR: HCPCS

## 2022-11-20 PROCEDURE — 25000242 PHARM REV CODE 250 ALT 637 W/ HCPCS: Performed by: HOSPITALIST

## 2022-11-20 PROCEDURE — 94761 N-INVAS EAR/PLS OXIMETRY MLT: CPT

## 2022-11-20 RX ORDER — FUROSEMIDE 20 MG/1
20 TABLET ORAL DAILY
Status: DISCONTINUED | OUTPATIENT
Start: 2022-11-20 | End: 2022-11-21

## 2022-11-20 RX ORDER — CALCIUM GLUCONATE 20 MG/ML
1 INJECTION, SOLUTION INTRAVENOUS
Status: COMPLETED | OUTPATIENT
Start: 2022-11-20 | End: 2022-11-20

## 2022-11-20 RX ORDER — PREDNISONE 20 MG/1
40 TABLET ORAL DAILY
Qty: 4 TABLET | Refills: 0 | Status: SHIPPED | OUTPATIENT
Start: 2022-11-20 | End: 2022-11-28 | Stop reason: HOSPADM

## 2022-11-20 RX ORDER — PREDNISONE 20 MG/1
40 TABLET ORAL DAILY
Status: DISCONTINUED | OUTPATIENT
Start: 2022-11-20 | End: 2022-11-24

## 2022-11-20 RX ADMIN — POTASSIUM & SODIUM PHOSPHATES POWDER PACK 280-160-250 MG 1 PACKET: 280-160-250 PACK at 05:11

## 2022-11-20 RX ADMIN — CALCIUM GLUCONATE 1 G: 20 INJECTION, SOLUTION INTRAVENOUS at 10:11

## 2022-11-20 RX ADMIN — FUROSEMIDE 20 MG: 20 TABLET ORAL at 09:11

## 2022-11-20 RX ADMIN — POTASSIUM & SODIUM PHOSPHATES POWDER PACK 280-160-250 MG 1 PACKET: 280-160-250 PACK at 01:11

## 2022-11-20 RX ADMIN — ASPIRIN 81 MG: 81 TABLET, COATED ORAL at 09:11

## 2022-11-20 RX ADMIN — PREDNISONE 40 MG: 20 TABLET ORAL at 09:11

## 2022-11-20 RX ADMIN — IPRATROPIUM BROMIDE AND ALBUTEROL SULFATE 3 ML: 2.5; .5 SOLUTION RESPIRATORY (INHALATION) at 08:11

## 2022-11-20 RX ADMIN — LEVOTHYROXINE SODIUM 25 MCG: 25 TABLET ORAL at 06:11

## 2022-11-20 RX ADMIN — POTASSIUM & SODIUM PHOSPHATES POWDER PACK 280-160-250 MG 1 PACKET: 280-160-250 PACK at 09:11

## 2022-11-20 RX ADMIN — IPRATROPIUM BROMIDE AND ALBUTEROL SULFATE 3 ML: 2.5; .5 SOLUTION RESPIRATORY (INHALATION) at 12:11

## 2022-11-20 RX ADMIN — TRAVOPROST 1 DROP: 0.04 SOLUTION/ DROPS OPHTHALMIC at 09:11

## 2022-11-20 RX ADMIN — APIXABAN 5 MG: 2.5 TABLET, FILM COATED ORAL at 09:11

## 2022-11-20 RX ADMIN — CLOPIDOGREL BISULFATE 75 MG: 75 TABLET, FILM COATED ORAL at 09:11

## 2022-11-20 RX ADMIN — CARVEDILOL 25 MG: 25 TABLET, FILM COATED ORAL at 09:11

## 2022-11-20 RX ADMIN — IPRATROPIUM BROMIDE AND ALBUTEROL SULFATE 3 ML: 2.5; .5 SOLUTION RESPIRATORY (INHALATION) at 06:11

## 2022-11-20 RX ADMIN — LISINOPRIL 40 MG: 40 TABLET ORAL at 09:11

## 2022-11-20 RX ADMIN — CALCIUM GLUCONATE 1 G: 20 INJECTION, SOLUTION INTRAVENOUS at 09:11

## 2022-11-20 RX ADMIN — IPRATROPIUM BROMIDE AND ALBUTEROL SULFATE 3 ML: 2.5; .5 SOLUTION RESPIRATORY (INHALATION) at 04:11

## 2022-11-20 RX ADMIN — ATORVASTATIN CALCIUM 80 MG: 40 TABLET, FILM COATED ORAL at 09:11

## 2022-11-20 NOTE — ASSESSMENT & PLAN NOTE
-Continue Coreg and lisinopril  -Continue Imdur on discharge  -Patient may benefit from Aldactone if BP can tolerate  -Continue to monitor

## 2022-11-20 NOTE — PLAN OF CARE
Problem: Adult Inpatient Plan of Care  Goal: Plan of Care Review  Outcome: Ongoing, Progressing  Goal: Patient-Specific Goal (Individualized)  Outcome: Ongoing, Progressing  Goal: Absence of Hospital-Acquired Illness or Injury  Outcome: Ongoing, Progressing  Goal: Optimal Comfort and Wellbeing  Outcome: Ongoing, Progressing  Goal: Readiness for Transition of Care  Outcome: Ongoing, Progressing     Problem: Fluid Imbalance (Pneumonia)  Goal: Fluid Balance  Outcome: Ongoing, Progressing     Problem: Infection (Pneumonia)  Goal: Resolution of Infection Signs and Symptoms  Outcome: Ongoing, Progressing     Problem: Respiratory Compromise (Pneumonia)  Goal: Effective Oxygenation and Ventilation  Outcome: Ongoing, Progressing     Problem: Fall Injury Risk  Goal: Absence of Fall and Fall-Related Injury  Outcome: Ongoing, Progressing     Problem: Diabetes Comorbidity  Goal: Blood Glucose Level Within Targeted Range  Outcome: Ongoing, Progressing     Patient resting comfortably in bed. No complaints of SOB, chest pains, or anxiety at this time. IV intact. Telemetry monitoring, call light in reach. Shift uneventful.

## 2022-11-20 NOTE — ASSESSMENT & PLAN NOTE
-Airborne and isolation precautions  -Decadron 6 transitioned to prednisone for COPD exacerbation  -Eliquis  -Hold remdesivir  -Trend inflammatory markers

## 2022-11-20 NOTE — PLAN OF CARE
Patient cleared for discharge from case management standpoint.       11/20/22 1003   Final Note   Assessment Type Final Discharge Note   Anticipated Discharge Disposition Home   Hospital Resources/Appts/Education Provided Appointments scheduled and added to AVS;Provided patient/caregiver with written discharge plan information;Provided education on problems/symptoms using teachback

## 2022-11-20 NOTE — ASSESSMENT & PLAN NOTE
Monitor potassium    Potassium   Date Value Ref Range Status   11/20/2022 4.2 3.5 - 5.1 mmol/L Final   11/19/2022 4.5 3.5 - 5.1 mmol/L Final

## 2022-11-20 NOTE — PROGRESS NOTES
"[facility] Medicine  Progress Note    Patient Name: Cleveland Capps  MRN: [id number]  Patient Class: OP- Observation   Admission Date: 11/16/2022  Length of Stay: 0 days  Attending Physician: Fernando Bryson MD  Primary Care Provider: Romero Snyder MD        Subjective:     Principal Problem:Chronic combined systolic and diastolic congestive heart failure        HPI:  Pt presented to ED with shortness of breath.  Began a few days prior.  Associated with cough and congestion.   He sought evaluation from his primary care provider and was diagnosed with SARS-CoV-2 infection.  He was put on Paxlovid, of which he took a couple of days' worth.  He didn't improve.  Cough is nonproductive.  No chest pain.  No fevers.  No chills. No orthopnea.  He has CAD, COPD, and is a former smoker.  Upon presenting to the ED, he was hypotensive but responded to crystalloid infusions.  Hospital observation requested for more hydration and treatment of mild COPD exacerbation.      Overview/Hospital Course:  Cleveland Capps is a 67 year old male with a past medical history of combined CHF s/p AICD, CAD s/p PCI, Afib, HTN, HLD, DM, and hypothyroidism, who presented with shortness of breath secondary to an acute COPD exacerbation in the setting of COVID-19 infection as well a mild acute on chronic CHF exacerbation. He was stable on room air throughout is entire course. He was placed on steroids and scheduled Duonebs. He was also placed on IV Lasix with adequate diuresis. Remdesivir was not started given elevated LFTs for which a RUQ ultrasound was unremarkable for acute pathology. His liver enzymes improved with diuresis as they were likely elevated secondary to hepatic congestion. The patient was transitioned to oral Lasix 11/20/2022. He will continue to be monitored for an episode of NSVT without any shock of his AICD.      Interval History: see "Hospital Course"    Review of Systems   Constitutional:  " Negative for chills, fatigue and fever.   HENT:  Positive for congestion. Negative for sinus pressure.    Eyes:  Negative for pain and visual disturbance.   Respiratory:  Positive for cough. Negative for shortness of breath and wheezing.    Cardiovascular:  Positive for palpitations. Negative for chest pain and leg swelling.   Gastrointestinal:  Negative for abdominal pain, diarrhea, nausea and vomiting.   Genitourinary:  Negative for difficulty urinating, dysuria and hematuria.   Musculoskeletal:  Negative for arthralgias and myalgias.   Skin:  Negative for rash and wound.   Allergic/Immunologic: Positive for immunocompromised state.   Neurological:  Negative for dizziness, weakness, light-headedness and headaches.   Hematological:  Negative for adenopathy. Does not bruise/bleed easily.   Psychiatric/Behavioral:  Negative for confusion and dysphoric mood. The patient is not nervous/anxious.    All other systems reviewed and are negative.  Objective:     Vital Signs (Most Recent):  Temp: 97.5 °F (36.4 °C) (11/20/22 1100)  Pulse: 86 (11/20/22 1100)  Resp: 16 (11/20/22 1100)  BP: 112/80 (11/20/22 1100)  SpO2: 100 % (11/20/22 1100) Vital Signs (24h Range):  Temp:  [96.6 °F (35.9 °C)-97.5 °F (36.4 °C)] 97.5 °F (36.4 °C)  Pulse:  [] 86  Resp:  [16-20] 16  SpO2:  [93 %-100 %] 100 %  BP: (112-130)/(75-81) 112/80     Weight: 60.6 kg (133 lb 9.6 oz)  Body mass index is 25.24 kg/m².    Intake/Output Summary (Last 24 hours) at 11/20/2022 1241  Last data filed at 11/20/2022 1232  Gross per 24 hour   Intake --   Output 1300 ml   Net -1300 ml      Physical Exam  Vitals and nursing note reviewed.   Constitutional:       General: He is not in acute distress.     Appearance: He is ill-appearing.   HENT:      Head: Normocephalic and atraumatic.      Right Ear: External ear normal.      Left Ear: External ear normal.      Nose: Nose normal.      Mouth/Throat:      Mouth: Mucous membranes are moist.      Pharynx: Oropharynx is  clear. No oropharyngeal exudate.   Eyes:      General: No scleral icterus.        Right eye: No discharge.         Left eye: No discharge.      Extraocular Movements: Extraocular movements intact.      Conjunctiva/sclera: Conjunctivae normal.   Neck:      Thyroid: No thyromegaly.      Vascular: No JVD.   Cardiovascular:      Rate and Rhythm: Normal rate and regular rhythm.      Pulses: Normal pulses.      Heart sounds: Normal heart sounds.     No gallop.   Pulmonary:      Effort: Pulmonary effort is normal. No respiratory distress.      Breath sounds: No wheezing or rales.      Comments: RA.  Abdominal:      General: Bowel sounds are normal. There is no distension.      Palpations: Abdomen is soft. There is no mass.      Tenderness: There is no abdominal tenderness.   Musculoskeletal:         General: No deformity. Normal range of motion.      Cervical back: Normal range of motion and neck supple.   Lymphadenopathy:      Cervical: No cervical adenopathy.   Skin:     General: Skin is warm and dry.      Findings: No rash.   Neurological:      General: No focal deficit present.      Mental Status: He is alert and oriented to person, place, and time. Mental status is at baseline.   Psychiatric:         Mood and Affect: Mood normal.         Behavior: Behavior normal.       Significant Labs: All pertinent labs within the past 24 hours have been reviewed.    Significant Imaging: I have reviewed all pertinent imaging results/findings within the past 24 hours.      Assessment/Plan:      * Chronic combined systolic and diastolic congestive heart failure  -Telemetry  -Lisinopril  -Coreg  -PO Lasix  -Monitor I's and O's  -Keep K > 4 and Mg > 2    COPD exacerbation  In setting of COVID-19 infection.  -Prednisone  -Hold remdesivir given LFT elevation  -Trend inflammatory markers  -Airborne and isolation precautions  -O2 PRN  -Duonebs Q4H WA      Dyslipidemia  -Continue ASA and statin      Presence of Watchman left atrial  appendage closure device  Noted.      Elevated LFTs  Unclear etiology. Possible hepatic congestion. Improved with diuresis.  -Trended LFTs with CMP    AICD (automatic cardioverter/defibrillator) present  Noted.  -Telemetry    Hypothyroid  -Continue home Synthroid    COVID-19  -Airborne and isolation precautions  -Decadron 6 transitioned to prednisone for COPD exacerbation  -Eliquis  -Hold remdesivir  -Trend inflammatory markers    PAF (paroxysmal atrial fibrillation)  -Eliquis  -Coreg  -Telemetry        Benign essential HTN  -Continue Coreg and lisinopril  -Continue Imdur on discharge  -Patient may benefit from Aldactone if BP can tolerate  -Continue to monitor    Coronary artery disease  -Telemetry  -Coreg  -ASA and Plavix  -Statin        VTE Risk Mitigation (From admission, onward)         Ordered     apixaban tablet 5 mg  2 times daily         11/17/22 2011     IP VTE HIGH RISK PATIENT  Once         11/16/22 1935     Place sequential compression device  Until discontinued         11/16/22 1935     Reason for No Pharmacological VTE Prophylaxis  Once        Question:  Reasons:  Answer:  Already adequately anticoagulated on oral Anticoagulants    11/16/22 1935                Discharge Planning   MACKENZIE: 11/20/2022     Code Status: Full Code   Is the patient medically ready for discharge?:     Reason for patient still in hospital (select all that apply): Patient trending condition  Discharge Plan A: Home                  Fernando Bryson MD  Department of Hospital Medicine   [facility]

## 2022-11-20 NOTE — DISCHARGE SUMMARY
[facility] Hospital Medicine  Discharge Summary      Patient Name: Cleveland Capps  MRN: [id number]  JUSTYN: [id number]  Patient Class: OP- Observation  Admission Date: 11/16/2022  Hospital Length of Stay: 0 days  Discharge Date and Time: No discharge date for patient encounter.  Attending Physician: Fernando Bryson MD   Discharging Provider: Fernando Bryson MD  Primary Care Provider: Romero Snyder MD    Primary Care Team: Networked reference to record PCT     HPI:   Pt presented to ED with shortness of breath.  Began a few days prior.  Associated with cough and congestion.   He sought evaluation from his primary care provider and was diagnosed with SARS-CoV-2 infection.  He was put on Paxlovid, of which he took a couple of days' worth.  He didn't improve.  Cough is nonproductive.  No chest pain.  No fevers.  No chills. No orthopnea.  He has CAD, COPD, and is a former smoker.  Upon presenting to the ED, he was hypotensive but responded to crystalloid infusions.  Hospital observation requested for more hydration and treatment of mild COPD exacerbation.      * No surgery found *      Hospital Course:   Cleveland Capps is a 67 year old male with a past medical history of combined CHF s/p AICD, CAD s/p PCI, Afib, HTN, HLD, DM, and hypothyroidism, who presented with shortness of breath secondary to an acute COPD exacerbation in the setting of COVID-19 infection as well a mild acute on chronic CHF exacerbation. He was stable on room air throughout is entire course. He was placed on steroids and scheduled Duonebs. He was also placed on IV Lasix with adequate diuresis. Remdesivir was not started given elevated LFTs for which a RUQ ultrasound was unremarkable for acute pathology. His liver enzymes improved with diuresis as they were likely elevated secondary to hepatic congestion. The patient was transitioned to oral Lasix and was discharged home 11/20/2022 where he will complete a short course of  prednisone. He should follow up with his PCP in the outpatient setting.       Goals of Care Treatment Preferences:  Code Status: Full Code      Consults:     Dyslipidemia  -Continue ASA and statin      Presence of Watchman left atrial appendage closure device  Noted.      Elevated LFTs  Unclear etiology. Possible hepatic congestion. Improved with diuresis.  -Trended LFTs with CMP    AICD (automatic cardioverter/defibrillator) present  Noted.      Hypothyroid  -Continue home Synthroid    COVID-19  -Airborne and isolation precautions  -Decadron 6 transitioned to prednisone for COPD exacerbation  -Eliquis  -Hold remdesivir  -Trend inflammatory markers    PAF (paroxysmal atrial fibrillation)  -Eliquis  -Coreg  -Telemetry        Benign essential HTN  -Continue Coreg and lisinopril  -Continue Imdur on discharge  -Patient may benefit from Aldactone if BP can tolerate  -Continue to monitor    Coronary artery disease  -Telemetry  -Coreg  -ASA and Plavix  -Statin      Type 2 diabetes mellitus without complication, without long-term current use of insulin-resolved as of 11/20/2022  -SSI  -Hypoglycemic precautions  -AC HS glucose checks  -Diabetic diet      Final Active Diagnoses:    Diagnosis Date Noted POA    Dyslipidemia [E78.5] 11/19/2022 Yes    Presence of Watchman left atrial appendage closure device [Z95.818] 11/19/2022 Yes    AICD (automatic cardioverter/defibrillator) present [Z95.810] 11/18/2022 Yes    Elevated LFTs [R79.89] 11/18/2022 Yes    COVID-19 [U07.1] 11/17/2022 Yes    Hypothyroid [E03.9] 11/17/2022 Yes    Coronary artery disease [I25.10] 04/25/2016 Yes    Benign essential HTN [I10] 04/25/2016 Yes    PAF (paroxysmal atrial fibrillation) [I48.0] 04/25/2016 Yes      Problems Resolved During this Admission:    Diagnosis Date Noted Date Resolved POA    PRINCIPAL PROBLEM:  COPD exacerbation [J44.1] 11/17/2022 11/20/2022 Yes    Acute on chronic combined systolic and diastolic congestive heart failure  [I50.43] 05/19/2020 11/20/2022 Yes    Type 2 diabetes mellitus without complication, without long-term current use of insulin [E11.9] 11/17/2022 11/20/2022 Yes    Hyponatremia [E87.1] 11/17/2022 11/19/2022 Yes    Hyperkalemia [E87.5] 11/17/2022 11/18/2022 Yes       Discharged Condition: stable    Disposition: Home or Self Care    Follow Up:   Follow-up Information     Romero Snyder MD. Schedule an appointment as soon as possible for a visit in 2 week(s).    Specialty: Family Medicine  Contact information:  [address]  [facility] [address] [phone]                       Patient Instructions:      Diet Cardiac     Notify your health care provider if you experience any of the following:  increased confusion or weakness     Notify your health care provider if you experience any of the following:  persistent dizziness, light-headedness, or visual disturbances     Notify your health care provider if you experience any of the following:  severe persistent headache     Notify your health care provider if you experience any of the following:  difficulty breathing or increased cough     Notify your health care provider if you experience any of the following:  severe uncontrolled pain     Notify your health care provider if you experience any of the following:  persistent nausea and vomiting or diarrhea     Notify your health care provider if you experience any of the following:  temperature >100.4     Activity as tolerated       Significant Diagnostic Studies: Labs: All labs within the past 24 hours have been reviewed    Pending Diagnostic Studies:     Procedure Component Value Units Date/Time    EKG 12-lead [[id number]]     Order Status: Sent Lab Status: No result          Medications:  Reconciled Home Medications:      Medication List      START taking these medications    predniSONE 20 MG tablet  Commonly known as: DELTASONE  Take 2 tablets (40 mg total) by mouth once daily. for 2 days        CONTINUE taking  these medications    aspirin 81 MG EC tablet  Commonly known as: ECOTRIN  Take 81 mg by mouth once daily.     atorvastatin 80 MG tablet  Commonly known as: LIPITOR  Take 80 mg by mouth once daily.     carvediloL 6.25 MG tablet  Commonly known as: COREG  Take 2 tablets (12.5 mg total) by mouth 2 (two) times daily.     clopidogreL 75 mg tablet  Commonly known as: PLAVIX  Take 1 tablet (75 mg total) by mouth once daily.     ELIQUIS 5 mg Tab  Generic drug: apixaban  Take 5 mg by mouth 2 (two) times daily.     enalapril 20 MG tablet  Commonly known as: VASOTEC  20 mg 2 (two) times daily.     furosemide 20 MG tablet  Commonly known as: LASIX  Take 1 tablet (20 mg total) by mouth once daily.     isosorbide mononitrate 30 MG 24 hr tablet  Commonly known as: IMDUR  Take 30 mg by mouth once daily.     levothyroxine 25 MCG tablet  Commonly known as: SYNTHROID  Take 25 mcg by mouth before breakfast.     potassium chloride SA 10 MEQ tablet  Commonly known as: K-DUR,KLOR-CON M  Take 10 mEq by mouth once daily.     travoprost (benzalkonium) 0.004 % ophthalmic solution  Commonly known as: TRAVATAN  Place 1 drop into both eyes nightly.        STOP taking these medications    azithromycin 250 MG tablet  Commonly known as: Z-ARNOLDO     nirmatrelvir-ritonavir 300 mg (150 mg x 2)-100 mg copackaged tablets (EUA)            Indwelling Lines/Drains at time of discharge:   Lines/Drains/Airways     None                 Time spent on the discharge of patient: 31 minutes         Fernando Bryson MD  Department of Hospital Medicine  [facility]

## 2022-11-20 NOTE — SUBJECTIVE & OBJECTIVE
"Interval History: see "Hospital Course"    Review of Systems   Constitutional:  Negative for chills, fatigue and fever.   HENT:  Positive for congestion. Negative for sinus pressure.    Eyes:  Negative for pain and visual disturbance.   Respiratory:  Positive for cough. Negative for shortness of breath and wheezing.    Cardiovascular:  Positive for palpitations. Negative for chest pain and leg swelling.   Gastrointestinal:  Negative for abdominal pain, diarrhea, nausea and vomiting.   Genitourinary:  Negative for difficulty urinating, dysuria and hematuria.   Musculoskeletal:  Negative for arthralgias and myalgias.   Skin:  Negative for rash and wound.   Allergic/Immunologic: Positive for immunocompromised state.   Neurological:  Negative for dizziness, weakness, light-headedness and headaches.   Hematological:  Negative for adenopathy. Does not bruise/bleed easily.   Psychiatric/Behavioral:  Negative for confusion and dysphoric mood. The patient is not nervous/anxious.    All other systems reviewed and are negative.  Objective:     Vital Signs (Most Recent):  Temp: 97.5 °F (36.4 °C) (11/20/22 1100)  Pulse: 86 (11/20/22 1100)  Resp: 16 (11/20/22 1100)  BP: 112/80 (11/20/22 1100)  SpO2: 100 % (11/20/22 1100) Vital Signs (24h Range):  Temp:  [96.6 °F (35.9 °C)-97.5 °F (36.4 °C)] 97.5 °F (36.4 °C)  Pulse:  [] 86  Resp:  [16-20] 16  SpO2:  [93 %-100 %] 100 %  BP: (112-130)/(75-81) 112/80     Weight: 60.6 kg (133 lb 9.6 oz)  Body mass index is 25.24 kg/m².    Intake/Output Summary (Last 24 hours) at 11/20/2022 1241  Last data filed at 11/20/2022 1232  Gross per 24 hour   Intake --   Output 1300 ml   Net -1300 ml      Physical Exam  Vitals and nursing note reviewed.   Constitutional:       General: He is not in acute distress.     Appearance: He is ill-appearing.   HENT:      Head: Normocephalic and atraumatic.      Right Ear: External ear normal.      Left Ear: External ear normal.      Nose: Nose normal.      " Mouth/Throat:      Mouth: Mucous membranes are moist.      Pharynx: Oropharynx is clear. No oropharyngeal exudate.   Eyes:      General: No scleral icterus.        Right eye: No discharge.         Left eye: No discharge.      Extraocular Movements: Extraocular movements intact.      Conjunctiva/sclera: Conjunctivae normal.   Neck:      Thyroid: No thyromegaly.      Vascular: No JVD.   Cardiovascular:      Rate and Rhythm: Normal rate and regular rhythm.      Pulses: Normal pulses.      Heart sounds: Normal heart sounds.     No gallop.   Pulmonary:      Effort: Pulmonary effort is normal. No respiratory distress.      Breath sounds: No wheezing or rales.      Comments: RA.  Abdominal:      General: Bowel sounds are normal. There is no distension.      Palpations: Abdomen is soft. There is no mass.      Tenderness: There is no abdominal tenderness.   Musculoskeletal:         General: No deformity. Normal range of motion.      Cervical back: Normal range of motion and neck supple.   Lymphadenopathy:      Cervical: No cervical adenopathy.   Skin:     General: Skin is warm and dry.      Findings: No rash.   Neurological:      General: No focal deficit present.      Mental Status: He is alert and oriented to person, place, and time. Mental status is at baseline.   Psychiatric:         Mood and Affect: Mood normal.         Behavior: Behavior normal.       Significant Labs: All pertinent labs within the past 24 hours have been reviewed.    Significant Imaging: I have reviewed all pertinent imaging results/findings within the past 24 hours.

## 2022-11-20 NOTE — ASSESSMENT & PLAN NOTE
In setting of COVID-19 infection.  -Prednisone  -Hold remdesivir given LFT elevation  -Trend inflammatory markers  -Airborne and isolation precautions  -O2 PRN  -Duonebs Q4H WA

## 2022-11-20 NOTE — NURSING
Informed Callie NP of pt heart rate and vss.  No new orders rec at this time.  Will cont to monitor.

## 2022-11-21 LAB
ALBUMIN SERPL BCP-MCNC: 2.8 G/DL (ref 3.5–5.2)
ALP SERPL-CCNC: 75 U/L (ref 55–135)
ALT SERPL W/O P-5'-P-CCNC: 225 U/L (ref 10–44)
ANION GAP SERPL CALC-SCNC: 8 MMOL/L (ref 8–16)
AST SERPL-CCNC: 45 U/L (ref 10–40)
BASOPHILS # BLD AUTO: 0 K/UL (ref 0–0.2)
BASOPHILS NFR BLD: 0 % (ref 0–1.9)
BILIRUB SERPL-MCNC: 1 MG/DL (ref 0.1–1)
BUN SERPL-MCNC: 34 MG/DL (ref 8–23)
CALCIUM SERPL-MCNC: 8 MG/DL (ref 8.7–10.5)
CHLORIDE SERPL-SCNC: 100 MMOL/L (ref 95–110)
CO2 SERPL-SCNC: 26 MMOL/L (ref 23–29)
CREAT SERPL-MCNC: 1.2 MG/DL (ref 0.5–1.4)
DIFFERENTIAL METHOD: ABNORMAL
EOSINOPHIL # BLD AUTO: 0 K/UL (ref 0–0.5)
EOSINOPHIL NFR BLD: 0 % (ref 0–8)
ERYTHROCYTE [DISTWIDTH] IN BLOOD BY AUTOMATED COUNT: 18.1 % (ref 11.5–14.5)
EST. GFR  (NO RACE VARIABLE): >60 ML/MIN/1.73 M^2
GLUCOSE SERPL-MCNC: 153 MG/DL (ref 70–110)
HCT VFR BLD AUTO: 34.7 % (ref 40–54)
HGB BLD-MCNC: 11.2 G/DL (ref 14–18)
IMM GRANULOCYTES # BLD AUTO: 0.05 K/UL (ref 0–0.04)
IMM GRANULOCYTES NFR BLD AUTO: 0.6 % (ref 0–0.5)
LYMPHOCYTES # BLD AUTO: 0.5 K/UL (ref 1–4.8)
LYMPHOCYTES NFR BLD: 6.6 % (ref 18–48)
MAGNESIUM SERPL-MCNC: 2.2 MG/DL (ref 1.6–2.6)
MCH RBC QN AUTO: 33 PG (ref 27–31)
MCHC RBC AUTO-ENTMCNC: 32.3 G/DL (ref 32–36)
MCV RBC AUTO: 102 FL (ref 82–98)
MONOCYTES # BLD AUTO: 0.6 K/UL (ref 0.3–1)
MONOCYTES NFR BLD: 7.3 % (ref 4–15)
NEUTROPHILS # BLD AUTO: 6.9 K/UL (ref 1.8–7.7)
NEUTROPHILS NFR BLD: 85.5 % (ref 38–73)
NRBC BLD-RTO: 1 /100 WBC
PHOSPHATE SERPL-MCNC: 5.2 MG/DL (ref 2.7–4.5)
PLATELET # BLD AUTO: 160 K/UL (ref 150–450)
PMV BLD AUTO: 9.3 FL (ref 9.2–12.9)
POCT GLUCOSE: 129 MG/DL (ref 70–110)
POCT GLUCOSE: 137 MG/DL (ref 70–110)
POCT GLUCOSE: 151 MG/DL (ref 70–110)
POCT GLUCOSE: 164 MG/DL (ref 70–110)
POTASSIUM SERPL-SCNC: 4.7 MMOL/L (ref 3.5–5.1)
PROT SERPL-MCNC: 5.6 G/DL (ref 6–8.4)
RBC # BLD AUTO: 3.39 M/UL (ref 4.6–6.2)
SODIUM SERPL-SCNC: 134 MMOL/L (ref 136–145)
WBC # BLD AUTO: 8.09 K/UL (ref 3.9–12.7)

## 2022-11-21 PROCEDURE — 85025 COMPLETE CBC W/AUTO DIFF WBC: CPT | Performed by: STUDENT IN AN ORGANIZED HEALTH CARE EDUCATION/TRAINING PROGRAM

## 2022-11-21 PROCEDURE — 25000003 PHARM REV CODE 250: Performed by: NURSE PRACTITIONER

## 2022-11-21 PROCEDURE — 94640 AIRWAY INHALATION TREATMENT: CPT

## 2022-11-21 PROCEDURE — 63600175 PHARM REV CODE 636 W HCPCS: Performed by: SPECIALIST

## 2022-11-21 PROCEDURE — 83735 ASSAY OF MAGNESIUM: CPT | Performed by: STUDENT IN AN ORGANIZED HEALTH CARE EDUCATION/TRAINING PROGRAM

## 2022-11-21 PROCEDURE — 25000003 PHARM REV CODE 250: Performed by: SPECIALIST

## 2022-11-21 PROCEDURE — 99223 1ST HOSP IP/OBS HIGH 75: CPT | Mod: ,,, | Performed by: SPECIALIST

## 2022-11-21 PROCEDURE — 25000242 PHARM REV CODE 250 ALT 637 W/ HCPCS: Performed by: HOSPITALIST

## 2022-11-21 PROCEDURE — 84100 ASSAY OF PHOSPHORUS: CPT | Performed by: STUDENT IN AN ORGANIZED HEALTH CARE EDUCATION/TRAINING PROGRAM

## 2022-11-21 PROCEDURE — 25000003 PHARM REV CODE 250: Performed by: HOSPITALIST

## 2022-11-21 PROCEDURE — 80053 COMPREHEN METABOLIC PANEL: CPT | Performed by: STUDENT IN AN ORGANIZED HEALTH CARE EDUCATION/TRAINING PROGRAM

## 2022-11-21 PROCEDURE — 99223 PR INITIAL HOSPITAL CARE,LEVL III: ICD-10-PCS | Mod: ,,, | Performed by: SPECIALIST

## 2022-11-21 PROCEDURE — 27000207 HC ISOLATION

## 2022-11-21 PROCEDURE — 94761 N-INVAS EAR/PLS OXIMETRY MLT: CPT

## 2022-11-21 PROCEDURE — 97535 SELF CARE MNGMENT TRAINING: CPT

## 2022-11-21 PROCEDURE — 36415 COLL VENOUS BLD VENIPUNCTURE: CPT | Performed by: STUDENT IN AN ORGANIZED HEALTH CARE EDUCATION/TRAINING PROGRAM

## 2022-11-21 PROCEDURE — 12000002 HC ACUTE/MED SURGE SEMI-PRIVATE ROOM

## 2022-11-21 PROCEDURE — 27000221 HC OXYGEN, UP TO 24 HOURS

## 2022-11-21 PROCEDURE — 63600175 PHARM REV CODE 636 W HCPCS: Performed by: STUDENT IN AN ORGANIZED HEALTH CARE EDUCATION/TRAINING PROGRAM

## 2022-11-21 PROCEDURE — 97165 OT EVAL LOW COMPLEX 30 MIN: CPT

## 2022-11-21 PROCEDURE — 25000003 PHARM REV CODE 250: Performed by: STUDENT IN AN ORGANIZED HEALTH CARE EDUCATION/TRAINING PROGRAM

## 2022-11-21 RX ORDER — FUROSEMIDE 10 MG/ML
40 INJECTION INTRAMUSCULAR; INTRAVENOUS
Status: COMPLETED | OUTPATIENT
Start: 2022-11-21 | End: 2022-11-22

## 2022-11-21 RX ORDER — SPIRONOLACTONE 25 MG/1
25 TABLET ORAL DAILY
Status: DISCONTINUED | OUTPATIENT
Start: 2022-11-21 | End: 2022-11-28 | Stop reason: HOSPADM

## 2022-11-21 RX ADMIN — APIXABAN 5 MG: 2.5 TABLET, FILM COATED ORAL at 09:11

## 2022-11-21 RX ADMIN — LISINOPRIL 40 MG: 40 TABLET ORAL at 09:11

## 2022-11-21 RX ADMIN — BENZONATATE 100 MG: 100 CAPSULE ORAL at 08:11

## 2022-11-21 RX ADMIN — PREDNISONE 40 MG: 20 TABLET ORAL at 09:11

## 2022-11-21 RX ADMIN — FUROSEMIDE 40 MG: 10 INJECTION, SOLUTION INTRAMUSCULAR; INTRAVENOUS at 09:11

## 2022-11-21 RX ADMIN — ASPIRIN 81 MG: 81 TABLET, COATED ORAL at 09:11

## 2022-11-21 RX ADMIN — CLOPIDOGREL BISULFATE 75 MG: 75 TABLET, FILM COATED ORAL at 09:11

## 2022-11-21 RX ADMIN — TRAVOPROST 1 DROP: 0.04 SOLUTION/ DROPS OPHTHALMIC at 08:11

## 2022-11-21 RX ADMIN — SODIUM CHLORIDE 250 ML: 0.9 INJECTION, SOLUTION INTRAVENOUS at 09:11

## 2022-11-21 RX ADMIN — ATORVASTATIN CALCIUM 80 MG: 40 TABLET, FILM COATED ORAL at 09:11

## 2022-11-21 RX ADMIN — IPRATROPIUM BROMIDE AND ALBUTEROL SULFATE 3 ML: 2.5; .5 SOLUTION RESPIRATORY (INHALATION) at 08:11

## 2022-11-21 RX ADMIN — CARVEDILOL 25 MG: 25 TABLET, FILM COATED ORAL at 08:11

## 2022-11-21 RX ADMIN — FUROSEMIDE 20 MG: 20 TABLET ORAL at 09:11

## 2022-11-21 RX ADMIN — SPIRONOLACTONE 25 MG: 25 TABLET ORAL at 02:11

## 2022-11-21 RX ADMIN — APIXABAN 5 MG: 2.5 TABLET, FILM COATED ORAL at 08:11

## 2022-11-21 RX ADMIN — CARVEDILOL 25 MG: 25 TABLET, FILM COATED ORAL at 09:11

## 2022-11-21 NOTE — PT/OT/SLP EVAL
"Occupational Therapy   Evaluation    Name: Cleveland Capps  MRN: [id number]  Admitting Diagnosis:  Chronic combined systolic and diastolic congestive heart failure  Recent Surgery: * No surgery found *      Recommendations:     Discharge Recommendations: home  Discharge Equipment Recommendations:  none  Barriers to discharge:  None    Assessment:     Cleveland Capps is a 67 y.o. male with a medical diagnosis of Chronic combined systolic and diastolic congestive heart failure.  He presents with decreased activity tolerance especially when ambulating in room and performing grooming tasks while standing. Patient c/o SOB shortly  after (apprx. 3 min) initiating hygiene tasks. Patient then requested to return to bed due to worsening fatigue when standing. Patient would benefit from continued therapy in hospital and at home to maximize independence and safety when performing self care tasks.  Performance deficits affecting function: impaired cardiopulmonary response to activity, weakness, impaired endurance, impaired self care skills, impaired functional mobility, gait instability, impaired balance.      Rehab Prognosis: Good; patient would benefit from acute skilled OT services to address these deficits and reach maximum level of function.       Plan:     Patient to be seen 3 x/week to address the above listed problems via self-care/home management, therapeutic activities, therapeutic exercises  Plan of Care Expires: 12/12/22  Plan of Care Reviewed with: patient    Subjective     Chief Complaint: SOB and fatigue with activity  Patient/Family Comments/goals: "I can't go home like this."    Occupational Profile:  Living Environment: Patient lives alone in a "[facility]" with [address] and handrails. Patient has a ramp at rear entrance.  Previous level of function: Patient was independent with ADLs. Patient was ambulatory using rollator.  Equipment Used at Home:  rollator, bedside commode, wheelchair  Assistance " upon Discharge: Patient stated he as a brother that lives next door that can offer assistance.     Pain/Comfort:  Pain Rating 1: 0/10  Pain Rating Post-Intervention 1: 0/10    Patients cultural, spiritual, [employer]ism conflicts given the current situation:      Objective:     Communicated with: nurse  prior to session.  Patient found HOB elevated with bed alarm, peripheral IV, pulse ox (continuous) upon OT entry to room.    General Precautions: Standard, fall, droplet, airborne, contact   Orthopedic Precautions:N/A   Braces: N/A  Respiratory Status: Nasal cannula, flow 1 L/min    Occupational Performance:    Bed Mobility:    Patient completed Scooting/Bridging with moderate assistance  Patient completed Supine to Sit with moderate assistance  Patient completed Sit to Supine with stand by assistance    Functional Mobility/Transfers:  Patient completed Sit <> Stand Transfer with stand by assistance  with  rolling walker     Activities of Daily Living:  Grooming: stand by assistance with oral and facial hygiene while standing at sink  Upper Body Dressing: stand by assistance   Lower Body Dressing: stand by assistance to don/doff socks while seated EOB  Toileting: stand by assistance     Cognitive/Visual Perceptual:  Cognitive/Psychosocial Skills:     -       Oriented to: x4   -       Follows Commands/attention:Follows multistep  commands  -       Communication: clear/fluent  -       Safety awareness/insight to disability: intact   -       Mood/Affect/Coping skills/emotional control: Appropriate to situation and Cooperative  Visual/Perceptual:      -Intact     Physical Exam:  Postural examination/scapula alignment:    -       Rounded shoulders  -       Forward head  Upper Extremity Range of Motion:     -       Right Upper Extremity: WFL  -       Left Upper Extremity: WFL  Upper Extremity Strength:    -       Right Upper Extremity: WFL  -       Left Upper Extremity: WFL   Strength:    -       Right Upper Extremity:  WFL  -       Left Upper Extremity: WFL  Fine Motor Coordination:    -       Intact  Gross motor coordination:   WFL    AMPAC 6 Click ADL:  AMPAC Total Score: 20    Treatment & Education:  OT ed pt on OT role & POC as well as discharge recommendations.  OT ed patient on safety with walker use for functional mobility with cues for hand placement & sequencing.   OT educated patient on energy conservation techniques to reduce demand on cardiovascular system with performance of ADL tasks.         Patient left HOB elevated with all lines intact, call button in reach, bed alarm on, and nurse notified    GOALS:   Multidisciplinary Problems       Occupational Therapy Goals          Problem: Occupational Therapy    Goal Priority Disciplines Outcome Interventions   Occupational Therapy Goal     OT, PT/OT Ongoing, Progressing    Description: Goals to be met by: 12/12/2022     Patient will increase functional independence with ADLs by performing:    LE Dressing with Modified [place].  Grooming while standing at sink with Modified [place].  Toileting from toilet with Modified [place] for hygiene and clothing management.   Supine to sit with Modified [place].  Toilet transfer to toilet with Modified [place].                         History:     Past Medical History:   Diagnosis Date    A-fib     Acute kidney injury     COPD (chronic obstructive pulmonary disease)     Coronary artery disease     Diabetes mellitus     Encounter for blood transfusion     Former smoker     Hypertension     Myocardial infarction     Thyroid disease          Past Surgical History:   Procedure Laterality Date    CLOSURE OF LEFT ATRIAL APPENDAGE USING DEVICE N/A 5/17/2022    Procedure: Left atrial appendage closure device;  Surgeon: Tre Schmidt MD;  Location: [facility] CATH LAB;  Service: Cardiology;  Laterality: N/A;    COLONOSCOPY N/A 1/3/2017    Procedure: COLONOSCOPY;  Surgeon: Marcus Green MD;  Location: [facility];   Service: Endoscopy;  Laterality: N/A;    CORONARY BYPASS GRAFT ANGIOGRAPHY  3/30/2021    Procedure: Bypass graft study;  Surgeon: Tre Schmidt MD;  Location: [facility] CATH LAB;  Service: Cardiology;;    CORONARY STENT PLACEMENT      GASTROSTOMY TUBE PLACEMENT      INSERTION OF PACEMAKER  04/2017    INTRAVASCULAR ULTRASOUND, NON-CORONARY  5/17/2022    Procedure: Intravascular Ultrasound, Non-Coronary;  Surgeon: Tre Schmidt MD;  Location: [facility] CATH LAB;  Service: Cardiology;;    LEFT HEART CATHETERIZATION N/A 3/30/2021    Procedure: Left heart cath;  Surgeon: Tre Schmidt MD;  Location: [facility] CATH LAB;  Service: Cardiology;  Laterality: N/A;    PEG TUBE REMOVAL      TRACHEOSTOMY CLOSURE      TRACHEOSTOMY TUBE PLACEMENT      TRANSESOPHAGEAL ECHOCARDIOGRAPHY N/A 5/17/2022    Procedure: ECHOCARDIOGRAM, TRANSESOPHAGEAL;  Surgeon: [facility];  Location: [facility] CATH LAB;  Service: Anesthesiology;  Laterality: N/A;    UPPER GASTROINTESTINAL ENDOSCOPY  05/2016    Dr. Zarco with PEG tube placement       Time Tracking:     OT Date of Treatment: 11/21/22  OT Start Time: 1041  OT Stop Time: 1117  OT Total Time (min): 36 min    Billable Minutes:Evaluation 10  Self Care/Home Management 26    11/21/2022

## 2022-11-21 NOTE — CONSULTS
[facility]  Cardiology  Consult Note    Patient Name: Cleveland Capps  MRN: [id number]  Admission Date: 11/16/2022  Hospital Length of Stay: 0 days  Code Status: Full Code   Attending Provider: Fernando Bryson MD   Consulting Provider: Kuldeep Daniels MD  Primary Care Physician: Romero Synder MD  Principal Problem:Chronic combined systolic and diastolic congestive heart failure    Patient information was obtained from patient, past medical records, and ER records.     Consults  Subjective:     Chief Complaint:   Shortness of breath s    HPI:  Patient is admitted with COVID problem 1.  He has been treated for COVID but still short of breath  2. He is had previous bypass surgery has a dilated ischemic cardiomyopathy  I reviewed echo from [facility] is ejection fractions less than 25% with evidence of old anterior apical damage  Significant pulmonary hypertension  Moderate mitral regurgitation  On chest x-ray physical examination and lab he has evidence of CHF  He has atrial fibrillation and was to have a cardioversion after Watchman device had been implanted over at [facility]  Clinically still very short of breath   Home meds include Lasix and Isordil and enalapril and carvedilol    Past Medical History:   Diagnosis Date    A-fib     Acute kidney injury     COPD (chronic obstructive pulmonary disease)     Coronary artery disease     Diabetes mellitus     Encounter for blood transfusion     Former smoker     Hypertension     Myocardial infarction     Thyroid disease        Past Surgical History:   Procedure Laterality Date    CLOSURE OF LEFT ATRIAL APPENDAGE USING DEVICE N/A 5/17/2022    Procedure: Left atrial appendage closure device;  Surgeon: Tre Schmidt MD;  Location: [facility] CATH LAB;  Service: Cardiology;  Laterality: N/A;    COLONOSCOPY N/A 1/3/2017    Procedure: COLONOSCOPY;  Surgeon: Marcus Green MD;  Location: [facility];  Service: Endoscopy;  Laterality: N/A;    CORONARY BYPASS  GRAFT ANGIOGRAPHY  3/30/2021    Procedure: Bypass graft study;  Surgeon: Tre Schmidt MD;  Location: [facility] CATH LAB;  Service: Cardiology;;    CORONARY STENT PLACEMENT      GASTROSTOMY TUBE PLACEMENT      INSERTION OF PACEMAKER  04/2017    INTRAVASCULAR ULTRASOUND, NON-CORONARY  5/17/2022    Procedure: Intravascular Ultrasound, Non-Coronary;  Surgeon: Tre Schmidt MD;  Location: [facility] CATH LAB;  Service: Cardiology;;    LEFT HEART CATHETERIZATION N/A 3/30/2021    Procedure: Left heart cath;  Surgeon: Tre Schmidt MD;  Location: [facility] CATH LAB;  Service: Cardiology;  Laterality: N/A;    PEG TUBE REMOVAL      TRACHEOSTOMY CLOSURE      TRACHEOSTOMY TUBE PLACEMENT      TRANSESOPHAGEAL ECHOCARDIOGRAPHY N/A 5/17/2022    Procedure: ECHOCARDIOGRAM, TRANSESOPHAGEAL;  Surgeon: [facility] Provider;  Location: [facility] CATH LAB;  Service: Anesthesiology;  Laterality: N/A;    UPPER GASTROINTESTINAL ENDOSCOPY  05/2016    Dr. Zarco with PEG tube placement       Review of patient's allergies indicates:   Allergen Reactions    Penicillins Hives       No current facility-administered medications on file prior to encounter.     Current Outpatient Medications on File Prior to Encounter   Medication Sig    apixaban (ELIQUIS) 5 mg Tab Take 5 mg by mouth 2 (two) times daily.    aspirin (ECOTRIN) 81 MG EC tablet Take 81 mg by mouth once daily.    atorvastatin (LIPITOR) 80 MG tablet Take 80 mg by mouth once daily.    carvedilol (COREG) 6.25 MG tablet Take 2 tablets (12.5 mg total) by mouth 2 (two) times daily.    clopidogreL (PLAVIX) 75 mg tablet Take 1 tablet (75 mg total) by mouth once daily.    enalapril (VASOTEC) 20 MG tablet 20 mg 2 (two) times daily.     furosemide (LASIX) 20 MG tablet Take 1 tablet (20 mg total) by mouth once daily.    isosorbide mononitrate (IMDUR) 30 MG 24 hr tablet Take 30 mg by mouth once daily.    levothyroxine (SYNTHROID) 25 MCG tablet Take 25 mcg by mouth before breakfast.    potassium chloride  SA (K-DUR,KLOR-CON M) 10 MEQ tablet Take 10 mEq by mouth once daily.    travoprost, benzalkonium, (TRAVATAN) 0.004 % ophthalmic solution Place 1 drop into both eyes nightly.     Family History       Problem Relation (Age of Onset)    Diabetes Mother, Brother    Glaucoma Father, Brother    Heart disease Mother    Macular degeneration Father, Brother    No Known Problems Sister, Maternal Aunt, Maternal Uncle, Paternal Aunt, Paternal Uncle, Maternal Grandmother, Maternal Grandfather, Paternal Grandmother, Paternal Grandfather          Tobacco Use    Smoking status: Former     Types: Cigarettes     Quit date: 2005     Years since quittin.9    Smokeless tobacco: Never    Tobacco comments:     quit    Substance and Sexual Activity    Alcohol use: No    Drug use: No    Sexual activity: Yes     ROS  Objective:     Vital Signs (Most Recent):  Temp: 96.5 °F (35.8 °C) (22 1240)  Pulse: 102 (22 1310)  Resp: 18 (22 1310)  BP: 119/81 (22 1240)  SpO2: (!) 92 % (22 1310)   Vital Signs (24h Range):  Temp:  [96.3 °F (35.7 °C)-98.2 °F (36.8 °C)] 96.5 °F (35.8 °C)  Pulse:  [] 102  Resp:  [16-20] 18  SpO2:  [92 %-100 %] 92 %  BP: (116-120)/(72-93) 119/81     Weight: 60.6 kg (133 lb 9.6 oz)  Body mass index is 25.24 kg/m².    SpO2: (!) 92 %  O2 Device (Oxygen Therapy): room air      Intake/Output Summary (Last 24 hours) at 2022 1313  Last data filed at 2022 1246  Gross per 24 hour   Intake --   Output 1050 ml   Net -1050 ml       Lines/Drains/Airways       Peripheral Intravenous Line  Duration                  Peripheral IV - Single Lumen 22 0037 20 G Left;Proximal;Lateral;Anterior Forearm 1 day                    Physical Exam blood pressure is approximately 110 blood pressure is 110  Lungs diminished breath sounds with rales  Cardiac irregular  P2 is increased  3/6 systolic murmur at the apex  Abdomen slight distention  Legs +1 +2 edema good pulses    Significant  Labs: CMP   Recent Labs   Lab 11/19/22  2321 11/20/22  0446 11/21/22  0525    139 134*   K 4.5 4.2 4.7    101 100   CO2 25 27 26   * 162* 153*   BUN 26* 29* 34*   CREATININE 1.1 1.2 1.2   CALCIUM 7.9* 7.7* 8.0*   PROT 6.1 6.0 5.6*   ALBUMIN 3.0* 3.0* 2.8*   BILITOT 1.0 1.1* 1.0   ALKPHOS 85 82 75   AST 99* 85* 45*   * 308* 225*   ANIONGAP 9 11 8   , CBC   Recent Labs   Lab 11/20/22  0446 11/21/22  0525   WBC 8.88 8.09   HGB 11.1* 11.2*   HCT 33.7* 34.7*    160   , and Troponin No results for input(s): TROPONINI in the last 48 hours.    Significant Imaging:   Assessment and Plan:   1. COVID  2. COPD  3. Ischemic cardiomyopathy evidence of old anterior apical infarction and heart failure reduced ejection fraction with evidence of fluid overload and mitral regurgitation  A recommendation-several doses of IV Lasix and DC enalapril and start on Entresto and see if he can tolerate that in the hospital   4. Cardioversion in several days if better clinically   Active Diagnoses:    Diagnosis Date Noted POA    PRINCIPAL PROBLEM:  Chronic combined systolic and diastolic congestive heart failure [I50.42] 05/19/2020 Yes    Dyslipidemia [E78.5] 11/19/2022 Yes    Presence of Watchman left atrial appendage closure device [Z95.818] 11/19/2022 Yes    AICD (automatic cardioverter/defibrillator) present [Z95.810] 11/18/2022 Yes    Elevated LFTs [R79.89] 11/18/2022 Yes    COVID-19 [U07.1] 11/17/2022 Yes    Hypothyroid [E03.9] 11/17/2022 Yes    COPD exacerbation [J44.1] 11/17/2022 Yes    Coronary artery disease [I25.10] 04/25/2016 Yes    Benign essential HTN [I10] 04/25/2016 Yes    PAF (paroxysmal atrial fibrillation) [I48.0] 04/25/2016 Yes      Problems Resolved During this Admission:    Diagnosis Date Noted Date Resolved POA    Type 2 diabetes mellitus without complication, without long-term current use of insulin [E11.9] 11/17/2022 11/20/2022 Yes    Hyponatremia [E87.1] 11/17/2022 11/19/2022 Yes     Hyperkalemia [E87.5] 11/17/2022 11/18/2022 Yes       VTE Risk Mitigation (From admission, onward)           Ordered     apixaban tablet 5 mg  2 times daily         11/17/22 2011     IP VTE HIGH RISK PATIENT  Once         11/16/22 1935     Place sequential compression device  Until discontinued         11/16/22 1935     Reason for No Pharmacological VTE Prophylaxis  Once        Question:  Reasons:  Answer:  Already adequately anticoagulated on oral Anticoagulants    11/16/22 1935                I personally reviewed old and new ecg's, lab reports,, xray reports  and  other cardiovascular studies including  echo's, stress tests, angiogram reports, holters,and vascular studies .  In addition I evaluated original cardiac cath  ___echo  ____cxr ______ct ____scan on Vitrea view or [employer]space or other viewing platforms .  I reviewed  office and hospital notes Yes _x __ of  referring providers.      Thank you for your consult.     Kuldeep Daniels MD  Cardiology   [facility]

## 2022-11-21 NOTE — ASSESSMENT & PLAN NOTE
In setting of COVID-19 infection.  -Prednisone  -Held remdesivir given LFT elevation  -Trended inflammatory markers  -Airborne and isolation precautions  -O2 PRN  -Duonebs Q4H WA

## 2022-11-21 NOTE — PROGRESS NOTES
[facility] Hospital Medicine  Progress Note    Patient Name: Cleveland Capps  MRN: [id number]  Patient Class: IP- Inpatient   Admission Date: 11/16/2022  Length of Stay: 0 days  Attending Physician: Adriano Suero MD  Primary Care Provider: Romero Snyder MD        Subjective:     Principal Problem:Chronic combined systolic and diastolic congestive heart failure        HPI:  Pt presented to ED with shortness of breath.  Began a few days prior.  Associated with cough and congestion.   He sought evaluation from his primary care provider and was diagnosed with SARS-CoV-2 infection.  He was put on Paxlovid, of which he took a couple of days' worth.  He didn't improve.  Cough is nonproductive.  No chest pain.  No fevers.  No chills. No orthopnea.  He has CAD, COPD, and is a former smoker.  Upon presenting to the ED, he was hypotensive but responded to crystalloid infusions.  Hospital observation requested for more hydration and treatment of mild COPD exacerbation.      Overview/Hospital Course:  Cleveland Capps is a 67 year old male with a past medical history of combined CHF s/p AICD, CAD s/p PCI, Afib, HTN, HLD, DM, and hypothyroidism who presented with shortness of breath secondary to an acute COPD exacerbation in the setting of COVID-19 infection as well a mild acute on chronic CHF exacerbation. He was stable on room air throughout is entire course. He was placed on steroids and scheduled Duonebs. He was also placed on IV Lasix with adequate diuresis. Remdesivir was not started given elevated LFTs for which a RUQ ultrasound was unremarkable for acute pathology. His liver enzymes improved with diuresis as they were likely elevated secondary to hepatic congestion. The patient was transitioned to oral Lasix 11/20/2022. He was monitored for an episode of NSVT without any shock of his AICD. He was given a 250 cc NS bolus. He developed Afib with RVR 11/22 for which Cardiology has been  "consulted.      Interval History: see "Hospital Course"    Review of Systems   Constitutional:  Negative for chills, fatigue and fever.   HENT:  Positive for congestion. Negative for sinus pressure.    Eyes:  Negative for pain and visual disturbance.   Respiratory:  Positive for cough. Negative for shortness of breath and wheezing.    Cardiovascular:  Positive for palpitations. Negative for chest pain and leg swelling.   Gastrointestinal:  Negative for abdominal pain, diarrhea, nausea and vomiting.   Genitourinary:  Negative for difficulty urinating, dysuria and hematuria.   Musculoskeletal:  Negative for arthralgias and myalgias.   Skin:  Negative for rash and wound.   Allergic/Immunologic: Positive for immunocompromised state.   Neurological:  Negative for dizziness, weakness, light-headedness and headaches.   Hematological:  Negative for adenopathy. Does not bruise/bleed easily.   Psychiatric/Behavioral:  Negative for confusion and dysphoric mood. The patient is not nervous/anxious.    All other systems reviewed and are negative.  Objective:     Vital Signs (Most Recent):  Temp: 98.2 °F (36.8 °C) (11/21/22 0731)  Pulse: 100 (11/21/22 0857)  Resp: 18 (11/21/22 0857)  BP: 120/76 (11/21/22 0731)  SpO2: 98 % (11/21/22 0857) Vital Signs (24h Range):  Temp:  [96.3 °F (35.7 °C)-98.2 °F (36.8 °C)] 98.2 °F (36.8 °C)  Pulse:  [] 100  Resp:  [16-20] 18  SpO2:  [98 %-100 %] 98 %  BP: (116-120)/(72-81) 120/76     Weight: 60.6 kg (133 lb 9.6 oz)  Body mass index is 25.24 kg/m².    Intake/Output Summary (Last 24 hours) at 11/21/2022 1108  Last data filed at 11/21/2022 0939  Gross per 24 hour   Intake --   Output 875 ml   Net -875 ml      Physical Exam  Vitals and nursing note reviewed.   Constitutional:       General: He is not in acute distress.     Appearance: He is ill-appearing.   HENT:      Head: Normocephalic and atraumatic.      Right Ear: External ear normal.      Left Ear: External ear normal.      Nose: Nose " normal.      Mouth/Throat:      Mouth: Mucous membranes are moist.      Pharynx: Oropharynx is clear. No oropharyngeal exudate.   Eyes:      General: No scleral icterus.        Right eye: No discharge.         Left eye: No discharge.      Extraocular Movements: Extraocular movements intact.      Conjunctiva/sclera: Conjunctivae normal.   Neck:      Thyroid: No thyromegaly.      Vascular: No JVD.   Cardiovascular:      Rate and Rhythm: Tachycardia present. Rhythm irregular.      Pulses: Normal pulses.      Heart sounds: Normal heart sounds.     No gallop.   Pulmonary:      Effort: Pulmonary effort is normal. No respiratory distress.      Breath sounds: No wheezing or rales.      Comments: RA.  Abdominal:      General: Bowel sounds are normal. There is no distension.      Palpations: Abdomen is soft. There is no mass.      Tenderness: There is no abdominal tenderness.   Musculoskeletal:         General: No deformity. Normal range of motion.      Cervical back: Normal range of motion and neck supple.   Lymphadenopathy:      Cervical: No cervical adenopathy.   Skin:     General: Skin is warm and dry.      Findings: No rash.   Neurological:      General: No focal deficit present.      Mental Status: He is alert and oriented to person, place, and time. Mental status is at baseline.   Psychiatric:         Mood and Affect: Mood normal.         Behavior: Behavior normal.       Significant Labs: All pertinent labs within the past 24 hours have been reviewed.    Significant Imaging: I have reviewed all pertinent imaging results/findings within the past 24 hours.      Assessment/Plan:      * Chronic combined systolic and diastolic congestive heart failure  -Telemetry  -Lisinopril  -Coreg  -PO Lasix  -Monitor I's and O's  -Keep K > 4 and Mg > 2    COPD exacerbation  In setting of COVID-19 infection.  -Prednisone  -Held remdesivir given LFT elevation  -Trended inflammatory markers  -Airborne and isolation precautions  -O2  PRN  -Duonebs Q4H WA      Dyslipidemia  -Continue ASA and statin      Presence of Watchman left atrial appendage closure device  Noted.      Elevated LFTs  Unclear etiology. Possible hepatic congestion. Improved with diuresis.  -Trended LFTs with CMP    AICD (automatic cardioverter/defibrillator) present  Noted.  -Telemetry    Hypothyroid  -Continue home Synthroid    COVID-19  -Airborne and isolation precautions  -Decadron 6 transitioned to prednisone for COPD exacerbation  -Eliquis  -Held remdesivir  -Trended inflammatory markers    PAF (paroxysmal atrial fibrillation)  -Eliquis  -Coreg  -Telemetry  -Cardiology consulted; had been scheduled for cardioversion        Benign essential HTN  -Continue Coreg and lisinopril  -Continue Imdur on discharge  -Patient may benefit from Aldactone if BP can tolerate  -Continue to monitor    Coronary artery disease  -Telemetry  -Coreg  -ASA and Plavix  -Statin        VTE Risk Mitigation (From admission, onward)         Ordered     apixaban tablet 5 mg  2 times daily         11/17/22 2011     IP VTE HIGH RISK PATIENT  Once         11/16/22 1935     Place sequential compression device  Until discontinued         11/16/22 1935     Reason for No Pharmacological VTE Prophylaxis  Once        Question:  Reasons:  Answer:  Already adequately anticoagulated on oral Anticoagulants    11/16/22 1935                Discharge Planning   MACKENZIE: 11/21/2022     Code Status: Full Code   Is the patient medically ready for discharge?:     Reason for patient still in hospital (select all that apply): Patient trending condition, Treatment and Consult recommendations  Discharge Plan A: Home                  Fernando Bryson MD  Department of Hospital Medicine   [facility]

## 2022-11-21 NOTE — PLAN OF CARE
Problem: Occupational Therapy  Goal: Occupational Therapy Goal  Description: Goals to be met by: 12/12/2022     Patient will increase functional independence with ADLs by performing:    LE Dressing with Modified [place].  Grooming while standing at sink with Modified [place].  Toileting from toilet with Modified [place] for hygiene and clothing management.   Supine to sit with Modified [place].  Toilet transfer to toilet with Modified [place].    Outcome: Ongoing, Progressing

## 2022-11-21 NOTE — DISCHARGE SUMMARY
[facility] Hospital Medicine  Discharge Summary      Patient Name: Cleveland Capps  MRN: [id number]  JUSTYN: [id number]  Patient Class: OP- Observation  Admission Date: 11/16/2022  Hospital Length of Stay: 0 days  Discharge Date and Time: No discharge date for patient encounter.  Attending Physician: Fernando Bryson MD   Discharging Provider: Fernando Bryson MD  Primary Care Provider: Romero Snyder MD    Primary Care Team: Networked reference to record PCT     HPI:   Pt presented to ED with shortness of breath.  Began a few days prior.  Associated with cough and congestion.   He sought evaluation from his primary care provider and was diagnosed with SARS-CoV-2 infection.  He was put on Paxlovid, of which he took a couple of days' worth.  He didn't improve.  Cough is nonproductive.  No chest pain.  No fevers.  No chills. No orthopnea.  He has CAD, COPD, and is a former smoker.  Upon presenting to the ED, he was hypotensive but responded to crystalloid infusions.  Hospital observation requested for more hydration and treatment of mild COPD exacerbation.      * No surgery found *      Hospital Course:   Cleveland Capps is a 67 year old male with a past medical history of combined CHF s/p AICD, CAD s/p PCI, Afib, HTN, HLD, DM, and hypothyroidism who presented with shortness of breath secondary to an acute COPD exacerbation in the setting of COVID-19 infection as well a mild acute on chronic CHF exacerbation. He was stable on room air throughout is entire course. He was placed on steroids and scheduled Duonebs. He was also placed on IV Lasix with adequate diuresis. Remdesivir was not started given elevated LFTs for which a RUQ ultrasound was unremarkable for acute pathology. His liver enzymes improved with diuresis as they were likely elevated secondary to hepatic congestion. The patient was transitioned to oral Lasix 11/20/2022. He was monitored for an episode of NSVT without any shock of his AICD.  He was given a 250 cc NS bolus. He was able to be discharged 11/21/2022 and will follow up with his PCP in the outpatient setting.       Goals of Care Treatment Preferences:  Code Status: Full Code      Consults:     * Chronic combined systolic and diastolic congestive heart failure  -Telemetry  -Lisinopril  -Coreg  -PO Lasix  -Monitor I's and O's  -Keep K > 4 and Mg > 2    COPD exacerbation  In setting of COVID-19 infection.  -Prednisone  -Held remdesivir given LFT elevation  -Trended inflammatory markers  -Airborne and isolation precautions  -O2 PRN  -Duonebs Q4H WA      Dyslipidemia  -Continue ASA and statin      Presence of Watchman left atrial appendage closure device  Noted.      Elevated LFTs  Unclear etiology. Possible hepatic congestion. Improved with diuresis.  -Trended LFTs with CMP    AICD (automatic cardioverter/defibrillator) present  Noted.  -Telemetry    Hypothyroid  -Continue home Synthroid    COVID-19  -Airborne and isolation precautions  -Decadron 6 transitioned to prednisone for COPD exacerbation  -Eliquis  -Held remdesivir  -Trended inflammatory markers    PAF (paroxysmal atrial fibrillation)  -Eliquis  -Coreg  -Telemetry        Benign essential HTN  -Continue Coreg and lisinopril  -Continue Imdur on discharge  -Patient may benefit from Aldactone if BP can tolerate  -Continue to monitor    Coronary artery disease  -Telemetry  -Coreg  -ASA and Plavix  -Statin        Final Active Diagnoses:    Diagnosis Date Noted POA    PRINCIPAL PROBLEM:  Chronic combined systolic and diastolic congestive heart failure [I50.42] 05/19/2020 Yes    COPD exacerbation [J44.1] 11/17/2022 Yes    Dyslipidemia [E78.5] 11/19/2022 Yes    Presence of Watchman left atrial appendage closure device [Z95.818] 11/19/2022 Yes    AICD (automatic cardioverter/defibrillator) present [Z95.810] 11/18/2022 Yes    Elevated LFTs [R79.89] 11/18/2022 Yes    COVID-19 [U07.1] 11/17/2022 Yes    Hypothyroid [E03.9] 11/17/2022 Yes     Coronary artery disease [I25.10] 04/25/2016 Yes    Benign essential HTN [I10] 04/25/2016 Yes    PAF (paroxysmal atrial fibrillation) [I48.0] 04/25/2016 Yes      Problems Resolved During this Admission:    Diagnosis Date Noted Date Resolved POA    Type 2 diabetes mellitus without complication, without long-term current use of insulin [E11.9] 11/17/2022 11/20/2022 Yes    Hyponatremia [E87.1] 11/17/2022 11/19/2022 Yes    Hyperkalemia [E87.5] 11/17/2022 11/18/2022 Yes       Discharged Condition: stable    Disposition: Home or Self Care    Follow Up:   Follow-up Information     Romero Snyder MD. Call in 2 week(s).    Specialty: Family Medicine  Why: Monday to schedule hospital follow up  Contact information:  [address]  [facility] [address]  [phone]                       Patient Instructions:      Diet Cardiac     Notify your health care provider if you experience any of the following:  increased confusion or weakness     Notify your health care provider if you experience any of the following:  persistent dizziness, light-headedness, or visual disturbances     Notify your health care provider if you experience any of the following:  severe persistent headache     Notify your health care provider if you experience any of the following:  difficulty breathing or increased cough     Notify your health care provider if you experience any of the following:  severe uncontrolled pain     Notify your health care provider if you experience any of the following:  persistent nausea and vomiting or diarrhea     Notify your health care provider if you experience any of the following:  temperature >100.4     Activity as tolerated       Significant Diagnostic Studies: Labs: All labs within the past 24 hours have been reviewed    Pending Diagnostic Studies:     Procedure Component Value Units Date/Time    EKG 12-lead [[id number]] Collected: 11/19/22 2343    Order Status: Sent Lab Status: In process Updated: 11/21/22 9146     Narrative:      Test Reason : I49.9,    Vent. Rate : 099 BPM     Atrial Rate : 090 BPM     P-R Int : 000 ms          QRS Dur : 086 ms      QT Int : 358 ms       P-R-T Axes : 000 108 -85 degrees     QTc Int : 459 ms    Atrial fibrillation  Rightward axis  Anterior infarct (cited on or before 01-MAY-2016)  Abnormal ECG  When compared with ECG of 16-NOV-2022 16:35,  No significant change was found    Referred By: [email or web address]   SELF           Confirmed By:          Medications:  Reconciled Home Medications:      Medication List      START taking these medications    predniSONE 20 MG tablet  Commonly known as: DELTASONE  Take 2 tablets (40 mg total) by mouth once daily. for 2 days        CONTINUE taking these medications    aspirin 81 MG EC tablet  Commonly known as: ECOTRIN  Take 81 mg by mouth once daily.     atorvastatin 80 MG tablet  Commonly known as: LIPITOR  Take 80 mg by mouth once daily.     carvediloL 6.25 MG tablet  Commonly known as: COREG  Take 2 tablets (12.5 mg total) by mouth 2 (two) times daily.     clopidogreL 75 mg tablet  Commonly known as: PLAVIX  Take 1 tablet (75 mg total) by mouth once daily.     ELIQUIS 5 mg Tab  Generic drug: apixaban  Take 5 mg by mouth 2 (two) times daily.     enalapril 20 MG tablet  Commonly known as: VASOTEC  20 mg 2 (two) times daily.     furosemide 20 MG tablet  Commonly known as: LASIX  Take 1 tablet (20 mg total) by mouth once daily.     isosorbide mononitrate 30 MG 24 hr tablet  Commonly known as: IMDUR  Take 30 mg by mouth once daily.     levothyroxine 25 MCG tablet  Commonly known as: SYNTHROID  Take 25 mcg by mouth before breakfast.     potassium chloride SA 10 MEQ tablet  Commonly known as: K-DUR,KLOR-CON M  Take 10 mEq by mouth once daily.     travoprost (benzalkonium) 0.004 % ophthalmic solution  Commonly known as: TRAVATAN  Place 1 drop into both eyes nightly.        STOP taking these medications    azithromycin 250 MG tablet  Commonly known as: Z-ARNOLDO      nirmatrelvir-ritonavir 300 mg (150 mg x 2)-100 mg copackaged tablets (EUA)            Indwelling Lines/Drains at time of discharge:   Lines/Drains/Airways     None                 Time spent on the discharge of patient: 33 minutes         Fernando Bryson MD  Department of Hospital Medicine  [facility]

## 2022-11-21 NOTE — PT/OT/SLP PROGRESS
Physical Therapy      Patient Name:  Cleveland Capps   MRN:  [id number]    Patient not seen today secondary to Afib 120-135 bpm; pt fatigue following OT participation. Ten minutes spent with pt for pt education on home safety and use of personal RW upon discharge home due to current deficits. Will follow-up 11/22/22 if discharge today does not proceed.

## 2022-11-21 NOTE — NURSING
Report from monitoring room that the patient was tachycardic 120-130. VSS, patient has no complaints of chest pains, or SOB. Cardiology has been consulted.

## 2022-11-21 NOTE — SUBJECTIVE & OBJECTIVE
"Interval History: see "Hospital Course"    Review of Systems   Constitutional:  Negative for chills, fatigue and fever.   HENT:  Positive for congestion. Negative for sinus pressure.    Eyes:  Negative for pain and visual disturbance.   Respiratory:  Positive for cough. Negative for shortness of breath and wheezing.    Cardiovascular:  Positive for palpitations. Negative for chest pain and leg swelling.   Gastrointestinal:  Negative for abdominal pain, diarrhea, nausea and vomiting.   Genitourinary:  Negative for difficulty urinating, dysuria and hematuria.   Musculoskeletal:  Negative for arthralgias and myalgias.   Skin:  Negative for rash and wound.   Allergic/Immunologic: Positive for immunocompromised state.   Neurological:  Negative for dizziness, weakness, light-headedness and headaches.   Hematological:  Negative for adenopathy. Does not bruise/bleed easily.   Psychiatric/Behavioral:  Negative for confusion and dysphoric mood. The patient is not nervous/anxious.    All other systems reviewed and are negative.  Objective:     Vital Signs (Most Recent):  Temp: 98.2 °F (36.8 °C) (11/21/22 0731)  Pulse: 100 (11/21/22 0857)  Resp: 18 (11/21/22 0857)  BP: 120/76 (11/21/22 0731)  SpO2: 98 % (11/21/22 0857) Vital Signs (24h Range):  Temp:  [96.3 °F (35.7 °C)-98.2 °F (36.8 °C)] 98.2 °F (36.8 °C)  Pulse:  [] 100  Resp:  [16-20] 18  SpO2:  [98 %-100 %] 98 %  BP: (116-120)/(72-81) 120/76     Weight: 60.6 kg (133 lb 9.6 oz)  Body mass index is 25.24 kg/m².    Intake/Output Summary (Last 24 hours) at 11/21/2022 1108  Last data filed at 11/21/2022 0939  Gross per 24 hour   Intake --   Output 875 ml   Net -875 ml      Physical Exam  Vitals and nursing note reviewed.   Constitutional:       General: He is not in acute distress.     Appearance: He is ill-appearing.   HENT:      Head: Normocephalic and atraumatic.      Right Ear: External ear normal.      Left Ear: External ear normal.      Nose: Nose normal.      " Mouth/Throat:      Mouth: Mucous membranes are moist.      Pharynx: Oropharynx is clear. No oropharyngeal exudate.   Eyes:      General: No scleral icterus.        Right eye: No discharge.         Left eye: No discharge.      Extraocular Movements: Extraocular movements intact.      Conjunctiva/sclera: Conjunctivae normal.   Neck:      Thyroid: No thyromegaly.      Vascular: No JVD.   Cardiovascular:      Rate and Rhythm: Tachycardia present. Rhythm irregular.      Pulses: Normal pulses.      Heart sounds: Normal heart sounds.     No gallop.   Pulmonary:      Effort: Pulmonary effort is normal. No respiratory distress.      Breath sounds: No wheezing or rales.      Comments: RA.  Abdominal:      General: Bowel sounds are normal. There is no distension.      Palpations: Abdomen is soft. There is no mass.      Tenderness: There is no abdominal tenderness.   Musculoskeletal:         General: No deformity. Normal range of motion.      Cervical back: Normal range of motion and neck supple.   Lymphadenopathy:      Cervical: No cervical adenopathy.   Skin:     General: Skin is warm and dry.      Findings: No rash.   Neurological:      General: No focal deficit present.      Mental Status: He is alert and oriented to person, place, and time. Mental status is at baseline.   Psychiatric:         Mood and Affect: Mood normal.         Behavior: Behavior normal.       Significant Labs: All pertinent labs within the past 24 hours have been reviewed.    Significant Imaging: I have reviewed all pertinent imaging results/findings within the past 24 hours.

## 2022-11-21 NOTE — PLAN OF CARE
Problem: Adult Inpatient Plan of Care  Goal: Plan of Care Review  Outcome: Ongoing, Progressing  Goal: Patient-Specific Goal (Individualized)  Outcome: Ongoing, Progressing  Goal: Absence of Hospital-Acquired Illness or Injury  Outcome: Ongoing, Progressing  Goal: Optimal Comfort and Wellbeing  Outcome: Ongoing, Progressing  Goal: Readiness for Transition of Care  Outcome: Ongoing, Progressing     Problem: Fluid Imbalance (Pneumonia)  Goal: Fluid Balance  Outcome: Ongoing, Progressing     Problem: Respiratory Compromise (Pneumonia)  Goal: Effective Oxygenation and Ventilation  Outcome: Ongoing, Progressing     Problem: Infection (Pneumonia)  Goal: Resolution of Infection Signs and Symptoms  Outcome: Ongoing, Progressing     Problem: Diabetes Comorbidity  Goal: Blood Glucose Level Within Targeted Range  Outcome: Ongoing, Progressing

## 2022-11-21 NOTE — ASSESSMENT & PLAN NOTE
-Airborne and isolation precautions  -Decadron 6 transitioned to prednisone for COPD exacerbation  -Eliquis  -Held remdesivir  -Trended inflammatory markers

## 2022-11-22 PROBLEM — D53.9 MACROCYTIC ANEMIA: Status: ACTIVE | Noted: 2022-11-22

## 2022-11-22 LAB
ALBUMIN SERPL BCP-MCNC: 3.2 G/DL (ref 3.5–5.2)
ALP SERPL-CCNC: 91 U/L (ref 55–135)
ALT SERPL W/O P-5'-P-CCNC: 212 U/L (ref 10–44)
ANION GAP SERPL CALC-SCNC: 11 MMOL/L (ref 8–16)
AST SERPL-CCNC: 42 U/L (ref 10–40)
BASOPHILS # BLD AUTO: 0.01 K/UL (ref 0–0.2)
BASOPHILS NFR BLD: 0.1 % (ref 0–1.9)
BILIRUB SERPL-MCNC: 1.5 MG/DL (ref 0.1–1)
BUN SERPL-MCNC: 40 MG/DL (ref 8–23)
CALCIUM SERPL-MCNC: 8.6 MG/DL (ref 8.7–10.5)
CHLORIDE SERPL-SCNC: 98 MMOL/L (ref 95–110)
CO2 SERPL-SCNC: 26 MMOL/L (ref 23–29)
CREAT SERPL-MCNC: 1.1 MG/DL (ref 0.5–1.4)
DIFFERENTIAL METHOD: ABNORMAL
EOSINOPHIL # BLD AUTO: 0 K/UL (ref 0–0.5)
EOSINOPHIL NFR BLD: 0 % (ref 0–8)
ERYTHROCYTE [DISTWIDTH] IN BLOOD BY AUTOMATED COUNT: 18.5 % (ref 11.5–14.5)
EST. GFR  (NO RACE VARIABLE): >60 ML/MIN/1.73 M^2
GLUCOSE SERPL-MCNC: 146 MG/DL (ref 70–110)
HCT VFR BLD AUTO: 38.9 % (ref 40–54)
HGB BLD-MCNC: 12.5 G/DL (ref 14–18)
IMM GRANULOCYTES # BLD AUTO: 0.06 K/UL (ref 0–0.04)
IMM GRANULOCYTES NFR BLD AUTO: 0.5 % (ref 0–0.5)
LYMPHOCYTES # BLD AUTO: 0.8 K/UL (ref 1–4.8)
LYMPHOCYTES NFR BLD: 6.6 % (ref 18–48)
MAGNESIUM SERPL-MCNC: 2.4 MG/DL (ref 1.6–2.6)
MCH RBC QN AUTO: 33.2 PG (ref 27–31)
MCHC RBC AUTO-ENTMCNC: 32.1 G/DL (ref 32–36)
MCV RBC AUTO: 103 FL (ref 82–98)
MONOCYTES # BLD AUTO: 0.8 K/UL (ref 0.3–1)
MONOCYTES NFR BLD: 6.7 % (ref 4–15)
NEUTROPHILS # BLD AUTO: 9.8 K/UL (ref 1.8–7.7)
NEUTROPHILS NFR BLD: 86.1 % (ref 38–73)
NRBC BLD-RTO: 0 /100 WBC
PHOSPHATE SERPL-MCNC: 5.2 MG/DL (ref 2.7–4.5)
PLATELET # BLD AUTO: 220 K/UL (ref 150–450)
PLATELET BLD QL SMEAR: ABNORMAL
PMV BLD AUTO: 9.6 FL (ref 9.2–12.9)
POCT GLUCOSE: 157 MG/DL (ref 70–110)
POCT GLUCOSE: 170 MG/DL (ref 70–110)
POCT GLUCOSE: 172 MG/DL (ref 70–110)
POCT GLUCOSE: 201 MG/DL (ref 70–110)
POTASSIUM SERPL-SCNC: 5.1 MMOL/L (ref 3.5–5.1)
PROT SERPL-MCNC: 6.4 G/DL (ref 6–8.4)
RBC # BLD AUTO: 3.77 M/UL (ref 4.6–6.2)
SODIUM SERPL-SCNC: 135 MMOL/L (ref 136–145)
WBC # BLD AUTO: 11.34 K/UL (ref 3.9–12.7)

## 2022-11-22 PROCEDURE — 80053 COMPREHEN METABOLIC PANEL: CPT | Performed by: STUDENT IN AN ORGANIZED HEALTH CARE EDUCATION/TRAINING PROGRAM

## 2022-11-22 PROCEDURE — 25000003 PHARM REV CODE 250: Performed by: STUDENT IN AN ORGANIZED HEALTH CARE EDUCATION/TRAINING PROGRAM

## 2022-11-22 PROCEDURE — 25000003 PHARM REV CODE 250: Performed by: NURSE PRACTITIONER

## 2022-11-22 PROCEDURE — 99233 PR SUBSEQUENT HOSPITAL CARE,LEVL III: ICD-10-PCS | Mod: ,,, | Performed by: SPECIALIST

## 2022-11-22 PROCEDURE — 84100 ASSAY OF PHOSPHORUS: CPT | Performed by: STUDENT IN AN ORGANIZED HEALTH CARE EDUCATION/TRAINING PROGRAM

## 2022-11-22 PROCEDURE — 97530 THERAPEUTIC ACTIVITIES: CPT | Mod: CQ

## 2022-11-22 PROCEDURE — 85025 COMPLETE CBC W/AUTO DIFF WBC: CPT | Performed by: STUDENT IN AN ORGANIZED HEALTH CARE EDUCATION/TRAINING PROGRAM

## 2022-11-22 PROCEDURE — 94760 N-INVAS EAR/PLS OXIMETRY 1: CPT

## 2022-11-22 PROCEDURE — 99233 SBSQ HOSP IP/OBS HIGH 50: CPT | Mod: ,,, | Performed by: SPECIALIST

## 2022-11-22 PROCEDURE — 25000003 PHARM REV CODE 250: Performed by: SPECIALIST

## 2022-11-22 PROCEDURE — 12000002 HC ACUTE/MED SURGE SEMI-PRIVATE ROOM

## 2022-11-22 PROCEDURE — 63600175 PHARM REV CODE 636 W HCPCS: Performed by: SPECIALIST

## 2022-11-22 PROCEDURE — 94761 N-INVAS EAR/PLS OXIMETRY MLT: CPT

## 2022-11-22 PROCEDURE — 36415 COLL VENOUS BLD VENIPUNCTURE: CPT | Performed by: STUDENT IN AN ORGANIZED HEALTH CARE EDUCATION/TRAINING PROGRAM

## 2022-11-22 PROCEDURE — 25000003 PHARM REV CODE 250: Performed by: HOSPITALIST

## 2022-11-22 PROCEDURE — 63600175 PHARM REV CODE 636 W HCPCS: Performed by: STUDENT IN AN ORGANIZED HEALTH CARE EDUCATION/TRAINING PROGRAM

## 2022-11-22 PROCEDURE — 27000207 HC ISOLATION

## 2022-11-22 PROCEDURE — 27000221 HC OXYGEN, UP TO 24 HOURS

## 2022-11-22 PROCEDURE — 83735 ASSAY OF MAGNESIUM: CPT | Performed by: STUDENT IN AN ORGANIZED HEALTH CARE EDUCATION/TRAINING PROGRAM

## 2022-11-22 RX ADMIN — SPIRONOLACTONE 25 MG: 25 TABLET ORAL at 09:11

## 2022-11-22 RX ADMIN — APIXABAN 5 MG: 2.5 TABLET, FILM COATED ORAL at 08:11

## 2022-11-22 RX ADMIN — SACUBITRIL AND VALSARTAN 1 TABLET: 24; 26 TABLET, FILM COATED ORAL at 12:11

## 2022-11-22 RX ADMIN — PREDNISONE 40 MG: 20 TABLET ORAL at 09:11

## 2022-11-22 RX ADMIN — CARVEDILOL 25 MG: 25 TABLET, FILM COATED ORAL at 09:11

## 2022-11-22 RX ADMIN — APIXABAN 5 MG: 2.5 TABLET, FILM COATED ORAL at 09:11

## 2022-11-22 RX ADMIN — TRAVOPROST 1 DROP: 0.04 SOLUTION/ DROPS OPHTHALMIC at 08:11

## 2022-11-22 RX ADMIN — CARVEDILOL 25 MG: 25 TABLET, FILM COATED ORAL at 08:11

## 2022-11-22 RX ADMIN — FUROSEMIDE 40 MG: 10 INJECTION, SOLUTION INTRAMUSCULAR; INTRAVENOUS at 08:11

## 2022-11-22 RX ADMIN — SACUBITRIL AND VALSARTAN 1 TABLET: 24; 26 TABLET, FILM COATED ORAL at 08:11

## 2022-11-22 RX ADMIN — FUROSEMIDE 40 MG: 10 INJECTION, SOLUTION INTRAMUSCULAR; INTRAVENOUS at 09:11

## 2022-11-22 RX ADMIN — BENZONATATE 100 MG: 100 CAPSULE ORAL at 08:11

## 2022-11-22 RX ADMIN — ASPIRIN 81 MG: 81 TABLET, COATED ORAL at 09:11

## 2022-11-22 RX ADMIN — CLOPIDOGREL BISULFATE 75 MG: 75 TABLET, FILM COATED ORAL at 09:11

## 2022-11-22 RX ADMIN — ATORVASTATIN CALCIUM 80 MG: 40 TABLET, FILM COATED ORAL at 09:11

## 2022-11-22 NOTE — PLAN OF CARE
[facility]  Discharge Reassessment    Primary Care Provider: Romero Snyder MD    Expected Discharge Date:     Case Management continuing to follow and will assist with discharge planning as needed. Pt not medically clear for DC at this time. Pt HR not controlled. Cards following. Possible cardioversion needed once pulm status improves. Plan to switch to oral lasix tomorrow per Dr. Daniels's note.       Reassessment (most recent)       Discharge Reassessment - 11/22/22 4539          Discharge Reassessment    Assessment Type Discharge Planning Reassessment     Discharge Plan A Home     Discharge Plan B Home with family     DME Needed Upon Discharge  none

## 2022-11-22 NOTE — ASSESSMENT & PLAN NOTE
Monitor potassium    Potassium   Date Value Ref Range Status   11/22/2022 5.1 3.5 - 5.1 mmol/L Final   11/21/2022 4.7 3.5 - 5.1 mmol/L Final

## 2022-11-22 NOTE — PLAN OF CARE
POC reviewed with pt, verbalized understanding. Pt AAO x 4, able to make needs known. Meds given per MAR. IV intact and patent. Tele in place and being monitored. Glucose monitored- no coverage required. O2 in place. Cough managed with prn medication. Safety maintained with side rails up x2, bed locked and in lowest positioned, call light in reach. Pt educated to call for assistance with ambulation, verbalized understanding. Pt remains free of falls. No further needs expressed at this time. Will continue to monitor.

## 2022-11-22 NOTE — SUBJECTIVE & OBJECTIVE
"Interval History: see "Hospital Course"    Review of Systems   Constitutional:  Negative for chills, fatigue and fever.   HENT:  Positive for congestion. Negative for sinus pressure.    Eyes:  Negative for pain and visual disturbance.   Respiratory:  Positive for cough. Negative for shortness of breath and wheezing.    Cardiovascular:  Positive for palpitations. Negative for chest pain and leg swelling.   Gastrointestinal:  Negative for abdominal pain, diarrhea, nausea and vomiting.   Genitourinary:  Negative for difficulty urinating, dysuria and hematuria.   Musculoskeletal:  Negative for arthralgias and myalgias.   Skin:  Negative for rash and wound.   Allergic/Immunologic: Positive for immunocompromised state.   Neurological:  Negative for dizziness, weakness, light-headedness and headaches.   Hematological:  Negative for adenopathy. Does not bruise/bleed easily.   Psychiatric/Behavioral:  Negative for confusion and dysphoric mood. The patient is not nervous/anxious.    All other systems reviewed and are negative.  Objective:     Vital Signs (Most Recent):  Temp: 97.7 °F (36.5 °C) (11/22/22 0741)  Pulse: 102 (11/22/22 0741)  Resp: 18 (11/22/22 0741)  BP: 137/84 (11/22/22 0951)  SpO2: 97 % (11/22/22 0741) Vital Signs (24h Range):  Temp:  [96.5 °F (35.8 °C)-98.2 °F (36.8 °C)] 97.7 °F (36.5 °C)  Pulse:  [] 102  Resp:  [18-20] 18  SpO2:  [92 %-99 %] 97 %  BP: (119-137)/(76-93) 137/84     Weight: 60.6 kg (133 lb 9.6 oz)  Body mass index is 25.24 kg/m².    Intake/Output Summary (Last 24 hours) at 11/22/2022 1033  Last data filed at 11/22/2022 1013  Gross per 24 hour   Intake --   Output 1450 ml   Net -1450 ml      Physical Exam  Vitals and nursing note reviewed.   Constitutional:       General: He is not in acute distress.     Appearance: He is ill-appearing.   HENT:      Head: Normocephalic and atraumatic.      Right Ear: External ear normal.      Left Ear: External ear normal.      Nose: Nose normal.      " Mouth/Throat:      Mouth: Mucous membranes are moist.      Pharynx: Oropharynx is clear. No oropharyngeal exudate.   Eyes:      General: No scleral icterus.        Right eye: No discharge.         Left eye: No discharge.      Extraocular Movements: Extraocular movements intact.      Conjunctiva/sclera: Conjunctivae normal.   Neck:      Thyroid: No thyromegaly.      Vascular: No JVD.   Cardiovascular:      Rate and Rhythm: Tachycardia present. Rhythm irregular.      Pulses: Normal pulses.      Heart sounds: Normal heart sounds.     No gallop.   Pulmonary:      Effort: Pulmonary effort is normal. No respiratory distress.      Breath sounds: No wheezing or rales.      Comments: RA.  Abdominal:      General: Bowel sounds are normal. There is no distension.      Palpations: Abdomen is soft. There is no mass.      Tenderness: There is no abdominal tenderness.   Musculoskeletal:         General: No deformity. Normal range of motion.      Cervical back: Normal range of motion and neck supple.   Lymphadenopathy:      Cervical: No cervical adenopathy.   Skin:     General: Skin is warm and dry.      Findings: No rash.   Neurological:      General: No focal deficit present.      Mental Status: He is alert and oriented to person, place, and time. Mental status is at baseline.   Psychiatric:         Mood and Affect: Mood normal.         Behavior: Behavior normal.       Significant Labs: All pertinent labs within the past 24 hours have been reviewed.    Significant Imaging: I have reviewed all pertinent imaging results/findings within the past 24 hours.

## 2022-11-22 NOTE — CONSULTS
[facility]  Cardiology  Progress Note    Patient Name: Cleveland Capps  MRN: [id number]  Admission Date: 11/16/2022  Hospital Length of Stay: 1 days  Code Status: Full Code   Attending Physician: Fernando Bryson MD   Primary Care Physician: Romero Snyder MD  Expected Discharge Date: 11/25/2022  Principal Problem:Acute on chronic combined systolic and diastolic congestive heart failure    ubjective:     Hospital Course:  Patient is still wheezing    Interval History:  Problem 1 heart failure reduced ejection fraction status post ASCVD and chronic persistent atrial fib  He is just been started on Entresto and is still on Lasix IV     ROS  Objective:     Vital Signs (Most Recent):  Temp: 97.7 °F (36.5 °C) (11/22/22 1142)  Pulse: 107 (11/22/22 1142)  Resp: 16 (11/22/22 1142)  BP: 121/80 (11/22/22 1246)  SpO2: 100 % (11/22/22 1142) Vital Signs (24h Range):  Temp:  [97.1 °F (36.2 °C)-98.2 °F (36.8 °C)] 97.7 °F (36.5 °C)  Pulse:  [] 107  Resp:  [16-20] 16  SpO2:  [97 %-100 %] 100 %  BP: (121-137)/(76-91) 121/80     Weight: 60.6 kg (133 lb 9.6 oz)  Body mass index is 25.24 kg/m².    SpO2: 100 %  O2 Device (Oxygen Therapy): nasal cannula      Intake/Output Summary (Last 24 hours) at 11/22/2022 1419  Last data filed at 11/22/2022 1254  Gross per 24 hour   Intake --   Output 1750 ml   Net -1750 ml       Lines/Drains/Airways       Peripheral Intravenous Line  Duration                  Peripheral IV - Single Lumen 11/20/22 0037 20 G Left;Proximal;Lateral;Anterior Forearm 2 days                    Physical Exam blood pressure 115/80  Pulse is 80  Bilateral wheezes  Cardiac irregular    Significant Labs: CMP   Recent Labs   Lab 11/21/22  0525 11/22/22  0440   * 135*   K 4.7 5.1    98   CO2 26 26   * 146*   BUN 34* 40*   CREATININE 1.2 1.1   CALCIUM 8.0* 8.6*   PROT 5.6* 6.4   ALBUMIN 2.8* 3.2*   BILITOT 1.0 1.5*   ALKPHOS 75 91   AST 45* 42*   * 212*   ANIONGAP 8 11    and  CBC   Recent Labs   Lab 11/21/22  0525 11/22/22  0440   WBC 8.09 11.34   HGB 11.2* 12.5*   HCT 34.7* 38.9*    220       Significant Imaging:   Assessment and Plan:   Status post COVID and bronchitis pneumonia being treated  Is on prednisone  2. Heart failure reduced ejection fraction-switch over to p.o. Lasix tomorrow and continue Entresto  3. Atrial fib-he had a Watchman device probably just needs to stay on aspirin and Plavix  If his pulmonary status improves we can go ahead and cardiovert but hold off for now      Brief HPI:     Active Diagnoses:    Diagnosis Date Noted POA    PRINCIPAL PROBLEM:  Acute on chronic combined systolic and diastolic congestive heart failure [I50.43] 05/19/2020 Yes    Macrocytic anemia [D53.9] 11/22/2022 Yes    Dyslipidemia [E78.5] 11/19/2022 Yes    Presence of Watchman left atrial appendage closure device [Z95.818] 11/19/2022 Yes    AICD (automatic cardioverter/defibrillator) present [Z95.810] 11/18/2022 Yes    Elevated LFTs [R79.89] 11/18/2022 Yes    COVID-19 [U07.1] 11/17/2022 Yes    Hypothyroid [E03.9] 11/17/2022 Yes    COPD exacerbation [J44.1] 11/17/2022 Yes    Coronary artery disease [I25.10] 04/25/2016 Yes    Benign essential HTN [I10] 04/25/2016 Yes    PAF (paroxysmal atrial fibrillation) [I48.0] 04/25/2016 Yes      Problems Resolved During this Admission:    Diagnosis Date Noted Date Resolved POA    Type 2 diabetes mellitus without complication, without long-term current use of insulin [E11.9] 11/17/2022 11/20/2022 Yes    Hyponatremia [E87.1] 11/17/2022 11/19/2022 Yes    Hyperkalemia [E87.5] 11/17/2022 11/18/2022 Yes       VTE Risk Mitigation (From admission, onward)           Ordered     apixaban tablet 5 mg  2 times daily         11/17/22 2011     IP VTE HIGH RISK PATIENT  Once         11/16/22 1935     Place sequential compression device  Until discontinued         11/16/22 1935     Reason for No Pharmacological VTE Prophylaxis  Once        Question:  Reasons:   Answer:  Already adequately anticoagulated on oral Anticoagulants    11/16/22 1935                    Kuldeep Daniels MD  Cardiology  [facility]

## 2022-11-22 NOTE — PT/OT/SLP PROGRESS
"Physical Therapy Treatment    Patient Name:  Clveeland Capps   MRN:  [id number]    Recommendations:     Discharge Recommendations:  home   Discharge Equipment Recommendations: none   Barriers to discharge: None    Assessment:     Cleveland Capps is a 67 y.o. 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  He presents with the following impairments/functional limitations:  weakness, impaired endurance, impaired functional mobility, impaired cardiopulmonary response to activity.  Pt found agitated and c/o urinary incontinence into bed due to lasix. Agreeable to therapist assist for changing of clothing and transfer to chair.   Pt transfers and performs bed mobility with CGA but requires extended seated rest breaks due to fatigue and episodes of tachycardia. O2 sats remained >93% with all spot checks. Ambulation deferred until HR more well controlled.  Additional transfer at pt's request to/from [facility] due to bowel urgency but unable to produce BM.   Pt was left [facility] with brief loosely fastened, two urinals at hand, call light, and nursing staff made aware.       Rehab Prognosis: Good; patient would benefit from acute skilled PT services to address these deficits and reach maximum level of function.    Recent Surgery: * No surgery found *      Plan:     During this hospitalization, patient to be seen 5 x/week to address the identified rehab impairments via gait training, therapeutic activities, therapeutic exercises and progress toward the following goals:    Plan of Care Expires:  12/16/22    Subjective     Chief Complaint: "I can't get the urinal placed in time"  Patient/Family Comments/goals: easier breathing; less heart racing  Pain/Comfort:  Pain Rating 1: 0/10      Objective:     Communicated with nurse Serrano prior to session.  Patient found HOB elevated with bed alarm, peripheral IV, pulse ox (continuous) upon PT entry to room.     General " Precautions: Standard, fall, droplet, airborne, contact   Orthopedic Precautions:N/A   Braces: N/A  Respiratory Status: Nasal cannula, flow   L/min     Functional Mobility:  Bed Mobility:     Supine to Sit: contact guard assistance and seated rest break  Transfers:     Sit to Stand:  contact guard assistance with rolling walker  Bed to Chair: contact guard assistance with  rolling walker  using  Stand Pivot  Toilet Transfer: contact guard assistance with  no AD  using  Stand Pivot  Gait: deferred until HR controlled      AM-PAC 6 CLICK MOBILITY          Treatment & Education:  -Pt education in importance of transfer to chair for improved OOB tolerance, skin integrity, prevent functional decline, improve ease of access for urinal  -Standing tolerance at RW ~ 30 seconds for self-hygiene of skin after urinary incontinence  -Seated balance endurance at EOB w/ supv for balance x 8 minutes for change of gown, oral meds from nurse, and pt education   -Seated balance endurance without back support in chair (brushing teeth) x 4 minutes and on BSC x 6-7 minutes      Patient left up in chair with all lines intact, call button in reach, chair alarm off due to power cord not in room, and OLIVIER Srerano notified of such..    GOALS:   Multidisciplinary Problems       Physical Therapy Goals       Not on file                    Time Tracking:     PT Received On: 11/22/22  PT Start Time: 0942     PT Stop Time: 1027  PT Total Time (min): 45 min     Billable Minutes: Therapeutic Activity 45    Treatment Type: Treatment  PT/PTA: PTA     PTA Visit Number: 1 11/22/2022

## 2022-11-22 NOTE — PLAN OF CARE
Patient resting comfortably in bed. No complaints at this time. IV intact, Telemetry on, VSS.  Shift uneventful.     Problem: Adult Inpatient Plan of Care  Goal: Plan of Care Review  Outcome: Ongoing, Progressing  Goal: Patient-Specific Goal (Individualized)  Outcome: Ongoing, Progressing  Goal: Absence of Hospital-Acquired Illness or Injury  Outcome: Ongoing, Progressing  Goal: Optimal Comfort and Wellbeing  Outcome: Ongoing, Progressing  Goal: Readiness for Transition of Care  Outcome: Ongoing, Progressing     Problem: Infection (Pneumonia)  Goal: Resolution of Infection Signs and Symptoms  Outcome: Ongoing, Progressing     Problem: Respiratory Compromise (Pneumonia)  Goal: Effective Oxygenation and Ventilation  Outcome: Ongoing, Progressing     Problem: Fall Injury Risk  Goal: Absence of Fall and Fall-Related Injury  Outcome: Ongoing, Progressing     Problem: Diabetes Comorbidity  Goal: Blood Glucose Level Within Targeted Range  Outcome: Ongoing, Progressing

## 2022-11-22 NOTE — PLAN OF CARE
Problem: Physical Therapy  Goal: Physical Therapy Goal  Description: Goals to be met by: 22     Patient will increase functional independence with mobility by performin. Supine to sit with [place]  2. Sit to supine with [place]  3. Sit to stand transfer with [place]  4. Bed to chair transfer with [place] using No Assistive Device  5. Gait  x 150 feet with [place] using No Assistive Device.     Outcome: Ongoing, Progressing

## 2022-11-22 NOTE — PROGRESS NOTES
[facility] Hospital Medicine  Progress Note    Patient Name: Cleveland Capps  MRN: [id number]  Patient Class: IP- Inpatient   Admission Date: 11/16/2022  Length of Stay: 1 days  Attending Physician: Fernando Bryson MD  Primary Care Provider: Romero Snyder MD        Subjective:     Principal Problem:Acute on chronic combined systolic and diastolic congestive heart failure        HPI:  Pt presented to ED with shortness of breath.  Began a few days prior.  Associated with cough and congestion.   He sought evaluation from his primary care provider and was diagnosed with SARS-CoV-2 infection.  He was put on Paxlovid, of which he took a couple of days' worth.  He didn't improve.  Cough is nonproductive.  No chest pain.  No fevers.  No chills. No orthopnea.  He has CAD, COPD, and is a former smoker.  Upon presenting to the ED, he was hypotensive but responded to crystalloid infusions.  Hospital observation requested for more hydration and treatment of mild COPD exacerbation.      Overview/Hospital Course:  Cleveland Capps is a 67 year old male with a past medical history of combined CHF s/p AICD, CAD s/p PCI, Afib, HTN, HLD, DM, and hypothyroidism who presented with shortness of breath secondary to an acute COPD exacerbation in the setting of COVID-19 infection as well a mild acute on chronic CHF exacerbation. He was stable on room air throughout is entire course. He was placed on steroids and scheduled Duonebs. He was also placed on IV Lasix. Remdesivir was not started given elevated LFTs for which a RUQ ultrasound was unremarkable for acute pathology. His liver enzymes improved with diuresis as they were likely elevated secondary to hepatic congestion. He was monitored for an episode of NSVT without any shock of his AICD. He was given a 250 cc NS bolus. He developed Afib with RVR 11/22 for which Cardiology has been consulted. He remains on IV Lasix and Entresto has been started.      Interval  "History: see "Hospital Course"    Review of Systems   Constitutional:  Negative for chills, fatigue and fever.   HENT:  Positive for congestion. Negative for sinus pressure.    Eyes:  Negative for pain and visual disturbance.   Respiratory:  Positive for cough. Negative for shortness of breath and wheezing.    Cardiovascular:  Positive for palpitations. Negative for chest pain and leg swelling.   Gastrointestinal:  Negative for abdominal pain, diarrhea, nausea and vomiting.   Genitourinary:  Negative for difficulty urinating, dysuria and hematuria.   Musculoskeletal:  Negative for arthralgias and myalgias.   Skin:  Negative for rash and wound.   Allergic/Immunologic: Positive for immunocompromised state.   Neurological:  Negative for dizziness, weakness, light-headedness and headaches.   Hematological:  Negative for adenopathy. Does not bruise/bleed easily.   Psychiatric/Behavioral:  Negative for confusion and dysphoric mood. The patient is not nervous/anxious.    All other systems reviewed and are negative.  Objective:     Vital Signs (Most Recent):  Temp: 97.7 °F (36.5 °C) (11/22/22 0741)  Pulse: 102 (11/22/22 0741)  Resp: 18 (11/22/22 0741)  BP: 137/84 (11/22/22 0951)  SpO2: 97 % (11/22/22 0741) Vital Signs (24h Range):  Temp:  [96.5 °F (35.8 °C)-98.2 °F (36.8 °C)] 97.7 °F (36.5 °C)  Pulse:  [] 102  Resp:  [18-20] 18  SpO2:  [92 %-99 %] 97 %  BP: (119-137)/(76-93) 137/84     Weight: 60.6 kg (133 lb 9.6 oz)  Body mass index is 25.24 kg/m².    Intake/Output Summary (Last 24 hours) at 11/22/2022 1033  Last data filed at 11/22/2022 1013  Gross per 24 hour   Intake --   Output 1450 ml   Net -1450 ml      Physical Exam  Vitals and nursing note reviewed.   Constitutional:       General: He is not in acute distress.     Appearance: He is ill-appearing.   HENT:      Head: Normocephalic and atraumatic.      Right Ear: External ear normal.      Left Ear: External ear normal.      Nose: Nose normal.      Mouth/Throat: "      Mouth: Mucous membranes are moist.      Pharynx: Oropharynx is clear. No oropharyngeal exudate.   Eyes:      General: No scleral icterus.        Right eye: No discharge.         Left eye: No discharge.      Extraocular Movements: Extraocular movements intact.      Conjunctiva/sclera: Conjunctivae normal.   Neck:      Thyroid: No thyromegaly.      Vascular: No JVD.   Cardiovascular:      Rate and Rhythm: Tachycardia present. Rhythm irregular.      Pulses: Normal pulses.      Heart sounds: Normal heart sounds.     No gallop.   Pulmonary:      Effort: Pulmonary effort is normal. No respiratory distress.      Breath sounds: No wheezing or rales.      Comments: RA.  Abdominal:      General: Bowel sounds are normal. There is no distension.      Palpations: Abdomen is soft. There is no mass.      Tenderness: There is no abdominal tenderness.   Musculoskeletal:         General: No deformity. Normal range of motion.      Cervical back: Normal range of motion and neck supple.   Lymphadenopathy:      Cervical: No cervical adenopathy.   Skin:     General: Skin is warm and dry.      Findings: No rash.   Neurological:      General: No focal deficit present.      Mental Status: He is alert and oriented to person, place, and time. Mental status is at baseline.   Psychiatric:         Mood and Affect: Mood normal.         Behavior: Behavior normal.       Significant Labs: All pertinent labs within the past 24 hours have been reviewed.    Significant Imaging: I have reviewed all pertinent imaging results/findings within the past 24 hours.      Assessment/Plan:      * Acute on chronic combined systolic and diastolic congestive heart failure  -Telemetry  -Entresto  -Coreg  -IV Lasix  -Monitor I's and O's  -Keep K > 4 and Mg > 2    COPD exacerbation  In setting of COVID-19 infection.  -Prednisone  -Held remdesivir given LFT elevation  -Trended inflammatory markers  -Airborne and isolation precautions  -O2 PRN  -Duonebs Q4H  WA      Macrocytic anemia  -Trend Hgb with CBC      Dyslipidemia  -Continue ASA and statin      Presence of Watchman left atrial appendage closure device  Noted.      Elevated LFTs  Unclear etiology. Possible hepatic congestion. Improved with diuresis.  -Trended LFTs with CMP    AICD (automatic cardioverter/defibrillator) present  Noted.  -Telemetry    Hypothyroid  -Continue home Synthroid    COVID-19  -Airborne and isolation precautions  -Decadron 6 transitioned to prednisone for COPD exacerbation  -Eliquis  -Held remdesivir  -Trended inflammatory markers    PAF (paroxysmal atrial fibrillation)  -Eliquis  -Coreg  -Telemetry  -Cardiology consulted; had been scheduled for cardioversion        Benign essential HTN  -Continue Coreg   -Entresto  -Continue to monitor    Coronary artery disease  -Telemetry  -Coreg  -ASA and Plavix  -Statin        VTE Risk Mitigation (From admission, onward)         Ordered     apixaban tablet 5 mg  2 times daily         11/17/22 2011     IP VTE HIGH RISK PATIENT  Once         11/16/22 1935     Place sequential compression device  Until discontinued         11/16/22 1935     Reason for No Pharmacological VTE Prophylaxis  Once        Question:  Reasons:  Answer:  Already adequately anticoagulated on oral Anticoagulants    11/16/22 1935                Discharge Planning   MACKENZIE: 11/25/2022     Code Status: Full Code   Is the patient medically ready for discharge?:     Reason for patient still in hospital (select all that apply): Patient trending condition, Treatment and Consult recommendations  Discharge Plan A: Home                  Fernando Bryson MD  Department of Hospital Medicine   [facility]

## 2022-11-23 LAB
ALBUMIN SERPL BCP-MCNC: 2.9 G/DL (ref 3.5–5.2)
ALP SERPL-CCNC: 77 U/L (ref 55–135)
ALT SERPL W/O P-5'-P-CCNC: 151 U/L (ref 10–44)
ANION GAP SERPL CALC-SCNC: 10 MMOL/L (ref 8–16)
AST SERPL-CCNC: 33 U/L (ref 10–40)
BASOPHILS # BLD AUTO: 0.01 K/UL (ref 0–0.2)
BASOPHILS NFR BLD: 0.1 % (ref 0–1.9)
BILIRUB SERPL-MCNC: 1.7 MG/DL (ref 0.1–1)
BUN SERPL-MCNC: 37 MG/DL (ref 8–23)
CALCIUM SERPL-MCNC: 8.1 MG/DL (ref 8.7–10.5)
CHLORIDE SERPL-SCNC: 97 MMOL/L (ref 95–110)
CO2 SERPL-SCNC: 30 MMOL/L (ref 23–29)
CREAT SERPL-MCNC: 1 MG/DL (ref 0.5–1.4)
DIFFERENTIAL METHOD: ABNORMAL
EOSINOPHIL # BLD AUTO: 0 K/UL (ref 0–0.5)
EOSINOPHIL NFR BLD: 0 % (ref 0–8)
ERYTHROCYTE [DISTWIDTH] IN BLOOD BY AUTOMATED COUNT: 17.9 % (ref 11.5–14.5)
EST. GFR  (NO RACE VARIABLE): >60 ML/MIN/1.73 M^2
GLUCOSE SERPL-MCNC: 136 MG/DL (ref 70–110)
HCT VFR BLD AUTO: 38.4 % (ref 40–54)
HGB BLD-MCNC: 12.7 G/DL (ref 14–18)
IMM GRANULOCYTES # BLD AUTO: 0.07 K/UL (ref 0–0.04)
IMM GRANULOCYTES NFR BLD AUTO: 0.5 % (ref 0–0.5)
LYMPHOCYTES # BLD AUTO: 0.8 K/UL (ref 1–4.8)
LYMPHOCYTES NFR BLD: 6.3 % (ref 18–48)
MAGNESIUM SERPL-MCNC: 2.3 MG/DL (ref 1.6–2.6)
MCH RBC QN AUTO: 34 PG (ref 27–31)
MCHC RBC AUTO-ENTMCNC: 33.1 G/DL (ref 32–36)
MCV RBC AUTO: 103 FL (ref 82–98)
MONOCYTES # BLD AUTO: 0.8 K/UL (ref 0.3–1)
MONOCYTES NFR BLD: 5.8 % (ref 4–15)
NEUTROPHILS # BLD AUTO: 11.7 K/UL (ref 1.8–7.7)
NEUTROPHILS NFR BLD: 87.3 % (ref 38–73)
NRBC BLD-RTO: 0 /100 WBC
PHOSPHATE SERPL-MCNC: 3.9 MG/DL (ref 2.7–4.5)
PLATELET # BLD AUTO: 186 K/UL (ref 150–450)
PMV BLD AUTO: 9.7 FL (ref 9.2–12.9)
POCT GLUCOSE: 150 MG/DL (ref 70–110)
POCT GLUCOSE: 152 MG/DL (ref 70–110)
POCT GLUCOSE: 157 MG/DL (ref 70–110)
POCT GLUCOSE: 183 MG/DL (ref 70–110)
POTASSIUM SERPL-SCNC: 4.3 MMOL/L (ref 3.5–5.1)
PROT SERPL-MCNC: 5.8 G/DL (ref 6–8.4)
RBC # BLD AUTO: 3.74 M/UL (ref 4.6–6.2)
SODIUM SERPL-SCNC: 137 MMOL/L (ref 136–145)
WBC # BLD AUTO: 13.41 K/UL (ref 3.9–12.7)

## 2022-11-23 PROCEDURE — 25000003 PHARM REV CODE 250: Performed by: SPECIALIST

## 2022-11-23 PROCEDURE — 99232 PR SUBSEQUENT HOSPITAL CARE,LEVL II: ICD-10-PCS | Mod: ,,, | Performed by: SPECIALIST

## 2022-11-23 PROCEDURE — 27000207 HC ISOLATION

## 2022-11-23 PROCEDURE — 25000003 PHARM REV CODE 250: Performed by: HOSPITALIST

## 2022-11-23 PROCEDURE — 25000242 PHARM REV CODE 250 ALT 637 W/ HCPCS: Performed by: STUDENT IN AN ORGANIZED HEALTH CARE EDUCATION/TRAINING PROGRAM

## 2022-11-23 PROCEDURE — 83735 ASSAY OF MAGNESIUM: CPT | Performed by: STUDENT IN AN ORGANIZED HEALTH CARE EDUCATION/TRAINING PROGRAM

## 2022-11-23 PROCEDURE — 84100 ASSAY OF PHOSPHORUS: CPT | Performed by: STUDENT IN AN ORGANIZED HEALTH CARE EDUCATION/TRAINING PROGRAM

## 2022-11-23 PROCEDURE — 80053 COMPREHEN METABOLIC PANEL: CPT | Performed by: STUDENT IN AN ORGANIZED HEALTH CARE EDUCATION/TRAINING PROGRAM

## 2022-11-23 PROCEDURE — 36415 COLL VENOUS BLD VENIPUNCTURE: CPT | Performed by: STUDENT IN AN ORGANIZED HEALTH CARE EDUCATION/TRAINING PROGRAM

## 2022-11-23 PROCEDURE — 85025 COMPLETE CBC W/AUTO DIFF WBC: CPT | Performed by: STUDENT IN AN ORGANIZED HEALTH CARE EDUCATION/TRAINING PROGRAM

## 2022-11-23 PROCEDURE — 25000003 PHARM REV CODE 250: Performed by: NURSE PRACTITIONER

## 2022-11-23 PROCEDURE — 25000003 PHARM REV CODE 250: Performed by: STUDENT IN AN ORGANIZED HEALTH CARE EDUCATION/TRAINING PROGRAM

## 2022-11-23 PROCEDURE — 12000002 HC ACUTE/MED SURGE SEMI-PRIVATE ROOM

## 2022-11-23 PROCEDURE — 63600175 PHARM REV CODE 636 W HCPCS: Performed by: STUDENT IN AN ORGANIZED HEALTH CARE EDUCATION/TRAINING PROGRAM

## 2022-11-23 PROCEDURE — 99900035 HC TECH TIME PER 15 MIN (STAT)

## 2022-11-23 PROCEDURE — 99232 SBSQ HOSP IP/OBS MODERATE 35: CPT | Mod: ,,, | Performed by: SPECIALIST

## 2022-11-23 PROCEDURE — 27000221 HC OXYGEN, UP TO 24 HOURS

## 2022-11-23 PROCEDURE — 94640 AIRWAY INHALATION TREATMENT: CPT

## 2022-11-23 PROCEDURE — 94761 N-INVAS EAR/PLS OXIMETRY MLT: CPT

## 2022-11-23 RX ORDER — LEVALBUTEROL INHALATION SOLUTION 0.63 MG/3ML
0.63 SOLUTION RESPIRATORY (INHALATION) EVERY 8 HOURS
Status: DISCONTINUED | OUTPATIENT
Start: 2022-11-23 | End: 2022-11-24

## 2022-11-23 RX ORDER — FUROSEMIDE 40 MG/1
40 TABLET ORAL DAILY
Status: DISCONTINUED | OUTPATIENT
Start: 2022-11-23 | End: 2022-11-24

## 2022-11-23 RX ORDER — IPRATROPIUM BROMIDE 0.5 MG/2.5ML
0.5 SOLUTION RESPIRATORY (INHALATION) EVERY 8 HOURS
Status: DISCONTINUED | OUTPATIENT
Start: 2022-11-23 | End: 2022-11-24

## 2022-11-23 RX ADMIN — IPRATROPIUM BROMIDE 0.5 MG: 0.5 SOLUTION RESPIRATORY (INHALATION) at 02:11

## 2022-11-23 RX ADMIN — SACUBITRIL AND VALSARTAN 1 TABLET: 24; 26 TABLET, FILM COATED ORAL at 09:11

## 2022-11-23 RX ADMIN — SACUBITRIL AND VALSARTAN 1 TABLET: 24; 26 TABLET, FILM COATED ORAL at 08:11

## 2022-11-23 RX ADMIN — APIXABAN 5 MG: 2.5 TABLET, FILM COATED ORAL at 08:11

## 2022-11-23 RX ADMIN — SPIRONOLACTONE 25 MG: 25 TABLET ORAL at 08:11

## 2022-11-23 RX ADMIN — TRAVOPROST 1 DROP: 0.04 SOLUTION/ DROPS OPHTHALMIC at 09:11

## 2022-11-23 RX ADMIN — BENZONATATE 100 MG: 100 CAPSULE ORAL at 12:11

## 2022-11-23 RX ADMIN — PREDNISONE 40 MG: 20 TABLET ORAL at 08:11

## 2022-11-23 RX ADMIN — CARVEDILOL 25 MG: 25 TABLET, FILM COATED ORAL at 08:11

## 2022-11-23 RX ADMIN — ASPIRIN 81 MG: 81 TABLET, COATED ORAL at 08:11

## 2022-11-23 RX ADMIN — CARVEDILOL 25 MG: 25 TABLET, FILM COATED ORAL at 09:11

## 2022-11-23 RX ADMIN — ATORVASTATIN CALCIUM 80 MG: 40 TABLET, FILM COATED ORAL at 08:11

## 2022-11-23 RX ADMIN — BENZONATATE 100 MG: 100 CAPSULE ORAL at 09:11

## 2022-11-23 RX ADMIN — FUROSEMIDE 40 MG: 40 TABLET ORAL at 10:11

## 2022-11-23 RX ADMIN — LEVALBUTEROL HYDROCHLORIDE 0.63 MG: 0.63 SOLUTION RESPIRATORY (INHALATION) at 02:11

## 2022-11-23 RX ADMIN — APIXABAN 5 MG: 2.5 TABLET, FILM COATED ORAL at 09:11

## 2022-11-23 RX ADMIN — CLOPIDOGREL BISULFATE 75 MG: 75 TABLET, FILM COATED ORAL at 08:11

## 2022-11-23 NOTE — CONSULTS
[facility]  Cardiology  Progress Note    Patient Name: Cleveland Capps  MRN: [id number]  Admission Date: 11/16/2022  Hospital Length of Stay: 2 days  Code Status: Full Code   Attending Physician: Fernando Bryson MD   Primary Care Physician: Romero Snyder MD  Expected Discharge Date: 11/27/2022  Principal Problem:Acute on chronic combined systolic and diastolic congestive heart failure    Subjective:     Hospital Course:  Patient is breathing little bit better    Interval History:  Still in atrial fib heart rate of 90    ROS  Objective:     Vital Signs (Most Recent):  Temp: 97.4 °F (36.3 °C) (11/23/22 0752)  Pulse: 98 (11/23/22 0752)  Resp: 17 (11/23/22 0752)  BP: 119/70 (11/23/22 1045)  SpO2: 97 % (11/23/22 0752) Vital Signs (24h Range):  Temp:  [97.3 °F (36.3 °C)-98.1 °F (36.7 °C)] 97.4 °F (36.3 °C)  Pulse:  [] 98  Resp:  [16-18] 17  SpO2:  [95 %-100 %] 97 %  BP: (113-121)/(70-80) 119/70     Weight: 60.6 kg (133 lb 9.6 oz)  Body mass index is 25.24 kg/m².    SpO2: 97 %  O2 Device (Oxygen Therapy): nasal cannula      Intake/Output Summary (Last 24 hours) at 11/23/2022 1105  Last data filed at 11/23/2022 0700  Gross per 24 hour   Intake 720 ml   Output 2700 ml   Net -1980 ml       Lines/Drains/Airways       Peripheral Intravenous Line  Duration                  Peripheral IV - Single Lumen 11/20/22 0037 20 G Left;Proximal;Lateral;Anterior Forearm 3 days                    Physical Exam unchanged few wheezes    Significant Labs: CMP   Recent Labs   Lab 11/22/22  0440 11/23/22  0712   * 137   K 5.1 4.3   CL 98 97   CO2 26 30*   * 136*   BUN 40* 37*   CREATININE 1.1 1.0   CALCIUM 8.6* 8.1*   PROT 6.4 5.8*   ALBUMIN 3.2* 2.9*   BILITOT 1.5* 1.7*   ALKPHOS 91 77   AST 42* 33   * 151*   ANIONGAP 11 10   , CBC   Recent Labs   Lab 11/22/22  0440 11/23/22  0712   WBC 11.34 13.41*   HGB 12.5* 12.7*   HCT 38.9* 38.4*    186   , and Troponin No results for input(s):  TROPONINI in the last 48 hours.    Significant Imaging:   Assessment and Plan:   Continue current Rx  Will set up for cardioversion on Friday  Brief HPI:   Active Diagnoses:    Diagnosis Date Noted POA    PRINCIPAL PROBLEM:  Acute on chronic combined systolic and diastolic congestive heart failure [I50.43] 05/19/2020 Yes    Macrocytic anemia [D53.9] 11/22/2022 Yes    Dyslipidemia [E78.5] 11/19/2022 Yes    Presence of Watchman left atrial appendage closure device [Z95.818] 11/19/2022 Yes    AICD (automatic cardioverter/defibrillator) present [Z95.810] 11/18/2022 Yes    Elevated LFTs [R79.89] 11/18/2022 Yes    COVID-19 [U07.1] 11/17/2022 Yes    Hypothyroid [E03.9] 11/17/2022 Yes    COPD exacerbation [J44.1] 11/17/2022 Yes    Coronary artery disease [I25.10] 04/25/2016 Yes    Benign essential HTN [I10] 04/25/2016 Yes    PAF (paroxysmal atrial fibrillation) [I48.0] 04/25/2016 Yes      Problems Resolved During this Admission:    Diagnosis Date Noted Date Resolved POA    Type 2 diabetes mellitus without complication, without long-term current use of insulin [E11.9] 11/17/2022 11/20/2022 Yes    Hyponatremia [E87.1] 11/17/2022 11/19/2022 Yes    Hyperkalemia [E87.5] 11/17/2022 11/18/2022 Yes       VTE Risk Mitigation (From admission, onward)           Ordered     apixaban tablet 5 mg  2 times daily         11/17/22 2011     IP VTE HIGH RISK PATIENT  Once         11/16/22 1935     Place sequential compression device  Until discontinued         11/16/22 1935     Reason for No Pharmacological VTE Prophylaxis  Once        Question:  Reasons:  Answer:  Already adequately anticoagulated on oral Anticoagulants    11/16/22 1935                    Kuldeep Daniels MD  Cardiology  [facility]

## 2022-11-23 NOTE — ASSESSMENT & PLAN NOTE
-Telemetry  -Entresto  -Coreg  -PO Lasix  -Aldactone  -Monitor I's and O's  -Keep K > 4 and Mg > 2

## 2022-11-23 NOTE — PROGRESS NOTES
[facility] Hospital Medicine  Progress Note    Patient Name: Cleveland Capps  MRN: [id number]  Patient Class: IP- Inpatient   Admission Date: 11/16/2022  Length of Stay: 2 days  Attending Physician: Fernando Bryson MD  Primary Care Provider: Romero Snyder MD        Subjective:     Principal Problem:Acute on chronic combined systolic and diastolic congestive heart failure        HPI:  Pt presented to ED with shortness of breath.  Began a few days prior.  Associated with cough and congestion.   He sought evaluation from his primary care provider and was diagnosed with SARS-CoV-2 infection.  He was put on Paxlovid, of which he took a couple of days' worth.  He didn't improve.  Cough is nonproductive.  No chest pain.  No fevers.  No chills. No orthopnea.  He has CAD, COPD, and is a former smoker.  Upon presenting to the ED, he was hypotensive but responded to crystalloid infusions.  Hospital observation requested for more hydration and treatment of mild COPD exacerbation.      Overview/Hospital Course:  Cleveland aCpps is a 67 year old male with a past medical history of combined CHF s/p AICD, CAD s/p PCI, Afib, HTN, HLD, DM, and hypothyroidism who presented with shortness of breath secondary to an acute COPD exacerbation in the setting of COVID-19 infection as well an acute on chronic CHF exacerbation. He is currently on 1 L NC, yet hast mostly been on room air throughout his course. He is on prednisone, levalbuterol and ipratropium nebulizers. He was also placed on IV Lasix which has since been changed to PO dosing. Remdesivir was not started given elevated LFTs for which a RUQ ultrasound was unremarkable for acute pathology. His liver enzymes improved with diuresis as they were likely elevated secondary to hepatic congestion. He was monitored for an episode of NSVT without any shock of his AICD. He was given a 250 cc NS bolus. He developed Afib with RVR 11/22 for which Cardiology has been  "consulted. Entresto and Aldactone have also been started.      Interval History: see "Hospital Course"    Review of Systems   Constitutional:  Negative for chills, fatigue and fever.   HENT:  Positive for congestion. Negative for sinus pressure.    Eyes:  Negative for pain and visual disturbance.   Respiratory:  Positive for cough. Negative for shortness of breath and wheezing.    Cardiovascular:  Positive for palpitations. Negative for chest pain and leg swelling.   Gastrointestinal:  Negative for abdominal pain, diarrhea, nausea and vomiting.   Genitourinary:  Negative for difficulty urinating, dysuria and hematuria.   Musculoskeletal:  Negative for arthralgias and myalgias.   Skin:  Negative for rash and wound.   Allergic/Immunologic: Positive for immunocompromised state.   Neurological:  Negative for dizziness, weakness, light-headedness and headaches.   Hematological:  Negative for adenopathy. Does not bruise/bleed easily.   Psychiatric/Behavioral:  Negative for confusion and dysphoric mood. The patient is not nervous/anxious.    All other systems reviewed and are negative.  Objective:     Vital Signs (Most Recent):  Temp: 97.4 °F (36.3 °C) (11/23/22 0752)  Pulse: 98 (11/23/22 0752)  Resp: 17 (11/23/22 0752)  BP: 119/70 (11/23/22 0846)  SpO2: 97 % (11/23/22 0752) Vital Signs (24h Range):  Temp:  [97.3 °F (36.3 °C)-98.1 °F (36.7 °C)] 97.4 °F (36.3 °C)  Pulse:  [] 98  Resp:  [16-18] 17  SpO2:  [95 %-100 %] 97 %  BP: (113-137)/(70-84) 119/70     Weight: 60.6 kg (133 lb 9.6 oz)  Body mass index is 25.24 kg/m².    Intake/Output Summary (Last 24 hours) at 11/23/2022 0921  Last data filed at 11/23/2022 0700  Gross per 24 hour   Intake 720 ml   Output 2900 ml   Net -2180 ml      Physical Exam  Vitals and nursing note reviewed.   Constitutional:       General: He is not in acute distress.     Appearance: He is ill-appearing.   HENT:      Head: Normocephalic and atraumatic.      Right Ear: External ear normal.      " Left Ear: External ear normal.      Nose: Nose normal.      Mouth/Throat:      Mouth: Mucous membranes are moist.      Pharynx: Oropharynx is clear. No oropharyngeal exudate.   Eyes:      General: No scleral icterus.        Right eye: No discharge.         Left eye: No discharge.      Extraocular Movements: Extraocular movements intact.      Conjunctiva/sclera: Conjunctivae normal.   Neck:      Thyroid: No thyromegaly.      Vascular: No JVD.   Cardiovascular:      Rate and Rhythm: Normal rate. Rhythm irregular.      Pulses: Normal pulses.      Heart sounds: Normal heart sounds.     No gallop.   Pulmonary:      Effort: Pulmonary effort is normal. No respiratory distress.      Breath sounds: No wheezing or rales.      Comments: NC.  Abdominal:      General: Bowel sounds are normal. There is no distension.      Palpations: Abdomen is soft. There is no mass.      Tenderness: There is no abdominal tenderness.   Musculoskeletal:         General: No deformity. Normal range of motion.      Cervical back: Normal range of motion and neck supple.   Lymphadenopathy:      Cervical: No cervical adenopathy.   Skin:     General: Skin is warm and dry.      Findings: No rash.   Neurological:      General: No focal deficit present.      Mental Status: He is alert and oriented to person, place, and time. Mental status is at baseline.   Psychiatric:         Mood and Affect: Mood normal.         Behavior: Behavior normal.       Significant Labs: All pertinent labs within the past 24 hours have been reviewed.    Significant Imaging: I have reviewed all pertinent imaging results/findings within the past 24 hours.        Assessment/Plan:      * Acute on chronic combined systolic and diastolic congestive heart failure  -Telemetry  -Entresto  -Coreg  -PO Lasix  -Aldactone  -Monitor I's and O's  -Keep K > 4 and Mg > 2    COPD exacerbation  In setting of COVID-19 infection.  -Prednisone  -Held remdesivir given LFT elevation  -Trended  inflammatory markers  -Airborne and isolation precautions  -O2 PRN  -Levalbuterol and ipratropium Q8H      Macrocytic anemia  -Trend Hgb with CBC  -Check B12, folic acid and TSH    Dyslipidemia  -Continue ASA and statin      Presence of Watchman left atrial appendage closure device  Noted.      Elevated LFTs  Unclear etiology. Possible hepatic congestion. Improved with diuresis.  -Trended LFTs with CMP    AICD (automatic cardioverter/defibrillator) present  Noted.  -Telemetry    Hypothyroid  -Continue home Synthroid    COVID-19  -Airborne and isolation precautions  -Decadron 6 transitioned to prednisone for COPD exacerbation  -Eliquis  -Held remdesivir  -Trended inflammatory markers    PAF (paroxysmal atrial fibrillation)  -Eliquis  -Coreg  -Telemetry  -Cardiology consulted; had been scheduled for cardioversion        Benign essential HTN  -Continue Coreg   -Entresto  -Continue to monitor    Coronary artery disease  -Telemetry  -Coreg  -ASA and Plavix  -Statin        VTE Risk Mitigation (From admission, onward)         Ordered     apixaban tablet 5 mg  2 times daily         11/17/22 2011     IP VTE HIGH RISK PATIENT  Once         11/16/22 1935     Place sequential compression device  Until discontinued         11/16/22 1935     Reason for No Pharmacological VTE Prophylaxis  Once        Question:  Reasons:  Answer:  Already adequately anticoagulated on oral Anticoagulants    11/16/22 1935                Discharge Planning   MACKENZIE: 11/27/2022     Code Status: Full Code   Is the patient medically ready for discharge?:     Reason for patient still in hospital (select all that apply): Patient trending condition, Treatment and Consult recommendations  Discharge Plan A: Home                  Fernando Bryson MD  Department of Hospital Medicine   [facility]

## 2022-11-23 NOTE — ASSESSMENT & PLAN NOTE
Monitor potassium    Potassium   Date Value Ref Range Status   11/23/2022 4.3 3.5 - 5.1 mmol/L Final   11/22/2022 5.1 3.5 - 5.1 mmol/L Final

## 2022-11-23 NOTE — ASSESSMENT & PLAN NOTE
In setting of COVID-19 infection.  -Prednisone  -Held remdesivir given LFT elevation  -Trended inflammatory markers  -Airborne and isolation precautions  -O2 PRN  -Levalbuterol and ipratropium Q8H

## 2022-11-23 NOTE — PLAN OF CARE
Entresto is in stock at 's pharmacy ([facility]) & is $9.85       11/23/22 1135   Post-Acute Status   Post-Acute Authorization Medications

## 2022-11-23 NOTE — SUBJECTIVE & OBJECTIVE
"Interval History: see "Hospital Course"    Review of Systems   Constitutional:  Negative for chills, fatigue and fever.   HENT:  Positive for congestion. Negative for sinus pressure.    Eyes:  Negative for pain and visual disturbance.   Respiratory:  Positive for cough. Negative for shortness of breath and wheezing.    Cardiovascular:  Positive for palpitations. Negative for chest pain and leg swelling.   Gastrointestinal:  Negative for abdominal pain, diarrhea, nausea and vomiting.   Genitourinary:  Negative for difficulty urinating, dysuria and hematuria.   Musculoskeletal:  Negative for arthralgias and myalgias.   Skin:  Negative for rash and wound.   Allergic/Immunologic: Positive for immunocompromised state.   Neurological:  Negative for dizziness, weakness, light-headedness and headaches.   Hematological:  Negative for adenopathy. Does not bruise/bleed easily.   Psychiatric/Behavioral:  Negative for confusion and dysphoric mood. The patient is not nervous/anxious.    All other systems reviewed and are negative.  Objective:     Vital Signs (Most Recent):  Temp: 97.4 °F (36.3 °C) (11/23/22 0752)  Pulse: 98 (11/23/22 0752)  Resp: 17 (11/23/22 0752)  BP: 119/70 (11/23/22 0846)  SpO2: 97 % (11/23/22 0752) Vital Signs (24h Range):  Temp:  [97.3 °F (36.3 °C)-98.1 °F (36.7 °C)] 97.4 °F (36.3 °C)  Pulse:  [] 98  Resp:  [16-18] 17  SpO2:  [95 %-100 %] 97 %  BP: (113-137)/(70-84) 119/70     Weight: 60.6 kg (133 lb 9.6 oz)  Body mass index is 25.24 kg/m².    Intake/Output Summary (Last 24 hours) at 11/23/2022 0921  Last data filed at 11/23/2022 0700  Gross per 24 hour   Intake 720 ml   Output 2900 ml   Net -2180 ml      Physical Exam  Vitals and nursing note reviewed.   Constitutional:       General: He is not in acute distress.     Appearance: He is ill-appearing.   HENT:      Head: Normocephalic and atraumatic.      Right Ear: External ear normal.      Left Ear: External ear normal.      Nose: Nose normal.      " Mouth/Throat:      Mouth: Mucous membranes are moist.      Pharynx: Oropharynx is clear. No oropharyngeal exudate.   Eyes:      General: No scleral icterus.        Right eye: No discharge.         Left eye: No discharge.      Extraocular Movements: Extraocular movements intact.      Conjunctiva/sclera: Conjunctivae normal.   Neck:      Thyroid: No thyromegaly.      Vascular: No JVD.   Cardiovascular:      Rate and Rhythm: Normal rate. Rhythm irregular.      Pulses: Normal pulses.      Heart sounds: Normal heart sounds.     No gallop.   Pulmonary:      Effort: Pulmonary effort is normal. No respiratory distress.      Breath sounds: No wheezing or rales.      Comments: NC.  Abdominal:      General: Bowel sounds are normal. There is no distension.      Palpations: Abdomen is soft. There is no mass.      Tenderness: There is no abdominal tenderness.   Musculoskeletal:         General: No deformity. Normal range of motion.      Cervical back: Normal range of motion and neck supple.   Lymphadenopathy:      Cervical: No cervical adenopathy.   Skin:     General: Skin is warm and dry.      Findings: No rash.   Neurological:      General: No focal deficit present.      Mental Status: He is alert and oriented to person, place, and time. Mental status is at baseline.   Psychiatric:         Mood and Affect: Mood normal.         Behavior: Behavior normal.       Significant Labs: All pertinent labs within the past 24 hours have been reviewed.    Significant Imaging: I have reviewed all pertinent imaging results/findings within the past 24 hours.

## 2022-11-23 NOTE — PLAN OF CARE
11/22/22 2015   Patient Assessment/Suction   Level of Consciousness (AVPU) alert   Respiratory Effort Normal;Unlabored   Expansion/Accessory Muscles/Retractions no retractions;no use of accessory muscles   Rhythm/Pattern, Respiratory depth regular;pattern regular;unlabored   Cough Frequency infrequent   Cough Type nonproductive   PRE-TX-O2   O2 Device (Oxygen Therapy) nasal cannula   $ Is the patient on Low Flow Oxygen? Yes   Flow (L/min) 1   SpO2 97 %   Pulse Oximetry Type Continuous   $ Pulse Oximetry - Multiple Charge Pulse Oximetry - Multiple   Pulse 88   Resp 18

## 2022-11-23 NOTE — PT/OT/SLP PROGRESS
Physical Therapy      Patient Name:  Cleveland Capps   MRN:  [id number]    Patient not seen today secondary to Patient unwilling to participate, Patient fatigue. Will follow-up 11/24/22.

## 2022-11-23 NOTE — PLAN OF CARE
Problem: Adult Inpatient Plan of Care  Goal: Plan of Care Review  Outcome: Ongoing, Progressing  Goal: Absence of Hospital-Acquired Illness or Injury  Outcome: Ongoing, Progressing  Goal: Optimal Comfort and Wellbeing  Outcome: Ongoing, Progressing     Problem: Fluid Imbalance (Pneumonia)  Goal: Fluid Balance  Outcome: Ongoing, Progressing     Problem: Infection (Pneumonia)  Goal: Resolution of Infection Signs and Symptoms  Outcome: Ongoing, Progressing     Problem: Respiratory Compromise (Pneumonia)  Goal: Effective Oxygenation and Ventilation  Outcome: Met     Problem: Fall Injury Risk  Goal: Absence of Fall and Fall-Related Injury  Outcome: Met     Problem: Diabetes Comorbidity  Goal: Blood Glucose Level Within Targeted Range  Outcome: Met

## 2022-11-24 LAB
ALBUMIN SERPL BCP-MCNC: 2.8 G/DL (ref 3.5–5.2)
ALP SERPL-CCNC: 76 U/L (ref 55–135)
ALT SERPL W/O P-5'-P-CCNC: 126 U/L (ref 10–44)
ANION GAP SERPL CALC-SCNC: 8 MMOL/L (ref 8–16)
AST SERPL-CCNC: 32 U/L (ref 10–40)
BASOPHILS # BLD AUTO: 0.01 K/UL (ref 0–0.2)
BASOPHILS NFR BLD: 0.1 % (ref 0–1.9)
BILIRUB SERPL-MCNC: 1.7 MG/DL (ref 0.1–1)
BNP SERPL-MCNC: 343 PG/ML (ref 0–99)
BUN SERPL-MCNC: 29 MG/DL (ref 8–23)
CALCIUM SERPL-MCNC: 7.9 MG/DL (ref 8.7–10.5)
CHLORIDE SERPL-SCNC: 100 MMOL/L (ref 95–110)
CO2 SERPL-SCNC: 29 MMOL/L (ref 23–29)
CREAT SERPL-MCNC: 0.8 MG/DL (ref 0.5–1.4)
DIFFERENTIAL METHOD: ABNORMAL
EOSINOPHIL # BLD AUTO: 0 K/UL (ref 0–0.5)
EOSINOPHIL NFR BLD: 0 % (ref 0–8)
ERYTHROCYTE [DISTWIDTH] IN BLOOD BY AUTOMATED COUNT: 18 % (ref 11.5–14.5)
EST. GFR  (NO RACE VARIABLE): >60 ML/MIN/1.73 M^2
FOLATE SERPL-MCNC: 5.6 NG/ML (ref 4–24)
GLUCOSE SERPL-MCNC: 137 MG/DL (ref 70–110)
HCT VFR BLD AUTO: 38.3 % (ref 40–54)
HGB BLD-MCNC: 12.6 G/DL (ref 14–18)
IMM GRANULOCYTES # BLD AUTO: 0.06 K/UL (ref 0–0.04)
IMM GRANULOCYTES NFR BLD AUTO: 0.4 % (ref 0–0.5)
LYMPHOCYTES # BLD AUTO: 0.7 K/UL (ref 1–4.8)
LYMPHOCYTES NFR BLD: 5.3 % (ref 18–48)
MAGNESIUM SERPL-MCNC: 2.3 MG/DL (ref 1.6–2.6)
MCH RBC QN AUTO: 33.4 PG (ref 27–31)
MCHC RBC AUTO-ENTMCNC: 32.9 G/DL (ref 32–36)
MCV RBC AUTO: 102 FL (ref 82–98)
MONOCYTES # BLD AUTO: 0.7 K/UL (ref 0.3–1)
MONOCYTES NFR BLD: 4.9 % (ref 4–15)
NEUTROPHILS # BLD AUTO: 12.2 K/UL (ref 1.8–7.7)
NEUTROPHILS NFR BLD: 89.3 % (ref 38–73)
NRBC BLD-RTO: 0 /100 WBC
PHOSPHATE SERPL-MCNC: 2.9 MG/DL (ref 2.7–4.5)
PLATELET # BLD AUTO: 179 K/UL (ref 150–450)
PMV BLD AUTO: 9.6 FL (ref 9.2–12.9)
POCT GLUCOSE: 140 MG/DL (ref 70–110)
POCT GLUCOSE: 140 MG/DL (ref 70–110)
POCT GLUCOSE: 147 MG/DL (ref 70–110)
POCT GLUCOSE: 167 MG/DL (ref 70–110)
POTASSIUM SERPL-SCNC: 4.2 MMOL/L (ref 3.5–5.1)
PROT SERPL-MCNC: 5.6 G/DL (ref 6–8.4)
RBC # BLD AUTO: 3.77 M/UL (ref 4.6–6.2)
SODIUM SERPL-SCNC: 137 MMOL/L (ref 136–145)
TSH SERPL DL<=0.005 MIU/L-ACNC: 3.44 UIU/ML (ref 0.4–4)
VIT B12 SERPL-MCNC: 1196 PG/ML (ref 210–950)
WBC # BLD AUTO: 13.6 K/UL (ref 3.9–12.7)

## 2022-11-24 PROCEDURE — 97110 THERAPEUTIC EXERCISES: CPT | Mod: CQ

## 2022-11-24 PROCEDURE — 63600175 PHARM REV CODE 636 W HCPCS: Performed by: STUDENT IN AN ORGANIZED HEALTH CARE EDUCATION/TRAINING PROGRAM

## 2022-11-24 PROCEDURE — 99233 SBSQ HOSP IP/OBS HIGH 50: CPT | Mod: ,,, | Performed by: SPECIALIST

## 2022-11-24 PROCEDURE — 25000003 PHARM REV CODE 250: Performed by: SPECIALIST

## 2022-11-24 PROCEDURE — 25000003 PHARM REV CODE 250: Performed by: HOSPITALIST

## 2022-11-24 PROCEDURE — 36415 COLL VENOUS BLD VENIPUNCTURE: CPT | Performed by: STUDENT IN AN ORGANIZED HEALTH CARE EDUCATION/TRAINING PROGRAM

## 2022-11-24 PROCEDURE — 82607 VITAMIN B-12: CPT | Performed by: STUDENT IN AN ORGANIZED HEALTH CARE EDUCATION/TRAINING PROGRAM

## 2022-11-24 PROCEDURE — 83735 ASSAY OF MAGNESIUM: CPT | Performed by: STUDENT IN AN ORGANIZED HEALTH CARE EDUCATION/TRAINING PROGRAM

## 2022-11-24 PROCEDURE — 83880 ASSAY OF NATRIURETIC PEPTIDE: CPT | Performed by: SPECIALIST

## 2022-11-24 PROCEDURE — 25000003 PHARM REV CODE 250: Performed by: STUDENT IN AN ORGANIZED HEALTH CARE EDUCATION/TRAINING PROGRAM

## 2022-11-24 PROCEDURE — 82746 ASSAY OF FOLIC ACID SERUM: CPT | Performed by: STUDENT IN AN ORGANIZED HEALTH CARE EDUCATION/TRAINING PROGRAM

## 2022-11-24 PROCEDURE — 11000001 HC ACUTE MED/SURG PRIVATE ROOM

## 2022-11-24 PROCEDURE — 63600175 PHARM REV CODE 636 W HCPCS: Performed by: SPECIALIST

## 2022-11-24 PROCEDURE — 94761 N-INVAS EAR/PLS OXIMETRY MLT: CPT

## 2022-11-24 PROCEDURE — 97530 THERAPEUTIC ACTIVITIES: CPT | Mod: CQ

## 2022-11-24 PROCEDURE — 25000242 PHARM REV CODE 250 ALT 637 W/ HCPCS: Performed by: STUDENT IN AN ORGANIZED HEALTH CARE EDUCATION/TRAINING PROGRAM

## 2022-11-24 PROCEDURE — 27000207 HC ISOLATION

## 2022-11-24 PROCEDURE — 25000003 PHARM REV CODE 250: Performed by: NURSE PRACTITIONER

## 2022-11-24 PROCEDURE — 84443 ASSAY THYROID STIM HORMONE: CPT | Performed by: STUDENT IN AN ORGANIZED HEALTH CARE EDUCATION/TRAINING PROGRAM

## 2022-11-24 PROCEDURE — 36415 COLL VENOUS BLD VENIPUNCTURE: CPT | Performed by: SPECIALIST

## 2022-11-24 PROCEDURE — 99233 PR SUBSEQUENT HOSPITAL CARE,LEVL III: ICD-10-PCS | Mod: ,,, | Performed by: SPECIALIST

## 2022-11-24 PROCEDURE — 84100 ASSAY OF PHOSPHORUS: CPT | Performed by: STUDENT IN AN ORGANIZED HEALTH CARE EDUCATION/TRAINING PROGRAM

## 2022-11-24 PROCEDURE — 94640 AIRWAY INHALATION TREATMENT: CPT

## 2022-11-24 PROCEDURE — 80053 COMPREHEN METABOLIC PANEL: CPT | Performed by: STUDENT IN AN ORGANIZED HEALTH CARE EDUCATION/TRAINING PROGRAM

## 2022-11-24 PROCEDURE — 85025 COMPLETE CBC W/AUTO DIFF WBC: CPT | Performed by: STUDENT IN AN ORGANIZED HEALTH CARE EDUCATION/TRAINING PROGRAM

## 2022-11-24 RX ORDER — DIGOXIN 0.25 MG/ML
375 INJECTION INTRAMUSCULAR; INTRAVENOUS ONCE
Status: COMPLETED | OUTPATIENT
Start: 2022-11-24 | End: 2022-11-24

## 2022-11-24 RX ORDER — SODIUM CHLORIDE 450 MG/100ML
INJECTION, SOLUTION INTRAVENOUS CONTINUOUS
Status: DISCONTINUED | OUTPATIENT
Start: 2022-11-25 | End: 2022-11-27

## 2022-11-24 RX ORDER — IPRATROPIUM BROMIDE 0.5 MG/2.5ML
0.5 SOLUTION RESPIRATORY (INHALATION) EVERY 6 HOURS PRN
Status: DISCONTINUED | OUTPATIENT
Start: 2022-11-24 | End: 2022-11-28 | Stop reason: HOSPADM

## 2022-11-24 RX ORDER — CARVEDILOL 6.25 MG/1
6.25 TABLET ORAL 2 TIMES DAILY
Status: DISCONTINUED | OUTPATIENT
Start: 2022-11-24 | End: 2022-11-28 | Stop reason: HOSPADM

## 2022-11-24 RX ORDER — LEVALBUTEROL INHALATION SOLUTION 0.63 MG/3ML
0.63 SOLUTION RESPIRATORY (INHALATION) EVERY 6 HOURS PRN
Status: DISCONTINUED | OUTPATIENT
Start: 2022-11-24 | End: 2022-11-28 | Stop reason: HOSPADM

## 2022-11-24 RX ADMIN — DIGOXIN 375 MCG: 0.25 INJECTION INTRAMUSCULAR; INTRAVENOUS at 12:11

## 2022-11-24 RX ADMIN — TRAVOPROST 1 DROP: 0.04 SOLUTION/ DROPS OPHTHALMIC at 08:11

## 2022-11-24 RX ADMIN — BENZONATATE 100 MG: 100 CAPSULE ORAL at 08:11

## 2022-11-24 RX ADMIN — APIXABAN 5 MG: 2.5 TABLET, FILM COATED ORAL at 08:11

## 2022-11-24 RX ADMIN — IPRATROPIUM BROMIDE 0.5 MG: 0.5 SOLUTION RESPIRATORY (INHALATION) at 08:11

## 2022-11-24 RX ADMIN — LEVALBUTEROL HYDROCHLORIDE 0.63 MG: 0.63 SOLUTION RESPIRATORY (INHALATION) at 08:11

## 2022-11-24 RX ADMIN — IPRATROPIUM BROMIDE 0.5 MG: 0.5 SOLUTION RESPIRATORY (INHALATION) at 12:11

## 2022-11-24 RX ADMIN — FUROSEMIDE 40 MG: 40 TABLET ORAL at 08:11

## 2022-11-24 RX ADMIN — ATORVASTATIN CALCIUM 80 MG: 40 TABLET, FILM COATED ORAL at 08:11

## 2022-11-24 RX ADMIN — PREDNISONE 40 MG: 20 TABLET ORAL at 08:11

## 2022-11-24 RX ADMIN — LEVALBUTEROL HYDROCHLORIDE 0.63 MG: 0.63 SOLUTION RESPIRATORY (INHALATION) at 12:11

## 2022-11-24 RX ADMIN — LEVALBUTEROL HYDROCHLORIDE 0.63 MG: 0.63 SOLUTION RESPIRATORY (INHALATION) at 07:11

## 2022-11-24 RX ADMIN — IPRATROPIUM BROMIDE 0.5 MG: 0.5 SOLUTION RESPIRATORY (INHALATION) at 07:11

## 2022-11-24 RX ADMIN — APIXABAN 5 MG: 2.5 TABLET, FILM COATED ORAL at 09:11

## 2022-11-24 RX ADMIN — CARVEDILOL 6.25 MG: 6.25 TABLET, FILM COATED ORAL at 08:11

## 2022-11-24 RX ADMIN — CLOPIDOGREL BISULFATE 75 MG: 75 TABLET, FILM COATED ORAL at 08:11

## 2022-11-24 RX ADMIN — ASPIRIN 81 MG: 81 TABLET, COATED ORAL at 08:11

## 2022-11-24 NOTE — PLAN OF CARE
11/24/22 0831   Patient Assessment/Suction   Respiratory Effort Unlabored   Expansion/Accessory Muscles/Retractions no use of accessory muscles   All Lung Fields Breath Sounds Anterior:;Lateral:;diminished   Rhythm/Pattern, Respiratory unlabored   Cough Frequency infrequent   PRE-TX-O2   O2 Device (Oxygen Therapy) room air   SpO2 (!) 93 %   Pulse Oximetry Type Intermittent   $ Pulse Oximetry - Multiple Charge Pulse Oximetry - Multiple   Pulse 102   Resp 18   Aerosol Therapy   $ Aerosol Therapy Charges Aerosol Treatment   Respiratory Treatment Status (SVN) given   Treatment Route (SVN) mask   Patient Position (SVN) HOB elevated   Post Treatment Assessment (SVN) breath sounds unchanged   Signs of Intolerance (SVN) none   Breath Sounds Post-Respiratory Treatment   Post-treatment Heart Rate (beats/min) 82   Post-treatment Resp Rate (breaths/min) 18

## 2022-11-24 NOTE — PLAN OF CARE
Problem: Physical Therapy  Goal: Physical Therapy Goal  Description: Goals to be met by: 22     Patient will increase functional independence with mobility by performin. Supine to sit with [place]  2. Sit to supine with [place]  3. Sit to stand transfer with [place]  4. Bed to chair transfer with [place] using No Assistive Device  5. Gait  x 150 feet with [place] using No Assistive Device.     Outcome: Ongoing, Progressing   Therapeutic activity : bed mobility, transfers, gait with assistance, LE exercises.

## 2022-11-24 NOTE — ASSESSMENT & PLAN NOTE
-Airborne and isolation precautions  -Decadron 6 transitioned to prednisone for COPD exacerbation; since discontinued  -Eliquis  -Held remdesivir  -Trended inflammatory markers

## 2022-11-24 NOTE — PLAN OF CARE
Plan of care reviewed with patient,verbalized understanding. Medicated for cough during night. Afib on telemetry. HS bg 157. Isolation maintained as per orders.    Remains free from falls, injury. Instructed to call for assistance as needed ,verbalized understanding. Bed in low & locked position. Call light in reach, bed alarm on .

## 2022-11-24 NOTE — SUBJECTIVE & OBJECTIVE
"Interval History: see "Hospital Course"    Review of Systems   Constitutional:  Negative for chills, fatigue and fever.   HENT:  Positive for congestion. Negative for sinus pressure.    Eyes:  Negative for pain and visual disturbance.   Respiratory:  Positive for cough. Negative for shortness of breath and wheezing.    Cardiovascular:  Positive for palpitations. Negative for chest pain and leg swelling.   Gastrointestinal:  Negative for abdominal pain, diarrhea, nausea and vomiting.   Genitourinary:  Negative for difficulty urinating, dysuria and hematuria.   Musculoskeletal:  Negative for arthralgias and myalgias.   Skin:  Negative for rash and wound.   Allergic/Immunologic: Positive for immunocompromised state.   Neurological:  Negative for dizziness, weakness, light-headedness and headaches.   Hematological:  Negative for adenopathy. Does not bruise/bleed easily.   Psychiatric/Behavioral:  Negative for confusion and dysphoric mood. The patient is not nervous/anxious.    All other systems reviewed and are negative.  Objective:     Vital Signs (Most Recent):  Temp: 97.5 °F (36.4 °C) (11/24/22 0740)  Pulse: 102 (11/24/22 0831)  Resp: 18 (11/24/22 0831)  BP: 97/61 (11/24/22 0740)  SpO2: (!) 93 % (11/24/22 0831)   Vital Signs (24h Range):  Temp:  [97.1 °F (36.2 °C)-97.7 °F (36.5 °C)] 97.5 °F (36.4 °C)  Pulse:  [] 102  Resp:  [16-18] 18  SpO2:  [92 %-98 %] 93 %  BP: ()/(60-76) 97/61     Weight: 60.6 kg (133 lb 9.6 oz)  Body mass index is 25.24 kg/m².    Intake/Output Summary (Last 24 hours) at 11/24/2022 1052  Last data filed at 11/24/2022 0629  Gross per 24 hour   Intake 720 ml   Output 975 ml   Net -255 ml      Physical Exam  Vitals and nursing note reviewed.   Constitutional:       General: He is not in acute distress.     Appearance: He is ill-appearing.   HENT:      Head: Normocephalic and atraumatic.      Right Ear: External ear normal.      Left Ear: External ear normal.      Nose: Nose normal.      " Mouth/Throat:      Mouth: Mucous membranes are moist.      Pharynx: Oropharynx is clear. No oropharyngeal exudate.   Eyes:      General: No scleral icterus.        Right eye: No discharge.         Left eye: No discharge.      Extraocular Movements: Extraocular movements intact.      Conjunctiva/sclera: Conjunctivae normal.   Neck:      Thyroid: No thyromegaly.      Vascular: No JVD.   Cardiovascular:      Rate and Rhythm: Normal rate. Rhythm irregular.      Pulses: Normal pulses.      Heart sounds: Normal heart sounds.     No gallop.   Pulmonary:      Effort: Pulmonary effort is normal. No respiratory distress.      Breath sounds: No wheezing or rales.      Comments: RA.  Abdominal:      General: Bowel sounds are normal. There is no distension.      Palpations: Abdomen is soft. There is no mass.      Tenderness: There is no abdominal tenderness.   Musculoskeletal:         General: No deformity. Normal range of motion.      Cervical back: Normal range of motion and neck supple.   Lymphadenopathy:      Cervical: No cervical adenopathy.   Skin:     General: Skin is warm and dry.      Findings: No rash.   Neurological:      General: No focal deficit present.      Mental Status: He is alert and oriented to person, place, and time. Mental status is at baseline.   Psychiatric:         Mood and Affect: Mood normal.         Behavior: Behavior normal.       Significant Labs: All pertinent labs within the past 24 hours have been reviewed.    Significant Imaging: I have reviewed all pertinent imaging results/findings within the past 24 hours.

## 2022-11-24 NOTE — PROGRESS NOTES
[facility] Hospital Medicine  Progress Note    Patient Name: Cleveland Capps  MRN: [id number]  Patient Class: IP- Inpatient   Admission Date: 11/16/2022  Length of Stay: 3 days  Attending Physician: Fernando Bryson MD  Primary Care Provider: Romero Snyder MD        Subjective:     Principal Problem:Acute on chronic combined systolic and diastolic congestive heart failure        HPI:  Pt presented to ED with shortness of breath.  Began a few days prior.  Associated with cough and congestion.   He sought evaluation from his primary care provider and was diagnosed with SARS-CoV-2 infection.  He was put on Paxlovid, of which he took a couple of days' worth.  He didn't improve.  Cough is nonproductive.  No chest pain.  No fevers.  No chills. No orthopnea.  He has CAD, COPD, and is a former smoker.  Upon presenting to the ED, he was hypotensive but responded to crystalloid infusions.  Hospital observation requested for more hydration and treatment of mild COPD exacerbation.      Overview/Hospital Course:  Cleveland Capps is a 67 year old male with a past medical history of combined CHF s/p AICD, CAD s/p PCI, Afib, HTN, HLD, DM, and hypothyroidism who presented with shortness of breath secondary to an acute COPD exacerbation in the setting of COVID-19 infection as well an acute on chronic CHF exacerbation. He is currently on room air. He completed a course of prednisone; levalbuterol and ipratropium nebulizers are ordered PRN. He was also placed on IV Lasix which has since been changed to PO dosing. Remdesivir was not started given elevated LFTs for which a RUQ ultrasound was unremarkable for acute pathology. His liver enzymes improved with diuresis as they were likely elevated secondary to hepatic congestion. He was monitored for an episode of NSVT without any shock of his AICD. He was given a 250 cc NS bolus. He developed Afib with RVR 11/22 for which Cardiology has been consulted. Entresto and  "Aldactone have also been started. Cardiology is planning for cardioversion 11/24/2022.      Interval History: see "Hospital Course"    Review of Systems   Constitutional:  Negative for chills, fatigue and fever.   HENT:  Positive for congestion. Negative for sinus pressure.    Eyes:  Negative for pain and visual disturbance.   Respiratory:  Positive for cough. Negative for shortness of breath and wheezing.    Cardiovascular:  Positive for palpitations. Negative for chest pain and leg swelling.   Gastrointestinal:  Negative for abdominal pain, diarrhea, nausea and vomiting.   Genitourinary:  Negative for difficulty urinating, dysuria and hematuria.   Musculoskeletal:  Negative for arthralgias and myalgias.   Skin:  Negative for rash and wound.   Allergic/Immunologic: Positive for immunocompromised state.   Neurological:  Negative for dizziness, weakness, light-headedness and headaches.   Hematological:  Negative for adenopathy. Does not bruise/bleed easily.   Psychiatric/Behavioral:  Negative for confusion and dysphoric mood. The patient is not nervous/anxious.    All other systems reviewed and are negative.  Objective:     Vital Signs (Most Recent):  Temp: 97.5 °F (36.4 °C) (11/24/22 0740)  Pulse: 102 (11/24/22 0831)  Resp: 18 (11/24/22 0831)  BP: 97/61 (11/24/22 0740)  SpO2: (!) 93 % (11/24/22 0831)   Vital Signs (24h Range):  Temp:  [97.1 °F (36.2 °C)-97.7 °F (36.5 °C)] 97.5 °F (36.4 °C)  Pulse:  [] 102  Resp:  [16-18] 18  SpO2:  [92 %-98 %] 93 %  BP: ()/(60-76) 97/61     Weight: 60.6 kg (133 lb 9.6 oz)  Body mass index is 25.24 kg/m².    Intake/Output Summary (Last 24 hours) at 11/24/2022 1052  Last data filed at 11/24/2022 0629  Gross per 24 hour   Intake 720 ml   Output 975 ml   Net -255 ml      Physical Exam  Vitals and nursing note reviewed.   Constitutional:       General: He is not in acute distress.     Appearance: He is ill-appearing.   HENT:      Head: Normocephalic and atraumatic.      Right " Ear: External ear normal.      Left Ear: External ear normal.      Nose: Nose normal.      Mouth/Throat:      Mouth: Mucous membranes are moist.      Pharynx: Oropharynx is clear. No oropharyngeal exudate.   Eyes:      General: No scleral icterus.        Right eye: No discharge.         Left eye: No discharge.      Extraocular Movements: Extraocular movements intact.      Conjunctiva/sclera: Conjunctivae normal.   Neck:      Thyroid: No thyromegaly.      Vascular: No JVD.   Cardiovascular:      Rate and Rhythm: Normal rate. Rhythm irregular.      Pulses: Normal pulses.      Heart sounds: Normal heart sounds.     No gallop.   Pulmonary:      Effort: Pulmonary effort is normal. No respiratory distress.      Breath sounds: No wheezing or rales.      Comments: RA.  Abdominal:      General: Bowel sounds are normal. There is no distension.      Palpations: Abdomen is soft. There is no mass.      Tenderness: There is no abdominal tenderness.   Musculoskeletal:         General: No deformity. Normal range of motion.      Cervical back: Normal range of motion and neck supple.   Lymphadenopathy:      Cervical: No cervical adenopathy.   Skin:     General: Skin is warm and dry.      Findings: No rash.   Neurological:      General: No focal deficit present.      Mental Status: He is alert and oriented to person, place, and time. Mental status is at baseline.   Psychiatric:         Mood and Affect: Mood normal.         Behavior: Behavior normal.       Significant Labs: All pertinent labs within the past 24 hours have been reviewed.    Significant Imaging: I have reviewed all pertinent imaging results/findings within the past 24 hours.      Assessment/Plan:      * Acute on chronic combined systolic and diastolic congestive heart failure  -Telemetry  -Entresto  -Coreg  -PO Lasix  -Aldactone  -Monitor I's and O's  -Keep K > 4 and Mg > 2    COPD exacerbation  In setting of COVID-19 infection.  -S/p prednisone  -Held remdesivir given  LFT elevation  -Trended inflammatory markers  -Airborne and isolation precautions  -O2 PRN  -Levalbuterol and ipratropium Q6H PRN      Macrocytic anemia  B12, folic acid and TSH within normal limits.  -Trend Hgb with CBC      Dyslipidemia  -Continue ASA and statin      Presence of Watchman left atrial appendage closure device  Noted.      Elevated LFTs  Unclear etiology. Possible hepatic congestion. Improved with diuresis.  -Trended LFTs with CMP    AICD (automatic cardioverter/defibrillator) present  Noted.  -Telemetry    Hypothyroid  -Continue home Synthroid    COVID-19  -Airborne and isolation precautions  -Decadron 6 transitioned to prednisone for COPD exacerbation; since discontinued  -Eliquis  -Held remdesivir  -Trended inflammatory markers    PAF (paroxysmal atrial fibrillation)  -Eliquis  -Coreg  -Telemetry  -Cardiology consulted; scheduled for cardioversion        Benign essential HTN  -Continue Coreg   -Entresto  -Continue to monitor    Coronary artery disease  -Telemetry  -Coreg  -ASA and Plavix  -Statin        VTE Risk Mitigation (From admission, onward)         Ordered     apixaban tablet 5 mg  2 times daily         11/17/22 2011     IP VTE HIGH RISK PATIENT  Once         11/16/22 1935     Place sequential compression device  Until discontinued         11/16/22 1935     Reason for No Pharmacological VTE Prophylaxis  Once        Question:  Reasons:  Answer:  Already adequately anticoagulated on oral Anticoagulants    11/16/22 1935                Discharge Planning   MACKENZIE: 11/27/2022     Code Status: Full Code   Is the patient medically ready for discharge?:     Reason for patient still in hospital (select all that apply): Patient trending condition, Treatment and Consult recommendations  Discharge Plan A: Home                  Fernando Bryson MD  Department of Hospital Medicine   [facility]

## 2022-11-24 NOTE — ASSESSMENT & PLAN NOTE
Monitor potassium    Potassium   Date Value Ref Range Status   11/24/2022 4.2 3.5 - 5.1 mmol/L Final   11/23/2022 4.3 3.5 - 5.1 mmol/L Final

## 2022-11-24 NOTE — RESPIRATORY THERAPY
11/23/22 2034   Patient Assessment/Suction   Level of Consciousness (AVPU) alert   Respiratory Effort Unlabored   Expansion/Accessory Muscles/Retractions no use of accessory muscles   All Lung Fields Breath Sounds diminished   PRE-TX-O2   O2 Device (Oxygen Therapy) room air   SpO2 96 %   Pulse Oximetry Type Intermittent   $ Pulse Oximetry - Multiple Charge Pulse Oximetry - Multiple   Pulse 87   Resp 16   Aerosol Therapy   $ Aerosol Therapy Charges PRN treatment not required   Respiratory Treatment Status (SVN) PRN treatment not required

## 2022-11-24 NOTE — ASSESSMENT & PLAN NOTE
In setting of COVID-19 infection.  -S/p prednisone  -Held remdesivir given LFT elevation  -Trended inflammatory markers  -Airborne and isolation precautions  -O2 PRN  -Levalbuterol and ipratropium Q6H PRN

## 2022-11-24 NOTE — PT/OT/SLP PROGRESS
Physical Therapy Treatment    Patient Name:  Cleveland Capps   MRN:  [id number]    Recommendations:     Discharge Recommendations:  home   Discharge Equipment Recommendations: none   Barriers to discharge: None    Assessment:     Cleveland Capps is a 67 y.o. 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  He presents with the following impairments/functional limitations:  weakness, impaired endurance, impaired functional mobility . Awake, alert, pleasant, cooperative. Reports feeling weak but no pain with occasional cough.  Agreed to participate in therapy but declined to sit up in chair . Concerned about IV site, nurse arrived to assess. Performed LE exercises in supine. Sat EOB briefly, ambulated in room with SBA. Returned to bed.     Rehab Prognosis: Good; patient would benefit from acute skilled PT services to address these deficits and reach maximum level of function.    Recent Surgery: * No surgery found *      Plan:     During this hospitalization, patient to be seen 5 x/week to address the identified rehab impairments via gait training, therapeutic activities, therapeutic exercises and progress toward the following goals:    Plan of Care Expires:  12/16/22    Subjective     Chief Complaint: feeling weak and cold.   Patient/Family Comments/goals: none stated  Pain/Comfort:  Pain Rating 1: 0/10      Objective:     Communicated with nurse Rizzo prior to session.  Patient found supine with bed alarm, telemetry, pulse ox (continuous) upon PT entry to room.     General Precautions: Standard, airborne, contact, droplet, fall   Orthopedic Precautions:N/A   Braces: N/A  Respiratory Status: Room air     Functional Mobility:  Bed Mobility:     Supine to Sit: stand by assistance  Sit to Supine: stand by assistance  Transfers:     Sit to Stand:  contact guard assistance with no AD  Gait: 30'' with SBA, no assistive device .       AM-PAC 6 CLICK  MOBILITY          Treatment & Education:  BLE exercises: SLR's, HS, hip abd/add, AP's.  Sup > sit with SBA. Sat ~ 7min. Sit > stand with CGA.  Ambulated in room with SBA.  Sit > supine with SBA.    Patient left supine with all lines intact, call button in reach, bed alarm on, and nurse Matthias notified..    GOALS:   Multidisciplinary Problems       Physical Therapy Goals          Problem: Physical Therapy    Goal Priority Disciplines Outcome Goal Variances Interventions   Physical Therapy Goal     PT, PT/OT Ongoing, Progressing     Description: Goals to be met by: 22     Patient will increase functional independence with mobility by performin. Supine to sit with [place]  2. Sit to supine with [place]  3. Sit to stand transfer with [place]  4. Bed to chair transfer with [place] using No Assistive Device  5. Gait  x 150 feet with [place] using No Assistive Device.                          Time Tracking:     PT Received On: 22  PT Start Time: 1123     PT Stop Time: 1146  PT Total Time (min): 23 min     Billable Minutes: Therapeutic Activity 13min and Therapeutic Exercise 10min    Treatment Type: Treatment  PT/PTA: PTA     PTA Visit Number: 2     2022

## 2022-11-24 NOTE — PLAN OF CARE
Problem: Adult Inpatient Plan of Care  Goal: Plan of Care Review  Outcome: Ongoing, Progressing  Goal: Absence of Hospital-Acquired Illness or Injury  Outcome: Ongoing, Progressing  Goal: Optimal Comfort and Wellbeing  Outcome: Ongoing, Progressing     Problem: Fluid Imbalance (Pneumonia)  Goal: Fluid Balance  Outcome: Ongoing, Progressing     Problem: Infection (Pneumonia)  Goal: Resolution of Infection Signs and Symptoms  Outcome: Ongoing, Progressing   POC was reviewed with pt.  Spoke with Dr. Daniels this afternoon and he said looks like cardioversion set up for Friday and that he would be in to see pt on thanksgiving.  Pt still in A-fib on the monitor.  Pain has been controlled.

## 2022-11-24 NOTE — CONSULTS
[facility]  Cardiology  Progress Note    Patient Name: Cleveland Capps  MRN: [id number]  Admission Date: 11/16/2022  Hospital Length of Stay: 3 days  Code Status: Full Code   Attending Physician: Fernando Bryson MD   Primary Care Physician: Romero Snyder MD  Expected Discharge Date: 11/27/2022  Principal Problem:Acute on chronic combined systolic and diastolic congestive heart failure    Subjective:     Hospital Course:  Patient is feeling somewhat better      Interval History:  Problem 1 he is getting over COVID and just finished a course of steroids he is an ex-smoker and has COPD and still wheezing slightly  2. ASCVD atrial fibrillation-2016 he had bypass  This past year catheterization demonstrated patent saphenous vein grafts x2  Ejection fractions diminished and he has a VVI defibrillator  He has been in atrial fibrillation for several years and has heart failure reduced ejection fraction in his never had an ablation or cardioversion  He had a Watchman device placed earlier this year but he is remained on Plavix and Eliquis  3. In treating this patient he is on large doses of carvedilol which can exacerbate COPD; he is on Entresto and blood pressure today standing is 80/60  He has been receiving Aldactone and Lasix     ROS  Objective:     Vital Signs (Most Recent):  Temp: 97.5 °F (36.4 °C) (11/24/22 0740)  Pulse: 102 (11/24/22 0831)  Resp: 18 (11/24/22 0831)  BP: 97/61 (11/24/22 0740)  SpO2: (!) 93 % (11/24/22 0831)   Vital Signs (24h Range):  Temp:  [97.1 °F (36.2 °C)-97.7 °F (36.5 °C)] 97.5 °F (36.4 °C)  Pulse:  [] 102  Resp:  [16-18] 18  SpO2:  [92 %-98 %] 93 %  BP: ()/(60-76) 97/61     Weight: 60.6 kg (133 lb 9.6 oz)  Body mass index is 25.24 kg/m².    SpO2: (!) 93 %  O2 Device (Oxygen Therapy): room air      Intake/Output Summary (Last 24 hours) at 11/24/2022 1058  Last data filed at 11/24/2022 0629  Gross per 24 hour   Intake 720 ml   Output 975 ml   Net -255 ml        Lines/Drains/Airways       Peripheral Intravenous Line  Duration                  Peripheral IV - Single Lumen 11/20/22 0037 20 G Left;Proximal;Lateral;Anterior Forearm 4 days                    Physical Exam blood pressure 80/60  Cardiac irregular with a 2-3/6 systolic murmur at the apex  Bilateral wheezes    Significant Labs: CMP   Recent Labs   Lab 11/23/22  0712 11/24/22  0355    137   K 4.3 4.2   CL 97 100   CO2 30* 29   * 137*   BUN 37* 29*   CREATININE 1.0 0.8   CALCIUM 8.1* 7.9*   PROT 5.8* 5.6*   ALBUMIN 2.9* 2.8*   BILITOT 1.7* 1.7*   ALKPHOS 77 76   AST 33 32   * 126*   ANIONGAP 10 8    and CBC   Recent Labs   Lab 11/23/22  0712 11/24/22  0355   WBC 13.41* 13.60*   HGB 12.7* 12.6*   HCT 38.4* 38.3*    179       Significant Imaging:   Assessment and Plan:   Problems as enumerated above  1. He is recuperating from COVID  2. He has COPD exacerbated by large doses of carvedilol  3 status post bypass with depressed ejection fraction and heart failure reduced ejection fraction exacerbated by atrial fibrillation  4. He has a Watchman device  Plan-will make 1 attempt to try to get him to regular rhythm-if he can maintain sinus rhythm we will do much better clinically  Start amiodarone amiodarone in the morning and SANTHOSH cardioversion tomorrow 11 30  Hold diuretics and decrease dose of carvedilol significantly-will probably switch over to metoprolol  Hold Entresto as his blood pressure is low   Brief HPI:  If we can get him sinus rhythm he will tolerate Entresto better  Think he can be maintained on Entresto with low-dose diuretics where they goes into sinus rhythm and not      I personally reviewed old and new ecg's, lab reports,, xray reports  and  other cardiovascular studies including  echo's, stress tests, angiogram reports, holters,and vascular studies .  In addition I evaluated original cardiac cath  ___echo  ____cxr ______ct ____scan on [facility] view or [facility] or other  viewing platforms .  I reviewed  office and hospital notes Yes __x __ of  referring providers.      Active Diagnoses:    Diagnosis Date Noted POA    PRINCIPAL PROBLEM:  Acute on chronic combined systolic and diastolic congestive heart failure [I50.43] 05/19/2020 Yes    Macrocytic anemia [D53.9] 11/22/2022 Yes    Dyslipidemia [E78.5] 11/19/2022 Yes    Presence of Watchman left atrial appendage closure device [Z95.818] 11/19/2022 Yes    AICD (automatic cardioverter/defibrillator) present [Z95.810] 11/18/2022 Yes    Elevated LFTs [R79.89] 11/18/2022 Yes    COVID-19 [U07.1] 11/17/2022 Yes    Hypothyroid [E03.9] 11/17/2022 Yes    COPD exacerbation [J44.1] 11/17/2022 Yes    Coronary artery disease [I25.10] 04/25/2016 Yes    Benign essential HTN [I10] 04/25/2016 Yes    PAF (paroxysmal atrial fibrillation) [I48.0] 04/25/2016 Yes      Problems Resolved During this Admission:    Diagnosis Date Noted Date Resolved POA    Type 2 diabetes mellitus without complication, without long-term current use of insulin [E11.9] 11/17/2022 11/20/2022 Yes    Hyponatremia [E87.1] 11/17/2022 11/19/2022 Yes    Hyperkalemia [E87.5] 11/17/2022 11/18/2022 Yes       VTE Risk Mitigation (From admission, onward)           Ordered     apixaban tablet 5 mg  2 times daily         11/17/22 2011     IP VTE HIGH RISK PATIENT  Once         11/16/22 1935     Place sequential compression device  Until discontinued         11/16/22 1935     Reason for No Pharmacological VTE Prophylaxis  Once        Question:  Reasons:  Answer:  Already adequately anticoagulated on oral Anticoagulants    11/16/22 1935                    Kuldeep Daniels MD  Cardiology  [facility]

## 2022-11-25 ENCOUNTER — ANESTHESIA (OUTPATIENT)
Dept: CARDIOLOGY | Facility: HOSPITAL | Age: 67
DRG: 291 | End: 2022-11-25
Payer: MEDICARE

## 2022-11-25 ENCOUNTER — ANESTHESIA EVENT (OUTPATIENT)
Dept: CARDIOLOGY | Facility: HOSPITAL | Age: 67
DRG: 291 | End: 2022-11-25
Payer: MEDICARE

## 2022-11-25 LAB
ALBUMIN SERPL BCP-MCNC: 2.9 G/DL (ref 3.5–5.2)
ALP SERPL-CCNC: 84 U/L (ref 55–135)
ALT SERPL W/O P-5'-P-CCNC: 123 U/L (ref 10–44)
ANION GAP SERPL CALC-SCNC: 10 MMOL/L (ref 8–16)
AST SERPL-CCNC: 36 U/L (ref 10–40)
BASOPHILS # BLD AUTO: 0.02 K/UL (ref 0–0.2)
BASOPHILS NFR BLD: 0.1 % (ref 0–1.9)
BILIRUB SERPL-MCNC: 1.9 MG/DL (ref 0.1–1)
BUN SERPL-MCNC: 29 MG/DL (ref 8–23)
CALCIUM SERPL-MCNC: 8.1 MG/DL (ref 8.7–10.5)
CHLORIDE SERPL-SCNC: 99 MMOL/L (ref 95–110)
CO2 SERPL-SCNC: 25 MMOL/L (ref 23–29)
CREAT SERPL-MCNC: 0.8 MG/DL (ref 0.5–1.4)
DIFFERENTIAL METHOD: ABNORMAL
EOSINOPHIL # BLD AUTO: 0 K/UL (ref 0–0.5)
EOSINOPHIL NFR BLD: 0 % (ref 0–8)
ERYTHROCYTE [DISTWIDTH] IN BLOOD BY AUTOMATED COUNT: 18.1 % (ref 11.5–14.5)
EST. GFR  (NO RACE VARIABLE): >60 ML/MIN/1.73 M^2
GLUCOSE SERPL-MCNC: 159 MG/DL (ref 70–110)
HCT VFR BLD AUTO: 43 % (ref 40–54)
HGB BLD-MCNC: 14.3 G/DL (ref 14–18)
IMM GRANULOCYTES # BLD AUTO: 0.04 K/UL (ref 0–0.04)
IMM GRANULOCYTES NFR BLD AUTO: 0.3 % (ref 0–0.5)
LYMPHOCYTES # BLD AUTO: 0.5 K/UL (ref 1–4.8)
LYMPHOCYTES NFR BLD: 3.8 % (ref 18–48)
MAGNESIUM SERPL-MCNC: 2.3 MG/DL (ref 1.6–2.6)
MCH RBC QN AUTO: 33.8 PG (ref 27–31)
MCHC RBC AUTO-ENTMCNC: 33.3 G/DL (ref 32–36)
MCV RBC AUTO: 102 FL (ref 82–98)
MONOCYTES # BLD AUTO: 0.9 K/UL (ref 0.3–1)
MONOCYTES NFR BLD: 6.4 % (ref 4–15)
NEUTROPHILS # BLD AUTO: 12 K/UL (ref 1.8–7.7)
NEUTROPHILS NFR BLD: 89.4 % (ref 38–73)
NRBC BLD-RTO: 0 /100 WBC
PHOSPHATE SERPL-MCNC: 2.8 MG/DL (ref 2.7–4.5)
PLATELET # BLD AUTO: 201 K/UL (ref 150–450)
PMV BLD AUTO: 9.5 FL (ref 9.2–12.9)
POCT GLUCOSE: 126 MG/DL (ref 70–110)
POTASSIUM SERPL-SCNC: 4.5 MMOL/L (ref 3.5–5.1)
PROT SERPL-MCNC: 6 G/DL (ref 6–8.4)
RBC # BLD AUTO: 4.23 M/UL (ref 4.6–6.2)
SODIUM SERPL-SCNC: 134 MMOL/L (ref 136–145)
WBC # BLD AUTO: 13.38 K/UL (ref 3.9–12.7)

## 2022-11-25 PROCEDURE — 93010 ELECTROCARDIOGRAM REPORT: CPT | Mod: ,,, | Performed by: SPECIALIST

## 2022-11-25 PROCEDURE — D9220A PRA ANESTHESIA: Mod: ANES,,, | Performed by: ANESTHESIOLOGY

## 2022-11-25 PROCEDURE — 63600175 PHARM REV CODE 636 W HCPCS: Performed by: SPECIALIST

## 2022-11-25 PROCEDURE — D9220A PRA ANESTHESIA: ICD-10-PCS | Mod: CRNA,,, | Performed by: NURSE ANESTHETIST, CERTIFIED REGISTERED

## 2022-11-25 PROCEDURE — 36415 COLL VENOUS BLD VENIPUNCTURE: CPT | Performed by: STUDENT IN AN ORGANIZED HEALTH CARE EDUCATION/TRAINING PROGRAM

## 2022-11-25 PROCEDURE — 93010 EKG 12-LEAD: ICD-10-PCS | Mod: ,,, | Performed by: SPECIALIST

## 2022-11-25 PROCEDURE — 93005 ELECTROCARDIOGRAM TRACING: CPT

## 2022-11-25 PROCEDURE — 92960 CARDIOVERSION ELECTRIC EXT: CPT | Performed by: SPECIALIST

## 2022-11-25 PROCEDURE — 63600175 PHARM REV CODE 636 W HCPCS: Performed by: NURSE ANESTHETIST, CERTIFIED REGISTERED

## 2022-11-25 PROCEDURE — 93724 PR ANALYZE PACER SYS: ICD-10-PCS | Mod: 26,,, | Performed by: SPECIALIST

## 2022-11-25 PROCEDURE — 83735 ASSAY OF MAGNESIUM: CPT | Performed by: STUDENT IN AN ORGANIZED HEALTH CARE EDUCATION/TRAINING PROGRAM

## 2022-11-25 PROCEDURE — 25000003 PHARM REV CODE 250: Performed by: SPECIALIST

## 2022-11-25 PROCEDURE — 27000221 HC OXYGEN, UP TO 24 HOURS

## 2022-11-25 PROCEDURE — 99232 SBSQ HOSP IP/OBS MODERATE 35: CPT | Mod: 25,,, | Performed by: SPECIALIST

## 2022-11-25 PROCEDURE — 93724 ELEC ALYS ANTITCHYCAR PM SYS: CPT | Mod: 26,,, | Performed by: SPECIALIST

## 2022-11-25 PROCEDURE — 84100 ASSAY OF PHOSPHORUS: CPT | Performed by: STUDENT IN AN ORGANIZED HEALTH CARE EDUCATION/TRAINING PROGRAM

## 2022-11-25 PROCEDURE — 25000003 PHARM REV CODE 250: Performed by: HOSPITALIST

## 2022-11-25 PROCEDURE — D9220A PRA ANESTHESIA: Mod: CRNA,,, | Performed by: NURSE ANESTHETIST, CERTIFIED REGISTERED

## 2022-11-25 PROCEDURE — 99232 PR SUBSEQUENT HOSPITAL CARE,LEVL II: ICD-10-PCS | Mod: 25,,, | Performed by: SPECIALIST

## 2022-11-25 PROCEDURE — 85025 COMPLETE CBC W/AUTO DIFF WBC: CPT | Performed by: STUDENT IN AN ORGANIZED HEALTH CARE EDUCATION/TRAINING PROGRAM

## 2022-11-25 PROCEDURE — 37000009 HC ANESTHESIA EA ADD 15 MINS: Performed by: SPECIALIST

## 2022-11-25 PROCEDURE — 25000003 PHARM REV CODE 250: Performed by: NURSE ANESTHETIST, CERTIFIED REGISTERED

## 2022-11-25 PROCEDURE — 80053 COMPREHEN METABOLIC PANEL: CPT | Performed by: STUDENT IN AN ORGANIZED HEALTH CARE EDUCATION/TRAINING PROGRAM

## 2022-11-25 PROCEDURE — 99900035 HC TECH TIME PER 15 MIN (STAT)

## 2022-11-25 PROCEDURE — D9220A PRA ANESTHESIA: ICD-10-PCS | Mod: ANES,,, | Performed by: ANESTHESIOLOGY

## 2022-11-25 PROCEDURE — 27000207 HC ISOLATION

## 2022-11-25 PROCEDURE — 94761 N-INVAS EAR/PLS OXIMETRY MLT: CPT

## 2022-11-25 PROCEDURE — 11000001 HC ACUTE MED/SURG PRIVATE ROOM

## 2022-11-25 PROCEDURE — 37000008 HC ANESTHESIA 1ST 15 MINUTES: Performed by: SPECIALIST

## 2022-11-25 RX ORDER — ATROPINE SULFATE 0.4 MG/ML
INJECTION, SOLUTION ENDOTRACHEAL; INTRAMEDULLARY; INTRAMUSCULAR; INTRAVENOUS; SUBCUTANEOUS
Status: COMPLETED
Start: 2022-11-25 | End: 2022-11-25

## 2022-11-25 RX ORDER — PROPOFOL 10 MG/ML
VIAL (ML) INTRAVENOUS
Status: DISCONTINUED | OUTPATIENT
Start: 2022-11-25 | End: 2022-11-25

## 2022-11-25 RX ORDER — LIDOCAINE HCL/PF 100 MG/5ML
SYRINGE (ML) INTRAVENOUS
Status: DISCONTINUED | OUTPATIENT
Start: 2022-11-25 | End: 2022-11-25

## 2022-11-25 RX ORDER — PHENYLEPHRINE HYDROCHLORIDE 10 MG/ML
INJECTION INTRAVENOUS
Status: DISCONTINUED | OUTPATIENT
Start: 2022-11-25 | End: 2022-11-25

## 2022-11-25 RX ORDER — PHENYLEPHRINE HCL IN 0.9% NACL 1 MG/10 ML
SYRINGE (ML) INTRAVENOUS
Status: DISCONTINUED
Start: 2022-11-25 | End: 2022-11-28 | Stop reason: HOSPADM

## 2022-11-25 RX ORDER — ATROPINE SULFATE 0.4 MG/ML
INJECTION, SOLUTION ENDOTRACHEAL; INTRAMEDULLARY; INTRAMUSCULAR; INTRAVENOUS; SUBCUTANEOUS
Status: DISCONTINUED | OUTPATIENT
Start: 2022-11-25 | End: 2022-11-25

## 2022-11-25 RX ADMIN — AMIODARONE HYDROCHLORIDE 1 MG/MIN: 1.8 INJECTION, SOLUTION INTRAVENOUS at 08:11

## 2022-11-25 RX ADMIN — AMIODARONE HYDROCHLORIDE 150 MG: 1.5 INJECTION, SOLUTION INTRAVENOUS at 07:11

## 2022-11-25 RX ADMIN — PHENYLEPHRINE HYDROCHLORIDE 100 MCG: 10 INJECTION INTRAVENOUS at 11:11

## 2022-11-25 RX ADMIN — ATORVASTATIN CALCIUM 80 MG: 40 TABLET, FILM COATED ORAL at 08:11

## 2022-11-25 RX ADMIN — SODIUM CHLORIDE: 0.45 INJECTION, SOLUTION INTRAVENOUS at 05:11

## 2022-11-25 RX ADMIN — CLOPIDOGREL BISULFATE 75 MG: 75 TABLET, FILM COATED ORAL at 08:11

## 2022-11-25 RX ADMIN — CARVEDILOL 6.25 MG: 6.25 TABLET, FILM COATED ORAL at 08:11

## 2022-11-25 RX ADMIN — PROPOFOL 40 MG: 10 INJECTION, EMULSION INTRAVENOUS at 11:11

## 2022-11-25 RX ADMIN — CARVEDILOL 6.25 MG: 6.25 TABLET, FILM COATED ORAL at 09:11

## 2022-11-25 RX ADMIN — ASPIRIN 81 MG: 81 TABLET, COATED ORAL at 08:11

## 2022-11-25 RX ADMIN — APIXABAN 5 MG: 2.5 TABLET, FILM COATED ORAL at 08:11

## 2022-11-25 RX ADMIN — AMIODARONE HYDROCHLORIDE 300 MG: 200 TABLET ORAL at 09:11

## 2022-11-25 RX ADMIN — LIDOCAINE HYDROCHLORIDE 50 MG: 20 INJECTION INTRAVENOUS at 11:11

## 2022-11-25 RX ADMIN — TRAVOPROST 1 DROP: 0.04 SOLUTION/ DROPS OPHTHALMIC at 09:11

## 2022-11-25 RX ADMIN — APIXABAN 5 MG: 2.5 TABLET, FILM COATED ORAL at 09:11

## 2022-11-25 RX ADMIN — SACUBITRIL AND VALSARTAN 1 TABLET: 24; 26 TABLET, FILM COATED ORAL at 09:11

## 2022-11-25 RX ADMIN — ATROPINE SULFATE 0.4 MG: 0.4 INJECTION, SOLUTION INTRAMUSCULAR; INTRAVENOUS; SUBCUTANEOUS at 11:11

## 2022-11-25 RX ADMIN — SODIUM CHLORIDE, SODIUM GLUCONATE, SODIUM ACETATE, POTASSIUM CHLORIDE, MAGNESIUM CHLORIDE, SODIUM PHOSPHATE, DIBASIC, AND POTASSIUM PHOSPHATE: .53; .5; .37; .037; .03; .012; .00082 INJECTION, SOLUTION INTRAVENOUS at 11:11

## 2022-11-25 RX ADMIN — SPIRONOLACTONE 25 MG: 25 TABLET ORAL at 08:11

## 2022-11-25 RX ADMIN — PROPOFOL 10 MG: 10 INJECTION, EMULSION INTRAVENOUS at 11:11

## 2022-11-25 NOTE — PT/OT/SLP PROGRESS
Physical Therapy      Patient Name:  Cleveland Capps   MRN:  [id number]    PT attempted- cardioversion. Will follow up 11-.

## 2022-11-25 NOTE — PROCEDURES
"Cleveland Capps is a 67 y.o. male patient.    Temp: 97 °F (36.1 °C) (11/25/22 0736)  Pulse: 72 (11/25/22 0754)  Resp: 18 (11/25/22 0754)  BP: 120/75 (11/25/22 0754)  SpO2: (!) 94 % (11/24/22 2341)  Weight: 60.6 kg (133 lb 9.6 oz) (11/16/22 2100)  Height: 5' 1" (154.9 cm) (11/16/22 2100)       Procedures    SANTHOSH consent obtained   -No absolute contraindications of esophageal stricture, tumor, perforation, laceration,or diverticulum and/or active GI bleed  -The risks, benefits & alternatives of the procedure were explained to the patient.   -The risks of transesophageal echo include but are not limited to:  Dental trauma, esophageal trauma/perforation, bleeding, laryngospasm/brochospasm, aspiration, sore throat/hoarseness, & dislodgement of the endotracheal tube/nasogastric tube (where applicable).    -The risks of moderate sedation include hypotension, respiratory depression, arrhythmias, bronchospasm, & death.    -Informed consent was obtained. The patient is agreeable to proceed with the procedure and all questions and concerns addressed.    M'allampati score 2 sotalol used for IV sedation    SANTHOSH probe then passed  via  transesophageal route and  imaging performed in the  standard 3 views ie  Upper esophageal, ,mid esophageal, and transgastric views . Saline bubbles were injected IV for PFO imaging of   Intraatrial septum.   At end of procedure, SANTHOSH probe removed. The patient tolerated procedure well .    11/25/2022    "

## 2022-11-25 NOTE — PLAN OF CARE
Problem: Infection (Pneumonia)  Goal: Resolution of Infection Signs and Symptoms  Outcome: Ongoing, Progressing     Problem: Fluid Imbalance (Pneumonia)  Goal: Fluid Balance  Outcome: Ongoing, Progressing

## 2022-11-25 NOTE — ASSESSMENT & PLAN NOTE
-Eliquis  -Coreg 6.25 BID  -Telemetry  -Cardiology consulted; scheduled for cardioversion  -Amiodarone infusion

## 2022-11-25 NOTE — PROCEDURES
"Cleveland Capps is a 67 y.o. male patient.    Temp: 97 °F (36.1 °C) (11/25/22 0736)  Pulse: 72 (11/25/22 0754)  Resp: 18 (11/25/22 0754)  BP: 120/75 (11/25/22 0754)  SpO2: (!) 94 % (11/24/22 2341)  Weight: 60.6 kg (133 lb 9.6 oz) (11/16/22 2100)  Height: 5' 1" (154.9 cm) (11/16/22 2100)       Electrical Cardioversion    Date/Time: 11/25/2022 12:10 PM  Location procedure was performed: [facility]  Performed by: Kuldeep Daniels MD  Authorized by: Kuldeep Daniels MD   Assisting provider: Kuldeep Daniels MD  Pre-operative diagnosis: afib  Post-operative diagnosis: same  Consent Done: Yes  Consent: Verbal consent obtained. Written consent obtained.  Risks and benefits: risks, benefits and alternatives were discussed  Consent given by: patient  Patient understanding: patient states understanding of the procedure being performed  Patient consent: the patient's understanding of the procedure matches consent given  Procedure consent: procedure consent matches procedure scheduled  Relevant documents: relevant documents present and verified  Test results: test results available and properly labeled    Patient sedated: yes  Sedatives: propofol  Cardioversion basis: elective  Indications: failure of anti-arrhythmic medications  Pre-procedure rhythm: atrial fibrillation  Chest area: chest area exposed  Electrodes: pads  Electrodes placed: anterior-posterior  Number of attempts: 1  Attempt 1 mode: synchronous  Attempt 1 shock (in Joules): 200  Attempt 1 outcome: conversion to normal sinus rhythm  Post-procedure rhythm: normal sinus rhythm  Complications: no complications  Technical procedures used: cardioversion  Significant surgical tasks conducted by the assistant(s): na  Complications: No  Estimated blood loss (mL): 0  Specimens: No  Implants: No        11/25/2022    "

## 2022-11-25 NOTE — ASSESSMENT & PLAN NOTE
-Telemetry  -Entresto held  -Coreg decreased to 6.25 BID  -PO Lasix held per Cardiology  -Aldactone  -Monitor I's and O's  -Keep K > 4 and Mg > 2

## 2022-11-25 NOTE — SUBJECTIVE & OBJECTIVE
"Interval History: see "Hospital Course"    Review of Systems   Constitutional:  Negative for chills, fatigue and fever.   HENT:  Negative for congestion and sinus pressure.    Eyes:  Negative for pain and visual disturbance.   Respiratory:  Negative for cough, shortness of breath and wheezing.    Cardiovascular:  Negative for chest pain, palpitations and leg swelling.   Gastrointestinal:  Negative for abdominal pain, diarrhea, nausea and vomiting.   Genitourinary:  Negative for difficulty urinating, dysuria and hematuria.   Musculoskeletal:  Negative for arthralgias and myalgias.   Skin:  Negative for rash and wound.   Allergic/Immunologic: Positive for immunocompromised state.   Neurological:  Negative for dizziness, weakness, light-headedness and headaches.   Hematological:  Negative for adenopathy. Does not bruise/bleed easily.   Psychiatric/Behavioral:  Negative for confusion and dysphoric mood. The patient is not nervous/anxious.    All other systems reviewed and are negative.  Objective:     Vital Signs (Most Recent):  Temp: 97 °F (36.1 °C) (11/25/22 0736)  Pulse: 72 (11/25/22 0754)  Resp: 18 (11/25/22 0754)  BP: 120/75 (11/25/22 0754)  SpO2: (!) 94 % (11/24/22 2341)   Vital Signs (24h Range):  Temp:  [96.3 °F (35.7 °C)-97.9 °F (36.6 °C)] 97 °F (36.1 °C)  Pulse:  [] 72  Resp:  [16-18] 18  SpO2:  [93 %-97 %] 94 %  BP: ()/(63-93) 120/75     Weight: 60.6 kg (133 lb 9.6 oz)  Body mass index is 25.24 kg/m².  No intake or output data in the 24 hours ending 11/25/22 0803   Physical Exam  Vitals and nursing note reviewed.   Constitutional:       General: He is not in acute distress.     Appearance: He is not ill-appearing.   HENT:      Head: Normocephalic and atraumatic.      Right Ear: External ear normal.      Left Ear: External ear normal.      Nose: Nose normal.      Mouth/Throat:      Mouth: Mucous membranes are moist.      Pharynx: Oropharynx is clear. No oropharyngeal exudate.   Eyes:      General: " No scleral icterus.        Right eye: No discharge.         Left eye: No discharge.      Extraocular Movements: Extraocular movements intact.      Conjunctiva/sclera: Conjunctivae normal.   Neck:      Thyroid: No thyromegaly.      Vascular: No JVD.   Cardiovascular:      Rate and Rhythm: Normal rate. Rhythm irregular.      Pulses: Normal pulses.      Heart sounds: Normal heart sounds.     No gallop.   Pulmonary:      Effort: Pulmonary effort is normal. No respiratory distress.      Breath sounds: No wheezing or rales.      Comments: NC.  Abdominal:      General: Bowel sounds are normal. There is no distension.      Palpations: Abdomen is soft. There is no mass.      Tenderness: There is no abdominal tenderness.   Musculoskeletal:         General: No deformity. Normal range of motion.      Cervical back: Normal range of motion and neck supple.   Lymphadenopathy:      Cervical: No cervical adenopathy.   Skin:     General: Skin is warm and dry.      Findings: No rash.   Neurological:      General: No focal deficit present.      Mental Status: He is alert and oriented to person, place, and time. Mental status is at baseline.   Psychiatric:         Mood and Affect: Mood normal.         Behavior: Behavior normal.       Significant Labs: All pertinent labs within the past 24 hours have been reviewed.    Significant Imaging: I have reviewed all pertinent imaging results/findings within the past 24 hours.

## 2022-11-25 NOTE — ANESTHESIA PREPROCEDURE EVALUATION
11/25/2022  Cleveland Capps is a 67 y.o., male.      Pre-op Assessment    I have reviewed the Patient Summary Reports.     I have reviewed the Nursing Notes. I have reviewed the NPO Status.   I have reviewed the Medications.     Review of Systems  Anesthesia Hx:  No problems with previous Anesthesia    Social:  Former Smoker    Cardiovascular:   Pacemaker Hypertension Past MI CAD  CABG/stent Dysrhythmias atrial fibrillation CHF    Pulmonary:   COPD Asthma    Renal/:   Chronic Renal Disease    Neurological:   CVA    Endocrine:   Diabetes Hypothyroidism        Physical Exam  General: Well nourished, Cooperative, Alert and Oriented    Airway:  Mallampati: II   Mouth Opening: Normal  TM Distance: Normal  Neck ROM: Normal ROM    Dental:  Intact        Anesthesia Plan  Type of Anesthesia, risks & benefits discussed:    Anesthesia Type: Gen Natural Airway  Intra-op Monitoring Plan: Standard ASA Monitors  Induction:  IV  Informed Consent: Informed consent signed with the Patient and all parties understand the risks and agree with anesthesia plan.  All questions answered.   ASA Score: 4  Day of Surgery Review of History & Physical: H&P Update referred to the surgeon/provider.    Ready For Surgery From Anesthesia Perspective.     .

## 2022-11-25 NOTE — ASSESSMENT & PLAN NOTE
Monitor potassium    Potassium   Date Value Ref Range Status   11/25/2022 4.5 3.5 - 5.1 mmol/L Final   11/24/2022 4.2 3.5 - 5.1 mmol/L Final

## 2022-11-25 NOTE — PLAN OF CARE
Plan of care reviewed with patient,verbalized understanding. Afib on telemetry. Blood glucose monitored No sliding scale insulin required this shift  Isolation maintained as per orders.Denies pain this shift SPO2 94-98% on room air  Remains free from falls, injury.Instructed to call for assistance as needed ,verbalized understanding. Bed in low & locked position. Call light in reach, bed alarm on .

## 2022-11-25 NOTE — PT/OT/SLP PROGRESS
"Occupational Therapy      Patient Name:  Cleveland Capps   MRN:  [id number]    Attempted OT tx. Patient refused stating "I just want to rest." Patient stated he is scheduled for a stress test later today. Will follow up when appropriate.    11/25/2022  "

## 2022-11-25 NOTE — TRANSFER OF CARE
"Anesthesia Transfer of Care Note    Patient: Cleveland Capps    Procedure(s) Performed: Procedure(s) (LRB):  Cardioversion or Defibrillation (N/A)  Transesophageal echo ([facility]) intra-procedure log documentation (N/A)    Patient location: Other: staying to recover endo room 3    Anesthesia Type: general    Transport from OR: Transported from OR on room air with adequate spontaneous ventilation    Post pain: adequate analgesia    Post assessment: no apparent anesthetic complications and tolerated procedure well    Post vital signs: stable    Level of consciousness: awake    Nausea/Vomiting: no nausea/vomiting    Complications: none    Transfer of care protocol was followed      Last vitals:   Visit Vitals  /75   Pulse 72   Temp 36.1 °C (97 °F) (Oral)   Resp 18   Ht 5' 1" (1.549 m)   Wt 60.6 kg (133 lb 9.6 oz)   SpO2 (!) 94%   BMI 25.24 kg/m²     "

## 2022-11-25 NOTE — CONSULTS
[facility]  Cardiology  Progress Note    Patient Name: Cleveland Capps  MRN: [id number]  Admission Date: 11/16/2022  Hospital Length of Stay: 4 days  Code Status: Full Code   Attending Physician: Fernnado Bryson MD   Primary Care Physician: Romero Snyder MD  Expected Discharge Date: 11/27/2022  Principal Problem:Acute on chronic combined systolic and diastolic congestive heart failure    Subjective:     Hospital Course:  Cardioversion done successfully    Interval History:  Biotronik defibrillator was interrogated and is functioning  It is a single lead and programmed at 50 VVI    ROS  Objective:     Vital Signs (Most Recent):  Temp: 97 °F (36.1 °C) (11/25/22 0736)  Pulse: 72 (11/25/22 0754)  Resp: 18 (11/25/22 0754)  BP: 120/75 (11/25/22 0754)  SpO2: (!) 94 % (11/24/22 2341)   Vital Signs (24h Range):  Temp:  [96.3 °F (35.7 °C)-97.6 °F (36.4 °C)] 97 °F (36.1 °C)  Pulse:  [] 72  Resp:  [16-18] 18  SpO2:  [94 %-96 %] 94 %  BP: ()/(63-93) 120/75     Weight: 60.6 kg (133 lb 9.6 oz)  Body mass index is 25.24 kg/m².    SpO2: (!) 94 %  O2 Device (Oxygen Therapy): room air      Intake/Output Summary (Last 24 hours) at 11/25/2022 1212  Last data filed at 11/25/2022 1209  Gross per 24 hour   Intake 800 ml   Output 300 ml   Net 500 ml       Lines/Drains/Airways       Peripheral Intravenous Line  Duration                  Peripheral IV - Single Lumen 11/20/22 0037 20 G Left;Proximal;Lateral;Anterior Forearm 5 days         Peripheral IV - Single Lumen 11/25/22 0541 22 G Left Antecubital <1 day                    Physical Exam lungs diminished breath sounds  Cardiac regular    Significant Labs: CMP   Recent Labs   Lab 11/24/22  0355 11/25/22  0446    134*   K 4.2 4.5    99   CO2 29 25   * 159*   BUN 29* 29*   CREATININE 0.8 0.8   CALCIUM 7.9* 8.1*   PROT 5.6* 6.0   ALBUMIN 2.8* 2.9*   BILITOT 1.7* 1.9*   ALKPHOS 76 84   AST 32 36   * 123*   ANIONGAP 8 10    and CBC    Recent Labs   Lab 11/24/22  0355 11/25/22  0446   WBC 13.60* 13.38*   HGB 12.6* 14.3   HCT 38.3* 43.0    201       Significant Imaging:  Preliminary toni demonstrates evidence of anterior apical damage and ejection fraction approximally 25%  Watchman device is intact  Defibrillator lead intact  Assessment and Plan:   Successful cardioversion  Hopefully clinically ill do better in sinus rhythm  Rx amiodarone p.o.  Watch overnight  Brief HPI:     Active Diagnoses:    Diagnosis Date Noted POA    PRINCIPAL PROBLEM:  Acute on chronic combined systolic and diastolic congestive heart failure [I50.43] 05/19/2020 Yes    Macrocytic anemia [D53.9] 11/22/2022 Yes    Dyslipidemia [E78.5] 11/19/2022 Yes    Presence of Watchman left atrial appendage closure device [Z95.818] 11/19/2022 Yes    AICD (automatic cardioverter/defibrillator) present [Z95.810] 11/18/2022 Yes    Elevated LFTs [R79.89] 11/18/2022 Yes    COVID-19 [U07.1] 11/17/2022 Yes    Hypothyroid [E03.9] 11/17/2022 Yes    COPD exacerbation [J44.1] 11/17/2022 Yes    Coronary artery disease [I25.10] 04/25/2016 Yes    Benign essential HTN [I10] 04/25/2016 Yes    PAF (paroxysmal atrial fibrillation) [I48.0] 04/25/2016 Yes      Problems Resolved During this Admission:    Diagnosis Date Noted Date Resolved POA    Type 2 diabetes mellitus without complication, without long-term current use of insulin [E11.9] 11/17/2022 11/20/2022 Yes    Hyponatremia [E87.1] 11/17/2022 11/19/2022 Yes    Hyperkalemia [E87.5] 11/17/2022 11/18/2022 Yes       VTE Risk Mitigation (From admission, onward)           Ordered     apixaban tablet 5 mg  2 times daily         11/17/22 2011     IP VTE HIGH RISK PATIENT  Once         11/16/22 1935     Place sequential compression device  Until discontinued         11/16/22 1935     Reason for No Pharmacological VTE Prophylaxis  Once        Question:  Reasons:  Answer:  Already adequately anticoagulated on oral Anticoagulants    11/16/22 1935                     Kuldeep Daniels MD  Cardiology  [facility]

## 2022-11-25 NOTE — NURSING
Called Jeremiah RICE regarding pt HR 50s. MD Jeremiah stated to hold night Amio if HR less that 70. Intresto has parameters see MAR comments.

## 2022-11-25 NOTE — NURSING
Report received from ISAAK Rizzo. Received pt to room 204 from 3rd floor. Vitals stable. Denies complaints. Care assumed currently.

## 2022-11-26 LAB
ALBUMIN SERPL BCP-MCNC: 2.7 G/DL (ref 3.5–5.2)
ALP SERPL-CCNC: 80 U/L (ref 55–135)
ALT SERPL W/O P-5'-P-CCNC: 97 U/L (ref 10–44)
ANION GAP SERPL CALC-SCNC: 9 MMOL/L (ref 8–16)
AST SERPL-CCNC: 34 U/L (ref 10–40)
BASOPHILS # BLD AUTO: 0.01 K/UL (ref 0–0.2)
BASOPHILS NFR BLD: 0.1 % (ref 0–1.9)
BILIRUB SERPL-MCNC: 2 MG/DL (ref 0.1–1)
BNP SERPL-MCNC: 420 PG/ML (ref 0–99)
BSA FOR ECHO PROCEDURE: 1.61 M2
BUN SERPL-MCNC: 25 MG/DL (ref 8–23)
CALCIUM SERPL-MCNC: 7.8 MG/DL (ref 8.7–10.5)
CHLORIDE SERPL-SCNC: 102 MMOL/L (ref 95–110)
CO2 SERPL-SCNC: 23 MMOL/L (ref 23–29)
CREAT SERPL-MCNC: 0.8 MG/DL (ref 0.5–1.4)
DIFFERENTIAL METHOD: ABNORMAL
EJECTION FRACTION: 25 %
EOSINOPHIL # BLD AUTO: 0 K/UL (ref 0–0.5)
EOSINOPHIL NFR BLD: 0 % (ref 0–8)
ERYTHROCYTE [DISTWIDTH] IN BLOOD BY AUTOMATED COUNT: 18.1 % (ref 11.5–14.5)
EST. GFR  (NO RACE VARIABLE): >60 ML/MIN/1.73 M^2
GLUCOSE SERPL-MCNC: 102 MG/DL (ref 70–110)
HCT VFR BLD AUTO: 39.7 % (ref 40–54)
HGB BLD-MCNC: 13.2 G/DL (ref 14–18)
IMM GRANULOCYTES # BLD AUTO: 0.02 K/UL (ref 0–0.04)
IMM GRANULOCYTES NFR BLD AUTO: 0.2 % (ref 0–0.5)
LYMPHOCYTES # BLD AUTO: 0.7 K/UL (ref 1–4.8)
LYMPHOCYTES NFR BLD: 7.1 % (ref 18–48)
MAGNESIUM SERPL-MCNC: 2.2 MG/DL (ref 1.6–2.6)
MCH RBC QN AUTO: 33.7 PG (ref 27–31)
MCHC RBC AUTO-ENTMCNC: 33.2 G/DL (ref 32–36)
MCV RBC AUTO: 101 FL (ref 82–98)
MONOCYTES # BLD AUTO: 1 K/UL (ref 0.3–1)
MONOCYTES NFR BLD: 10.3 % (ref 4–15)
NEUTROPHILS # BLD AUTO: 8.1 K/UL (ref 1.8–7.7)
NEUTROPHILS NFR BLD: 82.3 % (ref 38–73)
NRBC BLD-RTO: 0 /100 WBC
PHOSPHATE SERPL-MCNC: 2.7 MG/DL (ref 2.7–4.5)
PLATELET # BLD AUTO: 167 K/UL (ref 150–450)
PMV BLD AUTO: 9.2 FL (ref 9.2–12.9)
POCT GLUCOSE: 101 MG/DL (ref 70–110)
POCT GLUCOSE: 126 MG/DL (ref 70–110)
POCT GLUCOSE: 94 MG/DL (ref 70–110)
POCT GLUCOSE: 99 MG/DL (ref 70–110)
POTASSIUM SERPL-SCNC: 4.4 MMOL/L (ref 3.5–5.1)
PROT SERPL-MCNC: 5.5 G/DL (ref 6–8.4)
RBC # BLD AUTO: 3.92 M/UL (ref 4.6–6.2)
SODIUM SERPL-SCNC: 134 MMOL/L (ref 136–145)
WBC # BLD AUTO: 9.83 K/UL (ref 3.9–12.7)

## 2022-11-26 PROCEDURE — 83735 ASSAY OF MAGNESIUM: CPT | Performed by: STUDENT IN AN ORGANIZED HEALTH CARE EDUCATION/TRAINING PROGRAM

## 2022-11-26 PROCEDURE — 83880 ASSAY OF NATRIURETIC PEPTIDE: CPT | Performed by: NURSE PRACTITIONER

## 2022-11-26 PROCEDURE — 94761 N-INVAS EAR/PLS OXIMETRY MLT: CPT

## 2022-11-26 PROCEDURE — 36415 COLL VENOUS BLD VENIPUNCTURE: CPT | Performed by: NURSE PRACTITIONER

## 2022-11-26 PROCEDURE — 36415 COLL VENOUS BLD VENIPUNCTURE: CPT | Performed by: STUDENT IN AN ORGANIZED HEALTH CARE EDUCATION/TRAINING PROGRAM

## 2022-11-26 PROCEDURE — 11000001 HC ACUTE MED/SURG PRIVATE ROOM

## 2022-11-26 PROCEDURE — 99232 SBSQ HOSP IP/OBS MODERATE 35: CPT | Mod: 25,,, | Performed by: SPECIALIST

## 2022-11-26 PROCEDURE — 63600175 PHARM REV CODE 636 W HCPCS: Performed by: NURSE PRACTITIONER

## 2022-11-26 PROCEDURE — 25000003 PHARM REV CODE 250: Performed by: HOSPITALIST

## 2022-11-26 PROCEDURE — 27000221 HC OXYGEN, UP TO 24 HOURS

## 2022-11-26 PROCEDURE — 80053 COMPREHEN METABOLIC PANEL: CPT | Performed by: STUDENT IN AN ORGANIZED HEALTH CARE EDUCATION/TRAINING PROGRAM

## 2022-11-26 PROCEDURE — 99232 PR SUBSEQUENT HOSPITAL CARE,LEVL II: ICD-10-PCS | Mod: 25,,, | Performed by: SPECIALIST

## 2022-11-26 PROCEDURE — 99900035 HC TECH TIME PER 15 MIN (STAT)

## 2022-11-26 PROCEDURE — 84100 ASSAY OF PHOSPHORUS: CPT | Performed by: STUDENT IN AN ORGANIZED HEALTH CARE EDUCATION/TRAINING PROGRAM

## 2022-11-26 PROCEDURE — 27000207 HC ISOLATION

## 2022-11-26 PROCEDURE — 85025 COMPLETE CBC W/AUTO DIFF WBC: CPT | Performed by: STUDENT IN AN ORGANIZED HEALTH CARE EDUCATION/TRAINING PROGRAM

## 2022-11-26 PROCEDURE — 25000003 PHARM REV CODE 250: Performed by: SPECIALIST

## 2022-11-26 RX ORDER — FUROSEMIDE 10 MG/ML
40 INJECTION INTRAMUSCULAR; INTRAVENOUS ONCE
Status: COMPLETED | OUTPATIENT
Start: 2022-11-26 | End: 2022-11-26

## 2022-11-26 RX ADMIN — CARVEDILOL 6.25 MG: 6.25 TABLET, FILM COATED ORAL at 08:11

## 2022-11-26 RX ADMIN — AMIODARONE HYDROCHLORIDE 300 MG: 200 TABLET ORAL at 08:11

## 2022-11-26 RX ADMIN — SACUBITRIL AND VALSARTAN 1 TABLET: 24; 26 TABLET, FILM COATED ORAL at 09:11

## 2022-11-26 RX ADMIN — CARVEDILOL 6.25 MG: 6.25 TABLET, FILM COATED ORAL at 09:11

## 2022-11-26 RX ADMIN — ASPIRIN 81 MG: 81 TABLET, COATED ORAL at 08:11

## 2022-11-26 RX ADMIN — SODIUM CHLORIDE: 0.45 INJECTION, SOLUTION INTRAVENOUS at 09:11

## 2022-11-26 RX ADMIN — SPIRONOLACTONE 25 MG: 25 TABLET ORAL at 08:11

## 2022-11-26 RX ADMIN — FUROSEMIDE 40 MG: 10 INJECTION, SOLUTION INTRAMUSCULAR; INTRAVENOUS at 11:11

## 2022-11-26 RX ADMIN — CLOPIDOGREL BISULFATE 75 MG: 75 TABLET, FILM COATED ORAL at 08:11

## 2022-11-26 RX ADMIN — SACUBITRIL AND VALSARTAN 1 TABLET: 24; 26 TABLET, FILM COATED ORAL at 08:11

## 2022-11-26 RX ADMIN — APIXABAN 5 MG: 2.5 TABLET, FILM COATED ORAL at 08:11

## 2022-11-26 RX ADMIN — TRAVOPROST 1 DROP: 0.04 SOLUTION/ DROPS OPHTHALMIC at 09:11

## 2022-11-26 RX ADMIN — SACUBITRIL AND VALSARTAN 1 TABLET: 24; 26 TABLET, FILM COATED ORAL at 12:11

## 2022-11-26 RX ADMIN — ATORVASTATIN CALCIUM 80 MG: 40 TABLET, FILM COATED ORAL at 08:11

## 2022-11-26 NOTE — ASSESSMENT & PLAN NOTE
-Airborne and isolation precautions until 12/4/22   -Decadron 6 transitioned to prednisone for COPD exacerbation; since discontinued  -Eliquis  -Held remdesivir  -Trended inflammatory markers

## 2022-11-26 NOTE — PLAN OF CARE
Problem: Adult Inpatient Plan of Care  Goal: Plan of Care Review  Outcome: Ongoing, Progressing     Problem: Adult Inpatient Plan of Care  Goal: Patient-Specific Goal (Individualized)  Outcome: Ongoing, Progressing   Patient alert and oriented resting in bed. NAD. Denies pain or SOB. VSS. Brief for incontinence. Afib on monitor. 2LNC. Bed alarm active. Plan of care reviewed with patient. Verbalizes understanding.Call light in reach. Pt free from fall or injury. Will monitor.

## 2022-11-26 NOTE — SUBJECTIVE & OBJECTIVE
"Interval History: see "Hospital Course"    Review of Systems   Constitutional:  Negative for chills, fatigue and fever.   HENT:  Negative for congestion and sinus pressure.    Eyes:  Negative for pain and visual disturbance.   Respiratory:  Negative for cough, shortness of breath and wheezing.    Cardiovascular:  Negative for chest pain, palpitations and leg swelling.   Gastrointestinal:  Negative for abdominal pain, diarrhea, nausea and vomiting.   Genitourinary:  Negative for difficulty urinating, dysuria and hematuria.   Musculoskeletal:  Negative for arthralgias and myalgias.   Skin:  Negative for rash and wound.   Allergic/Immunologic: Positive for immunocompromised state.   Neurological:  Negative for dizziness, weakness, light-headedness and headaches.   Hematological:  Negative for adenopathy. Does not bruise/bleed easily.   Psychiatric/Behavioral:  Negative for confusion and dysphoric mood. The patient is not nervous/anxious.    All other systems reviewed and are negative.  Objective:     Vital Signs (Most Recent):  Temp: 96.7 °F (35.9 °C) (11/26/22 1108)  Pulse: 87 (11/26/22 1108)  Resp: 16 (11/26/22 1108)  BP: 110/68 (11/26/22 1108)  SpO2: 98 % (11/26/22 1108)   Vital Signs (24h Range):  Temp:  [96 °F (35.6 °C)-98.6 °F (37 °C)] 96.7 °F (35.9 °C)  Pulse:  [] 87  Resp:  [15-19] 16  SpO2:  [95 %-100 %] 98 %  BP: ()/(58-84) 110/68     Weight: 60.3 kg (133 lb)  Body mass index is 25.13 kg/m².    Intake/Output Summary (Last 24 hours) at 11/26/2022 1213  Last data filed at 11/26/2022 1136  Gross per 24 hour   Intake --   Output 400 ml   Net -400 ml        Physical Exam  Vitals and nursing note reviewed.   Constitutional:       General: He is not in acute distress.     Appearance: He is not ill-appearing.   HENT:      Head: Normocephalic and atraumatic.      Right Ear: External ear normal.      Left Ear: External ear normal.      Nose: Nose normal.      Mouth/Throat:      Mouth: Mucous membranes are " moist.      Pharynx: Oropharynx is clear. No oropharyngeal exudate.   Eyes:      General: No scleral icterus.        Right eye: No discharge.         Left eye: No discharge.      Extraocular Movements: Extraocular movements intact.      Conjunctiva/sclera: Conjunctivae normal.   Neck:      Thyroid: No thyromegaly.      Vascular: No JVD.   Cardiovascular:      Rate and Rhythm: Normal rate. Rhythm irregular.      Pulses: Normal pulses.      Heart sounds: Normal heart sounds.     No gallop.   Pulmonary:      Effort: Pulmonary effort is normal. No respiratory distress.      Breath sounds: No wheezing or rales.      Comments: NC.  Abdominal:      General: Bowel sounds are normal. There is no distension.      Palpations: Abdomen is soft. There is no mass.      Tenderness: There is no abdominal tenderness.   Musculoskeletal:         General: No deformity. Normal range of motion.      Cervical back: Normal range of motion and neck supple.   Lymphadenopathy:      Cervical: No cervical adenopathy.   Skin:     General: Skin is warm and dry.      Findings: No rash.   Neurological:      General: No focal deficit present.      Mental Status: He is alert and oriented to person, place, and time. Mental status is at baseline.   Psychiatric:         Mood and Affect: Mood normal.         Behavior: Behavior normal.       Significant Labs: All pertinent labs within the past 24 hours have been reviewed.    Significant Imaging: I have reviewed all pertinent imaging results/findings within the past 24 hours.

## 2022-11-26 NOTE — NURSING
11/22/22- patient tested positive for COVID on 11/13/22 and admitted for COPD exacerbation and COVID-19.  Patient must remain on isolation precautions for at least 20 days (12/04/22) from positive test (11/13/22).  In order to discontinue isolation precautions on 12/04/22, patient must be afebrile for at least 24 hours without fever reducing medications and symptoms improving.  If patient meets criteria to discontinue isolation precautions on 12/04/22, patient must be transferred to a clean room before discontinuing isolation precautions. Melany Hernandez, Infection Prevention

## 2022-11-26 NOTE — CONSULTS
[facility]  Cardiology  Progress Note    Patient Name: Cleveland Capps  MRN: [id number]  Admission Date: 11/16/2022  Hospital Length of Stay: 5 days  Code Status: Full Code   Attending Physician: Renu Beasley MD   Primary Care Physician: Romero Snyder MD  Expected Discharge Date: 11/27/2022  Principal Problem:Acute on chronic combined systolic and diastolic congestive heart failure    Subjective:     Hospital Course:  Patient was cardioverted yesterday but he is back in atrial fib    Interval History:  He has a history of heart failure reduced ejection fraction as well as COPD  And status post COVID    ROS  Objective:     Vital Signs (Most Recent):  Temp: 96.7 °F (35.9 °C) (11/26/22 1108)  Pulse: 87 (11/26/22 1108)  Resp: 16 (11/26/22 1108)  BP: 110/68 (11/26/22 1108)  SpO2: 98 % (11/26/22 1108) Vital Signs (24h Range):  Temp:  [96 °F (35.6 °C)-98.6 °F (37 °C)] 96.7 °F (35.9 °C)  Pulse:  [] 87  Resp:  [15-19] 16  SpO2:  [95 %-100 %] 98 %  BP: ()/(58-84) 110/68     Weight: 60.3 kg (133 lb)  Body mass index is 25.13 kg/m².    SpO2: 98 %  O2 Device (Oxygen Therapy): nasal cannula      Intake/Output Summary (Last 24 hours) at 11/26/2022 1112  Last data filed at 11/26/2022 0540  Gross per 24 hour   Intake 800 ml   Output 300 ml   Net 500 ml       Lines/Drains/Airways       Peripheral Intravenous Line  Duration                  Peripheral IV - Single Lumen 11/20/22 0037 20 G Left;Proximal;Lateral;Anterior Forearm 6 days         Peripheral IV - Single Lumen 11/25/22 0541 22 G Left Antecubital 1 day                    Physical Exam unchanged  Heart rate is 80    Significant Labs: CMP   Recent Labs   Lab 11/25/22  0446 11/26/22  0502   * 134*   K 4.5 4.4   CL 99 102   CO2 25 23   * 102   BUN 29* 25*   CREATININE 0.8 0.8   CALCIUM 8.1* 7.8*   PROT 6.0 5.5*   ALBUMIN 2.9* 2.7*   BILITOT 1.9* 2.0*   ALKPHOS 84 80   AST 36 34   * 97*   ANIONGAP 10 9       Significant  Imaging:   Assessment and Plan:   DC amiodarone-patient has chronic persistent atrial fib  Restart Entresto and stay on carvedilol  Follow-up in the clinic in approximately a month  Brief HPI:     Active Diagnoses:    Diagnosis Date Noted POA    PRINCIPAL PROBLEM:  Acute on chronic combined systolic and diastolic congestive heart failure [I50.43] 05/19/2020 Yes    Macrocytic anemia [D53.9] 11/22/2022 Yes    Dyslipidemia [E78.5] 11/19/2022 Yes    Presence of Watchman left atrial appendage closure device [Z95.818] 11/19/2022 Yes    AICD (automatic cardioverter/defibrillator) present [Z95.810] 11/18/2022 Yes    Elevated LFTs [R79.89] 11/18/2022 Yes    COVID-19 [U07.1] 11/17/2022 Yes    Hypothyroid [E03.9] 11/17/2022 Yes    COPD exacerbation [J44.1] 11/17/2022 Yes    Coronary artery disease [I25.10] 04/25/2016 Yes    Benign essential HTN [I10] 04/25/2016 Yes    PAF (paroxysmal atrial fibrillation) [I48.0] 04/25/2016 Yes      Problems Resolved During this Admission:    Diagnosis Date Noted Date Resolved POA    Type 2 diabetes mellitus without complication, without long-term current use of insulin [E11.9] 11/17/2022 11/20/2022 Yes    Hyponatremia [E87.1] 11/17/2022 11/19/2022 Yes    Hyperkalemia [E87.5] 11/17/2022 11/18/2022 Yes       VTE Risk Mitigation (From admission, onward)           Ordered     apixaban tablet 5 mg  2 times daily         11/17/22 2011     IP VTE HIGH RISK PATIENT  Once         11/16/22 1935     Place sequential compression device  Until discontinued         11/16/22 1935     Reason for No Pharmacological VTE Prophylaxis  Once        Question:  Reasons:  Answer:  Already adequately anticoagulated on oral Anticoagulants    11/16/22 1935                    Kuldeep Daniels MD  Cardiology  [facility]

## 2022-11-26 NOTE — PROGRESS NOTES
[facility] Hospital Medicine  Progress Note    Patient Name: Cleveland Capps  MRN: [id number]  Patient Class: IP- Inpatient   Admission Date: 11/16/2022  Length of Stay: 5 days  Attending Physician: Renu Beasley MD  Primary Care Provider: Romero Snyder MD        Subjective:     Principal Problem:Acute on chronic combined systolic and diastolic congestive heart failure        HPI:  Pt presented to ED with shortness of breath.  Began a few days prior.  Associated with cough and congestion.   He sought evaluation from his primary care provider and was diagnosed with SARS-CoV-2 infection.  He was put on Paxlovid, of which he took a couple of days' worth.  He didn't improve.  Cough is nonproductive.  No chest pain.  No fevers.  No chills. No orthopnea.  He has CAD, COPD, and is a former smoker.  Upon presenting to the ED, he was hypotensive but responded to crystalloid infusions.  Hospital observation requested for more hydration and treatment of mild COPD exacerbation.      Overview/Hospital Course:  Cleveland Capps is a 67 year old male with a past medical history of combined CHF s/p AICD, CAD s/p PCI, Afib, HTN, HLD, DM, and hypothyroidism who presented with shortness of breath secondary to an acute COPD exacerbation in the setting of COVID-19 infection as well an acute on chronic CHF exacerbation. He is currently on one liter NC. He completed a course of prednisone; levalbuterol and ipratropium nebulizers are ordered PRN. He was also placed on IV Lasix which has since been changed to PO dosing. Remdesivir was not started given elevated LFTs for which a RUQ ultrasound was unremarkable for acute pathology. His liver enzymes improved with diuresis as they were likely elevated secondary to hepatic congestion. He was monitored for an episode of NSVT without any shock of his AICD. He was given a 250 cc NS bolus. He developed Afib with RVR 11/22 for which Cardiology has been consulted. Entresto  "and Aldactone have also been started; Entresto was held for hypotension. Patient started on amiodarone and was cardioverted 11/25/22.  Amiodarone discontinued 11/26/22. Patient remains on isolation per infection control policy (x 20 days), no fever an no symptoms. PT/OT consulted for assistance in dc planning.       Interval History: see "Hospital Course"    Review of Systems   Constitutional:  Negative for chills, fatigue and fever.   HENT:  Negative for congestion and sinus pressure.    Eyes:  Negative for pain and visual disturbance.   Respiratory:  Negative for cough, shortness of breath and wheezing.    Cardiovascular:  Negative for chest pain, palpitations and leg swelling.   Gastrointestinal:  Negative for abdominal pain, diarrhea, nausea and vomiting.   Genitourinary:  Negative for difficulty urinating, dysuria and hematuria.   Musculoskeletal:  Negative for arthralgias and myalgias.   Skin:  Negative for rash and wound.   Allergic/Immunologic: Positive for immunocompromised state.   Neurological:  Negative for dizziness, weakness, light-headedness and headaches.   Hematological:  Negative for adenopathy. Does not bruise/bleed easily.   Psychiatric/Behavioral:  Negative for confusion and dysphoric mood. The patient is not nervous/anxious.    All other systems reviewed and are negative.  Objective:     Vital Signs (Most Recent):  Temp: 96.7 °F (35.9 °C) (11/26/22 1108)  Pulse: 87 (11/26/22 1108)  Resp: 16 (11/26/22 1108)  BP: 110/68 (11/26/22 1108)  SpO2: 98 % (11/26/22 1108)   Vital Signs (24h Range):  Temp:  [96 °F (35.6 °C)-98.6 °F (37 °C)] 96.7 °F (35.9 °C)  Pulse:  [] 87  Resp:  [15-19] 16  SpO2:  [95 %-100 %] 98 %  BP: ()/(58-84) 110/68     Weight: 60.3 kg (133 lb)  Body mass index is 25.13 kg/m².    Intake/Output Summary (Last 24 hours) at 11/26/2022 1213  Last data filed at 11/26/2022 1136  Gross per 24 hour   Intake --   Output 400 ml   Net -400 ml        Physical Exam  Vitals and nursing " note reviewed.   Constitutional:       General: He is not in acute distress.     Appearance: He is not ill-appearing.   HENT:      Head: Normocephalic and atraumatic.      Right Ear: External ear normal.      Left Ear: External ear normal.      Nose: Nose normal.      Mouth/Throat:      Mouth: Mucous membranes are moist.      Pharynx: Oropharynx is clear. No oropharyngeal exudate.   Eyes:      General: No scleral icterus.        Right eye: No discharge.         Left eye: No discharge.      Extraocular Movements: Extraocular movements intact.      Conjunctiva/sclera: Conjunctivae normal.   Neck:      Thyroid: No thyromegaly.      Vascular: No JVD.   Cardiovascular:      Rate and Rhythm: Normal rate. Rhythm irregular.      Pulses: Normal pulses.      Heart sounds: Normal heart sounds.     No gallop.   Pulmonary:      Effort: Pulmonary effort is normal. No respiratory distress.      Breath sounds: No wheezing or rales.      Comments: NC.  Abdominal:      General: Bowel sounds are normal. There is no distension.      Palpations: Abdomen is soft. There is no mass.      Tenderness: There is no abdominal tenderness.   Musculoskeletal:         General: No deformity. Normal range of motion.      Cervical back: Normal range of motion and neck supple.   Lymphadenopathy:      Cervical: No cervical adenopathy.   Skin:     General: Skin is warm and dry.      Findings: No rash.   Neurological:      General: No focal deficit present.      Mental Status: He is alert and oriented to person, place, and time. Mental status is at baseline.   Psychiatric:         Mood and Affect: Mood normal.         Behavior: Behavior normal.       Significant Labs: All pertinent labs within the past 24 hours have been reviewed.    Significant Imaging: I have reviewed all pertinent imaging results/findings within the past 24 hours.      Assessment/Plan:      * Acute on chronic combined systolic and diastolic congestive heart  failure  -Telemetry  -Entresto held  -Coreg decreased to 6.25 BID  -PO Lasix held per Cardiology  -Aldactone  -Monitor I's and O's  -Keep K > 4 and Mg > 2    Macrocytic anemia  B12, folic acid and TSH within normal limits.  -Trend Hgb with CBC      Dyslipidemia  -Continue ASA and statin      Presence of Watchman left atrial appendage closure device  Noted.      Elevated LFTs  Unclear etiology. Possible hepatic congestion. Improved with diuresis.  -Trended LFTs with CMP    AICD (automatic cardioverter/defibrillator) present  Noted.  -Telemetry    COPD exacerbation  In setting of COVID-19 infection.  -S/p prednisone  -Held remdesivir given LFT elevation  -Trended inflammatory markers  -Airborne and isolation precautions  -O2 PRN  -Levalbuterol and ipratropium Q6H PRN      Hypothyroid  -Continue home Synthroid    COVID-19  -Airborne and isolation precautions until 12/4/22   -Decadron 6 transitioned to prednisone for COPD exacerbation; since discontinued  -Eliquis  -Held remdesivir  -Trended inflammatory markers    PAF (paroxysmal atrial fibrillation)  -Eliquis  -Coreg 6.25 BID  -Telemetry  -Cardiology consulted  -cardioversion 11/25/22         Benign essential HTN  -Continue Coreg at 6.25 BID  -Aldactone  -Entresto held  -Continue to monitor    Coronary artery disease  -Telemetry  -Coreg 6.25 BID  -ASA and Plavix  -Statin        VTE Risk Mitigation (From admission, onward)         Ordered     apixaban tablet 5 mg  2 times daily         11/17/22 2011     IP VTE HIGH RISK PATIENT  Once         11/16/22 1935     Place sequential compression device  Until discontinued         11/16/22 1935     Reason for No Pharmacological VTE Prophylaxis  Once        Question:  Reasons:  Answer:  Already adequately anticoagulated on oral Anticoagulants    11/16/22 1935                Discharge Planning   MACKENZIE: 11/27/2022     Code Status: Full Code   Is the patient medically ready for discharge?:     Reason for patient still in hospital  (select all that apply): Pending disposition  Discharge Plan A: Home                  Renu Beasley MD  Department of Hospital Medicine   [facility]

## 2022-11-26 NOTE — PLAN OF CARE
Plan of care discussed with pt. Patient verbalizes understanding. Pt A&Ox4. Telemetry monitoring. Encouraged pt drink fluids. Voiding yellow urine into urinal. Oxygen 1L via NC. IV fluids infusing as ordered. Bed in lowest position, wheels locked. Call light within reach.

## 2022-11-27 LAB
ALBUMIN SERPL BCP-MCNC: 2.6 G/DL (ref 3.5–5.2)
ALP SERPL-CCNC: 81 U/L (ref 55–135)
ALT SERPL W/O P-5'-P-CCNC: 89 U/L (ref 10–44)
ANION GAP SERPL CALC-SCNC: 13 MMOL/L (ref 8–16)
AST SERPL-CCNC: 41 U/L (ref 10–40)
BASOPHILS # BLD AUTO: 0 K/UL (ref 0–0.2)
BASOPHILS # BLD AUTO: 0.01 K/UL (ref 0–0.2)
BASOPHILS NFR BLD: 0 % (ref 0–1.9)
BASOPHILS NFR BLD: 0.1 % (ref 0–1.9)
BILIRUB SERPL-MCNC: 2.5 MG/DL (ref 0.1–1)
BUN SERPL-MCNC: 27 MG/DL (ref 8–23)
CALCIUM SERPL-MCNC: 7.9 MG/DL (ref 8.7–10.5)
CHLORIDE SERPL-SCNC: 99 MMOL/L (ref 95–110)
CO2 SERPL-SCNC: 21 MMOL/L (ref 23–29)
CREAT SERPL-MCNC: 0.9 MG/DL (ref 0.5–1.4)
DIFFERENTIAL METHOD: ABNORMAL
DIFFERENTIAL METHOD: ABNORMAL
EOSINOPHIL # BLD AUTO: 0 K/UL (ref 0–0.5)
EOSINOPHIL # BLD AUTO: 0 K/UL (ref 0–0.5)
EOSINOPHIL NFR BLD: 0 % (ref 0–8)
EOSINOPHIL NFR BLD: 0.1 % (ref 0–8)
ERYTHROCYTE [DISTWIDTH] IN BLOOD BY AUTOMATED COUNT: 17.5 % (ref 11.5–14.5)
ERYTHROCYTE [DISTWIDTH] IN BLOOD BY AUTOMATED COUNT: 17.6 % (ref 11.5–14.5)
EST. GFR  (NO RACE VARIABLE): >60 ML/MIN/1.73 M^2
GLUCOSE SERPL-MCNC: 88 MG/DL (ref 70–110)
HCT VFR BLD AUTO: 38.9 % (ref 40–54)
HCT VFR BLD AUTO: 41.5 % (ref 40–54)
HGB BLD-MCNC: 12.9 G/DL (ref 14–18)
HGB BLD-MCNC: 13.7 G/DL (ref 14–18)
IMM GRANULOCYTES # BLD AUTO: 0.04 K/UL (ref 0–0.04)
IMM GRANULOCYTES # BLD AUTO: 0.04 K/UL (ref 0–0.04)
IMM GRANULOCYTES NFR BLD AUTO: 0.4 % (ref 0–0.5)
IMM GRANULOCYTES NFR BLD AUTO: 0.4 % (ref 0–0.5)
INR PPP: 1.1 (ref 0.8–1.2)
LYMPHOCYTES # BLD AUTO: 0.6 K/UL (ref 1–4.8)
LYMPHOCYTES # BLD AUTO: 0.6 K/UL (ref 1–4.8)
LYMPHOCYTES NFR BLD: 5.9 % (ref 18–48)
LYMPHOCYTES NFR BLD: 6 % (ref 18–48)
MAGNESIUM SERPL-MCNC: 2.2 MG/DL (ref 1.6–2.6)
MCH RBC QN AUTO: 33.6 PG (ref 27–31)
MCH RBC QN AUTO: 33.9 PG (ref 27–31)
MCHC RBC AUTO-ENTMCNC: 33 G/DL (ref 32–36)
MCHC RBC AUTO-ENTMCNC: 33.2 G/DL (ref 32–36)
MCV RBC AUTO: 101 FL (ref 82–98)
MCV RBC AUTO: 103 FL (ref 82–98)
MONOCYTES # BLD AUTO: 0.7 K/UL (ref 0.3–1)
MONOCYTES # BLD AUTO: 0.8 K/UL (ref 0.3–1)
MONOCYTES NFR BLD: 6.9 % (ref 4–15)
MONOCYTES NFR BLD: 8.5 % (ref 4–15)
NEUTROPHILS # BLD AUTO: 8 K/UL (ref 1.8–7.7)
NEUTROPHILS # BLD AUTO: 9.1 K/UL (ref 1.8–7.7)
NEUTROPHILS NFR BLD: 85.1 % (ref 38–73)
NEUTROPHILS NFR BLD: 86.6 % (ref 38–73)
NRBC BLD-RTO: 0 /100 WBC
NRBC BLD-RTO: 0 /100 WBC
PHOSPHATE SERPL-MCNC: 2.7 MG/DL (ref 2.7–4.5)
PLATELET # BLD AUTO: 145 K/UL (ref 150–450)
PLATELET # BLD AUTO: 157 K/UL (ref 150–450)
PMV BLD AUTO: 9.5 FL (ref 9.2–12.9)
PMV BLD AUTO: 9.8 FL (ref 9.2–12.9)
POCT GLUCOSE: 120 MG/DL (ref 70–110)
POCT GLUCOSE: 79 MG/DL (ref 70–110)
POTASSIUM SERPL-SCNC: 4.4 MMOL/L (ref 3.5–5.1)
PROT SERPL-MCNC: 5.6 G/DL (ref 6–8.4)
PROTHROMBIN TIME: 11.8 SEC (ref 9–12.5)
RBC # BLD AUTO: 3.84 M/UL (ref 4.6–6.2)
RBC # BLD AUTO: 4.04 M/UL (ref 4.6–6.2)
SODIUM SERPL-SCNC: 133 MMOL/L (ref 136–145)
WBC # BLD AUTO: 10.48 K/UL (ref 3.9–12.7)
WBC # BLD AUTO: 9.4 K/UL (ref 3.9–12.7)

## 2022-11-27 PROCEDURE — 84100 ASSAY OF PHOSPHORUS: CPT | Performed by: STUDENT IN AN ORGANIZED HEALTH CARE EDUCATION/TRAINING PROGRAM

## 2022-11-27 PROCEDURE — 25000003 PHARM REV CODE 250: Performed by: HOSPITALIST

## 2022-11-27 PROCEDURE — 99233 PR SUBSEQUENT HOSPITAL CARE,LEVL III: ICD-10-PCS | Mod: ,,, | Performed by: INTERNAL MEDICINE

## 2022-11-27 PROCEDURE — 36415 COLL VENOUS BLD VENIPUNCTURE: CPT | Performed by: HOSPITALIST

## 2022-11-27 PROCEDURE — 25000003 PHARM REV CODE 250: Performed by: SPECIALIST

## 2022-11-27 PROCEDURE — 99233 SBSQ HOSP IP/OBS HIGH 50: CPT | Mod: ,,, | Performed by: INTERNAL MEDICINE

## 2022-11-27 PROCEDURE — 11000001 HC ACUTE MED/SURG PRIVATE ROOM

## 2022-11-27 PROCEDURE — 94761 N-INVAS EAR/PLS OXIMETRY MLT: CPT

## 2022-11-27 PROCEDURE — 99900035 HC TECH TIME PER 15 MIN (STAT)

## 2022-11-27 PROCEDURE — 27000207 HC ISOLATION

## 2022-11-27 PROCEDURE — 83735 ASSAY OF MAGNESIUM: CPT | Performed by: STUDENT IN AN ORGANIZED HEALTH CARE EDUCATION/TRAINING PROGRAM

## 2022-11-27 PROCEDURE — 85610 PROTHROMBIN TIME: CPT | Performed by: HOSPITALIST

## 2022-11-27 PROCEDURE — 36415 COLL VENOUS BLD VENIPUNCTURE: CPT | Performed by: NURSE PRACTITIONER

## 2022-11-27 PROCEDURE — 80053 COMPREHEN METABOLIC PANEL: CPT | Performed by: STUDENT IN AN ORGANIZED HEALTH CARE EDUCATION/TRAINING PROGRAM

## 2022-11-27 PROCEDURE — 36415 COLL VENOUS BLD VENIPUNCTURE: CPT | Performed by: STUDENT IN AN ORGANIZED HEALTH CARE EDUCATION/TRAINING PROGRAM

## 2022-11-27 PROCEDURE — 27000221 HC OXYGEN, UP TO 24 HOURS

## 2022-11-27 PROCEDURE — 85025 COMPLETE CBC W/AUTO DIFF WBC: CPT | Mod: 91 | Performed by: NURSE PRACTITIONER

## 2022-11-27 PROCEDURE — 85025 COMPLETE CBC W/AUTO DIFF WBC: CPT | Performed by: STUDENT IN AN ORGANIZED HEALTH CARE EDUCATION/TRAINING PROGRAM

## 2022-11-27 PROCEDURE — 63600175 PHARM REV CODE 636 W HCPCS: Performed by: HOSPITALIST

## 2022-11-27 RX ORDER — PANTOPRAZOLE SODIUM 40 MG/1
40 TABLET, DELAYED RELEASE ORAL DAILY
Status: DISCONTINUED | OUTPATIENT
Start: 2022-11-27 | End: 2022-11-28 | Stop reason: HOSPADM

## 2022-11-27 RX ORDER — FUROSEMIDE 10 MG/ML
40 INJECTION INTRAMUSCULAR; INTRAVENOUS ONCE
Status: COMPLETED | OUTPATIENT
Start: 2022-11-27 | End: 2022-11-27

## 2022-11-27 RX ADMIN — CARVEDILOL 6.25 MG: 6.25 TABLET, FILM COATED ORAL at 09:11

## 2022-11-27 RX ADMIN — SACUBITRIL AND VALSARTAN 1 TABLET: 24; 26 TABLET, FILM COATED ORAL at 09:11

## 2022-11-27 RX ADMIN — FUROSEMIDE 40 MG: 10 INJECTION, SOLUTION INTRAMUSCULAR; INTRAVENOUS at 09:11

## 2022-11-27 RX ADMIN — ATORVASTATIN CALCIUM 80 MG: 40 TABLET, FILM COATED ORAL at 09:11

## 2022-11-27 RX ADMIN — ASPIRIN 81 MG: 81 TABLET, COATED ORAL at 09:11

## 2022-11-27 RX ADMIN — TRAVOPROST 1 DROP: 0.04 SOLUTION/ DROPS OPHTHALMIC at 08:11

## 2022-11-27 RX ADMIN — PANTOPRAZOLE SODIUM 40 MG: 40 TABLET, DELAYED RELEASE ORAL at 09:11

## 2022-11-27 RX ADMIN — SPIRONOLACTONE 25 MG: 25 TABLET ORAL at 09:11

## 2022-11-27 NOTE — CARE UPDATE
11/26/22 1954   Patient Assessment/Suction   Level of Consciousness (AVPU) alert   Respiratory Effort Normal;Unlabored   Expansion/Accessory Muscles/Retractions expansion symmetric;no retractions;no use of accessory muscles   PRE-TX-O2   O2 Device (Oxygen Therapy) nasal cannula   Flow (L/min) 1   Oxygen Concentration (%) 24   SpO2 97 %   Pulse Oximetry Type Intermittent   Pulse 85   Resp 18   Aerosol Therapy   $ Aerosol Therapy Charges PRN treatment not required   Respiratory Treatment Status (SVN) PRN treatment not required   Ready to Wean/Extubation Screen   FIO2<=50 (chart decimal) 0.24

## 2022-11-27 NOTE — PLAN OF CARE
POC discussed with patient, verbalized understanding. IV to left arm remains intact and patent with iv fluids infusing without difficulty. Pt was educated regarding pulmonary hygiene. Voiding per urinal without difficulty. Pt denies pain. Safety measures maintained with side rails up and slip resistant socks on, call light in reach, pt instructed to call for needs.

## 2022-11-27 NOTE — PROGRESS NOTES
[facility] Hospital Medicine  Progress Note    Patient Name: Cleveland Capps  MRN: [id number]  Patient Class: IP- Inpatient   Admission Date: 11/16/2022  Length of Stay: 6 days  Attending Physician: Renu Beasley MD  Primary Care Provider: Romero Snyder MD        Subjective:     Principal Problem:Acute on chronic combined systolic and diastolic congestive heart failure        HPI:  Pt presented to ED with shortness of breath.  Began a few days prior.  Associated with cough and congestion.   He sought evaluation from his primary care provider and was diagnosed with SARS-CoV-2 infection.  He was put on Paxlovid, of which he took a couple of days' worth.  He didn't improve.  Cough is nonproductive.  No chest pain.  No fevers.  No chills. No orthopnea.  He has CAD, COPD, and is a former smoker.  Upon presenting to the ED, he was hypotensive but responded to crystalloid infusions.  Hospital observation requested for more hydration and treatment of mild COPD exacerbation.      Overview/Hospital Course:  Cleveland Capps is a 67 year old male with a past medical history of combined CHF s/p AICD, CAD s/p PCI, Afib, HTN, HLD, DM, and hypothyroidism who presented with shortness of breath secondary to an acute COPD exacerbation in the setting of COVID-19 infection as well an acute on chronic CHF exacerbation. He is currently on one liter NC. He completed a course of prednisone; levalbuterol and ipratropium nebulizers are ordered PRN. He was also placed on IV Lasix which has since been changed to PO dosing. Remdesivir was not started given elevated LFTs for which a RUQ ultrasound was unremarkable for acute pathology. His liver enzymes improved with diuresis as they were likely elevated secondary to hepatic congestion. He was monitored for an episode of NSVT without any shock of his AICD. He was given a 250 cc NS bolus. He developed Afib with RVR 11/22 for which Cardiology has been consulted. Entresto  "and Aldactone have also been started; Entresto was held for hypotension. Patient started on amiodarone and was cardioverted 11/25/22.  Amiodarone discontinued 11/26/22. Patient remains on isolation per infection control policy (x 20 days), no fever an no symptoms. PT/OT consulted for assistance in dc planning. Bloody sputum and blood streaked bowel movement reported. Plavix and eliquis held.      Interval History: see "Hospital Course"    Review of Systems   Constitutional:  Negative for chills, fatigue and fever.   HENT:  Negative for congestion and sinus pressure.    Eyes:  Negative for pain and visual disturbance.   Respiratory:  Negative for cough, shortness of breath and wheezing.    Cardiovascular:  Negative for chest pain, palpitations and leg swelling.   Gastrointestinal:  Negative for abdominal pain, diarrhea, nausea and vomiting.   Genitourinary:  Negative for difficulty urinating, dysuria and hematuria.   Musculoskeletal:  Negative for arthralgias and myalgias.   Skin:  Negative for rash and wound.   Allergic/Immunologic: Positive for immunocompromised state.   Neurological:  Negative for dizziness, weakness, light-headedness and headaches.   Hematological:  Negative for adenopathy. Does not bruise/bleed easily.   Psychiatric/Behavioral:  Negative for confusion and dysphoric mood. The patient is not nervous/anxious.    All other systems reviewed and are negative.  Objective:     Vital Signs (Most Recent):  Temp: 97 °F (36.1 °C) (11/27/22 1456)  Pulse: 79 (11/27/22 1456)  Resp: 16 (11/27/22 1456)  BP: (!) 90/57 (11/27/22 1456)  SpO2: 97 % (11/27/22 1456)   Vital Signs (24h Range):  Temp:  [96.3 °F (35.7 °C)-97.6 °F (36.4 °C)] 97 °F (36.1 °C)  Pulse:  [] 79  Resp:  [16-20] 16  SpO2:  [96 %-98 %] 97 %  BP: ()/(57-76) 90/57     Weight: 60.3 kg (133 lb)  Body mass index is 25.13 kg/m².    Intake/Output Summary (Last 24 hours) at 11/27/2022 9019  Last data filed at 11/27/2022 1616  Gross per 24 hour "   Intake 916.91 ml   Output 1500 ml   Net -583.09 ml        Physical Exam  Vitals and nursing note reviewed.   Constitutional:       General: He is not in acute distress.     Appearance: He is not ill-appearing.   HENT:      Head: Normocephalic and atraumatic.      Right Ear: External ear normal.      Left Ear: External ear normal.      Nose: Nose normal.      Mouth/Throat:      Mouth: Mucous membranes are moist.      Pharynx: Oropharynx is clear. No oropharyngeal exudate.   Eyes:      General: No scleral icterus.        Right eye: No discharge.         Left eye: No discharge.      Extraocular Movements: Extraocular movements intact.      Conjunctiva/sclera: Conjunctivae normal.   Neck:      Thyroid: No thyromegaly.      Vascular: No JVD.   Cardiovascular:      Rate and Rhythm: Normal rate. Rhythm irregular.      Pulses: Normal pulses.      Heart sounds: Normal heart sounds.     No gallop.   Pulmonary:      Effort: Pulmonary effort is normal. No respiratory distress.      Breath sounds: No wheezing or rales.      Comments: NC.  Abdominal:      General: Bowel sounds are normal. There is no distension.      Palpations: Abdomen is soft. There is no mass.      Tenderness: There is no abdominal tenderness.   Musculoskeletal:         General: No deformity. Normal range of motion.      Cervical back: Normal range of motion and neck supple.   Lymphadenopathy:      Cervical: No cervical adenopathy.   Skin:     General: Skin is warm and dry.      Findings: No rash.   Neurological:      General: No focal deficit present.      Mental Status: He is alert and oriented to person, place, and time. Mental status is at baseline.   Psychiatric:         Mood and Affect: Mood normal.         Behavior: Behavior normal.       Significant Labs: All pertinent labs within the past 24 hours have been reviewed.    Significant Imaging: I have reviewed all pertinent imaging results/findings within the past 24 hours.      Assessment/Plan:      *  Acute on chronic combined systolic and diastolic congestive heart failure  -Telemetry  -Entresto   -Coreg decreased to 6.25 BID  -Lasix IV given with worsening CXR   -Aldactone  -Monitor I's and O's  -Keep K > 4 and Mg > 2    Macrocytic anemia  B12, folic acid and TSH within normal limits.  -Trend Hgb with CBC      Dyslipidemia  -Continue ASA and statin      Presence of Watchman left atrial appendage closure device  Noted.      Elevated LFTs  Unclear etiology. Possible hepatic congestion. Improved with diuresis.  -Trended LFTs with CMP    AICD (automatic cardioverter/defibrillator) present  Noted.  -Telemetry    COPD exacerbation  In setting of COVID-19 infection.  -S/p prednisone  -Held remdesivir given LFT elevation  -Trended inflammatory markers  -Airborne and isolation precautions  -O2 PRN  -Levalbuterol and ipratropium Q6H PRN      Hypothyroid  -Continue home Synthroid    COVID-19  -Airborne and isolation precautions until 12/4/22   -Decadron 6 transitioned to prednisone for COPD exacerbation; since discontinued  -Eliquis  -Held remdesivir  -Trended inflammatory markers    PAF (paroxysmal atrial fibrillation)  -Eliquis- held due to coughing blood   -Coreg 6.25 BID  -Telemetry  -Cardiology consulted  -cardioversion 11/25/22         Benign essential HTN  -Continue Coreg at 6.25 BID  -Aldactone  -Entresto held  -Continue to monitor    Coronary artery disease  -Telemetry  -Coreg 6.25 BID  -ASA and Plavix - held due to reported coughing blood   -Statin        VTE Risk Mitigation (From admission, onward)         Ordered     apixaban tablet 5 mg  2 times daily         11/17/22 2011     IP VTE HIGH RISK PATIENT  Once         11/16/22 1935     Place sequential compression device  Until discontinued         11/16/22 1935     Reason for No Pharmacological VTE Prophylaxis  Once        Question:  Reasons:  Answer:  Already adequately anticoagulated on oral Anticoagulants    11/16/22 1935                Discharge Planning    MACKENZIE: 11/27/2022     Code Status: Full Code   Is the patient medically ready for discharge?:     Reason for patient still in hospital (select all that apply): Patient trending condition  Discharge Plan A: Home                  Renu Beasley MD  Department of Hospital Medicine   [facility]

## 2022-11-27 NOTE — HOSPITAL COURSE
11/27/2022:  -Mr. Capps reports small amount of blood in his stool, and coughing up a small amount of blood overnight  -Eliquis held last night and today  -H/H stable at 14/42  -Pt cardioverted on 11/25 but reverted back to atrial fibrillation on 11/26/2022  -Amiodarone discontinued, and pt restarted on Entresto 24/26 mg bid on 11/26/2022

## 2022-11-27 NOTE — NURSING
Pt encouraged to do deep breathing and coughing. Incentive spirometer given to patient and instructed on use with return demonstration. Pt also encouraged to lie in prone position if tolerable for him but if not able to lie on side and alternate frequently. Pt verbalized understanding.

## 2022-11-27 NOTE — PLAN OF CARE
Problem: Adult Inpatient Plan of Care  Goal: Patient-Specific Goal (Individualized)  Outcome: Ongoing, Progressing  Goal: Optimal Comfort and Wellbeing  Outcome: Ongoing, Progressing     Problem: Fluid Imbalance (Pneumonia)  Goal: Fluid Balance  Outcome: Ongoing, Progressing

## 2022-11-27 NOTE — SUBJECTIVE & OBJECTIVE
"Interval History: see "Hospital Course"    Review of Systems   Constitutional:  Negative for chills, fatigue and fever.   HENT:  Negative for congestion and sinus pressure.    Eyes:  Negative for pain and visual disturbance.   Respiratory:  Negative for cough, shortness of breath and wheezing.    Cardiovascular:  Negative for chest pain, palpitations and leg swelling.   Gastrointestinal:  Negative for abdominal pain, diarrhea, nausea and vomiting.   Genitourinary:  Negative for difficulty urinating, dysuria and hematuria.   Musculoskeletal:  Negative for arthralgias and myalgias.   Skin:  Negative for rash and wound.   Allergic/Immunologic: Positive for immunocompromised state.   Neurological:  Negative for dizziness, weakness, light-headedness and headaches.   Hematological:  Negative for adenopathy. Does not bruise/bleed easily.   Psychiatric/Behavioral:  Negative for confusion and dysphoric mood. The patient is not nervous/anxious.    All other systems reviewed and are negative.  Objective:     Vital Signs (Most Recent):  Temp: 97 °F (36.1 °C) (11/27/22 1456)  Pulse: 79 (11/27/22 1456)  Resp: 16 (11/27/22 1456)  BP: (!) 90/57 (11/27/22 1456)  SpO2: 97 % (11/27/22 1456)   Vital Signs (24h Range):  Temp:  [96.3 °F (35.7 °C)-97.6 °F (36.4 °C)] 97 °F (36.1 °C)  Pulse:  [] 79  Resp:  [16-20] 16  SpO2:  [96 %-98 %] 97 %  BP: ()/(57-76) 90/57     Weight: 60.3 kg (133 lb)  Body mass index is 25.13 kg/m².    Intake/Output Summary (Last 24 hours) at 11/27/2022 1753  Last data filed at 11/27/2022 1616  Gross per 24 hour   Intake 916.91 ml   Output 1500 ml   Net -583.09 ml        Physical Exam  Vitals and nursing note reviewed.   Constitutional:       General: He is not in acute distress.     Appearance: He is not ill-appearing.   HENT:      Head: Normocephalic and atraumatic.      Right Ear: External ear normal.      Left Ear: External ear normal.      Nose: Nose normal.      Mouth/Throat:      Mouth: Mucous " membranes are moist.      Pharynx: Oropharynx is clear. No oropharyngeal exudate.   Eyes:      General: No scleral icterus.        Right eye: No discharge.         Left eye: No discharge.      Extraocular Movements: Extraocular movements intact.      Conjunctiva/sclera: Conjunctivae normal.   Neck:      Thyroid: No thyromegaly.      Vascular: No JVD.   Cardiovascular:      Rate and Rhythm: Normal rate. Rhythm irregular.      Pulses: Normal pulses.      Heart sounds: Normal heart sounds.     No gallop.   Pulmonary:      Effort: Pulmonary effort is normal. No respiratory distress.      Breath sounds: No wheezing or rales.      Comments: NC.  Abdominal:      General: Bowel sounds are normal. There is no distension.      Palpations: Abdomen is soft. There is no mass.      Tenderness: There is no abdominal tenderness.   Musculoskeletal:         General: No deformity. Normal range of motion.      Cervical back: Normal range of motion and neck supple.   Lymphadenopathy:      Cervical: No cervical adenopathy.   Skin:     General: Skin is warm and dry.      Findings: No rash.   Neurological:      General: No focal deficit present.      Mental Status: He is alert and oriented to person, place, and time. Mental status is at baseline.   Psychiatric:         Mood and Affect: Mood normal.         Behavior: Behavior normal.       Significant Labs: All pertinent labs within the past 24 hours have been reviewed.    Significant Imaging: I have reviewed all pertinent imaging results/findings within the past 24 hours.

## 2022-11-27 NOTE — SUBJECTIVE & OBJECTIVE
Objective:     Vital Signs (Most Recent):  Temp: 97 °F (36.1 °C) (11/27/22 1456)  Pulse: 79 (11/27/22 1456)  Resp: 16 (11/27/22 1456)  BP: (!) 90/57 (11/27/22 1456)  SpO2: 97 % (11/27/22 1456)   Vital Signs (24h Range):  Temp:  [96.3 °F (35.7 °C)-97.6 °F (36.4 °C)] 97 °F (36.1 °C)  Pulse:  [] 79  Resp:  [16-20] 16  SpO2:  [96 %-98 %] 97 %  BP: ()/(57-76) 90/57     Weight: 60.3 kg (133 lb)  Body mass index is 25.13 kg/m².     SpO2: 97 %  O2 Device (Oxygen Therapy): nasal cannula      Intake/Output Summary (Last 24 hours) at 11/27/2022 1729  Last data filed at 11/27/2022 1616  Gross per 24 hour   Intake 2719.97 ml   Output 1500 ml   Net 1219.97 ml       Lines/Drains/Airways       Peripheral Intravenous Line  Duration                  Peripheral IV - Single Lumen 11/20/22 0037 20 G Left;Proximal;Lateral;Anterior Forearm 7 days         Peripheral IV - Single Lumen 11/25/22 0541 22 G Left Antecubital 2 days                    Physical Exam  Vitals and nursing note reviewed.   Constitutional:       General: He is not in acute distress.  HENT:      Head: Normocephalic and atraumatic.      Nose: Nose normal.      Mouth/Throat:      Mouth: Mucous membranes are moist.   Eyes:      Extraocular Movements: Extraocular movements intact.      Pupils: Pupils are equal, round, and reactive to light.   Cardiovascular:      Rate and Rhythm: Normal rate. Rhythm irregular.      Heart sounds: No murmur heard.    No friction rub. No gallop.   Pulmonary:      Effort: Pulmonary effort is normal.      Breath sounds: No wheezing or rales.   Abdominal:      General: Bowel sounds are normal.      Palpations: Abdomen is soft.   Musculoskeletal:      Right lower leg: No edema.      Left lower leg: No edema.   Skin:     General: Skin is warm and dry.   Neurological:      General: No focal deficit present.      Mental Status: He is alert and oriented to person, place, and time.   Psychiatric:         Mood and Affect: Mood normal.          Behavior: Behavior normal.       Significant Labs: BMP:   Recent Labs   Lab 11/26/22  0502 11/27/22  0428    88   * 133*   K 4.4 4.4    99   CO2 23 21*   BUN 25* 27*   CREATININE 0.8 0.9   CALCIUM 7.8* 7.9*   MG 2.2 2.2   , CMP   Recent Labs   Lab 11/26/22  0502 11/27/22  0428   * 133*   K 4.4 4.4    99   CO2 23 21*    88   BUN 25* 27*   CREATININE 0.8 0.9   CALCIUM 7.8* 7.9*   PROT 5.5* 5.6*   ALBUMIN 2.7* 2.6*   BILITOT 2.0* 2.5*   ALKPHOS 80 81   AST 34 41*   ALT 97* 89*   ANIONGAP 9 13   , CBC   Recent Labs   Lab 11/26/22  0502 11/27/22  0428   WBC 9.83 9.40   HGB 13.2* 13.7*   HCT 39.7* 41.5    157   , INR   Recent Labs   Lab 11/27/22  0906   INR 1.1   , Lipid Panel No results for input(s): CHOL, HDL, LDLCALC, TRIG, CHOLHDL in the last 48 hours., Troponin No results for input(s): TROPONINI in the last 48 hours., and All pertinent lab results from the last 24 hours have been reviewed.    Significant Imaging: Echocardiogram: Transthoracic echo (TTE) complete (Cupid Only):   Results for orders placed or performed during the hospital encounter of 06/27/22   Echo   Result Value Ref Range    BSA 1.69 m2    TDI SEPTAL 0.03 m/s    LV LATERAL E/E' RATIO 15.86 m/s    LV SEPTAL E/E' RATIO 37.00 m/s    LA WIDTH 4.77 cm    TDI LATERAL 0.07 m/s    LVIDd 6.53 (A) 3.5 - 6.0 cm    IVS 0.55 (A) 0.6 - 1.1 cm    Posterior Wall 0.94 0.6 - 1.1 cm    LVIDs 5.78 (A) 2.1 - 4.0 cm    FS 11 28 - 44 %    LA volume 133.72 cm3    Sinus 2.86 cm    STJ 2.45 cm    Ascending aorta 2.85 cm    LV mass 198.12 g    LA size 5.32 cm    RVDD 4.49 cm    TAPSE 0.89 cm    RV S' 5.40 cm/s    Left Ventricle Relative Wall Thickness 0.29 cm    AV mean gradient 3 mmHg    AV valve area 2.05 cm2    AV Velocity Ratio 0.61     AV index (prosthetic) 0.65     MV mean gradient 1 mmHg    MV valve area by continuity eq 1.62 cm2    Mean e' 0.05 m/s    LVOT diameter 2.01 cm    LVOT area 3.2 cm2    LVOT peak shonda 0.68 m/s     LVOT peak VTI 12.09 cm    Ao peak nikhil 1.11 m/s    Ao VTI 18.66 cm    Radius 0.92 cm    Mr max nikhil 0.04 m/s    LVOT stroke volume 38.34 cm3    AV peak gradient 5 mmHg    MV peak gradient 7 mmHg    E/E' ratio 22.20 m/s    MV Peak E Nikhil 1.11 m/s    PISA TR VN Nyquist 0.00 m/s    TR Max Nikhil 3.13 m/s    MV VTI 23.74 cm    LV Systolic Volume 164.96 mL    LV Systolic Volume Index 100.0 mL/m2    LV Diastolic Volume 218.08 mL    LV Diastolic Volume Index 132.17 mL/m2    LA Volume Index 81.0 mL/m2    LV Mass Index 120 g/m2    RA Major Axis 5.19 cm    Left Atrium Minor Axis 6.15 cm    Left Atrium Major Axis 6.25 cm    Triscuspid Valve Regurgitation Peak Gradient 39 mmHg    LA Volume Index (Mod) 62.5 mL/m2    LA volume (mod) 103.17 cm3    RA Width 4.17 cm    Right Atrial Pressure (from IVC) 8 mmHg    EF 25 %    TV rest pulmonary artery pressure 47 mmHg    Narrative    · The left ventricle is severely enlarged with mild eccentric hypertrophy   and severely decreased systolic function.  · The estimated ejection fraction is 25%.  · There are segmental left ventricular wall motion abnormalities.  · Left ventricular diastolic dysfunction.  · Mild right ventricular enlargement with mildly to moderately reduced   right ventricular systolic function.  · Severe left atrial enlargement.  · Mild right atrial enlargement.  · The MR is moderate to moderate -severe ( 2-3+).  · Mild to moderate tricuspid regurgitation.  · Mild pulmonic regurgitation.  · Intermediate central venous pressure (8 mmHg).  · The estimated PA systolic pressure is 47 mmHg.  · There is pulmonary hypertension.

## 2022-11-27 NOTE — ASSESSMENT & PLAN NOTE
-Telemetry  -Entresto   -Coreg decreased to 6.25 BID  -Lasix IV given with worsening CXR   -Aldactone  -Monitor I's and O's  -Keep K > 4 and Mg > 2

## 2022-11-27 NOTE — PROGRESS NOTES
[facility]  Cardiology  Progress Note    Patient Name: Cleveland Capps  MRN: [id number]  Admission Date: 11/16/2022  Hospital Length of Stay: 6 days  Code Status: Full Code   Attending Physician: Renu Beasley MD   Primary Care Physician: Romero Snyder MD  Expected Discharge Date: 11/27/2022  Principal Problem:Acute on chronic combined systolic and diastolic congestive heart failure    Subjective:     Hospital Course:   11/27/2022:  -Mr. Capps reports small amount of blood in his stool, and coughing up a small amount of blood overnight  -Eliquis held last night and today  -H/H stable at 14/42  -Pt cardioverted on 11/25 but reverted back to atrial fibrillation on 11/26/2022  -Amiodarone discontinued, and pt restarted on Entresto 24/26 mg bid on 11/26/2022    Objective:     Vital Signs (Most Recent):  Temp: 97 °F (36.1 °C) (11/27/22 1456)  Pulse: 79 (11/27/22 1456)  Resp: 16 (11/27/22 1456)  BP: (!) 90/57 (11/27/22 1456)  SpO2: 97 % (11/27/22 1456)   Vital Signs (24h Range):  Temp:  [96.3 °F (35.7 °C)-97.6 °F (36.4 °C)] 97 °F (36.1 °C)  Pulse:  [] 79  Resp:  [16-20] 16  SpO2:  [96 %-98 %] 97 %  BP: ()/(57-76) 90/57     Weight: 60.3 kg (133 lb)  Body mass index is 25.13 kg/m².     SpO2: 97 %  O2 Device (Oxygen Therapy): nasal cannula      Intake/Output Summary (Last 24 hours) at 11/27/2022 1729  Last data filed at 11/27/2022 1616  Gross per 24 hour   Intake 2719.97 ml   Output 1500 ml   Net 1219.97 ml       Lines/Drains/Airways       Peripheral Intravenous Line  Duration                  Peripheral IV - Single Lumen 11/20/22 0037 20 G Left;Proximal;Lateral;Anterior Forearm 7 days         Peripheral IV - Single Lumen 11/25/22 0541 22 G Left Antecubital 2 days                    Physical Exam  Vitals and nursing note reviewed.   Constitutional:       General: He is not in acute distress.  HENT:      Head: Normocephalic and atraumatic.      Nose: Nose normal.      Mouth/Throat:       Mouth: Mucous membranes are moist.   Eyes:      Extraocular Movements: Extraocular movements intact.      Pupils: Pupils are equal, round, and reactive to light.   Cardiovascular:      Rate and Rhythm: Normal rate. Rhythm irregular.      Heart sounds: No murmur heard.    No friction rub. No gallop.   Pulmonary:      Effort: Pulmonary effort is normal.      Breath sounds: No wheezing or rales.   Abdominal:      General: Bowel sounds are normal.      Palpations: Abdomen is soft.   Musculoskeletal:      Right lower leg: No edema.      Left lower leg: No edema.   Skin:     General: Skin is warm and dry.   Neurological:      General: No focal deficit present.      Mental Status: He is alert and oriented to person, place, and time.   Psychiatric:         Mood and Affect: Mood normal.         Behavior: Behavior normal.       Significant Labs: BMP:   Recent Labs   Lab 11/26/22  0502 11/27/22 0428    88   * 133*   K 4.4 4.4    99   CO2 23 21*   BUN 25* 27*   CREATININE 0.8 0.9   CALCIUM 7.8* 7.9*   MG 2.2 2.2   , CMP   Recent Labs   Lab 11/26/22  0502 11/27/22 0428   * 133*   K 4.4 4.4    99   CO2 23 21*    88   BUN 25* 27*   CREATININE 0.8 0.9   CALCIUM 7.8* 7.9*   PROT 5.5* 5.6*   ALBUMIN 2.7* 2.6*   BILITOT 2.0* 2.5*   ALKPHOS 80 81   AST 34 41*   ALT 97* 89*   ANIONGAP 9 13   , CBC   Recent Labs   Lab 11/26/22  0502 11/27/22 0428   WBC 9.83 9.40   HGB 13.2* 13.7*   HCT 39.7* 41.5    157   , INR   Recent Labs   Lab 11/27/22  0906   INR 1.1   , Lipid Panel No results for input(s): CHOL, HDL, LDLCALC, TRIG, CHOLHDL in the last 48 hours., Troponin No results for input(s): TROPONINI in the last 48 hours., and All pertinent lab results from the last 24 hours have been reviewed.    Significant Imaging: Echocardiogram: Transthoracic echo (TTE) complete (Cupid Only):   Results for orders placed or performed during the hospital encounter of 06/27/22   Echo   Result Value Ref Range     BSA 1.69 m2    TDI SEPTAL 0.03 m/s    LV LATERAL E/E' RATIO 15.86 m/s    LV SEPTAL E/E' RATIO 37.00 m/s    LA WIDTH 4.77 cm    TDI LATERAL 0.07 m/s    LVIDd 6.53 (A) 3.5 - 6.0 cm    IVS 0.55 (A) 0.6 - 1.1 cm    Posterior Wall 0.94 0.6 - 1.1 cm    LVIDs 5.78 (A) 2.1 - 4.0 cm    FS 11 28 - 44 %    LA volume 133.72 cm3    Sinus 2.86 cm    STJ 2.45 cm    Ascending aorta 2.85 cm    LV mass 198.12 g    LA size 5.32 cm    RVDD 4.49 cm    TAPSE 0.89 cm    RV S' 5.40 cm/s    Left Ventricle Relative Wall Thickness 0.29 cm    AV mean gradient 3 mmHg    AV valve area 2.05 cm2    AV Velocity Ratio 0.61     AV index (prosthetic) 0.65     MV mean gradient 1 mmHg    MV valve area by continuity eq 1.62 cm2    Mean e' 0.05 m/s    LVOT diameter 2.01 cm    LVOT area 3.2 cm2    LVOT peak nikhil 0.68 m/s    LVOT peak VTI 12.09 cm    Ao peak nikhil 1.11 m/s    Ao VTI 18.66 cm    Radius 0.92 cm    Mr max nikhil 0.04 m/s    LVOT stroke volume 38.34 cm3    AV peak gradient 5 mmHg    MV peak gradient 7 mmHg    E/E' ratio 22.20 m/s    MV Peak E Nikhil 1.11 m/s    PISA TR VN Nyquist 0.00 m/s    TR Max Nikhil 3.13 m/s    MV VTI 23.74 cm    LV Systolic Volume 164.96 mL    LV Systolic Volume Index 100.0 mL/m2    LV Diastolic Volume 218.08 mL    LV Diastolic Volume Index 132.17 mL/m2    LA Volume Index 81.0 mL/m2    LV Mass Index 120 g/m2    RA Major Axis 5.19 cm    Left Atrium Minor Axis 6.15 cm    Left Atrium Major Axis 6.25 cm    Triscuspid Valve Regurgitation Peak Gradient 39 mmHg    LA Volume Index (Mod) 62.5 mL/m2    LA volume (mod) 103.17 cm3    RA Width 4.17 cm    Right Atrial Pressure (from IVC) 8 mmHg    EF 25 %    TV rest pulmonary artery pressure 47 mmHg    Narrative    · The left ventricle is severely enlarged with mild eccentric hypertrophy   and severely decreased systolic function.  · The estimated ejection fraction is 25%.  · There are segmental left ventricular wall motion abnormalities.  · Left ventricular diastolic dysfunction.  · Mild  right ventricular enlargement with mildly to moderately reduced   right ventricular systolic function.  · Severe left atrial enlargement.  · Mild right atrial enlargement.  · The MR is moderate to moderate -severe ( 2-3+).  · Mild to moderate tricuspid regurgitation.  · Mild pulmonic regurgitation.  · Intermediate central venous pressure (8 mmHg).  · The estimated PA systolic pressure is 47 mmHg.  · There is pulmonary hypertension.        Assessment and Plan:     Hypertension  Coronary artery disease  Presence of Watchman left atrial appendage closure device   Elevated LFTs   AICD present  COPD exacerbation  Atrial fibrillation with RVR  Paroxysmal atrial fibrillation  Acute on chronic systolic diastolic congestive heart failure  Chronic persistent atrial fibrillation    RECOMMENDATIONS:  Patient has chronic persistent atrial fibrillation  Continue Entresto 24/26 mg bid and carvedilol 6.25 mg bid (okay to adjust carvedilol dosage as needed for hypotension)  If H/H remained stable, recommend restarting Eliquis tomorrow.  Monitor on telemetry while inpatient  Follow-up in cardiology clinic with Dr. Daniels in approximately 1 month      Luis Miguel Jarrett MD PhD  Cardiology  [facility]

## 2022-11-27 NOTE — NURSING
KWESI Zepeda NP notified that pt has new onset of blood in sputum and rhonchi lung sounds throughout, vss.  New orders rec'd to hold eliquis and plavix for now, cxr, and labs. Will continue to monitor.

## 2022-11-27 NOTE — ASSESSMENT & PLAN NOTE
Hypertension  Coronary artery disease  Presence of Watchman left atrial appendage closure device   Elevated LFTs   AICD present  COPD exacerbation  Atrial fibrillation with RVR  Acute on chronic systolic diastolic congestive heart failure  Chronic persistent atrial fibrillation    RECOMMENDATIONS:  Patient has chronic persistent atrial fibrillation  Continue Entresto 24/26 mg bid and carvedilol 6.25 mg bid (okay to adjust carvedilol dosage as needed for hypotension)  Monitor on telemetry while inpatient  Follow-up in cardiology clinic with Dr. Daniels in approximately 1 month

## 2022-11-27 NOTE — NURSING
MD made aware of nightshift RN reporting patient coughing up blood and blood in BM during the night. MD stated to hold PO eliquis and plavix.

## 2022-11-27 NOTE — ASSESSMENT & PLAN NOTE
-Eliquis- held due to coughing blood   -Coreg 6.25 BID  -Telemetry  -Cardiology consulted  -cardioversion 11/25/22

## 2022-11-28 ENCOUNTER — TELEPHONE (OUTPATIENT)
Dept: CARDIOLOGY | Facility: CLINIC | Age: 67
End: 2022-11-28
Payer: MEDICARE

## 2022-11-28 VITALS
WEIGHT: 133 LBS | BODY MASS INDEX: 25.11 KG/M2 | RESPIRATION RATE: 17 BRPM | SYSTOLIC BLOOD PRESSURE: 107 MMHG | OXYGEN SATURATION: 97 % | HEART RATE: 87 BPM | DIASTOLIC BLOOD PRESSURE: 62 MMHG | TEMPERATURE: 98 F | HEIGHT: 61 IN

## 2022-11-28 PROBLEM — I48.11 LONGSTANDING PERSISTENT ATRIAL FIBRILLATION: Status: ACTIVE | Noted: 2022-11-28

## 2022-11-28 LAB
ALBUMIN SERPL BCP-MCNC: 2.5 G/DL (ref 3.5–5.2)
ALP SERPL-CCNC: 79 U/L (ref 55–135)
ALT SERPL W/O P-5'-P-CCNC: 73 U/L (ref 10–44)
ANION GAP SERPL CALC-SCNC: 11 MMOL/L (ref 8–16)
AST SERPL-CCNC: 35 U/L (ref 10–40)
BASOPHILS # BLD AUTO: 0.01 K/UL (ref 0–0.2)
BASOPHILS NFR BLD: 0.1 % (ref 0–1.9)
BILIRUB SERPL-MCNC: 2.2 MG/DL (ref 0.1–1)
BUN SERPL-MCNC: 28 MG/DL (ref 8–23)
CALCIUM SERPL-MCNC: 8.1 MG/DL (ref 8.7–10.5)
CHLORIDE SERPL-SCNC: 99 MMOL/L (ref 95–110)
CO2 SERPL-SCNC: 25 MMOL/L (ref 23–29)
CREAT SERPL-MCNC: 1.3 MG/DL (ref 0.5–1.4)
DIFFERENTIAL METHOD: ABNORMAL
EOSINOPHIL # BLD AUTO: 0 K/UL (ref 0–0.5)
EOSINOPHIL NFR BLD: 0.3 % (ref 0–8)
ERYTHROCYTE [DISTWIDTH] IN BLOOD BY AUTOMATED COUNT: 17.5 % (ref 11.5–14.5)
EST. GFR  (NO RACE VARIABLE): >60 ML/MIN/1.73 M^2
GLUCOSE SERPL-MCNC: 100 MG/DL (ref 70–110)
HCT VFR BLD AUTO: 38.7 % (ref 40–54)
HGB BLD-MCNC: 13 G/DL (ref 14–18)
IMM GRANULOCYTES # BLD AUTO: 0.04 K/UL (ref 0–0.04)
IMM GRANULOCYTES NFR BLD AUTO: 0.4 % (ref 0–0.5)
LYMPHOCYTES # BLD AUTO: 0.7 K/UL (ref 1–4.8)
LYMPHOCYTES NFR BLD: 6.9 % (ref 18–48)
MAGNESIUM SERPL-MCNC: 2.3 MG/DL (ref 1.6–2.6)
MCH RBC QN AUTO: 33.5 PG (ref 27–31)
MCHC RBC AUTO-ENTMCNC: 33.6 G/DL (ref 32–36)
MCV RBC AUTO: 100 FL (ref 82–98)
MONOCYTES # BLD AUTO: 0.8 K/UL (ref 0.3–1)
MONOCYTES NFR BLD: 7.1 % (ref 4–15)
NEUTROPHILS # BLD AUTO: 9 K/UL (ref 1.8–7.7)
NEUTROPHILS NFR BLD: 85.2 % (ref 38–73)
NRBC BLD-RTO: 0 /100 WBC
PHOSPHATE SERPL-MCNC: 2.4 MG/DL (ref 2.7–4.5)
PLATELET # BLD AUTO: 141 K/UL (ref 150–450)
PMV BLD AUTO: 9.4 FL (ref 9.2–12.9)
POTASSIUM SERPL-SCNC: 4.1 MMOL/L (ref 3.5–5.1)
PROT SERPL-MCNC: 5.3 G/DL (ref 6–8.4)
RBC # BLD AUTO: 3.88 M/UL (ref 4.6–6.2)
SODIUM SERPL-SCNC: 135 MMOL/L (ref 136–145)
WBC # BLD AUTO: 10.5 K/UL (ref 3.9–12.7)

## 2022-11-28 PROCEDURE — 84100 ASSAY OF PHOSPHORUS: CPT | Performed by: STUDENT IN AN ORGANIZED HEALTH CARE EDUCATION/TRAINING PROGRAM

## 2022-11-28 PROCEDURE — 99900035 HC TECH TIME PER 15 MIN (STAT)

## 2022-11-28 PROCEDURE — 94761 N-INVAS EAR/PLS OXIMETRY MLT: CPT

## 2022-11-28 PROCEDURE — 25000003 PHARM REV CODE 250: Performed by: SPECIALIST

## 2022-11-28 PROCEDURE — 25000003 PHARM REV CODE 250: Performed by: HOSPITALIST

## 2022-11-28 PROCEDURE — 97116 GAIT TRAINING THERAPY: CPT | Mod: CQ

## 2022-11-28 PROCEDURE — 97110 THERAPEUTIC EXERCISES: CPT | Mod: CQ

## 2022-11-28 PROCEDURE — 94618 PULMONARY STRESS TESTING: CPT

## 2022-11-28 PROCEDURE — 80053 COMPREHEN METABOLIC PANEL: CPT | Performed by: STUDENT IN AN ORGANIZED HEALTH CARE EDUCATION/TRAINING PROGRAM

## 2022-11-28 PROCEDURE — 83735 ASSAY OF MAGNESIUM: CPT | Performed by: STUDENT IN AN ORGANIZED HEALTH CARE EDUCATION/TRAINING PROGRAM

## 2022-11-28 PROCEDURE — 85025 COMPLETE CBC W/AUTO DIFF WBC: CPT | Performed by: STUDENT IN AN ORGANIZED HEALTH CARE EDUCATION/TRAINING PROGRAM

## 2022-11-28 PROCEDURE — 36415 COLL VENOUS BLD VENIPUNCTURE: CPT | Performed by: STUDENT IN AN ORGANIZED HEALTH CARE EDUCATION/TRAINING PROGRAM

## 2022-11-28 RX ORDER — FUROSEMIDE 20 MG/1
20 TABLET ORAL
Qty: 10 TABLET | Refills: 0 | Status: ON HOLD | OUTPATIENT
Start: 2022-11-28 | End: 2023-02-12

## 2022-11-28 RX ORDER — SPIRONOLACTONE 25 MG/1
25 TABLET ORAL DAILY
Qty: 30 TABLET | Refills: 11 | Status: ON HOLD | OUTPATIENT
Start: 2022-11-29 | End: 2023-04-05 | Stop reason: HOSPADM

## 2022-11-28 RX ORDER — CARVEDILOL 3.12 MG/1
3.12 TABLET ORAL 2 TIMES DAILY
Qty: 60 TABLET | Refills: 11 | Status: ON HOLD | OUTPATIENT
Start: 2022-11-28 | End: 2023-02-12 | Stop reason: HOSPADM

## 2022-11-28 RX ADMIN — PANTOPRAZOLE SODIUM 40 MG: 40 TABLET, DELAYED RELEASE ORAL at 08:11

## 2022-11-28 RX ADMIN — ATORVASTATIN CALCIUM 80 MG: 40 TABLET, FILM COATED ORAL at 08:11

## 2022-11-28 RX ADMIN — CARVEDILOL 6.25 MG: 6.25 TABLET, FILM COATED ORAL at 08:11

## 2022-11-28 RX ADMIN — APIXABAN 5 MG: 2.5 TABLET, FILM COATED ORAL at 08:11

## 2022-11-28 RX ADMIN — CLOPIDOGREL BISULFATE 75 MG: 75 TABLET, FILM COATED ORAL at 08:11

## 2022-11-28 RX ADMIN — SPIRONOLACTONE 25 MG: 25 TABLET ORAL at 08:11

## 2022-11-28 NOTE — PLAN OF CARE
Problem: Physical Therapy  Goal: Physical Therapy Goal  Description: Goals to be met by: 22     Patient will increase functional independence with mobility by performin. Supine to sit with [place]  2. Sit to supine with [place]  3. Sit to stand transfer with [place]  4. Bed to chair transfer with [place] using No Assistive Device  5. Gait  x 150 feet with [place] using No Assistive Device.     Outcome: Ongoing, Progressing   Ambulate with assistance for safety.

## 2022-11-28 NOTE — PLAN OF CARE
POC discussed with patient, verbalized understanding. IV saline locks to left arm remain intact and patent.. Pt remains with low bp throughout shift, but asymptomatic. No blood in sputum, no bm this shift. Pt encouraged to use incentive spirometer.  Voiding without difficulty. No c/o pain this shift. Safety measures maintained with side rails up and slip resistant socks on, call light in reach, pt instructed to call for needs.

## 2022-11-28 NOTE — CARE UPDATE
11/28/22 1122   Home Oxygen Qualification   $ Home O2 Qualification Pulmonary Stress Test/6 min walk;Tech time 15 minutes   Room Air SpO2 At Rest 97 %   Room Air SpO2 During Ambulation 95 %   SpO2 Post Ambulation 97 %   Post Ambulation Heart Rate 81 bpm   Home O2 Eval Comments does not qualify for home 02

## 2022-11-28 NOTE — PLAN OF CARE
PCP and cardiology follow up's scheduled.  Entresto in stock and $9.85 at pt's pharmacy.   PT did not recommend any equipment, pt is independent.   Pt did not qualify for home o2    Pt clear for discharge from CM standpoint. Discharging to home       11/28/22 1504   Final Note   Assessment Type Final Discharge Note   Anticipated Discharge Disposition Home

## 2022-11-28 NOTE — NURSING
RADHA Liang NP notified of patients manual bp of 80/50 hr of 74, meds that are due for HS and that blood thinners have been being held. Order rec'd to hold tonights dose of eliquis and to recheck bp later, if bp comes up to give bp meds. Will continue to monitor.

## 2022-11-28 NOTE — NURSING
Pt discharged per MD order. Verbalized understanding of discharge instructions, medications, and follow up appointments. PIV removed, catheter tip intact. Telemetry box removed and returned to nurse's station. Pt wheeled off of unit via PCT.

## 2022-11-28 NOTE — CARE UPDATE
11/27/22 2006   Patient Assessment/Suction   Level of Consciousness (AVPU) alert   Respiratory Effort Normal;Unlabored   Expansion/Accessory Muscles/Retractions expansion symmetric;no retractions   PRE-TX-O2   O2 Device (Oxygen Therapy) nasal cannula   Flow (L/min) 1   Oxygen Concentration (%) 24   SpO2 97 %   Pulse Oximetry Type Intermittent   Aerosol Therapy   $ Aerosol Therapy Charges PRN treatment not required   Respiratory Treatment Status (SVN) PRN treatment not required   Ready to Wean/Extubation Screen   FIO2<=50 (chart decimal) 0.24

## 2022-11-28 NOTE — PT/OT/SLP PROGRESS
Physical Therapy Treatment    Patient Name:  Cleveland Capps   MRN:  [id number]    Recommendations:     Discharge Recommendations:  home   Discharge Equipment Recommendations: none   Barriers to discharge: None    Assessment:     Cleveland Capps is a 67 y.o. male admitted with a medical diagnosis of COPD exacerbation.  He presents with the following impairments/functional limitations:  weakness, impaired endurance, gait instability, impaired cardiopulmonary response to activity . Agreed to mobilize. RT present to assess for home O2 . Ambulated 10' x 2 with SBA no assistive device. Standing rest between each . Performed LE exercises seated and supine.     Rehab Prognosis: Good; patient would benefit from acute skilled PT services to address these deficits and reach maximum level of function.    Recent Surgery: Procedure(s) (LRB):  Cardioversion or Defibrillation (N/A)  Transesophageal echo ([facility]) intra-procedure log documentation (N/A) 3 Days Post-Op    Plan:     During this hospitalization, patient to be seen 5 x/week to address the identified rehab impairments via gait training, therapeutic activities, therapeutic exercises and progress toward the following goals:    Plan of Care Expires:  12/16/22    Subjective     Chief Complaint: none stated  Patient/Family Comments/goals: to return home  Pain/Comfort:  Pain Rating 1: 0/10      Objective:     Communicated with nurse Posey prior to session.  Patient found supine with bed alarm, telemetry upon PT entry to room.     General Precautions: Standard, airborne, contact, droplet, fall   Orthopedic Precautions:N/A   Braces: N/A  Respiratory Status: Room air     Functional Mobility:  Bed Mobility:     Rolling Left:  supervision  Rolling Right: supervision  Supine to Sit: supervision  Sit to Supine: supervision  Transfers:     Sit to Stand:  contact guard assistance with no AD  Gait: 10' x 2 with HHA.      AM-PAC 6 CLICK MOBILITY          Treatment &  Education:  Transferred to sitting EOB with SBA. PCT arrived to assess vitals. BP low.   Sat briefly . Ambulated around bed and back with HHA, RT present to assess O2 SATS.  Performed LE exercises seated : TKE's, Hip abd/add/ flexion, AP's. Supine : SLR's, HS, QS.     Patient left supine with all lines intact, call button in reach, bed alarm on, and nurse Taty notified..    GOALS:   Multidisciplinary Problems       Physical Therapy Goals          Problem: Physical Therapy    Goal Priority Disciplines Outcome Goal Variances Interventions   Physical Therapy Goal     PT, PT/OT Ongoing, Progressing     Description: Goals to be met by: 22     Patient will increase functional independence with mobility by performin. Supine to sit with [place]  2. Sit to supine with [place]  3. Sit to stand transfer with [place]  4. Bed to chair transfer with [place] using No Assistive Device  5. Gait  x 150 feet with [place] using No Assistive Device.                          Time Tracking:     PT Received On: 22  PT Start Time: 1100     PT Stop Time: 1123  PT Total Time (min): 23 min     Billable Minutes: Gait Training 8 min and Therapeutic Exercise 15 min    Treatment Type: Treatment  PT/PTA: PTA     PTA Visit Number: 3     2022

## 2022-11-28 NOTE — TELEPHONE ENCOUNTER
----- Message from Armen Galan LPN sent at 11/28/2022  2:23 PM CST -----  Regarding: hospital follow up  Good afternoon. Please schedule a 1 month hospital follow up for this pt. Thank you!!

## 2022-11-29 ENCOUNTER — TELEPHONE (OUTPATIENT)
Dept: MEDSURG UNIT | Facility: HOSPITAL | Age: 67
End: 2022-11-29
Payer: MEDICARE

## 2022-11-29 ENCOUNTER — PATIENT OUTREACH (OUTPATIENT)
Dept: ADMINISTRATIVE | Facility: CLINIC | Age: 67
End: 2022-11-29
Payer: MEDICARE

## 2022-11-29 NOTE — ASSESSMENT & PLAN NOTE
Patient with Long standing persistent (>12 months) atrial fibrillation which is controlled currently with Beta Blocker. Patient is currently in atrial fibrillation.[id number] Score: 2. HASBLED Score: . Anticoagulation indicated. Anticoagulation done with eliquis.

## 2022-11-29 NOTE — DISCHARGE SUMMARY
[facility] Medicine  Discharge Summary      Patient Name: Cleveland Capps  MRN: [id number]  JUSTYN: [id number]  Patient Class: IP- Inpatient  Admission Date: 11/16/2022  Hospital Length of Stay: 7 days  Discharge Date and Time:  11/29/2022 7:27 AM  Attending Physician: No att. providers found   Discharging Provider: Renu Beasley MD  Primary Care Provider: Romero Snyder MD    Primary Care Team: Networked reference to record PCT     HPI:   Pt presented to ED with shortness of breath.  Began a few days prior.  Associated with cough and congestion.   He sought evaluation from his primary care provider and was diagnosed with SARS-CoV-2 infection.  He was put on Paxlovid, of which he took a couple of days' worth.  He didn't improve.  Cough is nonproductive.  No chest pain.  No fevers.  No chills. No orthopnea.  He has CAD, COPD, and is a former smoker.  Upon presenting to the ED, he was hypotensive but responded to crystalloid infusions.  Hospital observation requested for more hydration and treatment of mild COPD exacerbation.      Procedure(s) (LRB):  Cardioversion or Defibrillation (N/A)  Transesophageal echo (SANTHOSH) intra-procedure log documentation (N/A)      Hospital Course:   Cleveland Capps is a 67 year old male with a past medical history of combined CHF s/p AICD, CAD s/p PCI, Afib, HTN, HLD, DM, and hypothyroidism who presented with shortness of breath secondary to an acute COPD exacerbation in the setting of COVID-19 infection as well an acute on chronic CHF exacerbation. He is currently on one liter NC. He completed a course of prednisone; levalbuterol and ipratropium nebulizers are ordered PRN. He was also placed on IV Lasix which has since been changed to PO dosing. Remdesivir was not started given elevated LFTs for which a RUQ ultrasound was unremarkable for acute pathology. His liver enzymes improved with diuresis as they were likely elevated secondary to hepatic congestion.  He was monitored for an episode of NSVT without any shock of his AICD. He was given a 250 cc NS bolus. He developed Afib with RVR 11/22 for which Cardiology has been consulted. Entresto and Aldactone have also been started; Entresto was held for hypotension. Patient started on amiodarone and was cardioverted 11/25/22.  Amiodarone discontinued 11/26/22. Patient remains on isolation per infection control policy (x 20 days), no fever an no symptoms. PT/OT consulted for assistance in dc planning. Bloody sputum and blood streaked bowel movement reported. Plavix and eliquis held.    11/28- patient did not have bloody BM or hemoptysis, eliquis and and plavix resumed; aspirin dc'd. Afib rate 60-70s. He will continue on coreg, entresto and aldactone. Lasix 3 times a week if needed. He did not qualify for home oxygen. He was counseled on low salt diet. Follow up PCP and cardiology as scheduled.        Goals of Care Treatment Preferences:  Code Status: Full Code      Consults:     * COPD exacerbation  In setting of COVID-19 infection.  -S/p prednisone  -Held remdesivir given LFT elevation  -Trended inflammatory markers  -Airborne and isolation precautions  -O2 PRN  -nebs needs prior authorization - recommend PCP assisting with getting these approved       Longstanding persistent atrial fibrillation  Patient with Long standing persistent (>12 months) atrial fibrillation which is controlled currently with Beta Blocker. Patient is currently in atrial fibrillation.[id number] Score: 2. HASBLED Score: . Anticoagulation indicated. Anticoagulation done with eliquis.        Acute on chronic combined systolic and diastolic congestive heart failure  -Telemetry  -Entresto   -Coreg decreased to 6.25 BID  -Lasix IV given with worsening CXR - dropped BP   -Aldactone  -Monitor I's and O's  -Keep K > 4 and Mg > 2      Final Active Diagnoses:    Diagnosis Date Noted POA    PRINCIPAL PROBLEM:  COPD exacerbation [J44.1] 11/17/2022 Yes     Longstanding persistent atrial fibrillation [I48.11] 11/28/2022 Yes    Macrocytic anemia [D53.9] 11/22/2022 Yes    Dyslipidemia [E78.5] 11/19/2022 Yes    Presence of Watchman left atrial appendage closure device [Z95.818] 11/19/2022 Yes    AICD (automatic cardioverter/defibrillator) present [Z95.810] 11/18/2022 Yes    Elevated LFTs [R79.89] 11/18/2022 Yes    COVID-19 [U07.1] 11/17/2022 Yes    Hypothyroid [E03.9] 11/17/2022 Yes    Acute on chronic combined systolic and diastolic congestive heart failure [I50.43] 05/19/2020 Yes    Coronary artery disease [I25.10] 04/25/2016 Yes    Benign essential HTN [I10] 04/25/2016 Yes      Problems Resolved During this Admission:    Diagnosis Date Noted Date Resolved POA    Type 2 diabetes mellitus without complication, without long-term current use of insulin [E11.9] 11/17/2022 11/20/2022 Yes    Hyponatremia [E87.1] 11/17/2022 11/19/2022 Yes    Hyperkalemia [E87.5] 11/17/2022 11/18/2022 Yes    PAF (paroxysmal atrial fibrillation) [I48.0] 04/25/2016 11/28/2022 Yes       Discharged Condition: good    Disposition: Home or Self Care    Follow Up:   Follow-up Information     Ly Garcia MD Follow up.    Specialty: Family Medicine  Why: 12/1 @ 1:30 pm for hospital follow up  Contact information:  [address] [place] [id number] [phone]             Kuldeep Daniels MD Follow up.    Specialties: Cardiology, Cardiovascular Disease  Why: 12/20 @ 2:40 pm  Contact information:  [address] [place] [address]  [phone]                       Patient Instructions:      Diet Cardiac     Diet Cardiac     Notify your health care provider if you experience any of the following:  increased confusion or weakness     Notify your health care provider if you experience any of the following:  persistent dizziness, light-headedness, or visual disturbances     Notify your health care provider if you experience any of the following:  severe persistent  headache     Notify your health care provider if you experience any of the following:  difficulty breathing or increased cough     Notify your health care provider if you experience any of the following:  severe uncontrolled pain     Notify your health care provider if you experience any of the following:  persistent nausea and vomiting or diarrhea     Notify your health care provider if you experience any of the following:  temperature >100.4     Notify your health care provider if you experience any of the following:  temperature >100.4     Notify your health care provider if you experience any of the following:  difficulty breathing or increased cough     Notify your health care provider if you experience any of the following:  persistent dizziness, light-headedness, or visual disturbances     Activity as tolerated     Activity as tolerated       Significant Diagnostic Studies: Labs:   CMP   Recent Labs   Lab 11/28/22  0523   *   K 4.1   CL 99   CO2 25      BUN 28*   CREATININE 1.3   CALCIUM 8.1*   PROT 5.3*   ALBUMIN 2.5*   BILITOT 2.2*   ALKPHOS 79   AST 35   ALT 73*   ANIONGAP 11   , CBC   Recent Labs   Lab 11/27/22 2123 11/28/22  0524   WBC 10.48 10.50   HGB 12.9* 13.0*   HCT 38.9* 38.7*   * 141*   , Lipid Panel   Lab Results   Component Value Date    CHOL 123 10/23/2020    HDL 27 (L) 10/23/2020    LDLCALC 47.2 (L) 10/23/2020    TRIG 244 (H) 10/23/2020    CHOLHDL 22.0 10/23/2020    and A1C: No results for input(s): HGBA1C in the last 4320 hours.    SANTHOSH 11/25/22  Summary     The left ventricle is severely enlarged with concentric remodeling and   Grade 2 plaque present in the ascending aorta and transverse aorta.   The estimated ejection fraction is 25%.   There are segmental left ventricular wall motion abnormalities.   A diastolic pattern consistent with atrial fibrillation observed.   Normal right ventricular size with normal right ventricular systolic function.   Mild left  atrial enlargement.   Abnormal appearing left atrial appendage. No thrombus is present in the appendage. STANLEY occluder is present.   Mild-to-moderate mitral regurgitation.   There is no pulmonary hypertension.   Mild tricuspid regurgitation.    Medications:  Reconciled Home Medications:      Medication List      START taking these medications    sacubitriL-valsartan 24-26 mg per tablet  Commonly known as: ENTRESTO  Take 1 tablet by mouth 2 (two) times daily.     spironolactone 25 MG tablet  Commonly known as: ALDACTONE  Take 1 tablet (25 mg total) by mouth once daily.        CHANGE how you take these medications    carvediloL 3.125 MG tablet  Commonly known as: COREG  Take 1 tablet (3.125 mg total) by mouth 2 (two) times daily.  What changed:   · medication strength  · how much to take  · Another medication with the same name was removed. Continue taking this medication, and follow the directions you see here.     furosemide 20 MG tablet  Commonly known as: LASIX  Take 1 tablet (20 mg total) by mouth 3 (three) times a week.  What changed: when to take this        CONTINUE taking these medications    atorvastatin 80 MG tablet  Commonly known as: LIPITOR  Take 80 mg by mouth once daily.     clopidogreL 75 mg tablet  Commonly known as: PLAVIX  Take 1 tablet (75 mg total) by mouth once daily.     ELIQUIS 5 mg Tab  Generic drug: apixaban  Take 5 mg by mouth 2 (two) times daily.     levothyroxine 25 MCG tablet  Commonly known as: SYNTHROID  Take 25 mcg by mouth before breakfast.     travoprost (benzalkonium) 0.004 % ophthalmic solution  Commonly known as: TRAVATAN  Place 1 drop into both eyes nightly.        STOP taking these medications    aspirin 81 MG EC tablet  Commonly known as: ECOTRIN     azithromycin 250 MG tablet  Commonly known as: Z-ARNOLDO     enalapril 20 MG tablet  Commonly known as: VASOTEC     isosorbide mononitrate 30 MG 24 hr tablet  Commonly known as: IMDUR     nirmatrelvir-ritonavir 300 mg (150 mg x  2)-100 mg copackaged tablets (EUA)     potassium chloride SA 10 MEQ tablet  Commonly known as: K-DUR,KLOR-CON M     TRUE METRIX GLUCOSE TEST STRIP Strp  Generic drug: blood sugar diagnostic            Indwelling Lines/Drains at time of discharge:   Lines/Drains/Airways     None                 Time spent on the discharge of patient: 30 minutes         Renu Beasley MD  Department of Hospital Medicine  [facility]

## 2022-11-29 NOTE — ASSESSMENT & PLAN NOTE
In setting of COVID-19 infection.  -S/p prednisone  -Held remdesivir given LFT elevation  -Trended inflammatory markers  -Airborne and isolation precautions  -O2 PRN  -nebs needs prior authorization - recommend PCP assisting with getting these approved

## 2022-11-29 NOTE — PROGRESS NOTES
C3 nurse attempted to contact Cleveland Capps for a TCC post hospital discharge follow up call. No answer. Left voicemail with callback information. The patient has a scheduled HOSFU appointment with Ly Garcia MD on 12/01/22 @ 9185.

## 2022-11-29 NOTE — ASSESSMENT & PLAN NOTE
-Telemetry  -Entresto   -Coreg decreased to 6.25 BID  -Lasix IV given with worsening CXR - dropped BP   -Aldactone  -Monitor I's and O's  -Keep K > 4 and Mg > 2

## 2022-11-30 NOTE — PROGRESS NOTES
2nd Attempt made to reach patient for TCC call. Left voicemail please call [phone] leave first name, last name, and  [facility] will return your call.

## 2022-12-20 ENCOUNTER — OFFICE VISIT (OUTPATIENT)
Dept: CARDIOLOGY | Facility: CLINIC | Age: 67
End: 2022-12-20
Payer: MEDICARE

## 2022-12-20 VITALS
HEIGHT: 61 IN | BODY MASS INDEX: 21.99 KG/M2 | WEIGHT: 116.5 LBS | SYSTOLIC BLOOD PRESSURE: 120 MMHG | HEART RATE: 62 BPM | DIASTOLIC BLOOD PRESSURE: 60 MMHG

## 2022-12-20 DIAGNOSIS — I48.19 PERSISTENT ATRIAL FIBRILLATION: ICD-10-CM

## 2022-12-20 DIAGNOSIS — J44.1 COPD EXACERBATION: ICD-10-CM

## 2022-12-20 DIAGNOSIS — I10 BENIGN ESSENTIAL HTN: ICD-10-CM

## 2022-12-20 DIAGNOSIS — Z95.1 STATUS POST CORONARY ARTERY BYPASS GRAFT: Primary | ICD-10-CM

## 2022-12-20 DIAGNOSIS — I50.43 ACUTE ON CHRONIC COMBINED SYSTOLIC AND DIASTOLIC CONGESTIVE HEART FAILURE: ICD-10-CM

## 2022-12-20 DIAGNOSIS — I34.0 MITRAL VALVE INSUFFICIENCY, UNSPECIFIED ETIOLOGY: ICD-10-CM

## 2022-12-20 PROCEDURE — 99214 PR OFFICE/OUTPT VISIT, EST, LEVL IV, 30-39 MIN: ICD-10-PCS | Mod: S$GLB,,, | Performed by: SPECIALIST

## 2022-12-20 PROCEDURE — 1111F PR DISCHARGE MEDS RECONCILED W/ CURRENT OUTPATIENT MED LIST: ICD-10-PCS | Mod: CPTII,S$GLB,, | Performed by: SPECIALIST

## 2022-12-20 PROCEDURE — 1101F PT FALLS ASSESS-DOCD LE1/YR: CPT | Mod: CPTII,S$GLB,, | Performed by: SPECIALIST

## 2022-12-20 PROCEDURE — 1160F PR REVIEW ALL MEDS BY PRESCRIBER/CLIN PHARMACIST DOCUMENTED: ICD-10-PCS | Mod: CPTII,S$GLB,, | Performed by: SPECIALIST

## 2022-12-20 PROCEDURE — 3078F DIAST BP <80 MM HG: CPT | Mod: CPTII,S$GLB,, | Performed by: SPECIALIST

## 2022-12-20 PROCEDURE — 3008F PR BODY MASS INDEX (BMI) DOCUMENTED: ICD-10-PCS | Mod: CPTII,S$GLB,, | Performed by: SPECIALIST

## 2022-12-20 PROCEDURE — 3288F PR FALLS RISK ASSESSMENT DOCUMENTED: ICD-10-PCS | Mod: CPTII,S$GLB,, | Performed by: SPECIALIST

## 2022-12-20 PROCEDURE — 3008F BODY MASS INDEX DOCD: CPT | Mod: CPTII,S$GLB,, | Performed by: SPECIALIST

## 2022-12-20 PROCEDURE — 1111F DSCHRG MED/CURRENT MED MERGE: CPT | Mod: CPTII,S$GLB,, | Performed by: SPECIALIST

## 2022-12-20 PROCEDURE — 1159F MED LIST DOCD IN RCRD: CPT | Mod: CPTII,S$GLB,, | Performed by: SPECIALIST

## 2022-12-20 PROCEDURE — 1159F PR MEDICATION LIST DOCUMENTED IN MEDICAL RECORD: ICD-10-PCS | Mod: CPTII,S$GLB,, | Performed by: SPECIALIST

## 2022-12-20 PROCEDURE — 1101F PR PT FALLS ASSESS DOC 0-1 FALLS W/OUT INJ PAST YR: ICD-10-PCS | Mod: CPTII,S$GLB,, | Performed by: SPECIALIST

## 2022-12-20 PROCEDURE — 3074F SYST BP LT 130 MM HG: CPT | Mod: CPTII,S$GLB,, | Performed by: SPECIALIST

## 2022-12-20 PROCEDURE — 1160F RVW MEDS BY RX/DR IN RCRD: CPT | Mod: CPTII,S$GLB,, | Performed by: SPECIALIST

## 2022-12-20 PROCEDURE — 4010F ACE/ARB THERAPY RXD/TAKEN: CPT | Mod: CPTII,S$GLB,, | Performed by: SPECIALIST

## 2022-12-20 PROCEDURE — 3074F PR MOST RECENT SYSTOLIC BLOOD PRESSURE < 130 MM HG: ICD-10-PCS | Mod: CPTII,S$GLB,, | Performed by: SPECIALIST

## 2022-12-20 PROCEDURE — 3288F FALL RISK ASSESSMENT DOCD: CPT | Mod: CPTII,S$GLB,, | Performed by: SPECIALIST

## 2022-12-20 PROCEDURE — 99999 PR PBB SHADOW E&M-EST. PATIENT-LVL III: CPT | Mod: PBBFAC,,, | Performed by: SPECIALIST

## 2022-12-20 PROCEDURE — 3078F PR MOST RECENT DIASTOLIC BLOOD PRESSURE < 80 MM HG: ICD-10-PCS | Mod: CPTII,S$GLB,, | Performed by: SPECIALIST

## 2022-12-20 PROCEDURE — 1126F PR PAIN SEVERITY QUANTIFIED, NO PAIN PRESENT: ICD-10-PCS | Mod: CPTII,S$GLB,, | Performed by: SPECIALIST

## 2022-12-20 PROCEDURE — 99999 PR PBB SHADOW E&M-EST. PATIENT-LVL III: ICD-10-PCS | Mod: PBBFAC,,, | Performed by: SPECIALIST

## 2022-12-20 PROCEDURE — 4010F PR ACE/ARB THEARPY RXD/TAKEN: ICD-10-PCS | Mod: CPTII,S$GLB,, | Performed by: SPECIALIST

## 2022-12-20 PROCEDURE — 99214 OFFICE O/P EST MOD 30 MIN: CPT | Mod: S$GLB,,, | Performed by: SPECIALIST

## 2022-12-20 PROCEDURE — 1126F AMNT PAIN NOTED NONE PRSNT: CPT | Mod: CPTII,S$GLB,, | Performed by: SPECIALIST

## 2022-12-20 NOTE — PROGRESS NOTES
Subjective:    Patient ID:  Cleveland Capps is a 67 y.o. male who presents for   Hospital Follow Up, Atrial Fibrillation, Hypotension, Congestive Heart Failure, and Coronary Artery Disease    HPI hosp   1 mo ago  covid  sp cabg and aicd + stents + last mo  hfref and placed on  entresto  and now much better     Went  home with hematuria   feels good breath good   Patient is doing much better at home on this current medical regimen even though his blood pressure runs a little bit low at times  He is breathing well and actually able to perform household chores    Review of patient's allergies indicates:   Allergen Reactions    Penicillin      Other reaction(s): anaphylaxis    Penicillins Hives       Past Medical History:   Diagnosis Date    A-fib     Acute kidney injury     COPD (chronic obstructive pulmonary disease)     Coronary artery disease     Diabetes mellitus     Encounter for blood transfusion     Former smoker     Hypertension     Myocardial infarction     Thyroid disease      Past Surgical History:   Procedure Laterality Date    CLOSURE OF LEFT ATRIAL APPENDAGE USING DEVICE N/A 5/17/2022    Procedure: Left atrial appendage closure device;  Surgeon: Tre Schmidt MD;  Location: [facility] CATH LAB;  Service: Cardiology;  Laterality: N/A;    COLONOSCOPY N/A 1/3/2017    Procedure: COLONOSCOPY;  Surgeon: Marcus Green MD;  Location: [facility] ENDO;  Service: Endoscopy;  Laterality: N/A;    CORONARY BYPASS GRAFT ANGIOGRAPHY  3/30/2021    Procedure: Bypass graft study;  Surgeon: Tre Schmidt MD;  Location: [facility] CATH LAB;  Service: Cardiology;;    CORONARY STENT PLACEMENT      GASTROSTOMY TUBE PLACEMENT      INSERTION OF PACEMAKER  04/2017    INTRAVASCULAR ULTRASOUND, NON-CORONARY  5/17/2022    Procedure: Intravascular Ultrasound, Non-Coronary;  Surgeon: Tre Schmidt MD;  Location: [facility] CATH LAB;  Service: Cardiology;;    LEFT HEART CATHETERIZATION N/A 3/30/2021    Procedure: Left heart cath;  Surgeon:  Tre Schmidt MD;  Location: [facility] CATH LAB;  Service: Cardiology;  Laterality: N/A;    PEG TUBE REMOVAL      TRACHEOSTOMY CLOSURE      TRACHEOSTOMY TUBE PLACEMENT      TRANSESOPHAGEAL ECHOCARDIOGRAPHY N/A 2022    Procedure: ECHOCARDIOGRAM, TRANSESOPHAGEAL;  Surgeon: Yudi Diagnostic Provider;  Location: [facility] CATH LAB;  Service: Anesthesiology;  Laterality: N/A;    TREATMENT OF CARDIAC ARRHYTHMIA N/A 2022    Procedure: Cardioversion or Defibrillation;  Surgeon: Kuldeep Daniels MD;  Location: [facility] CATH LAB;  Service: Cardiology;  Laterality: N/A;    UPPER GASTROINTESTINAL ENDOSCOPY  2016    Dr. Zarco with PEG tube placement     Social History     Tobacco Use    Smoking status: Former     Types: Cigarettes     Quit date: 2005     Years since quittin.0    Smokeless tobacco: Never    Tobacco comments:     quit    Substance Use Topics    Alcohol use: No    Drug use: No        Review of Systems     ROS        Objective:        Vitals:    22 1435   BP: 120/60   Pulse: 62       Lab Results   Component Value Date    WBC 10.50 2022    HGB 13.0 (L) 2022    HCT 38.7 (L) 2022     (L) 2022    CHOL 123 10/23/2020    TRIG 244 (H) 10/23/2020    HDL 27 (L) 10/23/2020    ALT 73 (H) 2022    AST 35 2022     (L) 2022    K 4.1 2022    CL 99 2022    CREATININE 1.3 2022    BUN 28 (H) 2022    CO2 25 2022    TSH 3.439 2022    INR 1.1 2022    HGBA1C 6.3 (H) 10/24/2020        ECHOCARDIOGRAM RESULTS  Results for orders placed during the hospital encounter of 22    Transesophageal echo (SANTHOSH) with possible cardioversion    Interpretation Summary  · The left ventricle is severely enlarged with concentric remodeling and  · Grade 2 plaque present in the ascending aorta and transverse aorta.  · The estimated ejection fraction is 25%.  · There are segmental left ventricular wall motion abnormalities.  · A  diastolic pattern consistent with atrial fibrillation observed.  · Normal right ventricular size with normal right ventricular systolic function.  · Mild left atrial enlargement.  · Abnormal appearing left atrial appendage. No thrombus is present in the appendage. STANLEY occluder is present.  · Mild-to-moderate mitral regurgitation.  · There is no pulmonary hypertension.  · Mild tricuspid regurgitation.      CURRENT/PREVIOUS VISIT EKG  Results for orders placed or performed during the hospital encounter of 11/16/22   EKG 12-lead    Collection Time: 11/25/22 11:42 AM    Narrative    Test Reason : I48.91,    Vent. Rate : 050 BPM     Atrial Rate : 045 BPM     P-R Int : 000 ms          QRS Dur : 176 ms      QT Int : 552 ms       P-R-T Axes : 000 -68 110 degrees     QTc Int : 503 ms    Ventricular-paced rhythm with intrinsic complexes  Abnormal ECG  No previous ECGs available  Confirmed by Jeremiah RICE, Kuldeep BEACH ([id number]) on 11/28/2022 1:14:31 PM    Referred By: [email or web address]   SELF           Confirmed By:Kuldeep Daniels MD     Results for orders placed during the hospital encounter of 06/27/22    Echo    Interpretation Summary  · The left ventricle is severely enlarged with mild eccentric hypertrophy and severely decreased systolic function.  · The estimated ejection fraction is 25%.  · There are segmental left ventricular wall motion abnormalities.  · Left ventricular diastolic dysfunction.  · Mild right ventricular enlargement with mildly to moderately reduced right ventricular systolic function.  · Severe left atrial enlargement.  · Mild right atrial enlargement.  · The MR is moderate to moderate -severe ( 2-3+).  · Mild to moderate tricuspid regurgitation.  · Mild pulmonic regurgitation.  · Intermediate central venous pressure (8 mmHg).  · The estimated PA systolic pressure is 47 mmHg.  · There is pulmonary hypertension.    No results found for this or any previous visit.      PHYSICAL EXAM    Physical Exam blood pressure is  100/80  Lungs are clear diminished breath sounds with some wheezing  Cardiac irregular  Abdomen no masses    Medication List with Changes/Refills   Current Medications    APIXABAN (ELIQUIS) 5 MG TAB    Take 5 mg by mouth 2 (two) times daily.    ATORVASTATIN (LIPITOR) 80 MG TABLET    Take 80 mg by mouth once daily.    CARVEDILOL (COREG) 3.125 MG TABLET    Take 1 tablet (3.125 mg total) by mouth 2 (two) times daily.    CLOPIDOGREL (PLAVIX) 75 MG TABLET    Take 1 tablet (75 mg total) by mouth once daily.    FUROSEMIDE (LASIX) 20 MG TABLET    Take 1 tablet (20 mg total) by mouth 3 (three) times a week.    LEVOTHYROXINE (SYNTHROID) 25 MCG TABLET    Take 25 mcg by mouth before breakfast.    SACUBITRIL-VALSARTAN (ENTRESTO) 24-26 MG PER TABLET    Take 1 tablet by mouth 2 (two) times daily.    SPIRONOLACTONE (ALDACTONE) 25 MG TABLET    Take 1 tablet (25 mg total) by mouth once daily.    TRAVOPROST, BENZALKONIUM, (TRAVATAN) 0.004 % OPHTHALMIC SOLUTION    Place 1 drop into both eyes nightly.           Assessment:     Status post previous bypass surgery as well as Watchman device and failed recent cardioversion  Heart failure reduced ejection fraction improves significantly on Entresto and Aldactone and current meds         Plan:     Get lab in approximately  Return to clinic to  Problem List Items Addressed This Visit    None       No follow-ups on file.

## 2023-01-03 ENCOUNTER — TELEPHONE (OUTPATIENT)
Dept: CARDIOLOGY | Facility: CLINIC | Age: 68
End: 2023-01-03
Payer: MEDICARE

## 2023-01-03 NOTE — TELEPHONE ENCOUNTER
----- Message from Samm Prieto sent at 1/3/2023  3:17 PM CST -----  Contact: Kaykay from [facility] at [phone]  Type: Needs Medical Advice  Who Called:  Kaykay Holley Call Back Number: [phone]  Additional Information: Kaykay from [employer] is calling the office to let them know the pt blood pressure is running low it's in the 70's and 80's and he's getting dizzy. Please call back and advise.

## 2023-01-06 ENCOUNTER — TELEPHONE (OUTPATIENT)
Dept: CARDIOLOGY | Facility: CLINIC | Age: 68
End: 2023-01-06

## 2023-01-06 ENCOUNTER — LAB VISIT (OUTPATIENT)
Dept: LAB | Facility: HOSPITAL | Age: 68
End: 2023-01-06
Attending: SPECIALIST
Payer: MEDICARE

## 2023-01-06 DIAGNOSIS — I11.0 HYPERTENSIVE HEART DISEASE WITH CONGESTIVE HEART FAILURE: Primary | ICD-10-CM

## 2023-01-06 DIAGNOSIS — I50.43 ACUTE ON CHRONIC COMBINED SYSTOLIC AND DIASTOLIC HEART FAILURE: ICD-10-CM

## 2023-01-06 LAB
ALBUMIN SERPL BCP-MCNC: 3.8 G/DL (ref 3.5–5.2)
ALP SERPL-CCNC: 91 U/L (ref 55–135)
ALT SERPL W/O P-5'-P-CCNC: 36 U/L (ref 10–44)
ANION GAP SERPL CALC-SCNC: 10 MMOL/L (ref 8–16)
AST SERPL-CCNC: 25 U/L (ref 10–40)
BILIRUB SERPL-MCNC: 1.2 MG/DL (ref 0.1–1)
BUN SERPL-MCNC: 44 MG/DL (ref 8–23)
CALCIUM SERPL-MCNC: 8.9 MG/DL (ref 8.7–10.5)
CHLORIDE SERPL-SCNC: 103 MMOL/L (ref 95–110)
CO2 SERPL-SCNC: 17 MMOL/L (ref 23–29)
CREAT SERPL-MCNC: 1.6 MG/DL (ref 0.5–1.4)
ERYTHROCYTE [DISTWIDTH] IN BLOOD BY AUTOMATED COUNT: 14.9 % (ref 11.5–14.5)
EST. GFR  (NO RACE VARIABLE): 47.2 ML/MIN/1.73 M^2
GLUCOSE SERPL-MCNC: 123 MG/DL (ref 70–110)
HCT VFR BLD AUTO: 35.9 % (ref 40–54)
HGB BLD-MCNC: 12.1 G/DL (ref 14–18)
MCH RBC QN AUTO: 34.6 PG (ref 27–31)
MCHC RBC AUTO-ENTMCNC: 33.7 G/DL (ref 32–36)
MCV RBC AUTO: 103 FL (ref 82–98)
PLATELET # BLD AUTO: 166 K/UL (ref 150–450)
PMV BLD AUTO: 9.4 FL (ref 9.2–12.9)
POTASSIUM SERPL-SCNC: 5.2 MMOL/L (ref 3.5–5.1)
PROT SERPL-MCNC: 6.5 G/DL (ref 6–8.4)
RBC # BLD AUTO: 3.5 M/UL (ref 4.6–6.2)
SODIUM SERPL-SCNC: 130 MMOL/L (ref 136–145)
WBC # BLD AUTO: 6.82 K/UL (ref 3.9–12.7)

## 2023-01-06 PROCEDURE — 85027 COMPLETE CBC AUTOMATED: CPT | Performed by: SPECIALIST

## 2023-01-06 PROCEDURE — 83880 ASSAY OF NATRIURETIC PEPTIDE: CPT | Performed by: SPECIALIST

## 2023-01-06 PROCEDURE — 80053 COMPREHEN METABOLIC PANEL: CPT | Performed by: SPECIALIST

## 2023-01-06 NOTE — TELEPHONE ENCOUNTER
----- Message from Negrita Velazquez sent at 1/6/2023  1:43 PM CST -----  Contact: [phone]  Type: Needs Medical Advice  Who Called:  Ms. Cotto  Home health Nurse  Best Call Back Number: [phone]  Additional Information:  Ms. Cotto is calling to inform the dr of what was done when the pt did his labs today and how the pt is feeling. Blood pressure is 120/68, heart rate 62, no dizziness and feeling better today than yesterday. Lab work was sent over to the hospital. Please call back and advise.

## 2023-01-09 ENCOUNTER — LAB VISIT (OUTPATIENT)
Dept: LAB | Facility: HOSPITAL | Age: 68
End: 2023-01-09
Attending: FAMILY MEDICINE
Payer: MEDICARE

## 2023-01-09 ENCOUNTER — OFFICE VISIT (OUTPATIENT)
Dept: PODIATRY | Facility: CLINIC | Age: 68
End: 2023-01-09
Payer: MEDICARE

## 2023-01-09 VITALS
HEIGHT: 61 IN | SYSTOLIC BLOOD PRESSURE: 125 MMHG | WEIGHT: 116 LBS | BODY MASS INDEX: 21.9 KG/M2 | DIASTOLIC BLOOD PRESSURE: 76 MMHG | HEART RATE: 92 BPM

## 2023-01-09 DIAGNOSIS — I73.9 PERIPHERAL VASCULAR DISEASE: Primary | ICD-10-CM

## 2023-01-09 DIAGNOSIS — I11.0 HYPERTENSIVE HEART DISEASE WITH CONGESTIVE HEART FAILURE: Primary | ICD-10-CM

## 2023-01-09 DIAGNOSIS — M20.42 HAMMER TOES OF BOTH FEET: ICD-10-CM

## 2023-01-09 DIAGNOSIS — I50.43 ACUTE ON CHRONIC COMBINED SYSTOLIC AND DIASTOLIC HEART FAILURE: ICD-10-CM

## 2023-01-09 DIAGNOSIS — Q66.71 PES CAVUS OF BOTH FEET: ICD-10-CM

## 2023-01-09 DIAGNOSIS — L60.9 DISEASE OF NAIL: ICD-10-CM

## 2023-01-09 DIAGNOSIS — Q66.72 PES CAVUS OF BOTH FEET: ICD-10-CM

## 2023-01-09 DIAGNOSIS — M20.41 HAMMER TOES OF BOTH FEET: ICD-10-CM

## 2023-01-09 LAB
ANION GAP SERPL CALC-SCNC: 9 MMOL/L (ref 8–16)
BUN SERPL-MCNC: 37 MG/DL (ref 8–23)
CALCIUM SERPL-MCNC: 8.8 MG/DL (ref 8.7–10.5)
CHLORIDE SERPL-SCNC: 104 MMOL/L (ref 95–110)
CO2 SERPL-SCNC: 17 MMOL/L (ref 23–29)
CREAT SERPL-MCNC: 1.3 MG/DL (ref 0.5–1.4)
EST. GFR  (NO RACE VARIABLE): >60 ML/MIN/1.73 M^2
GLUCOSE SERPL-MCNC: 87 MG/DL (ref 70–110)
NT-PROBNP SERPL IA-MCNC: 1904 PG/ML
POTASSIUM SERPL-SCNC: 4.5 MMOL/L (ref 3.5–5.1)
SODIUM SERPL-SCNC: 130 MMOL/L (ref 136–145)

## 2023-01-09 PROCEDURE — 3074F SYST BP LT 130 MM HG: CPT | Mod: CPTII,S$GLB,, | Performed by: PODIATRIST

## 2023-01-09 PROCEDURE — 3008F PR BODY MASS INDEX (BMI) DOCUMENTED: ICD-10-PCS | Mod: CPTII,S$GLB,, | Performed by: PODIATRIST

## 2023-01-09 PROCEDURE — 80048 BASIC METABOLIC PNL TOTAL CA: CPT | Performed by: FAMILY MEDICINE

## 2023-01-09 PROCEDURE — 11721 DEBRIDE NAIL 6 OR MORE: CPT | Mod: Q9,S$GLB,, | Performed by: PODIATRIST

## 2023-01-09 PROCEDURE — 11721 ROUTINE FOOT CARE: ICD-10-PCS | Mod: Q9,S$GLB,, | Performed by: PODIATRIST

## 2023-01-09 PROCEDURE — 3008F BODY MASS INDEX DOCD: CPT | Mod: CPTII,S$GLB,, | Performed by: PODIATRIST

## 2023-01-09 PROCEDURE — 1126F AMNT PAIN NOTED NONE PRSNT: CPT | Mod: CPTII,S$GLB,, | Performed by: PODIATRIST

## 2023-01-09 PROCEDURE — 3078F DIAST BP <80 MM HG: CPT | Mod: CPTII,S$GLB,, | Performed by: PODIATRIST

## 2023-01-09 PROCEDURE — 99203 OFFICE O/P NEW LOW 30 MIN: CPT | Mod: 25,S$GLB,, | Performed by: PODIATRIST

## 2023-01-09 PROCEDURE — 36415 COLL VENOUS BLD VENIPUNCTURE: CPT | Performed by: FAMILY MEDICINE

## 2023-01-09 PROCEDURE — 1126F PR PAIN SEVERITY QUANTIFIED, NO PAIN PRESENT: ICD-10-PCS | Mod: CPTII,S$GLB,, | Performed by: PODIATRIST

## 2023-01-09 PROCEDURE — 1159F PR MEDICATION LIST DOCUMENTED IN MEDICAL RECORD: ICD-10-PCS | Mod: CPTII,S$GLB,, | Performed by: PODIATRIST

## 2023-01-09 PROCEDURE — 99203 PR OFFICE/OUTPT VISIT, NEW, LEVL III, 30-44 MIN: ICD-10-PCS | Mod: 25,S$GLB,, | Performed by: PODIATRIST

## 2023-01-09 PROCEDURE — 3074F PR MOST RECENT SYSTOLIC BLOOD PRESSURE < 130 MM HG: ICD-10-PCS | Mod: CPTII,S$GLB,, | Performed by: PODIATRIST

## 2023-01-09 PROCEDURE — 3078F PR MOST RECENT DIASTOLIC BLOOD PRESSURE < 80 MM HG: ICD-10-PCS | Mod: CPTII,S$GLB,, | Performed by: PODIATRIST

## 2023-01-09 PROCEDURE — 1159F MED LIST DOCD IN RCRD: CPT | Mod: CPTII,S$GLB,, | Performed by: PODIATRIST

## 2023-01-09 RX ORDER — TRAVOPROST OPHTHALMIC SOLUTION 0.04 MG/ML
SOLUTION OPHTHALMIC
COMMUNITY
Start: 2023-01-05 | End: 2023-03-29

## 2023-01-09 RX ORDER — NEBULIZER AND COMPRESSOR
EACH MISCELLANEOUS
Status: ON HOLD | COMMUNITY
Start: 2022-11-21 | End: 2023-04-05 | Stop reason: HOSPADM

## 2023-01-09 RX ORDER — CALCIUM CITRATE/VITAMIN D3 200MG-6.25
TABLET ORAL
Status: ON HOLD | COMMUNITY
Start: 2022-12-23 | End: 2023-04-05 | Stop reason: HOSPADM

## 2023-01-09 RX ORDER — POTASSIUM CHLORIDE 750 MG/1
10 TABLET, EXTENDED RELEASE ORAL DAILY
Status: ON HOLD | COMMUNITY
Start: 2022-12-05 | End: 2023-04-05 | Stop reason: HOSPADM

## 2023-01-09 NOTE — TELEPHONE ENCOUNTER
Called pt. And made sure that his nurse had him hold the spironolactone and repeat BMP Monday.  He confirmed that this has all been completed.

## 2023-01-10 NOTE — PROGRESS NOTES
Subjective:      Patient ID: Cleveland Capps is a 67 y.o. male.    Chief Complaint: Diabetes Mellitus and Nail Problem    Cleveland is a 67 y.o. male who presents to the clinic for evaluation and treatment of high risk feet. Cleveland has a past medical history of A-fib, Acute kidney injury, COPD (chronic obstructive pulmonary disease), Coronary artery disease, Diabetes mellitus, Encounter for blood transfusion, Former smoker, Hypertension, Myocardial infarction, Thyroid disease, and Type 2 diabetes mellitus without complications. The patient's chief complaint is long, thick toenails. This patient has documented high risk feet requiring routine maintenance secondary to peripheral vascular disease.    PCP: Romero Snyder MD    Date Last Seen by PCP: 12/2022    Current shoe gear:  Affected Foot: Tennis shoes     Unaffected Foot: Tennis shoes    Last encounter in this department: Visit date not found    Hemoglobin A1C   Date Value Ref Range Status   10/24/2020 6.3 (H) 4.0 - 5.6 % Final     Comment:     ADA Screening Guidelines:  5.7-6.4%  Consistent with prediabetes  >or=6.5%  Consistent with diabetes  High levels of fetal hemoglobin interfere with the HbA1C  assay. Heterozygous hemoglobin variants (HbS, HgC, etc)do  not significantly interfere with this assay.   However, presence of multiple variants may affect accuracy.     05/17/2020 6.0 4.5 - 6.2 % Final     Comment:     According to ADA guidelines, hemoglobin A1C <7.0% represents  optimal control in non-pregnant diabetic patients.  Different  metrics may apply to specific populations.   Standards of Medical Care in Diabetes - 2016.  For the purpose of screening for the presence of diabetes:  <5.7%     Consistent with the absence of diabetes  5.7-6.4%  Consistent with increasing risk for diabetes   (prediabetes)  >or=6.5%  Consistent with diabetes  Currently no consensus exists for use of hemoglobin A1C  for diagnosis of diabetes for children.     04/30/2016 6.0 4.5  - 6.2 % Final       Review of Systems   Constitutional: Negative for chills and fever.   Cardiovascular:  Positive for leg swelling. Negative for chest pain.   Respiratory:  Negative for cough and shortness of breath.    Gastrointestinal:  Negative for diarrhea, nausea and vomiting.   Neurological:  Positive for paresthesias.         Objective:      Physical Exam  Vitals reviewed.   Constitutional:       General: He is not in acute distress.     Appearance: Normal appearance. He is not ill-appearing.   HENT:      Head: Normocephalic.      Nose: Nose normal.   Cardiovascular:      Rate and Rhythm: Normal rate.   Pulmonary:      Effort: Pulmonary effort is normal. No respiratory distress.   Skin:     Capillary Refill: Capillary refill takes 2 to 3 seconds.   Neurological:      Mental Status: He is alert and oriented to person, place, and time.   Psychiatric:         Mood and Affect: Mood normal.         Behavior: Behavior normal.     Musculoskeletal:  5/5 muscle strength noted to bilateral ankle, ankle joint range of motion is full with crepitus but no pain, increased calcaneal inclination height noted bilateral foot, increased medial arch height bilateral foot, multiple contracted digits/hammertoe syndrome digits 2 through 5 bilateral foot, mild irritation with palpation of left 1st digit medial nail border   Vascular: DP and PT pulses palpable 1/4 bilateral foot, capillary refill time less 3 seconds to distal aspect the digits, mild edema noted to the bilateral ankle, skin temperature gradient warm to cool from proximal to distal bilateral foot, pedal hair growth is decreased at the level of the digits bilateral foot   Neurologic: Protective and light touch sensation intact bilateral lower extremity, positive paresthesias reported distal aspect bilateral foot   Dermatologic: Nails 1 through 5 bilateral are thickened, discolored, and elongated.  No open lesions noted bilateral foot, no rashes noted bilateral foot,  and no interdigital maceration noted bilateral foot         Assessment:       Encounter Diagnoses   Name Primary?    Peripheral vascular disease Yes    Hammer toes of both feet     Disease of nail     Pes cavus of both feet          Plan:       Cleveland was seen today for diabetes mellitus and nail problem.    Diagnoses and all orders for this visit:    Peripheral vascular disease  -     Routine Foot Care    Hammer toes of both feet    Disease of nail  -     Routine Foot Care    Pes cavus of both feet      I counseled the patient on his conditions, their implications and medical management.        1. Patient was examined and evaluated  2. Discussed with patient etiology of pes cavus.  Discussed conservative treatment options with the patient.  Patient was advised adjust shoe gear for better comfort and fit including increased toe box height and width and selection of shoe gear with soft stretchable uppers.  Patient will adjunct with OTC analgesics for any related  3. Discussed with patient etiology of hammertoe deformity.  Conservative treatment options were discussed in detail with the patient.  Patient was made aware that his contracted digits maybe secondary to congenital pes cavus deformity   4. Discussed with patient etiology of fungal nail changes.  Topical OTC conservative treatment such as Vicks vapor rub tea tree oil were discussed in detail with the patient   Routine Foot Care    Date/Time: 1/9/2023 2:15 PM  Performed by: Chrissy Garcia DPM  Authorized by: Chrissy Garcia DPM     Consent Done?:  Yes (Verbal)    Nail Care Type:  Debride  Location(s): All  (Left 1st Toe, Left 3rd Toe, Left 2nd Toe, Left 4th Toe, Left 5th Toe, Right 1st Toe, Right 2nd Toe, Right 3rd Toe, Right 4th Toe and Right 5th Toe)  Patient tolerance:  Patient tolerated the procedure well with no immediate complications    5. Patient was advised to continue with comfortable shoe gear both inside and outside the home  6. Patient will  follow-up in 3 months or p.r.n. for complaints

## 2023-01-21 ENCOUNTER — DOCUMENT SCAN (OUTPATIENT)
Dept: HOME HEALTH SERVICES | Facility: HOSPITAL | Age: 68
End: 2023-01-21
Payer: MEDICARE

## 2023-01-23 ENCOUNTER — DOCUMENT SCAN (OUTPATIENT)
Dept: HOME HEALTH SERVICES | Facility: HOSPITAL | Age: 68
End: 2023-01-23
Payer: MEDICARE

## 2023-01-31 ENCOUNTER — TELEPHONE (OUTPATIENT)
Dept: CARDIOLOGY | Facility: CLINIC | Age: 68
End: 2023-01-31
Payer: MEDICARE

## 2023-01-31 NOTE — TELEPHONE ENCOUNTER
----- Message from Negrita Velazquez sent at 1/31/2023 10:42 AM CST -----  Contact: called at [phone]  Type: Needs Medical Advice  Who Called:  Pt and Ms. Mccracken from home health nurse  Symptoms (please be specific):  Shortness of breathe and in A-fib  How long has patient had these symptoms:  a few days ago  Best Call Back Number: [phone] Ms. Mccracken's number is [phone]  Additional Information: Pt is calling because he is having shortness of breath and in A-fib. He would like to know what needs to be done to help him or can he been seen for the symptoms. please call back and advise.

## 2023-02-06 ENCOUNTER — HOSPITAL ENCOUNTER (INPATIENT)
Facility: HOSPITAL | Age: 68
LOS: 8 days | Discharge: HOME HEALTH | DRG: 291 | End: 2023-02-14
Attending: EMERGENCY MEDICINE | Admitting: STUDENT IN AN ORGANIZED HEALTH CARE EDUCATION/TRAINING PROGRAM
Payer: MEDICARE

## 2023-02-06 DIAGNOSIS — R06.02 SHORTNESS OF BREATH: ICD-10-CM

## 2023-02-06 DIAGNOSIS — I50.9 CHF EXACERBATION: ICD-10-CM

## 2023-02-06 DIAGNOSIS — I48.91 ATRIAL FIBRILLATION WITH RVR: Primary | ICD-10-CM

## 2023-02-06 DIAGNOSIS — I48.91 A-FIB: ICD-10-CM

## 2023-02-06 LAB
ALBUMIN SERPL BCP-MCNC: 3.4 G/DL (ref 3.5–5.2)
ALP SERPL-CCNC: 109 U/L (ref 55–135)
ALT SERPL W/O P-5'-P-CCNC: 488 U/L (ref 10–44)
ANION GAP SERPL CALC-SCNC: 9 MMOL/L (ref 8–16)
AST SERPL-CCNC: 235 U/L (ref 10–40)
BASOPHILS # BLD AUTO: 0.03 K/UL (ref 0–0.2)
BASOPHILS NFR BLD: 0.4 % (ref 0–1.9)
BILIRUB SERPL-MCNC: 3 MG/DL (ref 0.1–1)
BNP SERPL-MCNC: 1518 PG/ML (ref 0–99)
BUN SERPL-MCNC: 44 MG/DL (ref 8–23)
CALCIUM SERPL-MCNC: 8.7 MG/DL (ref 8.7–10.5)
CHLORIDE SERPL-SCNC: 103 MMOL/L (ref 95–110)
CO2 SERPL-SCNC: 19 MMOL/L (ref 23–29)
CREAT SERPL-MCNC: 1.8 MG/DL (ref 0.5–1.4)
DIFFERENTIAL METHOD: ABNORMAL
EOSINOPHIL # BLD AUTO: 0 K/UL (ref 0–0.5)
EOSINOPHIL NFR BLD: 0.1 % (ref 0–8)
ERYTHROCYTE [DISTWIDTH] IN BLOOD BY AUTOMATED COUNT: 17.1 % (ref 11.5–14.5)
EST. GFR  (NO RACE VARIABLE): 41 ML/MIN/1.73 M^2
GLUCOSE SERPL-MCNC: 136 MG/DL (ref 70–110)
HCT VFR BLD AUTO: 39.5 % (ref 40–54)
HGB BLD-MCNC: 12.8 G/DL (ref 14–18)
IMM GRANULOCYTES # BLD AUTO: 0.03 K/UL (ref 0–0.04)
IMM GRANULOCYTES NFR BLD AUTO: 0.4 % (ref 0–0.5)
LYMPHOCYTES # BLD AUTO: 0.8 K/UL (ref 1–4.8)
LYMPHOCYTES NFR BLD: 11 % (ref 18–48)
MAGNESIUM SERPL-MCNC: 2.3 MG/DL (ref 1.6–2.6)
MCH RBC QN AUTO: 34.1 PG (ref 27–31)
MCHC RBC AUTO-ENTMCNC: 32.4 G/DL (ref 32–36)
MCV RBC AUTO: 105 FL (ref 82–98)
MONOCYTES # BLD AUTO: 0.5 K/UL (ref 0.3–1)
MONOCYTES NFR BLD: 7.3 % (ref 4–15)
NEUTROPHILS # BLD AUTO: 5.6 K/UL (ref 1.8–7.7)
NEUTROPHILS NFR BLD: 80.8 % (ref 38–73)
NRBC BLD-RTO: 0 /100 WBC
PLATELET # BLD AUTO: 247 K/UL (ref 150–450)
PMV BLD AUTO: 8.9 FL (ref 9.2–12.9)
POTASSIUM SERPL-SCNC: 4.2 MMOL/L (ref 3.5–5.1)
PROT SERPL-MCNC: 6.3 G/DL (ref 6–8.4)
RBC # BLD AUTO: 3.75 M/UL (ref 4.6–6.2)
SODIUM SERPL-SCNC: 131 MMOL/L (ref 136–145)
TROPONIN I SERPL DL<=0.01 NG/ML-MCNC: 0.08 NG/ML (ref 0–0.03)
WBC # BLD AUTO: 6.98 K/UL (ref 3.9–12.7)

## 2023-02-06 PROCEDURE — 11000001 HC ACUTE MED/SURG PRIVATE ROOM

## 2023-02-06 PROCEDURE — 36415 COLL VENOUS BLD VENIPUNCTURE: CPT | Performed by: PHYSICIAN ASSISTANT

## 2023-02-06 PROCEDURE — 93005 ELECTROCARDIOGRAM TRACING: CPT

## 2023-02-06 PROCEDURE — 80053 COMPREHEN METABOLIC PANEL: CPT | Performed by: PHYSICIAN ASSISTANT

## 2023-02-06 PROCEDURE — 20600001 HC STEP DOWN PRIVATE ROOM

## 2023-02-06 PROCEDURE — 83036 HEMOGLOBIN GLYCOSYLATED A1C: CPT | Performed by: NURSE PRACTITIONER

## 2023-02-06 PROCEDURE — 36415 COLL VENOUS BLD VENIPUNCTURE: CPT | Performed by: EMERGENCY MEDICINE

## 2023-02-06 PROCEDURE — 93010 EKG 12-LEAD: ICD-10-PCS | Mod: ,,, | Performed by: INTERNAL MEDICINE

## 2023-02-06 PROCEDURE — 25000003 PHARM REV CODE 250: Performed by: NURSE PRACTITIONER

## 2023-02-06 PROCEDURE — 36415 COLL VENOUS BLD VENIPUNCTURE: CPT | Performed by: NURSE PRACTITIONER

## 2023-02-06 PROCEDURE — 83735 ASSAY OF MAGNESIUM: CPT | Performed by: EMERGENCY MEDICINE

## 2023-02-06 PROCEDURE — 63600175 PHARM REV CODE 636 W HCPCS: Performed by: NURSE PRACTITIONER

## 2023-02-06 PROCEDURE — 94760 N-INVAS EAR/PLS OXIMETRY 1: CPT

## 2023-02-06 PROCEDURE — 99291 CRITICAL CARE FIRST HOUR: CPT | Mod: 25

## 2023-02-06 PROCEDURE — 63600175 PHARM REV CODE 636 W HCPCS: Performed by: EMERGENCY MEDICINE

## 2023-02-06 PROCEDURE — 83880 ASSAY OF NATRIURETIC PEPTIDE: CPT | Performed by: PHYSICIAN ASSISTANT

## 2023-02-06 PROCEDURE — 93010 ELECTROCARDIOGRAM REPORT: CPT | Mod: ,,, | Performed by: INTERNAL MEDICINE

## 2023-02-06 PROCEDURE — 96374 THER/PROPH/DIAG INJ IV PUSH: CPT

## 2023-02-06 PROCEDURE — 84484 ASSAY OF TROPONIN QUANT: CPT | Performed by: PHYSICIAN ASSISTANT

## 2023-02-06 PROCEDURE — 85025 COMPLETE CBC W/AUTO DIFF WBC: CPT | Performed by: PHYSICIAN ASSISTANT

## 2023-02-06 RX ORDER — DILTIAZEM HYDROCHLORIDE 5 MG/ML
10 INJECTION INTRAVENOUS ONCE
Status: DISCONTINUED | OUTPATIENT
Start: 2023-02-06 | End: 2023-02-06

## 2023-02-06 RX ORDER — IBUPROFEN 200 MG
24 TABLET ORAL
Status: DISCONTINUED | OUTPATIENT
Start: 2023-02-06 | End: 2023-02-14 | Stop reason: HOSPADM

## 2023-02-06 RX ORDER — FUROSEMIDE 10 MG/ML
40 INJECTION INTRAMUSCULAR; INTRAVENOUS
Status: DISCONTINUED | OUTPATIENT
Start: 2023-02-07 | End: 2023-02-07

## 2023-02-06 RX ORDER — IBUPROFEN 200 MG
16 TABLET ORAL
Status: DISCONTINUED | OUTPATIENT
Start: 2023-02-06 | End: 2023-02-14 | Stop reason: HOSPADM

## 2023-02-06 RX ORDER — GLUCAGON 1 MG
1 KIT INJECTION
Status: DISCONTINUED | OUTPATIENT
Start: 2023-02-06 | End: 2023-02-14 | Stop reason: HOSPADM

## 2023-02-06 RX ORDER — INSULIN ASPART 100 [IU]/ML
1-10 INJECTION, SOLUTION INTRAVENOUS; SUBCUTANEOUS
Status: DISCONTINUED | OUTPATIENT
Start: 2023-02-06 | End: 2023-02-14 | Stop reason: HOSPADM

## 2023-02-06 RX ORDER — LEVOTHYROXINE SODIUM 25 UG/1
25 TABLET ORAL
Status: DISCONTINUED | OUTPATIENT
Start: 2023-02-07 | End: 2023-02-08

## 2023-02-06 RX ORDER — FUROSEMIDE 10 MG/ML
20 INJECTION INTRAMUSCULAR; INTRAVENOUS
Status: COMPLETED | OUTPATIENT
Start: 2023-02-06 | End: 2023-02-06

## 2023-02-06 RX ORDER — ATORVASTATIN CALCIUM 40 MG/1
80 TABLET, FILM COATED ORAL DAILY
Status: DISCONTINUED | OUTPATIENT
Start: 2023-02-07 | End: 2023-02-06

## 2023-02-06 RX ORDER — CLOPIDOGREL BISULFATE 75 MG/1
75 TABLET ORAL DAILY
Status: DISCONTINUED | OUTPATIENT
Start: 2023-02-07 | End: 2023-02-08

## 2023-02-06 RX ORDER — CARVEDILOL 3.12 MG/1
3.12 TABLET ORAL 2 TIMES DAILY
Status: DISCONTINUED | OUTPATIENT
Start: 2023-02-06 | End: 2023-02-06

## 2023-02-06 RX ORDER — SODIUM CHLORIDE 0.9 % (FLUSH) 0.9 %
10 SYRINGE (ML) INJECTION
Status: DISCONTINUED | OUTPATIENT
Start: 2023-02-06 | End: 2023-02-14 | Stop reason: HOSPADM

## 2023-02-06 RX ADMIN — FUROSEMIDE 20 MG: 10 INJECTION, SOLUTION INTRAMUSCULAR; INTRAVENOUS at 06:02

## 2023-02-06 RX ADMIN — AMIODARONE HYDROCHLORIDE 150 MG: 1.5 INJECTION, SOLUTION INTRAVENOUS at 10:02

## 2023-02-06 RX ADMIN — APIXABAN 5 MG: 2.5 TABLET, FILM COATED ORAL at 10:02

## 2023-02-06 RX ADMIN — AMIODARONE HYDROCHLORIDE 1 MG/MIN: 1.8 INJECTION, SOLUTION INTRAVENOUS at 10:02

## 2023-02-06 NOTE — ED PROVIDER NOTES
Encounter Date: 2/6/2023    SCRIBE #1 NOTE: I, Sandra Teresa, am scribing for, and in the presence of,  Puma Sheth MD.     History     Chief Complaint   Patient presents with    Shortness of Breath    Atrial Fibrillation     Time seen by provider: 5:30 PM on 02/06/2023    Cleveland Capps is a 67 y.o. male with a PMHx of HTN, CAD, acute kidney injury, MI, blood transfusion, COPD, thyroid disease, and type 2 DM who presents to the ED with an onset of SOB and atrial fibrillation exacerbated by exertion. The patient states that he has been going into atrial fibrillation intermittently for 8 days. His home health nurse visited him today ad told him to come to the ED. He is currently on Eliquis, and Lasix. The patient does not currently feel like he has a large amount of fluid on his body. The patient denies nausea, vomiting, fever, cough, congestion, or any other symptoms at this time. PSHx of coronary stet placement, gastrostomy tube placement, tracheostomy tube placement, upper gastrointestinal endoscopy, tracheostomy closure, colonoscopy, left heart catheterization, coronary bypass graft angiography, insertion of pacemaker, Closure of left atrial appendage using device, intravascular ultrasound; non-coronary, and transesophageal echocardiography.      The history is provided by the patient.   Review of patient's allergies indicates:   Allergen Reactions    Penicillin      Other reaction(s): anaphylaxis  Other reaction(s): anaphylaxis    Penicillins Hives     Past Medical History:   Diagnosis Date    A-fib     Acute kidney injury     COPD (chronic obstructive pulmonary disease)     Coronary artery disease     Diabetes mellitus     Encounter for blood transfusion     Former smoker     Hypertension     Myocardial infarction     Thyroid disease     Type 2 diabetes mellitus without complications      Past Surgical History:   Procedure Laterality Date    CLOSURE OF LEFT ATRIAL APPENDAGE USING DEVICE N/A 5/17/2022     Procedure: Left atrial appendage closure device;  Surgeon: Tre Schmidt MD;  Location: [facility] CATH LAB;  Service: Cardiology;  Laterality: N/A;    COLONOSCOPY N/A 1/3/2017    Procedure: COLONOSCOPY;  Surgeon: Marcus Zarco MD;  Location: [facility] ENDO;  Service: Endoscopy;  Laterality: N/A;    CORONARY BYPASS GRAFT ANGIOGRAPHY  3/30/2021    Procedure: Bypass graft study;  Surgeon: Tre Schmidt MD;  Location: [facility] CATH LAB;  Service: Cardiology;;    CORONARY STENT PLACEMENT      GASTROSTOMY TUBE PLACEMENT      INSERTION OF PACEMAKER  04/2017    INTRAVASCULAR ULTRASOUND, NON-CORONARY  5/17/2022    Procedure: Intravascular Ultrasound, Non-Coronary;  Surgeon: Tre Schmidt MD;  Location: [facility] CATH LAB;  Service: Cardiology;;    LEFT HEART CATHETERIZATION N/A 3/30/2021    Procedure: Left heart cath;  Surgeon: Tre Schmidt MD;  Location: [facility] CATH LAB;  Service: Cardiology;  Laterality: N/A;    PEG TUBE REMOVAL      TRACHEOSTOMY CLOSURE      TRACHEOSTOMY TUBE PLACEMENT      TRANSESOPHAGEAL ECHOCARDIOGRAPHY N/A 5/17/2022    Procedure: ECHOCARDIOGRAM, TRANSESOPHAGEAL;  Surgeon: [facility];  Location: [facility] CATH LAB;  Service: Anesthesiology;  Laterality: N/A;    TREATMENT OF CARDIAC ARRHYTHMIA N/A 11/25/2022    Procedure: Cardioversion or Defibrillation;  Surgeon: Kuldeep Daniels MD;  Location: [facility] CATH LAB;  Service: Cardiology;  Laterality: N/A;    UPPER GASTROINTESTINAL ENDOSCOPY  05/2016    Dr. Zarco with PEG tube placement     Family History   Problem Relation Age of Onset    Diabetes Mother     Heart disease Mother     Glaucoma Father     Macular degeneration Father     No Known Problems Sister     Diabetes Brother     Glaucoma Brother     Macular degeneration Brother     No Known Problems Maternal Aunt     No Known Problems Maternal Uncle     No Known Problems Paternal Aunt     No Known Problems Paternal Uncle     No Known Problems Maternal Grandmother     No Known Problems  Maternal Grandfather     No Known Problems Paternal Grandmother     No Known Problems Paternal Grandfather     Colon cancer Neg Hx     Colon polyps Neg Hx     Crohn's disease Neg Hx     Ulcerative colitis Neg Hx     Anemia Neg Hx     Arrhythmia Neg Hx     Asthma Neg Hx     Clotting disorder Neg Hx     Fainting Neg Hx     Heart attack Neg Hx     Heart failure Neg Hx     Hyperlipidemia Neg Hx     Hypertension Neg Hx     Stroke Neg Hx     Atrial Septal Defect Neg Hx      Social History     Tobacco Use    Smoking status: Former     Types: Cigarettes     Quit date: 2005     Years since quittin.2    Smokeless tobacco: Never    Tobacco comments:     quit    Substance Use Topics    Alcohol use: No    Drug use: No     Review of Systems   Constitutional:  Negative for fever.   HENT:  Negative for congestion and sore throat.    Respiratory:  Positive for shortness of breath. Negative for cough.    Cardiovascular:  Negative for chest pain.        Positive for atrial fibrillation.   Gastrointestinal:  Negative for nausea and vomiting.   Genitourinary:  Negative for dysuria.   Musculoskeletal:  Negative for back pain.   Skin:  Negative for rash.   Neurological:  Negative for weakness.   Hematological:  Bruises/bleeds easily.     Physical Exam     Initial Vitals [23 1449]   BP Pulse Resp Temp SpO2   127/87 (!) 147 18 97.6 °F (36.4 °C) 98 %      MAP       --         Physical Exam    Nursing note and vitals reviewed.  Constitutional: He appears well-developed and well-nourished. He is not diaphoretic. No distress.   HENT:   Head: Normocephalic and atraumatic.   Eyes: EOM are normal. Pupils are equal, round, and reactive to light.   Neck: Neck supple.   Normal range of motion.  Cardiovascular:  Regular rhythm, normal heart sounds and intact distal pulses.   Tachycardia present.   Exam reveals no gallop and no friction rub.       No murmur heard.  Pulmonary/Chest: Breath sounds normal. No respiratory distress. He  has no wheezes. He has no rhonchi. He has no rales.   Abdominal: Abdomen is soft. Bowel sounds are normal. There is no abdominal tenderness. There is no rebound and no guarding.   Musculoskeletal:         General: Normal range of motion.      Cervical back: Normal range of motion and neck supple.     Neurological: He is alert and oriented to person, place, and time.   Skin: Skin is warm.   Psychiatric: He has a normal mood and affect. His behavior is normal. Judgment and thought content normal.       ED Course   Critical Care    Date/Time: 2/6/2023 8:49 PM  Performed by: Puma Sheth MD  Authorized by: Puma Sheth MD   Direct patient critical care time: 18 minutes  Additional history critical care time: 8 minutes  Ordering / reviewing critical care time: 10 minutes  Documentation critical care time: 11 minutes  Total critical care time (exclusive of procedural time) : 47 minutes  Critical care was time spent personally by me on the following activities: development of treatment plan with patient or surrogate, examination of patient, obtaining history from patient or surrogate, ordering and performing treatments and interventions, ordering and review of laboratory studies and ordering and review of radiographic studies.      Labs Reviewed   CBC W/ AUTO DIFFERENTIAL - Abnormal; Notable for the following components:       Result Value    RBC 3.75 (*)     Hemoglobin 12.8 (*)     Hematocrit 39.5 (*)      (*)     MCH 34.1 (*)     RDW 17.1 (*)     MPV 8.9 (*)     Lymph # 0.8 (*)     Gran % 80.8 (*)     Lymph % 11.0 (*)     All other components within normal limits   COMPREHENSIVE METABOLIC PANEL - Abnormal; Notable for the following components:    Sodium 131 (*)     CO2 19 (*)     Glucose 136 (*)     BUN 44 (*)     Creatinine 1.8 (*)     Albumin 3.4 (*)     Total Bilirubin 3.0 (*)      (*)      (*)     eGFR 41 (*)     All other components within normal limits   B-TYPE NATRIURETIC PEPTIDE -  Abnormal; Notable for the following components:    BNP 1,518 (*)     All other components within normal limits   TROPONIN I - Abnormal; Notable for the following components:    Troponin I 0.077 (*)     All other components within normal limits   MAGNESIUM   HEMOGLOBIN A1C     EKG Readings: (Independently Interpreted)   Atrial fibrillation with RVR at rate of 127 bpm with normal axis and normal intervals. No acute ST segment changes.          Imaging Results              X-Ray Chest AP Portable (Final result)  Result time 02/06/23 18:04:07      Final result by Mirella Connelly MD (02/06/23 18:04:07)                   Impression:      Probable small left pleural effusion and mildly prominent markings right infrahilar with the appearance questioning very mild CHF.      Electronically signed by: Mirella Connelly MD  Date:    02/06/2023  Time:    18:04               Narrative:    EXAMINATION:  XR CHEST AP PORTABLE    CLINICAL HISTORY:  sob;    TECHNIQUE:  Single frontal view of the chest was performed.    COMPARISON:  11/26/2022    FINDINGS:  The cardiomediastinal silhouette appears stable.  Median sternotomy sutures and anterior chest wall cardiac pacemaker as before.  Limited visualization of the left lung base on the portable film.  Probable small left pleural effusion.  Mildly prominent markings right infrahilar.  No longer the more confluent right lung infiltrate that was seen on the prior exam.                                       Medications   sodium chloride 0.9% flush 10 mL (has no administration in time range)   furosemide injection 40 mg (has no administration in time range)   apixaban tablet 5 mg (has no administration in time range)   clopidogreL tablet 75 mg (has no administration in time range)   levothyroxine tablet 25 mcg (has no administration in time range)   sacubitriL-valsartan 24-26 mg per tablet 1 tablet (has no administration in time range)   glucose chewable tablet 16 g (has no administration in  time range)   glucose chewable tablet 24 g (has no administration in time range)   glucagon (human recombinant) injection 1 mg (has no administration in time range)   insulin aspart U-100 pen 1-10 Units (has no administration in time range)   dextrose 10% bolus 125 mL 125 mL (has no administration in time range)   dextrose 10% bolus 250 mL 250 mL (has no administration in time range)   amiodarone 360 mg/200 mL (1.8 mg/mL) infusion (has no administration in time range)   amiodarone 360 mg/200 mL (1.8 mg/mL) infusion (has no administration in time range)   furosemide injection 20 mg (20 mg Intravenous Given 2/6/23 1809)   amiodarone in dextrose 150 mg/100 mL (1.5 mg/mL) loading dose 150 mg (150 mg Intravenous New Bag 2/6/23 2239)     Medical Decision Making:   History:   Old Medical Records: I decided to obtain old medical records.  Initial Assessment:   67-year-old male presented with multiple complaints.  Differential Diagnosis:   Initial differential diagnosis included but not limited to cardiac arrhythmia, atypical MI, and CHF exacerbation.  Independently Interpreted Test(s):   I have ordered and independently interpreted EKG Reading(s) - see prior notes  Clinical Tests:   Lab Tests: Ordered and Reviewed  Radiological Study: Ordered and Reviewed  Medical Tests: Reviewed and Ordered  ED Management:  The patient was emergently evaluated in the emergency department, his evaluation was significant for an older gentleman with an irregular tachycardic heart rate noted.  The patient's EKG was significant for atrial fibrillation with RVR per my independent interpretation.  The patient's labs were significant for elevated LFTs, an elevated troponin, and an elevated BNP.  The patient likely has atrial fibrillation with a heart failure exacerbation.  The patient was treated with IV Lasix here in the emergency department.  I will admit the patient to the hospitalist service for further care.  The case was discussed with the  APC on call for the hospitalist service.  He has accepted the patient for admission.  Other:   I have discussed this case with another health care provider.        Scribe Attestation:   Scribe #1: I performed the above scribed service and the documentation accurately describes the services I performed. I attest to the accuracy of the note.               I, Dr. Puma Sheth, personally performed the services described in this documentation. All medical record entries made by the scribe were at my direction and in my presence.  I have reviewed the chart and agree that the record reflects my personal performance and is accurate and complete. Puma Sheth MD.  10:46 PM 02/06/2023      Clinical Impression:   Final diagnoses:  [I48.91] A-fib  [R06.02] Shortness of breath  [I48.91] Atrial fibrillation with RVR (Primary)        ED Disposition Condition    Admit Stable                Puma Sheth MD  02/06/23 5594

## 2023-02-06 NOTE — ED NOTES
Assumed care:  Cleveland Capps is awake, alert and oriented x 3, skin warm and dry, in NAD with family at bedside.  States that he went into AFIB last Sunday and is still having problems with SOB and fatigue.    Patient identifiers for Cleveland Capps checked and correct.  LOC:  Cleveland Capps is awake, alert, and aware of environment with an appropriate affect. He is oriented x 3 and speaking appropriately.  APPEARANCE:  He is resting comfortably and in no acute distress. He is clean and well groomed, patient's clothing is properly fastened.  SKIN:  The skin is warm and dry. He has normal skin turgor and moist mucus membranes. Skin is intact; no bruising or breakdown noted.  Yellow tint  MUSCULOSKELETAL:  He is moving all extremities well, no obvious deformities noted. Pulses intact.   RESPIRATORY:  Airway is open and patent. Respirations are spontaneous and non-labored with normal effort and rate.  SOB  CARDIAC:  AFIB RVR No peripheral edema noted. Capillary refill < 3 seconds.  ABDOMEN:  No distention noted.  Soft and non-tender upon palpation.  NEUROLOGICAL:  PERRL. Facial expression is symmetrical. Hand grasps are equal bilaterally. Normal sensation in all extremities when touched with finger.  Allergies reported:    Review of patient's allergies indicates:   Allergen Reactions    Penicillin      Other reaction(s): anaphylaxis  Other reaction(s): anaphylaxis    Penicillins Hives

## 2023-02-06 NOTE — Clinical Note
Diagnosis: Atrial fibrillation with RVR [[id number]]   Admitting Provider:: ILENE PAULINO [[id number]]   Future Attending Provider: ILENE PAULINO [[id number]]   Reason for IP Medical Treatment  (Clinical interventions that can only be accomplished in the IP setting? ) :: CHF/Afib   Estimated Length of Stay:: 2 midnights   I certify that Inpatient services for greater than or equal to 2 midnights are medically necessary:: Yes   Plans for Post-Acute care--if anticipated (pick the single best option):: A. No post acute care anticipated at this time   Special Needs:: No Special Needs [1]

## 2023-02-06 NOTE — FIRST PROVIDER EVALUATION
Emergency Department TeleTriage Encounter Note      CHIEF COMPLAINT    Chief Complaint   Patient presents with    Shortness of Breath    Atrial Fibrillation       VITAL SIGNS   Initial Vitals [02/06/23 1449]   BP Pulse Resp Temp SpO2   127/87 (!) 147 18 97.6 °F (36.4 °C) 98 %      MAP       --            ALLERGIES    Review of patient's allergies indicates:   Allergen Reactions    Penicillin      Other reaction(s): anaphylaxis  Other reaction(s): anaphylaxis    Penicillins Hives       PROVIDER TRIAGE NOTE  Patient presents with complaint of shortness of breath and rapid heart rate that feels like afib. No chest pain.       ORDERS  Labs Reviewed - No data to display    ED Orders (720h ago, onward)      Start Ordered     Status Ordering Provider    02/06/23 1446 02/06/23 1446  EKG 12-lead  Once         Completed by KARISHMA BLAKE on 2/6/2023 at  3:02 PM CRISTOBAL ETIENNE              Virtual Visit Note: The provider triage portion of this emergency department evaluation and documentation was performed via [employer], a HIPAA-compliant telemedicine application, in concert with a tele-presenter in the room. A face to face patient evaluation with one of my colleagues will occur once the patient is placed in an emergency department room.      DISCLAIMER: This note was prepared with [employer] voice recognition transcription software. Garbled syntax, mangled pronouns, and other bizarre constructions may be attributed to that software system.

## 2023-02-07 LAB
ALT SERPL W/O P-5'-P-CCNC: 741 U/L (ref 10–44)
ANION GAP SERPL CALC-SCNC: 17 MMOL/L (ref 8–16)
AST SERPL-CCNC: 458 U/L (ref 10–40)
BACTERIA #/AREA URNS HPF: ABNORMAL /HPF
BILIRUB SERPL-MCNC: 3.2 MG/DL (ref 0.1–1)
BILIRUB UR QL STRIP: NEGATIVE
BSA FOR ECHO PROCEDURE: 1.53 M2
BUN SERPL-MCNC: 50 MG/DL (ref 8–23)
CALCIUM SERPL-MCNC: 8.9 MG/DL (ref 8.7–10.5)
CHLORIDE SERPL-SCNC: 101 MMOL/L (ref 95–110)
CLARITY UR: CLEAR
CO2 SERPL-SCNC: 17 MMOL/L (ref 23–29)
COLOR UR: YELLOW
CREAT SERPL-MCNC: 2 MG/DL (ref 0.5–1.4)
CREAT UR-MCNC: 119.8 MG/DL (ref 23–375)
CV ECHO LV RWT: 0.25 CM
ECHO LV POSTERIOR WALL: 0.82 CM (ref 0.6–1.1)
EJECTION FRACTION: 20 %
EST. GFR  (NO RACE VARIABLE): 36 ML/MIN/1.73 M^2
ESTIMATED AVG GLUCOSE: 108 MG/DL (ref 68–131)
FRACTIONAL SHORTENING: 14 % (ref 28–44)
GLUCOSE SERPL-MCNC: 142 MG/DL (ref 70–110)
GLUCOSE UR QL STRIP: NEGATIVE
GRAN CASTS #/AREA URNS LPF: 3 /LPF
HAV IGM SERPL QL IA: NORMAL
HBA1C MFR BLD: 5.4 % (ref 4–5.6)
HBV CORE IGM SERPL QL IA: NORMAL
HBV SURFACE AG SERPL QL IA: NORMAL
HCV AB SERPL QL IA: NORMAL
HGB UR QL STRIP: ABNORMAL
HYALINE CASTS #/AREA URNS LPF: 12 /LPF
INTERVENTRICULAR SEPTUM: 0.55 CM (ref 0.6–1.1)
KETONES UR QL STRIP: NEGATIVE
LEFT INTERNAL DIMENSION IN SYSTOLE: 5.67 CM (ref 2.1–4)
LEFT VENTRICLE DIASTOLIC VOLUME INDEX: 145.16 ML/M2
LEFT VENTRICLE DIASTOLIC VOLUME: 220.64 ML
LEFT VENTRICLE MASS INDEX: 119 G/M2
LEFT VENTRICLE SYSTOLIC VOLUME INDEX: 104.2 ML/M2
LEFT VENTRICLE SYSTOLIC VOLUME: 158.4 ML
LEFT VENTRICULAR INTERNAL DIMENSION IN DIASTOLE: 6.56 CM (ref 3.5–6)
LEFT VENTRICULAR MASS: 180.63 G
LEUKOCYTE ESTERASE UR QL STRIP: NEGATIVE
MICROSCOPIC COMMENT: ABNORMAL
NITRITE UR QL STRIP: NEGATIVE
PH UR STRIP: 5 [PH] (ref 5–8)
POCT GLUCOSE: 119 MG/DL (ref 70–110)
POCT GLUCOSE: 119 MG/DL (ref 70–110)
POCT GLUCOSE: 137 MG/DL (ref 70–110)
POTASSIUM SERPL-SCNC: 4.6 MMOL/L (ref 3.5–5.1)
PROT UR QL STRIP: ABNORMAL
RA PRESSURE: 15 MMHG
RBC #/AREA URNS HPF: 2 /HPF (ref 0–4)
SODIUM SERPL-SCNC: 135 MMOL/L (ref 136–145)
SP GR UR STRIP: 1.02 (ref 1–1.03)
T4 FREE SERPL-MCNC: 0.88 NG/DL (ref 0.71–1.51)
TROPONIN I SERPL DL<=0.01 NG/ML-MCNC: 0.07 NG/ML (ref 0–0.03)
TSH SERPL DL<=0.005 MIU/L-ACNC: 18.15 UIU/ML (ref 0.4–4)
URN SPEC COLLECT METH UR: ABNORMAL
UROBILINOGEN UR STRIP-ACNC: NEGATIVE EU/DL
UUN UR-MCNC: 512 MG/DL (ref 140–1050)
WBC #/AREA URNS HPF: 1 /HPF (ref 0–5)

## 2023-02-07 PROCEDURE — 99223 PR INITIAL HOSPITAL CARE,LEVL III: ICD-10-PCS | Mod: ,,, | Performed by: GENERAL PRACTICE

## 2023-02-07 PROCEDURE — 81000 URINALYSIS NONAUTO W/SCOPE: CPT | Performed by: INTERNAL MEDICINE

## 2023-02-07 PROCEDURE — 99223 1ST HOSP IP/OBS HIGH 75: CPT | Mod: ,,, | Performed by: GENERAL PRACTICE

## 2023-02-07 PROCEDURE — 63600175 PHARM REV CODE 636 W HCPCS: Performed by: NURSE PRACTITIONER

## 2023-02-07 PROCEDURE — 84439 ASSAY OF FREE THYROXINE: CPT | Performed by: NURSE PRACTITIONER

## 2023-02-07 PROCEDURE — 81003 URINALYSIS AUTO W/O SCOPE: CPT | Performed by: NURSE PRACTITIONER

## 2023-02-07 PROCEDURE — 84484 ASSAY OF TROPONIN QUANT: CPT | Performed by: NURSE PRACTITIONER

## 2023-02-07 PROCEDURE — 80074 ACUTE HEPATITIS PANEL: CPT | Performed by: NURSE PRACTITIONER

## 2023-02-07 PROCEDURE — 36415 COLL VENOUS BLD VENIPUNCTURE: CPT | Performed by: INTERNAL MEDICINE

## 2023-02-07 PROCEDURE — 82247 BILIRUBIN TOTAL: CPT | Performed by: NURSE PRACTITIONER

## 2023-02-07 PROCEDURE — 84443 ASSAY THYROID STIM HORMONE: CPT | Performed by: NURSE PRACTITIONER

## 2023-02-07 PROCEDURE — 94761 N-INVAS EAR/PLS OXIMETRY MLT: CPT

## 2023-02-07 PROCEDURE — 11000001 HC ACUTE MED/SURG PRIVATE ROOM

## 2023-02-07 PROCEDURE — 84540 ASSAY OF URINE/UREA-N: CPT | Performed by: INTERNAL MEDICINE

## 2023-02-07 PROCEDURE — 82570 ASSAY OF URINE CREATININE: CPT | Performed by: NURSE PRACTITIONER

## 2023-02-07 PROCEDURE — 36415 COLL VENOUS BLD VENIPUNCTURE: CPT | Performed by: NURSE PRACTITIONER

## 2023-02-07 PROCEDURE — 83935 ASSAY OF URINE OSMOLALITY: CPT | Performed by: INTERNAL MEDICINE

## 2023-02-07 PROCEDURE — 27000221 HC OXYGEN, UP TO 24 HOURS

## 2023-02-07 PROCEDURE — 20600001 HC STEP DOWN PRIVATE ROOM

## 2023-02-07 PROCEDURE — 84100 ASSAY OF PHOSPHORUS: CPT | Performed by: INTERNAL MEDICINE

## 2023-02-07 PROCEDURE — 84460 ALANINE AMINO (ALT) (SGPT): CPT | Performed by: NURSE PRACTITIONER

## 2023-02-07 PROCEDURE — 84450 TRANSFERASE (AST) (SGOT): CPT | Performed by: NURSE PRACTITIONER

## 2023-02-07 PROCEDURE — 25000003 PHARM REV CODE 250: Performed by: NURSE PRACTITIONER

## 2023-02-07 PROCEDURE — 80048 BASIC METABOLIC PNL TOTAL CA: CPT | Performed by: NURSE PRACTITIONER

## 2023-02-07 RX ORDER — FUROSEMIDE 10 MG/ML
40 INJECTION INTRAMUSCULAR; INTRAVENOUS
Status: DISCONTINUED | OUTPATIENT
Start: 2023-02-07 | End: 2023-02-09

## 2023-02-07 RX ORDER — ATORVASTATIN CALCIUM 40 MG/1
80 TABLET, FILM COATED ORAL DAILY
Status: DISCONTINUED | OUTPATIENT
Start: 2023-02-07 | End: 2023-02-07

## 2023-02-07 RX ORDER — FUROSEMIDE 10 MG/ML
20 INJECTION INTRAMUSCULAR; INTRAVENOUS ONCE
Status: COMPLETED | OUTPATIENT
Start: 2023-02-07 | End: 2023-02-07

## 2023-02-07 RX ORDER — METOPROLOL TARTRATE 25 MG/1
25 TABLET, FILM COATED ORAL 2 TIMES DAILY
Status: DISCONTINUED | OUTPATIENT
Start: 2023-02-07 | End: 2023-02-09

## 2023-02-07 RX ADMIN — FUROSEMIDE 20 MG: 10 INJECTION, SOLUTION INTRAMUSCULAR; INTRAVENOUS at 01:02

## 2023-02-07 RX ADMIN — AMIODARONE HYDROCHLORIDE 0.5 MG/MIN: 1.8 INJECTION, SOLUTION INTRAVENOUS at 02:02

## 2023-02-07 RX ADMIN — SACUBITRIL AND VALSARTAN 1 TABLET: 24; 26 TABLET, FILM COATED ORAL at 09:02

## 2023-02-07 RX ADMIN — APIXABAN 5 MG: 2.5 TABLET, FILM COATED ORAL at 09:02

## 2023-02-07 RX ADMIN — LEVOTHYROXINE SODIUM 25 MCG: 0.03 TABLET ORAL at 05:02

## 2023-02-07 RX ADMIN — CLOPIDOGREL BISULFATE 75 MG: 75 TABLET ORAL at 09:02

## 2023-02-07 RX ADMIN — FUROSEMIDE 40 MG: 10 INJECTION, SOLUTION INTRAMUSCULAR; INTRAVENOUS at 12:02

## 2023-02-07 RX ADMIN — AMIODARONE HYDROCHLORIDE 0.5 MG/MIN: 1.8 INJECTION, SOLUTION INTRAVENOUS at 03:02

## 2023-02-07 NOTE — ASSESSMENT & PLAN NOTE
Chronic, uncontrolled.  Latest blood pressure and vitals reviewed-   Temp:  [97.5 °F (36.4 °C)-98 °F (36.7 °C)]   Pulse:  []   Resp:  [18-34]   BP: ()/(62-94)   SpO2:  [90 %-100 %] .   Home meds for hypertension were reviewed and noted below.   Hypertension Medications             carvediloL (COREG) 3.125 MG tablet Take 1 tablet (3.125 mg total) by mouth 2 (two) times daily.    furosemide (LASIX) 20 MG tablet Take 1 tablet (20 mg total) by mouth 3 (three) times a week.    sacubitriL-valsartan (ENTRESTO) 24-26 mg per tablet Take 1 tablet by mouth 2 (two) times daily.    spironolactone (ALDACTONE) 25 MG tablet Take 1 tablet (25 mg total) by mouth once daily.          While in the hospital, will manage blood pressure as follows; Adjust home antihypertensive regimen as follows- holding coreg and spirinolactone, cont entresto, IV lasix and amio gtt for uncontrolled afib    Will utilize p.r.n. blood pressure medication only if patient's blood pressure greater than  180/110 and he develops symptoms such as worsening chest pain or shortness of breath.

## 2023-02-07 NOTE — CONSULTS
Replaced by [facility]  Department of Cardiology  Consult Note      PATIENT NAME: Cleveland Capps    MRN: [id number]  TODAY'S DATE: 02/07/2023  ADMIT DATE: 2/6/2023                          CONSULT REQUESTED BY: Fernando Bryson MD    SUBJECTIVE     PRINCIPAL PROBLEM: Acute on chronic combined systolic and diastolic congestive heart failure      REASON FOR CONSULT:  CHF ATRIAL FIB      HPI:  Patient has a history of heart failure chronic atrial fibrillation.  His blood pressure was running low and some of his medications were stopped about 2 weeks ago over the phone.  He now presents with atrial fibrillation rapid response and he is placed on a amiodarone drip to control the heart rate.  He has a complex medical history as described below the chart was reviewed  He has AICD in place with no shocks    Reviewed the office visit 12/20 2022 he was taking carvedilol 3.125 b.i.d. but not currently      Principal Problem:Acute on chronic combined systolic and diastolic congestive heart failure           HPI:  Cleveland Capps is a 67 year male who presents emergency room for evaluation of shortness of breath.  He reports shortness of breath and tachycardia onset approximately 8 days ago.  He is a history of atrial fibrillation and reports intermittently in AFib over the past several days.  He denies any chest pain.  Reports shortness of breath is worse with exertion.  No alleviating factors.  He also endorses an approximate 10-15 lb weight gain over the past several weeks.  No reported fever chills.  Previous medical history includes CVA, Watchman left atrial closure device, hypothyroidism, coronary artery disease, stent placement, COPD, hypertension, AICD, and combined systolic/diastolic heart failure.  ER workup:  CBC with mild anemia.  CMP with BUN of 44 and creatinine of 1.8.  Bilirubin elevated 3.0,  .  BNP elevated at 1518 (baseline 400).  Troponin mildly elevated 0.077.  Last echo demonstrated ejection  fraction of 20-25%.  Patient was given 20 mg of Lasix in the ER.  Patient remained in atrial fibrillation/RVR while in ER.  He had mild hypotension.  Patient was started on amiodarone infusion and bolus after consulting Dr. Calhoun with on-call Cardiology.  Patient admitted Hospital Medicine for treatment management.  Patient admitted to step-down unit.        Overview/Hospital Course:  No notes on file     Interval History:  Notes reviewed, no acute events overnight.  Patient resting comfortably in bed.  States he feels better since admission and his shortness of breath is improving.  His appetite is very poor and he states that he does not feel hungry.  Will consult with RD.  Patient remains in AFib with uncontrolled rate in the 100-120s range.  He is currently on amiodarone drip.  Will await Cardiology recommendations.  Renal function also worsening, will consult with Nephrology      12/20/22  Progress Notes  Kuldeep Daniels MD (Physician)   Cardiology  Subjective:    Patient ID:  Cleveland Capps is a 67 y.o. male who presents for   Hospital Follow Up, Atrial Fibrillation, Hypotension, Congestive Heart Failure, and Coronary Artery Disease     HPI hosp   1 mo ago  covid  sp cabg and aicd + stents + last mo  hfref and placed on  entresto  and now much better     Went  home with hematuria   feels good breath good   Patient is doing much better at home on this current medical regimen even though his blood pressure runs a little bit low at times  He is breathing well and actually able to perform household chores       Status post previous bypass surgery as well as Watchman device and failed recent cardioversion  Heart failure reduced ejection fraction improves significantly on Entresto and Aldactone and current meds    6/27/22  HPI  Cleveland Capps is a 66 y.o. male  retired  with a history of non valvular, Paroxysmal Atrial Fibrillation originally referred by Dr. Yung Grimaldo for evaluation of severe  mitral regurgitation. He has a history of stroke in 2019, carotid artery disease, HLD, HTN, pAF, ischemic CHF, CAD s/p cabg/pci with chronic stable angina, previous silvestre requiring dialysis before renal recovery and previous tracheostomy.  He was originally sent to Dr Schmidt in 2021 for a postivie stress test. LHC showed Severe three-vessel coronary disease.  Status post CABG with incomplete but excellent revascularization. Holter monitor He underwent successful Watchman device with Dr Schmidt on 5/17/22. It was decided to consider surface echo instead of SANTHOSH at 45 days because of difficult intubation with the SANTHOSH probe requiring anesthesia scope support. SANTHOSH showed severe FMR which has failed medical therapy. He presents today for reevaluation of MR and possible MitraClip.      Today he is feeling well. He does not experience SOB with ADLs. He is very active during the day. He only notices SOB if he pushes himself heavily.      Cleveland Capps is a 66 y.o. male referred by Dr Grimaldo for evaluation of severe MR (NYHA Class II sx).     Mitral Valve Disease Etiology: FMR     The patient has undergone the following MitraClip work-up:   SANTHOSH (Date 5/17/22): Severe functional mitral insufficiency with the largest jet being between A2-P2 EF= 20%.   LHC (Date 3/30/21): Severe three-vessel coronary disease.  Status post CABG with incomplete but excellent revascularization.  STS: 4%   Frailty: 2/3 (failed  and walk, needs albumin)   CT Surgery risk assessment: not needed, functional MR  PFTs: not done  Comorbidities: Afib, CVA hx,        Assessment:   Mitral regurgitation  Cleveland Capps is a 66 y.o. male referred by Dr Grimaldo for evaluation of severe MR (NYHA Class II sx).     Mitral Valve Disease Etiology: FMR     The patient has undergone the following MitraClip work-up:   SANTHOSH (Date 5/17/22): Severe functional mitral insufficiency with the largest jet being between A2-P2 EF= 20%.   LHC (Date 3/30/21): Severe  three-vessel coronary disease.  Status post CABG with incomplete but excellent revascularization.  STS: 4%   Frailty: 2/3 (failed  and walk, needs albumin)   CT Surgery risk assessment: not needed, functional MR  PFTs: not done  Comorbidities: Afib, CVA hx,      PAF (paroxysmal atrial fibrillation)  S/p Watchman device with Dr Schmidt on 5/17/22. No need for 45 day SANTHOSH per Dr Schmidt due to difficult intubation. OK to stop Eliquis now and start ASA/Plavix.       Coronary artery disease  S/p CABG and PCI. Asymptomatic.      Chronic combined systolic and diastolic congestive heart failure  NYHA     Aortic atherosclerosis  See CTA     Plan:      Patient's symptoms are not yet severe enough to warrant MitraClip. If he develops NYHA IV symptoms refractory to medical therapy in the future, we can offer him MitraClip.  Regarding his Watchman, OK to discontinue Eliquis today and start ASA/Plavix. He is a difficult SANTHOSH due to history of tracheostomy complication. Will stop Plavix after 4.5 months.   Virtual Watchman follow up at 6 months, 1 year and 2 years.         Ani Aleman PA-C  Valve and Structural Heart Disease  [facility]    Review of patient's allergies indicates:   Allergen Reactions    Penicillin      Other reaction(s): anaphylaxis  Other reaction(s): anaphylaxis    Penicillins Hives       Past Medical History:   Diagnosis Date    A-fib     Acute kidney injury     COPD (chronic obstructive pulmonary disease)     Coronary artery disease     Diabetes mellitus     Encounter for blood transfusion     Former smoker     Hypertension     Myocardial infarction     Thyroid disease     Type 2 diabetes mellitus without complications      Past Surgical History:   Procedure Laterality Date    CLOSURE OF LEFT ATRIAL APPENDAGE USING DEVICE N/A 5/17/2022    Procedure: Left atrial appendage closure device;  Surgeon: Tre Schmidt MD;  Location: [facility] CATH LAB;  Service: Cardiology;  Laterality: N/A;     COLONOSCOPY N/A 1/3/2017    Procedure: COLONOSCOPY;  Surgeon: Marcus Green MD;  Location: [facility];  Service: Endoscopy;  Laterality: N/A;    CORONARY BYPASS GRAFT ANGIOGRAPHY  3/30/2021    Procedure: Bypass graft study;  Surgeon: Tre Schmidt MD;  Location: [facility] CATH LAB;  Service: Cardiology;;    CORONARY STENT PLACEMENT      GASTROSTOMY TUBE PLACEMENT      INSERTION OF PACEMAKER  2017    INTRAVASCULAR ULTRASOUND, NON-CORONARY  2022    Procedure: Intravascular Ultrasound, Non-Coronary;  Surgeon: Tre Schmidt MD;  Location: [facility] CATH LAB;  Service: Cardiology;;    LEFT HEART CATHETERIZATION N/A 3/30/2021    Procedure: Left heart cath;  Surgeon: Tre Schmidt MD;  Location: [facility] CATH LAB;  Service: Cardiology;  Laterality: N/A;    PEG TUBE REMOVAL      TRACHEOSTOMY CLOSURE      TRACHEOSTOMY TUBE PLACEMENT      TRANSESOPHAGEAL ECHOCARDIOGRAPHY N/A 2022    Procedure: ECHOCARDIOGRAM, TRANSESOPHAGEAL;  Surgeon: [facility];  Location: [facility] CATH LAB;  Service: Anesthesiology;  Laterality: N/A;    TREATMENT OF CARDIAC ARRHYTHMIA N/A 2022    Procedure: Cardioversion or Defibrillation;  Surgeon: Kuldeep Daniels MD;  Location: [facility] CATH LAB;  Service: Cardiology;  Laterality: N/A;    UPPER GASTROINTESTINAL ENDOSCOPY  2016    Dr. Green with PEG tube placement     Social History     Tobacco Use    Smoking status: Former     Types: Cigarettes     Quit date: 2005     Years since quittin.2    Smokeless tobacco: Never    Tobacco comments:     quit    Substance Use Topics    Alcohol use: No    Drug use: No        REVIEW OF SYSTEMS  CONSTITUTIONAL: Negative for chills, fatigue and fever.   EYES: No double vision, No blurred vision  NEURO: No headaches, No dizziness  RESPIRATORY: Negative for cough, shortness of breath and wheezing.    CARDIOVASCULAR: Negative for chest pain. Negative for palpitations and leg swelling.   GI: Negative for abdominal pain, No  melena, diarrhea, nausea and vomiting.   : Negative for dysuria and frequency, Negative for hematuria  SKIN: Negative for bruising, Negative for edema or discoloration noted.   ENDOCRINE: Negative for polyphagia, Negative for heat intolerance, Negative for cold intolerance  PSYCHIATRIC: Negative for depression, Negative for anxiety, Negative for memory loss  MUSCULOSKELETAL: Negative for neck pain, Negative for muscle weakness, Negative for back pain     OBJECTIVE     VITAL SIGNS (Most Recent)  Temp: 98 °F (36.7 °C) (02/07/23 0800)  Pulse: 109 (02/07/23 1000)  Resp: (!) 25 (02/07/23 1000)  BP: 109/82 (02/07/23 1000)  SpO2: (!) 93 % (02/07/23 1000)    VENTILATION STATUS  Resp: (!) 25 (02/07/23 1000)  SpO2: (!) 93 % (02/07/23 1000)       I & O (Last 24H):  Intake/Output Summary (Last 24 hours) at 2/7/2023 1041  Last data filed at 2/7/2023 0416  Gross per 24 hour   Intake --   Output 350 ml   Net -350 ml       WEIGHTS  Wt Readings from Last 1 Encounters:   02/07/23 0600 54.3 kg (119 lb 11.4 oz)   02/06/23 2217 54 kg (119 lb 0.8 oz)   02/06/23 1449 52.6 kg (116 lb)       PHYSICAL EXAM  GENERAL: well built, well nourished, well-developed in no apparent distress alert and oriented.   HEENT: Normocephalic. Pupils normal and conjunctivae normal.  Mucous membranes normal, no cyanosis or icterus, trachea central,no pallor or icterus is noted..   NECK: No JVD. No bruit..   THYROID: Thyroid not enlarged. No nodules present..   CARDIAC: Regular rate and rhythm.  Holosystolic murmur left precordial CHEST ANATOMY: normal.   LUNGS: Clear to auscultation. No wheezing or rhonchi..   ABDOMEN: Soft no masses or organomegaly.  No abdomen pulsations or bruits.  Normal bowel sounds. No pulsations and no masses felt, No guarding or rebound.   URINARY: No paul catheter   EXTREMITIES: No cyanosis, clubbing or edema noted at this time., no calf tenderness bilaterally.   PERIPHERAL VASCULAR SYSTEM: Good palpable distal pulses.   CENTRAL  NERVOUS SYSTEM: No focal motor or sensory deficits noted.   SKIN: Skin without lesions, moist, well perfused.   MUSCLE STRENGTH & TONE: No noteable weakness, atrophy or abnormal movement.     HOME MEDICATIONS:  No current facility-administered medications on file prior to encounter.     Current Outpatient Medications on File Prior to Encounter   Medication Sig Dispense Refill    apixaban (ELIQUIS) 5 mg Tab Take 5 mg by mouth 2 (two) times daily.      atorvastatin (LIPITOR) 80 MG tablet Take 80 mg by mouth once daily.      carvediloL (COREG) 3.125 MG tablet Take 1 tablet (3.125 mg total) by mouth 2 (two) times daily. (Patient taking differently: Take 3.125 mg by mouth 2 (two) times daily as needed.) 60 tablet 11    clopidogreL (PLAVIX) 75 mg tablet Take 1 tablet (75 mg total) by mouth once daily. 30 tablet 11    levothyroxine (SYNTHROID) 25 MCG tablet Take 25 mcg by mouth before breakfast.      potassium chloride SA (K-DUR,KLOR-CON M) 10 MEQ tablet       COMP-AIR NEBULIZER COMPRESSOR Alfreda use as directed      furosemide (LASIX) 20 MG tablet Take 1 tablet (20 mg total) by mouth 3 (three) times a week. 10 tablet 0    sacubitriL-valsartan (ENTRESTO) 24-26 mg per tablet Take 1 tablet by mouth 2 (two) times daily. 60 tablet 2    spironolactone (ALDACTONE) 25 MG tablet Take 1 tablet (25 mg total) by mouth once daily. 30 tablet 11    travoprost (TRAVATAN Z) 0.004 % ophthalmic solution       travoprost, benzalkonium, (TRAVATAN) 0.004 % ophthalmic solution Place 1 drop into both eyes nightly.      TRUE METRIX GLUCOSE TEST STRIP Strp USE 1 STRIP TO CHECK GLUCOSE ONCE DAILY         SCHEDULED MEDS:   apixaban  5 mg Oral BID    clopidogreL  75 mg Oral Daily    furosemide (LASIX) injection  40 mg Intravenous Q12H    levothyroxine  25 mcg Oral Before breakfast    sacubitriL-valsartan  1 tablet Oral BID       CONTINUOUS INFUSIONS:   amiodarone in dextrose 5% 0.5 mg/min (02/07/23 0354)       PRN MEDS:dextrose 10%, dextrose 10%,  glucagon (human recombinant), glucose, glucose, insulin aspart U-100, sodium chloride 0.9%    LABS AND DIAGNOSTICS     CBC LAST 3 DAYS  Recent Labs   Lab 02/06/23  1543   WBC 6.98   RBC 3.75*   HGB 12.8*   HCT 39.5*   *   MCH 34.1*   MCHC 32.4   RDW 17.1*      MPV 8.9*   GRAN 80.8*  5.6   LYMPH 11.0*  0.8*   MONO 7.3  0.5   BASO 0.03   NRBC 0       COAGULATION LAST 3 DAYS  No results for input(s): LABPT, INR, APTT in the last 168 hours.    CHEMISTRY LAST 3 DAYS  Recent Labs   Lab 02/06/23  1543 02/07/23  0518   * 135*   K 4.2 4.6    101   CO2 19* 17*   ANIONGAP 9 17*   BUN 44* 50*   CREATININE 1.8* 2.0*   * 142*   CALCIUM 8.7 8.9   MG 2.3  --    ALBUMIN 3.4*  --    PROT 6.3  --    ALKPHOS 109  --    *  --    *  --    BILITOT 3.0*  --        CARDIAC PROFILE LAST 3 DAYS  Recent Labs   Lab 02/06/23  1543   BNP 1,518*   TROPONINI 0.077*       ENDOCRINE LAST 3 DAYS  No results for input(s): TSH, PROCAL in the last 168 hours.    LAST ARTERIAL BLOOD GAS  ABG  No results for input(s): PH, PO2, PCO2, HCO3, BE in the last 168 hours.    LAST 7 DAYS MICROBIOLOGY   Microbiology Results (last 7 days)       ** No results found for the last 168 hours. **            MOST RECENT IMAGING  X-Ray Chest AP Portable  Narrative: EXAMINATION:  XR CHEST AP PORTABLE    CLINICAL HISTORY:  sob;    TECHNIQUE:  Single frontal view of the chest was performed.    COMPARISON:  11/26/2022    FINDINGS:  The cardiomediastinal silhouette appears stable.  Median sternotomy sutures and anterior chest wall cardiac pacemaker as before.  Limited visualization of the left lung base on the portable film.  Probable small left pleural effusion.  Mildly prominent markings right infrahilar.  No longer the more confluent right lung infiltrate that was seen on the prior exam.  Impression: Probable small left pleural effusion and mildly prominent markings right infrahilar with the appearance questioning very mild  CHF.    Electronically signed by: Mirella Connelly MD  Date:    02/06/2023  Time:    18:04      ECHOCARDIOGRAM RESULTS (last 5)  Results for orders placed during the hospital encounter of 11/16/22    Transesophageal echo (SANTHOSH) with possible cardioversion    Interpretation Summary  · The left ventricle is severely enlarged with concentric remodeling and  · Grade 2 plaque present in the ascending aorta and transverse aorta.  · The estimated ejection fraction is 25%.  · There are segmental left ventricular wall motion abnormalities.  · A diastolic pattern consistent with atrial fibrillation observed.  · Normal right ventricular size with normal right ventricular systolic function.  · Mild left atrial enlargement.  · Abnormal appearing left atrial appendage. No thrombus is present in the appendage. STANLEY occluder is present.  · Mild-to-moderate mitral regurgitation.  · There is no pulmonary hypertension.  · Mild tricuspid regurgitation.      Results for orders placed during the hospital encounter of 06/27/22    Echo    Interpretation Summary  · The left ventricle is severely enlarged with mild eccentric hypertrophy and severely decreased systolic function.  · The estimated ejection fraction is 25%.  · There are segmental left ventricular wall motion abnormalities.  · Left ventricular diastolic dysfunction.  · Mild right ventricular enlargement with mildly to moderately reduced right ventricular systolic function.  · Severe left atrial enlargement.  · Mild right atrial enlargement.  · The MR is moderate to moderate -severe ( 2-3+).  · Mild to moderate tricuspid regurgitation.  · Mild pulmonic regurgitation.  · Intermediate central venous pressure (8 mmHg).  · The estimated PA systolic pressure is 47 mmHg.  · There is pulmonary hypertension.      Results for orders placed during the hospital encounter of 05/17/22    Transesophageal echo (SANTHOSH)    Interpretation Summary  · SANTHOSH for intra-procedural guidance during Watchman  implantation.  · S/p placement of 31mm Watchman FLX. The device is well seated without rose-device leak. There is a small iatrogenic ASD in the superior interatrial septum due to trans-septal puncture. No pericardial effusion.  · Normal appearing left atrial appendage. No thrombus is present in the appendage. Abnormal appendage velocities of 0.15m/s.  · The left ventricle is normal in size with severely decreased systolic function.  · The estimated ejection fraction is 20%.  · Severe functional mitral regurgitation with largest MR jet between A2-P2.  · Normal right ventricular size with mildly reduced right ventricular systolic function.  · Mild tricuspid regurgitation.  · Grade 2 plaque present in the descending aorta.  · Biatrial enlargement.      Results for orders placed during the hospital encounter of 10/24/20    Echo Color Flow Doppler? Yes    Interpretation Summary  · Severe left atrial enlargement.  · There is left ventricular eccentric hypertrophy.  · Moderately decreased left ventricular systolic function. The estimated ejection fraction is 39%.  · Grade III diastolic dysfunction.  · Mildly reduced right ventricular systolic function.  · Mild aortic regurgitation.  · Mild mitral regurgitation.  · Normal central venous pressure (3 mmHg).  · There is no evidence of intracardiac shunting.      Results for orders placed during the hospital encounter of 05/16/20    Echo Color Flow Doppler? Yes    Interpretation Summary  · Eccentric left ventricular hypertrophy.  · Mild left ventricular enlargement.  · Severely decreased left ventricular systolic function. The estimated ejection fraction is 33%.  · Grade I (mild) left ventricular diastolic dysfunction consistent with impaired relaxation.  · Severely reduced right ventricular systolic function.  · Mild right atrial enlargement.  · Mild aortic regurgitation.  · Mild mitral regurgitation.  · The mean diastolic gradient across the mitral valve is 51 mmHg at a heart  rate of bpm.  · Normal central venous pressure (3 mmHg).  · The estimated PA systolic pressure is 17 mmHg.  · Trivial pericardial effusion.  · Mild left atrial enlargement.      CURRENT/PREVIOUS VISIT EKG  Results for orders placed or performed during the hospital encounter of 02/06/23   EKG 12-lead    Collection Time: 02/06/23  2:58 PM    Narrative    Test Reason : I48.91,    Vent. Rate : 127 BPM     Atrial Rate : 375 BPM     P-R Int : 000 ms          QRS Dur : 082 ms      QT Int : 318 ms       P-R-T Axes : 000 075 213 degrees     QTc Int : 462 ms    Atrial flutter with variable A-V block with premature ventricular or  aberrantly conducted complexes  Anteroseptal infarct  T wave abnormality, consider inferior ischemia  Abnormal ECG    Referred By: [email or web address]   SELF           Confirmed By:            ASSESSMENT/PLAN:     Active Hospital Problems    Diagnosis    *Acute on chronic combined systolic and diastolic congestive heart failure    Longstanding persistent atrial fibrillation    AICD (automatic cardioverter/defibrillator) present    Elevated LFTs    Hypothyroid    Stroke    MEIR (acute kidney injury)     Atn; requiring dialysis      Benign essential HTN       ASSESSMENT & PLAN:   Atrial fibrillation rapid response  Heart failure combined systolic and diastolic decreased ejection fraction  Mitral regurgitation  AICD    RECOMMENDATIONS:  Will add metoprolol to try to control the heart rate he may require digitalis if his blood pressure does not tolerate will try to DC the amiodarone        Tre Rosario MD  [facility]  Department of Cardiology  Date of Service: 02/07/2023  10:41 AM

## 2023-02-07 NOTE — PLAN OF CARE
Problem: Adult Inpatient Plan of Care  Goal: Optimal Comfort and Wellbeing  Outcome: Ongoing, Progressing     Problem: Skin Injury Risk Increased  Goal: Skin Health and Integrity  Outcome: Ongoing, Progressing     Problem: Dysrhythmia  Goal: Normalized Cardiac Rhythm  Outcome: Ongoing, Progressing   Pt remains in bed with continuous monitoring in place. Amiodarone Infusing @ 16.7 ml/hr. VSS. Bed alarm set, call light in reach.

## 2023-02-07 NOTE — EICU
Intervention Initiated From:  [facility] / EICU    Beatriz intervened regarding:  Rounding (Video assessment)    Pt lying in bed, awake and alert. 's, irregular on Amio gtt at 0.5 mg/min. VSS and pt denies complaints at this time. NAD noted.

## 2023-02-07 NOTE — EICU
Intervention Initiated From:  [facility] / ADENIKE Henderson intervened regarding:  Rounding (Video assessment)      Comments: new admit. Pt resting in bed with NAD observed. Amio drip infusing. Hr is 108 BPM. Remainder of VSS.

## 2023-02-07 NOTE — H&P
[facility] Medicine  History & Physical    Patient Name: Cleveland Capps  MRN: [id number]  Patient Class: IP- Inpatient  Admission Date: 2/6/2023  Attending Physician: Fernando Bryson MD   Primary Care Provider: Romero Snyder MD         Patient information was obtained from patient, relative(s), past medical records and ER records.     Subjective:     Principal Problem:Acute on chronic combined systolic and diastolic congestive heart failure    Chief Complaint:   Chief Complaint   Patient presents with    Shortness of Breath    Atrial Fibrillation        HPI: Cleveland Capps is a 67 year male who presents emergency room for evaluation of shortness of breath.  He reports shortness of breath and tachycardia onset approximately 8 days ago.  He is a history of atrial fibrillation and reports intermittently in AFib over the past several days.  He denies any chest pain.  Reports shortness of breath is worse with exertion.  No alleviating factors.  He also endorses an approximate 10-15 lb weight gain over the past several weeks.  No reported fever chills.  Previous medical history includes CVA, Watchman left atrial closure device, hypothyroidism, coronary artery disease, stent placement, COPD, hypertension, AICD, and combined systolic/diastolic heart failure.  ER workup:  CBC with mild anemia.  CMP with BUN of 44 and creatinine of 1.8.  Bilirubin elevated 3.0,  .  BNP elevated at 1518 (baseline 400).  Troponin mildly elevated 0.077.  Last echo demonstrated ejection fraction of 20-25%.  Patient was given 20 mg of Lasix in the ER.  Patient remained in atrial fibrillation/RVR while in ER.  He had mild hypotension.  Patient was started on amiodarone infusion and bolus after consulting Dr. Calhoun with on-call Cardiology.  Patient admitted Hospital Medicine for treatment management.  Patient admitted to step-down unit.      No new subjective & objective note has been filed under  this hospital service since the last note was generated.    Assessment/Plan:     * Acute on chronic combined systolic and diastolic congestive heart failure  Patient is identified as having Combined Systolic and Diastolic heart failure that is Acute on chronic. CHF is currently uncontrolled due to Continued edema of extremities and Weight gain of 15 pounds. Latest ECHO performed and demonstrates- Results for orders placed during the hospital encounter of 06/27/22    Echo    Interpretation Summary  · The left ventricle is severely enlarged with mild eccentric hypertrophy and severely decreased systolic function.  · The estimated ejection fraction is 25%.  · There are segmental left ventricular wall motion abnormalities.  · Left ventricular diastolic dysfunction.  · Mild right ventricular enlargement with mildly to moderately reduced right ventricular systolic function.  · Severe left atrial enlargement.  · Mild right atrial enlargement.  · The MR is moderate to moderate -severe ( 2-3+).  · Mild to moderate tricuspid regurgitation.  · Mild pulmonic regurgitation.  · Intermediate central venous pressure (8 mmHg).  · The estimated PA systolic pressure is 47 mmHg.  · There is pulmonary hypertension.  . Continue Furosemide and monitor clinical status closely. Monitor on telemetry. Patient is off CHF pathway.  Monitor strict Is&Os and daily weights.  Place on fluid restriction of 1.5 L. Continue to stress to patient importance of self efficacy and  on diet for CHF. Last BNP reviewed- and noted below   Recent Labs   Lab 02/06/23  1543   BNP 1,518*   .      Longstanding persistent atrial fibrillation  Patient with Persistent (7 days or more) atrial fibrillation which is uncontrolled currently with Amiodarone. Patient is currently in atrial fibrillation.[id number] Score: 2. Anticoagulation indicated. Anticoagulation done with Plavix.    Amiodarone bolus and infusion.        Elevated LFTs  Acute problem   Likely  secondary to right heart failure  Follow closely    AICD (automatic cardioverter/defibrillator) present  Chronic problem  Continuous cardiac monitor   Patient denies any recent discharges      Hypothyroid  Chronic problem  Continue levothyroxine      Stroke  Chronic problem  Stable      Benign essential HTN  Chronic problem   Antihypertensives on hold due to mild hypotension        VTE Risk Mitigation (From admission, onward)         Ordered     apixaban tablet 5 mg  2 times daily         02/06/23 2114     IP VTE HIGH RISK PATIENT  Once         02/06/23 2113     Place sequential compression device  Until discontinued         02/06/23 2113     Place KEN hose  Until discontinued         02/06/23 2113               Critical care time spent on the evaluation and treatment of severe organ dysfunction, review of pertinent labs and imaging studies, discussions with consulting providers and discussions with patient/family 45 minutes      Travon Zepeda NP  Department of Hospital Medicine   [facility]

## 2023-02-07 NOTE — PROGRESS NOTES
[facility]  Adult Nutrition  Progress Note    SUMMARY       Recommendations   1. Change diet to diabetic 1500 kcal  2gm sodium diet with 1500ml Fluid restriction   2. Weigh daily   3. Monitor P.O intakes  4. Add Boost Glucose control BID   5. Nutrition education on cardiac/low sodium diet and fluid restriction given    Goals: Consume 50% to 75% of all meals by follow up.  Nutrition Goal Status: new  Communication of RD Recs:  (P.O C sticky note.)    Assessment and Plan    Nutrition Problem  Moderate chronic illness related Malnutrition     Related to ( etiology)  Inadequate oral intakes, early satiety    Signs and Symptoms ( as evidenced by)    >10% unintentional wt loss in 6 months (18% wt loss)  Mild muscle wasting   Mild loss of subcutaneous fat.     Intervention: Fluid restricted Diet, fat/Na/carb modified diet and nutrition supplement therapy    Malnutrition Assessment  Malnutrition Type: acute illness or injury, chronic illness  Neck/Chest (Micronutrient): muscle wasting   Micronutrient Evaluation:   Micronutrient Evaluation Comments:   Weight Loss (Malnutrition): 10% in 6 months  Subcutaneous Fat (Malnutrition): mild depletion  Muscle Mass (Malnutrition): mild depletion   Orbital Region (Subcutaneous Fat Loss): mild depletion   [place] Region (Muscle Loss): mild depletion       Subcutaneous Fat Loss (Final Summary): mild protein-calorie malnutrition  Muscle Loss Evaluation (Final Summary): mild protein-calorie malnutrition    Severe Weight Loss (Malnutrition): greater than 10% in 6 months    Reason for Assessment    Reason For Assessment: consult  Diagnosis: other (see comments) (Acute on Chronic combined systolic and diastolic congestive heart Failure)  Relevant Medical History: Afib, MEIR, COPD, CAD, DM2,   Former Smoker, Hypertension, Myocardial Infraction, Thyroid Disease  Interdisciplinary Rounds: not attended    General Information Comments: 67 y.o. Male presented to the ED with SOB  "and Tachycardia onset approximately 8 days before admit. Pt reports a 10-15lb wt gain over the past several weeks, only 3 lb per chart review. Current wt. 119lbs. Pt denies any N/V.Pt reports current appetite is usually good. Pt states he got sick about a year ago and lost and a lot of wt during this time. Pt unsure of UBW before illness.   Per chart review 28 lb loss since (19% wt loss) 2022. Mahesh Score 17. Pt was not feeling well enough to perform NFPE, pt deferred, visually mild wasting seen.    Nutrition Discharge Planning: Continue DM 1500 kcal, 2gm Na diet with 1500ml fluid restriction.    Nutrition Risk Screen    Nutrition Risk Screen: no indicators present    Nutrition/Diet History    Patient Reported Diet/Restrictions/Preferences: diabetic diet, other (see comments) (Heart Failure diet)  Typical Food/Fluid Intake: Typical PO intake is usually good.  Spiritual, Cultural Beliefs, Worship Practices, Values that Affect Care: no  Food Allergies: NKFA  Factors Affecting Nutritional Intake: decreased appetite    Anthropometrics    Temp: 97.8 °F (36.6 °C)  Height Method: Stated  Height: 5' 1"  Height (inches): 61 in  Weight Method: Bed Scale  Weight: 54.3 kg (119 lb 11.4 oz)  Weight (lb): 119.71 lb  Ideal Body Weight (IBW), Male: 112 lb  % Ideal Body Weight, Male (lb): 106.88 %  BMI (Calculated): 22.6  BMI Grade: 18.5-24.9 - normal  Weight Loss: unintentional  Usual Body Weight (UBW), k.4 kg  % Usual Body Weight: 81.95  % Weight Change From Usual Weight: -18.22 %  Weight Loss Since Admission: 0 lb  % Weight Change Since Admission: 0    Wt Readings from Last 8 Encounters:   23 54.3 kg (119 lb 11.4 oz)   23 52.6 kg (116 lb)   22 52.9 kg (116 lb 8.2 oz)   22 60.3 kg (133 lb)   22 61.7 kg (136 lb)   22 66.2 kg (146 lb)   22 66.2 kg (146 lb)   22 66.2 kg (146 lb)   ]    Lab/Procedures/Meds    Pertinent Labs Reviewed: reviewed  BNP  Recent Labs   Lab " 02/06/23  1543   BNP 1,518*     BMP  Lab Results   Component Value Date     (L) 02/07/2023    K 4.6 02/07/2023     02/07/2023    CO2 17 (L) 02/07/2023    BUN 50 (H) 02/07/2023    CREATININE 2.0 (H) 02/07/2023    CALCIUM 8.9 02/07/2023    ANIONGAP 17 (H) 02/07/2023    EGFRNORACEVR 36 (A) 02/07/2023     Lab Results   Component Value Date    ALBUMIN 3.4 (L) 02/06/2023     Recent Labs   Lab 02/07/23  1615   POCTGLUCOSE 119*       Lab Results   Component Value Date    HGBA1C 5.4 02/06/2023     Pertinent Medications Reviewed: reviewed  Scheduled Meds:   apixaban  5 mg Oral BID    clopidogreL  75 mg Oral Daily    furosemide (LASIX) injection  40 mg Intravenous Q12H    levothyroxine  25 mcg Oral Before breakfast    sacubitriL-valsartan  1 tablet Oral BID     Continuous Infusions:   amiodarone in dextrose 5% 0.5 mg/min (02/07/23 0354)     PRN Meds:.dextrose 10%, dextrose 10%, glucagon (human recombinant), glucose, glucose, insulin aspart U-100, sodium chloride 0.9%      Estimated/Assessed Needs    Weight Used For Calorie Calculations: 54.3 kg (119 lb 11.4 oz)  Energy Calorie Requirements (kcal): 1434  Energy Need Method: Kcal/kg (MSJ 1.2 Pal Factor)  Protein Requirements: 60gm  Weight Used For Protein Calculations: 54.3 kg (119 lb 11.4 oz)  Fluid Requirements (mL): 1500mls  Estimated Fluid Requirement Method: other (see comments) (per MD)  RDA Method (mL): 1434  CHO Requirement: 161        Nutrition Prescription Ordered    Current Diet Order: Low Sodium, 2gm Fluid restriction 1500ml    Evaluation of Received Nutrient/Fluid Intake    Energy Calories Required: other (see comments) (had not eaten a meal yet)  Protein Required: other (see comments) (Had not eaten a meal yet)  Fluid Required: other (see comments) (Had not eaten a meal yet)  Total Fluid Intake (mL/kg): 240ml  Tolerance: tolerating  % Intake of Estimated Energy Needs: 0-25%  % Meal Intake: 0-25%    Intake/Output Summary (Last 24 hours) at 2/7/2023  1404  Last data filed at 2/7/2023 1147  Gross per 24 hour   Intake 240 ml   Output 475 ml   Net -235 ml     Nutrition Risk    Level of Risk/Frequency of Follow-up: low - moderate 1-2 x weekly    Monitor and Evaluation    Food and Nutrient Intake: energy intake, food and beverage intake  Food and Nutrient Adminstration: diet order  Knowledge/Beliefs/Attitudes: food and nutrition knowledge/skill  Physical Activity and Function: nutrition-related ADLs and IADLs  Anthropometric Measurements: weight, weight change, body mass index  Biochemical Data, Medical Tests and Procedures: electrolyte and renal panel, glucose/endocrine profile, inflammatory profile  Nutrition-Focused Physical Findings: overall appearance     Nutrition Follow-Up     Yes  Dietetic Intern: Gilda Tanner  Cosigner: Carolina Roach RDN, LDN

## 2023-02-07 NOTE — ASSESSMENT & PLAN NOTE
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 CHF is currently uncontrolled due to Continued edema of extremities and Weight gain of 15 pounds. Latest ECHO performed and demonstrates- Results for orders placed during the hospital encounter of 06/27/22    Echo    Interpretation Summary  · The left ventricle is severely enlarged with mild eccentric hypertrophy and severely decreased systolic function.  · The estimated ejection fraction is 25%.  · There are segmental left ventricular wall motion abnormalities.  · Left ventricular diastolic dysfunction.  · Mild right ventricular enlargement with mildly to moderately reduced right ventricular systolic function.  · Severe left atrial enlargement.  · Mild right atrial enlargement.  · The MR is moderate to moderate -severe ( 2-3+).  · Mild to moderate tricuspid regurgitation.  · Mild pulmonic regurgitation.  · Intermediate central venous pressure (8 mmHg).  · The estimated PA systolic pressure is 47 mmHg.  · There is pulmonary hypertension.  . Continue Furosemide and monitor clinical status closely. Monitor on telemetry. Patient is off CHF pathway.  Monitor strict Is&Os and daily weights.  Place on fluid restriction of 1.5 L. Continue to stress to patient importance of self efficacy and  on diet for CHF. Last BNP reviewed- and noted below   Recent Labs   Lab 02/06/23  1543   BNP 1,518*   .

## 2023-02-07 NOTE — H&P
[facility] Medicine  History & Physical    Patient Name: Cleveland Capps  MRN: [id number]  Patient Class: IP- Inpatient  Admission Date: 2/6/2023  Attending Physician: Fernando Bryson MD   Primary Care Provider: Romero Snyder MD         Patient information was obtained from patient, relative(s), past medical records and ER records.     Subjective:     Principal Problem:Acute on chronic combined systolic and diastolic congestive heart failure    Chief Complaint:   Chief Complaint   Patient presents with    Shortness of Breath    Atrial Fibrillation        HPI: Cleveland Capps is a 67 year male who presents emergency room for evaluation of shortness of breath.  He reports shortness of breath and tachycardia onset approximately 8 days ago.  He is a history of atrial fibrillation and reports intermittently in AFib over the past several days.  He denies any chest pain.  Reports shortness of breath is worse with exertion.  No alleviating factors.  He also endorses an approximate 10-15 lb weight gain over the past several weeks.  No reported fever chills.  Previous medical history includes CVA, Watchman left atrial closure device, hypothyroidism, coronary artery disease, stent placement, COPD, hypertension, AICD, and combined systolic/diastolic heart failure.  ER workup:  CBC with mild anemia.  CMP with BUN of 44 and creatinine of 1.8.  Bilirubin elevated 3.0,  .  BNP elevated at 1518 (baseline 400).  Troponin mildly elevated 0.077.  Last echo demonstrated ejection fraction of 20-25%.  Patient was given 20 mg of Lasix in the ER.  Patient remained in atrial fibrillation/RVR while in ER.  He had mild hypotension.  Patient was started on amiodarone infusion and bolus after consulting Dr. Calhoun with on-call Cardiology.  Patient admitted Hospital Medicine for treatment management.  Patient admitted to step-down unit.      Past Medical History:   Diagnosis Date    A-fib     Acute  kidney injury     COPD (chronic obstructive pulmonary disease)     Coronary artery disease     Diabetes mellitus     Encounter for blood transfusion     Former smoker     Hypertension     Myocardial infarction     Thyroid disease     Type 2 diabetes mellitus without complications        Past Surgical History:   Procedure Laterality Date    CLOSURE OF LEFT ATRIAL APPENDAGE USING DEVICE N/A 5/17/2022    Procedure: Left atrial appendage closure device;  Surgeon: Tre Schmidt MD;  Location: [facility] CATH LAB;  Service: Cardiology;  Laterality: N/A;    COLONOSCOPY N/A 1/3/2017    Procedure: COLONOSCOPY;  Surgeon: Marcus Zarco MD;  Location: [facility] ENDO;  Service: Endoscopy;  Laterality: N/A;    CORONARY BYPASS GRAFT ANGIOGRAPHY  3/30/2021    Procedure: Bypass graft study;  Surgeon: Tre Schmidt MD;  Location: [facility] CATH LAB;  Service: Cardiology;;    CORONARY STENT PLACEMENT      GASTROSTOMY TUBE PLACEMENT      INSERTION OF PACEMAKER  04/2017    INTRAVASCULAR ULTRASOUND, NON-CORONARY  5/17/2022    Procedure: Intravascular Ultrasound, Non-Coronary;  Surgeon: Tre Schmidt MD;  Location: [facility] CATH LAB;  Service: Cardiology;;    LEFT HEART CATHETERIZATION N/A 3/30/2021    Procedure: Left heart cath;  Surgeon: Tre Schmidt MD;  Location: [facility] CATH LAB;  Service: Cardiology;  Laterality: N/A;    PEG TUBE REMOVAL      TRACHEOSTOMY CLOSURE      TRACHEOSTOMY TUBE PLACEMENT      TRANSESOPHAGEAL ECHOCARDIOGRAPHY N/A 5/17/2022    Procedure: ECHOCARDIOGRAM, TRANSESOPHAGEAL;  Surgeon: [facility];  Location: [facility] CATH LAB;  Service: Anesthesiology;  Laterality: N/A;    TREATMENT OF CARDIAC ARRHYTHMIA N/A 11/25/2022    Procedure: Cardioversion or Defibrillation;  Surgeon: Kuldeep Daniels MD;  Location: [facility] CATH LAB;  Service: Cardiology;  Laterality: N/A;    UPPER GASTROINTESTINAL ENDOSCOPY  05/2016    Dr. Zarco with PEG tube placement       Review of patient's allergies  indicates:   Allergen Reactions    Penicillin      Other reaction(s): anaphylaxis  Other reaction(s): anaphylaxis    Penicillins Hives       No current facility-administered medications on file prior to encounter.     Current Outpatient Medications on File Prior to Encounter   Medication Sig    apixaban (ELIQUIS) 5 mg Tab Take 5 mg by mouth 2 (two) times daily.    atorvastatin (LIPITOR) 80 MG tablet Take 80 mg by mouth once daily.    carvediloL (COREG) 3.125 MG tablet Take 1 tablet (3.125 mg total) by mouth 2 (two) times daily. (Patient taking differently: Take 3.125 mg by mouth 2 (two) times daily as needed.)    clopidogreL (PLAVIX) 75 mg tablet Take 1 tablet (75 mg total) by mouth once daily.    levothyroxine (SYNTHROID) 25 MCG tablet Take 25 mcg by mouth before breakfast.    potassium chloride SA (K-DUR,KLOR-CON M) 10 MEQ tablet     COMP-AIR NEBULIZER COMPRESSOR Alfreda use as directed    furosemide (LASIX) 20 MG tablet Take 1 tablet (20 mg total) by mouth 3 (three) times a week.    sacubitriL-valsartan (ENTRESTO) 24-26 mg per tablet Take 1 tablet by mouth 2 (two) times daily.    spironolactone (ALDACTONE) 25 MG tablet Take 1 tablet (25 mg total) by mouth once daily.    travoprost (TRAVATAN Z) 0.004 % ophthalmic solution     travoprost, benzalkonium, (TRAVATAN) 0.004 % ophthalmic solution Place 1 drop into both eyes nightly.    TRUE METRIX GLUCOSE TEST STRIP Strp USE 1 STRIP TO CHECK GLUCOSE ONCE DAILY     Family History       Problem Relation (Age of Onset)    Diabetes Mother, Brother    Glaucoma Father, Brother    Heart disease Mother    Macular degeneration Father, Brother    No Known Problems Sister, Maternal Aunt, Maternal Uncle, Paternal Aunt, Paternal Uncle, Maternal Grandmother, Maternal Grandfather, Paternal Grandmother, Paternal Grandfather          Tobacco Use    Smoking status: Former     Types: Cigarettes     Quit date: 2005     Years since quittin.2    Smokeless tobacco:  Never    Tobacco comments:     quit 2007   Substance and Sexual Activity    Alcohol use: No    Drug use: No    Sexual activity: Yes     Review of Systems   Constitutional:  Positive for activity change, fatigue and unexpected weight change. Negative for chills, diaphoresis and fever.   HENT:  Negative for congestion, nosebleeds and tinnitus.    Eyes:  Negative for photophobia and visual disturbance.   Respiratory:  Positive for cough and shortness of breath. Negative for chest tightness and wheezing.    Cardiovascular:  Positive for leg swelling. Negative for chest pain and palpitations.   Gastrointestinal:  Negative for abdominal distention, abdominal pain, constipation, diarrhea, nausea and vomiting.   Endocrine: Negative for cold intolerance and heat intolerance.   Genitourinary:  Negative for difficulty urinating, dysuria, frequency, hematuria and urgency.   Musculoskeletal:  Negative for arthralgias, back pain and myalgias.   Skin:  Negative for pallor, rash and wound.   Allergic/Immunologic: Negative for immunocompromised state.   Neurological:  Positive for weakness. Negative for dizziness, tremors, facial asymmetry and speech difficulty.   Hematological:  Negative for adenopathy. Does not bruise/bleed easily.   Psychiatric/Behavioral:  Negative for confusion and sleep disturbance. The patient is not nervous/anxious.    Objective:     Vital Signs (Most Recent):  Temp: 97.5 °F (36.4 °C) (02/06/23 2217)  Pulse: (!) 134 (02/06/23 2217)  Resp: 18 (02/06/23 2217)  BP: 113/78 (02/06/23 2217)  SpO2: (!) 91 % (02/06/23 2217)   Vital Signs (24h Range):  Temp:  [97.5 °F (36.4 °C)-97.6 °F (36.4 °C)] 97.5 °F (36.4 °C)  Pulse:  [116-147] 134  Resp:  [18-20] 18  SpO2:  [91 %-99 %] 91 %  BP: ()/(64-87) 113/78     Weight: 54 kg (119 lb 0.8 oz)  Body mass index is 22.49 kg/m².    Physical Exam  Vitals and nursing note reviewed.   Constitutional:       General: He is not in acute distress.     Appearance: He is  well-developed. He is ill-appearing. He is not diaphoretic.   HENT:      Head: Normocephalic.      Mouth/Throat:      Mouth: Mucous membranes are moist.      Pharynx: Oropharynx is clear.   Eyes:      General: No scleral icterus.     Conjunctiva/sclera: Conjunctivae normal.      Pupils: Pupils are equal, round, and reactive to light.   Neck:      Vascular: No JVD.   Cardiovascular:      Rate and Rhythm: Normal rate. Rhythm irregular.      Heart sounds: Murmur heard.     No friction rub. No gallop.      Comments: Atrial fibrillation with rapid ventricular response  Pulmonary:      Effort: Respiratory distress present.      Breath sounds: Rales present. No wheezing.      Comments: Bibasilar crackles  Abdominal:      General: Bowel sounds are normal. There is no distension.      Palpations: Abdomen is soft.      Tenderness: There is no abdominal tenderness. There is no guarding or rebound.   Musculoskeletal:         General: No tenderness. Normal range of motion.      Cervical back: Normal range of motion and neck supple.      Right lower leg: Edema present.      Left lower leg: Edema present.   Lymphadenopathy:      Cervical: No cervical adenopathy.   Skin:     General: Skin is warm and dry.      Capillary Refill: Capillary refill takes less than 2 seconds.      Coloration: Skin is not pale.      Findings: No erythema or rash.   Neurological:      Mental Status: He is alert and oriented to person, place, and time.      Cranial Nerves: No cranial nerve deficit.      Sensory: No sensory deficit.      Coordination: Coordination normal.      Deep Tendon Reflexes: Reflexes normal.   Psychiatric:         Behavior: Behavior normal.         Thought Content: Thought content normal.         Judgment: Judgment normal.         CRANIAL NERVES     CN III, IV, VI   Pupils are equal, round, and reactive to light.     Significant Labs: All pertinent labs within the past 24 hours have been reviewed.  CBC:   Recent Labs   Lab  02/06/23  1543   WBC 6.98   HGB 12.8*   HCT 39.5*        CMP:   Recent Labs   Lab 02/06/23  1543   *   K 4.2      CO2 19*   *   BUN 44*   CREATININE 1.8*   CALCIUM 8.7   PROT 6.3   ALBUMIN 3.4*   BILITOT 3.0*   ALKPHOS 109   *   *   ANIONGAP 9     Cardiac Markers:   Recent Labs   Lab 02/06/23  1543   BNP 1,518*       Significant Imaging: I have reviewed all pertinent imaging results/findings within the past 24 hours.    FINDINGS:  The cardiomediastinal silhouette appears stable.  Median sternotomy sutures and anterior chest wall cardiac pacemaker as before.  Limited visualization of the left lung base on the portable film.  Probable small left pleural effusion.  Mildly prominent markings right infrahilar.  No longer the more confluent right lung infiltrate that was seen on the prior exam.     Impression:     Probable small left pleural effusion and mildly prominent markings right infrahilar with the appearance questioning very mild CHF.      Assessment/Plan:     * Acute on chronic combined systolic and diastolic congestive heart failure  Patient is identified as having Combined Systolic and Diastolic heart failure that is Acute on chronic. CHF is currently uncontrolled due to Continued edema of extremities and Weight gain of 15 pounds. Latest ECHO performed and demonstrates- Results for orders placed during the hospital encounter of 06/27/22    Echo    Interpretation Summary  · The left ventricle is severely enlarged with mild eccentric hypertrophy and severely decreased systolic function.  · The estimated ejection fraction is 25%.  · There are segmental left ventricular wall motion abnormalities.  · Left ventricular diastolic dysfunction.  · Mild right ventricular enlargement with mildly to moderately reduced right ventricular systolic function.  · Severe left atrial enlargement.  · Mild right atrial enlargement.  · The MR is moderate to moderate -severe ( 2-3+).  · Mild to  moderate tricuspid regurgitation.  · Mild pulmonic regurgitation.  · Intermediate central venous pressure (8 mmHg).  · The estimated PA systolic pressure is 47 mmHg.  · There is pulmonary hypertension.  . Continue Furosemide and monitor clinical status closely. Monitor on telemetry. Patient is off CHF pathway.  Monitor strict Is&Os and daily weights.  Place on fluid restriction of 1.5 L. Continue to stress to patient importance of self efficacy and  on diet for CHF. Last BNP reviewed- and noted below   Recent Labs   Lab 02/06/23 1543   BNP 1,518*   .      Longstanding persistent atrial fibrillation  Patient with Persistent (7 days or more) atrial fibrillation which is uncontrolled currently with Amiodarone. Patient is currently in atrial fibrillation.[id number] Score: 2. Anticoagulation indicated. Anticoagulation done with Plavix.        Elevated LFTs  Acute problem   Likely secondary to right heart failure  Follow closely    AICD (automatic cardioverter/defibrillator) present  Chronic problem  Continuous cardiac monitor   Patient denies any recent discharges      Hypothyroid  Chronic problem  Continue levothyroxine      Stroke  Chronic problem  Stable      Benign essential HTN  Chronic problem   Antihypertensives on hold due to mild hypotension        VTE Risk Mitigation (From admission, onward)         Ordered     apixaban tablet 5 mg  2 times daily         02/06/23 2114     IP VTE HIGH RISK PATIENT  Once         02/06/23 2113     Place sequential compression device  Until discontinued         02/06/23 2113     Place KEN hose  Until discontinued         02/06/23 2113                   Travon Zepeda NP  Department of Hospital Medicine   [facility]

## 2023-02-07 NOTE — PROGRESS NOTES
[facility] Hospital Medicine  Progress Note    Patient Name: Cleveland Capps  MRN: [id number]  Patient Class: IP- Inpatient   Admission Date: 2/6/2023  Length of Stay: 1 days  Attending Physician: Fernando Bryson MD  Primary Care Provider: Romero Snyder MD        Subjective:     Principal Problem:Acute on chronic combined systolic and diastolic congestive heart failure        HPI:  Cleveland Capps is a 67 year male who presents emergency room for evaluation of shortness of breath.  He reports shortness of breath and tachycardia onset approximately 8 days ago.  He is a history of atrial fibrillation and reports intermittently in AFib over the past several days.  He denies any chest pain.  Reports shortness of breath is worse with exertion.  No alleviating factors.  He also endorses an approximate 10-15 lb weight gain over the past several weeks.  No reported fever chills.  Previous medical history includes CVA, Watchman left atrial closure device, hypothyroidism, coronary artery disease, stent placement, COPD, hypertension, AICD, and combined systolic/diastolic heart failure.  ER workup:  CBC with mild anemia.  CMP with BUN of 44 and creatinine of 1.8.  Bilirubin elevated 3.0,  .  BNP elevated at 1518 (baseline 400).  Troponin mildly elevated 0.077.  Last echo demonstrated ejection fraction of 20-25%.  Patient was given 20 mg of Lasix in the ER.  Patient remained in atrial fibrillation/RVR while in ER.  He had mild hypotension.  Patient was started on amiodarone infusion and bolus after consulting Dr. Calhoun with on-call Cardiology.  Patient admitted Hospital Medicine for treatment management.  Patient admitted to step-down unit.      Overview/Hospital Course:  No notes on file    Interval History:  Notes reviewed, no acute events overnight.  Patient resting comfortably in bed.  States he feels better since admission and his shortness of breath is improving.  His appetite is very  poor and he states that he does not feel hungry.  Will consult with RD.  Patient remains in AFib with uncontrolled rate in the 100-120s range.  He is currently on amiodarone drip.  Will await Cardiology recommendations.  Renal function also worsening, will consult with Nephrology.    Review of Systems   Constitutional:  Positive for activity change, fatigue and unexpected weight change. Negative for chills, diaphoresis and fever.   HENT:  Negative for congestion, nosebleeds and tinnitus.    Eyes:  Negative for photophobia and visual disturbance.   Respiratory:  Positive for cough and shortness of breath. Negative for chest tightness and wheezing.    Cardiovascular:  Positive for leg swelling. Negative for chest pain and palpitations.   Gastrointestinal:  Negative for abdominal distention, abdominal pain, constipation, diarrhea, nausea and vomiting.   Endocrine: Negative for cold intolerance and heat intolerance.   Genitourinary:  Negative for difficulty urinating, dysuria, frequency, hematuria and urgency.   Musculoskeletal:  Negative for arthralgias, back pain and myalgias.   Skin:  Negative for pallor, rash and wound.   Allergic/Immunologic: Negative for immunocompromised state.   Neurological:  Positive for weakness. Negative for dizziness, tremors, facial asymmetry and speech difficulty.   Hematological:  Negative for adenopathy. Does not bruise/bleed easily.   Psychiatric/Behavioral:  Negative for confusion and sleep disturbance. The patient is not nervous/anxious.    All other systems reviewed and are negative.  Objective:     Vital Signs (Most Recent):  Temp: 98 °F (36.7 °C) (02/07/23 0800)  Pulse: 109 (02/07/23 1000)  Resp: (!) 25 (02/07/23 1000)  BP: 109/82 (02/07/23 1000)  SpO2: (!) 93 % (02/07/23 1000)   Vital Signs (24h Range):  Temp:  [97.5 °F (36.4 °C)-98 °F (36.7 °C)] 98 °F (36.7 °C)  Pulse:  [] 109  Resp:  [18-34] 25  SpO2:  [90 %-100 %] 93 %  BP: ()/(62-94) 109/82     Weight: 54.3 kg  (119 lb 11.4 oz)  Body mass index is 22.62 kg/m².    Intake/Output Summary (Last 24 hours) at 2/7/2023 1012  Last data filed at 2/7/2023 0416  Gross per 24 hour   Intake --   Output 350 ml   Net -350 ml      Physical Exam  Vitals and nursing note reviewed.   Constitutional:       General: He is not in acute distress.     Appearance: Normal appearance. He is well-developed. He is ill-appearing (chronically ill appearing). He is not diaphoretic.   HENT:      Head: Normocephalic and atraumatic.      Right Ear: External ear normal.      Left Ear: External ear normal.      Nose: Nose normal. No congestion or rhinorrhea.      Mouth/Throat:      Mouth: Mucous membranes are moist.      Pharynx: Oropharynx is clear. No oropharyngeal exudate or posterior oropharyngeal erythema.   Eyes:      General: No scleral icterus.     Conjunctiva/sclera: Conjunctivae normal.      Pupils: Pupils are equal, round, and reactive to light.   Neck:      Vascular: No JVD.   Cardiovascular:      Rate and Rhythm: Tachycardia present. Rhythm irregular.      Pulses: Normal pulses.      Heart sounds: Murmur heard.     No friction rub. No gallop.      Comments: Atrial fibrillation with uncontrolled rate - pt maintaining in the 100-120's range  Pulmonary:      Effort: Pulmonary effort is normal. No respiratory distress.      Breath sounds: Normal breath sounds. No stridor. No wheezing, rhonchi or rales.   Abdominal:      General: Bowel sounds are normal. There is no distension.      Palpations: Abdomen is soft.      Tenderness: There is no abdominal tenderness. There is no guarding or rebound.   Musculoskeletal:         General: No swelling or tenderness. Normal range of motion.      Cervical back: Normal range of motion and neck supple.      Right lower leg: Edema present.      Left lower leg: Edema present.      Comments: +2 pitting edema bilat LEs   Lymphadenopathy:      Cervical: No cervical adenopathy.   Skin:     General: Skin is warm and dry.       Capillary Refill: Capillary refill takes less than 2 seconds.      Coloration: Skin is not jaundiced or pale.      Findings: No erythema or rash.   Neurological:      General: No focal deficit present.      Mental Status: He is alert and oriented to person, place, and time.      Cranial Nerves: No cranial nerve deficit.      Sensory: No sensory deficit.      Coordination: Coordination normal.      Deep Tendon Reflexes: Reflexes normal.   Psychiatric:         Mood and Affect: Mood normal.         Behavior: Behavior normal.         Thought Content: Thought content normal.         Judgment: Judgment normal.       Significant Labs: All pertinent labs within the past 24 hours have been reviewed.  CBC:   Recent Labs   Lab 02/06/23  1543   WBC 6.98   HGB 12.8*   HCT 39.5*        CMP:   Recent Labs   Lab 02/06/23  1543 02/07/23  0518   * 135*   K 4.2 4.6    101   CO2 19* 17*   * 142*   BUN 44* 50*   CREATININE 1.8* 2.0*   CALCIUM 8.7 8.9   PROT 6.3  --    ALBUMIN 3.4*  --    BILITOT 3.0*  --    ALKPHOS 109  --    *  --    *  --    ANIONGAP 9 17*       Significant Imaging: I have reviewed all pertinent imaging results/findings within the past 24 hours.      Assessment/Plan:      * Acute on chronic combined systolic and diastolic congestive heart failure  Patient is identified as having Combined Systolic and Diastolic heart failure that is Acute on chronic. CHF is currently uncontrolled due to Continued edema of extremities and Weight gain of 15 pounds. Latest ECHO performed and demonstrates- Results for orders placed during the hospital encounter of 06/27/22    Echo    Interpretation Summary  · The left ventricle is severely enlarged with mild eccentric hypertrophy and severely decreased systolic function.  · The estimated ejection fraction is 25%.  · There are segmental left ventricular wall motion abnormalities.  · Left ventricular diastolic dysfunction.  · Mild right  ventricular enlargement with mildly to moderately reduced right ventricular systolic function.  · Severe left atrial enlargement.  · Mild right atrial enlargement.  · The MR is moderate to moderate -severe ( 2-3+).  · Mild to moderate tricuspid regurgitation.  · Mild pulmonic regurgitation.  · Intermediate central venous pressure (8 mmHg).  · The estimated PA systolic pressure is 47 mmHg.  · There is pulmonary hypertension.  . Continue Furosemide and monitor clinical status closely. Monitor on telemetry. Patient is off CHF pathway.  Monitor strict Is&Os and daily weights.  Place on fluid restriction of 1.5 L. Continue to stress to patient importance of self efficacy and  on diet for CHF. Last BNP reviewed- and noted below   Recent Labs   Lab 02/06/23  1543   BNP 1,518*   .  Repeat echo today  Cardiology consult pending  Cont entresto for now - will await further cardiology and renal recs    Longstanding persistent atrial fibrillation  Patient with Persistent (7 days or more) atrial fibrillation which is uncontrolled currently with Amiodarone. Patient is currently in atrial fibrillation.[id number] Score: 2. Anticoagulation indicated. Anticoagulation done with eliquis.    Amiodarone bolus complete  Cont amio gtt  Cardiology consult pending  Monitor closely on telemetry  Repeat echo  Check TSH and T4        Elevated LFTs  Acute problem   Likely secondary to right heart failure  Repeat AST, ALT and bilirubin today  Avoid hepatoxic drugs  Liver US pending  Check Hepatitis panel    AICD (automatic cardioverter/defibrillator) present  Chronic problem  Continuous cardiac monitor   Patient denies any recent discharges      Hypothyroid  Chronic problem  Continue levothyroxine  Check TSH and T4      Stroke  Chronic problem, stable  Cont eliquis and plavix  Holding statin secondary to elevated liver enzymes      MEIR (acute kidney injury)  Patient with acute kidney injury likely due to pre-renal azotemia MEIR is currently  worsening. Labs reviewed- Renal function/electrolytes with Estimated Creatinine Clearance: 26.9 mL/min (A) (based on SCr of 2 mg/dL (H)). according to latest data. Monitor urine output and serial BMP and adjust therapy as needed. Avoid nephrotoxins and renally dose meds for GFR listed above.     Consult nephrology  Renal US  Check urinalysis and urine creat      Benign essential HTN  Chronic, uncontrolled.  Latest blood pressure and vitals reviewed-   Temp:  [97.5 °F (36.4 °C)-98 °F (36.7 °C)]   Pulse:  []   Resp:  [18-34]   BP: ()/(62-94)   SpO2:  [90 %-100 %] .   Home meds for hypertension were reviewed and noted below.   Hypertension Medications             carvediloL (COREG) 3.125 MG tablet Take 1 tablet (3.125 mg total) by mouth 2 (two) times daily.    furosemide (LASIX) 20 MG tablet Take 1 tablet (20 mg total) by mouth 3 (three) times a week.    sacubitriL-valsartan (ENTRESTO) 24-26 mg per tablet Take 1 tablet by mouth 2 (two) times daily.    spironolactone (ALDACTONE) 25 MG tablet Take 1 tablet (25 mg total) by mouth once daily.          While in the hospital, will manage blood pressure as follows; Adjust home antihypertensive regimen as follows- holding coreg and spirinolactone, cont entresto, IV lasix and amio gtt for uncontrolled afib    Will utilize p.r.n. blood pressure medication only if patient's blood pressure greater than  180/110 and he develops symptoms such as worsening chest pain or shortness of breath.          VTE Risk Mitigation (From admission, onward)         Ordered     apixaban tablet 5 mg  2 times daily         02/06/23 2114     IP VTE HIGH RISK PATIENT  Once         02/06/23 2113     Place sequential compression device  Until discontinued         02/06/23 2113     Place KEN hose  Until discontinued         02/06/23 2113                Discharge Planning   MACKENZIE:      Code Status: Full Code   Is the patient medically ready for discharge?:     Reason for patient still in hospital  (select all that apply): Patient trending condition, Laboratory test, Treatment, Imaging and Consult recommendations  Discharge Plan A: Home                  Cheryl Kurtz NP  Department of Hospital Medicine   [facility]

## 2023-02-07 NOTE — ASSESSMENT & PLAN NOTE
Patient with acute kidney injury likely due to pre-renal azotemia MEIR is currently worsening. Labs reviewed- Renal function/electrolytes with Estimated Creatinine Clearance: 26.9 mL/min (A) (based on SCr of 2 mg/dL (H)). according to latest data. Monitor urine output and serial BMP and adjust therapy as needed. Avoid nephrotoxins and renally dose meds for GFR listed above.     Consult nephrology  Renal US  Check urinalysis and urine creat

## 2023-02-07 NOTE — HPI
Cleveland Capps is a 67 year male who presents emergency room for evaluation of shortness of breath.  He reports shortness of breath and tachycardia onset approximately 8 days ago.  He is a history of atrial fibrillation and reports intermittently in AFib over the past several days.  He denies any chest pain.  Reports shortness of breath is worse with exertion.  No alleviating factors.  He also endorses an approximate 10-15 lb weight gain over the past several weeks.  No reported fever chills.  Previous medical history includes CVA, Watchman left atrial closure device, hypothyroidism, coronary artery disease, stent placement, COPD, hypertension, AICD, and combined systolic/diastolic heart failure.  ER workup:  CBC with mild anemia.  CMP with BUN of 44 and creatinine of 1.8.  Bilirubin elevated 3.0,  .  BNP elevated at 1518 (baseline 400).  Troponin mildly elevated 0.077.  Last echo demonstrated ejection fraction of 20-25%.  Patient was given 20 mg of Lasix in the ER.  Patient remained in atrial fibrillation/RVR while in ER.  He had mild hypotension.  Patient was started on amiodarone infusion and bolus after consulting Dr. Calhoun with on-call Cardiology.  Patient admitted Hospital Medicine for treatment management.  Patient admitted to step-down unit.

## 2023-02-07 NOTE — CARE UPDATE
02/07/23 1151   Patient Assessment/Suction   Level of Consciousness (AVPU) alert   Respiratory Effort Normal;Unlabored   Expansion/Accessory Muscles/Retractions no use of accessory muscles;no retractions;expansion symmetric   All Lung Fields Breath Sounds diminished   Rhythm/Pattern, Respiratory unlabored;pattern regular;depth regular   Cough Frequency no cough   PRE-TX-O2   Device (Oxygen Therapy) nasal cannula   $ Is the patient on Low Flow Oxygen? Yes   Flow (L/min) 2   SpO2 96 %   Pulse Oximetry Type Continuous   $ Pulse Oximetry - Multiple Charge Pulse Oximetry - Multiple   Pulse 100   Resp (!) 21

## 2023-02-07 NOTE — CONSULTS
INPATIENT NEPHROLOGY CONSULT   [facility] NEPHROLOGY    Cleveland Capps  02/07/2023    Reason for consultation:    silvestre    Chief Complaint:   Chief Complaint   Patient presents with    Shortness of Breath    Atrial Fibrillation          History of Present Illness:    Per H and P    Cleveland Capps is a 67 year male who presents emergency room for evaluation of shortness of breath.  He reports shortness of breath and tachycardia onset approximately 8 days ago.  He is a history of atrial fibrillation and reports intermittently in AFib over the past several days.  He denies any chest pain.  Reports shortness of breath is worse with exertion.  No alleviating factors.  He also endorses an approximate 10-15 lb weight gain over the past several weeks.  No reported fever chills.  Previous medical history includes CVA, Watchman left atrial closure device, hypothyroidism, coronary artery disease, stent placement, COPD, hypertension, AICD, and combined systolic/diastolic heart failure.  ER workup:  CBC with mild anemia.  CMP with BUN of 44 and creatinine of 1.8.  Bilirubin elevated 3.0,  .  BNP elevated at 1518 (baseline 400).  Troponin mildly elevated 0.077.  Last echo demonstrated ejection fraction of 20-25%.  Patient was given 20 mg of Lasix in the ER.  Patient remained in atrial fibrillation/RVR while in ER.  He had mild hypotension.  Patient was started on amiodarone infusion and bolus after consulting Dr. Calhoun with on-call Cardiology.  Patient admitted Hospital Medicine for treatment management.         Plan of Care:       Assessment:    Acute kidney injury likely secondary to decompensated heart failure and cardiorenal syndrome  --follow abdominal ultrasound  --hold entresto if creatinine keeps rising  --Avoid NSAIDS, Mckeon II inhibitors, and other non-essential nephrotoxic agents  --renal dose medication for crcl 10-50  --strict I/Os  --hold aldactone  --ua with micro  --FEUrea     Hypervolemia  --lasix  40mg iv bid  --keep net daily diuresis to 1-1.5 liters  --monitor output     Anemia  --hg not low enough for erythropoiesis stimulating agent      Hyponatremia--hypervolemic   --diuresis  --treat hypothyroidism  --trend    Metabolic acidosis (mixed gap and non-gap).    --no intervention needed  --check urine pH  --diuretics alter calculation of anion gap            Thank you for allowing us to participate in this patient's care. We will continue to follow.    Vital Signs:  Temp Readings from Last 3 Encounters:   02/07/23 97.8 °F (36.6 °C) (Oral)   11/28/22 98.2 °F (36.8 °C)   11/13/22 97.7 °F (36.5 °C) (Oral)       Pulse Readings from Last 3 Encounters:   02/07/23 100   01/09/23 92   12/20/22 62       BP Readings from Last 3 Encounters:   02/07/23 103/71   01/09/23 125/76   12/20/22 120/60       Weight:  Wt Readings from Last 3 Encounters:   02/07/23 54.3 kg (119 lb 11.4 oz)   01/09/23 52.6 kg (116 lb)   12/20/22 52.9 kg (116 lb 8.2 oz)       Past Medical & Surgical History:  Past Medical History:   Diagnosis Date    A-fib     Acute kidney injury     COPD (chronic obstructive pulmonary disease)     Coronary artery disease     Diabetes mellitus     Encounter for blood transfusion     Former smoker     Hypertension     Myocardial infarction     Thyroid disease     Type 2 diabetes mellitus without complications        Past Surgical History:   Procedure Laterality Date    CLOSURE OF LEFT ATRIAL APPENDAGE USING DEVICE N/A 5/17/2022    Procedure: Left atrial appendage closure device;  Surgeon: rTe Schmidt MD;  Location: [facility] CATH LAB;  Service: Cardiology;  Laterality: N/A;    COLONOSCOPY N/A 1/3/2017    Procedure: COLONOSCOPY;  Surgeon: Marcus Green MD;  Location: [facility] ENDO;  Service: Endoscopy;  Laterality: N/A;    CORONARY BYPASS GRAFT ANGIOGRAPHY  3/30/2021    Procedure: Bypass graft study;  Surgeon: Tre Schmidt MD;  Location: [facility] CATH LAB;  Service: Cardiology;;    CORONARY STENT PLACEMENT       GASTROSTOMY TUBE PLACEMENT      INSERTION OF PACEMAKER  2017    INTRAVASCULAR ULTRASOUND, NON-CORONARY  2022    Procedure: Intravascular Ultrasound, Non-Coronary;  Surgeon: Tre Schmidt MD;  Location: [facility] CATH LAB;  Service: Cardiology;;    LEFT HEART CATHETERIZATION N/A 3/30/2021    Procedure: Left heart cath;  Surgeon: Tre Schmidt MD;  Location: [facility] CATH LAB;  Service: Cardiology;  Laterality: N/A;    PEG TUBE REMOVAL      TRACHEOSTOMY CLOSURE      TRACHEOSTOMY TUBE PLACEMENT      TRANSESOPHAGEAL ECHOCARDIOGRAPHY N/A 2022    Procedure: ECHOCARDIOGRAM, TRANSESOPHAGEAL;  Surgeon: [facility] Diagnostic Provider;  Location: [facility] CATH LAB;  Service: Anesthesiology;  Laterality: N/A;    TREATMENT OF CARDIAC ARRHYTHMIA N/A 2022    Procedure: Cardioversion or Defibrillation;  Surgeon: Kuldeep Daniels MD;  Location: [facility] CATH LAB;  Service: Cardiology;  Laterality: N/A;    UPPER GASTROINTESTINAL ENDOSCOPY  2016    Dr. Zarco with PEG tube placement       Past Social History:  Social History     Socioeconomic History    Marital status: Single   Tobacco Use    Smoking status: Former     Types: Cigarettes     Quit date: 2005     Years since quittin.2    Smokeless tobacco: Never    Tobacco comments:     quit    Substance and Sexual Activity    Alcohol use: No    Drug use: No    Sexual activity: Yes     Social Determinants of Health     Financial Resource Strain: Low Risk     Difficulty of Paying Living Expenses: Not hard at all   Food Insecurity: No Food Insecurity    Worried About Running Out of Food in the Last Year: Never true    Ran Out of Food in the Last Year: Never true   Transportation Needs: No Transportation Needs    Lack of Transportation (Medical): No    Lack of Transportation (Non-Medical): No   Physical Activity: Inactive    Days of Exercise per Week: 0 days    Minutes of Exercise per Session: 0 min   Stress: No Stress Concern Present    Feeling of Stress : Not at all    Social Connections: Socially Isolated    Frequency of Communication with Friends and Family: Three times a week    Frequency of Social Gatherings with Friends and Family: Once a week    Attends Hoahaoism Services: Never    Active Member of Clubs or Organizations: No    Attends Club or Organization Meetings: Never    Marital Status: Never    Housing Stability: Low Risk     Unable to Pay for Housing in the Last Year: No    Number of Places Lived in the Last Year: 1    Unstable Housing in the Last Year: No       Medications:  No current facility-administered medications on file prior to encounter.     Current Outpatient Medications on File Prior to Encounter   Medication Sig Dispense Refill    apixaban (ELIQUIS) 5 mg Tab Take 5 mg by mouth 2 (two) times daily.      atorvastatin (LIPITOR) 80 MG tablet Take 80 mg by mouth once daily.      carvediloL (COREG) 3.125 MG tablet Take 1 tablet (3.125 mg total) by mouth 2 (two) times daily. (Patient taking differently: Take 3.125 mg by mouth 2 (two) times daily as needed.) 60 tablet 11    clopidogreL (PLAVIX) 75 mg tablet Take 1 tablet (75 mg total) by mouth once daily. 30 tablet 11    levothyroxine (SYNTHROID) 25 MCG tablet Take 25 mcg by mouth before breakfast.      potassium chloride SA (K-DUR,KLOR-CON M) 10 MEQ tablet       COMP-AIR NEBULIZER COMPRESSOR Alfreda use as directed      furosemide (LASIX) 20 MG tablet Take 1 tablet (20 mg total) by mouth 3 (three) times a week. 10 tablet 0    sacubitriL-valsartan (ENTRESTO) 24-26 mg per tablet Take 1 tablet by mouth 2 (two) times daily. 60 tablet 2    spironolactone (ALDACTONE) 25 MG tablet Take 1 tablet (25 mg total) by mouth once daily. 30 tablet 11    travoprost (TRAVATAN Z) 0.004 % ophthalmic solution       travoprost, benzalkonium, (TRAVATAN) 0.004 % ophthalmic solution Place 1 drop into both eyes nightly.      TRUE METRIX GLUCOSE TEST STRIP Strp USE 1 STRIP TO CHECK GLUCOSE ONCE DAILY       Scheduled Meds:   apixaban   "5 mg Oral BID    clopidogreL  75 mg Oral Daily    furosemide (LASIX) injection  40 mg Intravenous Q12H    levothyroxine  25 mcg Oral Before breakfast    sacubitriL-valsartan  1 tablet Oral BID     Continuous Infusions:   amiodarone in dextrose 5% 0.5 mg/min (02/07/23 0354)     PRN Meds:.dextrose 10%, dextrose 10%, glucagon (human recombinant), glucose, glucose, insulin aspart U-100, sodium chloride 0.9%    Allergies:  Penicillin and Penicillins    Past Family History:  Reviewed; refer to Hospitalist Admission Note    Review of Systems:  Review of Systems - All 14 systems reviewed and negative, except as noted in HPI    Physical Exam:    /71 (BP Location: Right arm, Patient Position: Lying)   Pulse 100   Temp 97.8 °F (36.6 °C) (Oral)   Resp (!) 21   Ht 5' 1" (1.549 m)   Wt 54.3 kg (119 lb 11.4 oz)   SpO2 96%   BMI 22.62 kg/m²     General Appearance:    No distress   Head:    Normocephalic, without obvious abnormality, atraumatic   Eyes:    PER, conjunctiva/corneas clear, EOM's intact in both eyes        Throat:   Lips, mucosa, and tongue normal; teeth and gums normal   Back:     Symmetric, no curvature, ROM normal, no CVA tenderness   Lungs:     Diminished.  Crackles at bases   Chest wall:    No tenderness or deformity   Heart:    Irreg.  No rub   Abdomen:     Soft, non-tender, bowel sounds active all four quadrants,     no masses, no organomegaly   Extremities:   Extremities normal, atraumatic, no cyanosis or edema   Pulses:   2+ and symmetric all extremities   MSK:   No joint or muscle swelling, tenderness or deformity   Skin:   Skin color, texture, turgor normal, no rashes or lesions   Neurologic:      No flap     Results:  Lab Results   Component Value Date     (L) 02/07/2023    K 4.6 02/07/2023     02/07/2023    CO2 17 (L) 02/07/2023    BUN 50 (H) 02/07/2023    CREATININE 2.0 (H) 02/07/2023    CALCIUM 8.9 02/07/2023    ANIONGAP 17 (H) 02/07/2023    ESTGFRAFRICA >60.0 07/27/2022    " EGFRNONAA 52.4 (A) 07/27/2022       Lab Results   Component Value Date    CALCIUM 8.9 02/07/2023    PHOS 2.4 (L) 11/28/2022       Recent Labs   Lab 02/06/23  1543   WBC 6.98   RBC 3.75*   HGB 12.8*   HCT 39.5*      *   MCH 34.1*   MCHC 32.4          I have personally reviewed pertinent radiological imaging and reports.    Patient care was time spent personally by me on the following activities:   Obtaining a history  Examination of patient.  Providing medical care at the patients bedside.  Developing a treatment plan with patient or surrogate and bedside caregivers  Ordering and reviewing laboratory studies, radiographic studies, pulse oximetry.  Ordering and performing treatments and interventions.  Evaluation of patient's response to treatment.  Discussions with consultants while on the unit and immediately available to the patient.  Re-evaluation of the patient's condition.  Documentation in the medical record.     Terence Davis MD  Nephrology  [facility] [place]  [phone]

## 2023-02-07 NOTE — ASSESSMENT & PLAN NOTE
Patient with Persistent (7 days or more) atrial fibrillation which is uncontrolled currently with Amiodarone. Patient is currently in atrial fibrillation.[id number] Score: 2. Anticoagulation indicated. Anticoagulation done with eliquis.    Amiodarone bolus complete  Cont amio gtt  Cardiology consult pending  Monitor closely on telemetry  Repeat echo  Check TSH and T4

## 2023-02-07 NOTE — CONSULTS
CHF teaching performed with patient using brochure.  Pt states he has lived with CHF for many many years and understanding what it is and how to control it.  Pt appears to understand.

## 2023-02-07 NOTE — AI DETERIORATION ALERT
AI alert deterioration notice received due to patient's increased respiratory rate and decreased O2.  Patient found to have RR 26 with O2 sat 92%.  Per nursing, patient had accidentally removed his nasal cannula.  N/C placed back on patient and O2 sat increased to 96%, respirations decreased to 20.  Patient seen and examined at bedside.  Patient not complaining of shortness of breath or chest pain.      Physical Exam  Constitutional:       Appearance: He is ill-appearing.   Cardiovascular:      Rate and Rhythm: Tachycardia present. Rhythm irregularly irregular.      Heart sounds: Murmur heard.   Pulmonary:      Breath sounds: Rales present.   Neurological:      Mental Status: He is alert and oriented to person, place, and time. Mental status is at baseline.

## 2023-02-07 NOTE — MEDICAL/APP STUDENT
Medical Student Subjective & Objective     Principal Problem: Acute on chronic combined systolic and diastolic congestive heart failure    Chief Complaint:   Chief Complaint   Patient presents with    Shortness of Breath    Atrial Fibrillation       HPI: Cleveland Capps is a 67 year male who presents emergency room for evaluation of shortness of breath.  He reports shortness of breath and tachycardia onset approximately 8 days ago.  He is a history of atrial fibrillation and reports intermittently in AFib over the past several days.  He denies any chest pain.  Reports shortness of breath is worse with exertion.  No alleviating factors.  He also endorses an approximate 10-15 lb weight gain over the past several weeks.  No reported fever chills.  Previous medical history includes CVA, Watchman left atrial closure device, hypothyroidism, coronary artery disease, stent placement, COPD, hypertension, AICD, and combined systolic/diastolic heart failure.  ER workup:  CBC with mild anemia.  CMP with BUN of 44 and creatinine of 1.8.  Bilirubin elevated 3.0,  .  BNP elevated at 1518 (baseline 400).  Troponin mildly elevated 0.077.  Last echo demonstrated ejection fraction of 20-25%.  Patient was given 20 mg of Lasix in the ER.  Patient remained in atrial fibrillation/RVR while in ER.  He had mild hypotension.  Patient was started on amiodarone infusion and bolus after consulting Dr. Calhoun with on-call Cardiology.  Patient admitted Hospital Medicine for treatment management.  Patient admitted to step-down unit.    Hospital Course: No notes on file    Interval History: Pt seen and examined. Pt laying comfortably in bed. States he is feeling better today. Pt still currently in Afib w/ rapid rate. Pt's Cr bumped and his renal fxn is worsening. Cardiology and Nephrology consulted for further workup and management.    Past Medical History:   Diagnosis Date    A-fib     Acute kidney injury     COPD (chronic obstructive  pulmonary disease)     Coronary artery disease     Diabetes mellitus     Encounter for blood transfusion     Former smoker     Hypertension     Myocardial infarction     Thyroid disease     Type 2 diabetes mellitus without complications        Past Surgical History:   Procedure Laterality Date    CLOSURE OF LEFT ATRIAL APPENDAGE USING DEVICE N/A 5/17/2022    Procedure: Left atrial appendage closure device;  Surgeon: Tre Schmidt MD;  Location: [facility] CATH LAB;  Service: Cardiology;  Laterality: N/A;    COLONOSCOPY N/A 1/3/2017    Procedure: COLONOSCOPY;  Surgeon: Marcus Zarco MD;  Location: [facility];  Service: Endoscopy;  Laterality: N/A;    CORONARY BYPASS GRAFT ANGIOGRAPHY  3/30/2021    Procedure: Bypass graft study;  Surgeon: Tre Schmidt MD;  Location: [facility] CATH LAB;  Service: Cardiology;;    CORONARY STENT PLACEMENT      GASTROSTOMY TUBE PLACEMENT      INSERTION OF PACEMAKER  04/2017    INTRAVASCULAR ULTRASOUND, NON-CORONARY  5/17/2022    Procedure: Intravascular Ultrasound, Non-Coronary;  Surgeon: Tre Schmidt MD;  Location: [facility] CATH LAB;  Service: Cardiology;;    LEFT HEART CATHETERIZATION N/A 3/30/2021    Procedure: Left heart cath;  Surgeon: Tre Schmidt MD;  Location: [facility] CATH LAB;  Service: Cardiology;  Laterality: N/A;    PEG TUBE REMOVAL      TRACHEOSTOMY CLOSURE      TRACHEOSTOMY TUBE PLACEMENT      TRANSESOPHAGEAL ECHOCARDIOGRAPHY N/A 5/17/2022    Procedure: ECHOCARDIOGRAM, TRANSESOPHAGEAL;  Surgeon: Yudi Diagnostic Provider;  Location: [facility] CATH LAB;  Service: Anesthesiology;  Laterality: N/A;    TREATMENT OF CARDIAC ARRHYTHMIA N/A 11/25/2022    Procedure: Cardioversion or Defibrillation;  Surgeon: Kuldeep Daniels MD;  Location: [facility] CATH LAB;  Service: Cardiology;  Laterality: N/A;    UPPER GASTROINTESTINAL ENDOSCOPY  05/2016    Dr. Zarco with PEG tube placement       Review of patient's allergies indicates:   Allergen Reactions    Penicillin      Other reaction(s):  anaphylaxis  Other reaction(s): anaphylaxis    Penicillins Hives       No current facility-administered medications on file prior to encounter.     Current Outpatient Medications on File Prior to Encounter   Medication Sig    apixaban (ELIQUIS) 5 mg Tab Take 5 mg by mouth 2 (two) times daily.    atorvastatin (LIPITOR) 80 MG tablet Take 80 mg by mouth once daily.    carvediloL (COREG) 3.125 MG tablet Take 1 tablet (3.125 mg total) by mouth 2 (two) times daily. (Patient taking differently: Take 3.125 mg by mouth 2 (two) times daily as needed.)    clopidogreL (PLAVIX) 75 mg tablet Take 1 tablet (75 mg total) by mouth once daily.    levothyroxine (SYNTHROID) 25 MCG tablet Take 25 mcg by mouth before breakfast.    potassium chloride SA (K-DUR,KLOR-CON M) 10 MEQ tablet     COMP-AIR NEBULIZER COMPRESSOR Alfreda use as directed    furosemide (LASIX) 20 MG tablet Take 1 tablet (20 mg total) by mouth 3 (three) times a week.    sacubitriL-valsartan (ENTRESTO) 24-26 mg per tablet Take 1 tablet by mouth 2 (two) times daily.    spironolactone (ALDACTONE) 25 MG tablet Take 1 tablet (25 mg total) by mouth once daily.    travoprost (TRAVATAN Z) 0.004 % ophthalmic solution     travoprost, benzalkonium, (TRAVATAN) 0.004 % ophthalmic solution Place 1 drop into both eyes nightly.    TRUE METRIX GLUCOSE TEST STRIP Strp USE 1 STRIP TO CHECK GLUCOSE ONCE DAILY     Family History       Problem Relation (Age of Onset)    Diabetes Mother, Brother    Glaucoma Father, Brother    Heart disease Mother    Macular degeneration Father, Brother    No Known Problems Sister, Maternal Aunt, Maternal Uncle, Paternal Aunt, Paternal Uncle, Maternal Grandmother, Maternal Grandfather, Paternal Grandmother, Paternal Grandfather          Tobacco Use    Smoking status: Former     Types: Cigarettes     Quit date: 2005     Years since quittin.2    Smokeless tobacco: Never    Tobacco comments:     quit    Substance and Sexual Activity    Alcohol use:  No    Drug use: No    Sexual activity: Yes     Review of Systems   Constitutional: Negative.    HENT: Negative.     Eyes: Negative.    Respiratory:  Positive for shortness of breath.    Cardiovascular:  Negative for chest pain, palpitations and leg swelling.   Gastrointestinal:  Negative for diarrhea, nausea and vomiting.   Endocrine: Negative.    Genitourinary: Negative.    Musculoskeletal: Negative.    Skin: Negative.    Allergic/Immunologic: Negative.    Neurological:  Negative for weakness and numbness.   Hematological: Negative.    Psychiatric/Behavioral: Negative.     Objective:     Vital Signs (Most Recent):  Temp: 98 °F (36.7 °C) (02/07/23 0800)  Pulse: 109 (02/07/23 1000)  Resp: (!) 25 (02/07/23 1000)  BP: 109/82 (02/07/23 1000)  SpO2: (!) 93 % (02/07/23 1000)   Vital Signs (24h Range):  Temp:  [97.5 °F (36.4 °C)-98 °F (36.7 °C)] 98 °F (36.7 °C)  Pulse:  [] 109  Resp:  [18-34] 25  SpO2:  [90 %-100 %] 93 %  BP: ()/(62-94) 109/82     Weight: 54.3 kg (119 lb 11.4 oz)  Body mass index is 22.62 kg/m².    Intake/Output Summary (Last 24 hours) at 2/7/2023 1041  Last data filed at 2/7/2023 0416  Gross per 24 hour   Intake --   Output 350 ml   Net -350 ml      Physical Exam  Constitutional:       Appearance: Normal appearance.   HENT:      Head: Normocephalic and atraumatic.      Right Ear: External ear normal.      Left Ear: External ear normal.      Nose: Nose normal.      Mouth/Throat:      Mouth: Mucous membranes are moist.      Pharynx: Oropharynx is clear.   Eyes:      Extraocular Movements: Extraocular movements intact.      Conjunctiva/sclera: Conjunctivae normal.   Cardiovascular:      Rate and Rhythm: Tachycardia present. Rhythm irregular.      Pulses: Normal pulses.      Heart sounds: Normal heart sounds.   Pulmonary:      Effort: Pulmonary effort is normal.      Breath sounds: Normal breath sounds.   Abdominal:      General: Abdomen is flat.      Palpations: Abdomen is soft.    Musculoskeletal:         General: No swelling. Normal range of motion.      Cervical back: Normal range of motion and neck supple.      Right lower leg: No edema.      Left lower leg: No edema.   Skin:     General: Skin is cool and dry.      Comments: Skin is cool to BLE   Neurological:      General: No focal deficit present.      Mental Status: He is alert and oriented to person, place, and time.   Psychiatric:         Mood and Affect: Mood normal.         Behavior: Behavior normal.         Thought Content: Thought content normal.         Judgment: Judgment normal.       Significant Labs: All pertinent labs within the past 24 hours have been reviewed.  A1C:   Recent Labs   Lab 02/06/23  1543   HGBA1C 5.4     CBC:   Recent Labs   Lab 02/06/23  1543   WBC 6.98   HGB 12.8*   HCT 39.5*        CMP:   Recent Labs   Lab 02/06/23  1543 02/07/23  0518 02/07/23  1022   * 135*  --    K 4.2 4.6  --     101  --    CO2 19* 17*  --    * 142*  --    BUN 44* 50*  --    CREATININE 1.8* 2.0*  --    CALCIUM 8.7 8.9  --    PROT 6.3  --   --    ALBUMIN 3.4*  --   --    BILITOT 3.0*  --  3.2*   ALKPHOS 109  --   --    *  --  458*   *  --  741*   ANIONGAP 9 17*  --      POCT Glucose:   Recent Labs   Lab 02/07/23  0748   POCTGLUCOSE 137*       Significant Imaging: I have reviewed all pertinent imaging results/findings within the past 24 hours.    ASSESSMENT/PLAN:   Longstanding persistent Atrial Fibrillation  - pt in Afib > 7 days  - continue amiodarone and apixaban (pt [id number] score of > 2)  - cardiology consulted  - echo ordered    MEIR  - pt's Cr increased from 1.8 to 2.0. likely due to HF  - nephrology consulted  - maybe hold entresto if kidney fxn worsens    Elevated LFTs  - likely due to HF  - statin discontinued  - repeat LFTs ordered    Acute on chronic combined systolic and diastolic congestive heart failure  - chronic  - continue lasix and entresto (monitor potassium levels and kidney  fxn)  - cardiology and dietitian consulted  - echo ordered    Type II Diabetes Mellitus  - chronic, stable  - continue insulin before meals and nightly PRN    Essential Hypertension  - chronic, stable    Stroke  - chronic, stable  - continue clopidogrel and apixaban for prevention    Hypothyroidism  - chronic, labs pending  - continue home meds of levothyroxine      TONO RamosS

## 2023-02-07 NOTE — ASSESSMENT & PLAN NOTE
Chronic problem, stable  Cont eliquis and plavix  Holding statin secondary to elevated liver enzymes

## 2023-02-07 NOTE — PLAN OF CARE
[facility]  Initial Discharge Assessment       Primary Care Provider: Romero Snyder MD    Admission Diagnosis: Shortness of breath [R06.02]  A-fib [I48.91]  Atrial fibrillation with RVR [I48.91]    Admission Date: 2/6/2023  Expected Discharge Date:     Discharge Barriers Identified: None    Payor: HUMANA MANAGED MEDICARE / Plan: HUMANA SNP (SPECIAL NEEDS PLAN) / Product Type: Medicare Advantage /     Extended Emergency Contact Information  Primary Emergency Contact: Anthony Capps  Address: Unknown   [place]  Mobile Phone: [phone]  Relation: Relative  Preferred language: English   needed? No    Discharge Plan A: Home  Discharge Plan B: [facility] - [facility] [address] [facility]  [address] [facility] [address]  Phone: [phone] Fax: [phone]    [facility] [address] [facility], [place] - [address]  [address]  [facility] [address]  Phone: [phone] Fax: [phone]      Completed DC assessment with pt at bedside. Verified information on facesheet as correct. Pt lives at listed address alone but his brother lives next door and assists him a lot. Reports that his brother (Anthony) is POA (requested copy). PCP is Dr. Snyder (reports last in clinic a few weeks ago). Pharm is [facility] in [place]. Denies HH/HD. On eliquis at home. DME- cane, rollator, nebulizer, raised toilet, grab bars, and glucometer with supplies. Reports that his home is handicap accessible. Pt reports using rollator every day and is modified independent. His brother helps with cooking/house keeping. Pt normally can drive but is currently not. Brother provides transportation to [facility] and will provide transportation home upon DC. Denies any outpt services. Reports being compliant with home medications and able to afford them. Verified insurance on file. Denies recent inpt stay in last 30 days. DC plan is home. Pt could likely benefit  from - briefly discussed with pt and said not right now but willing to revisit topic closer to DC. CM following.     Initial Assessment (most recent)       Adult Discharge Assessment - 02/07/23 1016          Discharge Assessment    Assessment Type Discharge Planning Assessment     Confirmed/corrected address, phone number and insurance Yes     Confirmed Demographics Correct on Facesheet     Source of Information patient     Communicated MACKENZIE with patient/caregiver Yes     Reason For Admission CHF; MEIR     People in Home alone     Facility Arrived From: home     Do you expect to return to your current living situation? Yes     Do you have help at home or someone to help you manage your care at home? Yes     Who are your caregiver(s) and their phone number(s)? Anthony (brother) lives next door     Prior to hospitilization cognitive status: Alert/Oriented     Current cognitive status: Alert/Oriented     Walking or Climbing Stairs ambulation difficulty, requires equipment     Dressing/Bathing bathing difficulty, requires equipment     Home Accessibility wheelchair accessible     Equipment Currently Used at Home rollator;cane, straight;glucometer;grab bar;raised toilet;nebulizer     Readmission within 30 days? No     Patient currently being followed by outpatient case management? No     Do you currently have service(s) that help you manage your care at home? No     Do you take prescription medications? Yes     Do you have prescription coverage? Yes     Do you have any problems affording any of your prescribed medications? No     Is the patient taking medications as prescribed? yes     Who is going to help you get home at discharge? brother     How do you get to doctors appointments? family or friend will provide     Are you on dialysis? No     Do you take coumadin? No     Discharge Plan A Home     Discharge Plan B Home Health     DME Needed Upon Discharge  --   TBD    Discharge Plan discussed with: Patient     Discharge  Barriers Identified None

## 2023-02-07 NOTE — PLAN OF CARE
Recommendations   1. Change diet to diabetic 1500 kcal  2gm sodium diet with 1500ml Fluid restriction   2. Weigh daily   3. Monitor P.O intakes  4. Add Boost Glucose control BID   5. Nutrition education on cardiac/low sodium diet and fluid restriction given    Goals: Consume 50% to 75% of all meals by follow up.  Nutrition Goal Status: new  Communication of RD Recs:  (P.O C sticky note.)

## 2023-02-07 NOTE — SUBJECTIVE & OBJECTIVE
Interval History:  Notes reviewed, no acute events overnight.  Patient resting comfortably in bed.  States he feels better since admission and his shortness of breath is improving.  His appetite is very poor and he states that he does not feel hungry.  Will consult with RD.  Patient remains in AFib with uncontrolled rate in the 100-120s range.  He is currently on amiodarone drip.  Will await Cardiology recommendations.  Renal function also worsening, will consult with Nephrology.    Review of Systems   Constitutional:  Positive for activity change, fatigue and unexpected weight change. Negative for chills, diaphoresis and fever.   HENT:  Negative for congestion, nosebleeds and tinnitus.    Eyes:  Negative for photophobia and visual disturbance.   Respiratory:  Positive for cough and shortness of breath. Negative for chest tightness and wheezing.    Cardiovascular:  Positive for leg swelling. Negative for chest pain and palpitations.   Gastrointestinal:  Negative for abdominal distention, abdominal pain, constipation, diarrhea, nausea and vomiting.   Endocrine: Negative for cold intolerance and heat intolerance.   Genitourinary:  Negative for difficulty urinating, dysuria, frequency, hematuria and urgency.   Musculoskeletal:  Negative for arthralgias, back pain and myalgias.   Skin:  Negative for pallor, rash and wound.   Allergic/Immunologic: Negative for immunocompromised state.   Neurological:  Positive for weakness. Negative for dizziness, tremors, facial asymmetry and speech difficulty.   Hematological:  Negative for adenopathy. Does not bruise/bleed easily.   Psychiatric/Behavioral:  Negative for confusion and sleep disturbance. The patient is not nervous/anxious.    All other systems reviewed and are negative.  Objective:     Vital Signs (Most Recent):  Temp: 98 °F (36.7 °C) (02/07/23 0800)  Pulse: 109 (02/07/23 1000)  Resp: (!) 25 (02/07/23 1000)  BP: 109/82 (02/07/23 1000)  SpO2: (!) 93 % (02/07/23 1000)    Vital Signs (24h Range):  Temp:  [97.5 °F (36.4 °C)-98 °F (36.7 °C)] 98 °F (36.7 °C)  Pulse:  [] 109  Resp:  [18-34] 25  SpO2:  [90 %-100 %] 93 %  BP: ()/(62-94) 109/82     Weight: 54.3 kg (119 lb 11.4 oz)  Body mass index is 22.62 kg/m².    Intake/Output Summary (Last 24 hours) at 2/7/2023 1012  Last data filed at 2/7/2023 0416  Gross per 24 hour   Intake --   Output 350 ml   Net -350 ml      Physical Exam  Vitals and nursing note reviewed.   Constitutional:       General: He is not in acute distress.     Appearance: Normal appearance. He is well-developed. He is ill-appearing (chronically ill appearing). He is not diaphoretic.   HENT:      Head: Normocephalic and atraumatic.      Right Ear: External ear normal.      Left Ear: External ear normal.      Nose: Nose normal. No congestion or rhinorrhea.      Mouth/Throat:      Mouth: Mucous membranes are moist.      Pharynx: Oropharynx is clear. No oropharyngeal exudate or posterior oropharyngeal erythema.   Eyes:      General: No scleral icterus.     Conjunctiva/sclera: Conjunctivae normal.      Pupils: Pupils are equal, round, and reactive to light.   Neck:      Vascular: No JVD.   Cardiovascular:      Rate and Rhythm: Tachycardia present. Rhythm irregular.      Pulses: Normal pulses.      Heart sounds: Murmur heard.     No friction rub. No gallop.      Comments: Atrial fibrillation with uncontrolled rate - pt maintaining in the 100-120's range  Pulmonary:      Effort: Pulmonary effort is normal. No respiratory distress.      Breath sounds: Normal breath sounds. No stridor. No wheezing, rhonchi or rales.   Abdominal:      General: Bowel sounds are normal. There is no distension.      Palpations: Abdomen is soft.      Tenderness: There is no abdominal tenderness. There is no guarding or rebound.   Musculoskeletal:         General: No swelling or tenderness. Normal range of motion.      Cervical back: Normal range of motion and neck supple.      Right lower  leg: Edema present.      Left lower leg: Edema present.      Comments: +2 pitting edema bilat LEs   Lymphadenopathy:      Cervical: No cervical adenopathy.   Skin:     General: Skin is warm and dry.      Capillary Refill: Capillary refill takes less than 2 seconds.      Coloration: Skin is not jaundiced or pale.      Findings: No erythema or rash.   Neurological:      General: No focal deficit present.      Mental Status: He is alert and oriented to person, place, and time.      Cranial Nerves: No cranial nerve deficit.      Sensory: No sensory deficit.      Coordination: Coordination normal.      Deep Tendon Reflexes: Reflexes normal.   Psychiatric:         Mood and Affect: Mood normal.         Behavior: Behavior normal.         Thought Content: Thought content normal.         Judgment: Judgment normal.       Significant Labs: All pertinent labs within the past 24 hours have been reviewed.  CBC:   Recent Labs   Lab 02/06/23  1543   WBC 6.98   HGB 12.8*   HCT 39.5*        CMP:   Recent Labs   Lab 02/06/23  1543 02/07/23  0518   * 135*   K 4.2 4.6    101   CO2 19* 17*   * 142*   BUN 44* 50*   CREATININE 1.8* 2.0*   CALCIUM 8.7 8.9   PROT 6.3  --    ALBUMIN 3.4*  --    BILITOT 3.0*  --    ALKPHOS 109  --    *  --    *  --    ANIONGAP 9 17*       Significant Imaging: I have reviewed all pertinent imaging results/findings within the past 24 hours.

## 2023-02-07 NOTE — SUBJECTIVE & OBJECTIVE
Past Medical History:   Diagnosis Date    A-fib     Acute kidney injury     COPD (chronic obstructive pulmonary disease)     Coronary artery disease     Diabetes mellitus     Encounter for blood transfusion     Former smoker     Hypertension     Myocardial infarction     Thyroid disease     Type 2 diabetes mellitus without complications        Past Surgical History:   Procedure Laterality Date    CLOSURE OF LEFT ATRIAL APPENDAGE USING DEVICE N/A 5/17/2022    Procedure: Left atrial appendage closure device;  Surgeon: Tre Schmidt MD;  Location: [facility] CATH LAB;  Service: Cardiology;  Laterality: N/A;    COLONOSCOPY N/A 1/3/2017    Procedure: COLONOSCOPY;  Surgeon: Marcus Zarco MD;  Location: [facility] ENDO;  Service: Endoscopy;  Laterality: N/A;    CORONARY BYPASS GRAFT ANGIOGRAPHY  3/30/2021    Procedure: Bypass graft study;  Surgeon: Tre Schmidt MD;  Location: [facility] CATH LAB;  Service: Cardiology;;    CORONARY STENT PLACEMENT      GASTROSTOMY TUBE PLACEMENT      INSERTION OF PACEMAKER  04/2017    INTRAVASCULAR ULTRASOUND, NON-CORONARY  5/17/2022    Procedure: Intravascular Ultrasound, Non-Coronary;  Surgeon: Tre Schmidt MD;  Location: [facility] CATH LAB;  Service: Cardiology;;    LEFT HEART CATHETERIZATION N/A 3/30/2021    Procedure: Left heart cath;  Surgeon: Tre Schmidt MD;  Location: [facility] CATH LAB;  Service: Cardiology;  Laterality: N/A;    PEG TUBE REMOVAL      TRACHEOSTOMY CLOSURE      TRACHEOSTOMY TUBE PLACEMENT      TRANSESOPHAGEAL ECHOCARDIOGRAPHY N/A 5/17/2022    Procedure: ECHOCARDIOGRAM, TRANSESOPHAGEAL;  Surgeon: [facility];  Location: [facility] CATH LAB;  Service: Anesthesiology;  Laterality: N/A;    TREATMENT OF CARDIAC ARRHYTHMIA N/A 11/25/2022    Procedure: Cardioversion or Defibrillation;  Surgeon: Kuldeep Daniels MD;  Location: [facility] CATH LAB;  Service: Cardiology;  Laterality: N/A;    UPPER GASTROINTESTINAL ENDOSCOPY  05/2016    Dr. Zarco with PEG tube placement        Review of patient's allergies indicates:   Allergen Reactions    Penicillin      Other reaction(s): anaphylaxis  Other reaction(s): anaphylaxis    Penicillins Hives       No current facility-administered medications on file prior to encounter.     Current Outpatient Medications on File Prior to Encounter   Medication Sig    apixaban (ELIQUIS) 5 mg Tab Take 5 mg by mouth 2 (two) times daily.    atorvastatin (LIPITOR) 80 MG tablet Take 80 mg by mouth once daily.    carvediloL (COREG) 3.125 MG tablet Take 1 tablet (3.125 mg total) by mouth 2 (two) times daily. (Patient taking differently: Take 3.125 mg by mouth 2 (two) times daily as needed.)    clopidogreL (PLAVIX) 75 mg tablet Take 1 tablet (75 mg total) by mouth once daily.    levothyroxine (SYNTHROID) 25 MCG tablet Take 25 mcg by mouth before breakfast.    potassium chloride SA (K-DUR,KLOR-CON M) 10 MEQ tablet     COMP-AIR NEBULIZER COMPRESSOR Alfreda use as directed    furosemide (LASIX) 20 MG tablet Take 1 tablet (20 mg total) by mouth 3 (three) times a week.    sacubitriL-valsartan (ENTRESTO) 24-26 mg per tablet Take 1 tablet by mouth 2 (two) times daily.    spironolactone (ALDACTONE) 25 MG tablet Take 1 tablet (25 mg total) by mouth once daily.    travoprost (TRAVATAN Z) 0.004 % ophthalmic solution     travoprost, benzalkonium, (TRAVATAN) 0.004 % ophthalmic solution Place 1 drop into both eyes nightly.    TRUE METRIX GLUCOSE TEST STRIP Strp USE 1 STRIP TO CHECK GLUCOSE ONCE DAILY     Family History       Problem Relation (Age of Onset)    Diabetes Mother, Brother    Glaucoma Father, Brother    Heart disease Mother    Macular degeneration Father, Brother    No Known Problems Sister, Maternal Aunt, Maternal Uncle, Paternal Aunt, Paternal Uncle, Maternal Grandmother, Maternal Grandfather, Paternal Grandmother, Paternal Grandfather          Tobacco Use    Smoking status: Former     Types: Cigarettes     Quit date: 2005     Years since quittin.2     Smokeless tobacco: Never    Tobacco comments:     quit 2007   Substance and Sexual Activity    Alcohol use: No    Drug use: No    Sexual activity: Yes     Review of Systems   Constitutional:  Positive for activity change, fatigue and unexpected weight change. Negative for chills, diaphoresis and fever.   HENT:  Negative for congestion, nosebleeds and tinnitus.    Eyes:  Negative for photophobia and visual disturbance.   Respiratory:  Positive for cough and shortness of breath. Negative for chest tightness and wheezing.    Cardiovascular:  Positive for leg swelling. Negative for chest pain and palpitations.   Gastrointestinal:  Negative for abdominal distention, abdominal pain, constipation, diarrhea, nausea and vomiting.   Endocrine: Negative for cold intolerance and heat intolerance.   Genitourinary:  Negative for difficulty urinating, dysuria, frequency, hematuria and urgency.   Musculoskeletal:  Negative for arthralgias, back pain and myalgias.   Skin:  Negative for pallor, rash and wound.   Allergic/Immunologic: Negative for immunocompromised state.   Neurological:  Positive for weakness. Negative for dizziness, tremors, facial asymmetry and speech difficulty.   Hematological:  Negative for adenopathy. Does not bruise/bleed easily.   Psychiatric/Behavioral:  Negative for confusion and sleep disturbance. The patient is not nervous/anxious.    Objective:     Vital Signs (Most Recent):  Temp: 97.5 °F (36.4 °C) (02/06/23 2217)  Pulse: (!) 134 (02/06/23 2217)  Resp: 18 (02/06/23 2217)  BP: 113/78 (02/06/23 2217)  SpO2: (!) 91 % (02/06/23 2217)   Vital Signs (24h Range):  Temp:  [97.5 °F (36.4 °C)-97.6 °F (36.4 °C)] 97.5 °F (36.4 °C)  Pulse:  [116-147] 134  Resp:  [18-20] 18  SpO2:  [91 %-99 %] 91 %  BP: ()/(64-87) 113/78     Weight: 54 kg (119 lb 0.8 oz)  Body mass index is 22.49 kg/m².    Physical Exam  Vitals and nursing note reviewed.   Constitutional:       General: He is not in acute distress.      Appearance: He is well-developed. He is ill-appearing. He is not diaphoretic.   HENT:      Head: Normocephalic.      Mouth/Throat:      Mouth: Mucous membranes are moist.      Pharynx: Oropharynx is clear.   Eyes:      General: No scleral icterus.     Conjunctiva/sclera: Conjunctivae normal.      Pupils: Pupils are equal, round, and reactive to light.   Neck:      Vascular: No JVD.   Cardiovascular:      Rate and Rhythm: Normal rate. Rhythm irregular.      Heart sounds: Murmur heard.     No friction rub. No gallop.      Comments: Atrial fibrillation with rapid ventricular response  Pulmonary:      Effort: Respiratory distress present.      Breath sounds: Rales present. No wheezing.      Comments: Bibasilar crackles  Abdominal:      General: Bowel sounds are normal. There is no distension.      Palpations: Abdomen is soft.      Tenderness: There is no abdominal tenderness. There is no guarding or rebound.   Musculoskeletal:         General: No tenderness. Normal range of motion.      Cervical back: Normal range of motion and neck supple.      Right lower leg: Edema present.      Left lower leg: Edema present.   Lymphadenopathy:      Cervical: No cervical adenopathy.   Skin:     General: Skin is warm and dry.      Capillary Refill: Capillary refill takes less than 2 seconds.      Coloration: Skin is not pale.      Findings: No erythema or rash.   Neurological:      Mental Status: He is alert and oriented to person, place, and time.      Cranial Nerves: No cranial nerve deficit.      Sensory: No sensory deficit.      Coordination: Coordination normal.      Deep Tendon Reflexes: Reflexes normal.   Psychiatric:         Behavior: Behavior normal.         Thought Content: Thought content normal.         Judgment: Judgment normal.         CRANIAL NERVES     CN III, IV, VI   Pupils are equal, round, and reactive to light.     Significant Labs: All pertinent labs within the past 24 hours have been reviewed.  CBC:   Recent  Labs   Lab 02/06/23  1543   WBC 6.98   HGB 12.8*   HCT 39.5*        CMP:   Recent Labs   Lab 02/06/23  1543   *   K 4.2      CO2 19*   *   BUN 44*   CREATININE 1.8*   CALCIUM 8.7   PROT 6.3   ALBUMIN 3.4*   BILITOT 3.0*   ALKPHOS 109   *   *   ANIONGAP 9     Cardiac Markers:   Recent Labs   Lab 02/06/23  1543   BNP 1,518*       Significant Imaging: I have reviewed all pertinent imaging results/findings within the past 24 hours.    FINDINGS:  The cardiomediastinal silhouette appears stable.  Median sternotomy sutures and anterior chest wall cardiac pacemaker as before.  Limited visualization of the left lung base on the portable film.  Probable small left pleural effusion.  Mildly prominent markings right infrahilar.  No longer the more confluent right lung infiltrate that was seen on the prior exam.     Impression:     Probable small left pleural effusion and mildly prominent markings right infrahilar with the appearance questioning very mild CHF.

## 2023-02-07 NOTE — ASSESSMENT & PLAN NOTE
Patient with Persistent (7 days or more) atrial fibrillation which is uncontrolled currently with Amiodarone. Patient is currently in atrial fibrillation.[id number] Score: 2. Anticoagulation indicated. Anticoagulation done with Plavix.

## 2023-02-07 NOTE — ASSESSMENT & PLAN NOTE
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 CHF is currently uncontrolled due to Continued edema of extremities and Weight gain of 15 pounds. Latest ECHO performed and demonstrates- Results for orders placed during the hospital encounter of 06/27/22    Echo    Interpretation Summary  · The left ventricle is severely enlarged with mild eccentric hypertrophy and severely decreased systolic function.  · The estimated ejection fraction is 25%.  · There are segmental left ventricular wall motion abnormalities.  · Left ventricular diastolic dysfunction.  · Mild right ventricular enlargement with mildly to moderately reduced right ventricular systolic function.  · Severe left atrial enlargement.  · Mild right atrial enlargement.  · The MR is moderate to moderate -severe ( 2-3+).  · Mild to moderate tricuspid regurgitation.  · Mild pulmonic regurgitation.  · Intermediate central venous pressure (8 mmHg).  · The estimated PA systolic pressure is 47 mmHg.  · There is pulmonary hypertension.  . Continue Furosemide and monitor clinical status closely. Monitor on telemetry. Patient is off CHF pathway.  Monitor strict Is&Os and daily weights.  Place on fluid restriction of 1.5 L. Continue to stress to patient importance of self efficacy and  on diet for CHF. Last BNP reviewed- and noted below   Recent Labs   Lab 02/06/23  1543   BNP 1,518*   .  Repeat echo today  Cardiology consult pending  Cont entresto for now - will await further cardiology and renal recs

## 2023-02-07 NOTE — ASSESSMENT & PLAN NOTE
Acute problem   Likely secondary to right heart failure  Repeat AST, ALT and bilirubin today  Avoid hepatoxic drugs  Liver US pending  Check Hepatitis panel

## 2023-02-08 PROBLEM — Z86.73 HISTORY OF CVA (CEREBROVASCULAR ACCIDENT): Status: ACTIVE | Noted: 2020-10-23

## 2023-02-08 LAB
ALBUMIN SERPL BCP-MCNC: 3.1 G/DL (ref 3.5–5.2)
ALBUMIN SERPL BCP-MCNC: 3.1 G/DL (ref 3.5–5.2)
ALP SERPL-CCNC: 107 U/L (ref 55–135)
ALP SERPL-CCNC: 107 U/L (ref 55–135)
ALT SERPL W/O P-5'-P-CCNC: 924 U/L (ref 10–44)
ALT SERPL W/O P-5'-P-CCNC: 924 U/L (ref 10–44)
AMMONIA PLAS-SCNC: 42 UMOL/L (ref 10–50)
ANION GAP SERPL CALC-SCNC: 14 MMOL/L (ref 8–16)
ANION GAP SERPL CALC-SCNC: 14 MMOL/L (ref 8–16)
AST SERPL-CCNC: 581 U/L (ref 10–40)
AST SERPL-CCNC: 581 U/L (ref 10–40)
BASOPHILS # BLD AUTO: 0.03 K/UL (ref 0–0.2)
BASOPHILS # BLD AUTO: 0.03 K/UL (ref 0–0.2)
BASOPHILS NFR BLD: 0.4 % (ref 0–1.9)
BASOPHILS NFR BLD: 0.4 % (ref 0–1.9)
BILIRUB SERPL-MCNC: 3 MG/DL (ref 0.1–1)
BILIRUB SERPL-MCNC: 3 MG/DL (ref 0.1–1)
BUN SERPL-MCNC: 48 MG/DL (ref 8–23)
BUN SERPL-MCNC: 48 MG/DL (ref 8–23)
CALCIUM SERPL-MCNC: 8.1 MG/DL (ref 8.7–10.5)
CALCIUM SERPL-MCNC: 8.1 MG/DL (ref 8.7–10.5)
CHLORIDE SERPL-SCNC: 98 MMOL/L (ref 95–110)
CHLORIDE SERPL-SCNC: 98 MMOL/L (ref 95–110)
CO2 SERPL-SCNC: 19 MMOL/L (ref 23–29)
CO2 SERPL-SCNC: 19 MMOL/L (ref 23–29)
CREAT SERPL-MCNC: 2 MG/DL (ref 0.5–1.4)
CREAT SERPL-MCNC: 2 MG/DL (ref 0.5–1.4)
DIFFERENTIAL METHOD: ABNORMAL
DIFFERENTIAL METHOD: ABNORMAL
EOSINOPHIL # BLD AUTO: 0.1 K/UL (ref 0–0.5)
EOSINOPHIL # BLD AUTO: 0.1 K/UL (ref 0–0.5)
EOSINOPHIL NFR BLD: 0.8 % (ref 0–8)
EOSINOPHIL NFR BLD: 0.8 % (ref 0–8)
ERYTHROCYTE [DISTWIDTH] IN BLOOD BY AUTOMATED COUNT: 16.8 % (ref 11.5–14.5)
ERYTHROCYTE [DISTWIDTH] IN BLOOD BY AUTOMATED COUNT: 16.8 % (ref 11.5–14.5)
EST. GFR  (NO RACE VARIABLE): 36 ML/MIN/1.73 M^2
EST. GFR  (NO RACE VARIABLE): 36 ML/MIN/1.73 M^2
GLUCOSE SERPL-MCNC: 100 MG/DL (ref 70–110)
GLUCOSE SERPL-MCNC: 100 MG/DL (ref 70–110)
HCT VFR BLD AUTO: 37.8 % (ref 40–54)
HCT VFR BLD AUTO: 37.8 % (ref 40–54)
HGB BLD-MCNC: 12.5 G/DL (ref 14–18)
HGB BLD-MCNC: 12.5 G/DL (ref 14–18)
IMM GRANULOCYTES # BLD AUTO: 0.03 K/UL (ref 0–0.04)
IMM GRANULOCYTES # BLD AUTO: 0.03 K/UL (ref 0–0.04)
IMM GRANULOCYTES NFR BLD AUTO: 0.4 % (ref 0–0.5)
IMM GRANULOCYTES NFR BLD AUTO: 0.4 % (ref 0–0.5)
INR PPP: 1.5 (ref 0.8–1.2)
LACTATE SERPL-SCNC: 1.2 MMOL/L (ref 0.5–2.2)
LYMPHOCYTES # BLD AUTO: 1.3 K/UL (ref 1–4.8)
LYMPHOCYTES # BLD AUTO: 1.3 K/UL (ref 1–4.8)
LYMPHOCYTES NFR BLD: 16.1 % (ref 18–48)
LYMPHOCYTES NFR BLD: 16.1 % (ref 18–48)
MAGNESIUM SERPL-MCNC: 2.2 MG/DL (ref 1.6–2.6)
MCH RBC QN AUTO: 34.4 PG (ref 27–31)
MCH RBC QN AUTO: 34.4 PG (ref 27–31)
MCHC RBC AUTO-ENTMCNC: 33.1 G/DL (ref 32–36)
MCHC RBC AUTO-ENTMCNC: 33.1 G/DL (ref 32–36)
MCV RBC AUTO: 104 FL (ref 82–98)
MCV RBC AUTO: 104 FL (ref 82–98)
MONOCYTES # BLD AUTO: 0.7 K/UL (ref 0.3–1)
MONOCYTES # BLD AUTO: 0.7 K/UL (ref 0.3–1)
MONOCYTES NFR BLD: 8.4 % (ref 4–15)
MONOCYTES NFR BLD: 8.4 % (ref 4–15)
NEUTROPHILS # BLD AUTO: 5.9 K/UL (ref 1.8–7.7)
NEUTROPHILS # BLD AUTO: 5.9 K/UL (ref 1.8–7.7)
NEUTROPHILS NFR BLD: 73.9 % (ref 38–73)
NEUTROPHILS NFR BLD: 73.9 % (ref 38–73)
NRBC BLD-RTO: 0 /100 WBC
NRBC BLD-RTO: 0 /100 WBC
OSMOLALITY UR: 405 MOSM/KG (ref 50–1200)
PHOSPHATE SERPL-MCNC: 4.8 MG/DL (ref 2.7–4.5)
PLATELET # BLD AUTO: 233 K/UL (ref 150–450)
PLATELET # BLD AUTO: 233 K/UL (ref 150–450)
PMV BLD AUTO: 9.1 FL (ref 9.2–12.9)
PMV BLD AUTO: 9.1 FL (ref 9.2–12.9)
POCT GLUCOSE: 106 MG/DL (ref 70–110)
POCT GLUCOSE: 109 MG/DL (ref 70–110)
POCT GLUCOSE: 125 MG/DL (ref 70–110)
POCT GLUCOSE: 149 MG/DL (ref 70–110)
POTASSIUM SERPL-SCNC: 3.7 MMOL/L (ref 3.5–5.1)
POTASSIUM SERPL-SCNC: 3.7 MMOL/L (ref 3.5–5.1)
PROT SERPL-MCNC: 5.9 G/DL (ref 6–8.4)
PROT SERPL-MCNC: 5.9 G/DL (ref 6–8.4)
PROTHROMBIN TIME: 16.3 SEC (ref 9–12.5)
RBC # BLD AUTO: 3.63 M/UL (ref 4.6–6.2)
RBC # BLD AUTO: 3.63 M/UL (ref 4.6–6.2)
SODIUM SERPL-SCNC: 131 MMOL/L (ref 136–145)
SODIUM SERPL-SCNC: 131 MMOL/L (ref 136–145)
URATE SERPL-MCNC: 6.7 MG/DL (ref 3.4–7)
WBC # BLD AUTO: 8 K/UL (ref 3.9–12.7)
WBC # BLD AUTO: 8 K/UL (ref 3.9–12.7)

## 2023-02-08 PROCEDURE — 80053 COMPREHEN METABOLIC PANEL: CPT | Performed by: NURSE PRACTITIONER

## 2023-02-08 PROCEDURE — P9047 ALBUMIN (HUMAN), 25%, 50ML: HCPCS | Performed by: INTERNAL MEDICINE

## 2023-02-08 PROCEDURE — 25000003 PHARM REV CODE 250: Performed by: NURSE PRACTITIONER

## 2023-02-08 PROCEDURE — 63600175 PHARM REV CODE 636 W HCPCS: Performed by: NURSE PRACTITIONER

## 2023-02-08 PROCEDURE — 85610 PROTHROMBIN TIME: CPT | Performed by: NURSE PRACTITIONER

## 2023-02-08 PROCEDURE — 36415 COLL VENOUS BLD VENIPUNCTURE: CPT | Performed by: NURSE PRACTITIONER

## 2023-02-08 PROCEDURE — 82140 ASSAY OF AMMONIA: CPT | Performed by: NURSE PRACTITIONER

## 2023-02-08 PROCEDURE — 99233 PR SUBSEQUENT HOSPITAL CARE,LEVL III: ICD-10-PCS | Mod: ,,, | Performed by: GENERAL PRACTICE

## 2023-02-08 PROCEDURE — 20600001 HC STEP DOWN PRIVATE ROOM

## 2023-02-08 PROCEDURE — 94761 N-INVAS EAR/PLS OXIMETRY MLT: CPT

## 2023-02-08 PROCEDURE — 84550 ASSAY OF BLOOD/URIC ACID: CPT | Performed by: INTERNAL MEDICINE

## 2023-02-08 PROCEDURE — 99233 SBSQ HOSP IP/OBS HIGH 50: CPT | Mod: ,,, | Performed by: GENERAL PRACTICE

## 2023-02-08 PROCEDURE — 85025 COMPLETE CBC W/AUTO DIFF WBC: CPT | Performed by: NURSE PRACTITIONER

## 2023-02-08 PROCEDURE — 63600175 PHARM REV CODE 636 W HCPCS: Performed by: INTERNAL MEDICINE

## 2023-02-08 PROCEDURE — 83605 ASSAY OF LACTIC ACID: CPT | Performed by: INTERNAL MEDICINE

## 2023-02-08 PROCEDURE — 36415 COLL VENOUS BLD VENIPUNCTURE: CPT | Performed by: INTERNAL MEDICINE

## 2023-02-08 PROCEDURE — 27000221 HC OXYGEN, UP TO 24 HOURS

## 2023-02-08 PROCEDURE — 83735 ASSAY OF MAGNESIUM: CPT | Performed by: INTERNAL MEDICINE

## 2023-02-08 RX ORDER — DOBUTAMINE HYDROCHLORIDE 400 MG/100ML
2 INJECTION INTRAVENOUS CONTINUOUS
Status: DISCONTINUED | OUTPATIENT
Start: 2023-02-08 | End: 2023-02-12

## 2023-02-08 RX ORDER — ALBUMIN HUMAN 250 G/1000ML
25 SOLUTION INTRAVENOUS 2 TIMES DAILY
Status: DISCONTINUED | OUTPATIENT
Start: 2023-02-08 | End: 2023-02-09

## 2023-02-08 RX ORDER — LEVOTHYROXINE SODIUM 50 UG/1
50 TABLET ORAL
Status: DISCONTINUED | OUTPATIENT
Start: 2023-02-09 | End: 2023-02-14 | Stop reason: HOSPADM

## 2023-02-08 RX ADMIN — AMIODARONE HYDROCHLORIDE 0.5 MG/MIN: 1.8 INJECTION, SOLUTION INTRAVENOUS at 03:02

## 2023-02-08 RX ADMIN — APIXABAN 5 MG: 2.5 TABLET, FILM COATED ORAL at 10:02

## 2023-02-08 RX ADMIN — ALBUMIN (HUMAN) 25 G: 12.5 SOLUTION INTRAVENOUS at 10:02

## 2023-02-08 RX ADMIN — FUROSEMIDE 40 MG: 10 INJECTION, SOLUTION INTRAMUSCULAR; INTRAVENOUS at 12:02

## 2023-02-08 RX ADMIN — FUROSEMIDE 40 MG: 10 INJECTION, SOLUTION INTRAMUSCULAR; INTRAVENOUS at 11:02

## 2023-02-08 RX ADMIN — APIXABAN 5 MG: 2.5 TABLET, FILM COATED ORAL at 07:02

## 2023-02-08 RX ADMIN — DOBUTAMINE HYDROCHLORIDE 3 MCG/KG/MIN: 400 INJECTION INTRAVENOUS at 11:02

## 2023-02-08 RX ADMIN — LEVOTHYROXINE SODIUM 25 MCG: 0.03 TABLET ORAL at 06:02

## 2023-02-08 RX ADMIN — FUROSEMIDE 40 MG: 10 INJECTION, SOLUTION INTRAMUSCULAR; INTRAVENOUS at 10:02

## 2023-02-08 NOTE — CARE UPDATE
02/07/23 1927   Patient Assessment/Suction   Level of Consciousness (AVPU) alert   Respiratory Effort Unlabored   PRE-TX-O2   Device (Oxygen Therapy) nasal cannula   $ Is the patient on Low Flow Oxygen? Yes   Flow (L/min) 2   SpO2 99 %   Pulse Oximetry Type Continuous   $ Pulse Oximetry - Multiple Charge Pulse Oximetry - Multiple   Probe Placed On (Pulse Ox) other (see comments)  (left nare)   Pulse 102   Resp 20

## 2023-02-08 NOTE — ASSESSMENT & PLAN NOTE
Chronic problem, stable  Cont eliquis -no longer on Plavix  Holding statin secondary to elevated liver enzymes

## 2023-02-08 NOTE — SUBJECTIVE & OBJECTIVE
Interval History:  Pt seen and examined.  Reports his breathing is better.  Denies chest pain.  Unfortunately, his BP is running low.  His LFTs continue to worsen.  Discussed case with Cardiology and initiated Pt on Dobutrex infusion.  Cardiology felt Pt would benefit most from transfer to [facility] advanced cardiac management.  Discussed with Pt who is agreeable with transfer plan.  He will remain here under our care in the interim until a bed is available.    Review of Systems   Constitutional:  Negative for fatigue.   Respiratory:  Positive for cough and shortness of breath (Improved).    Cardiovascular:  Positive for leg swelling (improved). Negative for chest pain.   Gastrointestinal:  Negative for abdominal pain, nausea and vomiting.   Genitourinary:  Negative for difficulty urinating.   Psychiatric/Behavioral:  Negative for confusion.    Objective:     Vital Signs (Most Recent):  Temp: 96.9 °F (36.1 °C) (02/08/23 1140)  Pulse: 106 (02/08/23 1405)  Resp: (!) 24 (02/08/23 1405)  BP: 91/64 (02/08/23 1402)  SpO2: 96 % (02/08/23 1405)   Vital Signs (24h Range):  Temp:  [96.9 °F (36.1 °C)-97.9 °F (36.6 °C)] 96.9 °F (36.1 °C)  Pulse:  [] 106  Resp:  [16-28] 24  SpO2:  [95 %-100 %] 96 %  BP: ()/(55-82) 91/64     Weight: 54.3 kg (119 lb 11.4 oz)  Body mass index is 22.62 kg/m².    Intake/Output Summary (Last 24 hours) at 2/8/2023 1417  Last data filed at 2/8/2023 1402  Gross per 24 hour   Intake 585 ml   Output 2700 ml   Net -2115 ml      Physical Exam  Vitals and nursing note reviewed.   Constitutional:       Appearance: Normal appearance.   HENT:      Head: Normocephalic and atraumatic.   Cardiovascular:      Rate and Rhythm: Tachycardia present. Rhythm irregular.   Pulmonary:      Effort: Pulmonary effort is normal.      Comments: Comfortable on 2 L nasal cannula  Abdominal:      General: Abdomen is flat. Bowel sounds are normal.      Palpations: Abdomen is soft.   Skin:     Capillary Refill:  Capillary refill takes less than 2 seconds.      Coloration: Skin is jaundiced.   Neurological:      Mental Status: He is alert.       Significant Labs: All pertinent labs within the past 24 hours have been reviewed.  CBC:   Recent Labs   Lab 02/06/23  1543 02/08/23  0450   WBC 6.98 8.00  8.00   HGB 12.8* 12.5*  12.5*   HCT 39.5* 37.8*  37.8*    233  233     CMP:   Recent Labs   Lab 02/06/23  1543 02/07/23  0518 02/07/23  1022 02/08/23  0450   * 135*  --  131*  131*   K 4.2 4.6  --  3.7  3.7    101  --  98  98   CO2 19* 17*  --  19*  19*   * 142*  --  100  100   BUN 44* 50*  --  48*  48*   CREATININE 1.8* 2.0*  --  2.0*  2.0*   CALCIUM 8.7 8.9  --  8.1*  8.1*   PROT 6.3  --   --  5.9*  5.9*   ALBUMIN 3.4*  --   --  3.1*  3.1*   BILITOT 3.0*  --  3.2* 3.0*  3.0*   ALKPHOS 109  --   --  107  107   *  --  458* 581*  581*   *  --  741* 924*  924*   ANIONGAP 9 17*  --  14  14     Cardiac Markers:   Recent Labs   Lab 02/06/23  1543   BNP 1,518*     Coagulation:   Recent Labs   Lab 02/08/23  0959   INR 1.5*     Lactic Acid:   Recent Labs   Lab 02/08/23  0450   LACTATE 1.2     Magnesium:   Recent Labs   Lab 02/06/23  1543 02/08/23  0450   MG 2.3 2.2       Significant Imaging: I have reviewed all pertinent imaging results/findings within the past 24 hours.

## 2023-02-08 NOTE — AI DETERIORATION ALERT
AI alert received . Patient seen and examined at bedside. Patient denies chest pain, shortness of breath and reports feeling better than earlier. Patient showing no distress

## 2023-02-08 NOTE — EICU
Intervention Initiated From:  COR / EICU    Beatriz intervened regarding:  Rounding (Video assessment)  Comments: Vide rounds done.  Resting in bed, alert, talkative.  Bedside monitor showing Afib in the 100's, amiodarone IV drip in progress presently running at 0.5 mcg/kg/min, no acute distress noted.

## 2023-02-08 NOTE — EICU
Intervention Initiated From:  COR / EICU    Beatriz intervened regarding:  Documentation    Nurse Notified:  No    Doctor Notified:  No    Comments:  video rounding complete, AAO x's 4, no c/o pain/discomfort/needs/wants, amiodarone critical care drips at this time, NAD, VSS

## 2023-02-08 NOTE — ASSESSMENT & PLAN NOTE
Acute problem- suspect hepatic congestion in the setting of severe and advanced heart failure   -LFTs are worsening   -discussed at length with Cardiology, continue diuresis and add Dobutrex   -coags reviewed: Mildly elevated INR   -ammonia normal  -continue to trend the liver function tests

## 2023-02-08 NOTE — CONSULTS
INPATIENT NEPHROLOGY CONSULT   [facility] NEPHROLOGY    Cleveland Capps  02/08/2023    Reason for consultation:    silvestre    Chief Complaint:   Chief Complaint   Patient presents with    Shortness of Breath    Atrial Fibrillation          History of Present Illness:    Per H and P    Cleveland Capps is a 67 year male who presents emergency room for evaluation of shortness of breath.  He reports shortness of breath and tachycardia onset approximately 8 days ago.  He is a history of atrial fibrillation and reports intermittently in AFib over the past several days.  He denies any chest pain.  Reports shortness of breath is worse with exertion.  No alleviating factors.  He also endorses an approximate 10-15 lb weight gain over the past several weeks.  No reported fever chills.  Previous medical history includes CVA, Watchman left atrial closure device, hypothyroidism, coronary artery disease, stent placement, COPD, hypertension, AICD, and combined systolic/diastolic heart failure.  ER workup:  CBC with mild anemia.  CMP with BUN of 44 and creatinine of 1.8.  Bilirubin elevated 3.0,  .  BNP elevated at 1518 (baseline 400).  Troponin mildly elevated 0.077.  Last echo demonstrated ejection fraction of 20-25%.  Patient was given 20 mg of Lasix in the ER.  Patient remained in atrial fibrillation/RVR while in ER.  He had mild hypotension.  Patient was started on amiodarone infusion and bolus after consulting Dr. Calhoun with on-call Cardiology.  Patient admitted Hospital Medicine for treatment management.     2/8  stopped entresto due to low bp.  On dobutamine.  Episodes of hypotension.  tachycardic    Plan of Care:       Assessment:    Acute kidney injury likely secondary to decompensated heart failure and cardiorenal syndrome  -- abdominal ultrasound--no hydronephrosis  --hold entresto if creatinine keeps rising  --Avoid NSAIDS, Mckeon II inhibitors, and other non-essential nephrotoxic agents  --renal dose medication  for crcl 10-50  --strict I/Os  --hold aldactone  --ua with micro with tubular casts suggestion hypoperfusion of nephrons   --FEUrea 17 % suggesting pre-renal etiology    Hypervolemia  --lasix 40mg iv bid.,  Add albumin 25g iv bid with the lasix  --keep net daily diuresis to 1-1.5 liters  --monitor output  --patient declining to go to [facility] for EP specialist evaluation for ablation and CHF team evaluation     Anemia  --hg not low enough for erythropoiesis stimulating agent      Hyponatremia--hypervolemic   --diuresis  --treat hypothyroidism  --trend    Metabolic acidosis (mixed gap and non-gap).    --no intervention needed  --check urine pH is appropriately low  --diuretics alter calculation of anion gap    I had a jey discussion with the patient and his brother.  I told them that given his hemodynamic instability and unstable cardiac issues he is not currently a candidate for hemodialysis.  I told him that he is very sick and at considerable risk of dying              Thank you for allowing us to participate in this patient's care. We will continue to follow.    Vital Signs:  Temp Readings from Last 3 Encounters:   02/08/23 96.9 °F (36.1 °C) (Axillary)   11/28/22 98.2 °F (36.8 °C)   11/13/22 97.7 °F (36.5 °C) (Oral)       Pulse Readings from Last 3 Encounters:   02/08/23 110   01/09/23 92   12/20/22 62       BP Readings from Last 3 Encounters:   02/08/23 (!) 99/57   01/09/23 125/76   12/20/22 120/60       Weight:  Wt Readings from Last 3 Encounters:   02/08/23 53.5 kg (117 lb 15.1 oz)   01/09/23 52.6 kg (116 lb)   12/20/22 52.9 kg (116 lb 8.2 oz)       Past Medical & Surgical History:  Past Medical History:   Diagnosis Date    A-fib     Acute kidney injury     COPD (chronic obstructive pulmonary disease)     Coronary artery disease     Diabetes mellitus     Encounter for blood transfusion     Former smoker     Hypertension     Myocardial infarction     Thyroid disease     Type 2 diabetes mellitus without  complications        Past Surgical History:   Procedure Laterality Date    CLOSURE OF LEFT ATRIAL APPENDAGE USING DEVICE N/A 2022    Procedure: Left atrial appendage closure device;  Surgeon: Tre Schmidt MD;  Location: [facility] CATH LAB;  Service: Cardiology;  Laterality: N/A;    COLONOSCOPY N/A 1/3/2017    Procedure: COLONOSCOPY;  Surgeon: Marcus Zarco MD;  Location: [facility];  Service: Endoscopy;  Laterality: N/A;    CORONARY BYPASS GRAFT ANGIOGRAPHY  3/30/2021    Procedure: Bypass graft study;  Surgeon: Tre Schmidt MD;  Location: [facility] CATH LAB;  Service: Cardiology;;    CORONARY STENT PLACEMENT      GASTROSTOMY TUBE PLACEMENT      INSERTION OF PACEMAKER  2017    INTRAVASCULAR ULTRASOUND, NON-CORONARY  2022    Procedure: Intravascular Ultrasound, Non-Coronary;  Surgeon: Tre Scmhidt MD;  Location: [facility] CATH LAB;  Service: Cardiology;;    LEFT HEART CATHETERIZATION N/A 3/30/2021    Procedure: Left heart cath;  Surgeon: Tre Schmidt MD;  Location: [facility] CATH LAB;  Service: Cardiology;  Laterality: N/A;    PEG TUBE REMOVAL      TRACHEOSTOMY CLOSURE      TRACHEOSTOMY TUBE PLACEMENT      TRANSESOPHAGEAL ECHOCARDIOGRAPHY N/A 2022    Procedure: ECHOCARDIOGRAM, TRANSESOPHAGEAL;  Surgeon: [facility] Diagnostic Provider;  Location: [facility] CATH LAB;  Service: Anesthesiology;  Laterality: N/A;    TREATMENT OF CARDIAC ARRHYTHMIA N/A 2022    Procedure: Cardioversion or Defibrillation;  Surgeon: Kuldeep Daniels MD;  Location: [facility] CATH LAB;  Service: Cardiology;  Laterality: N/A;    UPPER GASTROINTESTINAL ENDOSCOPY  2016    Dr. Zarco with PEG tube placement       Past Social History:  Social History     Socioeconomic History    Marital status: Single   Tobacco Use    Smoking status: Former     Types: Cigarettes     Quit date: 2005     Years since quittin.2    Smokeless tobacco: Never    Tobacco comments:     quit    Substance and Sexual Activity    Alcohol use: No     Drug use: No    Sexual activity: Yes     Social Determinants of Health     Financial Resource Strain: Low Risk     Difficulty of Paying Living Expenses: Not hard at all   Food Insecurity: No Food Insecurity    Worried About Running Out of Food in the Last Year: Never true    Ran Out of Food in the Last Year: Never true   Transportation Needs: No Transportation Needs    Lack of Transportation (Medical): No    Lack of Transportation (Non-Medical): No   Physical Activity: Inactive    Days of Exercise per Week: 0 days    Minutes of Exercise per Session: 0 min   Stress: No Stress Concern Present    Feeling of Stress : Not at all   Social Connections: Socially Isolated    Frequency of Communication with Friends and Family: Three times a week    Frequency of Social Gatherings with Friends and Family: Once a week    Attends Holiness Services: Never    Active Member of Clubs or Organizations: No    Attends Club or Organization Meetings: Never    Marital Status: Never    Housing Stability: Low Risk     Unable to Pay for Housing in the Last Year: No    Number of Places Lived in the Last Year: 1    Unstable Housing in the Last Year: No       Medications:  No current facility-administered medications on file prior to encounter.     Current Outpatient Medications on File Prior to Encounter   Medication Sig Dispense Refill    apixaban (ELIQUIS) 5 mg Tab Take 5 mg by mouth 2 (two) times daily.      atorvastatin (LIPITOR) 80 MG tablet Take 80 mg by mouth once daily.      carvediloL (COREG) 3.125 MG tablet Take 1 tablet (3.125 mg total) by mouth 2 (two) times daily. (Patient taking differently: Take 3.125 mg by mouth 2 (two) times daily as needed.) 60 tablet 11    clopidogreL (PLAVIX) 75 mg tablet Take 1 tablet (75 mg total) by mouth once daily. 30 tablet 11    levothyroxine (SYNTHROID) 25 MCG tablet Take 25 mcg by mouth before breakfast.      potassium chloride SA (K-DUR,KLOR-CON M) 10 MEQ tablet       COMP-AIR NEBULIZER  "COMPRESSOR Alfreda use as directed      furosemide (LASIX) 20 MG tablet Take 1 tablet (20 mg total) by mouth 3 (three) times a week. 10 tablet 0    sacubitriL-valsartan (ENTRESTO) 24-26 mg per tablet Take 1 tablet by mouth 2 (two) times daily. 60 tablet 2    spironolactone (ALDACTONE) 25 MG tablet Take 1 tablet (25 mg total) by mouth once daily. 30 tablet 11    travoprost (TRAVATAN Z) 0.004 % ophthalmic solution       travoprost, benzalkonium, (TRAVATAN) 0.004 % ophthalmic solution Place 1 drop into both eyes nightly.      TRUE METRIX GLUCOSE TEST STRIP Strp USE 1 STRIP TO CHECK GLUCOSE ONCE DAILY       Scheduled Meds:   albumin human 25%  25 g Intravenous BID    apixaban  5 mg Oral BID    furosemide (LASIX) injection  40 mg Intravenous Q12H    [START ON 2/9/2023] levothyroxine  50 mcg Oral Before breakfast    metoprolol tartrate  25 mg Oral BID     Continuous Infusions:   amiodarone in dextrose 5% 0.5 mg/min (02/08/23 0300)    DOBUTamine IV infusion (non-titrating) 3 mcg/kg/min (02/08/23 1133)     PRN Meds:.dextrose 10%, dextrose 10%, glucagon (human recombinant), glucose, glucose, insulin aspart U-100, sodium chloride 0.9%    Allergies:  Penicillin and Penicillins    Past Family History:  Reviewed; refer to Hospitalist Admission Note    Review of Systems:  Review of Systems - All 14 systems reviewed and negative, except as noted in HPI    Physical Exam:    BP (!) 99/57   Pulse 110   Temp 96.9 °F (36.1 °C) (Axillary)   Resp (!) 28   Ht 5' 1" (1.549 m)   Wt 53.5 kg (117 lb 15.1 oz)   SpO2 100%   BMI 22.29 kg/m²     General Appearance:    No distress   Head:    Normocephalic, without obvious abnormality, atraumatic   Eyes:    PER, conjunctiva/corneas clear, EOM's intact in both eyes        Throat:   Lips, mucosa, and tongue normal; teeth and gums normal   Back:     Symmetric, no curvature, ROM normal, no CVA tenderness   Lungs:     Diminished.  Crackles at bases   Chest wall:    No tenderness or deformity "   Heart:    Irreg.  No rub   Abdomen:     Soft, non-tender, bowel sounds active all four quadrants,     no masses, no organomegaly   Extremities:   Extremities normal, atraumatic, no cyanosis or edema   Pulses:   2+ and symmetric all extremities   MSK:   No joint or muscle swelling, tenderness or deformity   Skin:   Skin color, texture, turgor normal, no rashes or lesions   Neurologic:      No flap     Results:  Lab Results   Component Value Date     (L) 02/08/2023     (L) 02/08/2023    K 3.7 02/08/2023    K 3.7 02/08/2023    CL 98 02/08/2023    CL 98 02/08/2023    CO2 19 (L) 02/08/2023    CO2 19 (L) 02/08/2023    BUN 48 (H) 02/08/2023    BUN 48 (H) 02/08/2023    CREATININE 2.0 (H) 02/08/2023    CREATININE 2.0 (H) 02/08/2023    CALCIUM 8.1 (L) 02/08/2023    CALCIUM 8.1 (L) 02/08/2023    ANIONGAP 14 02/08/2023    ANIONGAP 14 02/08/2023    ESTGFRAFRICA >60.0 07/27/2022    EGFRNONAA 52.4 (A) 07/27/2022       Lab Results   Component Value Date    CALCIUM 8.1 (L) 02/08/2023    CALCIUM 8.1 (L) 02/08/2023    PHOS 4.8 (H) 02/07/2023       Recent Labs   Lab 02/08/23  0450   WBC 8.00  8.00   RBC 3.63*  3.63*   HGB 12.5*  12.5*   HCT 37.8*  37.8*     233   *  104*   MCH 34.4*  34.4*   MCHC 33.1  33.1            I have personally reviewed pertinent radiological imaging and reports.    Patient care was time spent personally by me on the following activities:   Obtaining a history  Examination of patient.  Providing medical care at the patients bedside.  Developing a treatment plan with patient or surrogate and bedside caregivers  Ordering and reviewing laboratory studies, radiographic studies, pulse oximetry.  Ordering and performing treatments and interventions.  Evaluation of patient's response to treatment.  Discussions with consultants while on the unit and immediately available to the patient.  Re-evaluation of the patient's condition.  Documentation in the medical record.     Terence NJ  MD Susan  Nephrology  [facility]  [phone]

## 2023-02-08 NOTE — CARE UPDATE
Long discussion with Pt and his brother regarding his plan of care.  Pt does not wish to transfer to [facility].  He understands that care capabilities are limited here.  He would like to maintain his DNR status.  He does not wish to pursue any advanced heart failure treatment including but not limited to LVAD or transplant.  Understands that electrophysiology evaluation would be beneficial, but does not wish to transfer to [facility].  He would like to continue care here with our specialist.  If he continues to worsen then he would consider hospice care and discharge home as opposed any kind of transfer for advanced care.    I have updated the specialists.    ACP: I spent 16 minutes in conversation with Pt and his brother, Anthony, about his heart failure with both liver and renal failure and my concern for worsening of his condition.  They confirmed DNR status and goals of care focused upon being close to friends and family and quality of life.

## 2023-02-08 NOTE — ASSESSMENT & PLAN NOTE
Chronic problem, acutely uncontrolled despite medication compliance  -increase levothyroxine to 50 mics daily

## 2023-02-08 NOTE — ASSESSMENT & PLAN NOTE
Patient with known CAD s/p stent placement and CABG, which is controlled Will hold the statin due to LFT elevation and monitor for S/Sx of angina/ACS. Continue to monitor on telemetry.  Pt no longer on aspirin or Plavix

## 2023-02-08 NOTE — ASSESSMENT & PLAN NOTE
Patient with Persistent (7 days or more) atrial fibrillation which is uncontrolled currently with Amiodarone. Patient is currently in atrial fibrillation.[id number] Score: 2. Anticoagulation indicated. Anticoagulation done with eliquis.    -continue the amiodarone infusion  -cardiology following g  -Monitor closely on telemetry  -watch for uncontrolled tachycardia/tachyarrhythmia as while on dobutamine

## 2023-02-08 NOTE — MEDICAL/APP STUDENT
"Medical Student Subjective & Objective     Principal Problem: Acute on chronic combined systolic and diastolic congestive heart failure    Chief Complaint:   Chief Complaint   Patient presents with    Shortness of Breath    Atrial Fibrillation       HPI: Cleveland Capps is a 67 year male who presents emergency room for evaluation of shortness of breath.  He reports shortness of breath and tachycardia onset approximately 8 days ago.  He is a history of atrial fibrillation and reports intermittently in AFib over the past several days.  He denies any chest pain.  Reports shortness of breath is worse with exertion.  No alleviating factors.  He also endorses an approximate 10-15 lb weight gain over the past several weeks.  No reported fever chills.  Previous medical history includes CVA, Watchman left atrial closure device, hypothyroidism, coronary artery disease, stent placement, COPD, hypertension, AICD, and combined systolic/diastolic heart failure.  ER workup:  CBC with mild anemia.  CMP with BUN of 44 and creatinine of 1.8.  Bilirubin elevated 3.0,  .  BNP elevated at 1518 (baseline 400).  Troponin mildly elevated 0.077.  Last echo demonstrated ejection fraction of 20-25%.  Patient was given 20 mg of Lasix in the ER.  Patient remained in atrial fibrillation/RVR while in ER.  He had mild hypotension.  Patient was started on amiodarone infusion and bolus after consulting Dr. Calhoun with on-call Cardiology.  Patient admitted Hospital Medicine for treatment management.  Patient admitted to step-down unit.    Hospital Course: No notes on file    Interval History: Pt seen and examined. Pt is laying comfortably in his bed and states that he feels "pretty good" today. He states his SOB is better and he is coughing up a brownish/yellow sputum. Pt is still currently in Afib w/ rapid rate. Pt's Cr stayed the same with the entresto. Pt's blood pressure dropped really low (87/58) this morning after getting up to use " the bedside commode. Pt also refused to take clopidogrel after taking apixaban due to a previous episode of hematuria while on both of these medications.    Cardio added metoprolol to try to control his heart rate, but his blood pressure dropped too low due to the hypotensive effects of metoprolol and entresto. Both were held this morning. Cardio recommended digitalis if his BP did not tolerate metoprolol. Cardio is trying to discontinue amiodarone.    Nephrology recommended holding entresto if Cr increased, avoid nephrotoxic agents, and renal dose medications. They also recommend to keep IV lasix for his hypervolemia.    Pt may be transferred to an advanced cardiac unit for his worsening heart, liver, and kidney fxn.    Past Medical History:   Diagnosis Date    A-fib     Acute kidney injury     COPD (chronic obstructive pulmonary disease)     Coronary artery disease     Diabetes mellitus     Encounter for blood transfusion     Former smoker     Hypertension     Myocardial infarction     Thyroid disease     Type 2 diabetes mellitus without complications        Past Surgical History:   Procedure Laterality Date    CLOSURE OF LEFT ATRIAL APPENDAGE USING DEVICE N/A 5/17/2022    Procedure: Left atrial appendage closure device;  Surgeon: Tre Schmidt MD;  Location: [facility] CATH LAB;  Service: Cardiology;  Laterality: N/A;    COLONOSCOPY N/A 1/3/2017    Procedure: COLONOSCOPY;  Surgeon: Marcus Green MD;  Location: [facility] ENDO;  Service: Endoscopy;  Laterality: N/A;    CORONARY BYPASS GRAFT ANGIOGRAPHY  3/30/2021    Procedure: Bypass graft study;  Surgeon: Tre Schmidt MD;  Location: [facility] CATH LAB;  Service: Cardiology;;    CORONARY STENT PLACEMENT      GASTROSTOMY TUBE PLACEMENT      INSERTION OF PACEMAKER  04/2017    INTRAVASCULAR ULTRASOUND, NON-CORONARY  5/17/2022    Procedure: Intravascular Ultrasound, Non-Coronary;  Surgeon: Tre Schmidt MD;  Location: [facility] CATH LAB;  Service: Cardiology;;    LEFT  HEART CATHETERIZATION N/A 3/30/2021    Procedure: Left heart cath;  Surgeon: Tre Schmidt MD;  Location: [facility] CATH LAB;  Service: Cardiology;  Laterality: N/A;    PEG TUBE REMOVAL      TRACHEOSTOMY CLOSURE      TRACHEOSTOMY TUBE PLACEMENT      TRANSESOPHAGEAL ECHOCARDIOGRAPHY N/A 5/17/2022    Procedure: ECHOCARDIOGRAM, TRANSESOPHAGEAL;  Surgeon: Yudi Diagnostic Provider;  Location: [facility] CATH LAB;  Service: Anesthesiology;  Laterality: N/A;    TREATMENT OF CARDIAC ARRHYTHMIA N/A 11/25/2022    Procedure: Cardioversion or Defibrillation;  Surgeon: Kuldeep Daniels MD;  Location: [facility] CATH LAB;  Service: Cardiology;  Laterality: N/A;    UPPER GASTROINTESTINAL ENDOSCOPY  05/2016    Dr. Zarco with PEG tube placement       Review of patient's allergies indicates:   Allergen Reactions    Penicillin      Other reaction(s): anaphylaxis  Other reaction(s): anaphylaxis    Penicillins Hives       No current facility-administered medications on file prior to encounter.     Current Outpatient Medications on File Prior to Encounter   Medication Sig    apixaban (ELIQUIS) 5 mg Tab Take 5 mg by mouth 2 (two) times daily.    atorvastatin (LIPITOR) 80 MG tablet Take 80 mg by mouth once daily.    carvediloL (COREG) 3.125 MG tablet Take 1 tablet (3.125 mg total) by mouth 2 (two) times daily. (Patient taking differently: Take 3.125 mg by mouth 2 (two) times daily as needed.)    clopidogreL (PLAVIX) 75 mg tablet Take 1 tablet (75 mg total) by mouth once daily.    levothyroxine (SYNTHROID) 25 MCG tablet Take 25 mcg by mouth before breakfast.    potassium chloride SA (K-DUR,KLOR-CON M) 10 MEQ tablet     COMP-AIR NEBULIZER COMPRESSOR Alfreda use as directed    furosemide (LASIX) 20 MG tablet Take 1 tablet (20 mg total) by mouth 3 (three) times a week.    sacubitriL-valsartan (ENTRESTO) 24-26 mg per tablet Take 1 tablet by mouth 2 (two) times daily.    spironolactone (ALDACTONE) 25 MG tablet Take 1 tablet (25 mg total) by mouth once daily.     travoprost (TRAVATAN Z) 0.004 % ophthalmic solution     travoprost, benzalkonium, (TRAVATAN) 0.004 % ophthalmic solution Place 1 drop into both eyes nightly.    TRUE METRIX GLUCOSE TEST STRIP Strp USE 1 STRIP TO CHECK GLUCOSE ONCE DAILY     Family History       Problem Relation (Age of Onset)    Diabetes Mother, Brother    Glaucoma Father, Brother    Heart disease Mother    Macular degeneration Father, Brother    No Known Problems Sister, Maternal Aunt, Maternal Uncle, Paternal Aunt, Paternal Uncle, Maternal Grandmother, Maternal Grandfather, Paternal Grandmother, Paternal Grandfather          Tobacco Use    Smoking status: Former     Types: Cigarettes     Quit date: 2005     Years since quittin.2    Smokeless tobacco: Never    Tobacco comments:     quit    Substance and Sexual Activity    Alcohol use: No    Drug use: No    Sexual activity: Yes     Review of Systems   Constitutional: Negative.    HENT: Negative.     Eyes: Negative.    Respiratory:  Positive for cough and shortness of breath.         SOB is better than when he came in. Coughing up a brownish/yellow sputum.   Cardiovascular:  Negative for chest pain, palpitations and leg swelling.   Gastrointestinal:  Negative for diarrhea, nausea and vomiting.   Endocrine: Negative.    Genitourinary: Negative.    Musculoskeletal: Negative.    Skin: Negative.    Allergic/Immunologic: Negative.    Neurological:  Negative for weakness and numbness.   Hematological: Negative.    Psychiatric/Behavioral: Negative.     Objective:     Vital Signs (Most Recent):  Temp: 97.2 °F (36.2 °C) (23 0721)  Pulse: 110 (23 1000)  Resp: (!) 23 (23 1000)  BP: 93/71 (23 1000)  SpO2: 100 % (23 0900)   Vital Signs (24h Range):  Temp:  [97.2 °F (36.2 °C)-97.9 °F (36.6 °C)] 97.2 °F (36.2 °C)  Pulse:  [] 110  Resp:  [16-29] 23  SpO2:  [94 %-100 %] 100 %  BP: ()/(55-84) 93/71     Weight: 54.3 kg (119 lb 11.4 oz)  Body mass index is  22.62 kg/m².    Intake/Output Summary (Last 24 hours) at 2/8/2023 1029  Last data filed at 2/8/2023 0816  Gross per 24 hour   Intake 460 ml   Output 1875 ml   Net -1415 ml        Physical Exam  Constitutional:       Appearance: Normal appearance.   HENT:      Head: Normocephalic and atraumatic.      Right Ear: External ear normal.      Left Ear: External ear normal.      Nose: Nose normal.      Mouth/Throat:      Mouth: Mucous membranes are moist.      Pharynx: Oropharynx is clear.   Eyes:      Extraocular Movements: Extraocular movements intact.      Conjunctiva/sclera: Conjunctivae normal.   Cardiovascular:      Rate and Rhythm: Tachycardia present. Rhythm irregular.      Pulses: Normal pulses.      Heart sounds: Normal heart sounds.   Pulmonary:      Effort: Pulmonary effort is normal.      Breath sounds: Normal breath sounds.   Abdominal:      General: Abdomen is flat.      Palpations: Abdomen is soft.   Musculoskeletal:         General: No swelling. Normal range of motion.      Cervical back: Normal range of motion and neck supple.      Right lower leg: No edema.      Left lower leg: No edema.   Skin:     General: Skin is warm and dry.   Neurological:      General: No focal deficit present.      Mental Status: He is alert and oriented to person, place, and time.   Psychiatric:         Mood and Affect: Mood normal.         Behavior: Behavior normal.         Thought Content: Thought content normal.         Judgment: Judgment normal.       Significant Labs: All pertinent labs within the past 24 hours have been reviewed.  A1C:   Recent Labs   Lab 02/06/23  1543   HGBA1C 5.4       CBC:   Recent Labs   Lab 02/06/23  1543 02/08/23  0450   WBC 6.98 8.00  8.00   HGB 12.8* 12.5*  12.5*   HCT 39.5* 37.8*  37.8*    233  233       CMP:   Recent Labs   Lab 02/06/23  1543 02/07/23  0518 02/07/23  1022 02/08/23  0450   * 135*  --  131*  131*   K 4.2 4.6  --  3.7  3.7    101  --  98  98   CO2 19* 17*   --  19*  19*   * 142*  --  100  100   BUN 44* 50*  --  48*  48*   CREATININE 1.8* 2.0*  --  2.0*  2.0*   CALCIUM 8.7 8.9  --  8.1*  8.1*   PROT 6.3  --   --  5.9*  5.9*   ALBUMIN 3.4*  --   --  3.1*  3.1*   BILITOT 3.0*  --  3.2* 3.0*  3.0*   ALKPHOS 109  --   --  107  107   *  --  458* 581*  581*   *  --  741* 924*  924*   ANIONGAP 9 17*  --  14  14       POCT Glucose:   Recent Labs   Lab 02/07/23  1143 02/07/23  1615 02/08/23  0018   POCTGLUCOSE 119* 119* 109         Significant Imaging: I have reviewed all pertinent imaging results/findings within the past 24 hours.    ASSESSMENT/PLAN:   Acute on chronic combined systolic and diastolic congestive heart failure  - chronic  - echo showed EF of 20  - per cardiology: pt kept on entresto, but held this morning due to pt's BP. Pt started on dobutrex  - per nephrology: pt kept on IV lasix for fluid overload  - kidney fxn and potassium levels being monitored  - pt will likely need transfer to an advanced cardiac unit for further care of his worsening heart, liver, and kidney fxn    Longstanding persistent Atrial Fibrillation  - pt in Afib > 7 days  - continue apixaban (pt [id number] score of > 2)  - per cardiology: trying to get pt off of amiodarone, metoprolol started, but held this morning due to pt's BP. Digitalis was recommended if pt's BP did not tolerate metoprolol    MEIR  - pt's Cr stayed at 2.0 w/ entresto (but discontinued due to low BP)  - per nephrology: meds will be renally dosed and nephrotoxic agents will be avoided    Congestive Hepatopathy  - pt has elevated LFTs, PT, and INR and imaging shows IVC and hepatic vein dilation. All likely due to worsening heart failure    Hypothyroidism  - chronic  - TSH level is significantly high. Pt may benefit from an increase in his synthroid from 25 mcg to 50 mcg    Stroke  - chronic, stable  - continue apixaban for prevention. pt refused clopidogrel    Type II Diabetes Mellitus  -  chronic, stable  - continue insulin before meals and nightly PRN    Essential Hypertension  - chronic, stable      MANNY Ramos

## 2023-02-08 NOTE — ASSESSMENT & PLAN NOTE
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 CHF is currently uncontrolled due to Continued edema of extremities and Weight gain of 15 pounds. Latest ECHO performed and demonstrates- Results for orders placed during the hospital encounter of 06/27/22    Echo    Interpretation Summary  · The left ventricle is severely enlarged with mild eccentric hypertrophy and severely decreased systolic function.  · The estimated ejection fraction is 25%.  · There are segmental left ventricular wall motion abnormalities.  · Left ventricular diastolic dysfunction.  · Mild right ventricular enlargement with mildly to moderately reduced right ventricular systolic function.  · Severe left atrial enlargement.  · Mild right atrial enlargement.  · The MR is moderate to moderate -severe ( 2-3+).  · Mild to moderate tricuspid regurgitation.  · Mild pulmonic regurgitation.  · Intermediate central venous pressure (8 mmHg).  · The estimated PA systolic pressure is 47 mmHg.  · There is pulmonary hypertension.  . Continue Furosemide and monitor clinical status closely. Monitor on telemetry. Patient is off CHF pathway.  Monitor strict Is&Os and daily weights.  Place on fluid restriction of 1.5 L. Continue to stress to patient importance of self efficacy and  on diet for CHF. Last BNP reviewed- and noted below   Recent Labs   Lab 02/06/23  1543   BNP 1,518*   .  Repeat echo reviewed:  EF 20%   -he is diuresing well, but we still can not control his heart rate and his course is complicated by lower blood pressures   -discussed at length with Cardiology:  Will initiate Dobutrex infusion   -DC Entresto  -awaiting transfer to [facility] [place]

## 2023-02-08 NOTE — PROGRESS NOTES
[facility] Hospital Medicine  Progress Note    Patient Name: Cleveland Capps  MRN: [id number]  Patient Class: IP- Inpatient   Admission Date: 2/6/2023  Length of Stay: 2 days  Attending Physician: Fernando Bryson MD  Primary Care Provider: Romero Snyder MD        Subjective:     Principal Problem:Acute on chronic combined systolic and diastolic congestive heart failure        HPI:  Cleveland Capps is a 67 year male who presents emergency room for evaluation of shortness of breath.  He reports shortness of breath and tachycardia onset approximately 8 days ago.  He is a history of atrial fibrillation and reports intermittently in AFib over the past several days.  He denies any chest pain.  Reports shortness of breath is worse with exertion.  No alleviating factors.  He also endorses an approximate 10-15 lb weight gain over the past several weeks.  No reported fever chills.  Previous medical history includes CVA, Watchman left atrial closure device, hypothyroidism, coronary artery disease, stent placement, COPD, hypertension, AICD, and combined systolic/diastolic heart failure.  ER workup:  CBC with mild anemia.  CMP with BUN of 44 and creatinine of 1.8.  Bilirubin elevated 3.0,  .  BNP elevated at 1518 (baseline 400).  Troponin mildly elevated 0.077.  Last echo demonstrated ejection fraction of 20-25%.  Patient was given 20 mg of Lasix in the ER.  Patient remained in atrial fibrillation/RVR while in ER.  He had mild hypotension.  Patient was started on amiodarone infusion and bolus after consulting Dr. Calhoun with on-call Cardiology.  Patient admitted Hospital Medicine for treatment management.  Patient admitted to step-down unit.      Overview/Hospital Course:  No notes on file    Interval History:  Pt seen and examined.  Reports his breathing is better.  Denies chest pain.  Unfortunately, his BP is running low.  His LFTs continue to worsen.  Discussed case with Cardiology and  initiated Pt on Dobutrex infusion.  Cardiology felt Pt would benefit most from transfer to [facility] advanced cardiac management.  Discussed with Pt who is agreeable with transfer plan.  He will remain here under our care in the interim until a bed is available.    Review of Systems   Constitutional:  Negative for fatigue.   Respiratory:  Positive for cough and shortness of breath (Improved).    Cardiovascular:  Positive for leg swelling (improved). Negative for chest pain.   Gastrointestinal:  Negative for abdominal pain, nausea and vomiting.   Genitourinary:  Negative for difficulty urinating.   Psychiatric/Behavioral:  Negative for confusion.    Objective:     Vital Signs (Most Recent):  Temp: 96.9 °F (36.1 °C) (02/08/23 1140)  Pulse: 106 (02/08/23 1405)  Resp: (!) 24 (02/08/23 1405)  BP: 91/64 (02/08/23 1402)  SpO2: 96 % (02/08/23 1405)   Vital Signs (24h Range):  Temp:  [96.9 °F (36.1 °C)-97.9 °F (36.6 °C)] 96.9 °F (36.1 °C)  Pulse:  [] 106  Resp:  [16-28] 24  SpO2:  [95 %-100 %] 96 %  BP: ()/(55-82) 91/64     Weight: 54.3 kg (119 lb 11.4 oz)  Body mass index is 22.62 kg/m².    Intake/Output Summary (Last 24 hours) at 2/8/2023 1417  Last data filed at 2/8/2023 1402  Gross per 24 hour   Intake 585 ml   Output 2700 ml   Net -2115 ml      Physical Exam  Vitals and nursing note reviewed.   Constitutional:       Appearance: Normal appearance.   HENT:      Head: Normocephalic and atraumatic.   Cardiovascular:      Rate and Rhythm: Tachycardia present. Rhythm irregular.   Pulmonary:      Effort: Pulmonary effort is normal.      Comments: Comfortable on 2 L nasal cannula  Abdominal:      General: Abdomen is flat. Bowel sounds are normal.      Palpations: Abdomen is soft.   Skin:     Capillary Refill: Capillary refill takes less than 2 seconds.      Coloration: Skin is jaundiced.   Neurological:      Mental Status: He is alert.       Significant Labs: All pertinent labs within the past 24 hours have been  reviewed.  CBC:   Recent Labs   Lab 02/06/23  1543 02/08/23  0450   WBC 6.98 8.00  8.00   HGB 12.8* 12.5*  12.5*   HCT 39.5* 37.8*  37.8*    233  233     CMP:   Recent Labs   Lab 02/06/23  1543 02/07/23  0518 02/07/23  1022 02/08/23  0450   * 135*  --  131*  131*   K 4.2 4.6  --  3.7  3.7    101  --  98  98   CO2 19* 17*  --  19*  19*   * 142*  --  100  100   BUN 44* 50*  --  48*  48*   CREATININE 1.8* 2.0*  --  2.0*  2.0*   CALCIUM 8.7 8.9  --  8.1*  8.1*   PROT 6.3  --   --  5.9*  5.9*   ALBUMIN 3.4*  --   --  3.1*  3.1*   BILITOT 3.0*  --  3.2* 3.0*  3.0*   ALKPHOS 109  --   --  107  107   *  --  458* 581*  581*   *  --  741* 924*  924*   ANIONGAP 9 17*  --  14  14     Cardiac Markers:   Recent Labs   Lab 02/06/23  1543   BNP 1,518*     Coagulation:   Recent Labs   Lab 02/08/23  0959   INR 1.5*     Lactic Acid:   Recent Labs   Lab 02/08/23  0450   LACTATE 1.2     Magnesium:   Recent Labs   Lab 02/06/23  1543 02/08/23  0450   MG 2.3 2.2       Significant Imaging: I have reviewed all pertinent imaging results/findings within the past 24 hours.      Assessment/Plan:      * Acute on chronic combined systolic and diastolic congestive heart failure  Patient is identified as having Combined Systolic and Diastolic heart failure that is Acute on chronic. CHF is currently uncontrolled due to Continued edema of extremities and Weight gain of 15 pounds. Latest ECHO performed and demonstrates- Results for orders placed during the hospital encounter of 06/27/22    Echo    Interpretation Summary  · The left ventricle is severely enlarged with mild eccentric hypertrophy and severely decreased systolic function.  · The estimated ejection fraction is 25%.  · There are segmental left ventricular wall motion abnormalities.  · Left ventricular diastolic dysfunction.  · Mild right ventricular enlargement with mildly to moderately reduced right ventricular systolic function.  ·  Severe left atrial enlargement.  · Mild right atrial enlargement.  · The MR is moderate to moderate -severe ( 2-3+).  · Mild to moderate tricuspid regurgitation.  · Mild pulmonic regurgitation.  · Intermediate central venous pressure (8 mmHg).  · The estimated PA systolic pressure is 47 mmHg.  · There is pulmonary hypertension.  . Continue Furosemide and monitor clinical status closely. Monitor on telemetry. Patient is off CHF pathway.  Monitor strict Is&Os and daily weights.  Place on fluid restriction of 1.5 L. Continue to stress to patient importance of self efficacy and  on diet for CHF. Last BNP reviewed- and noted below   Recent Labs   Lab 02/06/23  1543   BNP 1,518*   .  Repeat echo reviewed:  EF 20%   -he is diuresing well, but we still can not control his heart rate and his course is complicated by lower blood pressures   -discussed at length with Cardiology:  Will initiate Dobutrex infusion   -DC Entresto  -awaiting transfer to [facility]    Longstanding persistent atrial fibrillation  Patient with Persistent (7 days or more) atrial fibrillation which is uncontrolled currently with Amiodarone. Patient is currently in atrial fibrillation.[id number] Score: 2. Anticoagulation indicated. Anticoagulation done with eliquis.    -continue the amiodarone infusion  -cardiology following g  -Monitor closely on telemetry  -watch for uncontrolled tachycardia/tachyarrhythmia as while on dobutamine        Elevated LFTs  Acute problem- suspect hepatic congestion in the setting of severe and advanced heart failure   -LFTs are worsening   -discussed at length with Cardiology, continue diuresis and add Dobutrex   -coags reviewed: Mildly elevated INR   -ammonia normal  -continue to trend the liver function tests    AICD (automatic cardioverter/defibrillator) present  Chronic problem  Continuous cardiac monitor   Patient denies any recent discharges      Hypothyroid  Chronic problem, acutely uncontrolled despite  medication compliance  -increase levothyroxine to 50 mics daily    History of CVA (cerebrovascular accident)  Chronic problem, stable  Cont eliquis -no longer on Plavix  Holding statin secondary to elevated liver enzymes      MEIR (acute kidney injury)  Patient with acute kidney injury likely due to pre-renal azotemia MEIR is currently stable. Labs reviewed- Renal function/electrolytes with Estimated Creatinine Clearance: 26.9 mL/min (A) (based on SCr of 2 mg/dL (H)). according to latest data. Monitor urine output and serial BMP and adjust therapy as needed. Avoid nephrotoxins and renally dose meds for GFR listed above.     Consult nephrology  -continue diuresis as tolerated   -DC Entresto due to low blood pressures      Benign essential HTN  Chronic, uncontrolled.  Latest blood pressure and vitals reviewed-   Temp:  [96.9 °F (36.1 °C)-97.9 °F (36.6 °C)]   Pulse:  []   Resp:  [16-28]   BP: ()/(55-82)   SpO2:  [95 %-100 %] .   Home meds for hypertension were reviewed and noted below.   Hypertension Medications               carvediloL (COREG) 3.125 MG tablet Take 1 tablet (3.125 mg total) by mouth 2 (two) times daily.    furosemide (LASIX) 20 MG tablet Take 1 tablet (20 mg total) by mouth 3 (three) times a week.    sacubitriL-valsartan (ENTRESTO) 24-26 mg per tablet Take 1 tablet by mouth 2 (two) times daily.    spironolactone (ALDACTONE) 25 MG tablet Take 1 tablet (25 mg total) by mouth once daily.            Holding his antihypertensives due to hypotension    Coronary artery disease  Patient with known CAD s/p stent placement and CABG, which is controlled Will hold the statin due to LFT elevation and monitor for S/Sx of angina/ACS. Continue to monitor on telemetry.  Pt no longer on aspirin or Plavix          VTE Risk Mitigation (From admission, onward)           Ordered     apixaban tablet 5 mg  2 times daily         02/06/23 2114     IP VTE HIGH RISK PATIENT  Once         02/06/23 2113     Place  sequential compression device  Until discontinued         02/06/23 2113     Place KEN hose  Until discontinued         02/06/23 2113                    Discharge Planning   MACKENZIE: 2/13/2023     Code Status: DNR   Is the patient medically ready for discharge?:     Reason for patient still in hospital (select all that apply): Patient trending condition, Treatment, Consult recommendations and Pending disposition  Discharge Plan A: Home          Advance Care Planning     Date: 02/08/2023    Code Status  In light of the patients advanced and life limiting illness,I engaged the the patient and family in a conversation about the patient's preferences for care  at the very end of life. The patient wishes to have a natural, peaceful death.  Along those lines, the patient does not wish to have CPR or other invasive treatments performed when his heart and/or breathing stops. I communicated to the patient and family that a DNR order would be placed in his medical record to reflect this preference.  I spent a total of 18 minutes engaging the patient in this advance care planning discussion.                Kaykay Siegel NP  Department of Hospital Medicine   [facility]

## 2023-02-08 NOTE — ASSESSMENT & PLAN NOTE
Chronic, uncontrolled.  Latest blood pressure and vitals reviewed-   Temp:  [96.9 °F (36.1 °C)-97.9 °F (36.6 °C)]   Pulse:  []   Resp:  [16-28]   BP: ()/(55-82)   SpO2:  [95 %-100 %] .   Home meds for hypertension were reviewed and noted below.   Hypertension Medications             carvediloL (COREG) 3.125 MG tablet Take 1 tablet (3.125 mg total) by mouth 2 (two) times daily.    furosemide (LASIX) 20 MG tablet Take 1 tablet (20 mg total) by mouth 3 (three) times a week.    sacubitriL-valsartan (ENTRESTO) 24-26 mg per tablet Take 1 tablet by mouth 2 (two) times daily.    spironolactone (ALDACTONE) 25 MG tablet Take 1 tablet (25 mg total) by mouth once daily.          Holding his antihypertensives due to hypotension

## 2023-02-08 NOTE — NURSING
Updated Hospitalist, RADHA Siegel NP, and cardiologist, Dr. Rosario, that scheduled AM dose of metoprolol and entresto held due to low blood pressure readings. Amiodarone drip IV Continuous maintained as ordered by providers. Vital signs stable. Pt denies complaints, will continue to closely monitor.

## 2023-02-08 NOTE — ASSESSMENT & PLAN NOTE
Patient with acute kidney injury likely due to pre-renal azotemia MEIR is currently stable. Labs reviewed- Renal function/electrolytes with Estimated Creatinine Clearance: 26.9 mL/min (A) (based on SCr of 2 mg/dL (H)). according to latest data. Monitor urine output and serial BMP and adjust therapy as needed. Avoid nephrotoxins and renally dose meds for GFR listed above.     Consult nephrology  -continue diuresis as tolerated   -DC Entresto due to low blood pressures

## 2023-02-08 NOTE — PROGRESS NOTES
[facility]  Department of Cardiology  Progress Note    PATIENT NAME: Cleveland Capps  MRN: [id number]  TODAY'S DATE: 02/08/2023  ADMIT DATE: 2/6/2023    SUBJECTIVE     PRINCIPLE PROBLEM: Acute on chronic combined systolic and diastolic congestive heart failure    INTERVAL HISTORY:    2/8/2023  PATIENT HAS NO COMPLAINT  His blood pressure is in the high 80s since he received Entresto yesterday  He is still on amiodarone drip at 0.5 milligrams per hour to control the heart rate atrial fibrillation with ventricular response in the low 100s  He did not receive any Lopressor because of hypotension.  Liver enzymes continue to elevate and he has IVC dilation on the echo and hepatic vein dilation on the ultrasound of the liver    Review of patient's allergies indicates:   Allergen Reactions    Penicillin      Other reaction(s): anaphylaxis  Other reaction(s): anaphylaxis    Penicillins Hives       REVIEW OF SYSTEMS  CARDIOVASCULAR: No recent chest pain, palpitations, arm, neck, or jaw pain  RESPIRATORY: No recent fever, cough chills, MILD SOB   POSITIVE PALPITATIONS  : No blood in the urine  GI: No Nausea, vomiting, constipation, diarrhea, blood, or reflux.  MUSCULOSKELETAL: No myalgias  NEURO: No lightheadedness or dizziness  EYES: No Double vision, blurry, vision or headache     OBJECTIVE     VITAL SIGNS (Most Recent)  Temp: 96.9 °F (36.1 °C) (02/08/23 1140)  Pulse: 110 (02/08/23 1000)  Resp: (!) 23 (02/08/23 1000)  BP: 93/71 (02/08/23 1000)  SpO2: 100 % (02/08/23 0920)    VENTILATION STATUS  Resp: (!) 23 (02/08/23 1000)  SpO2: 100 % (02/08/23 0920)  Oxygen Concentration (%):  [28] 28    I & O (Last 24H):  Intake/Output Summary (Last 24 hours) at 2/8/2023 1211  Last data filed at 2/8/2023 0816  Gross per 24 hour   Intake 460 ml   Output 1750 ml   Net -1290 ml       WEIGHTS  Wt Readings from Last 3 Encounters:   02/07/23 1300 54.3 kg (119 lb 11.4 oz)   02/07/23 0600 54.3 kg (119 lb 11.4 oz)   02/06/23  2217 54 kg (119 lb 0.8 oz)   02/06/23 1449 52.6 kg (116 lb)   01/09/23 1405 52.6 kg (116 lb)   12/20/22 1435 52.9 kg (116 lb 8.2 oz)       PHYSICAL EXAM  CONSTITUTIONAL: Well built, RESTING COMFORTABLY LAYING FLAT   NECK: no carotid bruit, DIFFICULT TO EVALUATE JVD BECAUSE OF BEARD  LUNGS: CTA  CHEST WALL: no tenderness  HEART:  IRREGULAR REGULAR TACHYCARDIAS  ABDOMEN: soft, non-tender; bowel sounds normal; no masses,  no organomegaly  EXTREMITIES: Extremities normal, no edema  NEURO: AAO X 3    SCHEDULED MEDS:   apixaban  5 mg Oral BID    furosemide (LASIX) injection  40 mg Intravenous Q12H    levothyroxine  25 mcg Oral Before breakfast    metoprolol tartrate  25 mg Oral BID       CONTINUOUS INFUSIONS:   amiodarone in dextrose 5% 0.5 mg/min (02/08/23 0300)    DOBUTamine IV infusion (non-titrating) 3 mcg/kg/min (02/08/23 1133)       PRN MEDS:dextrose 10%, dextrose 10%, glucagon (human recombinant), glucose, glucose, insulin aspart U-100, sodium chloride 0.9%    LABS AND DIAGNOSTICS     CBC LAST 3 DAYS  Recent Labs   Lab 02/06/23  1543 02/08/23  0450   WBC 6.98 8.00  8.00   RBC 3.75* 3.63*  3.63*   HGB 12.8* 12.5*  12.5*   HCT 39.5* 37.8*  37.8*   * 104*  104*   MCH 34.1* 34.4*  34.4*   MCHC 32.4 33.1  33.1   RDW 17.1* 16.8*  16.8*    233  233   MPV 8.9* 9.1*  9.1*   GRAN 80.8*  5.6 73.9*  73.9*  5.9  5.9   LYMPH 11.0*  0.8* 16.1*  16.1*  1.3  1.3   MONO 7.3  0.5 8.4  8.4  0.7  0.7   BASO 0.03 0.03  0.03   NRBC 0 0  0       COAGULATION LAST 3 DAYS  Recent Labs   Lab 02/08/23  0959   INR 1.5*       CHEMISTRY LAST 3 DAYS  Recent Labs   Lab 02/06/23  1543 02/07/23  0518 02/07/23  1022 02/08/23  0450   * 135*  --  131*  131*   K 4.2 4.6  --  3.7  3.7    101  --  98  98   CO2 19* 17*  --  19*  19*   ANIONGAP 9 17*  --  14  14   BUN 44* 50*  --  48*  48*   CREATININE 1.8* 2.0*  --  2.0*  2.0*   * 142*  --  100  100   CALCIUM 8.7 8.9  --  8.1*  8.1*   MG 2.3   --   --  2.2   ALBUMIN 3.4*  --   --  3.1*  3.1*   PROT 6.3  --   --  5.9*  5.9*   ALKPHOS 109  --   --  107  107   *  --  741* 924*  924*   *  --  458* 581*  581*   BILITOT 3.0*  --  3.2* 3.0*  3.0*       CARDIAC PROFILE LAST 3 DAYS  Recent Labs   Lab 02/06/23  1543 02/07/23  1022   BNP 1,518*  --    TROPONINI 0.077* 0.073*       ENDOCRINE LAST 3 DAYS  Recent Labs   Lab 02/07/23  1022   TSH 18.148*       LAST ARTERIAL BLOOD GAS  ABG  No results for input(s): PH, PO2, PCO2, HCO3, BE in the last 168 hours.    LAST 7 DAYS MICROBIOLOGY   Microbiology Results (last 7 days)       ** No results found for the last 168 hours. **            MOST RECENT IMAGING  Echo  · The left ventricle is severely enlarged with eccentric hypertrophy and   severely decreased systolic function.  · The estimated ejection fraction is 20%.  · Left ventricular diastolic dysfunction.  · There is left ventricular global hypokinesis. GLOBAL HYPOKINESIA WITH   ANTERIOR APICAL AKINESIA  · Moderate right ventricular enlargement.  · Moderate right atrial enlargement.  · Elevated central venous pressure (15 mmHg).  · LIMITED STUDY  · Right atrial enlargement.     US Abdomen Complete  Narrative: EXAMINATION:  US ABDOMEN COMPLETE    CLINICAL HISTORY:  elevated liver enzymes, acute CHF exac;    TECHNIQUE:  Complete abdominal ultrasound (including pancreas, aorta, liver, gallbladder, common bile duct, IVC, kidneys, and spleen) was performed.    COMPARISON:  None    FINDINGS:  Pancreas: The visualized portions of pancreas appear normal.    Aorta: No aneurysm.    Liver: 11.9 cm, normal in size. Homogeneous parenchymal echotexture. A cyst in the right lobe measures 9 mm.  There is enlargement of the hepatic veins and inferior vena cava which may be secondary to congestive heart failure.    Gallbladder: No calculi, wall thickening, or pericholecystic fluid.  Negative sonographic Mccabe's sign.    Biliary system: 2 mm common bile duct.  No  intrahepatic ductal dilatation.    Inferior vena cava: Normal in appearance.    Right kidney: 9.8 cm. No hydronephrosis.    Left kidney: 8.4 cm. No hydronephrosis.    Spleen: 7.7 cm.  Normal in size with homogeneous echotexture.    Miscellaneous: No ascites.  Impression: Enlargement of the inferior vena cava and hepatic veins may be due to congestive heart failure otherwise negative abdomen ultrasound    Electronically signed by: Huey Escalante MD  Date:    02/07/2023  Time:    14:42      [facility]  Results for orders placed during the hospital encounter of 02/06/23    Echo    Interpretation Summary  · The left ventricle is severely enlarged with eccentric hypertrophy and severely decreased systolic function.  · The estimated ejection fraction is 20%.  · Left ventricular diastolic dysfunction.  · There is left ventricular global hypokinesis. GLOBAL HYPOKINESIA WITH ANTERIOR APICAL AKINESIA  · Moderate right ventricular enlargement.  · Moderate right atrial enlargement.  · Elevated central venous pressure (15 mmHg).  · LIMITED STUDY  · Right atrial enlargement.      CURRENT/PREVIOUS VISIT EKG  Results for orders placed or performed during the hospital encounter of 02/06/23   EKG 12-lead    Collection Time: 02/06/23  2:58 PM    Narrative    Test Reason : I48.91,    Vent. Rate : 127 BPM     Atrial Rate : 375 BPM     P-R Int : 000 ms          QRS Dur : 082 ms      QT Int : 318 ms       P-R-T Axes : 000 075 213 degrees     QTc Int : 462 ms    Atrial flutter with variable A-V block with premature ventricular or  aberrantly conducted complexes  Anteroseptal infarct  T wave abnormality, consider inferior ischemia  Abnormal ECG    Referred By: [email or web address]   SELF           Confirmed By:        ASSESSMENT/PLAN:     Active Hospital Problems    Diagnosis    *Acute on chronic combined systolic and diastolic congestive heart failure    Longstanding persistent atrial fibrillation    AICD (automatic cardioverter/defibrillator)  present    Elevated LFTs    Hypothyroid    Stroke    MEIR (acute kidney injury)     Atn; requiring dialysis      Benign essential HTN       ASSESSMENT & PLAN:   PATIENT REPORTS ATRIAL FIBRILLATION RAPID RESPONSE WITH THE LAST COUPLE OF DAYS WHICH SHE ATTRIBUTES TO STOPPING THE CARVEDILOL ABOUT 2 WEEKS AGO.  HE HAS A HISTORY OF PERSISTENT ATRIAL FIBRILLATION AFTER FAILED CARDIOVERSION.  HEART FAILURE DECREASED EJECTION FRACTION EF 20 PERCENT  PASSIVE CONGESTION OF THE LIVER  MITRAL REGURGITATION    RECOMMENDATIONS:  TRIAL OF LOW-DOSE DOBUTREX TO SUPPORT THE BLOOD PRESSURE IF IT DOES NOT CAUSE TACHYCARDIA  CONTINUE LOW-DOSE AMIODARONE INFUSION TO CONTROL HEART RATE    Patient would be best served by going to the [facility] where he has multiple specialists including EP for possible ablation.  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    He has evidence of esophageal stricture which may need to be evaluated before SANTHOSH cardioversion can be attempted again or mitral valve clipping.            Tre Rosario MD  [facility]  Department of Cardiology  Date of Service: 02/08/2023  12:11 PM

## 2023-02-09 PROBLEM — I95.9 HYPOTENSION: Status: ACTIVE | Noted: 2023-02-09

## 2023-02-09 LAB
ALBUMIN SERPL BCP-MCNC: 3.6 G/DL (ref 3.5–5.2)
ALP SERPL-CCNC: 93 U/L (ref 55–135)
ALT SERPL W/O P-5'-P-CCNC: 687 U/L (ref 10–44)
ANION GAP SERPL CALC-SCNC: 14 MMOL/L (ref 8–16)
AST SERPL-CCNC: 308 U/L (ref 10–40)
BASOPHILS # BLD AUTO: 0.03 K/UL (ref 0–0.2)
BASOPHILS NFR BLD: 0.4 % (ref 0–1.9)
BILIRUB SERPL-MCNC: 3.3 MG/DL (ref 0.1–1)
BUN SERPL-MCNC: 39 MG/DL (ref 8–23)
CALCIUM SERPL-MCNC: 7.8 MG/DL (ref 8.7–10.5)
CHLORIDE SERPL-SCNC: 97 MMOL/L (ref 95–110)
CO2 SERPL-SCNC: 23 MMOL/L (ref 23–29)
CREAT SERPL-MCNC: 1.7 MG/DL (ref 0.5–1.4)
DIFFERENTIAL METHOD: ABNORMAL
EOSINOPHIL # BLD AUTO: 0.1 K/UL (ref 0–0.5)
EOSINOPHIL NFR BLD: 1.5 % (ref 0–8)
ERYTHROCYTE [DISTWIDTH] IN BLOOD BY AUTOMATED COUNT: 16.6 % (ref 11.5–14.5)
EST. GFR  (NO RACE VARIABLE): 44 ML/MIN/1.73 M^2
GLUCOSE SERPL-MCNC: 91 MG/DL (ref 70–110)
HCT VFR BLD AUTO: 36 % (ref 40–54)
HGB BLD-MCNC: 11.7 G/DL (ref 14–18)
IMM GRANULOCYTES # BLD AUTO: 0.02 K/UL (ref 0–0.04)
IMM GRANULOCYTES NFR BLD AUTO: 0.3 % (ref 0–0.5)
LYMPHOCYTES # BLD AUTO: 0.8 K/UL (ref 1–4.8)
LYMPHOCYTES NFR BLD: 10.4 % (ref 18–48)
MAGNESIUM SERPL-MCNC: 1.9 MG/DL (ref 1.6–2.6)
MCH RBC QN AUTO: 34.1 PG (ref 27–31)
MCHC RBC AUTO-ENTMCNC: 32.5 G/DL (ref 32–36)
MCV RBC AUTO: 105 FL (ref 82–98)
MONOCYTES # BLD AUTO: 0.5 K/UL (ref 0.3–1)
MONOCYTES NFR BLD: 7.2 % (ref 4–15)
NEUTROPHILS # BLD AUTO: 6 K/UL (ref 1.8–7.7)
NEUTROPHILS NFR BLD: 80.2 % (ref 38–73)
NRBC BLD-RTO: 0 /100 WBC
PLATELET # BLD AUTO: 158 K/UL (ref 150–450)
PMV BLD AUTO: 8.9 FL (ref 9.2–12.9)
POCT GLUCOSE: 101 MG/DL (ref 70–110)
POCT GLUCOSE: 117 MG/DL (ref 70–110)
POCT GLUCOSE: 128 MG/DL (ref 70–110)
POTASSIUM SERPL-SCNC: 3.2 MMOL/L (ref 3.5–5.1)
PROT SERPL-MCNC: 6.1 G/DL (ref 6–8.4)
RBC # BLD AUTO: 3.43 M/UL (ref 4.6–6.2)
SODIUM SERPL-SCNC: 134 MMOL/L (ref 136–145)
WBC # BLD AUTO: 7.47 K/UL (ref 3.9–12.7)

## 2023-02-09 PROCEDURE — 97530 THERAPEUTIC ACTIVITIES: CPT

## 2023-02-09 PROCEDURE — 25000003 PHARM REV CODE 250: Performed by: NURSE PRACTITIONER

## 2023-02-09 PROCEDURE — 99233 PR SUBSEQUENT HOSPITAL CARE,LEVL III: ICD-10-PCS | Mod: ,,, | Performed by: GENERAL PRACTICE

## 2023-02-09 PROCEDURE — 25000003 PHARM REV CODE 250: Performed by: GENERAL PRACTICE

## 2023-02-09 PROCEDURE — 36415 COLL VENOUS BLD VENIPUNCTURE: CPT | Performed by: NURSE PRACTITIONER

## 2023-02-09 PROCEDURE — 97161 PT EVAL LOW COMPLEX 20 MIN: CPT

## 2023-02-09 PROCEDURE — 99233 SBSQ HOSP IP/OBS HIGH 50: CPT | Mod: ,,, | Performed by: GENERAL PRACTICE

## 2023-02-09 PROCEDURE — 97165 OT EVAL LOW COMPLEX 30 MIN: CPT

## 2023-02-09 PROCEDURE — 85025 COMPLETE CBC W/AUTO DIFF WBC: CPT | Performed by: NURSE PRACTITIONER

## 2023-02-09 PROCEDURE — 83735 ASSAY OF MAGNESIUM: CPT | Performed by: NURSE PRACTITIONER

## 2023-02-09 PROCEDURE — P9047 ALBUMIN (HUMAN), 25%, 50ML: HCPCS | Performed by: INTERNAL MEDICINE

## 2023-02-09 PROCEDURE — 94761 N-INVAS EAR/PLS OXIMETRY MLT: CPT

## 2023-02-09 PROCEDURE — 20600001 HC STEP DOWN PRIVATE ROOM

## 2023-02-09 PROCEDURE — 36569 INSJ PICC 5 YR+ W/O IMAGING: CPT

## 2023-02-09 PROCEDURE — C1751 CATH, INF, PER/CENT/MIDLINE: HCPCS

## 2023-02-09 PROCEDURE — 63600175 PHARM REV CODE 636 W HCPCS: Performed by: NURSE PRACTITIONER

## 2023-02-09 PROCEDURE — 25000003 PHARM REV CODE 250: Performed by: STUDENT IN AN ORGANIZED HEALTH CARE EDUCATION/TRAINING PROGRAM

## 2023-02-09 PROCEDURE — 27000221 HC OXYGEN, UP TO 24 HOURS

## 2023-02-09 PROCEDURE — 63600175 PHARM REV CODE 636 W HCPCS: Performed by: INTERNAL MEDICINE

## 2023-02-09 PROCEDURE — 80053 COMPREHEN METABOLIC PANEL: CPT | Performed by: NURSE PRACTITIONER

## 2023-02-09 RX ORDER — SODIUM CHLORIDE 0.9 % (FLUSH) 0.9 %
10 SYRINGE (ML) INJECTION EVERY 6 HOURS
Status: DISCONTINUED | OUTPATIENT
Start: 2023-02-09 | End: 2023-02-14 | Stop reason: HOSPADM

## 2023-02-09 RX ORDER — SODIUM CHLORIDE 0.9 % (FLUSH) 0.9 %
10 SYRINGE (ML) INJECTION
Status: DISCONTINUED | OUTPATIENT
Start: 2023-02-09 | End: 2023-02-14 | Stop reason: HOSPADM

## 2023-02-09 RX ORDER — AMIODARONE HYDROCHLORIDE 200 MG/1
400 TABLET ORAL DAILY
Status: DISCONTINUED | OUTPATIENT
Start: 2023-02-09 | End: 2023-02-14 | Stop reason: HOSPADM

## 2023-02-09 RX ORDER — MUPIROCIN 20 MG/G
OINTMENT TOPICAL 2 TIMES DAILY
Status: COMPLETED | OUTPATIENT
Start: 2023-02-09 | End: 2023-02-14

## 2023-02-09 RX ORDER — POTASSIUM CHLORIDE 20 MEQ/1
40 TABLET, EXTENDED RELEASE ORAL ONCE
Status: COMPLETED | OUTPATIENT
Start: 2023-02-09 | End: 2023-02-09

## 2023-02-09 RX ADMIN — POTASSIUM CHLORIDE 40 MEQ: 1500 TABLET, EXTENDED RELEASE ORAL at 08:02

## 2023-02-09 RX ADMIN — AMIODARONE HYDROCHLORIDE 400 MG: 200 TABLET ORAL at 10:02

## 2023-02-09 RX ADMIN — MUPIROCIN: 20 OINTMENT TOPICAL at 09:02

## 2023-02-09 RX ADMIN — LEVOTHYROXINE SODIUM 50 MCG: 50 TABLET ORAL at 05:02

## 2023-02-09 RX ADMIN — ALBUMIN (HUMAN) 25 G: 12.5 SOLUTION INTRAVENOUS at 08:02

## 2023-02-09 RX ADMIN — APIXABAN 5 MG: 2.5 TABLET, FILM COATED ORAL at 08:02

## 2023-02-09 RX ADMIN — AMIODARONE HYDROCHLORIDE 0.5 MG/MIN: 1.8 INJECTION, SOLUTION INTRAVENOUS at 03:02

## 2023-02-09 RX ADMIN — APIXABAN 5 MG: 2.5 TABLET, FILM COATED ORAL at 09:02

## 2023-02-09 RX ADMIN — METOPROLOL TARTRATE 25 MG: 25 TABLET, FILM COATED ORAL at 08:02

## 2023-02-09 NOTE — EICU
Intervention Initiated From:  [facility] / EICU    Beatriz intervened regarding:  Rounding (Video assessment)    Comments: video rounds completed.  Pt aao, conversant.  VS:  69, 95/58, 24, 97% nasal cannula, dobutamine

## 2023-02-09 NOTE — PROGRESS NOTES
formerly [facility]  Department of Cardiology  Progress Note    PATIENT NAME: Cleveland Capps  MRN: [id number]  TODAY'S DATE: 02/09/2023  ADMIT DATE: 2/6/2023    SUBJECTIVE     PRINCIPLE PROBLEM: Acute on chronic combined systolic and diastolic congestive heart failure    INTERVAL HISTORY:    2/9/2023  Patient was doing well however got very hypotensive after he is seen metoprolol 25 mg.  He is in sinus rhythm  He is on Dobutrex at 3 mics  Renal function is improving    Review of patient's allergies indicates:   Allergen Reactions    Penicillin      Other reaction(s): anaphylaxis  Other reaction(s): anaphylaxis    Penicillins Hives       REVIEW OF SYSTEMS  CARDIOVASCULAR: No recent chest pain, palpitations, arm, neck, or jaw pain  RESPIRATORY: No recent fever, cough chills, SOB or congestion  : No blood in the urine  GI: No Nausea, vomiting, constipation, diarrhea, blood, or reflux.  MUSCULOSKELETAL: No myalgias  NEURO: No lightheadedness or dizziness  EYES: No Double vision, blurry, vision or headache     OBJECTIVE     VITAL SIGNS (Most Recent)  Temp: 97.5 °F (36.4 °C) (02/09/23 0751)  Pulse: 72 (02/09/23 0751)  Resp: (!) 23 (02/09/23 0751)  BP: (!) 95/58 (02/09/23 0700)  SpO2: 100 % (02/09/23 0751)    VENTILATION STATUS  Resp: (!) 23 (02/09/23 0751)  SpO2: 100 % (02/09/23 0751)       I & O (Last 24H):  Intake/Output Summary (Last 24 hours) at 2/9/2023 0939  Last data filed at 2/9/2023 0637  Gross per 24 hour   Intake 1327.22 ml   Output 3000 ml   Net -1672.78 ml       WEIGHTS  Wt Readings from Last 3 Encounters:   02/09/23 0540 51.7 kg (113 lb 15.7 oz)   02/08/23 1402 53.5 kg (117 lb 15.1 oz)   02/07/23 1300 54.3 kg (119 lb 11.4 oz)   02/07/23 0600 54.3 kg (119 lb 11.4 oz)   02/06/23 2217 54 kg (119 lb 0.8 oz)   02/06/23 1449 52.6 kg (116 lb)   01/09/23 1405 52.6 kg (116 lb)   12/20/22 1435 52.9 kg (116 lb 8.2 oz)       PHYSICAL EXAM  CONSTITUTIONAL: Well built, well nourished in no apparent  distress  NECK: no carotid bruit, no JVD  LUNGS: CTA  CHEST WALL: no tenderness  HEART: regular rate and rhythm, S1, S2 normal, no murmur, click, rub or gallop   ABDOMEN: soft, non-tender; bowel sounds normal; no masses,  no organomegaly  EXTREMITIES: Extremities normal, no edema  NEURO: AAO X 3    SCHEDULED MEDS:   albumin human 25%  25 g Intravenous BID    amiodarone  400 mg Oral Daily    apixaban  5 mg Oral BID    furosemide (LASIX) injection  40 mg Intravenous Q12H    levothyroxine  50 mcg Oral Before breakfast    metoprolol tartrate  25 mg Oral BID       CONTINUOUS INFUSIONS:   DOBUTamine IV infusion (non-titrating) 3 mcg/kg/min (02/09/23 0543)       PRN MEDS:dextrose 10%, dextrose 10%, glucagon (human recombinant), glucose, glucose, insulin aspart U-100, sodium chloride 0.9%    LABS AND DIAGNOSTICS     CBC LAST 3 DAYS  Recent Labs   Lab 02/06/23  1543 02/08/23  0450 02/09/23  0500   WBC 6.98 8.00  8.00 7.47   RBC 3.75* 3.63*  3.63* 3.43*   HGB 12.8* 12.5*  12.5* 11.7*   HCT 39.5* 37.8*  37.8* 36.0*   * 104*  104* 105*   MCH 34.1* 34.4*  34.4* 34.1*   MCHC 32.4 33.1  33.1 32.5   RDW 17.1* 16.8*  16.8* 16.6*    233  233 158   MPV 8.9* 9.1*  9.1* 8.9*   GRAN 80.8*  5.6 73.9*  73.9*  5.9  5.9 80.2*  6.0   LYMPH 11.0*  0.8* 16.1*  16.1*  1.3  1.3 10.4*  0.8*   MONO 7.3  0.5 8.4  8.4  0.7  0.7 7.2  0.5   BASO 0.03 0.03  0.03 0.03   NRBC 0 0  0 0       COAGULATION LAST 3 DAYS  Recent Labs   Lab 02/08/23  0959   INR 1.5*       CHEMISTRY LAST 3 DAYS  Recent Labs   Lab 02/06/23  1543 02/07/23  0518 02/07/23  1022 02/08/23  0450 02/09/23  0500   * 135*  --  131*  131* 134*   K 4.2 4.6  --  3.7  3.7 3.2*    101  --  98  98 97   CO2 19* 17*  --  19*  19* 23   ANIONGAP 9 17*  --  14  14 14   BUN 44* 50*  --  48*  48* 39*   CREATININE 1.8* 2.0*  --  2.0*  2.0* 1.7*   * 142*  --  100  100 91   CALCIUM 8.7 8.9  --  8.1*  8.1* 7.8*   MG 2.3  --   --  2.2 1.9    ALBUMIN 3.4*  --   --  3.1*  3.1* 3.6   PROT 6.3  --   --  5.9*  5.9* 6.1   ALKPHOS 109  --   --  107  107 93   *  --  741* 924*  924* 687*   *  --  458* 581*  581* 308*   BILITOT 3.0*  --  3.2* 3.0*  3.0* 3.3*       CARDIAC PROFILE LAST 3 DAYS  Recent Labs   Lab 02/06/23  1543 02/07/23  1022   BNP 1,518*  --    TROPONINI 0.077* 0.073*       ENDOCRINE LAST 3 DAYS  Recent Labs   Lab 02/07/23  1022   TSH 18.148*       LAST ARTERIAL BLOOD GAS  ABG  No results for input(s): PH, PO2, PCO2, HCO3, BE in the last 168 hours.    LAST 7 DAYS MICROBIOLOGY   Microbiology Results (last 7 days)       ** No results found for the last 168 hours. **            MOST RECENT IMAGING  Echo  · The left ventricle is severely enlarged with eccentric hypertrophy and   severely decreased systolic function.  · The estimated ejection fraction is 20%.  · Left ventricular diastolic dysfunction.  · There is left ventricular global hypokinesis. GLOBAL HYPOKINESIA WITH   ANTERIOR APICAL AKINESIA  · Moderate right ventricular enlargement.  · Moderate right atrial enlargement.  · Elevated central venous pressure (15 mmHg).  · LIMITED STUDY  · Right atrial enlargement.     US Abdomen Complete  Narrative: EXAMINATION:  US ABDOMEN COMPLETE    CLINICAL HISTORY:  elevated liver enzymes, acute CHF exac;    TECHNIQUE:  Complete abdominal ultrasound (including pancreas, aorta, liver, gallbladder, common bile duct, IVC, kidneys, and spleen) was performed.    COMPARISON:  None    FINDINGS:  Pancreas: The visualized portions of pancreas appear normal.    Aorta: No aneurysm.    Liver: 11.9 cm, normal in size. Homogeneous parenchymal echotexture. A cyst in the right lobe measures 9 mm.  There is enlargement of the hepatic veins and inferior vena cava which may be secondary to congestive heart failure.    Gallbladder: No calculi, wall thickening, or pericholecystic fluid.  Negative sonographic Mccabe's sign.    Biliary system: 2 mm common bile  duct.  No intrahepatic ductal dilatation.    Inferior vena cava: Normal in appearance.    Right kidney: 9.8 cm. No hydronephrosis.    Left kidney: 8.4 cm. No hydronephrosis.    Spleen: 7.7 cm.  Normal in size with homogeneous echotexture.    Miscellaneous: No ascites.  Impression: Enlargement of the inferior vena cava and hepatic veins may be due to congestive heart failure otherwise negative abdomen ultrasound    Electronically signed by: Huey Escalante MD  Date:    02/07/2023  Time:    14:42      [facility]  Results for orders placed during the hospital encounter of 02/06/23    Echo    Interpretation Summary  · The left ventricle is severely enlarged with eccentric hypertrophy and severely decreased systolic function.  · The estimated ejection fraction is 20%.  · Left ventricular diastolic dysfunction.  · There is left ventricular global hypokinesis. GLOBAL HYPOKINESIA WITH ANTERIOR APICAL AKINESIA  · Moderate right ventricular enlargement.  · Moderate right atrial enlargement.  · Elevated central venous pressure (15 mmHg).  · LIMITED STUDY  · Right atrial enlargement.      CURRENT/PREVIOUS VISIT EKG  Results for orders placed or performed during the hospital encounter of 02/06/23   EKG 12-lead    Collection Time: 02/06/23  2:58 PM    Narrative    Test Reason : I48.91,    Vent. Rate : 127 BPM     Atrial Rate : 375 BPM     P-R Int : 000 ms          QRS Dur : 082 ms      QT Int : 318 ms       P-R-T Axes : 000 075 213 degrees     QTc Int : 462 ms    Atrial flutter with variable A-V block with premature ventricular or  aberrantly conducted complexes  Anteroseptal infarct  T wave abnormality, consider inferior ischemia  Abnormal ECG    Referred By: [email or web address]   SELF           Confirmed By:        ASSESSMENT/PLAN:     Active Hospital Problems    Diagnosis    *Acute on chronic combined systolic and diastolic congestive heart failure    Longstanding persistent atrial fibrillation    AICD (automatic  cardioverter/defibrillator) present    Elevated LFTs    Hypothyroid    History of CVA (cerebrovascular accident)    MEIR (acute kidney injury)     Atn; requiring dialysis      Benign essential HTN    Coronary artery disease     S/p Cabg 4/26 Dr Bledsoe         ASSESSMENT & PLAN:   Hypotension secondary to metoprolol  Atrial fibrillation currently sinus rhythm  CKD  Heart failure decreased ejection fraction      RECOMMENDATIONS:  Patient refuses to be transferred to the higher level of care  Will increase Dobutrex to support the blood pressure  Hold metoprolol and carvedilol and Entresto  Fluids per Nephrology        Tre Rosario MD  [facility]  Department of Cardiology  Date of Service: 02/09/2023  9:39 AM

## 2023-02-09 NOTE — PLAN OF CARE
Problem: Adult Inpatient Plan of Care  Goal: Plan of Care Review  Outcome: Ongoing, Progressing  Goal: Patient-Specific Goal (Individualized)  Outcome: Ongoing, Progressing  Goal: Absence of Hospital-Acquired Illness or Injury  Outcome: Ongoing, Progressing  Goal: Optimal Comfort and Wellbeing  Outcome: Ongoing, Progressing  Goal: Readiness for Transition of Care  Outcome: Ongoing, Progressing     Problem: Skin Injury Risk Increased  Goal: Skin Health and Integrity  Outcome: Ongoing, Progressing     Problem: Dysrhythmia  Goal: Normalized Cardiac Rhythm  Outcome: Ongoing, Progressing     Problem: Fluid and Electrolyte Imbalance (Acute Kidney Injury/Impairment)  Goal: Fluid and Electrolyte Balance  Outcome: Ongoing, Progressing     Problem: Oral Intake Inadequate (Acute Kidney Injury/Impairment)  Goal: Optimal Nutrition Intake  Outcome: Ongoing, Progressing     Problem: Renal Function Impairment (Acute Kidney Injury/Impairment)  Goal: Effective Renal Function  Outcome: Ongoing, Progressing     Problem: Fall Injury Risk  Goal: Absence of Fall and Fall-Related Injury  Outcome: Ongoing, Progressing     Problem: Infection  Goal: Absence of Infection Signs and Symptoms  Outcome: Ongoing, Progressing

## 2023-02-09 NOTE — ASSESSMENT & PLAN NOTE
Chronic, uncontrolled due to hypotension.  Latest blood pressure and vitals reviewed-   Temp:  [96.3 °F (35.7 °C)-97.5 °F (36.4 °C)]   Pulse:  []   Resp:  [14-33]   BP: ()/(52-67)   SpO2:  [95 %-100 %] .   Home meds for hypertension were reviewed and noted below.   Hypertension Medications             carvediloL (COREG) 3.125 MG tablet Take 1 tablet (3.125 mg total) by mouth 2 (two) times daily.    furosemide (LASIX) 20 MG tablet Take 1 tablet (20 mg total) by mouth 3 (three) times a week.    sacubitriL-valsartan (ENTRESTO) 24-26 mg per tablet Take 1 tablet by mouth 2 (two) times daily.    spironolactone (ALDACTONE) 25 MG tablet Take 1 tablet (25 mg total) by mouth once daily.          Holding his antihypertensives due to hypotension

## 2023-02-09 NOTE — PLAN OF CARE
Goals to be met by: 3/9/23     Patient will increase functional independence with ADLs by performing:    UE Dressing with Modified [place].  LE Dressing with Modified [place].  Grooming while seated with Modified [place].  Toileting from raised toilet with Modified [place] for hygiene and clothing management.   Bathing from  shower chair/bench with Stand-by Assistance.  Toilet transfer to raised toilet with Modified [place].  Increased functional strength to WFL for ADL's/IADL's.

## 2023-02-09 NOTE — ASSESSMENT & PLAN NOTE
Patient with Persistent (7 days or more) atrial fibrillation which is uncontrolled currently with Amiodarone. Patient is currently in atrial fibrillation.[id number] Score: 2. Anticoagulation indicated. Anticoagulation done with eliquis.    -converted to SA overnight: discussed with cardiology and will transition to oral.  -cardiology following   -Monitor closely on telemetry  -watch for uncontrolled tachycardia/tachyarrhythmia as while on dobutamine

## 2023-02-09 NOTE — PT/OT/SLP EVAL
Physical Therapy Evaluation    Patient Name:  Cleveland Capps   MRN:  [id number]    Recommendations:     Discharge Recommendations: home health PT   Discharge Equipment Recommendations: none   Barriers to discharge: None    Assessment:     Cleveland Capps is a 67 y.o. 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  He presents with the following impairments/functional limitations: weakness, impaired endurance, impaired functional mobility, impaired self care skills, gait instability, impaired balance, decreased lower extremity function, decreased safety awareness, pain, impaired cardiopulmonary response to activity. Patient presented supine and agreeable to PT eval this morning. Patient transferred supine to sit SBA, sit to stand CGA and RW, and ambulated 150' with RW CGA, 2L/min O2, and 1 standing rest break. Upon returning to room from gait, BP taken in sitting 84/63 mmHg. Nursing notified. Patient returned to supine and able to scoot himself up with mod I and use of bed rails.     Rehab Prognosis: Fair; patient would benefit from acute skilled PT services to address these deficits and reach maximum level of function.    Recent Surgery: * No surgery found *      Plan:     During this hospitalization, patient to be seen 6 x/week to address the identified rehab impairments via therapeutic activities, gait training, therapeutic exercises and progress toward the following goals:    Plan of Care Expires:  03/09/23    Subjective     Chief Complaint: RUDOLPH  Patient/Family Comments/goals: Go home  Pain/Comfort:  Pain Rating 1: 0/10 (No pain reported pre and/or post mobility)  Pain Rating Post-Intervention 1: 0/10    Patients cultural, spiritual, Syn[employer] conflicts given the current situation:      Living Environment:  Currently, patient lives alone in W/C accessible home. Patient reports primarily using a rollator for mobility. Also reports his  brother lives next door and able to assist with transition home.   Prior to admission, patients level of function was independent.  Equipment used at home: walker, rolling, cane, straight, grab bar, bath bench, shower chair, rollator.  DME owned (not currently used): none.  Upon discharge, patient will have assistance from family.    Objective:     Communicated with nurse prior to session.  Patient found supine with bed alarm, blood pressure cuff, oxygen, peripheral IV, pulse ox (continuous), telemetry  upon PT entry to room.    General Precautions: Standard, fall  Orthopedic Precautions:N/A   Braces: N/A  Respiratory Status: Nasal cannula, flow 2 L/min    Exams:  RLE ROM: WFL  RLE Strength: Deficits: overall 3+/5  LLE ROM: WFL  LLE Strength: Deficits: overall 3+/5    Functional Mobility:  Bed Mobility:     Scooting: modified independence and use of B bed rails with HOB flat  Supine to Sit: stand by assistance  Sit to Supine: stand by assistance  Transfers:     Sit to Stand:  contact guard assistance with rolling walker  Gait: 150' with CGA, RW 2L/min O2, and 1 standing rest break. Decreased gait speed, RUDOLPH, and impaired activity tolerance.       AM-PAC 6 CLICK MOBILITY  Total Score:20       Treatment & Education:  Patient education on importance of safety with out of bed mobility and transfers to ensure safety with functional mobility.     Patient left supine with all lines intact, call button in reach, bed alarm on, and nurse notified.    GOALS:   Multidisciplinary Problems       Physical Therapy Goals          Problem: Physical Therapy    Goal Priority Disciplines Outcome Goal Variances Interventions   Physical Therapy Goal     PT, PT/OT Ongoing, Progressing     Description: Goals to be met by: 2023     Patient will increase functional independence with mobility by performin. Supine to sit with Modified [place]  2. Sit to supine with Modified [place]  3. Sit to stand transfer with Modified  [place]  4. Bed to chair transfer with Supervision using Rolling Walker  5. Gait  x 250 feet with Supervision using Rolling Walker.                          History:     Past Medical History:   Diagnosis Date    A-fib     Acute kidney injury     COPD (chronic obstructive pulmonary disease)     Coronary artery disease     Diabetes mellitus     Encounter for blood transfusion     Former smoker     Hypertension     Myocardial infarction     Thyroid disease     Type 2 diabetes mellitus without complications        Past Surgical History:   Procedure Laterality Date    CLOSURE OF LEFT ATRIAL APPENDAGE USING DEVICE N/A 5/17/2022    Procedure: Left atrial appendage closure device;  Surgeon: Tre Schmidt MD;  Location: [facility] CATH LAB;  Service: Cardiology;  Laterality: N/A;    COLONOSCOPY N/A 1/3/2017    Procedure: COLONOSCOPY;  Surgeon: Marcus Green MD;  Location: [facility] ENDO;  Service: Endoscopy;  Laterality: N/A;    CORONARY BYPASS GRAFT ANGIOGRAPHY  3/30/2021    Procedure: Bypass graft study;  Surgeon: Tre Schmidt MD;  Location: [facility] CATH LAB;  Service: Cardiology;;    CORONARY STENT PLACEMENT      GASTROSTOMY TUBE PLACEMENT      INSERTION OF PACEMAKER  04/2017    INTRAVASCULAR ULTRASOUND, NON-CORONARY  5/17/2022    Procedure: Intravascular Ultrasound, Non-Coronary;  Surgeon: Tre Schmidt MD;  Location: [facility] CATH LAB;  Service: Cardiology;;    LEFT HEART CATHETERIZATION N/A 3/30/2021    Procedure: Left heart cath;  Surgeon: Tre Schmidt MD;  Location: [facility] CATH LAB;  Service: Cardiology;  Laterality: N/A;    PEG TUBE REMOVAL      TRACHEOSTOMY CLOSURE      TRACHEOSTOMY TUBE PLACEMENT      TRANSESOPHAGEAL ECHOCARDIOGRAPHY N/A 5/17/2022    Procedure: ECHOCARDIOGRAM, TRANSESOPHAGEAL;  Surgeon: [facility] Diagnostic Provider;  Location: [facility] CATH LAB;  Service: Anesthesiology;  Laterality: N/A;    TREATMENT OF CARDIAC ARRHYTHMIA N/A 11/25/2022    Procedure: Cardioversion or Defibrillation;  Surgeon:  Kuldeep Daniels MD;  Location: [facility] CATH LAB;  Service: Cardiology;  Laterality: N/A;    UPPER GASTROINTESTINAL ENDOSCOPY  05/2016    Dr. Zarco with PEG tube placement       Time Tracking:     PT Received On: 02/09/23  PT Start Time: 0904     PT Stop Time: 0925  PT Total Time (min): 21 min     Billable Minutes: Evaluation 21 02/09/2023

## 2023-02-09 NOTE — PT/OT/SLP EVAL
Occupational Therapy   Evaluation    Name: Cleveland Capps  MRN: [id number]  Admitting Diagnosis: Acute on chronic combined systolic and diastolic congestive heart failure  Recent Surgery: * No surgery found *      Recommendations:     Discharge Recommendations: home health OT  Discharge Equipment Recommendations:  none  Barriers to discharge:       Assessment:     Cleveland Capps is a 67 y.o. male with a medical diagnosis of Acute on chronic combined systolic and diastolic congestive heart failure.  He presents with the following performance deficits affecting function: weakness, impaired endurance, impaired self care skills, impaired functional mobility, gait instability, impaired balance, impaired cardiopulmonary response to activity.  Pt was agreeable to OT. Brother present. Pt demonstrates BUE AROM/strength WFL's. OT provided instruction in home safety with ADL's/IADL's including review of home set up and DME/AE. Pt/brother verbalized understanding. Pt with low BP at time of OT Eval, so OOB activity was held. Pt as able to ambulate with PT earlier with CGA. Recommend  OT at discharge.    Rehab Prognosis: Fair; patient would benefit from acute skilled OT services to address these deficits and reach maximum level of function.       Plan:     Patient to be seen 5 x/week to address the above listed problems via self-care/home management, therapeutic activities, therapeutic exercises  Plan of Care Expires: 03/09/23  Plan of Care Reviewed with: patient    Subjective     Chief Complaint: weakness  Patient/Family Comments/goals: To go home    Occupational Profile:  Living Environment: Pt lives alone in [facility] with [address] and bilateral handrails. Brother next door.  Previous level of function: Modified Independent. Ambulates with rollator and perfoms ADL's. Bother assists with IADL's.  Equipment Used at Home: walker, rolling, cane, straight, grab bar, bath bench, shower chair, rollator,  raised toilet  Assistance upon Discharge: Brother    Pain/Comfort:  Pain Rating 1: 0/10  Pain Rating Post-Intervention 1: 0/10    Patients cultural, spiritual, Taoism conflicts given the current situation:      Objective:     Communicated with: Nurse Lucien/Jenise prior to session.  Patient found HOB elevated with bed alarm, blood pressure cuff, oxygen, peripheral IV, pulse ox (continuous), telemetry upon OT entry to room.    General Precautions: Standard, fall  Orthopedic Precautions: N/A  Braces: N/A  Respiratory Status: Nasal cannula, flow 2 L/min    Occupational Performance:      Activities of Daily Living:  Feeding:  stand by assistance set up  Grooming: stand by assistance set up with HOB elevated  Bathing: moderate assistance    Upper Body Dressing: stand by assistance    Lower Body Dressing: moderate assistance    Toileting: moderate assistance      Cognitive/Visual Perceptual:  Pt alert and oriented.   Pt reports hearing WFL's and wears glasses.  Physical Exam:  Upper Extremity Strength:    -       Right Upper Extremity: WFL  -       Left Upper Extremity: WFL    AMPAC 6 Click ADL:  AMPAC Total Score: 19    Treatment & Education:  OT provided education in role of OT. Pt/brother verbalized understanding and pt was agreeable to OT.  OT provided instruction in home safety with ADL's/IADL's including review of home set up and DME/AE. Pt/brother verbalized understanding.  OT provided education in calling for assist. Pt verbalized understanding.    Patient left HOB elevated with all lines intact, call button in reach, bed alarm on, and Jenise notified    GOALS:   Multidisciplinary Problems       Occupational Therapy Goals          Problem: Occupational Therapy    Goal Priority Disciplines Outcome Interventions   Occupational Therapy Goal     OT, PT/OT     Description: Goals to be met by: 3/9/23     Patient will increase functional independence with ADLs by performing:    UE Dressing with Modified  [place].  LE Dressing with Modified [place].  Grooming while seated with Modified [place].  Toileting from raised toilet with Modified [place] for hygiene and clothing management.   Bathing from  shower chair/bench with Stand-by Assistance.  Toilet transfer to raised toilet with Modified [place].  Increased functional strength to WFL for ADL's/IADL's.                         History:     Past Medical History:   Diagnosis Date    A-fib     Acute kidney injury     COPD (chronic obstructive pulmonary disease)     Coronary artery disease     Diabetes mellitus     Encounter for blood transfusion     Former smoker     Hypertension     Myocardial infarction     Thyroid disease     Type 2 diabetes mellitus without complications          Past Surgical History:   Procedure Laterality Date    CLOSURE OF LEFT ATRIAL APPENDAGE USING DEVICE N/A 5/17/2022    Procedure: Left atrial appendage closure device;  Surgeon: Tre Schmidt MD;  Location: [facility] CATH LAB;  Service: Cardiology;  Laterality: N/A;    COLONOSCOPY N/A 1/3/2017    Procedure: COLONOSCOPY;  Surgeon: Marcus Green MD;  Location: [facility] ENDO;  Service: Endoscopy;  Laterality: N/A;    CORONARY BYPASS GRAFT ANGIOGRAPHY  3/30/2021    Procedure: Bypass graft study;  Surgeon: Tre Schmidt MD;  Location: [facility] CATH LAB;  Service: Cardiology;;    CORONARY STENT PLACEMENT      GASTROSTOMY TUBE PLACEMENT      INSERTION OF PACEMAKER  04/2017    INTRAVASCULAR ULTRASOUND, NON-CORONARY  5/17/2022    Procedure: Intravascular Ultrasound, Non-Coronary;  Surgeon: Tre Schmidt MD;  Location: [facility] CATH LAB;  Service: Cardiology;;    LEFT HEART CATHETERIZATION N/A 3/30/2021    Procedure: Left heart cath;  Surgeon: Tre Schmidt MD;  Location: [facility] CATH LAB;  Service: Cardiology;  Laterality: N/A;    PEG TUBE REMOVAL      TRACHEOSTOMY CLOSURE      TRACHEOSTOMY TUBE PLACEMENT      TRANSESOPHAGEAL ECHOCARDIOGRAPHY N/A 5/17/2022    Procedure:  ECHOCARDIOGRAM, TRANSESOPHAGEAL;  Surgeon: [facility];  Location: [facility] CATH LAB;  Service: Anesthesiology;  Laterality: N/A;    TREATMENT OF CARDIAC ARRHYTHMIA N/A 11/25/2022    Procedure: Cardioversion or Defibrillation;  Surgeon: Kuldeep Daniels MD;  Location: [facility] CATH LAB;  Service: Cardiology;  Laterality: N/A;    UPPER GASTROINTESTINAL ENDOSCOPY  05/2016    Dr. Zarco with PEG tube placement       Time Tracking:     OT Date of Treatment: 02/09/23  OT Start Time: 1011  OT Stop Time: 1036  OT Total Time (min): 25 min    Billable Minutes:Evaluation 8  Therapeutic Activity 17    2/9/2023

## 2023-02-09 NOTE — PROGRESS NOTES
[facility] Hospital Medicine  Progress Note    Patient Name: Cleveland Capps  MRN: [id number]  Patient Class: IP- Inpatient   Admission Date: 2/6/2023  Length of Stay: 3 days  Attending Physician: Fernando Bryson MD  Primary Care Provider: Romero Snyder MD        Subjective:     Principal Problem:Acute on chronic combined systolic and diastolic congestive heart failure        HPI:  Cleveland Capps is a 67 year male who presents emergency room for evaluation of shortness of breath.  He reports shortness of breath and tachycardia onset approximately 8 days ago.  He is a history of atrial fibrillation and reports intermittently in AFib over the past several days.  He denies any chest pain.  Reports shortness of breath is worse with exertion.  No alleviating factors.  He also endorses an approximate 10-15 lb weight gain over the past several weeks.  No reported fever chills.  Previous medical history includes CVA, Watchman left atrial closure device, hypothyroidism, coronary artery disease, stent placement, COPD, hypertension, AICD, and combined systolic/diastolic heart failure.  ER workup:  CBC with mild anemia.  CMP with BUN of 44 and creatinine of 1.8.  Bilirubin elevated 3.0,  .  BNP elevated at 1518 (baseline 400).  Troponin mildly elevated 0.077.  Last echo demonstrated ejection fraction of 20-25%.  Patient was given 20 mg of Lasix in the ER.  Patient remained in atrial fibrillation/RVR while in ER.  He had mild hypotension.  Patient was started on amiodarone infusion and bolus after consulting Dr. Calhoun with on-call Cardiology.  Patient admitted Hospital Medicine for treatment management.  Patient admitted to step-down unit.      Overview/Hospital Course:  No notes on file    Interval History:  Pt seen and examined.  Walked half the hallway this morning and felt good.  Denies chest pain or shortness a breath.  States he continues to feel better.  His labs are improving.   Converted to sinus arrhythmia overnight on amiodarone infusion.  Unfortunately, his BP has dropped this morning after ambulation.  Discussed case with Cardiology who recommended Dobutrex infusion titration, fluid bolus, and discontinuation of Lasix and metoprolol.  Discussed plan of care with Pt and his brother who verbalized understanding and agreement.  We did discuss that should his hypotension persist and does not respond appropriately to our treatment efforts, he may have to transition to an ICU bed at [facility] for vasopressor therapy.    Review of Systems   Constitutional:  Negative for fatigue.   Respiratory:  Negative for shortness of breath.    Cardiovascular:  Negative for chest pain.   Gastrointestinal:  Negative for nausea and vomiting.   Musculoskeletal:  Negative for gait problem.   Objective:     Vital Signs (Most Recent):  Temp: 97.1 °F (36.2 °C) (02/09/23 1100)  Pulse: 71 (02/09/23 1100)  Resp: (!) 26 (02/09/23 1100)  BP: (!) 86/55 (02/09/23 1100)  SpO2: 100 % (02/09/23 0751)   Vital Signs (24h Range):  Temp:  [96.3 °F (35.7 °C)-97.5 °F (36.4 °C)] 97.1 °F (36.2 °C)  Pulse:  [] 71  Resp:  [14-33] 26  SpO2:  [95 %-100 %] 100 %  BP: ()/(52-67) 86/55     Weight: 51.7 kg (113 lb 15.7 oz)  Body mass index is 21.54 kg/m².    Intake/Output Summary (Last 24 hours) at 2/9/2023 1346  Last data filed at 2/9/2023 0637  Gross per 24 hour   Intake 1202.22 ml   Output 2650 ml   Net -1447.78 ml      Physical Exam  Vitals and nursing note reviewed.   Constitutional:       Appearance: Normal appearance.   Cardiovascular:      Rate and Rhythm: Normal rate. Rhythm irregular.   Pulmonary:      Effort: Pulmonary effort is normal.      Breath sounds: Normal breath sounds.   Abdominal:      General: Abdomen is flat. Bowel sounds are normal.      Palpations: Abdomen is soft.   Musculoskeletal:         General: Normal range of motion.   Skin:     General: Skin is warm and dry.      Coloration: Skin is jaundiced.    Neurological:      General: No focal deficit present.      Mental Status: He is alert and oriented to person, place, and time. Mental status is at baseline.       Significant Labs: All pertinent labs within the past 24 hours have been reviewed.  CBC:   Recent Labs   Lab 02/08/23  0450 02/09/23  0500   WBC 8.00  8.00 7.47   HGB 12.5*  12.5* 11.7*   HCT 37.8*  37.8* 36.0*     233 158     CMP:   Recent Labs   Lab 02/08/23  0450 02/09/23  0500   *  131* 134*   K 3.7  3.7 3.2*   CL 98  98 97   CO2 19*  19* 23     100 91   BUN 48*  48* 39*   CREATININE 2.0*  2.0* 1.7*   CALCIUM 8.1*  8.1* 7.8*   PROT 5.9*  5.9* 6.1   ALBUMIN 3.1*  3.1* 3.6   BILITOT 3.0*  3.0* 3.3*   ALKPHOS 107  107 93   *  581* 308*   *  924* 687*   ANIONGAP 14  14 14       Significant Imaging: I have reviewed all pertinent imaging results/findings within the past 24 hours.      Assessment/Plan:      * Acute on chronic combined systolic and diastolic congestive heart failure  Patient is identified as having Combined Systolic and Diastolic heart failure that is Acute on chronic. CHF is currently uncontrolled due to Continued edema of extremities and Weight gain of 15 pounds. Latest ECHO performed and demonstrates- Results for orders placed during the hospital encounter of 06/27/22    Echo    Interpretation Summary  · The left ventricle is severely enlarged with mild eccentric hypertrophy and severely decreased systolic function.  · The estimated ejection fraction is 25%.  · There are segmental left ventricular wall motion abnormalities.  · Left ventricular diastolic dysfunction.  · Mild right ventricular enlargement with mildly to moderately reduced right ventricular systolic function.  · Severe left atrial enlargement.  · Mild right atrial enlargement.  · The MR is moderate to moderate -severe ( 2-3+).  · Mild to moderate tricuspid regurgitation.  · Mild pulmonic regurgitation.  · Intermediate  central venous pressure (8 mmHg).  · The estimated PA systolic pressure is 47 mmHg.  · There is pulmonary hypertension.  . Continue Furosemide and monitor clinical status closely. Monitor on telemetry. Patient is off CHF pathway.  Monitor strict Is&Os and daily weights.  Place on fluid restriction of 1.5 L. Continue to stress to patient importance of self efficacy and  on diet for CHF. Last BNP reviewed- and noted below   Recent Labs   Lab 02/06/23  1543   BNP 1,518*   .  Repeat echo reviewed:  EF 20%   -liver function and kidney function seems improved today  -he appears euvolemic in his BP is running low:  Will increase Dobutrex and DC Lasix, required fluid bolus   -discussed case with Cardiology  -unable to tolerate beta blocker or Entresto due to BP issues       Hypotension  Pt has been having soft blood pressures, however this morning after ambulation BP dropped to the 70 systolic.  Was totally asymptomatic.  Pt had received metoprolol that a.m.   -DC metoprolol   -DC Lasix   -500 cc normal saline bolus   -discussed with Cardiology at length, will increase Dobutrex infusion dose to 4 mcg/kg/min.  -if we can not maintain an MAP over 60, may need to consider transfer to ICU for vasopressor support      Longstanding persistent atrial fibrillation  Patient with Persistent (7 days or more) atrial fibrillation which is uncontrolled currently with Amiodarone. Patient is currently in atrial fibrillation.[id number] Score: 2. Anticoagulation indicated. Anticoagulation done with eliquis.    -converted to SA overnight: discussed with cardiology and will transition to oral.  -cardiology following   -Monitor closely on telemetry  -watch for uncontrolled tachycardia/tachyarrhythmia as while on dobutamine        Elevated LFTs  Acute problem- suspect hepatic congestion in the setting of severe and advanced heart failure   -LFTs are improving today  -coags reviewed: Mildly elevated INR   -ammonia normal  -continue to  trend the liver function tests    AICD (automatic cardioverter/defibrillator) present  Chronic problem  Continuous cardiac monitor   Patient denies any recent discharges      Hypothyroid  Chronic problem, acutely uncontrolled despite medication compliance  -increased levothyroxine to 50 mics daily    History of CVA (cerebrovascular accident)  Chronic problem, stable  Cont eliquis -no longer on Plavix  Holding statin secondary to elevated liver enzymes      MEIR (acute kidney injury)  Patient with acute kidney injury likely due to pre-renal azotemia MEIR is currently improving. Labs reviewed- Renal function/electrolytes with Estimated Creatinine Clearance: 31.3 mL/min (A) (based on SCr of 1.7 mg/dL (H)). according to latest data. Monitor urine output and serial BMP and adjust therapy as needed. Avoid nephrotoxins and renally dose meds for GFR listed above.     -Discussed with cardiology: appears euvolemic.  D/C lasix today- BP still running low and actually requiring bolus  -F/u BMP tomorrow.    Benign essential HTN  Chronic, uncontrolled due to hypotension.  Latest blood pressure and vitals reviewed-   Temp:  [96.3 °F (35.7 °C)-97.5 °F (36.4 °C)]   Pulse:  []   Resp:  [14-33]   BP: ()/(52-67)   SpO2:  [95 %-100 %] .   Home meds for hypertension were reviewed and noted below.   Hypertension Medications             carvediloL (COREG) 3.125 MG tablet Take 1 tablet (3.125 mg total) by mouth 2 (two) times daily.    furosemide (LASIX) 20 MG tablet Take 1 tablet (20 mg total) by mouth 3 (three) times a week.    sacubitriL-valsartan (ENTRESTO) 24-26 mg per tablet Take 1 tablet by mouth 2 (two) times daily.    spironolactone (ALDACTONE) 25 MG tablet Take 1 tablet (25 mg total) by mouth once daily.          Holding his antihypertensives due to hypotension    Coronary artery disease  Patient with known CAD s/p stent placement and CABG, which is controlled Will hold the statin due to LFT elevation and monitor for S/Sx  of angina/ACS. Continue to monitor on telemetry.  Pt no longer on aspirin or Plavix          VTE Risk Mitigation (From admission, onward)         Ordered     apixaban tablet 5 mg  2 times daily         02/06/23 2114     IP VTE HIGH RISK PATIENT  Once         02/06/23 2113     Place sequential compression device  Until discontinued         02/06/23 2113     Place KEN hose  Until discontinued         02/06/23 2113                Discharge Planning   MACKENZIE: 2/9/2023     Code Status: DNR   Is the patient medically ready for discharge?:     Reason for patient still in hospital (select all that apply): Patient new problem, Patient trending condition, Treatment and Consult recommendations  Discharge Plan A: Home                  Kaykay Siegel NP  Department of Hospital Medicine   [facility]

## 2023-02-09 NOTE — PLAN OF CARE
Problem: Physical Therapy  Goal: Physical Therapy Goal  Description: Goals to be met by: 2023     Patient will increase functional independence with mobility by performin. Supine to sit with Modified [place]  2. Sit to supine with Modified [place]  3. Sit to stand transfer with Modified [place]  4. Bed to chair transfer with Supervision using Rolling Walker  5. Gait  x 250 feet with Supervision using Rolling Walker.     Outcome: Ongoing, Progressing

## 2023-02-09 NOTE — SUBJECTIVE & OBJECTIVE
Interval History:  Pt seen and examined.  Walked half the hallway this morning and felt good.  Denies chest pain or shortness a breath.  States he continues to feel better.  His labs are improving.  Converted to sinus arrhythmia overnight on amiodarone infusion.  Unfortunately, his BP has dropped this morning after ambulation.  Discussed case with Cardiology who recommended Dobutrex infusion titration, fluid bolus, and discontinuation of Lasix and metoprolol.  Discussed plan of care with Pt and his brother who verbalized understanding and agreement.  We did discuss that should his hypotension persist and does not respond appropriately to our treatment efforts, he may have to transition to an ICU bed at [facility] for vasopressor therapy.    Review of Systems   Constitutional:  Negative for fatigue.   Respiratory:  Negative for shortness of breath.    Cardiovascular:  Negative for chest pain.   Gastrointestinal:  Negative for nausea and vomiting.   Musculoskeletal:  Negative for gait problem.   Objective:     Vital Signs (Most Recent):  Temp: 97.1 °F (36.2 °C) (02/09/23 1100)  Pulse: 71 (02/09/23 1100)  Resp: (!) 26 (02/09/23 1100)  BP: (!) 86/55 (02/09/23 1100)  SpO2: 100 % (02/09/23 0751)   Vital Signs (24h Range):  Temp:  [96.3 °F (35.7 °C)-97.5 °F (36.4 °C)] 97.1 °F (36.2 °C)  Pulse:  [] 71  Resp:  [14-33] 26  SpO2:  [95 %-100 %] 100 %  BP: ()/(52-67) 86/55     Weight: 51.7 kg (113 lb 15.7 oz)  Body mass index is 21.54 kg/m².    Intake/Output Summary (Last 24 hours) at 2/9/2023 1346  Last data filed at 2/9/2023 0637  Gross per 24 hour   Intake 1202.22 ml   Output 2650 ml   Net -1447.78 ml      Physical Exam  Vitals and nursing note reviewed.   Constitutional:       Appearance: Normal appearance.   Cardiovascular:      Rate and Rhythm: Normal rate. Rhythm irregular.   Pulmonary:      Effort: Pulmonary effort is normal.      Breath sounds: Normal breath sounds.   Abdominal:      General: Abdomen is flat. Bowel  sounds are normal.      Palpations: Abdomen is soft.   Musculoskeletal:         General: Normal range of motion.   Skin:     General: Skin is warm and dry.      Coloration: Skin is jaundiced.   Neurological:      General: No focal deficit present.      Mental Status: He is alert and oriented to person, place, and time. Mental status is at baseline.       Significant Labs: All pertinent labs within the past 24 hours have been reviewed.  CBC:   Recent Labs   Lab 02/08/23  0450 02/09/23  0500   WBC 8.00  8.00 7.47   HGB 12.5*  12.5* 11.7*   HCT 37.8*  37.8* 36.0*     233 158     CMP:   Recent Labs   Lab 02/08/23  0450 02/09/23  0500   *  131* 134*   K 3.7  3.7 3.2*   CL 98  98 97   CO2 19*  19* 23     100 91   BUN 48*  48* 39*   CREATININE 2.0*  2.0* 1.7*   CALCIUM 8.1*  8.1* 7.8*   PROT 5.9*  5.9* 6.1   ALBUMIN 3.1*  3.1* 3.6   BILITOT 3.0*  3.0* 3.3*   ALKPHOS 107  107 93   *  581* 308*   *  924* 687*   ANIONGAP 14  14 14       Significant Imaging: I have reviewed all pertinent imaging results/findings within the past 24 hours.

## 2023-02-09 NOTE — RESPIRATORY THERAPY
02/08/23 1931   Patient Assessment/Suction   Level of Consciousness (AVPU) alert   Respiratory Effort Normal;Unlabored   Expansion/Accessory Muscles/Retractions no use of accessory muscles   PRE-TX-O2   Device (Oxygen Therapy) nasal cannula   $ Is the patient on Low Flow Oxygen? Yes   Flow (L/min) 2   SpO2 97 %   Pulse Oximetry Type Continuous   $ Pulse Oximetry - Multiple Charge Pulse Oximetry - Multiple   Pulse 110   Resp 19

## 2023-02-09 NOTE — PLAN OF CARE
POC discussed with pt, pt verbalized understanding. Oriented x4. PIV cdi, amiodarone and dobutamine drips infusing. Blood glucose monitored. Denies pain, nausea, and SOB. Repositions self. Pt converted to sinus arrhythmia this shift. Urinal emptied. Call light in reach, bed alarm set, safety maintained. No complaints or requests at this time, will continue to monitor.

## 2023-02-09 NOTE — PROGRESS NOTES
BP running low.  Discussed with cardiology.  Will increase the dobutrex, hold the lasix, and give a 500 cc bolus.  The patient is asymptomatic.  If BP unimproved with these measures may require upgrade to ICU bed for vasopressors.  PICC ordered due to poor access.  Nephrology- Dr. Hermelindo Schwartz with PICC.    Critical care time spent on the evaluation and treatment of severe organ dysfunction, review of pertinent labs and imaging studies, discussions with consulting providers and discussions with patient/family 25 minutes

## 2023-02-09 NOTE — ASSESSMENT & PLAN NOTE
Chronic problem, acutely uncontrolled despite medication compliance  -increased levothyroxine to 50 mics daily

## 2023-02-09 NOTE — CONSULTS
INPATIENT NEPHROLOGY CONSULT   [facility] NEPHROLOGY    Cleveland Capps  02/09/2023    Reason for consultation:    silvestre    Chief Complaint:   Chief Complaint   Patient presents with    Shortness of Breath    Atrial Fibrillation          History of Present Illness:    Per H and P    Cleveland Capps is a 67 year male who presents emergency room for evaluation of shortness of breath.  He reports shortness of breath and tachycardia onset approximately 8 days ago.  He is a history of atrial fibrillation and reports intermittently in AFib over the past several days.  He denies any chest pain.  Reports shortness of breath is worse with exertion.  No alleviating factors.  He also endorses an approximate 10-15 lb weight gain over the past several weeks.  No reported fever chills.  Previous medical history includes CVA, Watchman left atrial closure device, hypothyroidism, coronary artery disease, stent placement, COPD, hypertension, AICD, and combined systolic/diastolic heart failure.  ER workup:  CBC with mild anemia.  CMP with BUN of 44 and creatinine of 1.8.  Bilirubin elevated 3.0,  .  BNP elevated at 1518 (baseline 400).  Troponin mildly elevated 0.077.  Last echo demonstrated ejection fraction of 20-25%.  Patient was given 20 mg of Lasix in the ER.  Patient remained in atrial fibrillation/RVR while in ER.  He had mild hypotension.  Patient was started on amiodarone infusion and bolus after consulting Dr. Calhoun with on-call Cardiology.  Patient admitted Hospital Medicine for treatment management.     2/8  stopped entresto due to low bp.  On dobutamine.  Episodes of hypotension.  Tachycardic  2/9  converted to sinus rhythm.  3350 cc uop    Plan of Care:       Assessment:    Acute kidney injury likely secondary to decompensated heart failure and cardiorenal syndrome  -- abdominal ultrasound--no hydronephrosis  --hold entresto if creatinine keeps rising  --Avoid NSAIDS, Mckeon II inhibitors, and other  non-essential nephrotoxic agents  --renal dose medication for crcl 10-50  --strict I/Os  --hold aldactone  --ua with micro with tubular casts suggestion hypoperfusion of nephrons   --FEUrea 17 % suggesting pre-renal etiology    Hypervolemia  --lasix 40mg iv bid.,  Add albumin 25g iv bid with the lasix  --keep net daily diuresis to 1-1.5 liters  --monitor output  --patient declining to go to [facility] for EP specialist evaluation for ablation and CHF team evaluation     Anemia  --hg not low enough for erythropoiesis stimulating agent      Hyponatremia--hypervolemic   --diuresis  --treat hypothyroidism  --trend    Metabolic acidosis (mixed gap and non-gap).    --no intervention needed  --check urine pH is appropriately low  --diuretics alter calculation of anion gap      Hypokalemia  --replete    I had a jey discussion with the patient and his brother.  I told them that given his hemodynamic instability and unstable cardiac issues he is not currently a candidate for hemodialysis.  I told him that he is very sick and at considerable risk of dying              Thank you for allowing us to participate in this patient's care. We will continue to follow.    Vital Signs:  Temp Readings from Last 3 Encounters:   02/09/23 97.5 °F (36.4 °C) (Tympanic)   11/28/22 98.2 °F (36.8 °C)   11/13/22 97.7 °F (36.5 °C) (Oral)       Pulse Readings from Last 3 Encounters:   02/09/23 72   01/09/23 92   12/20/22 62       BP Readings from Last 3 Encounters:   02/09/23 (!) 95/58   01/09/23 125/76   12/20/22 120/60       Weight:  Wt Readings from Last 3 Encounters:   02/09/23 51.7 kg (113 lb 15.7 oz)   01/09/23 52.6 kg (116 lb)   12/20/22 52.9 kg (116 lb 8.2 oz)       Past Medical & Surgical History:  Past Medical History:   Diagnosis Date    A-fib     Acute kidney injury     COPD (chronic obstructive pulmonary disease)     Coronary artery disease     Diabetes mellitus     Encounter for blood transfusion     Former smoker     Hypertension      Myocardial infarction     Thyroid disease     Type 2 diabetes mellitus without complications        Past Surgical History:   Procedure Laterality Date    CLOSURE OF LEFT ATRIAL APPENDAGE USING DEVICE N/A 2022    Procedure: Left atrial appendage closure device;  Surgeon: Tre Schmidt MD;  Location: [facility] CATH LAB;  Service: Cardiology;  Laterality: N/A;    COLONOSCOPY N/A 1/3/2017    Procedure: COLONOSCOPY;  Surgeon: Marcus Zarco MD;  Location: [facility];  Service: Endoscopy;  Laterality: N/A;    CORONARY BYPASS GRAFT ANGIOGRAPHY  3/30/2021    Procedure: Bypass graft study;  Surgeon: Tre Schmidt MD;  Location: [facility] CATH LAB;  Service: Cardiology;;    CORONARY STENT PLACEMENT      GASTROSTOMY TUBE PLACEMENT      INSERTION OF PACEMAKER  2017    INTRAVASCULAR ULTRASOUND, NON-CORONARY  2022    Procedure: Intravascular Ultrasound, Non-Coronary;  Surgeon: Tre Schmidt MD;  Location: [facility] CATH LAB;  Service: Cardiology;;    LEFT HEART CATHETERIZATION N/A 3/30/2021    Procedure: Left heart cath;  Surgeon: Tre Schmidt MD;  Location: [facility] CATH LAB;  Service: Cardiology;  Laterality: N/A;    PEG TUBE REMOVAL      TRACHEOSTOMY CLOSURE      TRACHEOSTOMY TUBE PLACEMENT      TRANSESOPHAGEAL ECHOCARDIOGRAPHY N/A 2022    Procedure: ECHOCARDIOGRAM, TRANSESOPHAGEAL;  Surgeon: [facility] Diagnostic Provider;  Location: [facility] CATH LAB;  Service: Anesthesiology;  Laterality: N/A;    TREATMENT OF CARDIAC ARRHYTHMIA N/A 2022    Procedure: Cardioversion or Defibrillation;  Surgeon: Kuldeep Daniels MD;  Location: [facility] CATH LAB;  Service: Cardiology;  Laterality: N/A;    UPPER GASTROINTESTINAL ENDOSCOPY  2016    Dr. Zarco with PEG tube placement       Past Social History:  Social History     Socioeconomic History    Marital status: Single   Tobacco Use    Smoking status: Former     Types: Cigarettes     Quit date: 2005     Years since quittin.2    Smokeless tobacco: Never     Tobacco comments:     quit 2007   Substance and Sexual Activity    Alcohol use: No    Drug use: No    Sexual activity: Yes     Social Determinants of Health     Financial Resource Strain: Low Risk     Difficulty of Paying Living Expenses: Not hard at all   Food Insecurity: No Food Insecurity    Worried About Running Out of Food in the Last Year: Never true    Ran Out of Food in the Last Year: Never true   Transportation Needs: No Transportation Needs    Lack of Transportation (Medical): No    Lack of Transportation (Non-Medical): No   Physical Activity: Inactive    Days of Exercise per Week: 0 days    Minutes of Exercise per Session: 0 min   Stress: No Stress Concern Present    Feeling of Stress : Not at all   Social Connections: Socially Isolated    Frequency of Communication with Friends and Family: Three times a week    Frequency of Social Gatherings with Friends and Family: Once a week    Attends [employer] Services: Never    Active Member of Clubs or Organizations: No    Attends Club or Organization Meetings: Never    Marital Status: Never    Housing Stability: Low Risk     Unable to Pay for Housing in the Last Year: No    Number of Places Lived in the Last Year: 1    Unstable Housing in the Last Year: No       Medications:  No current facility-administered medications on file prior to encounter.     Current Outpatient Medications on File Prior to Encounter   Medication Sig Dispense Refill    apixaban (ELIQUIS) 5 mg Tab Take 5 mg by mouth 2 (two) times daily.      atorvastatin (LIPITOR) 80 MG tablet Take 80 mg by mouth once daily.      carvediloL (COREG) 3.125 MG tablet Take 1 tablet (3.125 mg total) by mouth 2 (two) times daily. (Patient taking differently: Take 3.125 mg by mouth 2 (two) times daily as needed.) 60 tablet 11    clopidogreL (PLAVIX) 75 mg tablet Take 1 tablet (75 mg total) by mouth once daily. 30 tablet 11    levothyroxine (SYNTHROID) 25 MCG tablet Take 25 mcg by mouth before breakfast.    "   potassium chloride SA (K-DUR,KLOR-CON M) 10 MEQ tablet       COMP-AIR NEBULIZER COMPRESSOR Alfreda use as directed      furosemide (LASIX) 20 MG tablet Take 1 tablet (20 mg total) by mouth 3 (three) times a week. 10 tablet 0    sacubitriL-valsartan (ENTRESTO) 24-26 mg per tablet Take 1 tablet by mouth 2 (two) times daily. 60 tablet 2    spironolactone (ALDACTONE) 25 MG tablet Take 1 tablet (25 mg total) by mouth once daily. 30 tablet 11    travoprost (TRAVATAN Z) 0.004 % ophthalmic solution       travoprost, benzalkonium, (TRAVATAN) 0.004 % ophthalmic solution Place 1 drop into both eyes nightly.      TRUE METRIX GLUCOSE TEST STRIP Strp USE 1 STRIP TO CHECK GLUCOSE ONCE DAILY       Scheduled Meds:   albumin human 25%  25 g Intravenous BID    amiodarone  400 mg Oral Daily    apixaban  5 mg Oral BID    furosemide (LASIX) injection  40 mg Intravenous Q12H    levothyroxine  50 mcg Oral Before breakfast    metoprolol tartrate  25 mg Oral BID     Continuous Infusions:   DOBUTamine IV infusion (non-titrating) 3 mcg/kg/min (02/09/23 0543)     PRN Meds:.dextrose 10%, dextrose 10%, glucagon (human recombinant), glucose, glucose, insulin aspart U-100, sodium chloride 0.9%    Allergies:  Penicillin and Penicillins    Past Family History:  Reviewed; refer to Hospitalist Admission Note    Review of Systems:  Review of Systems - All 14 systems reviewed and negative, except as noted in HPI    Physical Exam:    BP (!) 95/58   Pulse 72   Temp 97.5 °F (36.4 °C) (Tympanic)   Resp (!) 23   Ht 5' 1" (1.549 m)   Wt 51.7 kg (113 lb 15.7 oz)   SpO2 100%   BMI 21.54 kg/m²     General Appearance:    No distress   Head:    Normocephalic, without obvious abnormality, atraumatic   Eyes:    PER, conjunctiva/corneas clear, EOM's intact in both eyes        Throat:   Lips, mucosa, and tongue normal; teeth and gums normal   Back:     Symmetric, no curvature, ROM normal, no CVA tenderness   Lungs:     Diminished.  Crackles at bases   Chest " wall:    No tenderness or deformity   Heart:    Irreg.  No rub   Abdomen:     Soft, non-tender, bowel sounds active all four quadrants,     no masses, no organomegaly   Extremities:   Extremities normal, atraumatic, no cyanosis or edema   Pulses:   2+ and symmetric all extremities   MSK:   No joint or muscle swelling, tenderness or deformity   Skin:   Skin color, texture, turgor normal, no rashes or lesions   Neurologic:      No flap     Results:  Lab Results   Component Value Date     (L) 02/09/2023    K 3.2 (L) 02/09/2023    CL 97 02/09/2023    CO2 23 02/09/2023    BUN 39 (H) 02/09/2023    CREATININE 1.7 (H) 02/09/2023    CALCIUM 7.8 (L) 02/09/2023    ANIONGAP 14 02/09/2023    ESTGFRAFRICA >60.0 07/27/2022    EGFRNONAA 52.4 (A) 07/27/2022       Lab Results   Component Value Date    CALCIUM 7.8 (L) 02/09/2023    PHOS 4.8 (H) 02/07/2023       Recent Labs   Lab 02/09/23  0500   WBC 7.47   RBC 3.43*   HGB 11.7*   HCT 36.0*      *   MCH 34.1*   MCHC 32.5            I have personally reviewed pertinent radiological imaging and reports.    Patient care was time spent personally by me on the following activities:   Obtaining a history  Examination of patient.  Providing medical care at the patients bedside.  Developing a treatment plan with patient or surrogate and bedside caregivers  Ordering and reviewing laboratory studies, radiographic studies, pulse oximetry.  Ordering and performing treatments and interventions.  Evaluation of patient's response to treatment.  Discussions with consultants while on the unit and immediately available to the patient.  Re-evaluation of the patient's condition.  Documentation in the medical record.     Terence Davis MD  Nephrology  [facility]  [phone]

## 2023-02-09 NOTE — PROCEDURES
"Cleveland Capps is a 67 y.o. male patient.    Temp: 97.1 °F (36.2 °C) (02/09/23 1100)  Pulse: 71 (02/09/23 1100)  Resp: (!) 26 (02/09/23 1100)  BP: (!) 86/55 (02/09/23 1100)  SpO2: 100 % (02/09/23 0751)  Weight: 51.7 kg (113 lb 15.7 oz) (02/09/23 0540)  Height: 5' 1" (154.9 cm) (02/07/23 1323)    PICC  Date/Time: 2/9/2023 1:10 PM  Performed by: Jose Mcleod RN  Consent Done: Yes  Time out: Immediately prior to procedure a time out was called to verify the correct patient, procedure, equipment, support staff and site/side marked as required  Indications: med administration and vascular access  Anesthesia: local infiltration  Local anesthetic: lidocaine 1% without epinephrine  Anesthetic Total (mL): 2  Preparation: skin prepped with ChloraPrep  Skin prep agent dried: skin prep agent completely dried prior to procedure  Sterile barriers: all five maximum sterile barriers used - cap, mask, sterile gown, sterile gloves, and large sterile sheet  Hand hygiene: hand hygiene performed prior to central venous catheter insertion  Location details: right brachial  Catheter type: double lumen  Catheter size: 5 Fr  Catheter Length: 38cm    Ultrasound guidance: yes  Vessel Caliber: mediumVascular Doppler: not done  Needle advanced into vessel with real time Ultrasound guidance.  Guidewire confirmed in vessel.  Number of attempts: 1  Post-procedure: blood return through all ports, chlorhexidine patch and sterile dressing applied    Assessment: successful placement, tip termination and placement verified by x-ray        Name Jose Mcleod RN   2/9/2023    "

## 2023-02-09 NOTE — EICU
Intervention Initiated From:  [facility] / EICU    Beatriz intervened regarding:  Rounding (Video assessment)  Comments: Vide rounds done.  Resting quietly in bed,  Talkative, cooperative, amiodarone drip presently infusing at 0.5 mg/hr & Dobutamine IV drip infusing at 3 mcg/kg/min.  VS:  , BP 86/54, POx 93% on 2 L/min n/c, no acute distress noted.

## 2023-02-09 NOTE — ASSESSMENT & PLAN NOTE
Acute problem- suspect hepatic congestion in the setting of severe and advanced heart failure   -LFTs are improving today  -coags reviewed: Mildly elevated INR   -ammonia normal  -continue to trend the liver function tests

## 2023-02-09 NOTE — ASSESSMENT & PLAN NOTE
Pt has been having soft blood pressures, however this morning after ambulation BP dropped to the 70 systolic.  Was totally asymptomatic.  Pt had received metoprolol that a.m.   -DC metoprolol   -DC Lasix   -500 cc normal saline bolus   -discussed with Cardiology at length, will increase Dobutrex infusion dose to 4 mcg/kg/min.  -if we can not maintain an MAP over 60, may need to consider transfer to ICU for vasopressor support

## 2023-02-09 NOTE — ASSESSMENT & PLAN NOTE
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 CHF is currently uncontrolled due to Continued edema of extremities and Weight gain of 15 pounds. Latest ECHO performed and demonstrates- Results for orders placed during the hospital encounter of 06/27/22    Echo    Interpretation Summary  · The left ventricle is severely enlarged with mild eccentric hypertrophy and severely decreased systolic function.  · The estimated ejection fraction is 25%.  · There are segmental left ventricular wall motion abnormalities.  · Left ventricular diastolic dysfunction.  · Mild right ventricular enlargement with mildly to moderately reduced right ventricular systolic function.  · Severe left atrial enlargement.  · Mild right atrial enlargement.  · The MR is moderate to moderate -severe ( 2-3+).  · Mild to moderate tricuspid regurgitation.  · Mild pulmonic regurgitation.  · Intermediate central venous pressure (8 mmHg).  · The estimated PA systolic pressure is 47 mmHg.  · There is pulmonary hypertension.  . Continue Furosemide and monitor clinical status closely. Monitor on telemetry. Patient is off CHF pathway.  Monitor strict Is&Os and daily weights.  Place on fluid restriction of 1.5 L. Continue to stress to patient importance of self efficacy and  on diet for CHF. Last BNP reviewed- and noted below   Recent Labs   Lab 02/06/23  1543   BNP 1,518*   .  Repeat echo reviewed:  EF 20%   -liver function and kidney function seems improved today  -he appears euvolemic in his BP is running low:  Will increase Dobutrex and DC Lasix, required fluid bolus   -discussed case with Cardiology  -unable to tolerate beta blocker or Entresto due to BP issues      yes...

## 2023-02-09 NOTE — MEDICAL/APP STUDENT
Medical Student Subjective & Objective     Principal Problem: Acute on chronic combined systolic and diastolic congestive heart failure    Chief Complaint:   Chief Complaint   Patient presents with    Shortness of Breath    Atrial Fibrillation       HPI: Cleveland Capps is a 67 year male who presents emergency room for evaluation of shortness of breath.  He reports shortness of breath and tachycardia onset approximately 8 days ago.  He is a history of atrial fibrillation and reports intermittently in AFib over the past several days.  He denies any chest pain.  Reports shortness of breath is worse with exertion.  No alleviating factors.  He also endorses an approximate 10-15 lb weight gain over the past several weeks.  No reported fever chills.  Previous medical history includes CVA, Watchman left atrial closure device, hypothyroidism, coronary artery disease, stent placement, COPD, hypertension, AICD, and combined systolic/diastolic heart failure.  ER workup:  CBC with mild anemia.  CMP with BUN of 44 and creatinine of 1.8.  Bilirubin elevated 3.0,  .  BNP elevated at 1518 (baseline 400).  Troponin mildly elevated 0.077.  Last echo demonstrated ejection fraction of 20-25%.  Patient was given 20 mg of Lasix in the ER.  Patient remained in atrial fibrillation/RVR while in ER.  He had mild hypotension.  Patient was started on amiodarone infusion and bolus after consulting Dr. Calhoun with on-call Cardiology.  Patient admitted Hospital Medicine for treatment management.  Patient admitted to step-down unit.    Hospital Course: No notes on file    Interval History: Notes reviewed, no events overnight. Pt seen and examined. Pt is laying comfortably in his bed and states that he feels good today. He is not experiencing any CP, N/V/D, or SOB. He states that he is coughing up a brownish/yellow sputum. Pt converted to sinus arrhythmia today. Pt's kidney and liver fxn labs increased. Pt's systolic blood pressures have  been running in the 80's and 90's. Pt's SBP dropped into the 70's after getting up to walk w/ PT. Spoke with cardiology and will discontinue metoprolol and lasix, will switch amiodarone drip to oral, and will increase dobutrex dose.    Past Medical History:   Diagnosis Date    A-fib     Acute kidney injury     COPD (chronic obstructive pulmonary disease)     Coronary artery disease     Diabetes mellitus     Encounter for blood transfusion     Former smoker     Hypertension     Myocardial infarction     Thyroid disease     Type 2 diabetes mellitus without complications        Past Surgical History:   Procedure Laterality Date    CLOSURE OF LEFT ATRIAL APPENDAGE USING DEVICE N/A 5/17/2022    Procedure: Left atrial appendage closure device;  Surgeon: Tre Schmidt MD;  Location: [facility] CATH LAB;  Service: Cardiology;  Laterality: N/A;    COLONOSCOPY N/A 1/3/2017    Procedure: COLONOSCOPY;  Surgeon: Marcus Green MD;  Location: [facility] ENDO;  Service: Endoscopy;  Laterality: N/A;    CORONARY BYPASS GRAFT ANGIOGRAPHY  3/30/2021    Procedure: Bypass graft study;  Surgeon: Tre Schmidt MD;  Location: [facility] CATH LAB;  Service: Cardiology;;    CORONARY STENT PLACEMENT      GASTROSTOMY TUBE PLACEMENT      INSERTION OF PACEMAKER  04/2017    INTRAVASCULAR ULTRASOUND, NON-CORONARY  5/17/2022    Procedure: Intravascular Ultrasound, Non-Coronary;  Surgeon: Tre Schmidt MD;  Location: [facility] CATH LAB;  Service: Cardiology;;    LEFT HEART CATHETERIZATION N/A 3/30/2021    Procedure: Left heart cath;  Surgeon: Tre Schmidt MD;  Location: [facility] CATH LAB;  Service: Cardiology;  Laterality: N/A;    PEG TUBE REMOVAL      TRACHEOSTOMY CLOSURE      TRACHEOSTOMY TUBE PLACEMENT      TRANSESOPHAGEAL ECHOCARDIOGRAPHY N/A 5/17/2022    Procedure: ECHOCARDIOGRAM, TRANSESOPHAGEAL;  Surgeon: [facility] Diagnostic Provider;  Location: [facility] CATH LAB;  Service: Anesthesiology;  Laterality: N/A;    TREATMENT OF CARDIAC ARRHYTHMIA N/A 11/25/2022     Procedure: Cardioversion or Defibrillation;  Surgeon: Kuldeep Daniels MD;  Location: [facility] CATH LAB;  Service: Cardiology;  Laterality: N/A;    UPPER GASTROINTESTINAL ENDOSCOPY  05/2016    Dr. Zarco with PEG tube placement       Review of patient's allergies indicates:   Allergen Reactions    Penicillin      Other reaction(s): anaphylaxis  Other reaction(s): anaphylaxis    Penicillins Hives       No current facility-administered medications on file prior to encounter.     Current Outpatient Medications on File Prior to Encounter   Medication Sig    apixaban (ELIQUIS) 5 mg Tab Take 5 mg by mouth 2 (two) times daily.    atorvastatin (LIPITOR) 80 MG tablet Take 80 mg by mouth once daily.    carvediloL (COREG) 3.125 MG tablet Take 1 tablet (3.125 mg total) by mouth 2 (two) times daily. (Patient taking differently: Take 3.125 mg by mouth 2 (two) times daily as needed.)    clopidogreL (PLAVIX) 75 mg tablet Take 1 tablet (75 mg total) by mouth once daily.    levothyroxine (SYNTHROID) 25 MCG tablet Take 25 mcg by mouth before breakfast.    potassium chloride SA (K-DUR,KLOR-CON M) 10 MEQ tablet     COMP-AIR NEBULIZER COMPRESSOR Alfreda use as directed    furosemide (LASIX) 20 MG tablet Take 1 tablet (20 mg total) by mouth 3 (three) times a week.    sacubitriL-valsartan (ENTRESTO) 24-26 mg per tablet Take 1 tablet by mouth 2 (two) times daily.    spironolactone (ALDACTONE) 25 MG tablet Take 1 tablet (25 mg total) by mouth once daily.    travoprost (TRAVATAN Z) 0.004 % ophthalmic solution     travoprost, benzalkonium, (TRAVATAN) 0.004 % ophthalmic solution Place 1 drop into both eyes nightly.    TRUE METRIX GLUCOSE TEST STRIP Strp USE 1 STRIP TO CHECK GLUCOSE ONCE DAILY     Family History       Problem Relation (Age of Onset)    Diabetes Mother, Brother    Glaucoma Father, Brother    Heart disease Mother    Macular degeneration Father, Brother    No Known Problems Sister, Maternal Aunt, Maternal Uncle, Paternal Aunt,  Paternal Uncle, Maternal Grandmother, Maternal Grandfather, Paternal Grandmother, Paternal Grandfather          Tobacco Use    Smoking status: Former     Types: Cigarettes     Quit date: 2005     Years since quittin.2    Smokeless tobacco: Never    Tobacco comments:     quit    Substance and Sexual Activity    Alcohol use: No    Drug use: No    Sexual activity: Yes     Review of Systems   Constitutional: Negative.    HENT: Negative.     Eyes: Negative.    Respiratory:  Positive for cough. Negative for shortness of breath.         Coughing up a brownish/yellow sputum.   Cardiovascular:  Negative for chest pain, palpitations and leg swelling.   Gastrointestinal:  Negative for diarrhea, nausea and vomiting.   Endocrine: Negative.    Genitourinary: Negative.    Musculoskeletal: Negative.    Skin: Negative.    Allergic/Immunologic: Negative.    Neurological:  Negative for weakness and numbness.   Hematological: Negative.    Psychiatric/Behavioral: Negative.     Objective:     Vital Signs (Most Recent):  Temp: 97.5 °F (36.4 °C) (23 0751)  Pulse: 72 (23 0751)  Resp: (!) 23 (23 0751)  BP: (!) 95/58 (23 0700)  SpO2: 100 % (23 075)   Vital Signs (24h Range):  Temp:  [96.3 °F (35.7 °C)-97.5 °F (36.4 °C)] 97.5 °F (36.4 °C)  Pulse:  [] 72  Resp:  [14-33] 23  SpO2:  [95 %-100 %] 100 %  BP: ()/(52-73) 95/58     Weight: 51.7 kg (113 lb 15.7 oz)  Body mass index is 21.54 kg/m².    Intake/Output Summary (Last 24 hours) at 2023 1106  Last data filed at 2023 0637  Gross per 24 hour   Intake 1327.22 ml   Output 3000 ml   Net -1672.78 ml        Physical Exam  Constitutional:       Appearance: Normal appearance.   HENT:      Head: Normocephalic and atraumatic.      Right Ear: External ear normal.      Left Ear: External ear normal.      Nose: Nose normal.      Mouth/Throat:      Mouth: Mucous membranes are moist.      Pharynx: Oropharynx is clear.   Eyes:      Extraocular  Movements: Extraocular movements intact.      Conjunctiva/sclera: Conjunctivae normal.   Cardiovascular:      Rate and Rhythm: Normal rate. Rhythm irregular.      Pulses: Normal pulses.      Heart sounds: Normal heart sounds.   Pulmonary:      Effort: Pulmonary effort is normal.      Breath sounds: Normal breath sounds.      Comments: Comfortable on 2 L nasal cannula  Abdominal:      General: Abdomen is flat.      Palpations: Abdomen is soft.   Musculoskeletal:         General: No swelling. Normal range of motion.      Cervical back: Normal range of motion and neck supple.      Right lower leg: No edema.      Left lower leg: No edema.   Skin:     General: Skin is warm and dry.      Coloration: Skin is jaundiced.   Neurological:      General: No focal deficit present.      Mental Status: He is alert and oriented to person, place, and time.   Psychiatric:         Mood and Affect: Mood normal.         Behavior: Behavior normal.         Thought Content: Thought content normal.         Judgment: Judgment normal.       Significant Labs: All pertinent labs within the past 24 hours have been reviewed.  A1C:   Recent Labs   Lab 02/06/23  1543   HGBA1C 5.4       CBC:   Recent Labs   Lab 02/08/23  0450 02/09/23  0500   WBC 8.00  8.00 7.47   HGB 12.5*  12.5* 11.7*   HCT 37.8*  37.8* 36.0*     233 158       CMP:   Recent Labs   Lab 02/08/23  0450 02/09/23  0500   *  131* 134*   K 3.7  3.7 3.2*   CL 98  98 97   CO2 19*  19* 23     100 91   BUN 48*  48* 39*   CREATININE 2.0*  2.0* 1.7*   CALCIUM 8.1*  8.1* 7.8*   PROT 5.9*  5.9* 6.1   ALBUMIN 3.1*  3.1* 3.6   BILITOT 3.0*  3.0* 3.3*   ALKPHOS 107  107 93   *  581* 308*   *  924* 687*   ANIONGAP 14  14 14       POCT Glucose:   Recent Labs   Lab 02/08/23  1631 02/08/23  2217 02/09/23  0807   POCTGLUCOSE 125* 106 101         Significant Imaging: I have reviewed all pertinent imaging results/findings within the past 24  hours.    ASSESSMENT/PLAN:   Acute on chronic combined systolic and diastolic congestive heart failure  - chronic  - echo showed EF of 20  - potassium level and BPs dropped. Lasix discontinued.  - dobutrex increased to 4mcg  - continue to monitor telemetry    Hypotension  - pt's SBP running in the 80's-90's. SBP dropped in the 70's when walking around  - IV fluid bolus given  - continue to monitor BPs    Longstanding persistent Atrial Fibrillation  - pt in Afib > 7 days. Pt converted to sinus arrhythmia.   - continue apixaban (pt [id number] score of 7) and po amiodarone  - monitor telemetry    MEIR  - pt's Cr dropped to 1.7.   - continue to renally dose meds and monitor kidney fxn    Congestive Hepatopathy  - acute, suspected due to worsening HF  - LFTs have decreased  - continuing to monitor    Hypothyroidism  - chronic  - continue synthroid 50 mcg    History of CVA  - chronic, stable  - continue apixaban for prevention    Type II Diabetes Mellitus  - chronic, stable  - continue insulin before meals and nightly PRN    Essential Hypertension  - chronic, stable      TONO RamosS

## 2023-02-09 NOTE — ASSESSMENT & PLAN NOTE
Patient with acute kidney injury likely due to pre-renal azotemia MEIR is currently improving. Labs reviewed- Renal function/electrolytes with Estimated Creatinine Clearance: 31.3 mL/min (A) (based on SCr of 1.7 mg/dL (H)). according to latest data. Monitor urine output and serial BMP and adjust therapy as needed. Avoid nephrotoxins and renally dose meds for GFR listed above.     -Discussed with cardiology: appears euvolemic.  D/C lasix today- BP still running low and actually requiring bolus  -F/u BMP tomorrow.

## 2023-02-10 LAB
ALBUMIN SERPL BCP-MCNC: 3.4 G/DL (ref 3.5–5.2)
ALP SERPL-CCNC: 79 U/L (ref 55–135)
ALT SERPL W/O P-5'-P-CCNC: 489 U/L (ref 10–44)
ANION GAP SERPL CALC-SCNC: 10 MMOL/L (ref 8–16)
AST SERPL-CCNC: 180 U/L (ref 10–40)
BASOPHILS # BLD AUTO: 0.03 K/UL (ref 0–0.2)
BASOPHILS NFR BLD: 0.5 % (ref 0–1.9)
BILIRUB SERPL-MCNC: 3 MG/DL (ref 0.1–1)
BUN SERPL-MCNC: 31 MG/DL (ref 8–23)
CALCIUM SERPL-MCNC: 7.8 MG/DL (ref 8.7–10.5)
CHLORIDE SERPL-SCNC: 98 MMOL/L (ref 95–110)
CO2 SERPL-SCNC: 24 MMOL/L (ref 23–29)
CREAT SERPL-MCNC: 1.3 MG/DL (ref 0.5–1.4)
DIFFERENTIAL METHOD: ABNORMAL
EOSINOPHIL # BLD AUTO: 0.2 K/UL (ref 0–0.5)
EOSINOPHIL NFR BLD: 2.3 % (ref 0–8)
ERYTHROCYTE [DISTWIDTH] IN BLOOD BY AUTOMATED COUNT: 16.6 % (ref 11.5–14.5)
EST. GFR  (NO RACE VARIABLE): >60 ML/MIN/1.73 M^2
GLUCOSE SERPL-MCNC: 85 MG/DL (ref 70–110)
HCT VFR BLD AUTO: 31.8 % (ref 40–54)
HGB BLD-MCNC: 10.4 G/DL (ref 14–18)
IMM GRANULOCYTES # BLD AUTO: 0.02 K/UL (ref 0–0.04)
IMM GRANULOCYTES NFR BLD AUTO: 0.3 % (ref 0–0.5)
LYMPHOCYTES # BLD AUTO: 0.8 K/UL (ref 1–4.8)
LYMPHOCYTES NFR BLD: 11.6 % (ref 18–48)
MCH RBC QN AUTO: 33.9 PG (ref 27–31)
MCHC RBC AUTO-ENTMCNC: 32.7 G/DL (ref 32–36)
MCV RBC AUTO: 104 FL (ref 82–98)
MONOCYTES # BLD AUTO: 0.5 K/UL (ref 0.3–1)
MONOCYTES NFR BLD: 7.6 % (ref 4–15)
NEUTROPHILS # BLD AUTO: 5.1 K/UL (ref 1.8–7.7)
NEUTROPHILS NFR BLD: 77.7 % (ref 38–73)
NRBC BLD-RTO: 0 /100 WBC
PLATELET # BLD AUTO: 128 K/UL (ref 150–450)
PMV BLD AUTO: 8.8 FL (ref 9.2–12.9)
POCT GLUCOSE: 117 MG/DL (ref 70–110)
POCT GLUCOSE: 119 MG/DL (ref 70–110)
POCT GLUCOSE: 84 MG/DL (ref 70–110)
POCT GLUCOSE: 94 MG/DL (ref 70–110)
POTASSIUM SERPL-SCNC: 3.3 MMOL/L (ref 3.5–5.1)
PROT SERPL-MCNC: 5.5 G/DL (ref 6–8.4)
RBC # BLD AUTO: 3.07 M/UL (ref 4.6–6.2)
SODIUM SERPL-SCNC: 132 MMOL/L (ref 136–145)
WBC # BLD AUTO: 6.49 K/UL (ref 3.9–12.7)

## 2023-02-10 PROCEDURE — 99233 PR SUBSEQUENT HOSPITAL CARE,LEVL III: ICD-10-PCS | Mod: ,,, | Performed by: GENERAL PRACTICE

## 2023-02-10 PROCEDURE — 80053 COMPREHEN METABOLIC PANEL: CPT | Performed by: NURSE PRACTITIONER

## 2023-02-10 PROCEDURE — 20600001 HC STEP DOWN PRIVATE ROOM

## 2023-02-10 PROCEDURE — 36569 INSJ PICC 5 YR+ W/O IMAGING: CPT

## 2023-02-10 PROCEDURE — 85025 COMPLETE CBC W/AUTO DIFF WBC: CPT | Performed by: NURSE PRACTITIONER

## 2023-02-10 PROCEDURE — 25000003 PHARM REV CODE 250: Performed by: NURSE PRACTITIONER

## 2023-02-10 PROCEDURE — 94761 N-INVAS EAR/PLS OXIMETRY MLT: CPT

## 2023-02-10 PROCEDURE — 97116 GAIT TRAINING THERAPY: CPT

## 2023-02-10 PROCEDURE — 36415 COLL VENOUS BLD VENIPUNCTURE: CPT | Performed by: NURSE PRACTITIONER

## 2023-02-10 PROCEDURE — A4216 STERILE WATER/SALINE, 10 ML: HCPCS | Performed by: NURSE PRACTITIONER

## 2023-02-10 PROCEDURE — 99233 SBSQ HOSP IP/OBS HIGH 50: CPT | Mod: ,,, | Performed by: GENERAL PRACTICE

## 2023-02-10 PROCEDURE — 27000221 HC OXYGEN, UP TO 24 HOURS

## 2023-02-10 RX ORDER — FUROSEMIDE 20 MG/1
20 TABLET ORAL 2 TIMES DAILY
Status: DISCONTINUED | OUTPATIENT
Start: 2023-02-10 | End: 2023-02-10

## 2023-02-10 RX ORDER — POTASSIUM CHLORIDE 20 MEQ/1
40 TABLET, EXTENDED RELEASE ORAL ONCE
Status: COMPLETED | OUTPATIENT
Start: 2023-02-10 | End: 2023-02-10

## 2023-02-10 RX ORDER — SPIRONOLACTONE 25 MG/1
25 TABLET ORAL DAILY
Status: DISCONTINUED | OUTPATIENT
Start: 2023-02-10 | End: 2023-02-10

## 2023-02-10 RX ADMIN — SODIUM CHLORIDE, PRESERVATIVE FREE 10 ML: 5 INJECTION INTRAVENOUS at 05:02

## 2023-02-10 RX ADMIN — MUPIROCIN: 20 OINTMENT TOPICAL at 08:02

## 2023-02-10 RX ADMIN — LEVOTHYROXINE SODIUM 50 MCG: 50 TABLET ORAL at 05:02

## 2023-02-10 RX ADMIN — APIXABAN 5 MG: 2.5 TABLET, FILM COATED ORAL at 08:02

## 2023-02-10 RX ADMIN — SODIUM CHLORIDE, PRESERVATIVE FREE 10 ML: 5 INJECTION INTRAVENOUS at 12:02

## 2023-02-10 RX ADMIN — POTASSIUM CHLORIDE 40 MEQ: 1500 TABLET, EXTENDED RELEASE ORAL at 12:02

## 2023-02-10 RX ADMIN — AMIODARONE HYDROCHLORIDE 400 MG: 200 TABLET ORAL at 08:02

## 2023-02-10 RX ADMIN — MUPIROCIN: 20 OINTMENT TOPICAL at 10:02

## 2023-02-10 RX ADMIN — APIXABAN 5 MG: 2.5 TABLET, FILM COATED ORAL at 10:02

## 2023-02-10 NOTE — ASSESSMENT & PLAN NOTE
Patient with Persistent (7 days or more) atrial fibrillation which is uncontrolled currently with Amiodarone. Patient is currently in atrial fibrillation.[id number] Score: 2. Anticoagulation indicated. Anticoagulation done with eliquis.    -converted to SA 2/8 PM: Continue the oral amiodarone  -cardiology following   -Monitor closely on telemetry  -watch for uncontrolled tachycardia/tachyarrhythmia as while on dobutamine

## 2023-02-10 NOTE — PLAN OF CARE
Problem: Adult Inpatient Plan of Care  Goal: Plan of Care Review  Outcome: Ongoing, Progressing  Goal: Patient-Specific Goal (Individualized)  Outcome: Ongoing, Progressing  Goal: Optimal Comfort and Wellbeing  Outcome: Ongoing, Progressing  Goal: Readiness for Transition of Care  Outcome: Ongoing, Progressing     Problem: Skin Injury Risk Increased  Goal: Skin Health and Integrity  Outcome: Ongoing, Progressing

## 2023-02-10 NOTE — ASSESSMENT & PLAN NOTE
Chronic, uncontrolled due to hypotension.  Latest blood pressure and vitals reviewed-   Temp:  [97.4 °F (36.3 °C)-98 °F (36.7 °C)]   Pulse:  [63-76]   Resp:  []   BP: ()/(53-76)   SpO2:  [93 %-100 %] .   Home meds for hypertension were reviewed and noted below.   Hypertension Medications             carvediloL (COREG) 3.125 MG tablet Take 1 tablet (3.125 mg total) by mouth 2 (two) times daily.    furosemide (LASIX) 20 MG tablet Take 1 tablet (20 mg total) by mouth 3 (three) times a week.    sacubitriL-valsartan (ENTRESTO) 24-26 mg per tablet Take 1 tablet by mouth 2 (two) times daily.    spironolactone (ALDACTONE) 25 MG tablet Take 1 tablet (25 mg total) by mouth once daily.          Holding his antihypertensives due to hypotension

## 2023-02-10 NOTE — CARE UPDATE
02/10/23 0856   Patient Assessment/Suction   Level of Consciousness (AVPU) alert   PRE-TX-O2   Device (Oxygen Therapy) nasal cannula   $ Is the patient on Low Flow Oxygen? Yes   Flow (L/min) 2  (decreased to 1 LPM)   SpO2 100 %   Pulse Oximetry Type Continuous   $ Pulse Oximetry - Multiple Charge Pulse Oximetry - Multiple   Pulse 74   Resp 20

## 2023-02-10 NOTE — MEDICAL/APP STUDENT
Medical Student Subjective & Objective     Principal Problem: Acute on chronic combined systolic and diastolic congestive heart failure    Chief Complaint:   Chief Complaint   Patient presents with    Shortness of Breath    Atrial Fibrillation       HPI: Cleveland Capps is a 67 year male who presents emergency room for evaluation of shortness of breath.  He reports shortness of breath and tachycardia onset approximately 8 days ago.  He is a history of atrial fibrillation and reports intermittently in AFib over the past several days.  He denies any chest pain.  Reports shortness of breath is worse with exertion.  No alleviating factors.  He also endorses an approximate 10-15 lb weight gain over the past several weeks.  No reported fever chills.  Previous medical history includes CVA, Watchman left atrial closure device, hypothyroidism, coronary artery disease, stent placement, COPD, hypertension, AICD, and combined systolic/diastolic heart failure.  ER workup:  CBC with mild anemia.  CMP with BUN of 44 and creatinine of 1.8.  Bilirubin elevated 3.0,  .  BNP elevated at 1518 (baseline 400).  Troponin mildly elevated 0.077.  Last echo demonstrated ejection fraction of 20-25%.  Patient was given 20 mg of Lasix in the ER.  Patient remained in atrial fibrillation/RVR while in ER.  He had mild hypotension.  Patient was started on amiodarone infusion and bolus after consulting Dr. Calhoun with on-call Cardiology.  Patient admitted Hospital Medicine for treatment management.  Patient admitted to step-down unit.    Hospital Course: No notes on file    Interval History: Pt seen and examined. He is laying comfortably in his bed and states that he feels good today. States that he is not coughing as much as he has been. States that he was able to have a bowel movement today. He is not experiencing any CP, N/V/D, or SOB. Pt still in sinus arrhythmia today. Pt's labs and blood pressure are continuing to improve. Will wean  dobutrex drip per cardiology.    Past Medical History:   Diagnosis Date    A-fib     Acute kidney injury     COPD (chronic obstructive pulmonary disease)     Coronary artery disease     Diabetes mellitus     Encounter for blood transfusion     Former smoker     Hypertension     Myocardial infarction     Thyroid disease     Type 2 diabetes mellitus without complications        Past Surgical History:   Procedure Laterality Date    CLOSURE OF LEFT ATRIAL APPENDAGE USING DEVICE N/A 5/17/2022    Procedure: Left atrial appendage closure device;  Surgeon: Tre Schmidt MD;  Location: [facility] CATH LAB;  Service: Cardiology;  Laterality: N/A;    COLONOSCOPY N/A 1/3/2017    Procedure: COLONOSCOPY;  Surgeon: Marcus Zarco MD;  Location: [facility] ENDO;  Service: Endoscopy;  Laterality: N/A;    CORONARY BYPASS GRAFT ANGIOGRAPHY  3/30/2021    Procedure: Bypass graft study;  Surgeon: Tre Schmidt MD;  Location: [facility] CATH LAB;  Service: Cardiology;;    CORONARY STENT PLACEMENT      GASTROSTOMY TUBE PLACEMENT      INSERTION OF PACEMAKER  04/2017    INTRAVASCULAR ULTRASOUND, NON-CORONARY  5/17/2022    Procedure: Intravascular Ultrasound, Non-Coronary;  Surgeon: Tre Schmidt MD;  Location: [facility] CATH LAB;  Service: Cardiology;;    LEFT HEART CATHETERIZATION N/A 3/30/2021    Procedure: Left heart cath;  Surgeon: Tre Schmidt MD;  Location: [facility] CATH LAB;  Service: Cardiology;  Laterality: N/A;    PEG TUBE REMOVAL      TRACHEOSTOMY CLOSURE      TRACHEOSTOMY TUBE PLACEMENT      TRANSESOPHAGEAL ECHOCARDIOGRAPHY N/A 5/17/2022    Procedure: ECHOCARDIOGRAM, TRANSESOPHAGEAL;  [facility];  Location: [facility] CATH LAB;  Service: Anesthesiology;  Laterality: N/A;    TREATMENT OF CARDIAC ARRHYTHMIA N/A 11/25/2022    Procedure: Cardioversion or Defibrillation;  Surgeon: Kuldeep Daniels MD;  Location: [facility] CATH LAB;  Service: Cardiology;  Laterality: N/A;    UPPER GASTROINTESTINAL ENDOSCOPY  05/2016    Dr. Zarco  with PEG tube placement       Review of patient's allergies indicates:   Allergen Reactions    Penicillin      Other reaction(s): anaphylaxis  Other reaction(s): anaphylaxis    Penicillins Hives       No current facility-administered medications on file prior to encounter.     Current Outpatient Medications on File Prior to Encounter   Medication Sig    apixaban (ELIQUIS) 5 mg Tab Take 5 mg by mouth 2 (two) times daily.    atorvastatin (LIPITOR) 80 MG tablet Take 80 mg by mouth once daily.    carvediloL (COREG) 3.125 MG tablet Take 1 tablet (3.125 mg total) by mouth 2 (two) times daily. (Patient taking differently: Take 3.125 mg by mouth 2 (two) times daily as needed.)    clopidogreL (PLAVIX) 75 mg tablet Take 1 tablet (75 mg total) by mouth once daily.    levothyroxine (SYNTHROID) 25 MCG tablet Take 25 mcg by mouth before breakfast.    potassium chloride SA (K-DUR,KLOR-CON M) 10 MEQ tablet     COMP-AIR NEBULIZER COMPRESSOR Alfreda use as directed    furosemide (LASIX) 20 MG tablet Take 1 tablet (20 mg total) by mouth 3 (three) times a week.    sacubitriL-valsartan (ENTRESTO) 24-26 mg per tablet Take 1 tablet by mouth 2 (two) times daily.    spironolactone (ALDACTONE) 25 MG tablet Take 1 tablet (25 mg total) by mouth once daily.    travoprost (TRAVATAN Z) 0.004 % ophthalmic solution     travoprost, benzalkonium, (TRAVATAN) 0.004 % ophthalmic solution Place 1 drop into both eyes nightly.    TRUE METRIX GLUCOSE TEST STRIP Strp USE 1 STRIP TO CHECK GLUCOSE ONCE DAILY     Family History       Problem Relation (Age of Onset)    Diabetes Mother, Brother    Glaucoma Father, Brother    Heart disease Mother    Macular degeneration Father, Brother    No Known Problems Sister, Maternal Aunt, Maternal Uncle, Paternal Aunt, Paternal Uncle, Maternal Grandmother, Maternal Grandfather, Paternal Grandmother, Paternal Grandfather          Tobacco Use    Smoking status: Former     Types: Cigarettes     Quit date: 11/29/2005     Years  since quittin.2    Smokeless tobacco: Never    Tobacco comments:     quit    Substance and Sexual Activity    Alcohol use: No    Drug use: No    Sexual activity: Yes     Review of Systems   Constitutional: Negative.    HENT: Negative.     Eyes: Negative.    Respiratory:  Positive for cough. Negative for shortness of breath.         Pt states his cough has improved   Cardiovascular:  Negative for chest pain, palpitations and leg swelling.   Gastrointestinal:  Negative for diarrhea, nausea and vomiting.   Endocrine: Negative.    Genitourinary: Negative.    Musculoskeletal: Negative.    Skin: Negative.    Allergic/Immunologic: Negative.    Neurological:  Negative for weakness and numbness.   Hematological: Negative.    Psychiatric/Behavioral: Negative.     Objective:     Vital Signs (Most Recent):  Temp: 97.6 °F (36.4 °C) (02/10/23 0749)  Pulse: 63 (02/10/23 0500)  Resp: 16 (02/10/23 0500)  BP: 105/64 (02/10/23 0500)  SpO2: 100 % (02/10/23 0500)   Vital Signs (24h Range):  Temp:  [97.1 °F (36.2 °C)-98 °F (36.7 °C)] 97.6 °F (36.4 °C)  Pulse:  [63-76] 63  Resp:  [16-31] 16  SpO2:  [93 %-100 %] 100 %  BP: ()/(53-76) 105/64     Weight: 52.7 kg (116 lb 2.9 oz)  Body mass index is 21.95 kg/m².    Intake/Output Summary (Last 24 hours) at 2/10/2023 0850  Last data filed at 2/10/2023 0607  Gross per 24 hour   Intake 495.64 ml   Output 1020 ml   Net -524.36 ml        Physical Exam  Constitutional:       Appearance: Normal appearance.   HENT:      Head: Normocephalic and atraumatic.      Right Ear: External ear normal.      Left Ear: External ear normal.      Nose: Nose normal.      Mouth/Throat:      Mouth: Mucous membranes are moist.      Pharynx: Oropharynx is clear.   Eyes:      Extraocular Movements: Extraocular movements intact.      Conjunctiva/sclera: Conjunctivae normal.   Cardiovascular:      Rate and Rhythm: Normal rate. Rhythm irregular.      Pulses: Normal pulses.      Heart sounds: Normal heart  sounds.   Pulmonary:      Effort: Pulmonary effort is normal.      Breath sounds: Normal breath sounds.      Comments: Comfortable on 2 L nasal cannula  Abdominal:      General: Abdomen is flat.      Palpations: Abdomen is soft.   Musculoskeletal:         General: No swelling. Normal range of motion.      Cervical back: Normal range of motion and neck supple.      Right lower leg: No edema.      Left lower leg: No edema.   Skin:     General: Skin is warm and dry.      Coloration: Skin is jaundiced.   Neurological:      General: No focal deficit present.      Mental Status: He is alert and oriented to person, place, and time.   Psychiatric:         Mood and Affect: Mood normal.         Behavior: Behavior normal.         Thought Content: Thought content normal.         Judgment: Judgment normal.       Significant Labs: All pertinent labs within the past 24 hours have been reviewed.  A1C:   Recent Labs   Lab 02/06/23  1543   HGBA1C 5.4       CBC:   Recent Labs   Lab 02/09/23  0500 02/10/23  0501   WBC 7.47 6.49   HGB 11.7* 10.4*   HCT 36.0* 31.8*    128*       CMP:   Recent Labs   Lab 02/09/23  0500 02/10/23  0501   * 132*   K 3.2* 3.3*   CL 97 98   CO2 23 24   GLU 91 85   BUN 39* 31*   CREATININE 1.7* 1.3   CALCIUM 7.8* 7.8*   PROT 6.1 5.5*   ALBUMIN 3.6 3.4*   BILITOT 3.3* 3.0*   ALKPHOS 93 79   * 180*   * 489*   ANIONGAP 14 10       POCT Glucose:   Recent Labs   Lab 02/09/23  1129 02/09/23  2107 02/10/23  0737   POCTGLUCOSE 128* 117* 84         Significant Imaging: I have reviewed all pertinent imaging results/findings within the past 24 hours.    ASSESSMENT/PLAN:   Acute on chronic combined systolic and diastolic congestive heart failure  - chronic  - echo showed EF of 20  - pt's kidney fxn and and liver fxn improving  - will wean dobutrex 1 evelyn per day over the weekend and d/c once he gets to 2 mics per cardiology  - continue to monitor telemetry    Essential Hypertension  - chronic  -  pt has been hypotensive w/ SBPs in the 80s-90s, but BP improved (last BP of 113/65)  - continue IV fluids  - continue to monitor BPs    Hypokalemia  - acute, likely due to lasix that was d/c on 2/9  - po potassium chloride 40 mEq  - trend CMPs    Longstanding persistent Atrial Fibrillation  - pt in Afib > 7 days. Pt converted to sinus arrhythmia the night of 2/8  - continue apixaban (pt [id number] score of 7) and po amiodarone  - monitor telemetry    MEIR  - acute, stable  - pt's Cr improved (1.3)  - continue to renally dose meds and monitor kidney fxn    Congestive Hepatopathy  - acute, suspected due to worsening HF  - LFTs continuing to improve  - trend CMPs    Hypothyroidism  - chronic, uncontrolled per last TSH value of 18.148  - continue synthroid 50 mcg    History of CVA  - chronic, stable  - continue apixaban for prevention    Type II Diabetes Mellitus  - chronic, stable  - continue insulin before meals and nightly PRN      TONO RamosS

## 2023-02-10 NOTE — PROGRESS NOTES
[facility] Hospital Medicine  Progress Note    Patient Name: Cleveland Capps  MRN: [id number]  Patient Class: IP- Inpatient   Admission Date: 2/6/2023  Length of Stay: 4 days  Attending Physician: Fernando Bryson MD  Primary Care Provider: Romero Snyder MD        Subjective:     Principal Problem:Acute on chronic combined systolic and diastolic congestive heart failure        HPI:  Cleveland Capps is a 67 year male who presents emergency room for evaluation of shortness of breath.  He reports shortness of breath and tachycardia onset approximately 8 days ago.  He is a history of atrial fibrillation and reports intermittently in AFib over the past several days.  He denies any chest pain.  Reports shortness of breath is worse with exertion.  No alleviating factors.  He also endorses an approximate 10-15 lb weight gain over the past several weeks.  No reported fever chills.  Previous medical history includes CVA, Watchman left atrial closure device, hypothyroidism, coronary artery disease, stent placement, COPD, hypertension, AICD, and combined systolic/diastolic heart failure.  ER workup:  CBC with mild anemia.  CMP with BUN of 44 and creatinine of 1.8.  Bilirubin elevated 3.0,  .  BNP elevated at 1518 (baseline 400).  Troponin mildly elevated 0.077.  Last echo demonstrated ejection fraction of 20-25%.  Patient was given 20 mg of Lasix in the ER.  Patient remained in atrial fibrillation/RVR while in ER.  He had mild hypotension.  Patient was started on amiodarone infusion and bolus after consulting Dr. Calhoun with on-call Cardiology.  Patient admitted Hospital Medicine for treatment management.  Patient admitted to step-down unit.      Overview/Hospital Course:  No notes on file    Interval History:  Seen and examined.  In good spirits today.  Was able to have a bowel movement.  Denies chest pain.  No nausea or vomiting.  BP has improved and remains in sinus arrhythmia with  controlled heart rate.  Remains off diuretics.  Liver function and kidney function all improved today.  Will discuss with Cardiology a weaning plan for Dobutrex today to continue throughout the weekend and if continues improved, hopefully can go home early next week.    Review of Systems   Constitutional:  Negative for chills, fatigue and fever.   Respiratory:  Negative for cough and shortness of breath.    Cardiovascular:  Negative for chest pain and leg swelling.   Neurological:  Negative for dizziness and light-headedness.   Objective:     Vital Signs (Most Recent):  Temp: 97.6 °F (36.4 °C) (02/10/23 0749)  Pulse: 74 (02/10/23 0856)  Resp: 20 (02/10/23 0856)  BP: 113/66 (02/10/23 0800)  SpO2: 100 % (02/10/23 0856) Vital Signs (24h Range):  Temp:  [97.4 °F (36.3 °C)-98 °F (36.7 °C)] 97.6 °F (36.4 °C)  Pulse:  [63-76] 74  Resp:  [] 20  SpO2:  [93 %-100 %] 100 %  BP: ()/(53-76) 113/66     Weight: 52.7 kg (116 lb 2.9 oz)  Body mass index is 21.95 kg/m².    Intake/Output Summary (Last 24 hours) at 2/10/2023 1104  Last data filed at 2/10/2023 0900  Gross per 24 hour   Intake 495.64 ml   Output 1270 ml   Net -774.36 ml      Physical Exam  Vitals and nursing note reviewed.   Constitutional:       Appearance: Normal appearance. He is ill-appearing (chronically ill appearing).   HENT:      Mouth/Throat:      Mouth: Mucous membranes are moist.      Pharynx: Oropharynx is clear.   Cardiovascular:      Rate and Rhythm: Normal rate. Rhythm irregular.      Comments: PICC line in place without erythema; scant bloody drainage noted on bandage  Pulmonary:      Effort: Pulmonary effort is normal.      Breath sounds: Normal breath sounds.   Abdominal:      General: Abdomen is flat. Bowel sounds are normal. There is no distension.      Palpations: Abdomen is soft.   Musculoskeletal:         General: Normal range of motion.      Cervical back: Normal range of motion and neck supple.   Skin:     General: Skin is warm and  dry.      Capillary Refill: Capillary refill takes less than 2 seconds.   Neurological:      General: No focal deficit present.      Mental Status: He is alert.   Psychiatric:         Mood and Affect: Mood normal.       Significant Labs: All pertinent labs within the past 24 hours have been reviewed.  CBC:   Recent Labs   Lab 02/09/23  0500 02/10/23  0501   WBC 7.47 6.49   HGB 11.7* 10.4*   HCT 36.0* 31.8*    128*     CMP:   Recent Labs   Lab 02/09/23  0500 02/10/23  0501   * 132*   K 3.2* 3.3*   CL 97 98   CO2 23 24   GLU 91 85   BUN 39* 31*   CREATININE 1.7* 1.3   CALCIUM 7.8* 7.8*   PROT 6.1 5.5*   ALBUMIN 3.6 3.4*   BILITOT 3.3* 3.0*   ALKPHOS 93 79   * 180*   * 489*   ANIONGAP 14 10       Significant Imaging: I have reviewed all pertinent imaging results/findings within the past 24 hours.      Assessment/Plan:      * Acute on chronic combined systolic and diastolic congestive heart failure  Patient is identified as having Combined Systolic and Diastolic heart failure that is Acute on chronic. CHF is currently uncontrolled due to Continued edema of extremities and Weight gain of 15 pounds. Latest ECHO performed and demonstrates- Results for orders placed during the hospital encounter of 06/27/22    Echo    Interpretation Summary  · The left ventricle is severely enlarged with mild eccentric hypertrophy and severely decreased systolic function.  · The estimated ejection fraction is 25%.  · There are segmental left ventricular wall motion abnormalities.  · Left ventricular diastolic dysfunction.  · Mild right ventricular enlargement with mildly to moderately reduced right ventricular systolic function.  · Severe left atrial enlargement.  · Mild right atrial enlargement.  · The MR is moderate to moderate -severe ( 2-3+).  · Mild to moderate tricuspid regurgitation.  · Mild pulmonic regurgitation.  · Intermediate central venous pressure (8 mmHg).  · The estimated PA systolic pressure is 47  mmHg.  · There is pulmonary hypertension.  . Continue Furosemide and monitor clinical status closely. Monitor on telemetry. Patient is off CHF pathway.  Monitor strict Is&Os and daily weights.  Place on fluid restriction of 1.5 L. Continue to stress to patient importance of self efficacy and  on diet for CHF. Last BNP reviewed- and noted below   Recent Labs   Lab 02/06/23  1543   BNP 1,518*   .  Repeat echo reviewed:  EF 20%   -liver function and kidney function seems improved today  -Continue dobutrex: will discuss deescalation plan with cardiology  -Lasix stopped 2/9: appears euvolemic.  -discussed case with Cardiology  -unable to tolerate beta blocker or Entresto due to BP issues       Hypotension  Pt has been having soft blood pressures, however this morning after ambulation BP dropped to the 70 systolic.  Was totally asymptomatic.  Pt had received metoprolol that a.m.   -DC metoprolol   -DC Lasix   -500 cc normal saline bolus   -discussed with Cardiology at length, will increase Dobutrex infusion dose to 4 mcg/kg/min.  -if we can not maintain an MAP over 60, may need to consider transfer to ICU for vasopressor support      Longstanding persistent atrial fibrillation  Patient with Persistent (7 days or more) atrial fibrillation which is uncontrolled currently with Amiodarone. Patient is currently in atrial fibrillation.[id number] Score: 2. Anticoagulation indicated. Anticoagulation done with eliquis.    -converted to SA 2/8 PM: Continue the oral amiodarone  -cardiology following   -Monitor closely on telemetry  -watch for uncontrolled tachycardia/tachyarrhythmia as while on dobutamine        Elevated LFTs  Acute problem- suspect hepatic congestion in the setting of severe and advanced heart failure   -LFTs are improving today  -coags reviewed: Mildly elevated INR   -ammonia normal  -continue to trend the liver function tests    AICD (automatic cardioverter/defibrillator) present  Chronic  problem  Continuous cardiac monitor   Patient denies any recent discharges      Hypothyroid  Chronic problem, acutely uncontrolled despite medication compliance  -increased levothyroxine to 50 mics daily    History of CVA (cerebrovascular accident)  Chronic problem, stable  Cont eliquis -no longer on Plavix  Holding statin secondary to elevated liver enzymes      MEIR (acute kidney injury)  Patient with acute kidney injury likely due to pre-renal azotemia MEIR is currently improving. Labs reviewed- Renal function/electrolytes with Estimated Creatinine Clearance: 41.3 mL/min (based on SCr of 1.3 mg/dL). according to latest data. Monitor urine output and serial BMP and adjust therapy as needed. Avoid nephrotoxins and renally dose meds for GFR listed above.     -Discussed with cardiology: required bolus 2/2 hypotension on 2/9.  Lasix stopped 2/9  -Renal function continues to improve with adequate UOP.  -F/u BMP.  -Appreciate nephrology assistance.    Benign essential HTN  Chronic, uncontrolled due to hypotension.  Latest blood pressure and vitals reviewed-   Temp:  [97.4 °F (36.3 °C)-98 °F (36.7 °C)]   Pulse:  [63-76]   Resp:  []   BP: ()/(53-76)   SpO2:  [93 %-100 %] .   Home meds for hypertension were reviewed and noted below.   Hypertension Medications             carvediloL (COREG) 3.125 MG tablet Take 1 tablet (3.125 mg total) by mouth 2 (two) times daily.    furosemide (LASIX) 20 MG tablet Take 1 tablet (20 mg total) by mouth 3 (three) times a week.    sacubitriL-valsartan (ENTRESTO) 24-26 mg per tablet Take 1 tablet by mouth 2 (two) times daily.    spironolactone (ALDACTONE) 25 MG tablet Take 1 tablet (25 mg total) by mouth once daily.          Holding his antihypertensives due to hypotension    Coronary artery disease  Patient with known CAD s/p stent placement and CABG, which is controlled Will hold the statin due to LFT elevation and monitor for S/Sx of angina/ACS. Continue to monitor on telemetry.   Pt no longer on aspirin or Plavix          VTE Risk Mitigation (From admission, onward)         Ordered     apixaban tablet 5 mg  2 times daily         02/06/23 2114     IP VTE HIGH RISK PATIENT  Once         02/06/23 2113     Place sequential compression device  Until discontinued         02/06/23 2113     Place KEN hose  Until discontinued         02/06/23 2113                Discharge Planning   MACKENZIE:  2/13/23    Code Status: DNR   Is the patient medically ready for discharge?:     Reason for patient still in hospital (select all that apply): Patient trending condition, Treatment and Consult recommendations  Discharge Plan A: Home                  Kaykay Siegel NP  Department of Hospital Medicine   [facility]

## 2023-02-10 NOTE — PT/OT/SLP PROGRESS
Physical Therapy Treatment    Patient Name:  Cleveland Capps   MRN:  [id number]    Recommendations:     Discharge Recommendations: home health PT  Discharge Equipment Recommendations: none  Barriers to discharge: None    Assessment:     Cleveland Capps is a 67 y.o. 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  He presents with the following impairments/functional limitations: weakness, impaired endurance, impaired functional mobility, gait instability, impaired balance, decreased lower extremity function, impaired cardiopulmonary response to activity. Patient presented supine with brother at bedside, and agreeable to PT treatment this PM. Patient transferred supine to sit with SBA, sit to stand with CGA and RW, and ambulated 60 feet with CGA, RW, and 1L/min O2. BP taken seated /61 mmHg with HR 77, and BP taken seated after gait 93/57 mmHg with HR 80 and reported dizziness. Patient returned to supine upon end of treatment. Continue to monitor BP with activity.     Rehab Prognosis: Fair; patient would benefit from acute skilled PT services to address these deficits and reach maximum level of function.    Recent Surgery: * No surgery found *      Plan:     During this hospitalization, patient to be seen 6 x/week to address the identified rehab impairments via gait training, therapeutic activities, therapeutic exercises and progress toward the following goals:    Plan of Care Expires:  03/09/23    Subjective     Chief Complaint: None stated  Patient/Family Comments/goals: Go home  Pain/Comfort:  Pain Rating 1: 0/10 (No pain reported)  Pain Rating Post-Intervention 1: 0/10      Objective:     Communicated with nurse prior to session.  Patient found supine with bed alarm, blood pressure cuff, oxygen, peripheral IV, PICC line, pulse ox (continuous), telemetry upon PT entry to room.     General Precautions: Standard, fall  Orthopedic  Precautions: N/A  Braces: N/A  Respiratory Status: Nasal cannula, flow 1 L/min     Functional Mobility:  Bed Mobility:     Scooting: modified independence  Supine to Sit: stand by assistance  Sit to Supine: stand by assistance  Transfers:     Sit to Stand:  contact guard assistance with rolling walker  Gait: 60 feet RW, CGA, 1L/min O2. No loss of balance noted, but patient demonstrated decreased activity tolerance      AM-PAC 6 CLICK MOBILITY          Treatment & Education:  Gait training x60 feet as noted above to improve functional mobility and activity tolerance.     Patient left supine with all lines intact, call button in reach, bed alarm on, nurse notified, and brother present..    GOALS:   Multidisciplinary Problems       Physical Therapy Goals          Problem: Physical Therapy    Goal Priority Disciplines Outcome Goal Variances Interventions   Physical Therapy Goal     PT, PT/OT Ongoing, Progressing     Description: Goals to be met by: 2023     Patient will increase functional independence with mobility by performin. Supine to sit with Modified [place]  2. Sit to supine with Modified [place]  3. Sit to stand transfer with Modified [place]  4. Bed to chair transfer with Supervision using Rolling Walker  5. Gait  x 250 feet with Supervision using Rolling Walker.                          Time Tracking:     PT Received On: 02/10/23  PT Start Time: 1338     PT Stop Time: 1356  PT Total Time (min): 18 min     Billable Minutes: Gait Training 18    Treatment Type: Treatment  PT/PTA: PT     PTA Visit Number: 0     02/10/2023

## 2023-02-10 NOTE — ASSESSMENT & PLAN NOTE
Patient with acute kidney injury likely due to pre-renal azotemia MEIR is currently improving. Labs reviewed- Renal function/electrolytes with Estimated Creatinine Clearance: 41.3 mL/min (based on SCr of 1.3 mg/dL). according to latest data. Monitor urine output and serial BMP and adjust therapy as needed. Avoid nephrotoxins and renally dose meds for GFR listed above.     -Discussed with cardiology: required bolus 2/2 hypotension on 2/9.  Lasix stopped 2/9  -Renal function continues to improve with adequate UOP.  -F/u BMP.  -Appreciate nephrology assistance.

## 2023-02-10 NOTE — EICU
Intervention Initiated From:  [facility] / EICU    Beatriz intervened regarding:  Documentation    Nurse Notified:  No    Doctor Notified:  No    Comments:  video rounding complete, AAO x's 4, NC on/in place, no c/o pain/discomfort/needs/wants, no critical care drips at this time, NAD, VSS, Afib 118

## 2023-02-10 NOTE — PLAN OF CARE
Problem: Adult Inpatient Plan of Care  Goal: Plan of Care Review  Outcome: Ongoing, Progressing     Problem: Skin Injury Risk Increased  Goal: Skin Health and Integrity  Outcome: Ongoing, Progressing     Problem: Fall Injury Risk  Goal: Absence of Fall and Fall-Related Injury  Outcome: Ongoing, Progressing     Problem: Respiratory Compromise (Heart Failure)  Goal: Effective Oxygenation and Ventilation  Outcome: Ongoing, Progressing    Pt remains in sinus arrhythmia, rate 60 -70.  Dobutamine drip continues at 4 mcg/kg/min per order.  Pt denies shortness of breath or other discomforts.  Up to [facility] with standby assist for bowel movement.  Bath done and linens changed.  Call light within reach.

## 2023-02-10 NOTE — CONSULTS
INPATIENT NEPHROLOGY CONSULT   [facility] NEPHROLOGY    Cleveland Capps  02/10/2023    Reason for consultation:    silvestre    Chief Complaint:   Chief Complaint   Patient presents with    Shortness of Breath    Atrial Fibrillation          History of Present Illness:    Per H and P    Cleveland Capps is a 67 year male who presents emergency room for evaluation of shortness of breath.  He reports shortness of breath and tachycardia onset approximately 8 days ago.  He is a history of atrial fibrillation and reports intermittently in AFib over the past several days.  He denies any chest pain.  Reports shortness of breath is worse with exertion.  No alleviating factors.  He also endorses an approximate 10-15 lb weight gain over the past several weeks.  No reported fever chills.  Previous medical history includes CVA, Watchman left atrial closure device, hypothyroidism, coronary artery disease, stent placement, COPD, hypertension, AICD, and combined systolic/diastolic heart failure.  ER workup:  CBC with mild anemia.  CMP with BUN of 44 and creatinine of 1.8.  Bilirubin elevated 3.0,  .  BNP elevated at 1518 (baseline 400).  Troponin mildly elevated 0.077.  Last echo demonstrated ejection fraction of 20-25%.  Patient was given 20 mg of Lasix in the ER.  Patient remained in atrial fibrillation/RVR while in ER.  He had mild hypotension.  Patient was started on amiodarone infusion and bolus after consulting Dr. Calhoun with on-call Cardiology.  Patient admitted Hospital Medicine for treatment management.     2/8  stopped entresto due to low bp.  On dobutamine.  Episodes of hypotension.  Tachycardic  2/9  converted to sinus rhythm.  3350 cc uop  2/10  feels well.  Has had some issues with hypotension.   Lasix held.      Plan of Care:       Assessment:    Acute kidney injury likely secondary to decompensated heart failure and cardiorenal syndrome  -- abdominal ultrasound--no hydronephrosis  --held Entresto due to  hypotension  --Avoid NSAIDS, Mckeon II inhibitors, and other non-essential nephrotoxic agents  --renal dose medication for crcl 10-50  --strict I/Os  --hold aldactone  --ua with micro with tubular casts suggestion hypoperfusion of nephrons   --FEUrea 17 % suggesting pre-renal etiology    Hypervolemia  --much improved  --keep net daily diuresis to 1-1.5 liters  --monitor output  --patient declining to go to [facility] for EP specialist evaluation for ablation and CHF team evaluation  --when MAPS steadily above 55 can add Lasix 20mg bid and aldactone 25mg daily     Anemia  --hg not low enough for erythropoiesis stimulating agent      Hyponatremia--hypervolemic   --diuresis  --treat hypothyroidism  --trend    Metabolic acidosis (mixed gap and non-gap).    --no intervention needed  --check urine pH is appropriately low  --diuretics alter calculation of anion gap      Hypokalemia  --replete               Thank you for allowing us to participate in this patient's care. We will continue to follow.    Vital Signs:  Temp Readings from Last 3 Encounters:   02/10/23 97.6 °F (36.4 °C) (Tympanic)   11/28/22 98.2 °F (36.8 °C)   11/13/22 97.7 °F (36.5 °C) (Oral)       Pulse Readings from Last 3 Encounters:   02/10/23 77   01/09/23 92   12/20/22 62       BP Readings from Last 3 Encounters:   02/10/23 102/73   01/09/23 125/76   12/20/22 120/60       Weight:  Wt Readings from Last 3 Encounters:   02/10/23 52.7 kg (116 lb 2.9 oz)   01/09/23 52.6 kg (116 lb)   12/20/22 52.9 kg (116 lb 8.2 oz)       Past Medical & Surgical History:  Past Medical History:   Diagnosis Date    A-fib     Acute kidney injury     COPD (chronic obstructive pulmonary disease)     Coronary artery disease     Diabetes mellitus     Encounter for blood transfusion     Former smoker     Hypertension     Myocardial infarction     Thyroid disease     Type 2 diabetes mellitus without complications        Past Surgical History:   Procedure Laterality Date    CLOSURE OF  LEFT ATRIAL APPENDAGE USING DEVICE N/A 2022    Procedure: Left atrial appendage closure device;  Surgeon: Tre Schmidt MD;  Location: [facility] CATH LAB;  Service: Cardiology;  Laterality: N/A;    COLONOSCOPY N/A 1/3/2017    Procedure: COLONOSCOPY;  Surgeon: Marcus Zarco MD;  Location: [facility] ENDO;  Service: Endoscopy;  Laterality: N/A;    CORONARY BYPASS GRAFT ANGIOGRAPHY  3/30/2021    Procedure: Bypass graft study;  Surgeon: Tre Schmidt MD;  Location: [facility] CATH LAB;  Service: Cardiology;;    CORONARY STENT PLACEMENT      GASTROSTOMY TUBE PLACEMENT      INSERTION OF PACEMAKER  2017    INTRAVASCULAR ULTRASOUND, NON-CORONARY  2022    Procedure: Intravascular Ultrasound, Non-Coronary;  Surgeon: Tre Schmidt MD;  Location: [facility] CATH LAB;  Service: Cardiology;;    LEFT HEART CATHETERIZATION N/A 3/30/2021    Procedure: Left heart cath;  Surgeon: Tre Schmidt MD;  Location: [facility] CATH LAB;  Service: Cardiology;  Laterality: N/A;    PEG TUBE REMOVAL      TRACHEOSTOMY CLOSURE      TRACHEOSTOMY TUBE PLACEMENT      TRANSESOPHAGEAL ECHOCARDIOGRAPHY N/A 2022    Procedure: ECHOCARDIOGRAM, TRANSESOPHAGEAL;  [facility];  Location: [facility] CATH LAB;  Service: Anesthesiology;  Laterality: N/A;    TREATMENT OF CARDIAC ARRHYTHMIA N/A 2022    Procedure: Cardioversion or Defibrillation;  Surgeon: Kuldeep Daniels MD;  Location: [facility] CATH LAB;  Service: Cardiology;  Laterality: N/A;    UPPER GASTROINTESTINAL ENDOSCOPY  2016    Dr. Zarco with PEG tube placement       Past Social History:  Social History     Socioeconomic History    Marital status: Single   Tobacco Use    Smoking status: Former     Types: Cigarettes     Quit date: 2005     Years since quittin.2    Smokeless tobacco: Never    Tobacco comments:     quit    Substance and Sexual Activity    Alcohol use: No    Drug use: No    Sexual activity: Yes     Social Determinants of Health     Financial  Resource Strain: Low Risk     Difficulty of Paying Living Expenses: Not hard at all   Food Insecurity: No Food Insecurity    Worried About Running Out of Food in the Last Year: Never true    Ran Out of Food in the Last Year: Never true   Transportation Needs: No Transportation Needs    Lack of Transportation (Medical): No    Lack of Transportation (Non-Medical): No   Physical Activity: Inactive    Days of Exercise per Week: 0 days    Minutes of Exercise per Session: 0 min   Stress: No Stress Concern Present    Feeling of Stress : Not at all   Social Connections: Socially Isolated    Frequency of Communication with Friends and Family: Three times a week    Frequency of Social Gatherings with Friends and Family: Once a week    Attends Congregational Services: Never    Active Member of Clubs or Organizations: No    Attends Club or Organization Meetings: Never    Marital Status: Never    Housing Stability: Low Risk     Unable to Pay for Housing in the Last Year: No    Number of Places Lived in the Last Year: 1    Unstable Housing in the Last Year: No       Medications:  No current facility-administered medications on file prior to encounter.     Current Outpatient Medications on File Prior to Encounter   Medication Sig Dispense Refill    apixaban (ELIQUIS) 5 mg Tab Take 5 mg by mouth 2 (two) times daily.      atorvastatin (LIPITOR) 80 MG tablet Take 80 mg by mouth once daily.      carvediloL (COREG) 3.125 MG tablet Take 1 tablet (3.125 mg total) by mouth 2 (two) times daily. (Patient taking differently: Take 3.125 mg by mouth 2 (two) times daily as needed.) 60 tablet 11    clopidogreL (PLAVIX) 75 mg tablet Take 1 tablet (75 mg total) by mouth once daily. 30 tablet 11    levothyroxine (SYNTHROID) 25 MCG tablet Take 25 mcg by mouth before breakfast.      potassium chloride SA (K-DUR,KLOR-CON M) 10 MEQ tablet       COMP-AIR NEBULIZER COMPRESSOR Alfreda use as directed      furosemide (LASIX) 20 MG tablet Take 1 tablet (20  "mg total) by mouth 3 (three) times a week. 10 tablet 0    sacubitriL-valsartan (ENTRESTO) 24-26 mg per tablet Take 1 tablet by mouth 2 (two) times daily. 60 tablet 2    spironolactone (ALDACTONE) 25 MG tablet Take 1 tablet (25 mg total) by mouth once daily. 30 tablet 11    travoprost (TRAVATAN Z) 0.004 % ophthalmic solution       travoprost, benzalkonium, (TRAVATAN) 0.004 % ophthalmic solution Place 1 drop into both eyes nightly.      TRUE METRIX GLUCOSE TEST STRIP Strp USE 1 STRIP TO CHECK GLUCOSE ONCE DAILY       Scheduled Meds:   amiodarone  400 mg Oral Daily    apixaban  5 mg Oral BID    levothyroxine  50 mcg Oral Before breakfast    mupirocin   Nasal BID    sodium chloride 0.9%  10 mL Intravenous Q6H     Continuous Infusions:   DOBUTamine IV infusion (non-titrating) 4 mcg/kg/min (02/09/23 1036)     PRN Meds:.dextrose 10%, dextrose 10%, glucagon (human recombinant), glucose, glucose, insulin aspart U-100, sodium chloride 0.9%, Flushing PICC Protocol **AND** sodium chloride 0.9% **AND** sodium chloride 0.9%    Allergies:  Penicillin and Penicillins    Past Family History:  Reviewed; refer to Hospitalist Admission Note    Review of Systems:  Review of Systems - All 14 systems reviewed and negative, except as noted in HPI    Physical Exam:    /73   Pulse 77   Temp 97.6 °F (36.4 °C) (Tympanic)   Resp 20   Ht 5' 1" (1.549 m)   Wt 52.7 kg (116 lb 2.9 oz)   SpO2 100%   BMI 21.95 kg/m²     General Appearance:    No distress   Head:    Normocephalic, without obvious abnormality, atraumatic   Eyes:    PER, conjunctiva/corneas clear, EOM's intact in both eyes        Throat:   Lips, mucosa, and tongue normal; teeth and gums normal   Back:     Symmetric, no curvature, ROM normal, no CVA tenderness   Lungs:     Diminished.  Crackles at bases   Chest wall:    No tenderness or deformity   Heart:    Irreg.  No rub   Abdomen:     Soft, non-tender, bowel sounds active all four quadrants,     no masses, no organomegaly "   Extremities:   Extremities normal, atraumatic, no cyanosis or edema   Pulses:   2+ and symmetric all extremities   MSK:   No joint or muscle swelling, tenderness or deformity   Skin:   Skin color, texture, turgor normal, no rashes or lesions   Neurologic:      No flap     Results:  Lab Results   Component Value Date     (L) 02/10/2023    K 3.3 (L) 02/10/2023    CL 98 02/10/2023    CO2 24 02/10/2023    BUN 31 (H) 02/10/2023    CREATININE 1.3 02/10/2023    CALCIUM 7.8 (L) 02/10/2023    ANIONGAP 10 02/10/2023    ESTGFRAFRICA >60.0 07/27/2022    EGFRNONAA 52.4 (A) 07/27/2022       Lab Results   Component Value Date    CALCIUM 7.8 (L) 02/10/2023    PHOS 4.8 (H) 02/07/2023       Recent Labs   Lab 02/10/23  0501   WBC 6.49   RBC 3.07*   HGB 10.4*   HCT 31.8*   *   *   MCH 33.9*   MCHC 32.7            I have personally reviewed pertinent radiological imaging and reports.    Patient care was time spent personally by me on the following activities:   Obtaining a history  Examination of patient.  Providing medical care at the patients bedside.  Developing a treatment plan with patient or surrogate and bedside caregivers  Ordering and reviewing laboratory studies, radiographic studies, pulse oximetry.  Ordering and performing treatments and interventions.  Evaluation of patient's response to treatment.  Discussions with consultants while on the unit and immediately available to the patient.  Re-evaluation of the patient's condition.  Documentation in the medical record.     Terence Davis MD  Nephrology  [facility] [place]  [phone]

## 2023-02-10 NOTE — ASSESSMENT & PLAN NOTE
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 CHF is currently uncontrolled due to Continued edema of extremities and Weight gain of 15 pounds. Latest ECHO performed and demonstrates- Results for orders placed during the hospital encounter of 06/27/22    Echo    Interpretation Summary  · The left ventricle is severely enlarged with mild eccentric hypertrophy and severely decreased systolic function.  · The estimated ejection fraction is 25%.  · There are segmental left ventricular wall motion abnormalities.  · Left ventricular diastolic dysfunction.  · Mild right ventricular enlargement with mildly to moderately reduced right ventricular systolic function.  · Severe left atrial enlargement.  · Mild right atrial enlargement.  · The MR is moderate to moderate -severe ( 2-3+).  · Mild to moderate tricuspid regurgitation.  · Mild pulmonic regurgitation.  · Intermediate central venous pressure (8 mmHg).  · The estimated PA systolic pressure is 47 mmHg.  · There is pulmonary hypertension.  . Continue Furosemide and monitor clinical status closely. Monitor on telemetry. Patient is off CHF pathway.  Monitor strict Is&Os and daily weights.  Place on fluid restriction of 1.5 L. Continue to stress to patient importance of self efficacy and  on diet for CHF. Last BNP reviewed- and noted below   Recent Labs   Lab 02/06/23  1543   BNP 1,518*   .  Repeat echo reviewed:  EF 20%   -liver function and kidney function seems improved today  -Continue dobutrex: will discuss deescalation plan with cardiology  -Lasix stopped 2/9: appears euvolemic.  -discussed case with Cardiology  -unable to tolerate beta blocker or Entresto due to BP issues

## 2023-02-10 NOTE — SUBJECTIVE & OBJECTIVE
Interval History:  Seen and examined.  In good spirits today.  Was able to have a bowel movement.  Denies chest pain.  No nausea or vomiting.  BP has improved and remains in sinus arrhythmia with controlled heart rate.  Remains off diuretics.  Liver function and kidney function all improved today.  Will discuss with Cardiology a weaning plan for Dobutrex today to continue throughout the weekend and if continues improved, hopefully can go home early next week.    Review of Systems   Constitutional:  Negative for chills, fatigue and fever.   Respiratory:  Negative for cough and shortness of breath.    Cardiovascular:  Negative for chest pain and leg swelling.   Neurological:  Negative for dizziness and light-headedness.   Objective:     Vital Signs (Most Recent):  Temp: 97.6 °F (36.4 °C) (02/10/23 0749)  Pulse: 74 (02/10/23 0856)  Resp: 20 (02/10/23 0856)  BP: 113/66 (02/10/23 0800)  SpO2: 100 % (02/10/23 0856) Vital Signs (24h Range):  Temp:  [97.4 °F (36.3 °C)-98 °F (36.7 °C)] 97.6 °F (36.4 °C)  Pulse:  [63-76] 74  Resp:  [] 20  SpO2:  [93 %-100 %] 100 %  BP: ()/(53-76) 113/66     Weight: 52.7 kg (116 lb 2.9 oz)  Body mass index is 21.95 kg/m².    Intake/Output Summary (Last 24 hours) at 2/10/2023 1104  Last data filed at 2/10/2023 0900  Gross per 24 hour   Intake 495.64 ml   Output 1270 ml   Net -774.36 ml      Physical Exam  Vitals and nursing note reviewed.   Constitutional:       Appearance: Normal appearance. He is ill-appearing (chronically ill appearing).   HENT:      Mouth/Throat:      Mouth: Mucous membranes are moist.      Pharynx: Oropharynx is clear.   Cardiovascular:      Rate and Rhythm: Normal rate. Rhythm irregular.      Comments: PICC line in place without erythema; scant bloody drainage noted on bandage  Pulmonary:      Effort: Pulmonary effort is normal.      Breath sounds: Normal breath sounds.   Abdominal:      General: Abdomen is flat. Bowel sounds are normal. There is no  distension.      Palpations: Abdomen is soft.   Musculoskeletal:         General: Normal range of motion.      Cervical back: Normal range of motion and neck supple.   Skin:     General: Skin is warm and dry.      Capillary Refill: Capillary refill takes less than 2 seconds.   Neurological:      General: No focal deficit present.      Mental Status: He is alert.   Psychiatric:         Mood and Affect: Mood normal.       Significant Labs: All pertinent labs within the past 24 hours have been reviewed.  CBC:   Recent Labs   Lab 02/09/23  0500 02/10/23  0501   WBC 7.47 6.49   HGB 11.7* 10.4*   HCT 36.0* 31.8*    128*     CMP:   Recent Labs   Lab 02/09/23  0500 02/10/23  0501   * 132*   K 3.2* 3.3*   CL 97 98   CO2 23 24   GLU 91 85   BUN 39* 31*   CREATININE 1.7* 1.3   CALCIUM 7.8* 7.8*   PROT 6.1 5.5*   ALBUMIN 3.6 3.4*   BILITOT 3.3* 3.0*   ALKPHOS 93 79   * 180*   * 489*   ANIONGAP 14 10       Significant Imaging: I have reviewed all pertinent imaging results/findings within the past 24 hours.

## 2023-02-10 NOTE — PROGRESS NOTES
[facility]  Department of Cardiology  Progress Note    PATIENT NAME: Cleveland Capps  MRN: [id number]  TODAY'S DATE: 02/10/2023  ADMIT DATE: 2/6/2023    SUBJECTIVE     PRINCIPLE PROBLEM: Acute on chronic combined systolic and diastolic congestive heart failure    INTERVAL HISTORY:    2/10/2023  Blood pressure stabilized  On Dobutrex 4 mics per kg per minute  Remains in sinus rhythm  Renal functions improved euvolemic    Review of patient's allergies indicates:   Allergen Reactions    Penicillin      Other reaction(s): anaphylaxis  Other reaction(s): anaphylaxis    Penicillins Hives       REVIEW OF SYSTEMS  CARDIOVASCULAR: No recent chest pain, palpitations, arm, neck, or jaw pain  RESPIRATORY: No recent fever, cough chills, SOB or congestion  : No blood in the urine  GI: No Nausea, vomiting, constipation, diarrhea, blood, or reflux.  MUSCULOSKELETAL: No myalgias  NEURO: No lightheadedness or dizziness  EYES: No Double vision, blurry, vision or headache     OBJECTIVE     VITAL SIGNS (Most Recent)  Temp: 97.6 °F (36.4 °C) (02/10/23 0749)  Pulse: 74 (02/10/23 0856)  Resp: 20 (02/10/23 0856)  BP: 113/66 (02/10/23 0800)  SpO2: 100 % (02/10/23 0856)    VENTILATION STATUS  Resp: 20 (02/10/23 0856)  SpO2: 100 % (02/10/23 0856)       I & O (Last 24H):  Intake/Output Summary (Last 24 hours) at 2/10/2023 1130  Last data filed at 2/10/2023 0900  Gross per 24 hour   Intake 495.64 ml   Output 1270 ml   Net -774.36 ml       WEIGHTS  Wt Readings from Last 3 Encounters:   02/10/23 0400 52.7 kg (116 lb 2.9 oz)   02/09/23 0540 51.7 kg (113 lb 15.7 oz)   02/08/23 1402 53.5 kg (117 lb 15.1 oz)   02/07/23 1300 54.3 kg (119 lb 11.4 oz)   02/07/23 0600 54.3 kg (119 lb 11.4 oz)   02/06/23 2217 54 kg (119 lb 0.8 oz)   02/06/23 1449 52.6 kg (116 lb)   01/09/23 1405 52.6 kg (116 lb)   12/20/22 1435 52.9 kg (116 lb 8.2 oz)       PHYSICAL EXAM  CONSTITUTIONAL: Well built, well nourished in no apparent distress  NECK: no  carotid bruit, no JVD  LUNGS: CTA  CHEST WALL: no tenderness  HEART: regular rate and rhythm, S1, S2 normal, no murmur, click, rub or gallop   ABDOMEN: soft, non-tender; bowel sounds normal; no masses,  no organomegaly  EXTREMITIES: Extremities normal, no edema  NEURO: AAO X 3    SCHEDULED MEDS:   amiodarone  400 mg Oral Daily    apixaban  5 mg Oral BID    levothyroxine  50 mcg Oral Before breakfast    mupirocin   Nasal BID    potassium chloride  40 mEq Oral Once    sodium chloride 0.9%  10 mL Intravenous Q6H       CONTINUOUS INFUSIONS:   DOBUTamine IV infusion (non-titrating) 4 mcg/kg/min (02/09/23 1036)       PRN MEDS:dextrose 10%, dextrose 10%, glucagon (human recombinant), glucose, glucose, insulin aspart U-100, sodium chloride 0.9%, Flushing PICC Protocol **AND** sodium chloride 0.9% **AND** sodium chloride 0.9%    LABS AND DIAGNOSTICS     CBC LAST 3 DAYS  Recent Labs   Lab 02/08/23  0450 02/09/23  0500 02/10/23  0501   WBC 8.00  8.00 7.47 6.49   RBC 3.63*  3.63* 3.43* 3.07*   HGB 12.5*  12.5* 11.7* 10.4*   HCT 37.8*  37.8* 36.0* 31.8*   *  104* 105* 104*   MCH 34.4*  34.4* 34.1* 33.9*   MCHC 33.1  33.1 32.5 32.7   RDW 16.8*  16.8* 16.6* 16.6*     233 158 128*   MPV 9.1*  9.1* 8.9* 8.8*   GRAN 73.9*  73.9*  5.9  5.9 80.2*  6.0 77.7*  5.1   LYMPH 16.1*  16.1*  1.3  1.3 10.4*  0.8* 11.6*  0.8*   MONO 8.4  8.4  0.7  0.7 7.2  0.5 7.6  0.5   BASO 0.03  0.03 0.03 0.03   NRBC 0  0 0 0       COAGULATION LAST 3 DAYS  Recent Labs   Lab 02/08/23  0959   INR 1.5*       CHEMISTRY LAST 3 DAYS  Recent Labs   Lab 02/06/23  1543 02/07/23  0518 02/08/23  0450 02/09/23  0500 02/10/23  0501   *   < > 131*  131* 134* 132*   K 4.2   < > 3.7  3.7 3.2* 3.3*      < > 98  98 97 98   CO2 19*   < > 19*  19* 23 24   ANIONGAP 9   < > 14  14 14 10   BUN 44*   < > 48*  48* 39* 31*   CREATININE 1.8*   < > 2.0*  2.0* 1.7* 1.3   *   < > 100  100 91 85   CALCIUM 8.7   < > 8.1*   8.1* 7.8* 7.8*   MG 2.3  --  2.2 1.9  --    ALBUMIN 3.4*  --  3.1*  3.1* 3.6 3.4*   PROT 6.3  --  5.9*  5.9* 6.1 5.5*   ALKPHOS 109  --  107  107 93 79   *   < > 924*  924* 687* 489*   *   < > 581*  581* 308* 180*   BILITOT 3.0*   < > 3.0*  3.0* 3.3* 3.0*    < > = values in this interval not displayed.       CARDIAC PROFILE LAST 3 DAYS  Recent Labs   Lab 02/06/23  1543 02/07/23  1022   BNP 1,518*  --    TROPONINI 0.077* 0.073*       ENDOCRINE LAST 3 DAYS  Recent Labs   Lab 02/07/23  1022   TSH 18.148*       LAST ARTERIAL BLOOD GAS  ABG  No results for input(s): PH, PO2, PCO2, HCO3, BE in the last 168 hours.    LAST 7 DAYS MICROBIOLOGY   Microbiology Results (last 7 days)       ** No results found for the last 168 hours. **            MOST RECENT IMAGING  X-Ray Chest 1 View for Line/Tube Placement  Narrative: EXAMINATION:  XR CHEST 1 VIEW FOR LINE/TUBE PLACEMENT    CLINICAL HISTORY:  PICC insertion;    TECHNIQUE:  Single frontal portable view of the chest was performed.    COMPARISON:  Chest of February 6, 2023    FINDINGS:  A PICC has been placed entering from the right and ending in the superior vena cava in good position.  An AICD is noted in place on the left with single lead to the heart.  The patient has had prior sternotomy.  There is mild cardiomegaly.  An intrapulmonary mass or infiltrate is not seen although there is prominence of the pulmonary vasculature.  A pneumothorax is not noted.  Impression: PICC placed in good position.  Mild cardiomegaly.  Prior sternotomy.  AICD in place.  Prominence of the pulmonary vasculature.    Electronically signed by: Huey Escalante MD  Date:    02/09/2023  Time:    14:24      [facility]  Results for orders placed during the hospital encounter of 02/06/23    Echo    Interpretation Summary  · The left ventricle is severely enlarged with eccentric hypertrophy and severely decreased systolic function.  · The estimated ejection fraction is 20%.  · Left  ventricular diastolic dysfunction.  · There is left ventricular global hypokinesis. GLOBAL HYPOKINESIA WITH ANTERIOR APICAL AKINESIA  · Moderate right ventricular enlargement.  · Moderate right atrial enlargement.  · Elevated central venous pressure (15 mmHg).  · LIMITED STUDY  · Right atrial enlargement.      CURRENT/PREVIOUS VISIT EKG  Results for orders placed or performed during the hospital encounter of 02/06/23   EKG 12-lead    Collection Time: 02/06/23  2:58 PM    Narrative    Test Reason : I48.91,    Vent. Rate : 127 BPM     Atrial Rate : 375 BPM     P-R Int : 000 ms          QRS Dur : 082 ms      QT Int : 318 ms       P-R-T Axes : 000 075 213 degrees     QTc Int : 462 ms    Atrial flutter with variable A-V block with premature ventricular or  aberrantly conducted complexes  Anteroseptal infarct  T wave abnormality, consider inferior ischemia  Abnormal ECG    Referred By: [email or web address]   SELF           Confirmed By:        ASSESSMENT/PLAN:     Active Hospital Problems    Diagnosis    *Acute on chronic combined systolic and diastolic congestive heart failure    Hypotension    Longstanding persistent atrial fibrillation    AICD (automatic cardioverter/defibrillator) present    Elevated LFTs    Hypothyroid    History of CVA (cerebrovascular accident)    MEIR (acute kidney injury)     Atn; requiring dialysis      Benign essential HTN    Coronary artery disease     S/p Cabg 4/26 Dr Bledsoe         ASSESSMENT & PLAN:   Heart failure decreased ejection fraction improved fluid status  Atrial fibrillation currently in sinus rhythm  Elevated liver enzymes improving secondary to passive congestion  Acute kidney injury improved  Hypotension secondary to medications improved  RECOMMENDATIONS:  Patient is doing well today would try to wean Dobutrex 1 evelyn per day over the weekend when he gets to 2 mics DC the Dobutrex  Continue amiodarone for rhythm control 400 mg  Hold Entresto and carvedilol    Lasix and Aldactone and  potassium management per Nephrology      Tre Rosario MD  [facility]  Department of Cardiology  Date of Service: 02/10/2023  11:30 AM

## 2023-02-10 NOTE — EICU
Intervention Initiated From:  COR / EICU    Beatriz intervened regarding:  Rounding (Video assessment)  Comments: Vide rounds done.  Lying in bed,  no acute distress noted. Dobutamine IV drip in progress at 4 mcg/kg/min, POx 100% on 2 L/min nasal cannula, VS:  BP 86/57, HR 70 (SR), RR 20

## 2023-02-11 LAB
ALBUMIN SERPL BCP-MCNC: 3.2 G/DL (ref 3.5–5.2)
ANION GAP SERPL CALC-SCNC: 9 MMOL/L (ref 8–16)
BASOPHILS # BLD AUTO: 0.03 K/UL (ref 0–0.2)
BASOPHILS NFR BLD: 0.5 % (ref 0–1.9)
BUN SERPL-MCNC: 24 MG/DL (ref 8–23)
CALCIUM SERPL-MCNC: 8.1 MG/DL (ref 8.7–10.5)
CHLORIDE SERPL-SCNC: 101 MMOL/L (ref 95–110)
CO2 SERPL-SCNC: 25 MMOL/L (ref 23–29)
CREAT SERPL-MCNC: 1.2 MG/DL (ref 0.5–1.4)
DIFFERENTIAL METHOD: ABNORMAL
EOSINOPHIL # BLD AUTO: 0.1 K/UL (ref 0–0.5)
EOSINOPHIL NFR BLD: 1.9 % (ref 0–8)
ERYTHROCYTE [DISTWIDTH] IN BLOOD BY AUTOMATED COUNT: 16.5 % (ref 11.5–14.5)
EST. GFR  (NO RACE VARIABLE): >60 ML/MIN/1.73 M^2
GLUCOSE SERPL-MCNC: 92 MG/DL (ref 70–110)
HCT VFR BLD AUTO: 32.6 % (ref 40–54)
HGB BLD-MCNC: 10.7 G/DL (ref 14–18)
IMM GRANULOCYTES # BLD AUTO: 0.02 K/UL (ref 0–0.04)
IMM GRANULOCYTES NFR BLD AUTO: 0.3 % (ref 0–0.5)
LYMPHOCYTES # BLD AUTO: 0.7 K/UL (ref 1–4.8)
LYMPHOCYTES NFR BLD: 10.1 % (ref 18–48)
MCH RBC QN AUTO: 34.4 PG (ref 27–31)
MCHC RBC AUTO-ENTMCNC: 32.8 G/DL (ref 32–36)
MCV RBC AUTO: 105 FL (ref 82–98)
MONOCYTES # BLD AUTO: 0.6 K/UL (ref 0.3–1)
MONOCYTES NFR BLD: 8.9 % (ref 4–15)
NEUTROPHILS # BLD AUTO: 5 K/UL (ref 1.8–7.7)
NEUTROPHILS NFR BLD: 78.3 % (ref 38–73)
NRBC BLD-RTO: 0 /100 WBC
PHOSPHATE SERPL-MCNC: 2.9 MG/DL (ref 2.7–4.5)
PLATELET # BLD AUTO: 130 K/UL (ref 150–450)
PMV BLD AUTO: 8.6 FL (ref 9.2–12.9)
POCT GLUCOSE: 104 MG/DL (ref 70–110)
POCT GLUCOSE: 123 MG/DL (ref 70–110)
POCT GLUCOSE: 151 MG/DL (ref 70–110)
POCT GLUCOSE: 95 MG/DL (ref 70–110)
POTASSIUM SERPL-SCNC: 4.5 MMOL/L (ref 3.5–5.1)
RBC # BLD AUTO: 3.11 M/UL (ref 4.6–6.2)
SODIUM SERPL-SCNC: 135 MMOL/L (ref 136–145)
WBC # BLD AUTO: 6.41 K/UL (ref 3.9–12.7)

## 2023-02-11 PROCEDURE — 25000003 PHARM REV CODE 250: Performed by: NURSE PRACTITIONER

## 2023-02-11 PROCEDURE — 97110 THERAPEUTIC EXERCISES: CPT

## 2023-02-11 PROCEDURE — 80069 RENAL FUNCTION PANEL: CPT | Performed by: INTERNAL MEDICINE

## 2023-02-11 PROCEDURE — 99232 SBSQ HOSP IP/OBS MODERATE 35: CPT | Mod: ,,, | Performed by: INTERNAL MEDICINE

## 2023-02-11 PROCEDURE — A4216 STERILE WATER/SALINE, 10 ML: HCPCS | Performed by: NURSE PRACTITIONER

## 2023-02-11 PROCEDURE — 97116 GAIT TRAINING THERAPY: CPT

## 2023-02-11 PROCEDURE — 20600001 HC STEP DOWN PRIVATE ROOM

## 2023-02-11 PROCEDURE — 99232 PR SUBSEQUENT HOSPITAL CARE,LEVL II: ICD-10-PCS | Mod: ,,, | Performed by: INTERNAL MEDICINE

## 2023-02-11 PROCEDURE — 94761 N-INVAS EAR/PLS OXIMETRY MLT: CPT

## 2023-02-11 PROCEDURE — 36569 INSJ PICC 5 YR+ W/O IMAGING: CPT

## 2023-02-11 PROCEDURE — 27000221 HC OXYGEN, UP TO 24 HOURS

## 2023-02-11 PROCEDURE — 85025 COMPLETE CBC W/AUTO DIFF WBC: CPT | Performed by: INTERNAL MEDICINE

## 2023-02-11 PROCEDURE — 36415 COLL VENOUS BLD VENIPUNCTURE: CPT | Performed by: INTERNAL MEDICINE

## 2023-02-11 RX ORDER — SPIRONOLACTONE 25 MG/1
25 TABLET ORAL DAILY
Status: DISCONTINUED | OUTPATIENT
Start: 2023-02-11 | End: 2023-02-14 | Stop reason: HOSPADM

## 2023-02-11 RX ADMIN — SODIUM CHLORIDE, PRESERVATIVE FREE 10 ML: 5 INJECTION INTRAVENOUS at 05:02

## 2023-02-11 RX ADMIN — AMIODARONE HYDROCHLORIDE 400 MG: 200 TABLET ORAL at 08:02

## 2023-02-11 RX ADMIN — MUPIROCIN: 20 OINTMENT TOPICAL at 08:02

## 2023-02-11 RX ADMIN — SODIUM CHLORIDE, PRESERVATIVE FREE 10 ML: 5 INJECTION INTRAVENOUS at 06:02

## 2023-02-11 RX ADMIN — SPIRONOLACTONE 25 MG: 25 TABLET, FILM COATED ORAL at 09:02

## 2023-02-11 RX ADMIN — APIXABAN 5 MG: 2.5 TABLET, FILM COATED ORAL at 08:02

## 2023-02-11 RX ADMIN — SODIUM CHLORIDE, PRESERVATIVE FREE 10 ML: 5 INJECTION INTRAVENOUS at 11:02

## 2023-02-11 RX ADMIN — LEVOTHYROXINE SODIUM 50 MCG: 50 TABLET ORAL at 06:02

## 2023-02-11 RX ADMIN — SODIUM CHLORIDE, PRESERVATIVE FREE 10 ML: 5 INJECTION INTRAVENOUS at 12:02

## 2023-02-11 RX ADMIN — MUPIROCIN: 20 OINTMENT TOPICAL at 09:02

## 2023-02-11 NOTE — SUBJECTIVE & OBJECTIVE
Interval History:  Seen and examined.  Doing well.  No chest pain or shortness for breath.  Did well with physical therapy today.  No further dizziness with ambulation.  BP doing well, renal function is normalizing.  Discussed with Cardiology and Nephrology regarding plan of care and will decrease Dobutrex while resuming spironolactone and if well-tolerated eventually Lasix.  Pt agreeable care plan; brother at bedside updated    Review of Systems   Constitutional:  Negative for chills, fatigue and fever.   Respiratory:  Negative for cough and shortness of breath.    Cardiovascular:  Negative for chest pain and leg swelling.   Neurological:  Negative for dizziness and light-headedness.   Objective:     Vital Signs (Most Recent):  Temp: 97.4 °F (36.3 °C) (02/11/23 0725)  Pulse: 68 (02/11/23 0725)  Resp: 15 (02/11/23 0725)  BP: 111/71 (02/11/23 0600)  SpO2: 98 % (02/11/23 0725) Vital Signs (24h Range):  Temp:  [97.3 °F (36.3 °C)-98 °F (36.7 °C)] 97.4 °F (36.3 °C)  Pulse:  [64-80] 68  Resp:  [13-59] 15  SpO2:  [92 %-100 %] 98 %  BP: ()/(61-75) 111/71     Weight: 53.7 kg (118 lb 6.2 oz)  Body mass index is 22.37 kg/m².    Intake/Output Summary (Last 24 hours) at 2/11/2023 1143  Last data filed at 2/11/2023 0818  Gross per 24 hour   Intake --   Output 600 ml   Net -600 ml        Physical Exam  Vitals and nursing note reviewed.   Constitutional:       Appearance: Normal appearance. He is ill-appearing (chronically ill appearing).   HENT:      Mouth/Throat:      Mouth: Mucous membranes are moist.      Pharynx: Oropharynx is clear.   Cardiovascular:      Rate and Rhythm: Normal rate. Rhythm irregular.      Comments: PICC line in place without erythema; scant bloody drainage noted on bandage  Pulmonary:      Effort: Pulmonary effort is normal.      Breath sounds: Normal breath sounds.   Abdominal:      General: Abdomen is flat. Bowel sounds are normal. There is no distension.      Palpations: Abdomen is soft.    Musculoskeletal:         General: Normal range of motion.      Cervical back: Normal range of motion and neck supple.   Skin:     General: Skin is warm and dry.      Capillary Refill: Capillary refill takes less than 2 seconds.   Neurological:      General: No focal deficit present.      Mental Status: He is alert.   Psychiatric:         Mood and Affect: Mood normal.       Significant Labs: All pertinent labs within the past 24 hours have been reviewed.  CBC:   Recent Labs   Lab 02/10/23  0501 02/11/23 0441   WBC 6.49 6.41   HGB 10.4* 10.7*   HCT 31.8* 32.6*   * 130*       CMP:   Recent Labs   Lab 02/10/23  0501 02/11/23  0441   * 135*   K 3.3* 4.5   CL 98 101   CO2 24 25   GLU 85 92   BUN 31* 24*   CREATININE 1.3 1.2   CALCIUM 7.8* 8.1*   PROT 5.5*  --    ALBUMIN 3.4* 3.2*   BILITOT 3.0*  --    ALKPHOS 79  --    *  --    *  --    ANIONGAP 10 9         Significant Imaging: I have reviewed all pertinent imaging results/findings within the past 24 hours.

## 2023-02-11 NOTE — EICU
Intervention Initiated From:  COR / EICU    Beatriz intervened regarding:  Rounding (Video assessment)    Nurse Notified:  No    Doctor Notified:  No    Comments: Video rounds complete. Patient resting in bed with no alarms, or distress noted at this time.

## 2023-02-11 NOTE — EICU
"Intervention Initiated From:  COR / EICU    Beatriz intervened regarding:  Rounding (Video assessment)    Pt awake, sitting up in bed watching TV on nasal cannula-No distress observed. VSS States he feels "scared" about health issues but thanks his team for taking good care of him. Dobutamine infusing.  "

## 2023-02-11 NOTE — ASSESSMENT & PLAN NOTE
Patient with acute kidney injury likely due to pre-renal azotemia MEIR is currently improving. Labs reviewed- Renal function/electrolytes with Estimated Creatinine Clearance: 44.8 mL/min (based on SCr of 1.2 mg/dL). according to latest data. Monitor urine output and serial BMP and adjust therapy as needed. Avoid nephrotoxins and renally dose meds for GFR listed above.     -Discussed with cardiology: required bolus 2/2 hypotension on 2/9.  Lasix stopped 2/9. Aldactone resumed 2/10.  -Renal function continues to improve with adequate UOP.  -F/u BMP.  -Appreciate nephrology assistance.

## 2023-02-11 NOTE — HOSPITAL COURSE
Pt will monitor closely during his stay.  He was diuresed with IV Lasix.  Cardiology and Nephrology was consulted.  Pt was maintained on amiodarone infusion for his atrial fibrillation with RVR.  There were some issues with hypotension requiring the Entresto and attempts at beta-blocker to be stopped.  The echocardiogram confirmed an EF of 20%.  His renal and liver function worsened during his stay.  Transfer to [facility] for advanced heart failure service as was recommended by Cardiology, however Pt declined and elected for DNR status.  He wished to optimize his care here in this hospital, but not transfer of care.  Dobutrex was added to his regimen with good result.  He did have some lingering hypotension and diuresis was stopped and required fluid bolusing with improvement hypotension.  He did convert to sinus arrhythmia on amiodarone infusion.  His liver function tests and renal function began to improve.  Pt worked with PT/OT his hospitalization.  The Dobutrex was weaned down as Pt tolerated.  He was transitioned oral amiodarone. The dobutrex was discontinued on 2/12. He maintained SA/SR.  He was resumed on some diuretic therapy with good tolerance.  Entresto was reordered by Cardiology.  Patient tolerated.  He was cleared from Cardiology and Nephrology standpoint for discharge.  Discharge instructions as well as return precautions were discussed with patient with good understanding.    Physical exam:  Awake alert oriented x4, no acute distress  Cardiac:  RRR  Pulmonary:  Clear to auscultation  Abdomen:  Soft, nontender  Extremities: normal range of motion, no appreciable edema

## 2023-02-11 NOTE — PLAN OF CARE
Problem: Physical Therapy  Goal: Physical Therapy Goal  Description: Goals to be met by: 2023     Patient will increase functional independence with mobility by performin. Supine to sit with Modified [place]  2. Sit to supine with Modified [place]  3. Sit to stand transfer with Modified [place]  4. Bed to chair transfer with Supervision using Rolling Walker  5. Gait  x 250 feet with Supervision using Rolling Walker.     Outcome: Ongoing, Progressing

## 2023-02-11 NOTE — CARE UPDATE
02/10/23 1859   Patient Assessment/Suction   Level of Consciousness (AVPU) alert   Respiratory Effort Unlabored;Normal   Expansion/Accessory Muscles/Retractions no use of accessory muscles   All Lung Fields Breath Sounds equal bilaterally   Rhythm/Pattern, Respiratory unlabored;pattern regular   PRE-TX-O2   Device (Oxygen Therapy) nasal cannula   Flow (L/min) 1   SpO2 99 %   Pulse Oximetry Type Continuous   Pulse 77   Resp (!) 59

## 2023-02-11 NOTE — PT/OT/SLP PROGRESS
Physical Therapy Treatment    Patient Name:  Cleveland Capps   MRN:  [id number]    Recommendations:     Discharge Recommendations: home health PT  Discharge Equipment Recommendations: none  Barriers to discharge: None    Assessment:     Cleveland Capps is a 67 y.o. 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  He presents with the following impairments/functional limitations: weakness, impaired endurance, impaired functional mobility, gait instability, impaired balance, impaired cardiopulmonary response to activity. Pt agreeable to participation in PT treatment. Pt performed therex in bed prior to out of bed activity. Pt ambulated 200ft using RW with CGA.     Rehab Prognosis: Good; patient would benefit from acute skilled PT services to address these deficits and reach maximum level of function.    Recent Surgery: * No surgery found *      Plan:     During this hospitalization, patient to be seen 6 x/week to address the identified rehab impairments via gait training, therapeutic activities, therapeutic exercises and progress toward the following goals:    Plan of Care Expires:  03/09/23    Subjective     Chief Complaint: none at this time   Patient/Family Comments/goals: maximize functional strength and mobility prior to discharge   Pain/Comfort:  Pain Rating 1: 0/10      Objective:     Communicated with nurse Low prior to session.  Patient found HOB elevated with bed alarm, blood pressure cuff, peripheral IV, PICC line, pulse ox (continuous), telemetry upon PT entry to room.     General Precautions: Standard, fall  Orthopedic Precautions: N/A  Braces: N/A  Respiratory Status: Room air   BP Prior to treatment: 106/68mmHg  HR prior to treatment: 79  SPO2 prior to treatment: 98%    Following gait training   HR: 84bpm  SP02: 98%    Functional Mobility:  Bed Mobility:     Supine to Sit: modified independence  Transfers:     Sit to Stand:   supervision with rolling walker  Gait: Pt ambulated x200ft using RW with CGA, no rest breaks      AM-PAC 6 CLICK MOBILITY          Treatment & Education:    Heel slides x10 each side   Long arc quad  x10 each side  Sit to stand x2   Gait x200ft using RW with CGA no rest breaks, slow gait     Patient left HOB elevated with all lines intact, call button in reach, bed alarm on, nurse Maranda notified, and family present..    GOALS:   Multidisciplinary Problems       Physical Therapy Goals          Problem: Physical Therapy    Goal Priority Disciplines Outcome Goal Variances Interventions   Physical Therapy Goal     PT, PT/OT Ongoing, Progressing     Description: Goals to be met by: 2023     Patient will increase functional independence with mobility by performin. Supine to sit with Modified [place]  2. Sit to supine with Modified [place]  3. Sit to stand transfer with Modified [place]  4. Bed to chair transfer with Supervision using Rolling Walker  5. Gait  x 250 feet with Supervision using Rolling Walker.                          Time Tracking:     PT Received On: 23  PT Start Time: 0959     PT Stop Time: 1022  PT Total Time (min): 23 min     Billable Minutes: Gait Training 13 and Therapeutic Exercise 10    Treatment Type: Treatment  PT/PTA: PT     PTA Visit Number: 0     2023

## 2023-02-11 NOTE — ASSESSMENT & PLAN NOTE
Patient with Persistent (7 days or more) atrial fibrillation which is uncontrolled currently with Amiodarone. Patient is currently in atrial fibrillation.[id number] Score: 2. Anticoagulation indicated. Anticoagulation done with eliquis.    -converted to SA 2/8 PM: Continue the oral amiodarone  -cardiology following   -Monitor closely on telemetry  -watch for uncontrolled tachycardia/tachyarrhythmia as while on dobutamine

## 2023-02-11 NOTE — CARE UPDATE
02/11/23 0725   Patient Assessment/Suction   Level of Consciousness (AVPU) alert   Respiratory Effort Unlabored;Normal   Expansion/Accessory Muscles/Retractions no use of accessory muscles;expansion symmetric;no retractions   Rhythm/Pattern, Respiratory unlabored;pattern regular;depth regular   PRE-TX-O2   Device (Oxygen Therapy) nasal cannula;room air  (cannula placed on standby transitioned to room air)   $ Is the patient on Low Flow Oxygen? Yes   Flow (L/min) 1   SpO2 98 %   Pulse Oximetry Type Continuous   $ Pulse Oximetry - Multiple Charge Pulse Oximetry - Multiple   Pulse 68   Resp 15

## 2023-02-11 NOTE — ASSESSMENT & PLAN NOTE
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 CHF is currently uncontrolled due to Continued edema of extremities and Weight gain of 15 pounds. Latest ECHO performed and demonstrates- Results for orders placed during the hospital encounter of 06/27/22    Echo    Interpretation Summary  · The left ventricle is severely enlarged with mild eccentric hypertrophy and severely decreased systolic function.  · The estimated ejection fraction is 25%.  · There are segmental left ventricular wall motion abnormalities.  · Left ventricular diastolic dysfunction.  · Mild right ventricular enlargement with mildly to moderately reduced right ventricular systolic function.  · Severe left atrial enlargement.  · Mild right atrial enlargement.  · The MR is moderate to moderate -severe ( 2-3+).  · Mild to moderate tricuspid regurgitation.  · Mild pulmonic regurgitation.  · Intermediate central venous pressure (8 mmHg).  · The estimated PA systolic pressure is 47 mmHg.  · There is pulmonary hypertension.  . Continue Furosemide and monitor clinical status closely. Monitor on telemetry. Patient is off CHF pathway.  Monitor strict Is&Os and daily weights.  Place on fluid restriction of 1.5 L. Continue to stress to patient importance of self efficacy and  on diet for CHF. Last BNP reviewed- and noted below   Recent Labs   Lab 02/06/23  1543   BNP 1,518*   .  Repeat echo reviewed:  EF 20%   -liver function and kidney function seems improved today  -Continue dobutrex: deescalating daily: down to 3mcg/kg/min today.  -discussed with nephrology: adding back aldactone today.  -Lasix stopped 2/9: appears euvolemic.  -unable to tolerate beta blocker or Entresto due to BP issues

## 2023-02-11 NOTE — CONSULTS
Consulted for skin tear to right forearm. Orders obtained for wound care. Wound care to follow up throughout hospital stay.

## 2023-02-11 NOTE — PROGRESS NOTES
INPATIENT NEPHROLOGY CONSULT   [facility] NEPHROLOGY    Cleveland Capps  02/11/2023    Reason for consultation:  silvestre    Chief Complaint:   Chief Complaint   Patient presents with    Shortness of Breath    Atrial Fibrillation     History of Present Illness:    Cleveland Capps is a 67 year male who presents emergency room for evaluation of shortness of breath.  He reports shortness of breath and tachycardia onset approximately 8 days ago.  He is a history of atrial fibrillation and reports intermittently in AFib over the past several days.  He denies any chest pain.  Reports shortness of breath is worse with exertion.  No alleviating factors.  He also endorses an approximate 10-15 lb weight gain over the past several weeks.  No reported fever chills.  Previous medical history includes CVA, Watchman left atrial closure device, hypothyroidism, coronary artery disease, stent placement, COPD, hypertension, AICD, and combined systolic/diastolic heart failure.  ER workup:  CBC with mild anemia.  CMP with BUN of 44 and creatinine of 1.8.  Bilirubin elevated 3.0,  .  BNP elevated at 1518 (baseline 400).  Troponin mildly elevated 0.077.  Last echo demonstrated ejection fraction of 20-25%.  Patient was given 20 mg of Lasix in the ER.  Patient remained in atrial fibrillation/RVR while in ER.  He had mild hypotension.  Patient was started on amiodarone infusion and bolus after consulting Dr. Calhoun with on-call Cardiology.  Patient admitted Hospital Medicine for treatment management.     2/8  stopped entresto due to low bp.  On dobutamine.  Episodes of hypotension.  Tachycardic  2/9  converted to sinus rhythm.  3350 cc uop  2/10  feels well.  Has had some issues with hypotension.   Lasix held.    2/11 VSS, no new complains.    Plan of Care:    Acute kidney injury likely secondary to decompensated heart failure and cardiorenal syndrome  --abdominal ultrasound--no hydronephrosis  --held Entresto due to  hypotension  --avoid NSAIDS, Mckeon II inhibitors, and other non-essential nephrotoxic agents  --renal dose medication for crcl 10-50  --strict I/Os  --hold aldactone  --ua with micro with tubular casts suggestion hypoperfusion of nephrons   --FEUrea 17 % suggesting pre-renal etiology    Hypervolemia  --much improved  --keep net daily diuresis to 1-1.5 liters  --monitor output  --patient declining to go to [facility] for EP specialist evaluation for ablation and CHF team evaluation  --when MAP steadily above 55 can add Lasix 20mg bid and aldactone 25mg daily     Anemia  --hgb not low enough for erythropoiesis stimulating agent      Hyponatremia--hypervolemic   --diuresis  --treat hypothyroidism  --trend    Metabolic acidosis (mixed gap and non-gap)  --no intervention needed  --check urine pH is appropriately low  --diuretics alter calculation of anion gap    Hypokalemia  --replete as needed    Thank you for allowing us to participate in this patient's care. We will continue to follow.    Vital Signs:  Temp Readings from Last 3 Encounters:   02/11/23 97.4 °F (36.3 °C)   11/28/22 98.2 °F (36.8 °C)   11/13/22 97.7 °F (36.5 °C) (Oral)       Pulse Readings from Last 3 Encounters:   02/11/23 68   01/09/23 92   12/20/22 62       BP Readings from Last 3 Encounters:   02/11/23 111/71   01/09/23 125/76   12/20/22 120/60       Weight:  Wt Readings from Last 3 Encounters:   02/11/23 53.7 kg (118 lb 6.2 oz)   01/09/23 52.6 kg (116 lb)   12/20/22 52.9 kg (116 lb 8.2 oz)       Past Medical & Surgical History:  Past Medical History:   Diagnosis Date    A-fib     Acute kidney injury     COPD (chronic obstructive pulmonary disease)     Coronary artery disease     Diabetes mellitus     Encounter for blood transfusion     Former smoker     Hypertension     Myocardial infarction     Thyroid disease     Type 2 diabetes mellitus without complications        Past Surgical History:   Procedure Laterality Date    CLOSURE OF LEFT ATRIAL  APPENDAGE USING DEVICE N/A 2022    Procedure: Left atrial appendage closure device;  Surgeon: Tre Schmidt MD;  Location: [facility] CATH LAB;  Service: Cardiology;  Laterality: N/A;    COLONOSCOPY N/A 1/3/2017    Procedure: COLONOSCOPY;  Surgeon: Marcus Zarco MD;  Location: [facility] ENDO;  Service: Endoscopy;  Laterality: N/A;    CORONARY BYPASS GRAFT ANGIOGRAPHY  3/30/2021    Procedure: Bypass graft study;  Surgeon: Tre Schmidt MD;  Location: [facility] CATH LAB;  Service: Cardiology;;    CORONARY STENT PLACEMENT      GASTROSTOMY TUBE PLACEMENT      INSERTION OF PACEMAKER  2017    INTRAVASCULAR ULTRASOUND, NON-CORONARY  2022    Procedure: Intravascular Ultrasound, Non-Coronary;  Surgeon: Tre Schmidt MD;  Location: [facility] CATH LAB;  Service: Cardiology;;    LEFT HEART CATHETERIZATION N/A 3/30/2021    Procedure: Left heart cath;  Surgeon: Tre Schmidt MD;  Location: [facility] CATH LAB;  Service: Cardiology;  Laterality: N/A;    PEG TUBE REMOVAL      TRACHEOSTOMY CLOSURE      TRACHEOSTOMY TUBE PLACEMENT      TRANSESOPHAGEAL ECHOCARDIOGRAPHY N/A 2022    Procedure: ECHOCARDIOGRAM, TRANSESOPHAGEAL;  Surgeon: [facility];  Location: [facility] CATH LAB;  Service: Anesthesiology;  Laterality: N/A;    TREATMENT OF CARDIAC ARRHYTHMIA N/A 2022    Procedure: Cardioversion or Defibrillation;  Surgeon: Kuldeep Daniels MD;  Location: [facility] CATH LAB;  Service: Cardiology;  Laterality: N/A;    UPPER GASTROINTESTINAL ENDOSCOPY  2016    Dr. Zarco with PEG tube placement       Past Social History:  Social History     Socioeconomic History    Marital status: Single   Tobacco Use    Smoking status: Former     Types: Cigarettes     Quit date: 2005     Years since quittin.2    Smokeless tobacco: Never    Tobacco comments:     quit    Substance and Sexual Activity    Alcohol use: No    Drug use: No    Sexual activity: Yes     Social Determinants of Health     Financial Resource  Strain: Low Risk     Difficulty of Paying Living Expenses: Not hard at all   Food Insecurity: No Food Insecurity    Worried About Running Out of Food in the Last Year: Never true    Ran Out of Food in the Last Year: Never true   Transportation Needs: No Transportation Needs    Lack of Transportation (Medical): No    Lack of Transportation (Non-Medical): No   Physical Activity: Inactive    Days of Exercise per Week: 0 days    Minutes of Exercise per Session: 0 min   Stress: No Stress Concern Present    Feeling of Stress : Not at all   Social Connections: Socially Isolated    Frequency of Communication with Friends and Family: Three times a week    Frequency of Social Gatherings with Friends and Family: Once a week    Attends [employer] Services: Never    Active Member of Clubs or Organizations: No    Attends Club or Organization Meetings: Never    Marital Status: Never    Housing Stability: Low Risk     Unable to Pay for Housing in the Last Year: No    Number of Places Lived in the Last Year: 1    Unstable Housing in the Last Year: No       Medications:  No current facility-administered medications on file prior to encounter.     Current Outpatient Medications on File Prior to Encounter   Medication Sig Dispense Refill    apixaban (ELIQUIS) 5 mg Tab Take 5 mg by mouth 2 (two) times daily.      atorvastatin (LIPITOR) 80 MG tablet Take 80 mg by mouth once daily.      carvediloL (COREG) 3.125 MG tablet Take 1 tablet (3.125 mg total) by mouth 2 (two) times daily. (Patient taking differently: Take 3.125 mg by mouth 2 (two) times daily as needed.) 60 tablet 11    clopidogreL (PLAVIX) 75 mg tablet Take 1 tablet (75 mg total) by mouth once daily. 30 tablet 11    levothyroxine (SYNTHROID) 25 MCG tablet Take 25 mcg by mouth before breakfast.      potassium chloride SA (K-DUR,KLOR-CON M) 10 MEQ tablet       COMP-AIR NEBULIZER COMPRESSOR Alfreda use as directed      furosemide (LASIX) 20 MG tablet Take 1 tablet (20 mg total)  "by mouth 3 (three) times a week. 10 tablet 0    sacubitriL-valsartan (ENTRESTO) 24-26 mg per tablet Take 1 tablet by mouth 2 (two) times daily. 60 tablet 2    spironolactone (ALDACTONE) 25 MG tablet Take 1 tablet (25 mg total) by mouth once daily. 30 tablet 11    travoprost (TRAVATAN Z) 0.004 % ophthalmic solution       travoprost, benzalkonium, (TRAVATAN) 0.004 % ophthalmic solution Place 1 drop into both eyes nightly.      TRUE METRIX GLUCOSE TEST STRIP Strp USE 1 STRIP TO CHECK GLUCOSE ONCE DAILY       Scheduled Meds:   amiodarone  400 mg Oral Daily    apixaban  5 mg Oral BID    levothyroxine  50 mcg Oral Before breakfast    mupirocin   Nasal BID    sodium chloride 0.9%  10 mL Intravenous Q6H    spironolactone  25 mg Oral Daily     Continuous Infusions:   DOBUTamine IV infusion (non-titrating) 3 mcg/kg/min (02/11/23 0915)     PRN Meds:.dextrose 10%, dextrose 10%, glucagon (human recombinant), glucose, glucose, insulin aspart U-100, sodium chloride 0.9%, Flushing PICC Protocol **AND** sodium chloride 0.9% **AND** sodium chloride 0.9%    Allergies:  Penicillin and Penicillins    Past Family History:  Reviewed; refer to Hospitalist Admission Note    Review of Systems:  Review of Systems - All 14 systems reviewed and negative, except as noted in HPI    Physical Exam:    /71   Pulse 68   Temp 97.4 °F (36.3 °C)   Resp 15   Ht 5' 1" (1.549 m)   Wt 53.7 kg (118 lb 6.2 oz)   SpO2 98%   BMI 22.37 kg/m²     General Appearance:    No distress   Head:    Normocephalic, without obvious abnormality, atraumatic   Eyes:    PER, conjunctiva/corneas clear, EOM's intact in both eyes        Throat:   Lips, mucosa, and tongue normal; teeth and gums normal   Back:     Symmetric, no curvature, ROM normal, no CVA tenderness   Lungs:     Diminished.  Crackles at bases   Chest wall:    No tenderness or deformity   Heart:    Irreg.  No rub   Abdomen:     Soft, non-tender, bowel sounds active all four quadrants,     no masses, " no organomegaly   Extremities:   Extremities normal, atraumatic, no cyanosis or edema   Pulses:   2+ and symmetric all extremities   MSK:   No joint or muscle swelling, tenderness or deformity   Skin:   Skin color, texture, turgor normal, no rashes or lesions   Neurologic:      No flap     Results:  Recent Labs   Lab 02/09/23  0500 02/10/23  0501 02/11/23  0441   * 132* 135*   K 3.2* 3.3* 4.5   CL 97 98 101   CO2 23 24 25   BUN 39* 31* 24*   CREATININE 1.7* 1.3 1.2   GLU 91 85 92       Recent Labs   Lab 02/06/23  1543 02/07/23  0518 02/07/23  2355 02/08/23  0450 02/09/23  0500 02/10/23  0501 02/11/23  0441   CALCIUM 8.7   < >  --  8.1*  8.1* 7.8* 7.8* 8.1*   ALBUMIN 3.4*  --   --  3.1*  3.1* 3.6 3.4* 3.2*   PHOS  --   --  4.8*  --   --   --  2.9   MG 2.3  --   --  2.2 1.9  --   --     < > = values in this interval not displayed.             Recent Labs   Lab 02/08/23  1631 02/08/23 2217 02/09/23  0807 02/09/23  1129 02/09/23  2107 02/10/23  0737 02/10/23  1155 02/10/23  1652 02/10/23  2214 02/11/23  0731   POCTGLUCOSE 125* 106 101 128* 117* 84 117* 94 119* 95       Recent Labs   Lab 05/17/20  0646 10/24/20  0240 02/06/23  1543   Hemoglobin A1C 6.0 6.3 H 5.4       Recent Labs   Lab 02/09/23  0500 02/10/23  0501 02/11/23  0441   WBC 7.47 6.49 6.41   HGB 11.7* 10.4* 10.7*   HCT 36.0* 31.8* 32.6*    128* 130*   * 104* 105*   MCHC 32.5 32.7 32.8   MONO 7.2  0.5 7.6  0.5 8.9  0.6       Recent Labs   Lab 02/08/23  0450 02/09/23  0500 02/10/23  0501 02/11/23  0441   BILITOT 3.0*  3.0* 3.3* 3.0*  --    PROT 5.9*  5.9* 6.1 5.5*  --    ALBUMIN 3.1*  3.1* 3.6 3.4* 3.2*   ALKPHOS 107  107 93 79  --    *  924* 687* 489*  --    *  581* 308* 180*  --        Recent Labs   Lab 10/23/20  1557 02/07/23  1242   Color, UA Yellow Yellow   Appearance, UA Clear Clear   pH, UA 7.0 5.0   Specific Gravity, UA 1.015 1.020   Protein, UA Negative 3+ A   Glucose, UA Negative Negative   Ketones, UA  Negative Negative   Urobilinogen, UA Negative Negative   Bilirubin (UA) Negative Negative   Occult Blood UA Negative 1+ A   Nitrite, UA Negative Negative   RBC, UA  --  2   WBC, UA  --  1   Bacteria  --  Moderate A   Hyaline Casts, UA  --  12 A       Recent Labs   Lab 05/02/22  0912 05/17/22  1428 05/17/22  1501   Sample VENOUS ARTERIAL ARTERIAL       Microbiology Results (last 7 days)       ** No results found for the last 168 hours. **          Patient care was time spent personally by me on the following activities:   Obtaining a history  Examination of patient.  Providing medical care at the patients bedside.  Developing a treatment plan with patient or surrogate and bedside caregivers  Ordering and reviewing laboratory studies, radiographic studies, pulse oximetry.  Ordering and performing treatments and interventions.  Evaluation of patient's response to treatment.  Discussions with consultants while on the unit and immediately available to the patient.  Re-evaluation of the patient's condition.  Documentation in the medical record.     Michael Brandt MD  Nephrology  [facility] [place]  [phone]

## 2023-02-11 NOTE — PROGRESS NOTES
[facility] Hospital Medicine  Progress Note    Patient Name: Cleveland Capps  MRN: [id number]  Patient Class: IP- Inpatient   Admission Date: 2/6/2023  Length of Stay: 5 days  Attending Physician: Fernando Bryson MD  Primary Care Provider: Romero Snyder MD        Subjective:     Principal Problem:Acute on chronic combined systolic and diastolic congestive heart failure        HPI:  Cleveland Capps is a 67 year male who presents emergency room for evaluation of shortness of breath.  He reports shortness of breath and tachycardia onset approximately 8 days ago.  He is a history of atrial fibrillation and reports intermittently in AFib over the past several days.  He denies any chest pain.  Reports shortness of breath is worse with exertion.  No alleviating factors.  He also endorses an approximate 10-15 lb weight gain over the past several weeks.  No reported fever chills.  Previous medical history includes CVA, Watchman left atrial closure device, hypothyroidism, coronary artery disease, stent placement, COPD, hypertension, AICD, and combined systolic/diastolic heart failure.  ER workup:  CBC with mild anemia.  CMP with BUN of 44 and creatinine of 1.8.  Bilirubin elevated 3.0,  .  BNP elevated at 1518 (baseline 400).  Troponin mildly elevated 0.077.  Last echo demonstrated ejection fraction of 20-25%.  Patient was given 20 mg of Lasix in the ER.  Patient remained in atrial fibrillation/RVR while in ER.  He had mild hypotension.  Patient was started on amiodarone infusion and bolus after consulting Dr. Calhoun with on-call Cardiology.  Patient admitted Hospital Medicine for treatment management.  Patient admitted to step-down unit.      Overview/Hospital Course:  No notes on file    Interval History:  Seen and examined.  Doing well.  No chest pain or shortness for breath.  Did well with physical therapy today.  No further dizziness with ambulation.  BP doing well, renal function is  normalizing.  Discussed with Cardiology and Nephrology regarding plan of care and will decrease Dobutrex while resuming spironolactone and if well-tolerated eventually Lasix.  Pt agreeable care plan; brother at bedside updated    Review of Systems   Constitutional:  Negative for chills, fatigue and fever.   Respiratory:  Negative for cough and shortness of breath.    Cardiovascular:  Negative for chest pain and leg swelling.   Neurological:  Negative for dizziness and light-headedness.   Objective:     Vital Signs (Most Recent):  Temp: 97.4 °F (36.3 °C) (02/11/23 0725)  Pulse: 68 (02/11/23 0725)  Resp: 15 (02/11/23 0725)  BP: 111/71 (02/11/23 0600)  SpO2: 98 % (02/11/23 0725) Vital Signs (24h Range):  Temp:  [97.3 °F (36.3 °C)-98 °F (36.7 °C)] 97.4 °F (36.3 °C)  Pulse:  [64-80] 68  Resp:  [13-59] 15  SpO2:  [92 %-100 %] 98 %  BP: ()/(61-75) 111/71     Weight: 53.7 kg (118 lb 6.2 oz)  Body mass index is 22.37 kg/m².    Intake/Output Summary (Last 24 hours) at 2/11/2023 1143  Last data filed at 2/11/2023 0818  Gross per 24 hour   Intake --   Output 600 ml   Net -600 ml        Physical Exam  Vitals and nursing note reviewed.   Constitutional:       Appearance: Normal appearance. He is ill-appearing (chronically ill appearing).   HENT:      Mouth/Throat:      Mouth: Mucous membranes are moist.      Pharynx: Oropharynx is clear.   Cardiovascular:      Rate and Rhythm: Normal rate. Rhythm irregular.      Comments: PICC line in place without erythema; scant bloody drainage noted on bandage  Pulmonary:      Effort: Pulmonary effort is normal.      Breath sounds: Normal breath sounds.   Abdominal:      General: Abdomen is flat. Bowel sounds are normal. There is no distension.      Palpations: Abdomen is soft.   Musculoskeletal:         General: Normal range of motion.      Cervical back: Normal range of motion and neck supple.   Skin:     General: Skin is warm and dry.      Capillary Refill: Capillary refill takes less  than 2 seconds.   Neurological:      General: No focal deficit present.      Mental Status: He is alert.   Psychiatric:         Mood and Affect: Mood normal.       Significant Labs: All pertinent labs within the past 24 hours have been reviewed.  CBC:   Recent Labs   Lab 02/10/23  0501 02/11/23  0441   WBC 6.49 6.41   HGB 10.4* 10.7*   HCT 31.8* 32.6*   * 130*       CMP:   Recent Labs   Lab 02/10/23  0501 02/11/23  0441   * 135*   K 3.3* 4.5   CL 98 101   CO2 24 25   GLU 85 92   BUN 31* 24*   CREATININE 1.3 1.2   CALCIUM 7.8* 8.1*   PROT 5.5*  --    ALBUMIN 3.4* 3.2*   BILITOT 3.0*  --    ALKPHOS 79  --    *  --    *  --    ANIONGAP 10 9         Significant Imaging: I have reviewed all pertinent imaging results/findings within the past 24 hours.      Assessment/Plan:      * Acute on chronic combined systolic and diastolic congestive heart failure  Patient is identified as having Combined Systolic and Diastolic heart failure that is Acute on chronic. CHF is currently uncontrolled due to Continued edema of extremities and Weight gain of 15 pounds. Latest ECHO performed and demonstrates- Results for orders placed during the hospital encounter of 06/27/22    Echo    Interpretation Summary  · The left ventricle is severely enlarged with mild eccentric hypertrophy and severely decreased systolic function.  · The estimated ejection fraction is 25%.  · There are segmental left ventricular wall motion abnormalities.  · Left ventricular diastolic dysfunction.  · Mild right ventricular enlargement with mildly to moderately reduced right ventricular systolic function.  · Severe left atrial enlargement.  · Mild right atrial enlargement.  · The MR is moderate to moderate -severe ( 2-3+).  · Mild to moderate tricuspid regurgitation.  · Mild pulmonic regurgitation.  · Intermediate central venous pressure (8 mmHg).  · The estimated PA systolic pressure is 47 mmHg.  · There is pulmonary hypertension.  .  Continue Furosemide and monitor clinical status closely. Monitor on telemetry. Patient is off CHF pathway.  Monitor strict Is&Os and daily weights.  Place on fluid restriction of 1.5 L. Continue to stress to patient importance of self efficacy and  on diet for CHF. Last BNP reviewed- and noted below   Recent Labs   Lab 02/06/23  1543   BNP 1,518*   .  Repeat echo reviewed:  EF 20%   -liver function and kidney function seems improved today  -Continue dobutrex: deescalating daily: down to 3mcg/kg/min today.  -discussed with nephrology: adding back aldactone today.  -Lasix stopped 2/9: appears euvolemic.  -unable to tolerate beta blocker or Entresto due to BP issues       Hypotension  Pt has been having soft blood pressures, however this morning after ambulation BP dropped to the 70 systolic.  Was totally asymptomatic.  Pt had received metoprolol that a.m.   -DC metoprolol   -DC Lasix   -500 cc normal saline bolus   -discussed with Cardiology at length, will increase Dobutrex infusion dose to 4 mcg/kg/min.  -if we can not maintain an MAP over 60, may need to consider transfer to ICU for vasopressor support      Longstanding persistent atrial fibrillation  Patient with Persistent (7 days or more) atrial fibrillation which is uncontrolled currently with Amiodarone. Patient is currently in atrial fibrillation.[id number] Score: 2. Anticoagulation indicated. Anticoagulation done with eliquis.    -converted to SA 2/8 PM: Continue the oral amiodarone  -cardiology following   -Monitor closely on telemetry  -watch for uncontrolled tachycardia/tachyarrhythmia as while on dobutamine        Elevated LFTs  Acute problem- suspect hepatic congestion in the setting of severe and advanced heart failure   -LFTs are improving today  -coags reviewed: Mildly elevated INR   -ammonia normal  -continue to trend the liver function tests    AICD (automatic cardioverter/defibrillator) present  Chronic problem  Continuous cardiac monitor    Patient denies any recent discharges      Hypothyroid  Chronic problem, acutely uncontrolled despite medication compliance  -increased levothyroxine to 50 mics daily    History of CVA (cerebrovascular accident)  Chronic problem, stable  Cont eliquis -no longer on Plavix  Holding statin secondary to elevated liver enzymes      MEIR (acute kidney injury)  Patient with acute kidney injury likely due to pre-renal azotemia MEIR is currently improving. Labs reviewed- Renal function/electrolytes with Estimated Creatinine Clearance: 44.8 mL/min (based on SCr of 1.2 mg/dL). according to latest data. Monitor urine output and serial BMP and adjust therapy as needed. Avoid nephrotoxins and renally dose meds for GFR listed above.     -Discussed with cardiology: required bolus 2/2 hypotension on 2/9.  Lasix stopped 2/9. Aldactone resumed 2/10.  -Renal function continues to improve with adequate UOP.  -F/u BMP.  -Appreciate nephrology assistance.    Benign essential HTN  Chronic, uncontrolled due to hypotension.  Latest blood pressure and vitals reviewed-   Temp:  [97.4 °F (36.3 °C)-98 °F (36.7 °C)]   Pulse:  [63-76]   Resp:  []   BP: ()/(53-76)   SpO2:  [93 %-100 %] .   Home meds for hypertension were reviewed and noted below.   Hypertension Medications             carvediloL (COREG) 3.125 MG tablet Take 1 tablet (3.125 mg total) by mouth 2 (two) times daily.    furosemide (LASIX) 20 MG tablet Take 1 tablet (20 mg total) by mouth 3 (three) times a week.    sacubitriL-valsartan (ENTRESTO) 24-26 mg per tablet Take 1 tablet by mouth 2 (two) times daily.    spironolactone (ALDACTONE) 25 MG tablet Take 1 tablet (25 mg total) by mouth once daily.          Holding his antihypertensives due to hypotension    Coronary artery disease  Patient with known CAD s/p stent placement and CABG, which is controlled Will hold the statin due to LFT elevation and monitor for S/Sx of angina/ACS. Continue to monitor on telemetry.  Pt no  longer on aspirin or Plavix          VTE Risk Mitigation (From admission, onward)         Ordered     apixaban tablet 5 mg  2 times daily         02/06/23 2114     IP VTE HIGH RISK PATIENT  Once         02/06/23 2113     Place sequential compression device  Until discontinued         02/06/23 2113     Place KEN hose  Until discontinued         02/06/23 2113                Discharge Planning   MACKENZIE: 2/13/2023     Code Status: DNR   Is the patient medically ready for discharge?:     Reason for patient still in hospital (select all that apply): Patient trending condition, Laboratory test, Treatment and Consult recommendations  Discharge Plan A: Home                  Kaykay Siegel NP  Department of Hospital Medicine   [facility]

## 2023-02-11 NOTE — PLAN OF CARE
Problem: Adult Inpatient Plan of Care  Goal: Optimal Comfort and Wellbeing  Outcome: Ongoing, Progressing     Problem: Adult Inpatient Plan of Care  Goal: Absence of Hospital-Acquired Illness or Injury  Outcome: Ongoing, Progressing     Problem: Adult Inpatient Plan of Care  Goal: Patient-Specific Goal (Individualized)  Outcome: Ongoing, Progressing     Problem: Adult Inpatient Plan of Care  Goal: Plan of Care Review  Outcome: Ongoing, Progressing     Problem: Fall Injury Risk  Goal: Absence of Fall and Fall-Related Injury  Outcome: Ongoing, Progressing

## 2023-02-11 NOTE — PROGRESS NOTES
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  Blood pressure has stabilized.  On 3 mics of dobutamine.  Remains in normal sinus rhythm his renal function continues to improve.    2/10/2023  Blood pressure stabilized  On Dobutrex 4 mics per kg per minute  Remains in sinus rhythm  Renal functions improved euvolemic    Review of patient's allergies indicates:   Allergen Reactions    Penicillin      Other reaction(s): anaphylaxis  Other reaction(s): anaphylaxis    Penicillins Hives       REVIEW OF SYSTEMS  CARDIOVASCULAR: No recent chest pain, palpitations, arm, neck, or jaw pain  RESPIRATORY: No recent fever, cough chills, SOB or congestion  : No blood in the urine  GI: No Nausea, vomiting, constipation, diarrhea, blood, or reflux.  MUSCULOSKELETAL: No myalgias  NEURO: No lightheadedness or dizziness  EYES: No Double vision, blurry, vision or headache     OBJECTIVE     VITAL SIGNS (Most Recent)  Temp: 97.4 °F (36.3 °C) (02/11/23 0725)  Pulse: 68 (02/11/23 0725)  Resp: 15 (02/11/23 0725)  BP: 111/71 (02/11/23 0600)  SpO2: 98 % (02/11/23 0725)    VENTILATION STATUS  Resp: 15 (02/11/23 0725)  SpO2: 98 % (02/11/23 0725)       I & O (Last 24H):  Intake/Output Summary (Last 24 hours) at 2/11/2023 1155  Last data filed at 2/11/2023 0818  Gross per 24 hour   Intake --   Output 600 ml   Net -600 ml         WEIGHTS  Wt Readings from Last 3 Encounters:   02/11/23 0500 53.7 kg (118 lb 6.2 oz)   02/10/23 0400 52.7 kg (116 lb 2.9 oz)   02/09/23 0540 51.7 kg (113 lb 15.7 oz)   02/08/23 1402 53.5 kg (117 lb 15.1 oz)   02/07/23 1300 54.3 kg (119 lb 11.4 oz)   02/07/23 0600 54.3 kg (119 lb 11.4 oz)   02/06/23 2217 54 kg (119 lb 0.8 oz)    02/06/23 1449 52.6 kg (116 lb)   01/09/23 1405 52.6 kg (116 lb)   12/20/22 1435 52.9 kg (116 lb 8.2 oz)       PHYSICAL EXAM  CONSTITUTIONAL: Well built, well nourished in no apparent distress  NECK: no carotid bruit, no JVD  LUNGS: CTA  CHEST WALL: no tenderness  HEART: regular rate and rhythm, S1, S2 normal, no murmur, click, rub or gallop   ABDOMEN: soft, non-tender; bowel sounds normal; no masses,  no organomegaly  EXTREMITIES: Extremities normal, no edema  NEURO: AAO X 3    SCHEDULED MEDS:   amiodarone  400 mg Oral Daily    apixaban  5 mg Oral BID    levothyroxine  50 mcg Oral Before breakfast    mupirocin   Nasal BID    sodium chloride 0.9%  10 mL Intravenous Q6H    spironolactone  25 mg Oral Daily       CONTINUOUS INFUSIONS:   DOBUTamine IV infusion (non-titrating) 3 mcg/kg/min (02/11/23 0915)       PRN MEDS:dextrose 10%, dextrose 10%, glucagon (human recombinant), glucose, glucose, insulin aspart U-100, sodium chloride 0.9%, Flushing PICC Protocol **AND** sodium chloride 0.9% **AND** sodium chloride 0.9%    LABS AND DIAGNOSTICS     CBC LAST 3 DAYS  Recent Labs   Lab 02/09/23  0500 02/10/23  0501 02/11/23  0441   WBC 7.47 6.49 6.41   RBC 3.43* 3.07* 3.11*   HGB 11.7* 10.4* 10.7*   HCT 36.0* 31.8* 32.6*   * 104* 105*   MCH 34.1* 33.9* 34.4*   MCHC 32.5 32.7 32.8   RDW 16.6* 16.6* 16.5*    128* 130*   MPV 8.9* 8.8* 8.6*   GRAN 80.2*  6.0 77.7*  5.1 78.3*  5.0   LYMPH 10.4*  0.8* 11.6*  0.8* 10.1*  0.7*   MONO 7.2  0.5 7.6  0.5 8.9  0.6   BASO 0.03 0.03 0.03   NRBC 0 0 0         COAGULATION LAST 3 DAYS  Recent Labs   Lab 02/08/23  0959   INR 1.5*         CHEMISTRY LAST 3 DAYS  Recent Labs   Lab 02/06/23  1543 02/07/23  0518 02/08/23  0450 02/09/23  0500 02/10/23  0501 02/11/23  0441   *   < > 131*  131* 134* 132* 135*   K 4.2   < > 3.7  3.7 3.2* 3.3* 4.5      < > 98  98 97 98 101   CO2 19*   < > 19*  19* 23 24 25   ANIONGAP 9   < > 14  14 14 10 9   BUN 44*   < > 48*  48*  39* 31* 24*   CREATININE 1.8*   < > 2.0*  2.0* 1.7* 1.3 1.2   *   < > 100  100 91 85 92   CALCIUM 8.7   < > 8.1*  8.1* 7.8* 7.8* 8.1*   MG 2.3  --  2.2 1.9  --   --    ALBUMIN 3.4*  --  3.1*  3.1* 3.6 3.4* 3.2*   PROT 6.3  --  5.9*  5.9* 6.1 5.5*  --    ALKPHOS 109  --  107  107 93 79  --    *   < > 924*  924* 687* 489*  --    *   < > 581*  581* 308* 180*  --    BILITOT 3.0*   < > 3.0*  3.0* 3.3* 3.0*  --     < > = values in this interval not displayed.         CARDIAC PROFILE LAST 3 DAYS  Recent Labs   Lab 02/06/23  1543 02/07/23  1022   BNP 1,518*  --    TROPONINI 0.077* 0.073*         ENDOCRINE LAST 3 DAYS  Recent Labs   Lab 02/07/23  1022   TSH 18.148*         LAST ARTERIAL BLOOD GAS  ABG  No results for input(s): PH, PO2, PCO2, HCO3, BE in the last 168 hours.    LAST 7 DAYS MICROBIOLOGY   Microbiology Results (last 7 days)       ** No results found for the last 168 hours. **            MOST RECENT IMAGING  X-Ray Chest 1 View for Line/Tube Placement  Narrative: EXAMINATION:  XR CHEST 1 VIEW FOR LINE/TUBE PLACEMENT    CLINICAL HISTORY:  PICC insertion;    TECHNIQUE:  Single frontal portable view of the chest was performed.    COMPARISON:  Chest of February 6, 2023    FINDINGS:  A PICC has been placed entering from the right and ending in the superior vena cava in good position.  An AICD is noted in place on the left with single lead to the heart.  The patient has had prior sternotomy.  There is mild cardiomegaly.  An intrapulmonary mass or infiltrate is not seen although there is prominence of the pulmonary vasculature.  A pneumothorax is not noted.  Impression: PICC placed in good position.  Mild cardiomegaly.  Prior sternotomy.  AICD in place.  Prominence of the pulmonary vasculature.    Electronically signed by: Huey Escalante MD  Date:    02/09/2023  Time:    14:24      [facility]  Results for orders placed during the hospital encounter of 02/06/23    Echo    Interpretation  Summary  · The left ventricle is severely enlarged with eccentric hypertrophy and severely decreased systolic function.  · The estimated ejection fraction is 20%.  · Left ventricular diastolic dysfunction.  · There is left ventricular global hypokinesis. GLOBAL HYPOKINESIA WITH ANTERIOR APICAL AKINESIA  · Moderate right ventricular enlargement.  · Moderate right atrial enlargement.  · Elevated central venous pressure (15 mmHg).  · LIMITED STUDY  · Right atrial enlargement.      CURRENT/PREVIOUS VISIT EKG  Results for orders placed or performed during the hospital encounter of 02/06/23   EKG 12-lead    Collection Time: 02/06/23  2:58 PM    Narrative    Test Reason : I48.91,    Vent. Rate : 127 BPM     Atrial Rate : 375 BPM     P-R Int : 000 ms          QRS Dur : 082 ms      QT Int : 318 ms       P-R-T Axes : 000 075 213 degrees     QTc Int : 462 ms    Atrial flutter with variable A-V block with premature ventricular or  aberrantly conducted complexes  Anteroseptal infarct  T wave abnormality, consider inferior ischemia  Abnormal ECG    Referred By: [email or web address]   SELF           Confirmed By:        ASSESSMENT/PLAN:     Active Hospital Problems    Diagnosis    *Acute on chronic combined systolic and diastolic congestive heart failure    Hypotension    Longstanding persistent atrial fibrillation    AICD (automatic cardioverter/defibrillator) present    Elevated LFTs    Hypothyroid    History of CVA (cerebrovascular accident)    MEIR (acute kidney injury)     Atn; requiring dialysis      Benign essential HTN    Coronary artery disease     S/p Cabg 4/26 Dr Bledsoe         ASSESSMENT & PLAN:   Heart failure decreased ejection fraction improved fluid status  Atrial fibrillation currently in sinus rhythm  Elevated liver enzymes improving secondary to passive congestion  Acute kidney injury improved  Hypotension secondary to medications improved  Hypothyroid    RECOMMENDATIONS:  Patient is doing well today would try to wean  Dobutrex 1 evelyn per day over the weekend when he gets to 2 mics DC the Dobutrex  Continue amiodarone for rhythm control 400 mg  Hold Entresto and carvedilol    Lasix and Aldactone and potassium management per Nephrology      Mane Welsh MD  [facility]  Department of Cardiology  Date of Service: 02/11/2023  11:30 AM

## 2023-02-12 LAB
ALBUMIN SERPL BCP-MCNC: 3.2 G/DL (ref 3.5–5.2)
ALP SERPL-CCNC: 81 U/L (ref 55–135)
ALT SERPL W/O P-5'-P-CCNC: 322 U/L (ref 10–44)
ANION GAP SERPL CALC-SCNC: 6 MMOL/L (ref 8–16)
AST SERPL-CCNC: 84 U/L (ref 10–40)
BASOPHILS # BLD AUTO: 0.04 K/UL (ref 0–0.2)
BASOPHILS NFR BLD: 0.6 % (ref 0–1.9)
BILIRUB SERPL-MCNC: 3.7 MG/DL (ref 0.1–1)
BUN SERPL-MCNC: 23 MG/DL (ref 8–23)
CALCIUM SERPL-MCNC: 8.3 MG/DL (ref 8.7–10.5)
CHLORIDE SERPL-SCNC: 100 MMOL/L (ref 95–110)
CO2 SERPL-SCNC: 24 MMOL/L (ref 23–29)
CREAT SERPL-MCNC: 1.2 MG/DL (ref 0.5–1.4)
DIFFERENTIAL METHOD: ABNORMAL
EOSINOPHIL # BLD AUTO: 0.2 K/UL (ref 0–0.5)
EOSINOPHIL NFR BLD: 2.4 % (ref 0–8)
ERYTHROCYTE [DISTWIDTH] IN BLOOD BY AUTOMATED COUNT: 16.2 % (ref 11.5–14.5)
EST. GFR  (NO RACE VARIABLE): >60 ML/MIN/1.73 M^2
GLUCOSE SERPL-MCNC: 100 MG/DL (ref 70–110)
HCT VFR BLD AUTO: 33.1 % (ref 40–54)
HGB BLD-MCNC: 10.7 G/DL (ref 14–18)
IMM GRANULOCYTES # BLD AUTO: 0.01 K/UL (ref 0–0.04)
IMM GRANULOCYTES NFR BLD AUTO: 0.2 % (ref 0–0.5)
LYMPHOCYTES # BLD AUTO: 0.8 K/UL (ref 1–4.8)
LYMPHOCYTES NFR BLD: 13 % (ref 18–48)
MAGNESIUM SERPL-MCNC: 2.1 MG/DL (ref 1.6–2.6)
MCH RBC QN AUTO: 34.2 PG (ref 27–31)
MCHC RBC AUTO-ENTMCNC: 32.3 G/DL (ref 32–36)
MCV RBC AUTO: 106 FL (ref 82–98)
MONOCYTES # BLD AUTO: 0.5 K/UL (ref 0.3–1)
MONOCYTES NFR BLD: 7.4 % (ref 4–15)
NEUTROPHILS # BLD AUTO: 4.8 K/UL (ref 1.8–7.7)
NEUTROPHILS NFR BLD: 76.4 % (ref 38–73)
NRBC BLD-RTO: 0 /100 WBC
PLATELET # BLD AUTO: 140 K/UL (ref 150–450)
PMV BLD AUTO: 8.9 FL (ref 9.2–12.9)
POCT GLUCOSE: 126 MG/DL (ref 70–110)
POCT GLUCOSE: 159 MG/DL (ref 70–110)
POCT GLUCOSE: 90 MG/DL (ref 70–110)
POCT GLUCOSE: 98 MG/DL (ref 70–110)
POTASSIUM SERPL-SCNC: 4 MMOL/L (ref 3.5–5.1)
PROT SERPL-MCNC: 5.7 G/DL (ref 6–8.4)
RBC # BLD AUTO: 3.13 M/UL (ref 4.6–6.2)
SODIUM SERPL-SCNC: 130 MMOL/L (ref 136–145)
WBC # BLD AUTO: 6.25 K/UL (ref 3.9–12.7)

## 2023-02-12 PROCEDURE — 25000003 PHARM REV CODE 250: Performed by: NURSE PRACTITIONER

## 2023-02-12 PROCEDURE — 99232 PR SUBSEQUENT HOSPITAL CARE,LEVL II: ICD-10-PCS | Mod: ,,, | Performed by: INTERNAL MEDICINE

## 2023-02-12 PROCEDURE — 83735 ASSAY OF MAGNESIUM: CPT | Performed by: NURSE PRACTITIONER

## 2023-02-12 PROCEDURE — 20600001 HC STEP DOWN PRIVATE ROOM

## 2023-02-12 PROCEDURE — 63600175 PHARM REV CODE 636 W HCPCS: Performed by: NURSE PRACTITIONER

## 2023-02-12 PROCEDURE — A4216 STERILE WATER/SALINE, 10 ML: HCPCS | Performed by: NURSE PRACTITIONER

## 2023-02-12 PROCEDURE — 36569 INSJ PICC 5 YR+ W/O IMAGING: CPT

## 2023-02-12 PROCEDURE — 80053 COMPREHEN METABOLIC PANEL: CPT | Performed by: NURSE PRACTITIONER

## 2023-02-12 PROCEDURE — 99232 SBSQ HOSP IP/OBS MODERATE 35: CPT | Mod: ,,, | Performed by: INTERNAL MEDICINE

## 2023-02-12 PROCEDURE — 94761 N-INVAS EAR/PLS OXIMETRY MLT: CPT

## 2023-02-12 PROCEDURE — 85025 COMPLETE CBC W/AUTO DIFF WBC: CPT | Performed by: NURSE PRACTITIONER

## 2023-02-12 PROCEDURE — 36415 COLL VENOUS BLD VENIPUNCTURE: CPT | Performed by: NURSE PRACTITIONER

## 2023-02-12 RX ORDER — AMIODARONE HYDROCHLORIDE 400 MG/1
400 TABLET ORAL DAILY
Qty: 30 TABLET | Refills: 0 | Status: SHIPPED | OUTPATIENT
Start: 2023-02-13 | End: 2023-03-23

## 2023-02-12 RX ORDER — FUROSEMIDE 20 MG/1
20 TABLET ORAL 2 TIMES DAILY
Qty: 60 TABLET | Refills: 0 | Status: SHIPPED | OUTPATIENT
Start: 2023-02-12 | End: 2023-02-14 | Stop reason: HOSPADM

## 2023-02-12 RX ORDER — LEVOTHYROXINE SODIUM 25 UG/1
50 TABLET ORAL
Qty: 60 TABLET | Refills: 0 | Status: SHIPPED | OUTPATIENT
Start: 2023-02-12 | End: 2023-03-24

## 2023-02-12 RX ORDER — FUROSEMIDE 20 MG/1
20 TABLET ORAL DAILY
Status: DISCONTINUED | OUTPATIENT
Start: 2023-02-12 | End: 2023-02-14 | Stop reason: HOSPADM

## 2023-02-12 RX ADMIN — SPIRONOLACTONE 25 MG: 25 TABLET, FILM COATED ORAL at 08:02

## 2023-02-12 RX ADMIN — MUPIROCIN: 20 OINTMENT TOPICAL at 08:02

## 2023-02-12 RX ADMIN — SODIUM CHLORIDE, PRESERVATIVE FREE 10 ML: 5 INJECTION INTRAVENOUS at 11:02

## 2023-02-12 RX ADMIN — AMIODARONE HYDROCHLORIDE 400 MG: 200 TABLET ORAL at 08:02

## 2023-02-12 RX ADMIN — APIXABAN 5 MG: 2.5 TABLET, FILM COATED ORAL at 08:02

## 2023-02-12 RX ADMIN — SODIUM CHLORIDE, PRESERVATIVE FREE 10 ML: 5 INJECTION INTRAVENOUS at 06:02

## 2023-02-12 RX ADMIN — FUROSEMIDE 20 MG: 20 TABLET ORAL at 10:02

## 2023-02-12 RX ADMIN — DOBUTAMINE HYDROCHLORIDE 3 MCG/KG/MIN: 400 INJECTION INTRAVENOUS at 01:02

## 2023-02-12 RX ADMIN — LEVOTHYROXINE SODIUM 50 MCG: 50 TABLET ORAL at 06:02

## 2023-02-12 RX ADMIN — SODIUM CHLORIDE, PRESERVATIVE FREE 10 ML: 5 INJECTION INTRAVENOUS at 05:02

## 2023-02-12 NOTE — SUBJECTIVE & OBJECTIVE
Interval History:  Seen and examined.  Doing well.  No chest pain or shortness for breath.  BP, renal, and liver function all trending in the correct direction.  Dobutrex discontinued today and Lasix added at 20 mg daily to assess tolerance.  If remains stable plan to discharge tomorrow with home health.    Review of Systems   Constitutional:  Negative for chills, fatigue and fever.   Respiratory:  Negative for cough and shortness of breath.    Cardiovascular:  Negative for chest pain and leg swelling.   Neurological:  Negative for dizziness and light-headedness.   Objective:     Vital Signs (Most Recent):  Temp: 97.7 °F (36.5 °C) (02/12/23 1106)  Pulse: 85 (02/12/23 1106)  Resp: 18 (02/12/23 0804)  BP: 112/71 (02/12/23 1106)  SpO2: 100 % (02/12/23 1106) Vital Signs (24h Range):  Temp:  [97 °F (36.1 °C)-98.1 °F (36.7 °C)] 97.7 °F (36.5 °C)  Pulse:  [67-85] 85  Resp:  [15-27] 18  SpO2:  [89 %-100 %] 100 %  BP: ()/(51-86) 112/71     Weight: 53.7 kg (118 lb 6.2 oz)  Body mass index is 22.37 kg/m².    Intake/Output Summary (Last 24 hours) at 2/12/2023 1332  Last data filed at 2/12/2023 0820  Gross per 24 hour   Intake 240 ml   Output 1300 ml   Net -1060 ml        Physical Exam  Vitals and nursing note reviewed.   Constitutional:       Appearance: Normal appearance. He is ill-appearing (chronically ill appearing).   HENT:      Mouth/Throat:      Mouth: Mucous membranes are moist.      Pharynx: Oropharynx is clear.   Cardiovascular:      Rate and Rhythm: Normal rate. Rhythm irregular.      Comments: PICC line in place without erythema; scant bloody drainage noted on bandage  Pulmonary:      Effort: Pulmonary effort is normal.      Breath sounds: Normal breath sounds.   Abdominal:      General: Abdomen is flat. Bowel sounds are normal. There is no distension.      Palpations: Abdomen is soft.   Musculoskeletal:         General: Normal range of motion.      Cervical back: Normal range of motion and neck supple.    Skin:     General: Skin is warm and dry.      Capillary Refill: Capillary refill takes less than 2 seconds.   Neurological:      General: No focal deficit present.      Mental Status: He is alert.   Psychiatric:         Mood and Affect: Mood normal.       Significant Labs: All pertinent labs within the past 24 hours have been reviewed.  CBC:   Recent Labs   Lab 02/11/23 0441 02/12/23 0435   WBC 6.41 6.25   HGB 10.7* 10.7*   HCT 32.6* 33.1*   * 140*       CMP:   Recent Labs   Lab 02/11/23 0441 02/12/23  0435   * 130*   K 4.5 4.0    100   CO2 25 24   GLU 92 100   BUN 24* 23   CREATININE 1.2 1.2   CALCIUM 8.1* 8.3*   PROT  --  5.7*   ALBUMIN 3.2* 3.2*   BILITOT  --  3.7*   ALKPHOS  --  81   AST  --  84*   ALT  --  322*   ANIONGAP 9 6*         Significant Imaging: I have reviewed all pertinent imaging results/findings within the past 24 hours.

## 2023-02-12 NOTE — PROGRESS NOTES
[facility]  Department of Cardiology  Progress Note    PATIENT NAME: Cleveland Capps  MRN: [id number]  TODAY'S DATE: 02/12/2023  ADMIT DATE: 2/6/2023    SUBJECTIVE     PRINCIPLE PROBLEM: Acute on chronic combined systolic and diastolic congestive heart failure    INTERVAL HISTORY:    2/12/2023  Doing better blood pressure has stabilized.  On 2 mics of dobutamine.  Oral diuretics were started.  Will discontinue the dobutamine and continue to monitor.    2/11/2023  He is feeling better ambulating.  Blood pressure has stabilized.  On 3 mics of dobutamine.  Remains in normal sinus rhythm his renal function continues to improve.    2/10/2023  Blood pressure stabilized  On Dobutrex 4 mics per kg per minute  Remains in sinus rhythm  Renal functions improved euvolemic    Review of patient's allergies indicates:   Allergen Reactions    Penicillin      Other reaction(s): anaphylaxis  Other reaction(s): anaphylaxis    Penicillins Hives       REVIEW OF SYSTEMS  CARDIOVASCULAR: No recent chest pain, palpitations, arm, neck, or jaw pain  RESPIRATORY: No recent fever, cough chills, SOB or congestion  : No blood in the urine  GI: No Nausea, vomiting, constipation, diarrhea, blood, or reflux.  MUSCULOSKELETAL: No myalgias  NEURO: No lightheadedness or dizziness  EYES: No Double vision, blurry, vision or headache     OBJECTIVE     VITAL SIGNS (Most Recent)  Temp: 97.7 °F (36.5 °C) (02/12/23 1106)  Pulse: 85 (02/12/23 1106)  Resp: 18 (02/12/23 0804)  BP: 112/71 (02/12/23 1106)  SpO2: 100 % (02/12/23 1106)    VENTILATION STATUS  Resp: 18 (02/12/23 0804)  SpO2: 100 % (02/12/23 1106)       I & O (Last 24H):  Intake/Output Summary (Last 24 hours) at 2/12/2023 1259  Last data filed at 2/12/2023 0820  Gross per 24 hour   Intake 240 ml   Output 1300 ml   Net -1060 ml         WEIGHTS  Wt Readings from Last 3 Encounters:   02/11/23 0500 53.7 kg (118 lb 6.2 oz)   02/10/23 0400 52.7 kg (116 lb 2.9 oz)   02/09/23 0540 51.7 kg  (113 lb 15.7 oz)   02/08/23 1402 53.5 kg (117 lb 15.1 oz)   02/07/23 1300 54.3 kg (119 lb 11.4 oz)   02/07/23 0600 54.3 kg (119 lb 11.4 oz)   02/06/23 2217 54 kg (119 lb 0.8 oz)   02/06/23 1449 52.6 kg (116 lb)   01/09/23 1405 52.6 kg (116 lb)   12/20/22 1435 52.9 kg (116 lb 8.2 oz)       PHYSICAL EXAM  CONSTITUTIONAL: Well built, well nourished in no apparent distress  NECK: no carotid bruit, no JVD  LUNGS: CTA  CHEST WALL: no tenderness  HEART: regular rate and rhythm, S1, S2 normal, no murmur, click, rub or gallop   ABDOMEN: soft, non-tender; bowel sounds normal; no masses,  no organomegaly  EXTREMITIES: Extremities normal, no edema  NEURO: AAO X 3    SCHEDULED MEDS:   amiodarone  400 mg Oral Daily    apixaban  5 mg Oral BID    furosemide  20 mg Oral Daily    levothyroxine  50 mcg Oral Before breakfast    mupirocin   Nasal BID    sodium chloride 0.9%  10 mL Intravenous Q6H    spironolactone  25 mg Oral Daily       CONTINUOUS INFUSIONS:   DOBUTamine IV infusion (non-titrating) 2 mcg/kg/min (02/12/23 0933)       PRN MEDS:dextrose 10%, dextrose 10%, glucagon (human recombinant), glucose, glucose, insulin aspart U-100, sodium chloride 0.9%, Flushing PICC Protocol **AND** sodium chloride 0.9% **AND** sodium chloride 0.9%    LABS AND DIAGNOSTICS     CBC LAST 3 DAYS  Recent Labs   Lab 02/10/23  0501 02/11/23  0441 02/12/23  0435   WBC 6.49 6.41 6.25   RBC 3.07* 3.11* 3.13*   HGB 10.4* 10.7* 10.7*   HCT 31.8* 32.6* 33.1*   * 105* 106*   MCH 33.9* 34.4* 34.2*   MCHC 32.7 32.8 32.3   RDW 16.6* 16.5* 16.2*   * 130* 140*   MPV 8.8* 8.6* 8.9*   GRAN 77.7*  5.1 78.3*  5.0 76.4*  4.8   LYMPH 11.6*  0.8* 10.1*  0.7* 13.0*  0.8*   MONO 7.6  0.5 8.9  0.6 7.4  0.5   BASO 0.03 0.03 0.04   NRBC 0 0 0         COAGULATION LAST 3 DAYS  Recent Labs   Lab 02/08/23  0959   INR 1.5*         CHEMISTRY LAST 3 DAYS  Recent Labs   Lab 02/08/23  0450 02/09/23  0500 02/10/23  0501 02/11/23  0441 02/12/23  0435   *   131* 134* 132* 135* 130*   K 3.7  3.7 3.2* 3.3* 4.5 4.0   CL 98  98 97 98 101 100   CO2 19*  19* 23 24 25 24   ANIONGAP 14  14 14 10 9 6*   BUN 48*  48* 39* 31* 24* 23   CREATININE 2.0*  2.0* 1.7* 1.3 1.2 1.2     100 91 85 92 100   CALCIUM 8.1*  8.1* 7.8* 7.8* 8.1* 8.3*   MG 2.2 1.9  --   --  2.1   ALBUMIN 3.1*  3.1* 3.6 3.4* 3.2* 3.2*   PROT 5.9*  5.9* 6.1 5.5*  --  5.7*   ALKPHOS 107  107 93 79  --  81   *  924* 687* 489*  --  322*   *  581* 308* 180*  --  84*   BILITOT 3.0*  3.0* 3.3* 3.0*  --  3.7*         CARDIAC PROFILE LAST 3 DAYS  Recent Labs   Lab 02/06/23  1543 02/07/23  1022   BNP 1,518*  --    TROPONINI 0.077* 0.073*         ENDOCRINE LAST 3 DAYS  Recent Labs   Lab 02/07/23  1022   TSH 18.148*         LAST ARTERIAL BLOOD GAS  ABG  No results for input(s): PH, PO2, PCO2, HCO3, BE in the last 168 hours.    LAST 7 DAYS MICROBIOLOGY   Microbiology Results (last 7 days)       ** No results found for the last 168 hours. **            MOST RECENT IMAGING  X-Ray Chest 1 View for Line/Tube Placement  Narrative: EXAMINATION:  XR CHEST 1 VIEW FOR LINE/TUBE PLACEMENT    CLINICAL HISTORY:  PICC insertion;    TECHNIQUE:  Single frontal portable view of the chest was performed.    COMPARISON:  Chest of February 6, 2023    FINDINGS:  A PICC has been placed entering from the right and ending in the superior vena cava in good position.  An AICD is noted in place on the left with single lead to the heart.  The patient has had prior sternotomy.  There is mild cardiomegaly.  An intrapulmonary mass or infiltrate is not seen although there is prominence of the pulmonary vasculature.  A pneumothorax is not noted.  Impression: PICC placed in good position.  Mild cardiomegaly.  Prior sternotomy.  AICD in place.  Prominence of the pulmonary vasculature.    Electronically signed by: Huey Escalante MD  Date:    02/09/2023  Time:    14:24      [facility]  Results for orders placed during the  hospital encounter of 02/06/23    Echo    Interpretation Summary  · The left ventricle is severely enlarged with eccentric hypertrophy and severely decreased systolic function.  · The estimated ejection fraction is 20%.  · Left ventricular diastolic dysfunction.  · There is left ventricular global hypokinesis. GLOBAL HYPOKINESIA WITH ANTERIOR APICAL AKINESIA  · Moderate right ventricular enlargement.  · Moderate right atrial enlargement.  · Elevated central venous pressure (15 mmHg).  · LIMITED STUDY  · Right atrial enlargement.      CURRENT/PREVIOUS VISIT EKG  Results for orders placed or performed during the hospital encounter of 02/06/23   EKG 12-lead    Collection Time: 02/06/23  2:58 PM    Narrative    Test Reason : I48.91,    Vent. Rate : 127 BPM     Atrial Rate : 375 BPM     P-R Int : 000 ms          QRS Dur : 082 ms      QT Int : 318 ms       P-R-T Axes : 000 075 213 degrees     QTc Int : 462 ms    Atrial flutter with variable A-V block with premature ventricular or  aberrantly conducted complexes  Anteroseptal infarct  T wave abnormality, consider inferior ischemia  Abnormal ECG    Referred By: [email or web address]   SELF           Confirmed By:        ASSESSMENT/PLAN:     Active Hospital Problems    Diagnosis    *Acute on chronic combined systolic and diastolic congestive heart failure    Hypotension    Longstanding persistent atrial fibrillation    AICD (automatic cardioverter/defibrillator) present    Elevated LFTs    Hypothyroid    History of CVA (cerebrovascular accident)    MEIR (acute kidney injury)     Atn; requiring dialysis      Benign essential HTN    Coronary artery disease     S/p Cabg 4/26 Dr Bledsoe         ASSESSMENT & PLAN:   Heart failure decreased ejection fraction improved fluid status responded well to dobutamine infusion.  Oral diuretics started today.  Will discontinue dobutamine and monitor    Atrial fibrillation currently in sinus rhythm    Elevated liver enzymes improving secondary to passive  congestion  Acute kidney injury improved    Hypotension secondary to medications improved off Entresto and carvedilol on amiodarone    Hypothyroid on thyroid replacement    RECOMMENDATIONS:  Discontinue dobutamine  Continue amiodarone for rhythm control 400 mg  Hold Entresto and carvedilol    Lasix and Aldactone and potassium management per Nephrology      Mane Welsh MD  [facility]  Department of Cardiology  Date of Service: 02/12/2023  11:30 AM

## 2023-02-12 NOTE — ASSESSMENT & PLAN NOTE
Patient unable to tolerate BB or entresto this admit and required dobutrex initiation which is now off.  -Cards recommending holding carvedilol and entresto on d/c.

## 2023-02-12 NOTE — PROGRESS NOTES
[facility] Hospital Medicine  Progress Note    Patient Name: Cleveland Capps  MRN: [id number]  Patient Class: IP- Inpatient   Admission Date: 2/6/2023  Length of Stay: 6 days  Attending Physician: Michelet Schwartz MD  Primary Care Provider: Romero Snyder MD        Subjective:     Principal Problem:Acute on chronic combined systolic and diastolic congestive heart failure        HPI:  Cleveland Capps is a 67 year male who presents emergency room for evaluation of shortness of breath.  He reports shortness of breath and tachycardia onset approximately 8 days ago.  He is a history of atrial fibrillation and reports intermittently in AFib over the past several days.  He denies any chest pain.  Reports shortness of breath is worse with exertion.  No alleviating factors.  He also endorses an approximate 10-15 lb weight gain over the past several weeks.  No reported fever chills.  Previous medical history includes CVA, Watchman left atrial closure device, hypothyroidism, coronary artery disease, stent placement, COPD, hypertension, AICD, and combined systolic/diastolic heart failure.  ER workup:  CBC with mild anemia.  CMP with BUN of 44 and creatinine of 1.8.  Bilirubin elevated 3.0,  .  BNP elevated at 1518 (baseline 400).  Troponin mildly elevated 0.077.  Last echo demonstrated ejection fraction of 20-25%.  Patient was given 20 mg of Lasix in the ER.  Patient remained in atrial fibrillation/RVR while in ER.  He had mild hypotension.  Patient was started on amiodarone infusion and bolus after consulting Dr. Calhoun with on-call Cardiology.  Patient admitted Hospital Medicine for treatment management.  Patient admitted to step-down unit.      Overview/Hospital Course:  Pt will monitor closely during his stay.  He was diuresed with IV Lasix.  Cardiology and Nephrology was consulted.  Pt was maintained on amiodarone infusion for his atrial fibrillation with RVR.  There were some issues with  hypotension requiring the Entresto and attempts at beta-blocker to be stopped.  The echocardiogram confirmed an EF of 20%.  His renal and liver function worsened during his stay.  Transfer to [facility] for advanced heart failure service as was recommended by Cardiology, however Pt declined and elected for DNR status.  He wished to optimize his care here in this hospital, but not transfer of care.  Dobutrex was added to his regimen with good result.  He did have some lingering hypotension and diuresis was stopped and required fluid bolusing with improvement hypotension.  He did convert to sinus arrhythmia on amiodarone infusion.  His liver function tests and renal function began to improve.  Pt worked with PT/OT his hospitalization.  The Dobutrex was weaned down as Pt tolerated.  He was transition oral amiodarone.      Interval History:  Seen and examined.  Doing well.  No chest pain or shortness for breath.  BP, renal, and liver function all trending in the correct direction.  Dobutrex discontinued today and Lasix added at 20 mg daily to assess tolerance.  If remains stable plan to discharge tomorrow with home health.    Review of Systems   Constitutional:  Negative for chills, fatigue and fever.   Respiratory:  Negative for cough and shortness of breath.    Cardiovascular:  Negative for chest pain and leg swelling.   Neurological:  Negative for dizziness and light-headedness.   Objective:     Vital Signs (Most Recent):  Temp: 97.7 °F (36.5 °C) (02/12/23 1106)  Pulse: 85 (02/12/23 1106)  Resp: 18 (02/12/23 0804)  BP: 112/71 (02/12/23 1106)  SpO2: 100 % (02/12/23 1106) Vital Signs (24h Range):  Temp:  [97 °F (36.1 °C)-98.1 °F (36.7 °C)] 97.7 °F (36.5 °C)  Pulse:  [67-85] 85  Resp:  [15-27] 18  SpO2:  [89 %-100 %] 100 %  BP: ()/(51-86) 112/71     Weight: 53.7 kg (118 lb 6.2 oz)  Body mass index is 22.37 kg/m².    Intake/Output Summary (Last 24 hours) at 2/12/2023 1332  Last data filed at 2/12/2023 0820  Gross  per 24 hour   Intake 240 ml   Output 1300 ml   Net -1060 ml        Physical Exam  Vitals and nursing note reviewed.   Constitutional:       Appearance: Normal appearance. He is ill-appearing (chronically ill appearing).   HENT:      Mouth/Throat:      Mouth: Mucous membranes are moist.      Pharynx: Oropharynx is clear.   Cardiovascular:      Rate and Rhythm: Normal rate. Rhythm irregular.      Comments: PICC line in place without erythema; scant bloody drainage noted on bandage  Pulmonary:      Effort: Pulmonary effort is normal.      Breath sounds: Normal breath sounds.   Abdominal:      General: Abdomen is flat. Bowel sounds are normal. There is no distension.      Palpations: Abdomen is soft.   Musculoskeletal:         General: Normal range of motion.      Cervical back: Normal range of motion and neck supple.   Skin:     General: Skin is warm and dry.      Capillary Refill: Capillary refill takes less than 2 seconds.   Neurological:      General: No focal deficit present.      Mental Status: He is alert.   Psychiatric:         Mood and Affect: Mood normal.       Significant Labs: All pertinent labs within the past 24 hours have been reviewed.  CBC:   Recent Labs   Lab 02/11/23  0441 02/12/23  0435   WBC 6.41 6.25   HGB 10.7* 10.7*   HCT 32.6* 33.1*   * 140*       CMP:   Recent Labs   Lab 02/11/23  0441 02/12/23  0435   * 130*   K 4.5 4.0    100   CO2 25 24   GLU 92 100   BUN 24* 23   CREATININE 1.2 1.2   CALCIUM 8.1* 8.3*   PROT  --  5.7*   ALBUMIN 3.2* 3.2*   BILITOT  --  3.7*   ALKPHOS  --  81   AST  --  84*   ALT  --  322*   ANIONGAP 9 6*         Significant Imaging: I have reviewed all pertinent imaging results/findings within the past 24 hours.      Assessment/Plan:      * Acute on chronic combined systolic and diastolic congestive heart failure  Patient is identified as having Combined Systolic and Diastolic heart failure that is Acute on chronic. CHF is currently uncontrolled due to  Continued edema of extremities and Weight gain of 15 pounds. Latest ECHO performed and demonstrates- Results for orders placed during the hospital encounter of 06/27/22    Echo    Interpretation Summary  · The left ventricle is severely enlarged with mild eccentric hypertrophy and severely decreased systolic function.  · The estimated ejection fraction is 25%.  · There are segmental left ventricular wall motion abnormalities.  · Left ventricular diastolic dysfunction.  · Mild right ventricular enlargement with mildly to moderately reduced right ventricular systolic function.  · Severe left atrial enlargement.  · Mild right atrial enlargement.  · The MR is moderate to moderate -severe ( 2-3+).  · Mild to moderate tricuspid regurgitation.  · Mild pulmonic regurgitation.  · Intermediate central venous pressure (8 mmHg).  · The estimated PA systolic pressure is 47 mmHg.  · There is pulmonary hypertension.  . Continue Furosemide and monitor clinical status closely. Monitor on telemetry. Patient is off CHF pathway.  Monitor strict Is&Os and daily weights.  Place on fluid restriction of 1.5 L. Continue to stress to patient importance of self efficacy and  on diet for CHF. Last BNP reviewed- and noted below   Recent Labs   Lab 02/06/23  1543   BNP 1,518*   .  Repeat echo reviewed:  EF 20%   -liver function and kidney function seems improved today  -Continue dobutrex: deescalating daily: turned off 2/12.  -discussed with nephrology: aldactone + lasix being added back as he tolerates.  -Lasix stopped 2/9: appears euvolemic.  -unable to tolerate beta blocker or Entresto due to BP issues.      Hypotension  Patient unable to tolerate BB or entresto this admit and required dobutrex initiation which is now off.  -Cards recommending holding carvedilol and entresto on d/c.    Longstanding persistent atrial fibrillation  Patient with Persistent (7 days or more) atrial fibrillation which is uncontrolled currently with  Amiodarone. Patient is currently in atrial fibrillation.[id number] Score: 2. Anticoagulation indicated. Anticoagulation done with eliquis.    -converted to SA 2/8 PM: Continue the oral amiodarone  -cardiology following   -Monitor closely on telemetry  -watch for uncontrolled tachycardia/tachyarrhythmia as while on dobutamine        Elevated LFTs  Acute problem- suspect hepatic congestion in the setting of severe and advanced heart failure   -LFTs are improving today  -coags reviewed: Mildly elevated INR   -ammonia normal  -continue to trend the liver function tests    AICD (automatic cardioverter/defibrillator) present  Chronic problem  Continuous cardiac monitor   Patient denies any recent discharges      Hypothyroid  Chronic problem, acutely uncontrolled despite medication compliance  -increased levothyroxine to 50 mics daily    History of CVA (cerebrovascular accident)  Chronic problem, stable  Cont eliquis -no longer on Plavix  Holding statin secondary to elevated liver enzymes      MEIR (acute kidney injury)  Patient with acute kidney injury likely due to pre-renal azotemia MEIR is currently improving. Labs reviewed- Renal function/electrolytes with Estimated Creatinine Clearance: 44.8 mL/min (based on SCr of 1.2 mg/dL). according to latest data. Monitor urine output and serial BMP and adjust therapy as needed. Avoid nephrotoxins and renally dose meds for GFR listed above.     -Discussed with cardiology: required bolus 2/2 hypotension on 2/9.  Lasix stopped 2/9. Aldactone resumed 2/10.  -Renal function continues to improve with adequate UOP.  -F/u BMP.  -Appreciate nephrology assistance.    Benign essential HTN  Chronic, uncontrolled due to hypotension.  Latest blood pressure and vitals reviewed-   Temp:  [97.4 °F (36.3 °C)-98 °F (36.7 °C)]   Pulse:  [63-76]   Resp:  []   BP: ()/(53-76)   SpO2:  [93 %-100 %] .   Home meds for hypertension were reviewed and noted below.   Hypertension Medications              carvediloL (COREG) 3.125 MG tablet Take 1 tablet (3.125 mg total) by mouth 2 (two) times daily.    furosemide (LASIX) 20 MG tablet Take 1 tablet (20 mg total) by mouth 3 (three) times a week.    sacubitriL-valsartan (ENTRESTO) 24-26 mg per tablet Take 1 tablet by mouth 2 (two) times daily.    spironolactone (ALDACTONE) 25 MG tablet Take 1 tablet (25 mg total) by mouth once daily.          Holding his antihypertensives due to hypotension    Coronary artery disease  Patient with known CAD s/p stent placement and CABG, which is controlled Will hold the statin due to LFT elevation and monitor for S/Sx of angina/ACS. Continue to monitor on telemetry.  Pt no longer on aspirin or Plavix          VTE Risk Mitigation (From admission, onward)         Ordered     apixaban tablet 5 mg  2 times daily         02/06/23 2114     IP VTE HIGH RISK PATIENT  Once         02/06/23 2113     Place sequential compression device  Until discontinued         02/06/23 2113     Place KEN hose  Until discontinued         02/06/23 2113                Discharge Planning   MACKENZIE: 2/13/2023     Code Status: DNR   Is the patient medically ready for discharge?:     Reason for patient still in hospital (select all that apply): Patient trending condition, Treatment and Pending disposition  Discharge Plan A: Home                  Kaykay Siegel NP  Department of Hospital Medicine   [facility]

## 2023-02-12 NOTE — PROGRESS NOTES
INPATIENT NEPHROLOGY CONSULT   [facility] NEPHROLOGY    Cleveland Capps  02/12/2023    Reason for consultation:  silvestre    Chief Complaint:   Chief Complaint   Patient presents with    Shortness of Breath    Atrial Fibrillation     History of Present Illness:    Cleveland Capps is a 67 year male who presents emergency room for evaluation of shortness of breath.  He reports shortness of breath and tachycardia onset approximately 8 days ago.  He is a history of atrial fibrillation and reports intermittently in AFib over the past several days.  He denies any chest pain.  Reports shortness of breath is worse with exertion.  No alleviating factors.  He also endorses an approximate 10-15 lb weight gain over the past several weeks.  No reported fever chills.  Previous medical history includes CVA, Watchman left atrial closure device, hypothyroidism, coronary artery disease, stent placement, COPD, hypertension, AICD, and combined systolic/diastolic heart failure.  ER workup:  CBC with mild anemia.  CMP with BUN of 44 and creatinine of 1.8.  Bilirubin elevated 3.0,  .  BNP elevated at 1518 (baseline 400).  Troponin mildly elevated 0.077.  Last echo demonstrated ejection fraction of 20-25%.  Patient was given 20 mg of Lasix in the ER.  Patient remained in atrial fibrillation/RVR while in ER.  He had mild hypotension.  Patient was started on amiodarone infusion and bolus after consulting Dr. Calhoun with on-call Cardiology.  Patient admitted Hospital Medicine for treatment management.     2/8  stopped entresto due to low bp.  On dobutamine.  Episodes of hypotension.  Tachycardic  2/9  converted to sinus rhythm.  3350 cc uop  2/10  feels well.  Has had some issues with hypotension.   Lasix held.    2/11 VSS, no new complains.  2/12 VSS, no new complains.    Plan of Care:    Acute kidney injury likely secondary to decompensated heart failure and cardiorenal syndrome  --abdominal ultrasound--no hydronephrosis  --held  Entresto due to hypotension  --avoid NSAIDS, Mckeon II inhibitors, and other non-essential nephrotoxic agents  --renal dose medication for crcl 10-50  --strict I/Os  --hold aldactone  --ua with micro with tubular casts suggestion hypoperfusion of nephrons   --FEUrea 17 % suggesting pre-renal etiology    Hypervolemia  --much improved  --keep net daily diuresis to 1-1.5 liters  --monitor output  --patient declining to go to [facility] for EP specialist evaluation for ablation and CHF team evaluation  --when MAP steadily above 55 can add Lasix 20mg bid and aldactone 25mg daily     Anemia  --hgb not low enough for erythropoiesis stimulating agent      Hyponatremia--hypervolemic   --diuresis  --treat hypothyroidism  --trend    Metabolic acidosis (mixed gap and non-gap)  --no intervention needed  --check urine pH is appropriately low  --diuretics alter calculation of anion gap    Hypokalemia  --replete as needed    Thank you for allowing us to participate in this patient's care. We will continue to follow.    Vital Signs:  Temp Readings from Last 3 Encounters:   02/12/23 97.5 °F (36.4 °C) (Oral)   11/28/22 98.2 °F (36.8 °C)   11/13/22 97.7 °F (36.5 °C) (Oral)       Pulse Readings from Last 3 Encounters:   02/12/23 79   01/09/23 92   12/20/22 62       BP Readings from Last 3 Encounters:   02/12/23 108/84   01/09/23 125/76   12/20/22 120/60       Weight:  Wt Readings from Last 3 Encounters:   02/11/23 53.7 kg (118 lb 6.2 oz)   01/09/23 52.6 kg (116 lb)   12/20/22 52.9 kg (116 lb 8.2 oz)       Past Medical & Surgical History:  Past Medical History:   Diagnosis Date    A-fib     Acute kidney injury     COPD (chronic obstructive pulmonary disease)     Coronary artery disease     Diabetes mellitus     Encounter for blood transfusion     Former smoker     Hypertension     Myocardial infarction     Thyroid disease     Type 2 diabetes mellitus without complications        Past Surgical History:   Procedure Laterality Date    CLOSURE  OF LEFT ATRIAL APPENDAGE USING DEVICE N/A 2022    Procedure: Left atrial appendage closure device;  Surgeon: Tre Schmidt MD;  Location: [facility] CATH LAB;  Service: Cardiology;  Laterality: N/A;    COLONOSCOPY N/A 1/3/2017    Procedure: COLONOSCOPY;  Surgeon: Marcus Zarco MD;  Location: [facility] ENDO;  Service: Endoscopy;  Laterality: N/A;    CORONARY BYPASS GRAFT ANGIOGRAPHY  3/30/2021    Procedure: Bypass graft study;  Surgeon: Tre Schmidt MD;  Location: [facility] CATH LAB;  Service: Cardiology;;    CORONARY STENT PLACEMENT      GASTROSTOMY TUBE PLACEMENT      INSERTION OF PACEMAKER  2017    INTRAVASCULAR ULTRASOUND, NON-CORONARY  2022    Procedure: Intravascular Ultrasound, Non-Coronary;  Surgeon: Tre Schmidt MD;  Location: [facility] CATH LAB;  Service: Cardiology;;    LEFT HEART CATHETERIZATION N/A 3/30/2021    Procedure: Left heart cath;  Surgeon: Tre Schmidt MD;  Location: [facility] CATH LAB;  Service: Cardiology;  Laterality: N/A;    PEG TUBE REMOVAL      TRACHEOSTOMY CLOSURE      TRACHEOSTOMY TUBE PLACEMENT      TRANSESOPHAGEAL ECHOCARDIOGRAPHY N/A 2022    Procedure: ECHOCARDIOGRAM, TRANSESOPHAGEAL;  Surgeon: Yudi Diagnostic Provider;  Location: [facility] CATH LAB;  Service: Anesthesiology;  Laterality: N/A;    TREATMENT OF CARDIAC ARRHYTHMIA N/A 2022    Procedure: Cardioversion or Defibrillation;  Surgeon: Kuldeep Daniels MD;  Location: [facility] CATH LAB;  Service: Cardiology;  Laterality: N/A;    UPPER GASTROINTESTINAL ENDOSCOPY  2016    Dr. Zarco with PEG tube placement       Past Social History:  Social History     Socioeconomic History    Marital status: Single   Tobacco Use    Smoking status: Former     Types: Cigarettes     Quit date: 2005     Years since quittin.2    Smokeless tobacco: Never    Tobacco comments:     quit    Substance and Sexual Activity    Alcohol use: No    Drug use: No    Sexual activity: Yes     Social Determinants of Health     Financial  Resource Strain: Low Risk     Difficulty of Paying Living Expenses: Not hard at all   Food Insecurity: No Food Insecurity    Worried About Running Out of Food in the Last Year: Never true    Ran Out of Food in the Last Year: Never true   Transportation Needs: No Transportation Needs    Lack of Transportation (Medical): No    Lack of Transportation (Non-Medical): No   Physical Activity: Inactive    Days of Exercise per Week: 0 days    Minutes of Exercise per Session: 0 min   Stress: No Stress Concern Present    Feeling of Stress : Not at all   Social Connections: Socially Isolated    Frequency of Communication with Friends and Family: Three times a week    Frequency of Social Gatherings with Friends and Family: Once a week    Attends Jew Services: Never    Active Member of Clubs or Organizations: No    Attends Club or Organization Meetings: Never    Marital Status: Never    Housing Stability: Low Risk     Unable to Pay for Housing in the Last Year: No    Number of Places Lived in the Last Year: 1    Unstable Housing in the Last Year: No       Medications:  No current facility-administered medications on file prior to encounter.     Current Outpatient Medications on File Prior to Encounter   Medication Sig Dispense Refill    apixaban (ELIQUIS) 5 mg Tab Take 5 mg by mouth 2 (two) times daily.      atorvastatin (LIPITOR) 80 MG tablet Take 80 mg by mouth once daily.      carvediloL (COREG) 3.125 MG tablet Take 1 tablet (3.125 mg total) by mouth 2 (two) times daily. (Patient taking differently: Take 3.125 mg by mouth 2 (two) times daily as needed.) 60 tablet 11    clopidogreL (PLAVIX) 75 mg tablet Take 1 tablet (75 mg total) by mouth once daily. 30 tablet 11    levothyroxine (SYNTHROID) 25 MCG tablet Take 25 mcg by mouth before breakfast.      potassium chloride SA (K-DUR,KLOR-CON M) 10 MEQ tablet       COMP-AIR NEBULIZER COMPRESSOR Alfreda use as directed      furosemide (LASIX) 20 MG tablet Take 1 tablet (20  "mg total) by mouth 3 (three) times a week. 10 tablet 0    sacubitriL-valsartan (ENTRESTO) 24-26 mg per tablet Take 1 tablet by mouth 2 (two) times daily. 60 tablet 2    spironolactone (ALDACTONE) 25 MG tablet Take 1 tablet (25 mg total) by mouth once daily. 30 tablet 11    travoprost (TRAVATAN Z) 0.004 % ophthalmic solution       travoprost, benzalkonium, (TRAVATAN) 0.004 % ophthalmic solution Place 1 drop into both eyes nightly.      TRUE METRIX GLUCOSE TEST STRIP Strp USE 1 STRIP TO CHECK GLUCOSE ONCE DAILY       Scheduled Meds:   amiodarone  400 mg Oral Daily    apixaban  5 mg Oral BID    furosemide  20 mg Oral Daily    levothyroxine  50 mcg Oral Before breakfast    mupirocin   Nasal BID    sodium chloride 0.9%  10 mL Intravenous Q6H    spironolactone  25 mg Oral Daily     Continuous Infusions:   DOBUTamine IV infusion (non-titrating) 2 mcg/kg/min (02/12/23 0933)     PRN Meds:.dextrose 10%, dextrose 10%, glucagon (human recombinant), glucose, glucose, insulin aspart U-100, sodium chloride 0.9%, Flushing PICC Protocol **AND** sodium chloride 0.9% **AND** sodium chloride 0.9%    Allergies:  Penicillin and Penicillins    Past Family History:  Reviewed; refer to Hospitalist Admission Note    Review of Systems:  Review of Systems - All 14 systems reviewed and negative, except as noted in HPI    Physical Exam:    /84   Pulse 79   Temp 97.5 °F (36.4 °C) (Oral)   Resp 18   Ht 5' 1" (1.549 m)   Wt 53.7 kg (118 lb 6.2 oz)   SpO2 100%   BMI 22.37 kg/m²     General Appearance:    No distress   Head:    Normocephalic, without obvious abnormality, atraumatic   Eyes:    PER, conjunctiva/corneas clear, EOM's intact in both eyes        Throat:   Lips, mucosa, and tongue normal; teeth and gums normal   Back:     Symmetric, no curvature, ROM normal, no CVA tenderness   Lungs:     Diminished.  Crackles at bases   Chest wall:    No tenderness or deformity   Heart:    Irreg.  No rub   Abdomen:     Soft, non-tender, bowel " sounds active all four quadrants,     no masses, no organomegaly   Extremities:   Extremities normal, atraumatic, no cyanosis or edema   Pulses:   2+ and symmetric all extremities   MSK:   No joint or muscle swelling, tenderness or deformity   Skin:   Skin color, texture, turgor normal, no rashes or lesions   Neurologic:      No flap     Results:  Recent Labs   Lab 02/10/23  0501 02/11/23  0441 02/12/23  0435   * 135* 130*   K 3.3* 4.5 4.0   CL 98 101 100   CO2 24 25 24   BUN 31* 24* 23   CREATININE 1.3 1.2 1.2   GLU 85 92 100         Recent Labs   Lab 02/07/23  2355 02/08/23  0450 02/09/23  0500 02/10/23  0501 02/11/23  0441 02/12/23  0435   CALCIUM  --  8.1*  8.1* 7.8* 7.8* 8.1* 8.3*   ALBUMIN  --  3.1*  3.1* 3.6 3.4* 3.2* 3.2*   PHOS 4.8*  --   --   --  2.9  --    MG  --  2.2 1.9  --   --  2.1               Recent Labs   Lab 02/09/23  2107 02/10/23  0737 02/10/23  1155 02/10/23  1652 02/10/23  2214 02/11/23  0731 02/11/23  1138 02/11/23  1642 02/11/23  2029 02/12/23  0736   POCTGLUCOSE 117* 84 117* 94 119* 95 151* 104 123* 90         Recent Labs   Lab 05/17/20  0646 10/24/20  0240 02/06/23  1543   Hemoglobin A1C 6.0 6.3 H 5.4         Recent Labs   Lab 02/10/23  0501 02/11/23  0441 02/12/23  0435   WBC 6.49 6.41 6.25   HGB 10.4* 10.7* 10.7*   HCT 31.8* 32.6* 33.1*   * 130* 140*   * 105* 106*   MCHC 32.7 32.8 32.3   MONO 7.6  0.5 8.9  0.6 7.4  0.5         Recent Labs   Lab 02/09/23  0500 02/10/23  0501 02/11/23  0441 02/12/23  0435   BILITOT 3.3* 3.0*  --  3.7*   PROT 6.1 5.5*  --  5.7*   ALBUMIN 3.6 3.4* 3.2* 3.2*   ALKPHOS 93 79  --  81   * 489*  --  322*   * 180*  --  84*         Recent Labs   Lab 10/23/20  1557 02/07/23  1242   Color, UA Yellow Yellow   Appearance, UA Clear Clear   pH, UA 7.0 5.0   Specific Gravity, UA 1.015 1.020   Protein, UA Negative 3+ A   Glucose, UA Negative Negative   Ketones, UA Negative Negative   Urobilinogen, UA Negative Negative   Bilirubin  (UA) Negative Negative   Occult Blood UA Negative 1+ A   Nitrite, UA Negative Negative   RBC, UA  --  2   WBC, UA  --  1   Bacteria  --  Moderate A   Hyaline Casts, UA  --  12 A         Recent Labs   Lab 05/02/22  0912 05/17/22  1428 05/17/22  1501   Sample VENOUS ARTERIAL ARTERIAL         Microbiology Results (last 7 days)       ** No results found for the last 168 hours. **          Patient care was time spent personally by me on the following activities:   Obtaining a history  Examination of patient.  Providing medical care at the patients bedside.  Developing a treatment plan with patient or surrogate and bedside caregivers  Ordering and reviewing laboratory studies, radiographic studies, pulse oximetry.  Ordering and performing treatments and interventions.  Evaluation of patient's response to treatment.  Discussions with consultants while on the unit and immediately available to the patient.  Re-evaluation of the patient's condition.  Documentation in the medical record.     Michael Brandt MD  Nephrology  [facility]  [phone]

## 2023-02-12 NOTE — PLAN OF CARE
02/11/23 1919   Patient Assessment/Suction   Level of Consciousness (AVPU) alert   Respiratory Effort Normal;Unlabored   Rhythm/Pattern, Respiratory pattern regular   PRE-TX-O2   Device (Oxygen Therapy) room air   SpO2 100 %   Pulse Oximetry Type Continuous   Pulse 73   Resp (!) 27   /64

## 2023-02-12 NOTE — CARE UPDATE
02/12/23 0804   Patient Assessment/Suction   Level of Consciousness (AVPU) alert   Respiratory Effort Normal;Unlabored   Expansion/Accessory Muscles/Retractions no use of accessory muscles;no retractions;expansion symmetric   Rhythm/Pattern, Respiratory depth regular;unlabored;pattern regular   PRE-TX-O2   Device (Oxygen Therapy) room air   SpO2 100 %   Pulse Oximetry Type Continuous   $ Pulse Oximetry - Multiple Charge Pulse Oximetry - Multiple   Pulse 79   Resp 18

## 2023-02-12 NOTE — ASSESSMENT & PLAN NOTE
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 CHF is currently uncontrolled due to Continued edema of extremities and Weight gain of 15 pounds. Latest ECHO performed and demonstrates- Results for orders placed during the hospital encounter of 06/27/22    Echo    Interpretation Summary  · The left ventricle is severely enlarged with mild eccentric hypertrophy and severely decreased systolic function.  · The estimated ejection fraction is 25%.  · There are segmental left ventricular wall motion abnormalities.  · Left ventricular diastolic dysfunction.  · Mild right ventricular enlargement with mildly to moderately reduced right ventricular systolic function.  · Severe left atrial enlargement.  · Mild right atrial enlargement.  · The MR is moderate to moderate -severe ( 2-3+).  · Mild to moderate tricuspid regurgitation.  · Mild pulmonic regurgitation.  · Intermediate central venous pressure (8 mmHg).  · The estimated PA systolic pressure is 47 mmHg.  · There is pulmonary hypertension.  . Continue Furosemide and monitor clinical status closely. Monitor on telemetry. Patient is off CHF pathway.  Monitor strict Is&Os and daily weights.  Place on fluid restriction of 1.5 L. Continue to stress to patient importance of self efficacy and  on diet for CHF. Last BNP reviewed- and noted below   Recent Labs   Lab 02/06/23  1543   BNP 1,518*   .  Repeat echo reviewed:  EF 20%   -liver function and kidney function seems improved today  -Continue dobutrex: deescalating daily: turned off 2/12.  -discussed with nephrology: aldactone + lasix being added back as he tolerates.  -Lasix stopped 2/9: appears euvolemic.  -unable to tolerate beta blocker or Entresto due to BP issues.

## 2023-02-13 LAB
ALBUMIN SERPL BCP-MCNC: 3.2 G/DL (ref 3.5–5.2)
ALP SERPL-CCNC: 82 U/L (ref 55–135)
ALT SERPL W/O P-5'-P-CCNC: 243 U/L (ref 10–44)
ANION GAP SERPL CALC-SCNC: 10 MMOL/L (ref 8–16)
AST SERPL-CCNC: 55 U/L (ref 10–40)
BASOPHILS # BLD AUTO: 0.04 K/UL (ref 0–0.2)
BASOPHILS NFR BLD: 0.6 % (ref 0–1.9)
BILIRUB SERPL-MCNC: 3.4 MG/DL (ref 0.1–1)
BUN SERPL-MCNC: 24 MG/DL (ref 8–23)
CALCIUM SERPL-MCNC: 8.4 MG/DL (ref 8.7–10.5)
CHLORIDE SERPL-SCNC: 102 MMOL/L (ref 95–110)
CO2 SERPL-SCNC: 23 MMOL/L (ref 23–29)
CREAT SERPL-MCNC: 1.3 MG/DL (ref 0.5–1.4)
DIFFERENTIAL METHOD: ABNORMAL
EOSINOPHIL # BLD AUTO: 0.2 K/UL (ref 0–0.5)
EOSINOPHIL NFR BLD: 2.8 % (ref 0–8)
ERYTHROCYTE [DISTWIDTH] IN BLOOD BY AUTOMATED COUNT: 16.4 % (ref 11.5–14.5)
EST. GFR  (NO RACE VARIABLE): >60 ML/MIN/1.73 M^2
GLUCOSE SERPL-MCNC: 86 MG/DL (ref 70–110)
HCT VFR BLD AUTO: 33 % (ref 40–54)
HGB BLD-MCNC: 10.6 G/DL (ref 14–18)
IMM GRANULOCYTES # BLD AUTO: 0.02 K/UL (ref 0–0.04)
IMM GRANULOCYTES NFR BLD AUTO: 0.3 % (ref 0–0.5)
LYMPHOCYTES # BLD AUTO: 0.9 K/UL (ref 1–4.8)
LYMPHOCYTES NFR BLD: 13.9 % (ref 18–48)
MAGNESIUM SERPL-MCNC: 1.9 MG/DL (ref 1.6–2.6)
MCH RBC QN AUTO: 34.1 PG (ref 27–31)
MCHC RBC AUTO-ENTMCNC: 32.1 G/DL (ref 32–36)
MCV RBC AUTO: 106 FL (ref 82–98)
MONOCYTES # BLD AUTO: 0.5 K/UL (ref 0.3–1)
MONOCYTES NFR BLD: 7.7 % (ref 4–15)
NEUTROPHILS # BLD AUTO: 5 K/UL (ref 1.8–7.7)
NEUTROPHILS NFR BLD: 74.7 % (ref 38–73)
NRBC BLD-RTO: 0 /100 WBC
PLATELET # BLD AUTO: 143 K/UL (ref 150–450)
PMV BLD AUTO: 8.8 FL (ref 9.2–12.9)
POCT GLUCOSE: 134 MG/DL (ref 70–110)
POCT GLUCOSE: 152 MG/DL (ref 70–110)
POCT GLUCOSE: 188 MG/DL (ref 70–110)
POTASSIUM SERPL-SCNC: 4.4 MMOL/L (ref 3.5–5.1)
PROT SERPL-MCNC: 5.5 G/DL (ref 6–8.4)
RBC # BLD AUTO: 3.11 M/UL (ref 4.6–6.2)
SODIUM SERPL-SCNC: 135 MMOL/L (ref 136–145)
WBC # BLD AUTO: 6.75 K/UL (ref 3.9–12.7)

## 2023-02-13 PROCEDURE — 94761 N-INVAS EAR/PLS OXIMETRY MLT: CPT

## 2023-02-13 PROCEDURE — 99233 SBSQ HOSP IP/OBS HIGH 50: CPT | Mod: ,,, | Performed by: INTERNAL MEDICINE

## 2023-02-13 PROCEDURE — 25000003 PHARM REV CODE 250: Performed by: NURSE PRACTITIONER

## 2023-02-13 PROCEDURE — 36415 COLL VENOUS BLD VENIPUNCTURE: CPT | Performed by: NURSE PRACTITIONER

## 2023-02-13 PROCEDURE — A4216 STERILE WATER/SALINE, 10 ML: HCPCS | Performed by: NURSE PRACTITIONER

## 2023-02-13 PROCEDURE — 97535 SELF CARE MNGMENT TRAINING: CPT

## 2023-02-13 PROCEDURE — 25000003 PHARM REV CODE 250: Performed by: INTERNAL MEDICINE

## 2023-02-13 PROCEDURE — 97116 GAIT TRAINING THERAPY: CPT | Mod: CQ

## 2023-02-13 PROCEDURE — 85025 COMPLETE CBC W/AUTO DIFF WBC: CPT | Performed by: NURSE PRACTITIONER

## 2023-02-13 PROCEDURE — 83735 ASSAY OF MAGNESIUM: CPT | Performed by: NURSE PRACTITIONER

## 2023-02-13 PROCEDURE — 20600001 HC STEP DOWN PRIVATE ROOM

## 2023-02-13 PROCEDURE — 99233 PR SUBSEQUENT HOSPITAL CARE,LEVL III: ICD-10-PCS | Mod: ,,, | Performed by: INTERNAL MEDICINE

## 2023-02-13 PROCEDURE — 36569 INSJ PICC 5 YR+ W/O IMAGING: CPT

## 2023-02-13 PROCEDURE — 80053 COMPREHEN METABOLIC PANEL: CPT | Performed by: NURSE PRACTITIONER

## 2023-02-13 RX ADMIN — SODIUM CHLORIDE, PRESERVATIVE FREE 10 ML: 5 INJECTION INTRAVENOUS at 12:02

## 2023-02-13 RX ADMIN — AMIODARONE HYDROCHLORIDE 400 MG: 200 TABLET ORAL at 08:02

## 2023-02-13 RX ADMIN — FUROSEMIDE 20 MG: 20 TABLET ORAL at 08:02

## 2023-02-13 RX ADMIN — MUPIROCIN: 20 OINTMENT TOPICAL at 09:02

## 2023-02-13 RX ADMIN — LEVOTHYROXINE SODIUM 50 MCG: 50 TABLET ORAL at 05:02

## 2023-02-13 RX ADMIN — SACUBITRIL AND VALSARTAN 1 TABLET: 24; 26 TABLET, FILM COATED ORAL at 02:02

## 2023-02-13 RX ADMIN — SPIRONOLACTONE 25 MG: 25 TABLET, FILM COATED ORAL at 08:02

## 2023-02-13 RX ADMIN — SODIUM CHLORIDE, PRESERVATIVE FREE 10 ML: 5 INJECTION INTRAVENOUS at 05:02

## 2023-02-13 RX ADMIN — SACUBITRIL AND VALSARTAN 1 TABLET: 24; 26 TABLET, FILM COATED ORAL at 09:02

## 2023-02-13 RX ADMIN — APIXABAN 5 MG: 2.5 TABLET, FILM COATED ORAL at 08:02

## 2023-02-13 RX ADMIN — APIXABAN 5 MG: 2.5 TABLET, FILM COATED ORAL at 09:02

## 2023-02-13 NOTE — ASSESSMENT & PLAN NOTE
Acute problem- suspect hepatic congestion in the setting of severe and advanced heart failure   -LFTs are improving daily  -coags reviewed: Mildly elevated INR   -ammonia normal  -continue to trend the liver function tests

## 2023-02-13 NOTE — PLAN OF CARE
Problem: Adult Inpatient Plan of Care  Goal: Plan of Care Review  Outcome: Ongoing, Progressing     Problem: Cardiac Output Decreased (Heart Failure)  Goal: Optimal Cardiac Output  Outcome: Ongoing, Progressing     Problem: Skin Injury Risk Increased  Goal: Skin Health and Integrity  Outcome: Ongoing, Progressing     Problem: Renal Function Impairment (Acute Kidney Injury/Impairment)  Goal: Effective Renal Function  Outcome: Ongoing, Progressing     Problem: Respiratory Compromise (Heart Failure)  Goal: Effective Oxygenation and Ventilation  Outcome: Ongoing, Progressing     Problem: Functional Ability Impaired (Heart Failure)  Goal: Optimal Functional Ability  Outcome: Ongoing, Progressing    Problem: Fall Injury Risk  Goal: Absence of Fall and Fall-Related Injury  Outcome: Ongoing, Progressing

## 2023-02-13 NOTE — SUBJECTIVE & OBJECTIVE
Interval History:  Patient seen and examined.  No acute events overnight. Doing well.  No chest pain or shortness for breath. Tolerating lasix well.  BP stable.  Cardiology at bedside, wants to keep overnight.  Patient agreeable.   Will plan for d/c in am.     Review of Systems   Constitutional:  Negative for chills, fatigue and fever.   Respiratory:  Negative for cough and shortness of breath.    Cardiovascular:  Negative for chest pain, palpitations and leg swelling.   Gastrointestinal:  Negative for nausea and vomiting.   Neurological:  Negative for dizziness, weakness and light-headedness.   Objective:     Vital Signs (Most Recent):  Temp: 97.3 °F (36.3 °C) (02/13/23 0700)  Pulse: 67 (02/13/23 0750)  Resp: (!) 27 (02/13/23 0750)  BP: 120/71 (02/13/23 0400)  SpO2: 100 % (02/13/23 0750) Vital Signs (24h Range):  Temp:  [96.9 °F (36.1 °C)-98.5 °F (36.9 °C)] 97.3 °F (36.3 °C)  Pulse:  [67-85] 67  Resp:  [16-57] 27  SpO2:  [91 %-100 %] 100 %  BP: ()/(59-88) 120/71     Weight: 55.4 kg (122 lb 2.2 oz)  Body mass index is 23.08 kg/m².    Intake/Output Summary (Last 24 hours) at 2/13/2023 1051  Last data filed at 2/13/2023 0525  Gross per 24 hour   Intake 200 ml   Output 775 ml   Net -575 ml        Physical Exam  Vitals and nursing note reviewed.   Constitutional:       General: He is not in acute distress.     Appearance: Normal appearance. He is ill-appearing (chronically ill appearing). He is not toxic-appearing or diaphoretic.   Cardiovascular:      Rate and Rhythm: Normal rate and regular rhythm.      Pulses: Normal pulses.      Comments: PICC line noted.  Pulmonary:      Effort: Pulmonary effort is normal. No respiratory distress.      Breath sounds: Normal breath sounds.   Musculoskeletal:         General: Normal range of motion.      Right lower leg: No edema.      Left lower leg: No edema.   Skin:     General: Skin is warm and dry.      Coloration: Skin is not pale.   Neurological:      Mental Status: He  is alert.       Significant Labs: All pertinent labs within the past 24 hours have been reviewed.  CBC:   Recent Labs   Lab 02/12/23  0435 02/13/23  0444   WBC 6.25 6.75   HGB 10.7* 10.6*   HCT 33.1* 33.0*   * 143*       CMP:   Recent Labs   Lab 02/12/23  0435 02/13/23  0444   * 135*   K 4.0 4.4    102   CO2 24 23    86   BUN 23 24*   CREATININE 1.2 1.3   CALCIUM 8.3* 8.4*   PROT 5.7* 5.5*   ALBUMIN 3.2* 3.2*   BILITOT 3.7* 3.4*   ALKPHOS 81 82   AST 84* 55*   * 243*   ANIONGAP 6* 10         Significant Imaging: I have reviewed all pertinent imaging results/findings within the past 24 hours.

## 2023-02-13 NOTE — PLAN OF CARE
Recommendations   1. Continue diabetic 1500 kcal  2gm sodium diet with 1500ml Fluid restriction   2. Weigh daily  3. D/c Boost per pt request  4. Nutrition education on cardiac/low sodium diet and fluid restriction given    Goals: Consume 50% to 75% of all meals by follow up.  Nutrition Goal Status: meeting-continues  Communication of RD Recs:  (P.O C sticky note.)

## 2023-02-13 NOTE — ASSESSMENT & PLAN NOTE
Patient with Persistent (7 days or more) atrial fibrillation which is uncontrolled currently with Amiodarone. Patient is currently in atrial fibrillation.[id number] Score: 2. Anticoagulation indicated. Anticoagulation done with eliquis.    -converted to SA 2/8 PM: Continue the oral amiodarone  -cardiology following   -Monitor closely on telemetry

## 2023-02-13 NOTE — PLAN OF CARE
CM scheduled a hospital PCP follow up appointment with GINI Snyder for 2/17/23 at 9:00 am and Dr. Daniels for 2/28/23 at 2:40 pm. AVS updated with follow up appointments.    02/13/23 1041   Discharge Assessment   Assessment Type Discharge Planning Reassessment

## 2023-02-13 NOTE — ASSESSMENT & PLAN NOTE
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 CHF is currently uncontrolled due to Continued edema of extremities and Weight gain of 15 pounds. Latest ECHO performed and demonstrates- Results for orders placed during the hospital encounter of 06/27/22    Echo    Interpretation Summary  · The left ventricle is severely enlarged with mild eccentric hypertrophy and severely decreased systolic function.  · The estimated ejection fraction is 25%.  · There are segmental left ventricular wall motion abnormalities.  · Left ventricular diastolic dysfunction.  · Mild right ventricular enlargement with mildly to moderately reduced right ventricular systolic function.  · Severe left atrial enlargement.  · Mild right atrial enlargement.  · The MR is moderate to moderate -severe ( 2-3+).  · Mild to moderate tricuspid regurgitation.  · Mild pulmonic regurgitation.  · Intermediate central venous pressure (8 mmHg).  · The estimated PA systolic pressure is 47 mmHg.  · There is pulmonary hypertension.  . Continue Furosemide and monitor clinical status closely. Monitor on telemetry. Patient is off CHF pathway.  Monitor strict Is&Os and daily weights.  Place on fluid restriction of 1.5 L. Continue to stress to patient importance of self efficacy and  on diet for CHF. Last BNP reviewed- and noted below   Recent Labs   Lab 02/06/23  1543   BNP 1,518*   .  Repeat echo reviewed:  EF 20%   -liver function and kidney function improving  -Continue dobutrex: deescalating daily: turned off 2/12.  -aldactone + lasix being added back as he tolerates - seems to be tolerating well.  -unable to tolerate beta blocker or Entresto due to BP issues.

## 2023-02-13 NOTE — PT/OT/SLP PROGRESS
Physical Therapy Treatment    Patient Name:  Cleveland Capps   MRN:  [id number]    Recommendations:     Discharge Recommendations: home health PT  Discharge Equipment Recommendations: none  Barriers to discharge: None    Assessment:     Cleveland Capps is a 67 y.o. 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  He presents with the following impairments/functional limitations: weakness, impaired endurance, impaired functional mobility, impaired cardiopulmonary response to activity.  Pt increased ambulation distance to 250' with appropriately slow pace, RW support, CGA, and chair follow but with no request for rest break.   Pre gait: /75, 97% sats on RA, HR 75 bpm  Post gait: /66, 99% sats on RA, HR 77 bpm  Agreed to remain seated in chair with good tolerance, increased confidence re: safety post discharge, and all needs at hand.     Rehab Prognosis: Good; patient would benefit from acute skilled PT services to address these deficits and reach maximum level of function.    Recent Surgery: * No surgery found *      Plan:     During this hospitalization, patient to be seen 6 x/week to address the identified rehab impairments via therapeutic activities, therapeutic exercises and progress toward the following goals:    Plan of Care Expires:  03/09/23    Subjective     Chief Complaint: cold  Patient/Family Comments/goals: be safe after discharge; warm up with blankets in room  Pain/Comfort:  Pain Rating 1: 0/10  Pain Rating Post-Intervention 1: 0/10      Objective:     Communicated with nurse Low prior to session.  Patient found HOB elevated with bed alarm, blood pressure cuff, peripheral IV, PICC line, pulse ox (continuous), telemetry upon PT entry to room.     General Precautions: Standard, fall  Orthopedic Precautions: N/A  Braces: N/A  Respiratory Status: Room air     Functional Mobility:  Bed Mobility:     Supine to Sit:  supervision  Transfers:     Sit to Stand:  stand by assistance with rolling walker  Bed to Chair: contact guard assistance with  rolling walker  using  Stand Pivot  Gait: 250' RW, CGA, slow pace, no seated rest break      AM-PAC 6 CLICK MOBILITY          Treatment & Education:  -Seated LAQx x10 w/ VC for full ROM    Patient left up in chair with all lines intact, call button in reach, nurse notified, and cardiologist present..    GOALS:   Multidisciplinary Problems       Physical Therapy Goals          Problem: Physical Therapy    Goal Priority Disciplines Outcome Goal Variances Interventions   Physical Therapy Goal     PT, PT/OT Ongoing, Progressing     Description: Goals to be met by: 2023     Patient will increase functional independence with mobility by performin. Supine to sit with Modified [place]  2. Sit to supine with Modified [place]  3. Sit to stand transfer with Modified [place]  4. Bed to chair transfer with Supervision using Rolling Walker  5. Gait  x 250 feet with Supervision using Rolling Walker.                          Time Tracking:     PT Received On: 23  PT Start Time: 0916     PT Stop Time: 0940  PT Total Time (min): 24 min     Billable Minutes: Gait Training 24    Treatment Type: Treatment  PT/PTA: PTA     PTA Visit Number: 1     2023

## 2023-02-13 NOTE — PLAN OF CARE
PT is accepted by Prashant CLEMONS. AVS updated and discharge plan closed in [facility].    02/13/23 1131   Post-Acute Status   Post-Acute Authorization Home Health   Home Health Status Set-up Complete/Auth obtained

## 2023-02-13 NOTE — EICU
Intervention Initiated From:  COR / LEONELU    Beatriz intervened regarding:  Rounding (Video assessment)    Comments: Pt awake  and resting in bed. He reports no complaints and or request. Vss.  No drips infusing.

## 2023-02-13 NOTE — PROGRESS NOTES
INPATIENT NEPHROLOGY Progress Note  [facility] NEPHROLOGY    Cleveland Capps  02/13/2023    Reason for consultation:  silvestre    Chief Complaint:   Chief Complaint   Patient presents with    Shortness of Breath    Atrial Fibrillation     History of Present Illness:    Cleveland Capps is a 67 year male who presents emergency room for evaluation of shortness of breath.  He reports shortness of breath and tachycardia onset approximately 8 days ago.  He is a history of atrial fibrillation and reports intermittently in AFib over the past several days.  He denies any chest pain.  Reports shortness of breath is worse with exertion.  No alleviating factors.  He also endorses an approximate 10-15 lb weight gain over the past several weeks.  No reported fever chills.  Previous medical history includes CVA, Watchman left atrial closure device, hypothyroidism, coronary artery disease, stent placement, COPD, hypertension, AICD, and combined systolic/diastolic heart failure.  ER workup:  CBC with mild anemia.  CMP with BUN of 44 and creatinine of 1.8.  Bilirubin elevated 3.0,  .  BNP elevated at 1518 (baseline 400).  Troponin mildly elevated 0.077.  Last echo demonstrated ejection fraction of 20-25%.  Patient was given 20 mg of Lasix in the ER.  Patient remained in atrial fibrillation/RVR while in ER.  He had mild hypotension.  Patient was started on amiodarone infusion and bolus after consulting Dr. Calhoun with on-call Cardiology.  Patient admitted Hospital Medicine for treatment management.     2/8  stopped entresto due to low bp.  On dobutamine.  Episodes of hypotension.  Tachycardic  2/9  converted to sinus rhythm.  3350 cc uop  2/10  feels well.  Has had some issues with hypotension.   Lasix held.    2/11 VSS, no new complains.  2/12 VSS, no new complains.  2/13 VSS, on RA, UOP 1.4L, net -6.2L    Plan of Care:    Acute kidney injury likely secondary to decompensated heart failure and cardiorenal syndrome  --improved  with diuresis  --hold entresto- continue lasix and aldactone  --avoid NSAIDS, Mckeon II inhibitors, and other non-essential nephrotoxic agents  --renal dose medication for crcl > 60  --abdominal ultrasound--no hydronephrosis  --ua with micro with tubular casts suggestion hypoperfusion of nephrons   --FEUrea 17 % suggesting pre-renal etiology    HFrEF  Hypervolemia  Hyponatremia  --much improved and stable off dobutamine  --hold entresto at discharge  --2g salt, 1.5L fluid restriction     Anemia  --at goal    Thank you for allowing us to participate in this patient's care. We will continue to follow.    Vital Signs:  Temp Readings from Last 3 Encounters:   02/13/23 97.3 °F (36.3 °C)   11/28/22 98.2 °F (36.8 °C)   11/13/22 97.7 °F (36.5 °C) (Oral)       Pulse Readings from Last 3 Encounters:   02/13/23 78   01/09/23 92   12/20/22 62       BP Readings from Last 3 Encounters:   02/13/23 105/60   01/09/23 125/76   12/20/22 120/60       Weight:  Wt Readings from Last 3 Encounters:   02/13/23 55.4 kg (122 lb 2.2 oz)   01/09/23 52.6 kg (116 lb)   12/20/22 52.9 kg (116 lb 8.2 oz)       Medications:  No current facility-administered medications on file prior to encounter.     Current Outpatient Medications on File Prior to Encounter   Medication Sig Dispense Refill    apixaban (ELIQUIS) 5 mg Tab Take 5 mg by mouth 2 (two) times daily.      atorvastatin (LIPITOR) 80 MG tablet Take 80 mg by mouth once daily.      potassium chloride SA (K-DUR,KLOR-CON M) 10 MEQ tablet       COMP-AIR NEBULIZER COMPRESSOR Alfreda use as directed      spironolactone (ALDACTONE) 25 MG tablet Take 1 tablet (25 mg total) by mouth once daily. 30 tablet 11    travoprost (TRAVATAN Z) 0.004 % ophthalmic solution       travoprost, benzalkonium, (TRAVATAN) 0.004 % ophthalmic solution Place 1 drop into both eyes nightly.      TRUE METRIX GLUCOSE TEST STRIP Strp USE 1 STRIP TO CHECK GLUCOSE ONCE DAILY       Scheduled Meds:   amiodarone  400 mg Oral Daily     "apixaban  5 mg Oral BID    furosemide  20 mg Oral Daily    levothyroxine  50 mcg Oral Before breakfast    mupirocin   Nasal BID    sodium chloride 0.9%  10 mL Intravenous Q6H    spironolactone  25 mg Oral Daily     Continuous Infusions:      PRN Meds:.dextrose 10%, dextrose 10%, glucagon (human recombinant), glucose, glucose, insulin aspart U-100, sodium chloride 0.9%, Flushing PICC Protocol **AND** sodium chloride 0.9% **AND** sodium chloride 0.9%    Review of Systems:  Neg    Physical Exam:    /60   Pulse 78   Temp 97.3 °F (36.3 °C)   Resp (P) 19   Ht 5' 1" (1.549 m)   Wt 55.4 kg (122 lb 2.2 oz)   SpO2 100%   BMI 23.08 kg/m²     Constitutional: nad, aao x 3  Heart: rrr, no r/g, wwp, no edema  Lungs: ctab, no w/r/r/c, no lb  Abdomen: s/nt/nd, +BS    Results:  Recent Labs   Lab 02/11/23  0441 02/12/23  0435 02/13/23  0444   * 130* 135*   K 4.5 4.0 4.4    100 102   CO2 25 24 23   BUN 24* 23 24*   CREATININE 1.2 1.2 1.3   GLU 92 100 86         Recent Labs   Lab 02/07/23  2355 02/08/23  0450 02/09/23  0500 02/10/23  0501 02/11/23  0441 02/12/23  0435 02/13/23  0444   CALCIUM  --    < > 7.8*   < > 8.1* 8.3* 8.4*   ALBUMIN  --    < > 3.6   < > 3.2* 3.2* 3.2*   PHOS 4.8*  --   --   --  2.9  --   --    MG  --    < > 1.9  --   --  2.1 1.9    < > = values in this interval not displayed.               Recent Labs   Lab 02/10/23  2214 02/11/23  0731 02/11/23  1138 02/11/23  1642 02/11/23 2029 02/12/23  0736 02/12/23  1117 02/12/23  1641 02/12/23  2045 02/13/23  1144   POCTGLUCOSE 119* 95 151* 104 123* 90 98 159* 126* 188*         Recent Labs   Lab 05/17/20  0646 10/24/20  0240 02/06/23  1543   Hemoglobin A1C 6.0 6.3 H 5.4         Recent Labs   Lab 02/11/23  0441 02/12/23  0435 02/13/23  0444   WBC 6.41 6.25 6.75   HGB 10.7* 10.7* 10.6*   HCT 32.6* 33.1* 33.0*   * 140* 143*   * 106* 106*   MCHC 32.8 32.3 32.1   MONO 8.9  0.6 7.4  0.5 7.7  0.5         Recent Labs   Lab 02/10/23  0501 " 02/11/23  0441 02/12/23  0435 02/13/23  0444   BILITOT 3.0*  --  3.7* 3.4*   PROT 5.5*  --  5.7* 5.5*   ALBUMIN 3.4* 3.2* 3.2* 3.2*   ALKPHOS 79  --  81 82   *  --  322* 243*   *  --  84* 55*         Recent Labs   Lab 10/23/20  1557 02/07/23  1242   Color, UA Yellow Yellow   Appearance, UA Clear Clear   pH, UA 7.0 5.0   Specific Gravity, UA 1.015 1.020   Protein, UA Negative 3+ A   Glucose, UA Negative Negative   Ketones, UA Negative Negative   Urobilinogen, UA Negative Negative   Bilirubin (UA) Negative Negative   Occult Blood UA Negative 1+ A   Nitrite, UA Negative Negative   RBC, UA  --  2   WBC, UA  --  1   Bacteria  --  Moderate A   Hyaline Casts, UA  --  12 A         Recent Labs   Lab 05/02/22  0912 05/17/22  1428 05/17/22  1501   Sample VENOUS ARTERIAL ARTERIAL         Microbiology Results (last 7 days)       ** No results found for the last 168 hours. **          Patient care was time spent personally by me on the following activities: > 35 min  Obtaining a history  Examination of patient.  Providing medical care at the patients bedside.  Developing a treatment plan with patient or surrogate and bedside caregivers  Ordering and reviewing laboratory studies, radiographic studies, pulse oximetry.  Ordering and performing treatments and interventions.  Evaluation of patient's response to treatment.  Discussions with consultants while on the unit and immediately available to the patient.  Re-evaluation of the patient's condition.  Documentation in the medical record.     Adelaida Carrasco MD  Nephrology  [facility] [place]  [phone]

## 2023-02-13 NOTE — PROGRESS NOTES
[facility]  Adult Nutrition  Progress Note    SUMMARY       Recommendations   1. Continue diabetic 1500 kcal  2gm sodium diet with 1500ml Fluid restriction   2. Weigh daily  3. D/c Boost per pt request  4. Nutrition education on cardiac/low sodium diet and fluid restriction given    Goals: Consume 50% to 75% of all meals by follow up.  Nutrition Goal Status: meeting-continues  Communication of RD Recs:  (P.O C sticky note.)    Assessment and Plan    Nutrition Problem  Moderate chronic illness related Malnutrition     Related to ( etiology)  Inadequate oral intakes, early satiety    Signs and Symptoms ( as evidenced by)    >10% unintentional wt loss in 6 months (18% wt loss)  Mild muscle wasting   Mild loss of subcutaneous fat.     Intervention: Fluid restricted Diet, fat/Na/carb modified diet and nutrition supplement therapy  improving    Malnutrition Assessment  Malnutrition Type: acute illness or injury, chronic illness  Neck/Chest (Micronutrient): muscle wasting   Micronutrient Evaluation:   Micronutrient Evaluation Comments:   Weight Loss (Malnutrition): 10% in 6 months  Subcutaneous Fat (Malnutrition): mild depletion  Muscle Mass (Malnutrition): mild depletion   Orbital Region (Subcutaneous Fat Loss): mild depletion   [place] Region (Muscle Loss): mild depletion       Subcutaneous Fat Loss (Final Summary): mild protein-calorie malnutrition  Muscle Loss Evaluation (Final Summary): mild protein-calorie malnutrition    Severe Weight Loss (Malnutrition): greater than 10% in 6 months  Mahesh: 20  Edema: 1+    Reason for Assessment    Reason For Assessment: follow up  Diagnosis: other (see comments) (Acute on Chronic combined systolic and diastolic congestive heart Failure)  Relevant Medical History: Afib, MEIR, COPD, CAD, DM2,   Former Smoker, Hypertension, Myocardial Infraction, Thyroid Disease  Interdisciplinary Rounds: not attended    General Information Comments: 67 y.o. Male presented to the  "ED with SOB and Tachycardia onset approximately 8 days before admit. Pt reports a 10-15lb wt gain over the past several weeks, only 3 lb per chart review. Current wt. 119lbs. Pt denies any N/V.Pt reports current appetite is usually good. Pt states he got sick about a year ago and lost and a lot of wt during this time. Pt unsure of UBW before illness.   Per chart review 28 lb loss since (19% wt loss) 2022. Mahesh Score 17. Pt was not feeling well enough to perform NFPE, pt deferred, visually mild wasting seen.  22 MEIR/CHF improving with diuresis. Pt is eating very well he tells me. No questions about his diet. Plan to go home with home health today.    Nutrition Discharge Planning: Continue DM 1500 kcal, 2gm Na diet with 1500ml fluid restriction.    Nutrition Risk Screen    Nutrition Risk Screen: no indicators present    Nutrition/Diet History    Patient Reported Diet/Restrictions/Preferences: diabetic diet, other (see comments) (Heart Failure diet)  Typical Food/Fluid Intake: Typical PO intake is usually good.  Spiritual, Cultural Beliefs, Latter-day Practices, Values that Affect Care: no  Food Allergies: NKFA  Factors Affecting Nutritional Intake: none    Anthropometrics    Temp: 97.3 °F (36.3 °C)  Height Method: Stated  Height: 5' 1"  Height (inches): 61 in  Weight Method: Bed Scale  Weight: 55.4 kg (122 lb 2.2 oz)  Weight (lb): 122.14 lb  Ideal Body Weight (IBW), Male: 112 lb  % Ideal Body Weight, Male (lb): 106.88 %  BMI (Calculated): 23.1  BMI Grade: 18.5-24.9 - normal  Weight Loss: unintentional  Usual Body Weight (UBW), k.4 kg  % Usual Body Weight: 81.95  % Weight Change From Usual Weight: -18.22 %  Weight Loss Since Admission: 0 lb  % Weight Change Since Admission: 0      Lab/Procedures/Meds    Pertinent Labs Reviewed: reviewed  BMP  Lab Results   Component Value Date     (L) 2023    K 4.4 2023     2023    CO2 23 2023    BUN 24 (H) 2023    " CREATININE 1.3 02/13/2023    CALCIUM 8.4 (L) 02/13/2023    ANIONGAP 10 02/13/2023    EGFRNORACEVR >60 02/13/2023       Lab Results   Component Value Date    ALBUMIN 3.2 (L) 02/13/2023     POCT Glucose   Date Value Ref Range Status   02/13/2023 188 (H) 70 - 110 mg/dL Final   02/12/2023 126 (H) 70 - 110 mg/dL Final   02/12/2023 159 (H) 70 - 110 mg/dL Final   02/12/2023 98 70 - 110 mg/dL Final   02/12/2023 90 70 - 110 mg/dL Final   02/11/2023 123 (H) 70 - 110 mg/dL Final   02/11/2023 104 70 - 110 mg/dL Final   02/11/2023 151 (H) 70 - 110 mg/dL Final   02/11/2023 95 70 - 110 mg/dL Final   02/10/2023 119 (H) 70 - 110 mg/dL Final   02/10/2023 94 70 - 110 mg/dL Final       Pertinent Medications Reviewed: reviewed  Spironolactone, insulin, lasix    Estimated/Assessed Needs    Weight Used For Calorie Calculations: 54.3 kg (119 lb 11.4 oz)  Energy Calorie Requirements (kcal): 1434  Energy Need Method: Kcal/kg (MSJ 1.2 Pal Factor)  Protein Requirements: 60gm  Weight Used For Protein Calculations: 54.3 kg (119 lb 11.4 oz)  Fluid Requirements (mL): 1500mls  Estimated Fluid Requirement Method: other (see comments) (per MD)  RDA Method (mL): 1434  CHO Requirement: 161        Nutrition Prescription Ordered    Current Diet Order: Low Sodium, 2gm Fluid restriction 1500ml, DM 1500 kcal    Evaluation of Received Nutrient/Fluid Intake    Energy Calories Required: meeting  Protein Required: meeting  Fluid Required: meeting  Tolerance: tolerating    Intake/Output Summary (Last 24 hours) at 2/13/2023 1323  Last data filed at 2/13/2023 1318  Gross per 24 hour   Intake 440 ml   Output 775 ml   Net -335 ml       % Intake of Estimated Energy Needs: %  % Meal Intake: %    Nutrition Risk    Level of Risk/Frequency of Follow-up: 1 x weekly    Monitor and Evaluation    Food and Nutrient Intake: energy intake, food and beverage intake  Food and Nutrient Adminstration: diet order  Knowledge/Beliefs/Attitudes: food and nutrition  knowledge/skill  Physical Activity and Function: nutrition-related ADLs and IADLs  Anthropometric Measurements: weight, weight change, body mass index  Biochemical Data, Medical Tests and Procedures: electrolyte and renal panel, glucose/endocrine profile  Nutrition-Focused Physical Findings: overall appearance     Nutrition Follow-Up    RD Follow-up?: Yes

## 2023-02-13 NOTE — MEDICAL/APP STUDENT
Medical Student Subjective & Objective     Principal Problem: Acute on chronic combined systolic and diastolic congestive heart failure    Chief Complaint:   Chief Complaint   Patient presents with    Shortness of Breath    Atrial Fibrillation       HPI: Cleveland Capps is a 67 year male who presents emergency room for evaluation of shortness of breath.  He reports shortness of breath and tachycardia onset approximately 8 days ago.  He is a history of atrial fibrillation and reports intermittently in AFib over the past several days.  He denies any chest pain.  Reports shortness of breath is worse with exertion.  No alleviating factors.  He also endorses an approximate 10-15 lb weight gain over the past several weeks.  No reported fever chills.  Previous medical history includes CVA, Watchman left atrial closure device, hypothyroidism, coronary artery disease, stent placement, COPD, hypertension, AICD, and combined systolic/diastolic heart failure.  ER workup:  CBC with mild anemia.  CMP with BUN of 44 and creatinine of 1.8.  Bilirubin elevated 3.0,  .  BNP elevated at 1518 (baseline 400).  Troponin mildly elevated 0.077.  Last echo demonstrated ejection fraction of 20-25%.  Patient was given 20 mg of Lasix in the ER.  Patient remained in atrial fibrillation/RVR while in ER.  He had mild hypotension.  Patient was started on amiodarone infusion and bolus after consulting Dr. Calhoun with on-call Cardiology.  Patient admitted Hospital Medicine for treatment management.  Patient admitted to step-down unit.    Hospital Course: No notes on file    Interval History: Pt seen and examined. He is laying comfortably in his bed and states that he feels good today. States that his coughing has improved. He is not experiencing any CP, N/V/D, or SOB. Pt remains in sinus rhythm. Pt's labs and blood pressure are continuing to improve. Dobutrex drip discontinued and pt started on oral diuretics per cardiology.    Past  Medical History:   Diagnosis Date    A-fib     Acute kidney injury     COPD (chronic obstructive pulmonary disease)     Coronary artery disease     Diabetes mellitus     Encounter for blood transfusion     Former smoker     Hypertension     Myocardial infarction     Thyroid disease     Type 2 diabetes mellitus without complications        Past Surgical History:   Procedure Laterality Date    CLOSURE OF LEFT ATRIAL APPENDAGE USING DEVICE N/A 5/17/2022    Procedure: Left atrial appendage closure device;  Surgeon: Tre Schmidt MD;  Location: [facility] CATH LAB;  Service: Cardiology;  Laterality: N/A;    COLONOSCOPY N/A 1/3/2017    Procedure: COLONOSCOPY;  Surgeon: Marcus Zarco MD;  Location: [facility] ENDO;  Service: Endoscopy;  Laterality: N/A;    CORONARY BYPASS GRAFT ANGIOGRAPHY  3/30/2021    Procedure: Bypass graft study;  Surgeon: Tre Schmidt MD;  Location: [facility] CATH LAB;  Service: Cardiology;;    CORONARY STENT PLACEMENT      GASTROSTOMY TUBE PLACEMENT      INSERTION OF PACEMAKER  04/2017    INTRAVASCULAR ULTRASOUND, NON-CORONARY  5/17/2022    Procedure: Intravascular Ultrasound, Non-Coronary;  Surgeon: Tre Schmidt MD;  Location: [facility] CATH LAB;  Service: Cardiology;;    LEFT HEART CATHETERIZATION N/A 3/30/2021    Procedure: Left heart cath;  Surgeon: Tre Schmidt MD;  Location: [facility] CATH LAB;  Service: Cardiology;  Laterality: N/A;    PEG TUBE REMOVAL      TRACHEOSTOMY CLOSURE      TRACHEOSTOMY TUBE PLACEMENT      TRANSESOPHAGEAL ECHOCARDIOGRAPHY N/A 5/17/2022    Procedure: ECHOCARDIOGRAM, TRANSESOPHAGEAL;  Surgeon: [facility];  Location: [facility] CATH LAB;  Service: Anesthesiology;  Laterality: N/A;    TREATMENT OF CARDIAC ARRHYTHMIA N/A 11/25/2022    Procedure: Cardioversion or Defibrillation;  Surgeon: Kuldeep Daniels MD;  Location: [facility] CATH LAB;  Service: Cardiology;  Laterality: N/A;    UPPER GASTROINTESTINAL ENDOSCOPY  05/2016    Dr. Zarco with PEG tube placement       Review  of patient's allergies indicates:   Allergen Reactions    Penicillin      Other reaction(s): anaphylaxis  Other reaction(s): anaphylaxis    Penicillins Hives       No current facility-administered medications on file prior to encounter.     Current Outpatient Medications on File Prior to Encounter   Medication Sig    apixaban (ELIQUIS) 5 mg Tab Take 5 mg by mouth 2 (two) times daily.    atorvastatin (LIPITOR) 80 MG tablet Take 80 mg by mouth once daily.    potassium chloride SA (K-DUR,KLOR-CON M) 10 MEQ tablet     COMP-AIR NEBULIZER COMPRESSOR Alfreda use as directed    spironolactone (ALDACTONE) 25 MG tablet Take 1 tablet (25 mg total) by mouth once daily.    travoprost (TRAVATAN Z) 0.004 % ophthalmic solution     travoprost, benzalkonium, (TRAVATAN) 0.004 % ophthalmic solution Place 1 drop into both eyes nightly.    TRUE METRIX GLUCOSE TEST STRIP Strp USE 1 STRIP TO CHECK GLUCOSE ONCE DAILY     Family History       Problem Relation (Age of Onset)    Diabetes Mother, Brother    Glaucoma Father, Brother    Heart disease Mother    Macular degeneration Father, Brother    No Known Problems Sister, Maternal Aunt, Maternal Uncle, Paternal Aunt, Paternal Uncle, Maternal Grandmother, Maternal Grandfather, Paternal Grandmother, Paternal Grandfather          Tobacco Use    Smoking status: Former     Types: Cigarettes     Quit date: 2005     Years since quittin.2    Smokeless tobacco: Never    Tobacco comments:     quit 2007   Substance and Sexual Activity    Alcohol use: No    Drug use: No    Sexual activity: Yes     Review of Systems   Constitutional: Negative.    HENT: Negative.     Eyes: Negative.    Respiratory:  Positive for cough. Negative for shortness of breath.         Pt states his cough has improved   Cardiovascular:  Negative for chest pain, palpitations and leg swelling.   Gastrointestinal:  Negative for diarrhea, nausea and vomiting.   Endocrine: Negative.    Genitourinary: Negative.    Musculoskeletal:  Negative.    Skin: Negative.    Allergic/Immunologic: Negative.    Neurological:  Negative for weakness and numbness.   Hematological: Negative.    Psychiatric/Behavioral: Negative.     Objective:     Vital Signs (Most Recent):  Temp: 97.3 °F (36.3 °C) (02/13/23 0700)  Pulse: 67 (02/13/23 0750)  Resp: (!) 27 (02/13/23 0750)  BP: 120/71 (02/13/23 0400)  SpO2: 100 % (02/13/23 0750)   Vital Signs (24h Range):  Temp:  [96.9 °F (36.1 °C)-98.5 °F (36.9 °C)] 97.3 °F (36.3 °C)  Pulse:  [67-85] 67  Resp:  [16-57] 27  SpO2:  [91 %-100 %] 100 %  BP: ()/(59-88) 120/71     Weight: 55.4 kg (122 lb 2.2 oz)  Body mass index is 23.08 kg/m².    Intake/Output Summary (Last 24 hours) at 2/13/2023 1021  Last data filed at 2/13/2023 0525  Gross per 24 hour   Intake 200 ml   Output 775 ml   Net -575 ml        Physical Exam  Constitutional:       Appearance: Normal appearance.   HENT:      Head: Normocephalic and atraumatic.      Right Ear: External ear normal.      Left Ear: External ear normal.      Nose: Nose normal.      Mouth/Throat:      Mouth: Mucous membranes are moist.      Pharynx: Oropharynx is clear.   Eyes:      Extraocular Movements: Extraocular movements intact.      Conjunctiva/sclera: Conjunctivae normal.   Cardiovascular:      Rate and Rhythm: Normal rate and regular rhythm.      Pulses: Normal pulses.      Heart sounds: Normal heart sounds.   Pulmonary:      Effort: Pulmonary effort is normal.      Breath sounds: Normal breath sounds.      Comments: Comfortable on 2 L nasal cannula  Abdominal:      General: Abdomen is flat.      Palpations: Abdomen is soft.   Musculoskeletal:         General: No swelling. Normal range of motion.      Cervical back: Normal range of motion and neck supple.      Right lower leg: No edema.      Left lower leg: No edema.   Skin:     General: Skin is warm and dry.      Comments: PICC line clean and intact   Neurological:      General: No focal deficit present.      Mental Status: He is  alert and oriented to person, place, and time.   Psychiatric:         Mood and Affect: Mood normal.         Behavior: Behavior normal.         Thought Content: Thought content normal.         Judgment: Judgment normal.       Significant Labs: All pertinent labs within the past 24 hours have been reviewed.  A1C:   Recent Labs   Lab 02/06/23  1543   HGBA1C 5.4       CBC:   Recent Labs   Lab 02/12/23  0435 02/13/23  0444   WBC 6.25 6.75   HGB 10.7* 10.6*   HCT 33.1* 33.0*   * 143*       CMP:   Recent Labs   Lab 02/12/23  0435 02/13/23  0444   * 135*   K 4.0 4.4    102   CO2 24 23    86   BUN 23 24*   CREATININE 1.2 1.3   CALCIUM 8.3* 8.4*   PROT 5.7* 5.5*   ALBUMIN 3.2* 3.2*   BILITOT 3.7* 3.4*   ALKPHOS 81 82   AST 84* 55*   * 243*   ANIONGAP 6* 10       POCT Glucose:   Recent Labs   Lab 02/12/23  1117 02/12/23  1641 02/12/23  2045   POCTGLUCOSE 98 159* 126*         Significant Imaging: I have reviewed all pertinent imaging results/findings within the past 24 hours.    ASSESSMENT/PLAN:   Acute on chronic combined systolic and diastolic congestive heart failure  - chronic  - echo showed EF of 20  - pt's kidney fxn and and liver fxn improving  - pt was weaned off dobutrex and has started oral diuretics (spironolactone and lasix)  - continue to monitor telemetry  - trend CMPs (for kidney fxn, liver fxn, and potassium level) and monitor BPs    Essential Hypertension  - chronic, stable, last BP of 120/71  - continue spironolactone  - continue to monitor BPs    Longstanding persistent Atrial Fibrillation  - pt in Afib > 7 days. Pt converted to sinus rhythm  - continue apixaban (pt [id number] score of 7) and po amiodarone  - monitor telemetry    MEIR  - acute, stable, suspected due to worsening HF  - pt's Cr improved (1.3)  - continue to renally dose meds and monitor kidney fxn    Congestive Hepatopathy  - acute, suspected due to worsening HF  - LFTs continuing to improve  - trend  CMPs    Hypothyroidism  - chronic, uncontrolled per last TSH value of 18.148  - continue synthroid 50 mcg    History of CVA  - chronic, stable  - continue apixaban for prevention    Type II Diabetes Mellitus  - chronic, stable (last a1c of 5.4)  - continue insulin before meals and nightly PRN      TONO RamosS

## 2023-02-13 NOTE — PROGRESS NOTES
[facility] Hospital Medicine  Progress Note    Patient Name: Cleveland Capps  MRN: [id number]  Patient Class: IP- Inpatient   Admission Date: 2/6/2023  Length of Stay: 7 days  Attending Physician: Michelet Schwartz MD  Primary Care Provider: Romero Snyder MD        Subjective:     Principal Problem:Acute on chronic combined systolic and diastolic congestive heart failure        HPI:  Cleveland Capps is a 67 year male who presents emergency room for evaluation of shortness of breath.  He reports shortness of breath and tachycardia onset approximately 8 days ago.  He is a history of atrial fibrillation and reports intermittently in AFib over the past several days.  He denies any chest pain.  Reports shortness of breath is worse with exertion.  No alleviating factors.  He also endorses an approximate 10-15 lb weight gain over the past several weeks.  No reported fever chills.  Previous medical history includes CVA, Watchman left atrial closure device, hypothyroidism, coronary artery disease, stent placement, COPD, hypertension, AICD, and combined systolic/diastolic heart failure.  ER workup:  CBC with mild anemia.  CMP with BUN of 44 and creatinine of 1.8.  Bilirubin elevated 3.0,  .  BNP elevated at 1518 (baseline 400).  Troponin mildly elevated 0.077.  Last echo demonstrated ejection fraction of 20-25%.  Patient was given 20 mg of Lasix in the ER.  Patient remained in atrial fibrillation/RVR while in ER.  He had mild hypotension.  Patient was started on amiodarone infusion and bolus after consulting Dr. Calhoun with on-call Cardiology.  Patient admitted Hospital Medicine for treatment management.  Patient admitted to step-down unit.      Overview/Hospital Course:  Pt will monitor closely during his stay.  He was diuresed with IV Lasix.  Cardiology and Nephrology was consulted.  Pt was maintained on amiodarone infusion for his atrial fibrillation with RVR.  There were some issues with  hypotension requiring the Entresto and attempts at beta-blocker to be stopped.  The echocardiogram confirmed an EF of 20%.  His renal and liver function worsened during his stay.  Transfer to [facility] for advanced heart failure service as was recommended by Cardiology, however Pt declined and elected for DNR status.  He wished to optimize his care here in this hospital, but not transfer of care.  Dobutrex was added to his regimen with good result.  He did have some lingering hypotension and diuresis was stopped and required fluid bolusing with improvement hypotension.  He did convert to sinus arrhythmia on amiodarone infusion.  His liver function tests and renal function began to improve.  Pt worked with PT/OT his hospitalization.  The Dobutrex was weaned down as Pt tolerated.  He was transitioned oral amiodarone. The dobutrex was discontinued on 2/12. He maintained SA/SR.  He was resumed on some diuretic therapy with good tolerance.        Interval History:  Patient seen and examined.  No acute events overnight. Doing well.  No chest pain or shortness for breath. Tolerating lasix well.  BP stable.  Cardiology at bedside, wants to keep overnight.  Patient agreeable.   Will plan for d/c in am.     Review of Systems   Constitutional:  Negative for chills, fatigue and fever.   Respiratory:  Negative for cough and shortness of breath.    Cardiovascular:  Negative for chest pain, palpitations and leg swelling.   Gastrointestinal:  Negative for nausea and vomiting.   Neurological:  Negative for dizziness, weakness and light-headedness.   Objective:     Vital Signs (Most Recent):  Temp: 97.3 °F (36.3 °C) (02/13/23 0700)  Pulse: 67 (02/13/23 0750)  Resp: (!) 27 (02/13/23 0750)  BP: 120/71 (02/13/23 0400)  SpO2: 100 % (02/13/23 0750) Vital Signs (24h Range):  Temp:  [96.9 °F (36.1 °C)-98.5 °F (36.9 °C)] 97.3 °F (36.3 °C)  Pulse:  [67-85] 67  Resp:  [16-57] 27  SpO2:  [91 %-100 %] 100 %  BP: ()/(59-88) 120/71      Weight: 55.4 kg (122 lb 2.2 oz)  Body mass index is 23.08 kg/m².    Intake/Output Summary (Last 24 hours) at 2/13/2023 1051  Last data filed at 2/13/2023 0525  Gross per 24 hour   Intake 200 ml   Output 775 ml   Net -575 ml        Physical Exam  Vitals and nursing note reviewed.   Constitutional:       General: He is not in acute distress.     Appearance: Normal appearance. He is ill-appearing (chronically ill appearing). He is not toxic-appearing or diaphoretic.   Cardiovascular:      Rate and Rhythm: Normal rate and regular rhythm.      Pulses: Normal pulses.      Comments: PICC line noted.  Pulmonary:      Effort: Pulmonary effort is normal. No respiratory distress.      Breath sounds: Normal breath sounds.   Musculoskeletal:         General: Normal range of motion.      Right lower leg: No edema.      Left lower leg: No edema.   Skin:     General: Skin is warm and dry.      Coloration: Skin is not pale.   Neurological:      Mental Status: He is alert.       Significant Labs: All pertinent labs within the past 24 hours have been reviewed.  CBC:   Recent Labs   Lab 02/12/23  0435 02/13/23  0444   WBC 6.25 6.75   HGB 10.7* 10.6*   HCT 33.1* 33.0*   * 143*       CMP:   Recent Labs   Lab 02/12/23  0435 02/13/23  0444   * 135*   K 4.0 4.4    102   CO2 24 23    86   BUN 23 24*   CREATININE 1.2 1.3   CALCIUM 8.3* 8.4*   PROT 5.7* 5.5*   ALBUMIN 3.2* 3.2*   BILITOT 3.7* 3.4*   ALKPHOS 81 82   AST 84* 55*   * 243*   ANIONGAP 6* 10         Significant Imaging: I have reviewed all pertinent imaging results/findings within the past 24 hours.      Assessment/Plan:      * Acute on chronic combined systolic and diastolic congestive heart failure  Patient is identified as having Combined Systolic and Diastolic heart failure that is Acute on chronic. CHF is currently uncontrolled due to Continued edema of extremities and Weight gain of 15 pounds. Latest ECHO performed and demonstrates- Results  for orders placed during the hospital encounter of 06/27/22    Echo    Interpretation Summary  · The left ventricle is severely enlarged with mild eccentric hypertrophy and severely decreased systolic function.  · The estimated ejection fraction is 25%.  · There are segmental left ventricular wall motion abnormalities.  · Left ventricular diastolic dysfunction.  · Mild right ventricular enlargement with mildly to moderately reduced right ventricular systolic function.  · Severe left atrial enlargement.  · Mild right atrial enlargement.  · The MR is moderate to moderate -severe ( 2-3+).  · Mild to moderate tricuspid regurgitation.  · Mild pulmonic regurgitation.  · Intermediate central venous pressure (8 mmHg).  · The estimated PA systolic pressure is 47 mmHg.  · There is pulmonary hypertension.  . Continue Furosemide and monitor clinical status closely. Monitor on telemetry. Patient is off CHF pathway.  Monitor strict Is&Os and daily weights.  Place on fluid restriction of 1.5 L. Continue to stress to patient importance of self efficacy and  on diet for CHF. Last BNP reviewed- and noted below   Recent Labs   Lab 02/06/23  1543   BNP 1,518*   .  Repeat echo reviewed:  EF 20%   -liver function and kidney function improving  -Continue dobutrex: deescalating daily: turned off 2/12.  -aldactone + lasix being added back as he tolerates - seems to be tolerating well.  -unable to tolerate beta blocker or Entresto due to BP issues.      Hypotension  Patient unable to tolerate BB or entresto this admit and required dobutrex initiation which is now off.  -Cards recommending holding carvedilol and entresto on d/c.    Longstanding persistent atrial fibrillation  Patient with Persistent (7 days or more) atrial fibrillation which is uncontrolled currently with Amiodarone. Patient is currently in atrial fibrillation.[id number] Score: 2. Anticoagulation indicated. Anticoagulation done with eliquis.    -converted to SA 2/8 PM:  Continue the oral amiodarone  -cardiology following   -Monitor closely on telemetry    Elevated LFTs  Acute problem- suspect hepatic congestion in the setting of severe and advanced heart failure   -LFTs are improving daily  -coags reviewed: Mildly elevated INR   -ammonia normal  -continue to trend the liver function tests    AICD (automatic cardioverter/defibrillator) present  Chronic problem  Continuous cardiac monitor   Patient denies any recent discharges      Hypothyroid  Chronic problem, acutely uncontrolled despite medication compliance  -increased levothyroxine to 50 mics daily    History of CVA (cerebrovascular accident)  Chronic problem, stable  Cont eliquis -no longer on Plavix  Holding statin secondary to elevated liver enzymes      MEIR (acute kidney injury)  Patient with acute kidney injury likely due to pre-renal azotemia MEIR is currently improving. Labs reviewed- Renal function/electrolytes with Estimated Creatinine Clearance: 44.8 mL/min (based on SCr of 1.2 mg/dL). according to latest data. Monitor urine output and serial BMP and adjust therapy as needed. Avoid nephrotoxins and renally dose meds for GFR listed above.     -Discussed with cardiology: required bolus 2/2 hypotension on 2/9.  Lasix stopped 2/9. Aldactone resumed 2/10.  -Renal function continues to improve with adequate UOP.  -F/u BMP.  -Appreciate nephrology assistance.    Benign essential HTN  Chronic, uncontrolled due to hypotension.  Latest blood pressure and vitals reviewed-   Temp:  [97.4 °F (36.3 °C)-98 °F (36.7 °C)]   Pulse:  [63-76]   Resp:  []   BP: ()/(53-76)   SpO2:  [93 %-100 %] .   Home meds for hypertension were reviewed and noted below.   Hypertension Medications             carvediloL (COREG) 3.125 MG tablet Take 1 tablet (3.125 mg total) by mouth 2 (two) times daily.    furosemide (LASIX) 20 MG tablet Take 1 tablet (20 mg total) by mouth 3 (three) times a week.    sacubitriL-valsartan (ENTRESTO) 24-26 mg per  tablet Take 1 tablet by mouth 2 (two) times daily.    spironolactone (ALDACTONE) 25 MG tablet Take 1 tablet (25 mg total) by mouth once daily.          Holding his antihypertensives due to hypotension    Coronary artery disease  Patient with known CAD s/p stent placement and CABG, which is controlled Will hold the statin due to LFT elevation and monitor for S/Sx of angina/ACS. Continue to monitor on telemetry.  Pt no longer on aspirin or Plavix          VTE Risk Mitigation (From admission, onward)         Ordered     apixaban tablet 5 mg  2 times daily         02/06/23 2114     IP VTE HIGH RISK PATIENT  Once         02/06/23 2113     Place sequential compression device  Until discontinued         02/06/23 2113     Place KEN hose  Until discontinued         02/06/23 2113                Discharge Planning   MACKENZIE: 2/13/2023     Code Status: DNR   Is the patient medically ready for discharge?:     Reason for patient still in hospital (select all that apply): Patient trending condition and Consult recommendations  Discharge Plan A: Home                  Taylor Lyn NP  Department of Hospital Medicine   [facility]

## 2023-02-13 NOTE — PROGRESS NOTES
[facility]  Cardiology  Progress Note    Patient Name: Cleveland Capps  MRN: [id number]  Admission Date: 2/6/2023  Hospital Length of Stay: 7 days  Code Status: DNR   Attending Physician: Michelet Schwartz MD   Primary Care Physician: Romero Snyder MD  Expected Discharge Date: 2/13/2023  Principal Problem:Acute on chronic combined systolic and diastolic congestive heart failure    Subjective:     No acute cardiac events overnight.  Patient continues to do well off dobutamine.    Review of Systems   All other systems reviewed and are negative.  Objective:     Vital Signs (Most Recent):  Temp: 97.3 °F (36.3 °C) (02/13/23 0700)  Pulse: 78 (02/13/23 1000)  Resp: (P) 19 (02/13/23 1000)  BP: 105/60 (02/13/23 1000)  SpO2: 100 % (02/13/23 1000) Vital Signs (24h Range):  Temp:  [96.9 °F (36.1 °C)-98.5 °F (36.9 °C)] 97.3 °F (36.3 °C)  Pulse:  [67-82] 78  Resp:  [16-57] (P) 19  SpO2:  [91 %-100 %] 100 %  BP: ()/(59-83) 105/60     Weight: 55.4 kg (122 lb 2.2 oz)  Body mass index is 23.08 kg/m².    SpO2: 100 %         Intake/Output Summary (Last 24 hours) at 2/13/2023 1439  Last data filed at 2/13/2023 1318  Gross per 24 hour   Intake 440 ml   Output 775 ml   Net -335 ml       Lines/Drains/Airways       Peripherally Inserted Central Catheter Line  Duration             PICC Double Lumen 02/09/23 1310 right brachial 4 days                    Physical Exam  Vitals reviewed.   Constitutional:       Appearance: Normal appearance.   HENT:      Mouth/Throat:      Mouth: Mucous membranes are moist.   Eyes:      Extraocular Movements: Extraocular movements intact.      Pupils: Pupils are equal, round, and reactive to light.   Cardiovascular:      Rate and Rhythm: Normal rate and regular rhythm.      Heart sounds: No murmur heard.    No gallop.   Pulmonary:      Effort: Pulmonary effort is normal.      Breath sounds: Normal breath sounds.   Abdominal:      General: Abdomen is flat.      Palpations: Abdomen is  soft.   Musculoskeletal:      Cervical back: Normal range of motion.   Skin:     General: Skin is warm and dry.   Neurological:      General: No focal deficit present.      Mental Status: He is alert and oriented to person, place, and time.   Psychiatric:         Mood and Affect: Mood normal.       Significant Labs: BMP:   Recent Labs   Lab 02/12/23  0435 02/13/23  0444    86   * 135*   K 4.0 4.4    102   CO2 24 23   BUN 23 24*   CREATININE 1.2 1.3   CALCIUM 8.3* 8.4*   MG 2.1 1.9    and CBC   Recent Labs   Lab 02/12/23 0435 02/13/23  0444   WBC 6.25 6.75   HGB 10.7* 10.6*   HCT 33.1* 33.0*   * 143*       Significant Imaging: Echocardiogram: 2D echo with color flow doppler:   Results for orders placed or performed during the hospital encounter of 04/25/16   2D echo with color flow doppler    Narrative    This study is in progress....    and Transthoracic echo (TTE) complete (Cupid Only):   Results for orders placed or performed during the hospital encounter of 02/06/23   Echo   Result Value Ref Range    BSA 1.53 m2    LVIDd 6.56 (A) 3.5 - 6.0 cm    IVS 0.55 (A) 0.6 - 1.1 cm    Posterior Wall 0.82 0.6 - 1.1 cm    LVIDs 5.67 (A) 2.1 - 4.0 cm    FS 14 28 - 44 %    LV mass 180.63 g    Left Ventricle Relative Wall Thickness 0.25 cm    LV Systolic Volume 158.40 mL    LV Systolic Volume Index 104.2 mL/m2    LV Diastolic Volume 220.64 mL    LV Diastolic Volume Index 145.16 mL/m2    LV Mass Index 119 g/m2    Right Atrial Pressure (from IVC) 15 mmHg    EF 20 %    Narrative    · The left ventricle is severely enlarged with eccentric hypertrophy and   severely decreased systolic function.  · The estimated ejection fraction is 20%.  · Left ventricular diastolic dysfunction.  · There is left ventricular global hypokinesis. GLOBAL HYPOKINESIA WITH   ANTERIOR APICAL AKINESIA  · Moderate right ventricular enlargement.  · Moderate right atrial enlargement.  · Elevated central venous pressure (15 mmHg).  ·  LIMITED STUDY  · Right atrial enlargement.        Assessment and Plan:     Severe heart failure with reduced EF, grossly compensated now  Off dobutamine since February 12  Ischemic cardiomyopathy, patent SVG to LAD and patent SVG to PDA, occluded SVG to OMB with small caliber vessels, angiogram 2021  Paroxysmal AFib status post recent cardioversion and history of Watchman device    Recommendations:  - continue current medications.  Will add low-dose Entresto and monitor.  Will get a BNP and a chest x-ray in the morning.  Will need referral to Advanced Heart failure team at [facility] for further evaluation and management as an outpatient.    Active Diagnoses:    Diagnosis Date Noted POA    PRINCIPAL PROBLEM:  Acute on chronic combined systolic and diastolic congestive heart failure [I50.43] 05/19/2020 Yes    Hypotension [I95.9] 02/09/2023 Yes    Longstanding persistent atrial fibrillation [I48.11] 11/28/2022 Yes    AICD (automatic cardioverter/defibrillator) present [Z95.810] 11/18/2022 Yes    Elevated LFTs [R79.89] 11/18/2022 Yes    Hypothyroid [E03.9] 11/17/2022 Yes    History of CVA (cerebrovascular accident) [Z86.73] 10/23/2020 Not Applicable    MEIR (acute kidney injury) [N17.9] 04/27/2016 Yes    Benign essential HTN [I10] 04/25/2016 Yes    Coronary artery disease [I25.10] 04/25/2016 Yes      Problems Resolved During this Admission:    Diagnosis Date Noted Date Resolved POA    A-fib [I48.91] 04/25/2016 02/07/2023 Yes       VTE Risk Mitigation (From admission, onward)           Ordered     apixaban tablet 5 mg  2 times daily         02/06/23 2114     IP VTE HIGH RISK PATIENT  Once         02/06/23 2113     Place sequential compression device  Until discontinued         02/06/23 2113     Place KEN hose  Until discontinued         02/06/23 2113                    Anjel Cervantes MD  Cardiology  [facility]

## 2023-02-13 NOTE — PLAN OF CARE
Problem: Adult Inpatient Plan of Care  Goal: Plan of Care Review  Outcome: Ongoing, Progressing  Goal: Patient-Specific Goal (Individualized)  Outcome: Ongoing, Progressing  Goal: Absence of Hospital-Acquired Illness or Injury  Outcome: Ongoing, Progressing  Goal: Optimal Comfort and Wellbeing  Outcome: Ongoing, Progressing  Goal: Readiness for Transition of Care  Outcome: Ongoing, Progressing     Problem: Dysrhythmia  Goal: Normalized Cardiac Rhythm  Outcome: Ongoing, Progressing     Problem: Fluid and Electrolyte Imbalance (Acute Kidney Injury/Impairment)  Goal: Fluid and Electrolyte Balance  Outcome: Ongoing, Progressing

## 2023-02-13 NOTE — PLAN OF CARE
CM met with pt at bedside regarding HH referral. PT showed me a blue folder from [facility]  and stated that they have been caring for him for almost 4 month now and wants to resume care with them. CM [facility] HH packet and [facility]. CM following for acceptance.    02/13/23 1049   Post-Acute Status   Post-Acute Authorization Home Health   Home Health Status Referrals Sent

## 2023-02-14 VITALS
TEMPERATURE: 98 F | HEART RATE: 67 BPM | WEIGHT: 123.44 LBS | DIASTOLIC BLOOD PRESSURE: 54 MMHG | HEIGHT: 61 IN | RESPIRATION RATE: 19 BRPM | OXYGEN SATURATION: 100 % | BODY MASS INDEX: 23.3 KG/M2 | SYSTOLIC BLOOD PRESSURE: 85 MMHG

## 2023-02-14 DIAGNOSIS — I50.9 CONGESTIVE HEART FAILURE, UNSPECIFIED HF CHRONICITY, UNSPECIFIED HEART FAILURE TYPE: Primary | ICD-10-CM

## 2023-02-14 LAB
ALBUMIN SERPL BCP-MCNC: 3.2 G/DL (ref 3.5–5.2)
ALP SERPL-CCNC: 80 U/L (ref 55–135)
ALT SERPL W/O P-5'-P-CCNC: 199 U/L (ref 10–44)
ANION GAP SERPL CALC-SCNC: 9 MMOL/L (ref 8–16)
AST SERPL-CCNC: 42 U/L (ref 10–40)
BASOPHILS # BLD AUTO: 0.04 K/UL (ref 0–0.2)
BASOPHILS NFR BLD: 0.6 % (ref 0–1.9)
BILIRUB SERPL-MCNC: 2.9 MG/DL (ref 0.1–1)
BNP SERPL-MCNC: 2363 PG/ML (ref 0–99)
BUN SERPL-MCNC: 24 MG/DL (ref 8–23)
CALCIUM SERPL-MCNC: 8.5 MG/DL (ref 8.7–10.5)
CHLORIDE SERPL-SCNC: 102 MMOL/L (ref 95–110)
CO2 SERPL-SCNC: 23 MMOL/L (ref 23–29)
CREAT SERPL-MCNC: 1.2 MG/DL (ref 0.5–1.4)
DIFFERENTIAL METHOD: ABNORMAL
EOSINOPHIL # BLD AUTO: 0.2 K/UL (ref 0–0.5)
EOSINOPHIL NFR BLD: 3.6 % (ref 0–8)
ERYTHROCYTE [DISTWIDTH] IN BLOOD BY AUTOMATED COUNT: 16.3 % (ref 11.5–14.5)
EST. GFR  (NO RACE VARIABLE): >60 ML/MIN/1.73 M^2
GLUCOSE SERPL-MCNC: 96 MG/DL (ref 70–110)
HCT VFR BLD AUTO: 33.7 % (ref 40–54)
HGB BLD-MCNC: 10.7 G/DL (ref 14–18)
IMM GRANULOCYTES # BLD AUTO: 0.02 K/UL (ref 0–0.04)
IMM GRANULOCYTES NFR BLD AUTO: 0.3 % (ref 0–0.5)
LYMPHOCYTES # BLD AUTO: 1 K/UL (ref 1–4.8)
LYMPHOCYTES NFR BLD: 15 % (ref 18–48)
MAGNESIUM SERPL-MCNC: 1.9 MG/DL (ref 1.6–2.6)
MCH RBC QN AUTO: 34.1 PG (ref 27–31)
MCHC RBC AUTO-ENTMCNC: 31.8 G/DL (ref 32–36)
MCV RBC AUTO: 107 FL (ref 82–98)
MONOCYTES # BLD AUTO: 0.6 K/UL (ref 0.3–1)
MONOCYTES NFR BLD: 8.6 % (ref 4–15)
NEUTROPHILS # BLD AUTO: 4.7 K/UL (ref 1.8–7.7)
NEUTROPHILS NFR BLD: 71.9 % (ref 38–73)
NRBC BLD-RTO: 0 /100 WBC
PLATELET # BLD AUTO: 137 K/UL (ref 150–450)
PMV BLD AUTO: 8.9 FL (ref 9.2–12.9)
POCT GLUCOSE: 115 MG/DL (ref 70–110)
POCT GLUCOSE: 77 MG/DL (ref 70–110)
POTASSIUM SERPL-SCNC: 4.5 MMOL/L (ref 3.5–5.1)
PROT SERPL-MCNC: 5.9 G/DL (ref 6–8.4)
RBC # BLD AUTO: 3.14 M/UL (ref 4.6–6.2)
SODIUM SERPL-SCNC: 134 MMOL/L (ref 136–145)
WBC # BLD AUTO: 6.6 K/UL (ref 3.9–12.7)

## 2023-02-14 PROCEDURE — 83880 ASSAY OF NATRIURETIC PEPTIDE: CPT | Performed by: INTERNAL MEDICINE

## 2023-02-14 PROCEDURE — 80053 COMPREHEN METABOLIC PANEL: CPT | Performed by: NURSE PRACTITIONER

## 2023-02-14 PROCEDURE — 36415 COLL VENOUS BLD VENIPUNCTURE: CPT | Performed by: NURSE PRACTITIONER

## 2023-02-14 PROCEDURE — 25000003 PHARM REV CODE 250: Performed by: NURSE PRACTITIONER

## 2023-02-14 PROCEDURE — 90471 IMMUNIZATION ADMIN: CPT | Performed by: INTERNAL MEDICINE

## 2023-02-14 PROCEDURE — 85025 COMPLETE CBC W/AUTO DIFF WBC: CPT | Performed by: NURSE PRACTITIONER

## 2023-02-14 PROCEDURE — 94761 N-INVAS EAR/PLS OXIMETRY MLT: CPT

## 2023-02-14 PROCEDURE — 25000003 PHARM REV CODE 250: Performed by: INTERNAL MEDICINE

## 2023-02-14 PROCEDURE — 97116 GAIT TRAINING THERAPY: CPT | Mod: CQ

## 2023-02-14 PROCEDURE — G0008 ADMIN INFLUENZA VIRUS VAC: HCPCS | Performed by: INTERNAL MEDICINE

## 2023-02-14 PROCEDURE — 83735 ASSAY OF MAGNESIUM: CPT | Performed by: NURSE PRACTITIONER

## 2023-02-14 PROCEDURE — 99233 PR SUBSEQUENT HOSPITAL CARE,LEVL III: ICD-10-PCS | Mod: ,,, | Performed by: INTERNAL MEDICINE

## 2023-02-14 PROCEDURE — A4216 STERILE WATER/SALINE, 10 ML: HCPCS | Performed by: NURSE PRACTITIONER

## 2023-02-14 PROCEDURE — 90694 VACC AIIV4 NO PRSRV 0.5ML IM: CPT | Performed by: INTERNAL MEDICINE

## 2023-02-14 PROCEDURE — 63600175 PHARM REV CODE 636 W HCPCS: Performed by: INTERNAL MEDICINE

## 2023-02-14 PROCEDURE — 99233 SBSQ HOSP IP/OBS HIGH 50: CPT | Mod: ,,, | Performed by: INTERNAL MEDICINE

## 2023-02-14 RX ORDER — AMOXICILLIN 250 MG
1 CAPSULE ORAL ONCE
Status: COMPLETED | OUTPATIENT
Start: 2023-02-14 | End: 2023-02-14

## 2023-02-14 RX ORDER — TORSEMIDE 20 MG/1
20 TABLET ORAL DAILY
Qty: 30 TABLET | Refills: 0 | Status: ON HOLD | OUTPATIENT
Start: 2023-02-14 | End: 2023-04-05 | Stop reason: HOSPADM

## 2023-02-14 RX ADMIN — SPIRONOLACTONE 25 MG: 25 TABLET, FILM COATED ORAL at 08:02

## 2023-02-14 RX ADMIN — FUROSEMIDE 20 MG: 20 TABLET ORAL at 08:02

## 2023-02-14 RX ADMIN — SODIUM CHLORIDE, PRESERVATIVE FREE 10 ML: 5 INJECTION INTRAVENOUS at 05:02

## 2023-02-14 RX ADMIN — SACUBITRIL AND VALSARTAN 1 TABLET: 24; 26 TABLET, FILM COATED ORAL at 08:02

## 2023-02-14 RX ADMIN — APIXABAN 5 MG: 2.5 TABLET, FILM COATED ORAL at 08:02

## 2023-02-14 RX ADMIN — INFLUENZA A VIRUS A/VICTORIA/2570/2019 IVR-215 (H1N1) ANTIGEN (FORMALDEHYDE INACTIVATED), INFLUENZA A VIRUS A/DARWIN/6/2021 IVR-227 (H3N2) ANTIGEN (FORMALDEHYDE INACTIVATED), INFLUENZA B VIRUS B/AUSTRIA/1359417/2021 BVR-26 ANTIGEN (FORMALDEHYDE INACTIVATED), INFLUENZA B VIRUS B/PHUKET/3073/2013 BVR-1B ANTIGEN (FORMALDEHYDE INACTIVATED) 0.5 ML: 15; 15; 15; 15 INJECTION, SUSPENSION INTRAMUSCULAR at 12:02

## 2023-02-14 RX ADMIN — DOCUSATE SODIUM AND SENNOSIDES 1 TABLET: 8.6; 5 TABLET, FILM COATED ORAL at 09:02

## 2023-02-14 RX ADMIN — SODIUM CHLORIDE, PRESERVATIVE FREE 10 ML: 5 INJECTION INTRAVENOUS at 12:02

## 2023-02-14 RX ADMIN — MUPIROCIN: 20 OINTMENT TOPICAL at 08:02

## 2023-02-14 RX ADMIN — LEVOTHYROXINE SODIUM 50 MCG: 50 TABLET ORAL at 05:02

## 2023-02-14 RX ADMIN — AMIODARONE HYDROCHLORIDE 400 MG: 200 TABLET ORAL at 08:02

## 2023-02-14 NOTE — PT/OT/SLP PROGRESS
"Physical Therapy Treatment    Patient Name:  Cleveland Capps   MRN:  [id number]    Recommendations:     Discharge Recommendations: home health PT  Discharge Equipment Recommendations: none  Barriers to discharge: None    Assessment:     Cleveland Capps is a 67 y.o. 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  He presents with the following impairments/functional limitations: weakness, impaired endurance, impaired functional mobility, impaired cardiopulmonary response to activity.  Pt found with BP 94/62 (74 MAP) with nursing clearance for ambulation, and pt verbalizing understanding to inform therapist of any symptoms.    Ambulated 250' with RW, CGA, slow pace, with no LOB, rest breaks requested, or c/o dizziness. Post-gait /59 with 100% sats on RA.    Good tolerance to activity. Remained up in chair with all needs at hand.     Rehab Prognosis: Good; patient would benefit from acute skilled PT services to address these deficits and reach maximum level of function.    Recent Surgery: * No surgery found *      Plan:     During this hospitalization, patient to be seen 6 x/week to address the identified rehab impairments via gait training, therapeutic activities, therapeutic exercises and progress toward the following goals:    Plan of Care Expires:  03/09/23    Subjective     Chief Complaint: uncertainty of discharge time today; need to communicate with brother for transportation  Patient/Family Comments/goals: "I'm ok to walk, I didn't get dizzy earlier when I went to the bathroom"  Pain/Comfort:  Pain Rating 1: 0/10  Pain Rating Post-Intervention 1: 0/10      Objective:     Communicated with nurse Mcgraw prior to session.  Patient found left sidelying with bed alarm, blood pressure cuff, peripheral IV, PICC line, pulse ox (continuous), telemetry upon PT entry to room.     General Precautions: Standard, fall  Orthopedic Precautions: " N/A  Braces: N/A  Respiratory Status: Room air     Functional Mobility:  Bed Mobility:     Supine to Sit: supervision  Transfers:     Sit to Stand:  stand by assistance with rolling walker  Gait: 250' RW, CGA, slow pace, no complaints      AM-PAC 6 CLICK MOBILITY          Treatment & Education:  -Pt educ in self monitoring for symptoms of hypotensive effects during activity    Patient left up in chair with all lines intact, call button in reach, and Naara notified..    GOALS:   Multidisciplinary Problems       Physical Therapy Goals          Problem: Physical Therapy    Goal Priority Disciplines Outcome Goal Variances Interventions   Physical Therapy Goal     PT, PT/OT Ongoing, Progressing     Description: Goals to be met by: 2023     Patient will increase functional independence with mobility by performin. Supine to sit with Modified Howard  2. Sit to supine with Modified Howard  3. Sit to stand transfer with Modified Howard  4. Bed to chair transfer with Supervision using Rolling Walker  5. Gait  x 250 feet with Supervision using Rolling Walker.                          Time Tracking:     PT Received On: 23  PT Start Time: 0946     PT Stop Time: 1004  PT Total Time (min): 18 min     Billable Minutes: Gait Training 18    Treatment Type: Treatment  PT/PTA: PTA     PTA Visit Number: 2     2023

## 2023-02-14 NOTE — PLAN OF CARE
Problem: Adult Inpatient Plan of Care  Goal: Plan of Care Review  Outcome: Ongoing, Progressing    Problem: Adult Inpatient Plan of Care  Goal: Optimal Comfort and Wellbeing  Outcome: Ongoing, Progressing     Problem: Skin Injury Risk Increased  Goal: Skin Health and Integrity  Outcome: Ongoing, Progressing     Problem: Dysrhythmia  Goal: Normalized Cardiac Rhythm  Outcome: Ongoing, Progressing     Problem: Fluid Imbalance (Heart Failure)  Goal: Fluid Balance  Outcome: Ongoing, Progressing     Problem: Fall Injury Risk  Goal: Absence of Fall and Fall-Related Injury  Outcome: Ongoing, Progressing

## 2023-02-14 NOTE — EICU
Intervention Initiated From:  COR / ADENIKE Henderson intervened regarding:  Rounding (Video assessment)    Nurse Notified:  No    Doctor Notified:  No    Comments: Video rounding completed. No gtts at present. VSS. NAD.

## 2023-02-14 NOTE — CARE UPDATE
02/14/23 0804   Patient Assessment/Suction   Level of Consciousness (AVPU) alert   PRE-TX-O2   Device (Oxygen Therapy) room air   SpO2 100 %   Pulse Oximetry Type Continuous   $ Pulse Oximetry - Multiple Charge Pulse Oximetry - Multiple   Pulse 76   Resp 20

## 2023-02-14 NOTE — PROGRESS NOTES
INPATIENT NEPHROLOGY Progress Note  [facility] NEPHROLOGY    Cleveland Capps  02/14/2023    Reason for consultation:  silvestre    Chief Complaint:   Chief Complaint   Patient presents with    Shortness of Breath    Atrial Fibrillation     History of Present Illness:    Cleveland Capps is a 67 year male who presents emergency room for evaluation of shortness of breath.  He reports shortness of breath and tachycardia onset approximately 8 days ago.  He is a history of atrial fibrillation and reports intermittently in AFib over the past several days.  He denies any chest pain.  Reports shortness of breath is worse with exertion.  No alleviating factors.  He also endorses an approximate 10-15 lb weight gain over the past several weeks.  No reported fever chills.  Previous medical history includes CVA, Watchman left atrial closure device, hypothyroidism, coronary artery disease, stent placement, COPD, hypertension, AICD, and combined systolic/diastolic heart failure.  ER workup:  CBC with mild anemia.  CMP with BUN of 44 and creatinine of 1.8.  Bilirubin elevated 3.0,  .  BNP elevated at 1518 (baseline 400).  Troponin mildly elevated 0.077.  Last echo demonstrated ejection fraction of 20-25%.  Patient was given 20 mg of Lasix in the ER.  Patient remained in atrial fibrillation/RVR while in ER.  He had mild hypotension.  Patient was started on amiodarone infusion and bolus after consulting Dr. Calhoun with on-call Cardiology.  Patient admitted Hospital Medicine for treatment management.     2/8  stopped entresto due to low bp.  On dobutamine.  Episodes of hypotension.  Tachycardic  2/9  converted to sinus rhythm.  3350 cc uop  2/10  feels well.  Has had some issues with hypotension.   Lasix held.    2/11 VSS, no new complains.  2/12 VSS, no new complains.  2/13 VSS, on RA, UOP 1.4L, net -6.2L  2/14 cardiology resumed entresto yest- some isolated drops in BP, UOP 1L    Plan of Care:    Acute kidney injury likely  secondary to decompensated heart failure and cardiorenal syndrome  --improved with diuresis  --renal function is stable with entresto, lasix and aldactone on board  --avoid NSAIDS, Mckeon II inhibitors, and other non-essential nephrotoxic agents  --renal dose medication for crcl > 60  --abdominal ultrasound--no hydronephrosis  --ua with micro with tubular casts suggestion hypoperfusion of nephrons   --FEUrea 17 % suggesting pre-renal etiology    HFrEF  Hypervolemia  Hyponatremia  Congestive hepatopathy  --much improved and stable off dobutamine  --2g salt, 1.5L fluid restriction     Anemia  --at goal    Advise renal panel in 1 week and f/u with Dr. Davis within 4 weeks.  Updated family at bedside.    Thank you for allowing us to participate in this patient's care. We will continue to follow.    Vital Signs:  Temp Readings from Last 3 Encounters:   02/14/23 97.9 °F (36.6 °C) (Tympanic)   11/28/22 98.2 °F (36.8 °C)   11/13/22 97.7 °F (36.5 °C) (Oral)       Pulse Readings from Last 3 Encounters:   02/14/23 67   01/09/23 92   12/20/22 62       BP Readings from Last 3 Encounters:   02/14/23 (!) 85/54   01/09/23 125/76   12/20/22 120/60       Weight:  Wt Readings from Last 3 Encounters:   02/14/23 56 kg (123 lb 7.3 oz)   01/09/23 52.6 kg (116 lb)   12/20/22 52.9 kg (116 lb 8.2 oz)       Medications:  No current facility-administered medications on file prior to encounter.     Current Outpatient Medications on File Prior to Encounter   Medication Sig Dispense Refill    apixaban (ELIQUIS) 5 mg Tab Take 5 mg by mouth 2 (two) times daily.      atorvastatin (LIPITOR) 80 MG tablet Take 80 mg by mouth once daily.      potassium chloride SA (K-DUR,KLOR-CON M) 10 MEQ tablet       COMP-AIR NEBULIZER COMPRESSOR Alfreda use as directed      spironolactone (ALDACTONE) 25 MG tablet Take 1 tablet (25 mg total) by mouth once daily. 30 tablet 11    travoprost (TRAVATAN Z) 0.004 % ophthalmic solution       travoprost, benzalkonium, (TRAVATAN)  "0.004 % ophthalmic solution Place 1 drop into both eyes nightly.      TRUE METRIX GLUCOSE TEST STRIP Strp USE 1 STRIP TO CHECK GLUCOSE ONCE DAILY       Scheduled Meds:   amiodarone  400 mg Oral Daily    apixaban  5 mg Oral BID    furosemide  20 mg Oral Daily    levothyroxine  50 mcg Oral Before breakfast    sacubitriL-valsartan  1 tablet Oral BID    sodium chloride 0.9%  10 mL Intravenous Q6H    spironolactone  25 mg Oral Daily     Continuous Infusions:      PRN Meds:.dextrose 10%, dextrose 10%, glucagon (human recombinant), glucose, glucose, insulin aspart U-100, sodium chloride 0.9%, Flushing PICC Protocol **AND** sodium chloride 0.9% **AND** sodium chloride 0.9%    Review of Systems:  Neg    Physical Exam:    BP (!) 85/54   Pulse 67   Temp 97.9 °F (36.6 °C) (Tympanic)   Resp 19   Ht 5' 1" (1.549 m)   Wt 56 kg (123 lb 7.3 oz)   SpO2 100%   BMI 23.33 kg/m²     Constitutional: nad, aao x 3  Heart: rrr, no r/g, wwp, no edema  Lungs: ctab, no w/r/r/c, no lb  Abdomen: s/nt/nd, +BS    Results:  Recent Labs   Lab 02/12/23 0435 02/13/23 0444 02/14/23  0514   * 135* 134*   K 4.0 4.4 4.5    102 102   CO2 24 23 23   BUN 23 24* 24*   CREATININE 1.2 1.3 1.2    86 96         Recent Labs   Lab 02/07/23 2355 02/08/23 0450 02/11/23 0441 02/12/23  0435 02/13/23  0444 02/14/23  0514   CALCIUM  --    < > 8.1* 8.3* 8.4* 8.5*   ALBUMIN  --    < > 3.2* 3.2* 3.2* 3.2*   PHOS 4.8*  --  2.9  --   --   --    MG  --    < >  --  2.1 1.9 1.9    < > = values in this interval not displayed.               Recent Labs   Lab 02/11/23 2029 02/12/23 0736 02/12/23  1117 02/12/23  1641 02/12/23  2045 02/13/23  1144 02/13/23  1656 02/13/23  2141 02/14/23  0729 02/14/23  1127   POCTGLUCOSE 123* 90 98 159* 126* 188* 152* 134* 77 115*         Recent Labs   Lab 05/17/20  0646 10/24/20  0240 02/06/23  1543   Hemoglobin A1C 6.0 6.3 H 5.4         Recent Labs   Lab 02/12/23  0435 02/13/23  0444 02/14/23  0514   WBC 6.25 6.75 " 6.60   HGB 10.7* 10.6* 10.7*   HCT 33.1* 33.0* 33.7*   * 143* 137*   * 106* 107*   MCHC 32.3 32.1 31.8*   MONO 7.4  0.5 7.7  0.5 8.6  0.6         Recent Labs   Lab 02/12/23  0435 02/13/23  0444 02/14/23  0514   BILITOT 3.7* 3.4* 2.9*   PROT 5.7* 5.5* 5.9*   ALBUMIN 3.2* 3.2* 3.2*   ALKPHOS 81 82 80   * 243* 199*   AST 84* 55* 42*         Recent Labs   Lab 10/23/20  1557 02/07/23  1242   Color, UA Yellow Yellow   Appearance, UA Clear Clear   pH, UA 7.0 5.0   Specific Gravity, UA 1.015 1.020   Protein, UA Negative 3+ A   Glucose, UA Negative Negative   Ketones, UA Negative Negative   Urobilinogen, UA Negative Negative   Bilirubin (UA) Negative Negative   Occult Blood UA Negative 1+ A   Nitrite, UA Negative Negative   RBC, UA  --  2   WBC, UA  --  1   Bacteria  --  Moderate A   Hyaline Casts, UA  --  12 A         Recent Labs   Lab 05/02/22  0912 05/17/22  1428 05/17/22  1501   Sample VENOUS ARTERIAL ARTERIAL         Microbiology Results (last 7 days)       ** No results found for the last 168 hours. **          Patient care was time spent personally by me on the following activities: > 35 min  Obtaining a history  Examination of patient.  Providing medical care at the patients bedside.  Developing a treatment plan with patient or surrogate and bedside caregivers  Ordering and reviewing laboratory studies, radiographic studies, pulse oximetry.  Ordering and performing treatments and interventions.  Evaluation of patient's response to treatment.  Discussions with consultants while on the unit and immediately available to the patient.  Re-evaluation of the patient's condition.  Documentation in the medical record.     Adelaida Carrasco MD  Nephrology  [facility]  [phone]

## 2023-02-14 NOTE — MEDICAL/APP STUDENT
[facility]  Discharge Summary      Patient Name: Cleveland Capps  MRN: [id number]  Admission Date: 2/6/2023  Hospital Length of Stay: 8 days  Discharge Date and Time:  02/14/2023 11:04 AM  Attending Physician: Michelet Schwartz MD   Discharging Provider: MANNY Ramos  Primary Care Provider: Romero Snyder MD    HPI: Cleveland Capps is a 67 year male who presents emergency room for evaluation of shortness of breath.  He reports shortness of breath and tachycardia onset approximately 8 days ago.  He is a history of atrial fibrillation and reports intermittently in AFib over the past several days.  He denies any chest pain.  Reports shortness of breath is worse with exertion.  No alleviating factors.  He also endorses an approximate 10-15 lb weight gain over the past several weeks.  No reported fever chills.  Previous medical history includes CVA, Watchman left atrial closure device, hypothyroidism, coronary artery disease, stent placement, COPD, hypertension, AICD, and combined systolic/diastolic heart failure.  ER workup:  CBC with mild anemia.  CMP with BUN of 44 and creatinine of 1.8.  Bilirubin elevated 3.0,  .  BNP elevated at 1518 (baseline 400).  Troponin mildly elevated 0.077.  Last echo demonstrated ejection fraction of 20-25%.  Patient was given 20 mg of Lasix in the ER.  Patient remained in atrial fibrillation/RVR while in ER.  He had mild hypotension.  Patient was started on amiodarone infusion and bolus after consulting Dr. Calhoun with on-call Cardiology.  Patient admitted Hospital Medicine for treatment management.  Patient admitted to step-down unit.    * No surgery found *      Indwelling Lines/Drains at time of discharge:   Lines/Drains/Airways       Peripherally Inserted Central Catheter Line  Duration             PICC Double Lumen 02/09/23 1310 right brachial 4 days                  Hospital Course: Pt was monitored closely during his stay. He was cleared for  discharge by cardiologist and will discharge with home health services. He was D/C home on new medication regimen which he was educated on extensively. We discussed the importance of monitoring BP at home and recording values for follow-up with his PCP. He will follow-up w/ Dr. Snyder on 2/17 and Dr. Daniels in cardiology on 2/28 and he was referred to an advanced cardiac clinic for further management. Pt was eager for discharge and agreeable with plan of care. Return precautions were provided upon discharge.     Pt was seen on day of discharge and deemed appropriate for discharge.    Consults:   Consults (From admission, onward)          Status Ordering Provider     [facility]  Once        Provider:  (Not yet assigned)    Acknowledged KAREN ESPINOZA     Inpatient consult to Cardiac Teaching  Once        Provider:  (Not yet assigned)    Completed ILENE PAULINO     Inpatient consult to Nephrology  Once        Provider:  Terence Davis MD    Completed KEON ALEXANDRA     Inpatient consult to Cardiology  Once        Provider:  Tre Rosario MD    Completed COTY TOVAR     Inpatient consult to Social Work/Case Management  Once        Provider:  (Not yet assigned)    Completed COTY TOVAR     Inpatient consult to Registered Dietitian/Nutritionist  Once        Provider:  (Not yet assigned)    Completed COTY TOVAR            Significant Diagnostic Studies: Labs: CMP   Recent Labs   Lab 02/13/23  0444 02/14/23  0514   * 134*   K 4.4 4.5    102   CO2 23 23   GLU 86 96   BUN 24* 24*   CREATININE 1.3 1.2   CALCIUM 8.4* 8.5*   PROT 5.5* 5.9*   ALBUMIN 3.2* 3.2*   BILITOT 3.4* 2.9*   ALKPHOS 82 80   AST 55* 42*   * 199*   ANIONGAP 10 9   , CBC   Recent Labs   Lab 02/13/23  0444 02/14/23  0514   WBC 6.75 6.60   HGB 10.6* 10.7*   HCT 33.0* 33.7*   * 137*   , Troponin No results for input(s): TROPONINI in the last 168 hours., and A1C:   Recent Labs    Lab 02/06/23  1543   HGBA1C 5.4       Pending Diagnostic Studies:       None          Final Active Diagnoses:    Diagnosis Date Noted POA    PRINCIPAL PROBLEM:  Acute on chronic combined systolic and diastolic congestive heart failure [I50.43] 05/19/2020 Yes    Hypotension [I95.9] 02/09/2023 Yes    Longstanding persistent atrial fibrillation [I48.11] 11/28/2022 Yes    AICD (automatic cardioverter/defibrillator) present [Z95.810] 11/18/2022 Yes    Elevated LFTs [R79.89] 11/18/2022 Yes    Hypothyroid [E03.9] 11/17/2022 Yes    History of CVA (cerebrovascular accident) [Z86.73] 10/23/2020 Not Applicable    MEIR (acute kidney injury) [N17.9] 04/27/2016 Yes    Benign essential HTN [I10] 04/25/2016 Yes    Coronary artery disease [I25.10] 04/25/2016 Yes      Problems Resolved During this Admission:    Diagnosis Date Noted Date Resolved POA    A-fib [I48.91] 04/25/2016 02/07/2023 Yes      Discharged Condition: good    Disposition: Home-Health Care [facility]    Follow Up:   Follow-up Information       Romero Snyder MD Follow up on 2/17/2023.    Specialty: Family Medicine  Why: Hospital follow up appointment at 9:00 am  Contact information:  [address]  [facility] [address]  [phone]               Kuldeep Daniels MD Follow up on 2/28/2023.    Specialties: Cardiology, Cardiovascular Disease  Why: 2:40 pm  Contact information:  [address]  [facility] [address]  [phone]               [facility]    Specialty: Home Health Services  Why: Home Health  Contact information:  [address] [place] [address]  [phone]                           Patient Instructions:      COMPREHENSIVE METABOLIC PANEL   Standing Status: Future Standing Exp. Date: 04/14/24     Ambulatory referral/consult to Home Health   Standing Status: Future   Referral Priority: Routine Referral Type: Home Health   Referral Reason: Specialty Services Required   Requested Specialty: Home Health Services   Number of Visits  Requested: 1     Ambulatory referral/consult to Transplant, Heart   Standing Status: Future   Referral Priority: Routine Referral Type: Transplants   Number of Visits Requested: 1     Ambulatory referral/consult to General Congestive Heart Failure Clinic   Standing Status: Future   Referral Priority: Routine Referral Type: Consultation   Referral Reason: Specialty Services Required   Requested Specialty: Cardiology   Number of Visits Requested: 1     Diet Cardiac   Order Comments: Low sodium, heart health diet     Notify your health care provider if you experience any of the following:  difficulty breathing or increased cough     Notify your health care provider if you experience any of the following:  severe uncontrolled pain     Notify your health care provider if you experience any of the following:  persistent dizziness, light-headedness, or visual disturbances     Notify your health care provider if you experience any of the following:  increased confusion or weakness     Activity as tolerated     Medications:  Reconciled Home Medications:      Medication List        START taking these medications      amiodarone 400 MG tablet  Commonly known as: PACERONE  Take 1 tablet (400 mg total) by mouth once daily.     docusate sodium 50 MG capsule  Commonly known as: COLACE  Take 2 capsules (100 mg total) by mouth 2 (two) times daily as needed for Constipation.     torsemide 20 MG Tab  Commonly known as: DEMADEX  Take 1 tablet (20 mg total) by mouth once daily.            CHANGE how you take these medications      levothyroxine 25 MCG tablet  Commonly known as: SYNTHROID  Take 2 tablets (50 mcg total) by mouth before breakfast.  What changed: how much to take            CONTINUE taking these medications      apixaban 5 mg Tab  Commonly known as: ELIQUIS  Take 5 mg by mouth 2 (two) times daily.     atorvastatin 80 MG tablet  Commonly known as: LIPITOR  Take 80 mg by mouth once daily.     COMP-AIR NEBULIZER COMPRESSOR  Alfreda  Generic drug: nebulizer and compressor  use as directed     potassium chloride SA 10 MEQ tablet  Commonly known as: K-DUR,KLOR-CON M     sacubitriL-valsartan 24-26 mg per tablet  Commonly known as: ENTRESTO  Take 1 tablet by mouth 2 (two) times daily.     spironolactone 25 MG tablet  Commonly known as: ALDACTONE  Take 1 tablet (25 mg total) by mouth once daily.     travoprost (benzalkonium) 0.004 % ophthalmic solution  Commonly known as: TRAVATAN  Place 1 drop into both eyes nightly.     travoprost 0.004 % ophthalmic solution  Commonly known as: TRAVATAN Z     TRUE METRIX GLUCOSE TEST STRIP Strp  Generic drug: blood sugar diagnostic  USE 1 STRIP TO CHECK GLUCOSE ONCE DAILY            STOP taking these medications      carvediloL 3.125 MG tablet  Commonly known as: COREG     clopidogreL 75 mg tablet  Commonly known as: PLAVIX     furosemide 20 MG tablet  Commonly known as: LASIX            Time spent on the discharge of patient: 34 minutes         MANNY Ramos  [facility]

## 2023-02-14 NOTE — PLAN OF CARE
The pt is cleared for discharge home from case management with Prashant  and has follow up appointments added to his AVS. The pt needs to be seen by hospital medicine and cleared by other discharge conditions.    02/14/23 0907   Final Note   Assessment Type Final Discharge Note   Anticipated Discharge Disposition Home-Health   What phone number can be called within the next 1-3 days to see how you are doing after discharge? [phone]   Hospital Resources/Appts/Education Provided Appointments scheduled and added to AVS

## 2023-02-14 NOTE — DISCHARGE SUMMARY
[facility]  Discharge Summary      Patient Name: Cleveland Capps  MRN: [id number]  JUSTYN: [id number]  Patient Class: IP- Inpatient  Admission Date: 2/6/2023  Hospital Length of Stay: 8 days  Discharge Date and Time: 2/14/2023  1:56 PM  Attending Physician: No att. providers found   Discharging Provider: Taylor Lyn NP  Primary Care Provider: Romero Snyder MD    Primary Care Team: Networked reference to record PCT     HPI:   Cleveland Capps is a 67 year male who presents emergency room for evaluation of shortness of breath.  He reports shortness of breath and tachycardia onset approximately 8 days ago.  He is a history of atrial fibrillation and reports intermittently in AFib over the past several days.  He denies any chest pain.  Reports shortness of breath is worse with exertion.  No alleviating factors.  He also endorses an approximate 10-15 lb weight gain over the past several weeks.  No reported fever chills.  Previous medical history includes CVA, Watchman left atrial closure device, hypothyroidism, coronary artery disease, stent placement, COPD, hypertension, AICD, and combined systolic/diastolic heart failure.  ER workup:  CBC with mild anemia.  CMP with BUN of 44 and creatinine of 1.8.  Bilirubin elevated 3.0,  .  BNP elevated at 1518 (baseline 400).  Troponin mildly elevated 0.077.  Last echo demonstrated ejection fraction of 20-25%.  Patient was given 20 mg of Lasix in the ER.  Patient remained in atrial fibrillation/RVR while in ER.  He had mild hypotension.  Patient was started on amiodarone infusion and bolus after consulting Dr. Calhoun with on-call Cardiology.  Patient admitted Hospital Medicine for treatment management.  Patient admitted to step-down unit.      * No surgery found *      Hospital Course:   Pt will monitor closely during his stay.  He was diuresed with IV Lasix.  Cardiology and Nephrology was consulted.  Pt was maintained on  amiodarone infusion for his atrial fibrillation with RVR.  There were some issues with hypotension requiring the Entresto and attempts at beta-blocker to be stopped.  The echocardiogram confirmed an EF of 20%.  His renal and liver function worsened during his stay.  Transfer to [facility] for advanced heart failure service as was recommended by Cardiology, however Pt declined and elected for DNR status.  He wished to optimize his care here in this hospital, but not transfer of care.  Dobutrex was added to his regimen with good result.  He did have some lingering hypotension and diuresis was stopped and required fluid bolusing with improvement hypotension.  He did convert to sinus arrhythmia on amiodarone infusion.  His liver function tests and renal function began to improve.  Pt worked with PT/OT his hospitalization.  The Dobutrex was weaned down as Pt tolerated.  He was transitioned oral amiodarone. The dobutrex was discontinued on 2/12. He maintained SA/SR.  He was resumed on some diuretic therapy with good tolerance.  Entresto was reordered by Cardiology.  Patient tolerated.  He was cleared from Cardiology and Nephrology standpoint for discharge.  Discharge instructions as well as return precautions were discussed with patient with good understanding.    Physical exam:  Awake alert oriented x4, no acute distress  Cardiac:  RRR  Pulmonary:  Clear to auscultation  Abdomen:  Soft, nontender  Extremities: normal range of motion, no appreciable edema         Goals of Care Treatment Preferences:  Code Status: DNR      Consults:   Consults (From admission, onward)        Status Ordering Provider     Inpatient consult to Cardiac Teaching  Once        Provider:  (Not yet assigned)    Completed ILENE PAULINO     Inpatient consult to Nephrology  Once        Provider:  Terence Davis MD    Completed KEON ALEXANDRA     Inpatient consult to Cardiology  Once        Provider:  Tre Rosario MD    Completed [place]  COTY RENE     Inpatient consult to Social Work/Case Management  Once        Provider:  (Not yet assigned)    Completed COTY TOVAR     Inpatient consult to Registered Dietitian/Nutritionist  Once        Provider:  (Not yet assigned)    Completed COTY TOVAR.          No new Assessment & Plan notes have been filed under this hospital service since the last note was generated.  Service: Hospital Medicine    Final Active Diagnoses:    Diagnosis Date Noted POA    PRINCIPAL PROBLEM:  Acute on chronic combined systolic and diastolic congestive heart failure [I50.43] 05/19/2020 Yes    Hypotension [I95.9] 02/09/2023 Yes    Longstanding persistent atrial fibrillation [I48.11] 11/28/2022 Yes    AICD (automatic cardioverter/defibrillator) present [Z95.810] 11/18/2022 Yes    Elevated LFTs [R79.89] 11/18/2022 Yes    Hypothyroid [E03.9] 11/17/2022 Yes    History of CVA (cerebrovascular accident) [Z86.73] 10/23/2020 Not Applicable    MEIR (acute kidney injury) [N17.9] 04/27/2016 Yes    Benign essential HTN [I10] 04/25/2016 Yes    Coronary artery disease [I25.10] 04/25/2016 Yes      Problems Resolved During this Admission:    Diagnosis Date Noted Date Resolved POA    A-fib [I48.91] 04/25/2016 02/07/2023 Yes       Discharged Condition: fair    [place] [facility]    Follow Up:   Follow-up Information     Romero Snyder MD Follow up on 2/17/2023.    Specialty: Family Medicine  Why: Hospital follow up appointment at 9:00 am  Contact information:  [address]  [facility] [address]  [phone]             Kuldeep Daniels MD Follow up on 2/28/2023.    Specialties: Cardiology, Cardiovascular Disease  Why: 2:40 pm  Contact information:  [address]  [address]  [place] [address]  [phone]             [facility]    Specialty: Home Health Services  Why: Home Health  Contact information:  [address] [place] [address]  [phone]                       Patient  Instructions:      COMPREHENSIVE METABOLIC PANEL   Standing Status: Future Standing Exp. Date: 04/14/24     Ambulatory referral/consult to Home Health   Standing Status: Future   Referral Priority: Routine Referral Type: Home Health   Referral Reason: Specialty Services Required   Requested Specialty: Home Health Services   Number of Visits Requested: 1     Ambulatory referral/consult to Transplant, Heart   Standing Status: Future   Referral Priority: Routine Referral Type: Transplants   Number of Visits Requested: 1     Ambulatory referral/consult to General Congestive Heart Failure Clinic   Standing Status: Future   Referral Priority: Routine Referral Type: Consultation   Referral Reason: Specialty Services Required   Requested Specialty: Cardiology   Number of Visits Requested: 1     Diet Cardiac   Order Comments: Low sodium, heart health diet     Notify your health care provider if you experience any of the following:  difficulty breathing or increased cough     Notify your health care provider if you experience any of the following:  severe uncontrolled pain     Notify your health care provider if you experience any of the following:  persistent dizziness, light-headedness, or visual disturbances     Notify your health care provider if you experience any of the following:  increased confusion or weakness     Activity as tolerated       Significant Diagnostic Studies:    X-Ray Chest 1 View [[id number]] Resulted: 02/14/23 0812   Order Status: Completed Updated: 02/14/23 0815   Narrative:     EXAMINATION:   XR CHEST 1 VIEW     CLINICAL HISTORY:   pulmonary edema;     TECHNIQUE:   Single frontal view of the chest was performed.     COMPARISON:   02/09/2023 and 02/06/2023     FINDINGS:   Mild ground-glass disease in both lower lung zones.  Left lower lung zone partially obscured.  Enlarged heart with cardiac conduction device and several stents.  Right-sided PICC line with tip in the SVC.  No significant pleural  effusion.  No pneumothorax.  Sternal wires.    Impression:       Improved aeration of the lungs with ground-glass disease less confluent.  Cardiomegaly with postsurgical change.       Electronically signed by: Sixto Ordonez   Date: 02/14/2023   Time: 08:12   X-Ray Chest 1 View for Line/Tube Placement [[id number]] Resulted: 02/09/23 1424   Order Status: Completed Updated: 02/09/23 1427   Narrative:     EXAMINATION:   XR CHEST 1 VIEW FOR LINE/TUBE PLACEMENT     CLINICAL HISTORY:   PICC insertion;     TECHNIQUE:   Single frontal portable view of the chest was performed.     COMPARISON:   Chest of February 6, 2023     FINDINGS:   A PICC has been placed entering from the right and ending in the superior vena cava in good position.  An AICD is noted in place on the left with single lead to the heart.  The patient has had prior sternotomy.  There is mild cardiomegaly.  An intrapulmonary mass or infiltrate is not seen although there is prominence of the pulmonary vasculature.  A pneumothorax is not noted.    Impression:       PICC placed in good position.  Mild cardiomegaly.  Prior sternotomy.  AICD in place.  Prominence of the pulmonary vasculature.       Electronically signed by: Huey Escalante MD   Date: 02/09/2023   Time: 14:24   US Abdomen Complete [[id number]] Resulted: 02/07/23 1442   Order Status: Completed Updated: 02/07/23 1445   Narrative:     EXAMINATION:   US ABDOMEN COMPLETE     CLINICAL HISTORY:   elevated liver enzymes, acute CHF exac;     TECHNIQUE:   Complete abdominal ultrasound (including pancreas, aorta, liver, gallbladder, common bile duct, IVC, kidneys, and spleen) was performed.     COMPARISON:   None     FINDINGS:   Pancreas: The visualized portions of pancreas appear normal.     Aorta: No aneurysm.     Liver: 11.9 cm, normal in size. Homogeneous parenchymal echotexture. A cyst in the right lobe measures 9 mm.  There is enlargement of the hepatic veins and inferior vena cava which may be secondary  to congestive heart failure.     Gallbladder: No calculi, wall thickening, or pericholecystic fluid.  Negative sonographic Mccabe's sign.     Biliary system: 2 mm common bile duct.  No intrahepatic ductal dilatation.     Inferior vena cava: Normal in appearance.     Right kidney: 9.8 cm. No hydronephrosis.     Left kidney: 8.4 cm. No hydronephrosis.     Spleen: 7.7 cm.  Normal in size with homogeneous echotexture.     Miscellaneous: No ascites.    Impression:       Enlargement of the inferior vena cava and hepatic veins may be due to congestive heart failure otherwise negative abdomen ultrasound       Electronically signed by: Huey Escalante MD   Date: 02/07/2023   Time: 14:42         Pending Diagnostic Studies:     None         Medications:  Reconciled Home Medications:      Medication List      START taking these medications    amiodarone 400 MG tablet  Commonly known as: PACERONE  Take 1 tablet (400 mg total) by mouth once daily.     docusate sodium 50 MG capsule  Commonly known as: COLACE  Take 2 capsules (100 mg total) by mouth 2 (two) times daily as needed for Constipation.     torsemide 20 MG Tab  Commonly known as: DEMADEX  Take 1 tablet (20 mg total) by mouth once daily.        CHANGE how you take these medications    levothyroxine 25 MCG tablet  Commonly known as: SYNTHROID  Take 2 tablets (50 mcg total) by mouth before breakfast.  What changed: how much to take        CONTINUE taking these medications    apixaban 5 mg Tab  Commonly known as: ELIQUIS  Take 5 mg by mouth 2 (two) times daily.     atorvastatin 80 MG tablet  Commonly known as: LIPITOR  Take 80 mg by mouth once daily.     COMP-AIR NEBULIZER COMPRESSOR Alfreda  Generic drug: nebulizer and compressor  use as directed     potassium chloride SA 10 MEQ tablet  Commonly known as: K-DUR,KLOR-CON M     sacubitriL-valsartan 24-26 mg per tablet  Commonly known as: ENTRESTO  Take 1 tablet by mouth 2 (two) times daily.     spironolactone 25 MG  tablet  Commonly known as: ALDACTONE  Take 1 tablet (25 mg total) by mouth once daily.     travoprost (benzalkonium) 0.004 % ophthalmic solution  Commonly known as: TRAVATAN  Place 1 drop into both eyes nightly.     travoprost 0.004 % ophthalmic solution  Commonly known as: TRAVATAN Z     TRUE METRIX GLUCOSE TEST STRIP Strp  Generic drug: blood sugar diagnostic  USE 1 STRIP TO CHECK GLUCOSE ONCE DAILY        STOP taking these medications    carvediloL 3.125 MG tablet  Commonly known as: COREG     clopidogreL 75 mg tablet  Commonly known as: PLAVIX     furosemide 20 MG tablet  Commonly known as: LASIX            Indwelling Lines/Drains at time of discharge:   Lines/Drains/Airways     None                 Time spent on the discharge of patient: 42 minutes         Taylor Lyn NP  Department of Hospital Medicine  [facility]

## 2023-02-14 NOTE — PROGRESS NOTES
[facility]  Cardiology  Progress Note    Patient Name: Cleveland Capps  MRN: [id number]  Admission Date: 2/6/2023  Hospital Length of Stay: 8 days  Code Status: DNR   Attending Physician: Michelet Schwartz MD   Primary Care Physician: Romero Snyder MD  Expected Discharge Date: 2/14/2023  Principal Problem:Acute on chronic combined systolic and diastolic congestive heart failure    Subjective:     Patient reports feeling better.  Tolerated is Entresto.  No dizziness.    Review of Systems   All other systems reviewed and are negative.  Objective:     Vital Signs (Most Recent):  Temp: 97.3 °F (36.3 °C) (02/14/23 0728)  Pulse: 76 (02/14/23 0804)  Resp: 20 (02/14/23 0804)  BP: 105/68 (02/14/23 0800)  SpO2: 100 % (02/14/23 0804) Vital Signs (24h Range):  Temp:  [97.3 °F (36.3 °C)-98 °F (36.7 °C)] 97.3 °F (36.3 °C)  Pulse:  [61-80] 76  Resp:  [16-26] 20  SpO2:  [89 %-100 %] 100 %  BP: ()/(57-79) 105/68     Weight: 56 kg (123 lb 7.3 oz)  Body mass index is 23.33 kg/m².    SpO2: 100 %         Intake/Output Summary (Last 24 hours) at 2/14/2023 1056  Last data filed at 2/14/2023 0839  Gross per 24 hour   Intake 540 ml   Output 1000 ml   Net -460 ml       Lines/Drains/Airways       Peripherally Inserted Central Catheter Line  Duration             PICC Double Lumen 02/09/23 1310 right brachial 4 days                    Physical Exam  Vitals reviewed.   Constitutional:       Appearance: Normal appearance.   HENT:      Mouth/Throat:      Mouth: Mucous membranes are moist.   Eyes:      Extraocular Movements: Extraocular movements intact.      Pupils: Pupils are equal, round, and reactive to light.   Cardiovascular:      Rate and Rhythm: Normal rate and regular rhythm.      Heart sounds: No murmur heard.    No gallop.   Pulmonary:      Effort: Pulmonary effort is normal.      Breath sounds: Normal breath sounds.   Abdominal:      General: Abdomen is flat.      Palpations: Abdomen is soft.   Musculoskeletal:       Cervical back: Normal range of motion.   Skin:     General: Skin is warm and dry.   Neurological:      General: No focal deficit present.      Mental Status: He is alert and oriented to person, place, and time.   Psychiatric:         Mood and Affect: Mood normal.       Significant Labs: BMP:   Recent Labs   Lab 02/13/23  0444 02/14/23  0514   GLU 86 96   * 134*   K 4.4 4.5    102   CO2 23 23   BUN 24* 24*   CREATININE 1.3 1.2   CALCIUM 8.4* 8.5*   MG 1.9 1.9    and CBC   Recent Labs   Lab 02/13/23 0444 02/14/23  0514   WBC 6.75 6.60   HGB 10.6* 10.7*   HCT 33.0* 33.7*   * 137*       Significant Imaging: Echocardiogram: 2D echo with color flow doppler:   Results for orders placed or performed during the hospital encounter of 04/25/16   2D echo with color flow doppler    Narrative    This study is in progress....    and Transthoracic echo (TTE) complete (Cupid Only):   Results for orders placed or performed during the hospital encounter of 02/06/23   Echo   Result Value Ref Range    BSA 1.53 m2    LVIDd 6.56 (A) 3.5 - 6.0 cm    IVS 0.55 (A) 0.6 - 1.1 cm    Posterior Wall 0.82 0.6 - 1.1 cm    LVIDs 5.67 (A) 2.1 - 4.0 cm    FS 14 28 - 44 %    LV mass 180.63 g    Left Ventricle Relative Wall Thickness 0.25 cm    LV Systolic Volume 158.40 mL    LV Systolic Volume Index 104.2 mL/m2    LV Diastolic Volume 220.64 mL    LV Diastolic Volume Index 145.16 mL/m2    LV Mass Index 119 g/m2    Right Atrial Pressure (from IVC) 15 mmHg    EF 20 %    Narrative    · The left ventricle is severely enlarged with eccentric hypertrophy and   severely decreased systolic function.  · The estimated ejection fraction is 20%.  · Left ventricular diastolic dysfunction.  · There is left ventricular global hypokinesis. GLOBAL HYPOKINESIA WITH   ANTERIOR APICAL AKINESIA  · Moderate right ventricular enlargement.  · Moderate right atrial enlargement.  · Elevated central venous pressure (15 mmHg).  · LIMITED STUDY  · Right  atrial enlargement.        Assessment and Plan:     Severe heart failure with reduced EF, grossly compensated now  Off dobutamine since February 12  Ischemic cardiomyopathy, patent SVG to LAD and patent SVG to PDA, occluded SVG to OMB with small caliber vessels, angiogram 2021  Paroxysmal AFib status post recent cardioversion and history of Watchman device     Recommendations:  - continue current medications, amiodarone 400 mg for 30 days followed by 200 mg q.day.  Continue with current doses of heart failure therapy.  Recommend torsemide as an outpatient diuretic given that patient has till some evidence of pulmonary edema and elevated BNP.  - consult placed for advanced heart failure.  - patient has a Watchman device and highly unsure if patient still needs to be on Eliquis.  He did have a recent cardioversion.  Recommend follow-up with electrophysiology or with Dr. Schmidt at [facility] to determine the need for Eliquis at this point.    Active Diagnoses:    Diagnosis Date Noted POA    PRINCIPAL PROBLEM:  Acute on chronic combined systolic and diastolic congestive heart failure [I50.43] 05/19/2020 Yes    Hypotension [I95.9] 02/09/2023 Yes    Longstanding persistent atrial fibrillation [I48.11] 11/28/2022 Yes    AICD (automatic cardioverter/defibrillator) present [Z95.810] 11/18/2022 Yes    Elevated LFTs [R79.89] 11/18/2022 Yes    Hypothyroid [E03.9] 11/17/2022 Yes    History of CVA (cerebrovascular accident) [Z86.73] 10/23/2020 Not Applicable    MEIR (acute kidney injury) [N17.9] 04/27/2016 Yes    Benign essential HTN [I10] 04/25/2016 Yes    Coronary artery disease [I25.10] 04/25/2016 Yes      Problems Resolved During this Admission:    Diagnosis Date Noted Date Resolved POA    A-fib [I48.91] 04/25/2016 02/07/2023 Yes       VTE Risk Mitigation (From admission, onward)           Ordered     apixaban tablet 5 mg  2 times daily         02/06/23 2114     IP VTE HIGH RISK PATIENT  Once         02/06/23 2113     Place  sequential compression device  Until discontinued         02/06/23 2113     Place KEN hose  Until discontinued         02/06/23 2113                    Anjel Cervantes MD  Cardiology  [facility]

## 2023-02-15 ENCOUNTER — PATIENT OUTREACH (OUTPATIENT)
Dept: ADMINISTRATIVE | Facility: CLINIC | Age: 68
End: 2023-02-15
Payer: MEDICARE

## 2023-02-15 NOTE — PROGRESS NOTES
2nd Attempt made to reach patient for TCC call. Left voicemail please call [phone] leave first name, last name, and  Ivett will return your call.

## 2023-02-15 NOTE — PROGRESS NOTES
C3 nurse attempted to contact Cleveland Capps for a TCC post hospital discharge follow up call. No answer. Left voicemail with callback information. The patient has a scheduled HOSFU appointment with Romero Snyder MD on 02/17/23 @ 0900.

## 2023-02-16 ENCOUNTER — TELEPHONE (OUTPATIENT)
Dept: CARDIOLOGY | Facility: CLINIC | Age: 68
End: 2023-02-16
Payer: MEDICARE

## 2023-02-16 NOTE — PROGRESS NOTES
C3 nurse spoke with Cleveland Capps for a TCC post hospital discharge follow up call. The patient has a scheduled [facility] appointment with Romero Snyder MD on 02/17/23 @ 0900.

## 2023-02-16 NOTE — TELEPHONE ENCOUNTER
----- Message from Brian Mane sent at 2/16/2023 10:30 AM CST -----  Regarding: Advice  Contact: [facility]  Type:  Needs Medical Advice    Who Called: [facility]  Symptoms (please be specific): need medication   How long has patient had these symptoms:    Pharmacy name and phone #:   [facility], [place] - [address] [address]  Phone: [phone] Fax: [phone]  Would the patient rather a call back or a response via [facility]? call  Best Call Back Number: [phone]  Additional Information: Linda called regarding a medication that this patient needs as soon as possible. Please call Linda to advise.

## 2023-02-16 NOTE — TELEPHONE ENCOUNTER
Potassium chloride - doesn't have  CBG 82 this AM  Suppository last night to relieve constipation, Dulcolax OTC

## 2023-02-20 NOTE — TELEPHONE ENCOUNTER
Attempted to call Linda.  I left a msg for her to return my call w/phone number.  Also, called [facility] pharmacy and asked if they had any info about what medication the pt. Needed.  They did not have anything noted about the request.

## 2023-02-21 ENCOUNTER — LAB VISIT (OUTPATIENT)
Dept: LAB | Facility: HOSPITAL | Age: 68
End: 2023-02-21
Attending: SPECIALIST
Payer: MEDICARE

## 2023-02-21 DIAGNOSIS — I50.43 ACUTE ON CHRONIC COMBINED SYSTOLIC AND DIASTOLIC HEART FAILURE: ICD-10-CM

## 2023-02-21 DIAGNOSIS — I11.0 HYPERTENSIVE HEART DISEASE WITH CONGESTIVE HEART FAILURE: Primary | ICD-10-CM

## 2023-02-21 LAB
ANION GAP SERPL CALC-SCNC: 11 MMOL/L (ref 8–16)
BUN SERPL-MCNC: 52 MG/DL (ref 8–23)
CALCIUM SERPL-MCNC: 8.8 MG/DL (ref 8.7–10.5)
CHLORIDE SERPL-SCNC: 96 MMOL/L (ref 95–110)
CO2 SERPL-SCNC: 24 MMOL/L (ref 23–29)
CREAT SERPL-MCNC: 2.1 MG/DL (ref 0.5–1.4)
EST. GFR  (NO RACE VARIABLE): 34.1 ML/MIN/1.73 M^2
GLUCOSE SERPL-MCNC: 100 MG/DL (ref 70–110)
POTASSIUM SERPL-SCNC: 5.5 MMOL/L (ref 3.5–5.1)
SODIUM SERPL-SCNC: 131 MMOL/L (ref 136–145)

## 2023-02-21 PROCEDURE — 36415 COLL VENOUS BLD VENIPUNCTURE: CPT | Performed by: SPECIALIST

## 2023-02-21 PROCEDURE — 80048 BASIC METABOLIC PNL TOTAL CA: CPT | Performed by: SPECIALIST

## 2023-02-27 ENCOUNTER — TELEPHONE (OUTPATIENT)
Dept: CARDIOLOGY | Facility: CLINIC | Age: 68
End: 2023-02-27
Payer: MEDICARE

## 2023-02-27 NOTE — TELEPHONE ENCOUNTER
----- Message from Mireya Guajardo sent at 2/27/2023  2:43 PM CST -----  Contact: patient  Type: Needs Medical Advice  Who Called:  Anthony brother Kishan Call Back Number: [phone]  Additional Information: requesting a call back regarding appt and procedure.

## 2023-03-03 ENCOUNTER — TELEPHONE (OUTPATIENT)
Dept: CARDIOLOGY | Facility: CLINIC | Age: 68
End: 2023-03-03
Payer: MEDICARE

## 2023-03-03 NOTE — TELEPHONE ENCOUNTER
----- Message from Negrita Velazquez sent at 3/3/2023  3:35 PM CST -----  Contact: called at [phone]  Type: Needs Medical Advice  Who Called:  Linda from [facility]  Symptoms (please be specific):  Dizzy spells and blood pressure has been on the lower side  How long has patient had these symptoms:  through out this week  Pharmacy name and phone #:    [facility] [address] [place] [address], [place] - [address]  [address] [place] [address]  Phone: [phone] Fax: [phone]  Best Call Back Number: [phone]  Additional Information: Nola is calling to inform he dr that the pt's been having dizzy spells and blood pressure has been on the lower side. Please call back and advise.

## 2023-03-06 NOTE — TELEPHONE ENCOUNTER
Spoke with Linda and she said he is steady losing weight, bp low and he has not had anymore dizzy spells since Friday morning. HH nurse doesn't know what more to do to help patient.

## 2023-03-06 NOTE — TELEPHONE ENCOUNTER
117lb when admitted 2/15  102lb today  Brother said he is eating and he lives beside him.   Taking meds as prescribed.

## 2023-03-07 NOTE — TELEPHONE ENCOUNTER
Spoke with Linda and she said she doesn't think his pcp is going to do anything when its a cardiac thing due to him being on fluid pills. Advised her that is what Brittny advised so we are just relaying her message. Linda understood.

## 2023-03-09 NOTE — TELEPHONE ENCOUNTER
----- Message from Brian Mane sent at 3/9/2023  1:46 PM CST -----  Regarding: Refill  Contact: [facility]  Type:  RX Refill Request    Who Called: [facility]  Refill or New Rx:Refill  RX Name and Strength:sacubitriL-valsartan (ENTRESTO) 24-26 mg per tablet  How is the patient currently taking it? (ex. 1XDay):  Is this a 30 day or 90 day RX:  Preferred Pharmacy with phone number:  [facility], [place] - [address] [place] [address]  Phone: [phone] Fax: [phone]  Local or Mail Order:local  Ordering Provider:Dr Daniels  Would the patient rather a call back or a response via [facility]? call  Best Call Back Number:[phone]  Additional Information: Patient called regarding a refill.

## 2023-03-23 ENCOUNTER — TELEPHONE (OUTPATIENT)
Dept: CARDIOLOGY | Facility: CLINIC | Age: 68
End: 2023-03-23
Payer: MEDICARE

## 2023-03-23 ENCOUNTER — LAB VISIT (OUTPATIENT)
Dept: LAB | Facility: HOSPITAL | Age: 68
End: 2023-03-23
Attending: NURSE PRACTITIONER
Payer: MEDICARE

## 2023-03-23 ENCOUNTER — OFFICE VISIT (OUTPATIENT)
Dept: CARDIOLOGY | Facility: CLINIC | Age: 68
End: 2023-03-23
Payer: MEDICARE

## 2023-03-23 VITALS
SYSTOLIC BLOOD PRESSURE: 108 MMHG | OXYGEN SATURATION: 99 % | BODY MASS INDEX: 19.83 KG/M2 | DIASTOLIC BLOOD PRESSURE: 70 MMHG | HEIGHT: 61 IN | WEIGHT: 105 LBS | HEART RATE: 81 BPM

## 2023-03-23 DIAGNOSIS — I25.10 CORONARY ARTERY DISEASE INVOLVING NATIVE CORONARY ARTERY OF NATIVE HEART WITHOUT ANGINA PECTORIS: ICD-10-CM

## 2023-03-23 DIAGNOSIS — I48.11 LONGSTANDING PERSISTENT ATRIAL FIBRILLATION: ICD-10-CM

## 2023-03-23 DIAGNOSIS — Z95.810 AICD (AUTOMATIC CARDIOVERTER/DEFIBRILLATOR) PRESENT: ICD-10-CM

## 2023-03-23 DIAGNOSIS — I50.43 ACUTE ON CHRONIC COMBINED SYSTOLIC AND DIASTOLIC CONGESTIVE HEART FAILURE: ICD-10-CM

## 2023-03-23 DIAGNOSIS — E03.9 ACQUIRED HYPOTHYROIDISM: ICD-10-CM

## 2023-03-23 DIAGNOSIS — Z95.1 STATUS POST CORONARY ARTERY BYPASS GRAFT: ICD-10-CM

## 2023-03-23 DIAGNOSIS — R63.4 WEIGHT LOSS: ICD-10-CM

## 2023-03-23 DIAGNOSIS — R11.0 NAUSEA: ICD-10-CM

## 2023-03-23 DIAGNOSIS — Z95.810 AICD (AUTOMATIC CARDIOVERTER/DEFIBRILLATOR) PRESENT: Primary | ICD-10-CM

## 2023-03-23 DIAGNOSIS — Z95.818 PRESENCE OF WATCHMAN LEFT ATRIAL APPENDAGE CLOSURE DEVICE: ICD-10-CM

## 2023-03-23 DIAGNOSIS — N18.9 CHRONIC KIDNEY DISEASE, UNSPECIFIED CKD STAGE: ICD-10-CM

## 2023-03-23 LAB
ALBUMIN SERPL BCP-MCNC: 4.6 G/DL (ref 3.5–5.2)
ALP SERPL-CCNC: 83 U/L (ref 55–135)
ALT SERPL W/O P-5'-P-CCNC: 92 U/L (ref 10–44)
ANION GAP SERPL CALC-SCNC: 17 MMOL/L (ref 8–16)
AST SERPL-CCNC: 143 U/L (ref 10–40)
BASOPHILS # BLD AUTO: 0.03 K/UL (ref 0–0.2)
BASOPHILS NFR BLD: 0.4 % (ref 0–1.9)
BILIRUB SERPL-MCNC: 2 MG/DL (ref 0.1–1)
BUN SERPL-MCNC: 41 MG/DL (ref 8–23)
CALCIUM SERPL-MCNC: 9.9 MG/DL (ref 8.7–10.5)
CHLORIDE SERPL-SCNC: 89 MMOL/L (ref 95–110)
CO2 SERPL-SCNC: 21 MMOL/L (ref 23–29)
CREAT SERPL-MCNC: 2.3 MG/DL (ref 0.5–1.4)
DIFFERENTIAL METHOD: ABNORMAL
EOSINOPHIL # BLD AUTO: 0.2 K/UL (ref 0–0.5)
EOSINOPHIL NFR BLD: 2.4 % (ref 0–8)
ERYTHROCYTE [DISTWIDTH] IN BLOOD BY AUTOMATED COUNT: 14.5 % (ref 11.5–14.5)
EST. GFR  (NO RACE VARIABLE): 30.6 ML/MIN/1.73 M^2
GLUCOSE SERPL-MCNC: 126 MG/DL (ref 70–110)
HCT VFR BLD AUTO: 40 % (ref 40–54)
HGB BLD-MCNC: 13.5 G/DL (ref 14–18)
IMM GRANULOCYTES # BLD AUTO: 0.04 K/UL (ref 0–0.04)
IMM GRANULOCYTES NFR BLD AUTO: 0.6 % (ref 0–0.5)
LYMPHOCYTES # BLD AUTO: 0.6 K/UL (ref 1–4.8)
LYMPHOCYTES NFR BLD: 8.6 % (ref 18–48)
MCH RBC QN AUTO: 33.8 PG (ref 27–31)
MCHC RBC AUTO-ENTMCNC: 33.8 G/DL (ref 32–36)
MCV RBC AUTO: 100 FL (ref 82–98)
MONOCYTES # BLD AUTO: 0.5 K/UL (ref 0.3–1)
MONOCYTES NFR BLD: 7 % (ref 4–15)
NEUTROPHILS # BLD AUTO: 5.6 K/UL (ref 1.8–7.7)
NEUTROPHILS NFR BLD: 81 % (ref 38–73)
NRBC BLD-RTO: 0 /100 WBC
PLATELET # BLD AUTO: 167 K/UL (ref 150–450)
PMV BLD AUTO: 9 FL (ref 9.2–12.9)
POTASSIUM SERPL-SCNC: 4.8 MMOL/L (ref 3.5–5.1)
PROT SERPL-MCNC: 8.4 G/DL (ref 6–8.4)
RBC # BLD AUTO: 4 M/UL (ref 4.6–6.2)
SODIUM SERPL-SCNC: 127 MMOL/L (ref 136–145)
T4 FREE SERPL-MCNC: 0.94 NG/DL (ref 0.71–1.51)
TSH SERPL DL<=0.005 MIU/L-ACNC: 27.92 UIU/ML (ref 0.34–5.6)
WBC # BLD AUTO: 6.96 K/UL (ref 3.9–12.7)

## 2023-03-23 PROCEDURE — 84480 ASSAY TRIIODOTHYRONINE (T3): CPT | Performed by: NURSE PRACTITIONER

## 2023-03-23 PROCEDURE — 3078F PR MOST RECENT DIASTOLIC BLOOD PRESSURE < 80 MM HG: ICD-10-PCS | Mod: CPTII,S$GLB,, | Performed by: NURSE PRACTITIONER

## 2023-03-23 PROCEDURE — 99214 OFFICE O/P EST MOD 30 MIN: CPT | Mod: S$GLB,,, | Performed by: NURSE PRACTITIONER

## 2023-03-23 PROCEDURE — 84443 ASSAY THYROID STIM HORMONE: CPT | Performed by: NURSE PRACTITIONER

## 2023-03-23 PROCEDURE — 1159F MED LIST DOCD IN RCRD: CPT | Mod: CPTII,S$GLB,, | Performed by: NURSE PRACTITIONER

## 2023-03-23 PROCEDURE — 1101F PT FALLS ASSESS-DOCD LE1/YR: CPT | Mod: CPTII,S$GLB,, | Performed by: NURSE PRACTITIONER

## 2023-03-23 PROCEDURE — 4010F PR ACE/ARB THEARPY RXD/TAKEN: ICD-10-PCS | Mod: CPTII,S$GLB,, | Performed by: NURSE PRACTITIONER

## 2023-03-23 PROCEDURE — 4010F ACE/ARB THERAPY RXD/TAKEN: CPT | Mod: CPTII,S$GLB,, | Performed by: NURSE PRACTITIONER

## 2023-03-23 PROCEDURE — 3078F DIAST BP <80 MM HG: CPT | Mod: CPTII,S$GLB,, | Performed by: NURSE PRACTITIONER

## 2023-03-23 PROCEDURE — 1126F PR PAIN SEVERITY QUANTIFIED, NO PAIN PRESENT: ICD-10-PCS | Mod: CPTII,S$GLB,, | Performed by: NURSE PRACTITIONER

## 2023-03-23 PROCEDURE — 3008F BODY MASS INDEX DOCD: CPT | Mod: CPTII,S$GLB,, | Performed by: NURSE PRACTITIONER

## 2023-03-23 PROCEDURE — 99214 PR OFFICE/OUTPT VISIT, EST, LEVL IV, 30-39 MIN: ICD-10-PCS | Mod: S$GLB,,, | Performed by: NURSE PRACTITIONER

## 2023-03-23 PROCEDURE — 3074F PR MOST RECENT SYSTOLIC BLOOD PRESSURE < 130 MM HG: ICD-10-PCS | Mod: CPTII,S$GLB,, | Performed by: NURSE PRACTITIONER

## 2023-03-23 PROCEDURE — 84439 ASSAY OF FREE THYROXINE: CPT | Performed by: NURSE PRACTITIONER

## 2023-03-23 PROCEDURE — 3074F SYST BP LT 130 MM HG: CPT | Mod: CPTII,S$GLB,, | Performed by: NURSE PRACTITIONER

## 2023-03-23 PROCEDURE — 99999 PR PBB SHADOW E&M-EST. PATIENT-LVL V: CPT | Mod: PBBFAC,,, | Performed by: NURSE PRACTITIONER

## 2023-03-23 PROCEDURE — 3008F PR BODY MASS INDEX (BMI) DOCUMENTED: ICD-10-PCS | Mod: CPTII,S$GLB,, | Performed by: NURSE PRACTITIONER

## 2023-03-23 PROCEDURE — 1126F AMNT PAIN NOTED NONE PRSNT: CPT | Mod: CPTII,S$GLB,, | Performed by: NURSE PRACTITIONER

## 2023-03-23 PROCEDURE — 99999 PR PBB SHADOW E&M-EST. PATIENT-LVL V: ICD-10-PCS | Mod: PBBFAC,,, | Performed by: NURSE PRACTITIONER

## 2023-03-23 PROCEDURE — 3288F FALL RISK ASSESSMENT DOCD: CPT | Mod: CPTII,S$GLB,, | Performed by: NURSE PRACTITIONER

## 2023-03-23 PROCEDURE — 3288F PR FALLS RISK ASSESSMENT DOCUMENTED: ICD-10-PCS | Mod: CPTII,S$GLB,, | Performed by: NURSE PRACTITIONER

## 2023-03-23 PROCEDURE — 85025 COMPLETE CBC W/AUTO DIFF WBC: CPT | Performed by: NURSE PRACTITIONER

## 2023-03-23 PROCEDURE — 1159F PR MEDICATION LIST DOCUMENTED IN MEDICAL RECORD: ICD-10-PCS | Mod: CPTII,S$GLB,, | Performed by: NURSE PRACTITIONER

## 2023-03-23 PROCEDURE — 1101F PR PT FALLS ASSESS DOC 0-1 FALLS W/OUT INJ PAST YR: ICD-10-PCS | Mod: CPTII,S$GLB,, | Performed by: NURSE PRACTITIONER

## 2023-03-23 PROCEDURE — 3044F HG A1C LEVEL LT 7.0%: CPT | Mod: CPTII,S$GLB,, | Performed by: NURSE PRACTITIONER

## 2023-03-23 PROCEDURE — 3044F PR MOST RECENT HEMOGLOBIN A1C LEVEL <7.0%: ICD-10-PCS | Mod: CPTII,S$GLB,, | Performed by: NURSE PRACTITIONER

## 2023-03-23 PROCEDURE — 80053 COMPREHEN METABOLIC PANEL: CPT | Performed by: NURSE PRACTITIONER

## 2023-03-23 RX ORDER — AMIODARONE HYDROCHLORIDE 200 MG/1
200 TABLET ORAL DAILY
Qty: 90 TABLET | Refills: 3 | Status: ON HOLD | OUTPATIENT
Start: 2023-03-23 | End: 2023-04-05 | Stop reason: HOSPADM

## 2023-03-23 NOTE — PROGRESS NOTES
Subjective:    Patient ID:  Cleveland Capps is a 67 y.o. male   Chief Complaint   Patient presents with    Follow-up    Weight Loss    Nausea    Fatigue       HPI:  Patient seen today for follow-up appointment.  Reports having issues keeping his food down and states that he has lost a significant amount of weight.  He is feeling okay otherwise.  Denies any shortness of breath or orthopnea.    Review of patient's allergies indicates:   Allergen Reactions    Penicillin      Other reaction(s): anaphylaxis  Other reaction(s): anaphylaxis    Penicillins Hives       Past Medical History:   Diagnosis Date    A-fib     Acute kidney injury     COPD (chronic obstructive pulmonary disease)     Coronary artery disease     Diabetes mellitus     Encounter for blood transfusion     Former smoker     Hypertension     Myocardial infarction     Thyroid disease     Type 2 diabetes mellitus without complications      Past Surgical History:   Procedure Laterality Date    CLOSURE OF LEFT ATRIAL APPENDAGE USING DEVICE N/A 5/17/2022    Procedure: Left atrial appendage closure device;  Surgeon: Tre Schmidt MD;  Location: [facility] CATH LAB;  Service: Cardiology;  Laterality: N/A;    COLONOSCOPY N/A 1/3/2017    Procedure: COLONOSCOPY;  Surgeon: Marcus Green MD;  Location: [facility] ENDO;  Service: Endoscopy;  Laterality: N/A;    CORONARY BYPASS GRAFT ANGIOGRAPHY  3/30/2021    Procedure: Bypass graft study;  Surgeon: Tre Schmidt MD;  Location: [facility] CATH LAB;  Service: Cardiology;;    CORONARY STENT PLACEMENT      GASTROSTOMY TUBE PLACEMENT      INSERTION OF PACEMAKER  04/2017    INTRAVASCULAR ULTRASOUND, NON-CORONARY  5/17/2022    Procedure: Intravascular Ultrasound, Non-Coronary;  Surgeon: Tre Schmidt MD;  Location: [facility] CATH LAB;  Service: Cardiology;;    LEFT HEART CATHETERIZATION N/A 3/30/2021    Procedure: Left heart cath;  Surgeon: Tre Schmidt MD;  Location: [facility] CATH LAB;  Service: Cardiology;  Laterality: N/A;     PEG TUBE REMOVAL      TRACHEOSTOMY CLOSURE      TRACHEOSTOMY TUBE PLACEMENT      TRANSESOPHAGEAL ECHOCARDIOGRAPHY N/A 2022    Procedure: ECHOCARDIOGRAM, TRANSESOPHAGEAL;  Surgeon: Yudi Diagnostic Provider;  Location: [facility] CATH LAB;  Service: Anesthesiology;  Laterality: N/A;    TREATMENT OF CARDIAC ARRHYTHMIA N/A 2022    Procedure: Cardioversion or Defibrillation;  Surgeon: Kuldeep Daniels MD;  Location: [facility] CATH LAB;  Service: Cardiology;  Laterality: N/A;    UPPER GASTROINTESTINAL ENDOSCOPY  2016    Dr. Zarco with PEG tube placement     Social History     Tobacco Use    Smoking status: Former     Types: Cigarettes     Quit date: 2005     Years since quittin.3    Smokeless tobacco: Never    Tobacco comments:     quit    Substance Use Topics    Alcohol use: No    Drug use: No     Family History   Problem Relation Age of Onset    Diabetes Mother     Heart disease Mother     Glaucoma Father     Macular degeneration Father     No Known Problems Sister     Diabetes Brother     Glaucoma Brother     Macular degeneration Brother     No Known Problems Maternal Aunt     No Known Problems Maternal Uncle     No Known Problems Paternal Aunt     No Known Problems Paternal Uncle     No Known Problems Maternal Grandmother     No Known Problems Maternal Grandfather     No Known Problems Paternal Grandmother     No Known Problems Paternal Grandfather     Colon cancer Neg Hx     Colon polyps Neg Hx     Crohn's disease Neg Hx     Ulcerative colitis Neg Hx     Anemia Neg Hx     Arrhythmia Neg Hx     Asthma Neg Hx     Clotting disorder Neg Hx     Fainting Neg Hx     Heart attack Neg Hx     Heart failure Neg Hx     Hyperlipidemia Neg Hx     Hypertension Neg Hx     Stroke Neg Hx     Atrial Septal Defect Neg Hx         Review of Systems:   Constitution: Negative for diaphoresis and fever.  Positive for fatigue  HEENT: Negative for nosebleeds.    Cardiovascular: Negative for chest pain       No dyspnea on  exertion       No leg swelling        No palpitations  Respiratory: Negative for shortness of breath and wheezing.    Hematologic/Lymphatic: Negative for bleeding problem. Does not bruise/bleed easily.   Skin: Negative for color change and rash.   Musculoskeletal: Negative for falls and myalgias.   Gastrointestinal: + nausea and vomiting, difficulty keeping food down, weight loss   Genitourinary: Negative for hematuria.   Neurological: Negative for dizziness and light-headedness.   Psychiatric/Behavioral: Negative for altered mental status and memory loss.          Objective:        Vitals:    03/23/23 1527   BP: 108/70   Pulse: 81       Lab Results   Component Value Date    WBC 6.96 03/23/2023    HGB 13.5 (L) 03/23/2023    HCT 40.0 03/23/2023     03/23/2023    CHOL 123 10/23/2020    TRIG 244 (H) 10/23/2020    HDL 27 (L) 10/23/2020    ALT 92 (H) 03/23/2023     (H) 03/23/2023     (L) 03/23/2023    K 4.8 03/23/2023    CL 89 (L) 03/23/2023    CREATININE 2.3 (H) 03/23/2023    BUN 41 (H) 03/23/2023    CO2 21 (L) 03/23/2023    TSH 27.920 (H) 03/23/2023    INR 1.5 (H) 02/08/2023    HGBA1C 5.4 02/06/2023        ECHOCARDIOGRAM RESULTS  Results for orders placed during the hospital encounter of 02/06/23    Echo    Interpretation Summary  · The left ventricle is severely enlarged with eccentric hypertrophy and severely decreased systolic function.  · The estimated ejection fraction is 20%.  · Left ventricular diastolic dysfunction.  · There is left ventricular global hypokinesis. GLOBAL HYPOKINESIA WITH ANTERIOR APICAL AKINESIA  · Moderate right ventricular enlargement.  · Moderate right atrial enlargement.  · Elevated central venous pressure (15 mmHg).  · LIMITED STUDY  · Right atrial enlargement.        CURRENT/PREVIOUS VISIT EKG  Results for orders placed or performed during the hospital encounter of 02/06/23   EKG 12-lead    Collection Time: 02/06/23  2:58 PM    Narrative    Test Reason :  I48.91,    Vent. Rate : 127 BPM     Atrial Rate : 375 BPM     P-R Int : 000 ms          QRS Dur : 082 ms      QT Int : 318 ms       P-R-T Axes : 000 075 213 degrees     QTc Int : 462 ms    Atrial flutter with variable A-V block with premature ventricular or  aberrantly conducted complexes  Anteroseptal infarct  T wave abnormality, consider inferior ischemia  Abnormal ECG  Confirmed by Justus Morrison MD ([id number]) on 2/14/2023 10:24:08 AM    Referred By: JOSE G   SELF           Confirmed By:Justus Morrison MD     No valid procedures specified.   No results found for this or any previous visit.      Physical Exam:  CONSTITUTIONAL: No fever, no chills  HEENT: Normocephalic, atraumatic,pupils reactive to light                 NECK:  No JVD no carotid bruit  CVS: S1S2+, RRR  LUNGS: Clear  ABDOMEN: Soft, NT, BS+  EXTREMITIES: No cyanosis, edema  : No paul catheter  NEURO: AAO X 3  PSY: Normal affect      Medication List with Changes/Refills   Current Medications    APIXABAN (ELIQUIS) 5 MG TAB    Take 5 mg by mouth 2 (two) times daily.    ATORVASTATIN (LIPITOR) 80 MG TABLET    Take 80 mg by mouth once daily.    COMP-AIR NEBULIZER COMPRESSOR TONE    use as directed    DOCUSATE SODIUM (COLACE) 50 MG CAPSULE    Take 2 capsules (100 mg total) by mouth 2 (two) times daily as needed for Constipation.    POTASSIUM CHLORIDE SA (K-DUR,KLOR-CON M) 10 MEQ TABLET        SPIRONOLACTONE (ALDACTONE) 25 MG TABLET    Take 1 tablet (25 mg total) by mouth once daily.    TORSEMIDE (DEMADEX) 20 MG TAB    Take 1 tablet (20 mg total) by mouth once daily.    TRAVOPROST (TRAVATAN Z) 0.004 % OPHTHALMIC SOLUTION        TRAVOPROST, BENZALKONIUM, (TRAVATAN) 0.004 % OPHTHALMIC SOLUTION    Place 1 drop into both eyes nightly.    TRUE METRIX GLUCOSE TEST STRIP STRP    USE 1 STRIP TO CHECK GLUCOSE ONCE DAILY   Changed and/or Refilled Medications    Modified Medication Previous Medication    AMIODARONE (PACERONE) 200 MG TAB amiodarone (PACERONE) 400 MG  tablet       Take 1 tablet (200 mg total) by mouth once daily.    Take 1 tablet (400 mg total) by mouth once daily.    LEVOTHYROXINE (SYNTHROID) 75 MCG TABLET levothyroxine (SYNTHROID) 25 MCG tablet       Take 1 tablet (75 mcg total) by mouth before breakfast.    Take 2 tablets (50 mcg total) by mouth before breakfast.   Discontinued Medications    SACUBITRIL-VALSARTAN (ENTRESTO) 24-26 MG PER TABLET    Take 1 tablet by mouth 2 (two) times daily.             Assessment:       1. AICD (automatic cardioverter/defibrillator) present    2. Acute on chronic combined systolic and diastolic congestive heart failure    3. Coronary artery disease involving native coronary artery of native heart without angina pectoris    4. Longstanding persistent atrial fibrillation    5. Presence of Watchman left atrial appendage closure device    6. Status post coronary artery bypass graft    7. Nausea    8. Weight loss    9. Chronic kidney disease, unspecified CKD stage    10. Acquired hypothyroidism         Plan:       Continue torsemide 20 mg p.o. daily and spironolactone 25 mg p.o. daily.  Patient was also recently started on Entresto.  His renal function was slightly worsened on labs after starting this medication.  Would like to repeat labs today including thyroid panel, CMP, CBC.  For now we will decrease his amiodarone to 200 mg p.o. daily.  Will maintain on Eliquis for now.  He is no bleeding issues, falls, or head injuries.  Need to refer to GI urgently due to difficulty keeping his food down and weight loss.  Problem List Items Addressed This Visit          Unprioritized    Coronary artery disease    Overview     S/p Cabg 4/26 Dr Bledsoe         Relevant Orders    Comprehensive Metabolic Panel (Completed)    Acute on chronic combined systolic and diastolic congestive heart failure    Relevant Orders    TSH (Completed)    T4, Free (Completed)    T3    Comprehensive Metabolic Panel (Completed)    Hypothyroid    Relevant  Medications    levothyroxine (SYNTHROID) 75 MCG tablet    Other Relevant Orders    TSH (Completed)    T4, Free (Completed)    T3    AICD (automatic cardioverter/defibrillator) present - Primary    Relevant Medications    amiodarone (PACERONE) 200 MG Tab    Other Relevant Orders    TSH (Completed)    T4, Free (Completed)    T3    CBC Auto Differential (Completed)    Comprehensive Metabolic Panel (Completed)    Status post coronary artery bypass graft    Presence of Watchman left atrial appendage closure device    Longstanding persistent atrial fibrillation    Relevant Medications    amiodarone (PACERONE) 200 MG Tab    Other Relevant Orders    TSH (Completed)    T4, Free (Completed)    T3     Other Visit Diagnoses       Nausea        Relevant Orders    TSH (Completed)    T4, Free (Completed)    T3    CBC Auto Differential (Completed)    Ambulatory referral/consult to Gastroenterology    Weight loss        Relevant Orders    TSH (Completed)    T4, Free (Completed)    T3    CBC Auto Differential (Completed)    Ambulatory referral/consult to Gastroenterology    Chronic kidney disease, unspecified CKD stage        Relevant Orders    Ambulatory referral/consult to Nephrology            Follow up in about 8 weeks (around 5/18/2023).

## 2023-03-23 NOTE — TELEPHONE ENCOUNTER
----- Message from Barbra Simon sent at 3/23/2023  2:42 PM CDT -----  Type: Needs Medical Advice  Who Called:  pt  Symptoms (please be specific):  pt said he will be late to his appt his ride just showed up and he need to be seen--please call and advise  Best Call Back Number: [phone] (home)     Additional Information: thank you

## 2023-03-24 ENCOUNTER — TELEPHONE (OUTPATIENT)
Dept: GASTROENTEROLOGY | Facility: CLINIC | Age: 68
End: 2023-03-24
Payer: MEDICARE

## 2023-03-24 ENCOUNTER — TELEPHONE (OUTPATIENT)
Dept: FAMILY MEDICINE | Facility: CLINIC | Age: 68
End: 2023-03-24
Payer: MEDICARE

## 2023-03-24 RX ORDER — LEVOTHYROXINE SODIUM 75 UG/1
75 TABLET ORAL
Qty: 90 TABLET | Refills: 3 | Status: ON HOLD | OUTPATIENT
Start: 2023-03-24 | End: 2023-04-05 | Stop reason: HOSPADM

## 2023-03-24 NOTE — TELEPHONE ENCOUNTER
----- Message from Magalie Flowers sent at 3/24/2023  2:02 PM CDT -----  Regarding: advise  Contact: patient  Type: Needs Medical Advice  Who Called:  Patient  Symptoms (please be specific):    How long has patient had these symptoms:    Pharmacy name and phone #:    Best Call Back Number:[phone]    Additional Information: Dr. Daniels states the is a concern in the pt weight loss and not being able to hold his food down. Please call to advise.

## 2023-03-24 NOTE — PROGRESS NOTES
Labs noted. Will increase levothyroxine to 75 mcg po daily.   Will have him stop Entresto due to abnormal renal function since starting this medication. Refer to nephrology.

## 2023-03-24 NOTE — TELEPHONE ENCOUNTER
Called patient per referral he states he already has a GI doctor and is having a colonoscopy done in [place] in 8 weeks.

## 2023-03-25 LAB — T3 SERPL-MCNC: 51 NG/DL (ref 71–180)

## 2023-03-27 ENCOUNTER — TELEPHONE (OUTPATIENT)
Dept: CARDIOLOGY | Facility: CLINIC | Age: 68
End: 2023-03-27
Payer: MEDICARE

## 2023-03-27 NOTE — TELEPHONE ENCOUNTER
----- Message from Brittny Lara NP sent at 3/24/2023 10:47 AM CDT -----  Renal function is abnormal: refer to nephrology and stop Entresto.   Thyroid function is worsened: increase levothyroxine to 75 mcg po daily.

## 2023-03-28 ENCOUNTER — TELEPHONE (OUTPATIENT)
Dept: CARDIOLOGY | Facility: CLINIC | Age: 68
End: 2023-03-28
Payer: MEDICARE

## 2023-03-28 NOTE — TELEPHONE ENCOUNTER
----- Message from Kecia Krishna, Patient Care Assistant sent at 3/28/2023 12:21 PM CDT -----  Regarding: advice  Contact: kristine with [facility]  Type: Needs Medical Advice    Who Called:  kristine with [facility]    Symptoms (please be specific):  b/p and wt concerns       Best Call Back Number:      Additional Information: please call kristine with [facility] to advise. Thanks!

## 2023-03-28 NOTE — TELEPHONE ENCOUNTER
Patient hh nurse said pt is still losing weight, not eating, weak and low bp. Informed her we can bring him in tomorrow to see nP. Called brother scheduled for tomorrow.

## 2023-03-29 ENCOUNTER — ANESTHESIA (OUTPATIENT)
Dept: INTENSIVE CARE | Facility: HOSPITAL | Age: 68
DRG: 291 | End: 2023-03-29
Payer: MEDICARE

## 2023-03-29 ENCOUNTER — HOSPITAL ENCOUNTER (INPATIENT)
Facility: HOSPITAL | Age: 68
LOS: 4 days | Discharge: HOSPICE | DRG: 291 | End: 2023-04-03
Attending: EMERGENCY MEDICINE | Admitting: INTERNAL MEDICINE
Payer: MEDICARE

## 2023-03-29 ENCOUNTER — OFFICE VISIT (OUTPATIENT)
Dept: CARDIOLOGY | Facility: CLINIC | Age: 68
End: 2023-03-29
Payer: MEDICARE

## 2023-03-29 ENCOUNTER — ANESTHESIA EVENT (OUTPATIENT)
Dept: INTENSIVE CARE | Facility: HOSPITAL | Age: 68
DRG: 291 | End: 2023-03-29
Payer: MEDICARE

## 2023-03-29 VITALS
WEIGHT: 98.38 LBS | SYSTOLIC BLOOD PRESSURE: 90 MMHG | OXYGEN SATURATION: 98 % | HEIGHT: 61 IN | BODY MASS INDEX: 18.57 KG/M2 | RESPIRATION RATE: 16 BRPM | HEART RATE: 80 BPM | DIASTOLIC BLOOD PRESSURE: 60 MMHG

## 2023-03-29 DIAGNOSIS — E86.0 DEHYDRATION: ICD-10-CM

## 2023-03-29 DIAGNOSIS — I50.9 HEART FAILURE: ICD-10-CM

## 2023-03-29 DIAGNOSIS — I25.10 CORONARY ARTERY DISEASE INVOLVING NATIVE CORONARY ARTERY OF NATIVE HEART WITHOUT ANGINA PECTORIS: ICD-10-CM

## 2023-03-29 DIAGNOSIS — E03.9 HYPOTHYROIDISM, UNSPECIFIED TYPE: ICD-10-CM

## 2023-03-29 DIAGNOSIS — N17.9 AKI (ACUTE KIDNEY INJURY): Primary | ICD-10-CM

## 2023-03-29 DIAGNOSIS — R09.02 HYPOXIA: ICD-10-CM

## 2023-03-29 DIAGNOSIS — J18.9 PNEUMONIA DUE TO INFECTIOUS ORGANISM, UNSPECIFIED LATERALITY, UNSPECIFIED PART OF LUNG: Primary | ICD-10-CM

## 2023-03-29 DIAGNOSIS — E87.5 HYPERKALEMIA: ICD-10-CM

## 2023-03-29 DIAGNOSIS — I48.0 PAROXYSMAL ATRIAL FIBRILLATION: ICD-10-CM

## 2023-03-29 DIAGNOSIS — I95.9 HYPOTENSION, UNSPECIFIED HYPOTENSION TYPE: ICD-10-CM

## 2023-03-29 DIAGNOSIS — R79.89 ELEVATED LFTS: ICD-10-CM

## 2023-03-29 DIAGNOSIS — R07.9 CHEST PAIN: ICD-10-CM

## 2023-03-29 DIAGNOSIS — R57.0 CARDIOGENIC SHOCK: ICD-10-CM

## 2023-03-29 LAB
ABO + RH BLD: NORMAL
ALBUMIN SERPL BCP-MCNC: 4 G/DL (ref 3.5–5.2)
ALP SERPL-CCNC: 90 U/L (ref 55–135)
ALT SERPL W/O P-5'-P-CCNC: 143 U/L (ref 10–44)
ANION GAP SERPL CALC-SCNC: 14 MMOL/L (ref 8–16)
ANION GAP SERPL CALC-SCNC: 16 MMOL/L (ref 8–16)
AST SERPL-CCNC: 241 U/L (ref 10–40)
BASOPHILS # BLD AUTO: 0.04 K/UL (ref 0–0.2)
BASOPHILS NFR BLD: 0.5 % (ref 0–1.9)
BILIRUB SERPL-MCNC: 1.5 MG/DL (ref 0.1–1)
BLD GP AB SCN CELLS X3 SERPL QL: NORMAL
BNP SERPL-MCNC: 96 PG/ML (ref 0–99)
BUN SERPL-MCNC: 74 MG/DL (ref 8–23)
BUN SERPL-MCNC: 75 MG/DL (ref 8–23)
CALCIUM SERPL-MCNC: 8.9 MG/DL (ref 8.7–10.5)
CALCIUM SERPL-MCNC: 9.6 MG/DL (ref 8.7–10.5)
CHLORIDE SERPL-SCNC: 92 MMOL/L (ref 95–110)
CHLORIDE SERPL-SCNC: 94 MMOL/L (ref 95–110)
CO2 SERPL-SCNC: 21 MMOL/L (ref 23–29)
CO2 SERPL-SCNC: 23 MMOL/L (ref 23–29)
CREAT SERPL-MCNC: 3.9 MG/DL (ref 0.5–1.4)
CREAT SERPL-MCNC: 4.1 MG/DL (ref 0.5–1.4)
DIFFERENTIAL METHOD: ABNORMAL
EOSINOPHIL # BLD AUTO: 0.2 K/UL (ref 0–0.5)
EOSINOPHIL NFR BLD: 2.3 % (ref 0–8)
ERYTHROCYTE [DISTWIDTH] IN BLOOD BY AUTOMATED COUNT: 15 % (ref 11.5–14.5)
EST. GFR  (NO RACE VARIABLE): 15.3 ML/MIN/1.73 M^2
EST. GFR  (NO RACE VARIABLE): 16.2 ML/MIN/1.73 M^2
GLUCOSE SERPL-MCNC: 115 MG/DL (ref 70–110)
GLUCOSE SERPL-MCNC: 139 MG/DL (ref 70–110)
HCT VFR BLD AUTO: 38.9 % (ref 40–54)
HGB BLD-MCNC: 13.3 G/DL (ref 14–18)
IMM GRANULOCYTES # BLD AUTO: 0.04 K/UL (ref 0–0.04)
IMM GRANULOCYTES NFR BLD AUTO: 0.5 % (ref 0–0.5)
INR PPP: 1.1 (ref 0.8–1.2)
LACTATE SERPL-SCNC: 1.3 MMOL/L (ref 0.5–1.9)
LACTATE SERPL-SCNC: 1.7 MMOL/L (ref 0.5–1.9)
LIPASE SERPL-CCNC: 60 U/L (ref 4–60)
LYMPHOCYTES # BLD AUTO: 0.5 K/UL (ref 1–4.8)
LYMPHOCYTES NFR BLD: 6.6 % (ref 18–48)
MAGNESIUM SERPL-MCNC: 2.4 MG/DL (ref 1.6–2.6)
MCH RBC QN AUTO: 34 PG (ref 27–31)
MCHC RBC AUTO-ENTMCNC: 34.2 G/DL (ref 32–36)
MCV RBC AUTO: 100 FL (ref 82–98)
MONOCYTES # BLD AUTO: 0.6 K/UL (ref 0.3–1)
MONOCYTES NFR BLD: 7.7 % (ref 4–15)
NEUTROPHILS # BLD AUTO: 6.1 K/UL (ref 1.8–7.7)
NEUTROPHILS NFR BLD: 82.4 % (ref 38–73)
NRBC BLD-RTO: 0 /100 WBC
PLATELET # BLD AUTO: 186 K/UL (ref 150–450)
PMV BLD AUTO: 8.6 FL (ref 9.2–12.9)
POTASSIUM SERPL-SCNC: 5.4 MMOL/L (ref 3.5–5.1)
POTASSIUM SERPL-SCNC: 5.9 MMOL/L (ref 3.5–5.1)
PROT SERPL-MCNC: 8.2 G/DL (ref 6–8.4)
PROTHROMBIN TIME: 11.4 SEC (ref 9–12.5)
RBC # BLD AUTO: 3.91 M/UL (ref 4.6–6.2)
SARS-COV-2 RDRP RESP QL NAA+PROBE: NEGATIVE
SODIUM SERPL-SCNC: 129 MMOL/L (ref 136–145)
SODIUM SERPL-SCNC: 131 MMOL/L (ref 136–145)
SPECIMEN OUTDATE: NORMAL
T4 FREE SERPL-MCNC: 0.96 NG/DL (ref 0.71–1.51)
TROPONIN I SERPL HS-MCNC: 87.2 PG/ML (ref 0–14.9)
TSH SERPL DL<=0.005 MIU/L-ACNC: 22.4 UIU/ML (ref 0.34–5.6)
WBC # BLD AUTO: 7.42 K/UL (ref 3.9–12.7)

## 2023-03-29 PROCEDURE — 83735 ASSAY OF MAGNESIUM: CPT | Performed by: NURSE PRACTITIONER

## 2023-03-29 PROCEDURE — 25000242 PHARM REV CODE 250 ALT 637 W/ HCPCS: Performed by: EMERGENCY MEDICINE

## 2023-03-29 PROCEDURE — 63600175 PHARM REV CODE 636 W HCPCS: Performed by: EMERGENCY MEDICINE

## 2023-03-29 PROCEDURE — 99291 CRITICAL CARE FIRST HOUR: CPT

## 2023-03-29 PROCEDURE — 87040 BLOOD CULTURE FOR BACTERIA: CPT | Mod: 59 | Performed by: EMERGENCY MEDICINE

## 2023-03-29 PROCEDURE — 93010 ELECTROCARDIOGRAM REPORT: CPT | Mod: ,,, | Performed by: INTERNAL MEDICINE

## 2023-03-29 PROCEDURE — 83605 ASSAY OF LACTIC ACID: CPT | Mod: 91 | Performed by: INTERNAL MEDICINE

## 2023-03-29 PROCEDURE — 84443 ASSAY THYROID STIM HORMONE: CPT | Performed by: NURSE PRACTITIONER

## 2023-03-29 PROCEDURE — 25000003 PHARM REV CODE 250: Performed by: INTERNAL MEDICINE

## 2023-03-29 PROCEDURE — 99900031 HC PATIENT EDUCATION (STAT)

## 2023-03-29 PROCEDURE — 85610 PROTHROMBIN TIME: CPT | Performed by: NURSE PRACTITIONER

## 2023-03-29 PROCEDURE — 96367 TX/PROPH/DG ADDL SEQ IV INF: CPT

## 2023-03-29 PROCEDURE — 36415 COLL VENOUS BLD VENIPUNCTURE: CPT | Performed by: NURSE PRACTITIONER

## 2023-03-29 PROCEDURE — 1101F PR PT FALLS ASSESS DOC 0-1 FALLS W/OUT INJ PAST YR: ICD-10-PCS | Mod: CPTII,S$GLB,,

## 2023-03-29 PROCEDURE — 1159F PR MEDICATION LIST DOCUMENTED IN MEDICAL RECORD: ICD-10-PCS | Mod: CPTII,S$GLB,,

## 2023-03-29 PROCEDURE — 93010 ELECTROCARDIOGRAM REPORT: CPT | Mod: ,,, | Performed by: SPECIALIST

## 2023-03-29 PROCEDURE — 3074F PR MOST RECENT SYSTOLIC BLOOD PRESSURE < 130 MM HG: ICD-10-PCS | Mod: CPTII,S$GLB,,

## 2023-03-29 PROCEDURE — 93005 ELECTROCARDIOGRAM TRACING: CPT | Performed by: SPECIALIST

## 2023-03-29 PROCEDURE — U0002 COVID-19 LAB TEST NON-CDC: HCPCS | Performed by: EMERGENCY MEDICINE

## 2023-03-29 PROCEDURE — 25000003 PHARM REV CODE 250

## 2023-03-29 PROCEDURE — 1160F RVW MEDS BY RX/DR IN RCRD: CPT | Mod: CPTII,S$GLB,,

## 2023-03-29 PROCEDURE — 85025 COMPLETE CBC W/AUTO DIFF WBC: CPT | Performed by: NURSE PRACTITIONER

## 2023-03-29 PROCEDURE — 25000003 PHARM REV CODE 250: Performed by: EMERGENCY MEDICINE

## 2023-03-29 PROCEDURE — 93010 EKG 12-LEAD: ICD-10-PCS | Mod: ,,, | Performed by: SPECIALIST

## 2023-03-29 PROCEDURE — 3044F PR MOST RECENT HEMOGLOBIN A1C LEVEL <7.0%: ICD-10-PCS | Mod: CPTII,S$GLB,,

## 2023-03-29 PROCEDURE — 25000242 PHARM REV CODE 250 ALT 637 W/ HCPCS: Performed by: INTERNAL MEDICINE

## 2023-03-29 PROCEDURE — G0378 HOSPITAL OBSERVATION PER HR: HCPCS

## 2023-03-29 PROCEDURE — 83605 ASSAY OF LACTIC ACID: CPT | Performed by: EMERGENCY MEDICINE

## 2023-03-29 PROCEDURE — 1160F PR REVIEW ALL MEDS BY PRESCRIBER/CLIN PHARMACIST DOCUMENTED: ICD-10-PCS | Mod: CPTII,S$GLB,,

## 2023-03-29 PROCEDURE — 99999 PR PBB SHADOW E&M-EST. PATIENT-LVL IV: ICD-10-PCS | Mod: PBBFAC,,,

## 2023-03-29 PROCEDURE — 96366 THER/PROPH/DIAG IV INF ADDON: CPT

## 2023-03-29 PROCEDURE — 3008F PR BODY MASS INDEX (BMI) DOCUMENTED: ICD-10-PCS | Mod: CPTII,S$GLB,,

## 2023-03-29 PROCEDURE — 3288F PR FALLS RISK ASSESSMENT DOCUMENTED: ICD-10-PCS | Mod: CPTII,S$GLB,,

## 2023-03-29 PROCEDURE — 96368 THER/DIAG CONCURRENT INF: CPT

## 2023-03-29 PROCEDURE — 36620 ARTERIAL LINE: ICD-10-PCS | Mod: ,,, | Performed by: ANESTHESIOLOGY

## 2023-03-29 PROCEDURE — 83690 ASSAY OF LIPASE: CPT | Performed by: NURSE PRACTITIONER

## 2023-03-29 PROCEDURE — 84484 ASSAY OF TROPONIN QUANT: CPT | Performed by: NURSE PRACTITIONER

## 2023-03-29 PROCEDURE — 1101F PT FALLS ASSESS-DOCD LE1/YR: CPT | Mod: CPTII,S$GLB,,

## 2023-03-29 PROCEDURE — 93005 ELECTROCARDIOGRAM TRACING: CPT | Performed by: INTERNAL MEDICINE

## 2023-03-29 PROCEDURE — 94644 CONT INHLJ TX 1ST HOUR: CPT

## 2023-03-29 PROCEDURE — 1159F MED LIST DOCD IN RCRD: CPT | Mod: CPTII,S$GLB,,

## 2023-03-29 PROCEDURE — 84439 ASSAY OF FREE THYROXINE: CPT | Performed by: NURSE PRACTITIONER

## 2023-03-29 PROCEDURE — 80053 COMPREHEN METABOLIC PANEL: CPT | Performed by: NURSE PRACTITIONER

## 2023-03-29 PROCEDURE — 99900035 HC TECH TIME PER 15 MIN (STAT)

## 2023-03-29 PROCEDURE — 93010 EKG 12-LEAD: ICD-10-PCS | Mod: ,,, | Performed by: INTERNAL MEDICINE

## 2023-03-29 PROCEDURE — 4010F ACE/ARB THERAPY RXD/TAKEN: CPT | Mod: CPTII,S$GLB,,

## 2023-03-29 PROCEDURE — 27000221 HC OXYGEN, UP TO 24 HOURS

## 2023-03-29 PROCEDURE — 3288F FALL RISK ASSESSMENT DOCD: CPT | Mod: CPTII,S$GLB,,

## 2023-03-29 PROCEDURE — 3078F PR MOST RECENT DIASTOLIC BLOOD PRESSURE < 80 MM HG: ICD-10-PCS | Mod: CPTII,S$GLB,,

## 2023-03-29 PROCEDURE — 94640 AIRWAY INHALATION TREATMENT: CPT

## 2023-03-29 PROCEDURE — 4010F PR ACE/ARB THEARPY RXD/TAKEN: ICD-10-PCS | Mod: CPTII,S$GLB,,

## 2023-03-29 PROCEDURE — 36415 COLL VENOUS BLD VENIPUNCTURE: CPT | Performed by: INTERNAL MEDICINE

## 2023-03-29 PROCEDURE — 99214 OFFICE O/P EST MOD 30 MIN: CPT | Mod: 25,S$GLB,,

## 2023-03-29 PROCEDURE — 3008F BODY MASS INDEX DOCD: CPT | Mod: CPTII,S$GLB,,

## 2023-03-29 PROCEDURE — 83880 ASSAY OF NATRIURETIC PEPTIDE: CPT | Performed by: NURSE PRACTITIONER

## 2023-03-29 PROCEDURE — 94761 N-INVAS EAR/PLS OXIMETRY MLT: CPT

## 2023-03-29 PROCEDURE — 99999 PR PBB SHADOW E&M-EST. PATIENT-LVL IV: CPT | Mod: PBBFAC,,,

## 2023-03-29 PROCEDURE — 3078F DIAST BP <80 MM HG: CPT | Mod: CPTII,S$GLB,,

## 2023-03-29 PROCEDURE — 86900 BLOOD TYPING SEROLOGIC ABO: CPT | Performed by: EMERGENCY MEDICINE

## 2023-03-29 PROCEDURE — 36620 INSERTION CATHETER ARTERY: CPT | Mod: ,,, | Performed by: ANESTHESIOLOGY

## 2023-03-29 PROCEDURE — 3044F HG A1C LEVEL LT 7.0%: CPT | Mod: CPTII,S$GLB,,

## 2023-03-29 PROCEDURE — 3074F SYST BP LT 130 MM HG: CPT | Mod: CPTII,S$GLB,,

## 2023-03-29 PROCEDURE — 80048 BASIC METABOLIC PNL TOTAL CA: CPT | Performed by: INTERNAL MEDICINE

## 2023-03-29 PROCEDURE — 99214 PR OFFICE/OUTPT VISIT, EST, LEVL IV, 30-39 MIN: ICD-10-PCS | Mod: 25,S$GLB,,

## 2023-03-29 PROCEDURE — 96365 THER/PROPH/DIAG IV INF INIT: CPT

## 2023-03-29 RX ORDER — LATANOPROST 50 UG/ML
1 SOLUTION/ DROPS OPHTHALMIC NIGHTLY
Status: DISCONTINUED | OUTPATIENT
Start: 2023-03-29 | End: 2023-04-03

## 2023-03-29 RX ORDER — ALBUTEROL SULFATE 0.83 MG/ML
10 SOLUTION RESPIRATORY (INHALATION)
Status: COMPLETED | OUTPATIENT
Start: 2023-03-29 | End: 2023-03-29

## 2023-03-29 RX ORDER — ISOSORBIDE MONONITRATE 30 MG/1
30 TABLET, EXTENDED RELEASE ORAL DAILY
COMMUNITY
Start: 2023-02-01

## 2023-03-29 RX ORDER — LIDOCAINE HYDROCHLORIDE 10 MG/ML
INJECTION, SOLUTION EPIDURAL; INFILTRATION; INTRACAUDAL; PERINEURAL
Status: COMPLETED
Start: 2023-03-29 | End: 2023-03-29

## 2023-03-29 RX ORDER — AMIODARONE HYDROCHLORIDE 200 MG/1
200 TABLET ORAL DAILY
Status: DISCONTINUED | OUTPATIENT
Start: 2023-03-30 | End: 2023-03-30

## 2023-03-29 RX ORDER — SODIUM CHLORIDE 9 MG/ML
1000 INJECTION, SOLUTION INTRAVENOUS
Status: COMPLETED | OUTPATIENT
Start: 2023-03-29 | End: 2023-03-29

## 2023-03-29 RX ORDER — ATORVASTATIN CALCIUM 40 MG/1
80 TABLET, FILM COATED ORAL DAILY
Status: DISCONTINUED | OUTPATIENT
Start: 2023-03-30 | End: 2023-03-30

## 2023-03-29 RX ORDER — NOREPINEPHRINE BITARTRATE/D5W 8 MG/250ML
0-3 PLASTIC BAG, INJECTION (ML) INTRAVENOUS CONTINUOUS
Status: DISCONTINUED | OUTPATIENT
Start: 2023-03-29 | End: 2023-03-30

## 2023-03-29 RX ORDER — LATANOPROST 50 UG/ML
1 SOLUTION/ DROPS OPHTHALMIC NIGHTLY
Status: DISCONTINUED | OUTPATIENT
Start: 2023-03-29 | End: 2023-03-29

## 2023-03-29 RX ORDER — TALC
6 POWDER (GRAM) TOPICAL NIGHTLY PRN
Status: DISCONTINUED | OUTPATIENT
Start: 2023-03-29 | End: 2023-04-03

## 2023-03-29 RX ORDER — NOREPINEPHRINE BITARTRATE/D5W 4MG/250ML
0-3 PLASTIC BAG, INJECTION (ML) INTRAVENOUS CONTINUOUS
Status: DISCONTINUED | OUTPATIENT
Start: 2023-03-29 | End: 2023-03-29

## 2023-03-29 RX ORDER — LEVOFLOXACIN 5 MG/ML
750 INJECTION, SOLUTION INTRAVENOUS
Status: COMPLETED | OUTPATIENT
Start: 2023-03-29 | End: 2023-03-29

## 2023-03-29 RX ORDER — PANTOPRAZOLE SODIUM 40 MG/1
40 TABLET, DELAYED RELEASE ORAL EVERY MORNING
COMMUNITY
Start: 2023-03-15

## 2023-03-29 RX ORDER — SODIUM CHLORIDE 0.9 % (FLUSH) 0.9 %
10 SYRINGE (ML) INJECTION EVERY 12 HOURS
Status: DISCONTINUED | OUTPATIENT
Start: 2023-03-29 | End: 2023-04-03

## 2023-03-29 RX ORDER — ENALAPRIL MALEATE 20 MG/1
20 TABLET ORAL DAILY
Status: ON HOLD | COMMUNITY
Start: 2023-02-01 | End: 2023-04-05 | Stop reason: HOSPADM

## 2023-03-29 RX ORDER — SODIUM CHLORIDE 9 MG/ML
INJECTION, SOLUTION INTRAVENOUS CONTINUOUS
Status: DISCONTINUED | OUTPATIENT
Start: 2023-03-29 | End: 2023-04-02

## 2023-03-29 RX ORDER — MORPHINE SULFATE 2 MG/ML
2 INJECTION, SOLUTION INTRAMUSCULAR; INTRAVENOUS EVERY 4 HOURS PRN
Status: DISCONTINUED | OUTPATIENT
Start: 2023-03-29 | End: 2023-04-03

## 2023-03-29 RX ORDER — ISOSORBIDE MONONITRATE 30 MG/1
30 TABLET, EXTENDED RELEASE ORAL DAILY
Status: DISCONTINUED | OUTPATIENT
Start: 2023-03-30 | End: 2023-03-30

## 2023-03-29 RX ORDER — CARVEDILOL 25 MG/1
25 TABLET ORAL 2 TIMES DAILY
Status: ON HOLD | COMMUNITY
Start: 2023-01-30 | End: 2023-04-05 | Stop reason: HOSPADM

## 2023-03-29 RX ORDER — PANTOPRAZOLE SODIUM 40 MG/1
40 TABLET, DELAYED RELEASE ORAL EVERY MORNING
Status: DISCONTINUED | OUTPATIENT
Start: 2023-03-30 | End: 2023-04-03

## 2023-03-29 RX ORDER — PROCHLORPERAZINE EDISYLATE 5 MG/ML
5 INJECTION INTRAMUSCULAR; INTRAVENOUS EVERY 6 HOURS PRN
Status: DISCONTINUED | OUTPATIENT
Start: 2023-03-29 | End: 2023-04-03

## 2023-03-29 RX ORDER — LATANOPROST 50 UG/ML
1 SOLUTION/ DROPS OPHTHALMIC NIGHTLY
Status: ON HOLD | COMMUNITY
Start: 2023-01-30 | End: 2023-04-05 | Stop reason: HOSPADM

## 2023-03-29 RX ORDER — CARVEDILOL 12.5 MG/1
25 TABLET ORAL 2 TIMES DAILY
Status: DISCONTINUED | OUTPATIENT
Start: 2023-03-29 | End: 2023-03-30

## 2023-03-29 RX ORDER — IPRATROPIUM BROMIDE AND ALBUTEROL SULFATE 2.5; .5 MG/3ML; MG/3ML
3 SOLUTION RESPIRATORY (INHALATION) 3 TIMES DAILY
COMMUNITY
Start: 2023-01-30

## 2023-03-29 RX ORDER — ONDANSETRON 2 MG/ML
4 INJECTION INTRAMUSCULAR; INTRAVENOUS EVERY 8 HOURS PRN
Status: DISCONTINUED | OUTPATIENT
Start: 2023-03-29 | End: 2023-04-03

## 2023-03-29 RX ORDER — IPRATROPIUM BROMIDE AND ALBUTEROL SULFATE 2.5; .5 MG/3ML; MG/3ML
3 SOLUTION RESPIRATORY (INHALATION) 3 TIMES DAILY
Status: DISCONTINUED | OUTPATIENT
Start: 2023-03-29 | End: 2023-03-30

## 2023-03-29 RX ORDER — LEVOTHYROXINE SODIUM 25 UG/1
75 TABLET ORAL
Status: DISCONTINUED | OUTPATIENT
Start: 2023-03-30 | End: 2023-03-31

## 2023-03-29 RX ADMIN — LIDOCAINE HYDROCHLORIDE 20 MG: 10 INJECTION, SOLUTION EPIDURAL; INFILTRATION; INTRACAUDAL; PERINEURAL at 08:03

## 2023-03-29 RX ADMIN — NOREPINEPHRINE BITARTRATE 0.32 MCG/KG/MIN: 4 INJECTION, SOLUTION INTRAVENOUS at 08:03

## 2023-03-29 RX ADMIN — APIXABAN 2.5 MG: 2.5 TABLET, FILM COATED ORAL at 09:03

## 2023-03-29 RX ADMIN — NOREPINEPHRINE BITARTRATE 0.34 MCG/KG/MIN: 8 INJECTION, SOLUTION INTRAVENOUS at 10:03

## 2023-03-29 RX ADMIN — IPRATROPIUM BROMIDE AND ALBUTEROL SULFATE 3 ML: .5; 3 SOLUTION RESPIRATORY (INHALATION) at 08:03

## 2023-03-29 RX ADMIN — LATANOPROST 1 DROP: 50 SOLUTION OPHTHALMIC at 09:03

## 2023-03-29 RX ADMIN — VANCOMYCIN HYDROCHLORIDE 750 MG: 750 INJECTION, POWDER, LYOPHILIZED, FOR SOLUTION INTRAVENOUS at 05:03

## 2023-03-29 RX ADMIN — SODIUM ZIRCONIUM CYCLOSILICATE 10 G: 5 POWDER, FOR SUSPENSION ORAL at 03:03

## 2023-03-29 RX ADMIN — SODIUM CHLORIDE: 0.9 INJECTION, SOLUTION INTRAVENOUS at 06:03

## 2023-03-29 RX ADMIN — ALBUTEROL SULFATE 10 MG: 2.5 SOLUTION RESPIRATORY (INHALATION) at 04:03

## 2023-03-29 RX ADMIN — SODIUM CHLORIDE 1000 ML: 0.9 INJECTION, SOLUTION INTRAVENOUS at 03:03

## 2023-03-29 RX ADMIN — LEVOFLOXACIN 750 MG: 5 INJECTION, SOLUTION INTRAVENOUS at 03:03

## 2023-03-29 RX ADMIN — NOREPINEPHRINE BITARTRATE 0.02 MCG/KG/MIN: 4 INJECTION, SOLUTION INTRAVENOUS at 05:03

## 2023-03-29 NOTE — ASSESSMENT & PLAN NOTE
Blood pressure today in office is 90/60.  Patient complains of generalized weakness, dizziness and lightheadedness with mild activity and ambulation.    Patient has not been able to eat or drink for the past 24 hours.  He has difficulty keeping liquids, food, and medications down.  He states that everything gets caught in his throat and will not pass down.  He vomits at times because he can not get the food to pass to his stomach.

## 2023-03-29 NOTE — ASSESSMENT & PLAN NOTE
Patient was recently seen last week in the Cardiology office and was suggested to come off of Entresto due to worsening renal function.  Patient has not stopped Entresto at this time.  Strongly encouraged patient to stop Entresto.  Recommended patient go to the ER for further evaluation of multiple complaints.

## 2023-03-29 NOTE — ED PROVIDER NOTES
Encounter Date: 3/29/2023       History     Chief Complaint   Patient presents with    Weakness     Pt here for low blood pressure and weakness, pt has not been eating well.      67-year-old male presents with generalized weakness nausea vomiting hypotension and hypoxia patient has a history of COPD, coronary artery disease, diabetes, CHF with a low ejection fraction patient has been having nausea and vomiting and not able to keep his medicines down patient has been having fatigue patient reports shortness of breath on exertion.  Patient denies fever patient reports chronic cough patient denies chest pain    Review of patient's allergies indicates:   Allergen Reactions    Penicillin      Other reaction(s): anaphylaxis  Other reaction(s): anaphylaxis    Penicillins Hives     Past Medical History:   Diagnosis Date    A-fib     Acute kidney injury     COPD (chronic obstructive pulmonary disease)     Coronary artery disease     Diabetes mellitus     Encounter for blood transfusion     Former smoker     Hypertension     Myocardial infarction     Thyroid disease     Type 2 diabetes mellitus without complications      Past Surgical History:   Procedure Laterality Date    CLOSURE OF LEFT ATRIAL APPENDAGE USING DEVICE N/A 5/17/2022    Procedure: Left atrial appendage closure device;  Surgeon: Tre Schmidt MD;  [place] [facility] [place] [facility];  Service: Cardiology;  Laterality: N/A;    COLONOSCOPY N/A 1/3/2017    Procedure: COLONOSCOPY;  Surgeon: Marcus Green MD;  Location: [facility] ENDO;  Service: Endoscopy;  Laterality: N/A;    CORONARY BYPASS GRAFT ANGIOGRAPHY  3/30/2021    Procedure: Bypass graft study;  Surgeon: Tre Schmidt MD;  [place] [facility];  Service: Cardiology;;    CORONARY STENT PLACEMENT      GASTROSTOMY TUBE PLACEMENT      INSERTION OF PACEMAKER  04/2017    INTRAVASCULAR ULTRASOUND, NON-CORONARY  5/17/2022    Procedure: Intravascular Ultrasound, Non-Coronary;  Surgeon: Tre Schmidt MD;   Location: [facility] CATH LAB;  Service: Cardiology;;    LEFT HEART CATHETERIZATION N/A 3/30/2021    Procedure: Left heart cath;  Surgeon: Tre Schmidt MD;  Location: [facility] CATH LAB;  Service: Cardiology;  Laterality: N/A;    PEG TUBE REMOVAL      TRACHEOSTOMY CLOSURE      TRACHEOSTOMY TUBE PLACEMENT      TRANSESOPHAGEAL ECHOCARDIOGRAPHY N/A 2022    Procedure: ECHOCARDIOGRAM, TRANSESOPHAGEAL;  Surgeon: Yudi Diagnostic Provider;  Location: [facility] CATH LAB;  Service: Anesthesiology;  Laterality: N/A;    TREATMENT OF CARDIAC ARRHYTHMIA N/A 2022    Procedure: Cardioversion or Defibrillation;  Surgeon: Kuldeep Daniels MD;  Location: [facility] CATH LAB;  Service: Cardiology;  Laterality: N/A;    UPPER GASTROINTESTINAL ENDOSCOPY  2016    Dr. Zarco with PEG tube placement     Family History   Problem Relation Age of Onset    Diabetes Mother     Heart disease Mother     Glaucoma Father     Macular degeneration Father     No Known Problems Sister     Diabetes Brother     Glaucoma Brother     Macular degeneration Brother     No Known Problems Maternal Aunt     No Known Problems Maternal Uncle     No Known Problems Paternal Aunt     No Known Problems Paternal Uncle     No Known Problems Maternal Grandmother     No Known Problems Maternal Grandfather     No Known Problems Paternal Grandmother     No Known Problems Paternal Grandfather     Colon cancer Neg Hx     Colon polyps Neg Hx     Crohn's disease Neg Hx     Ulcerative colitis Neg Hx     Anemia Neg Hx     Arrhythmia Neg Hx     Asthma Neg Hx     Clotting disorder Neg Hx     Fainting Neg Hx     Heart attack Neg Hx     Heart failure Neg Hx     Hyperlipidemia Neg Hx     Hypertension Neg Hx     Stroke Neg Hx     Atrial Septal Defect Neg Hx      Social History     Tobacco Use    Smoking status: Former     Types: Cigarettes     Quit date: 2005     Years since quittin.3    Smokeless tobacco: Never    Tobacco comments:     quit    Substance Use Topics     Alcohol use: No    Drug use: No     Review of Systems   Constitutional:  Positive for fatigue. Negative for fever.   HENT:  Negative for congestion, rhinorrhea, sore throat and trouble swallowing.    Eyes:  Negative for visual disturbance.   Respiratory:  Positive for cough and shortness of breath. Negative for chest tightness and wheezing.    Cardiovascular:  Negative for chest pain, palpitations and leg swelling.   Gastrointestinal:  Positive for nausea and vomiting. Negative for abdominal distention, abdominal pain, constipation and diarrhea.   Genitourinary:  Negative for difficulty urinating, dysuria, flank pain and frequency.   Musculoskeletal:  Negative for arthralgias, back pain, joint swelling and neck pain.   Skin:  Negative for color change and rash.   Neurological:  Negative for dizziness, syncope, speech difficulty, weakness, numbness and headaches.   All other systems reviewed and are negative.    Physical Exam     Initial Vitals   BP Pulse Resp Temp SpO2   03/29/23 1056 03/29/23 1056 03/29/23 1056 03/29/23 1056 03/29/23 1159   (!) 91/57 69 18 97.6 °F (36.4 °C) (!) 87 %      MAP       --                Physical Exam    Nursing note and vitals reviewed.  Constitutional: He appears well-developed and well-nourished. He is not diaphoretic. No distress.   HENT:   Head: Normocephalic and atraumatic.   Right Ear: External ear normal.   Left Ear: External ear normal.   Nose: Nose normal.   Mouth/Throat: Oropharynx is clear and moist. No oropharyngeal exudate.   Eyes: Conjunctivae and EOM are normal. Pupils are equal, round, and reactive to light. Right eye exhibits no discharge. Left eye exhibits no discharge. No scleral icterus.   Neck: Neck supple. No thyromegaly present. No tracheal deviation present. No JVD present.   Normal range of motion.  Cardiovascular:  Normal rate, regular rhythm, normal heart sounds and intact distal pulses.     Exam reveals no gallop and no friction rub.       No murmur  heard.  Pulmonary/Chest: No stridor. No respiratory distress. He has no wheezes. He has no rhonchi. He has no rales. He exhibits no tenderness.   Coarse breath sounds noted bilaterally   Abdominal: Abdomen is soft. Bowel sounds are normal. He exhibits no distension and no mass. There is no abdominal tenderness. There is no rebound and no guarding.   Musculoskeletal:         General: No tenderness or edema. Normal range of motion.      Cervical back: Normal range of motion and neck supple.     Lymphadenopathy:     He has no cervical adenopathy.   Neurological: He is alert and oriented to person, place, and time. He has normal strength and normal reflexes. He displays normal reflexes. No cranial nerve deficit or sensory deficit.   Skin: Skin is warm and dry. No rash noted. No erythema.       ED Course   Procedures  Labs Reviewed   CBC W/ AUTO DIFFERENTIAL - Abnormal; Notable for the following components:       Result Value    RBC 3.91 (*)     Hemoglobin 13.3 (*)     Hematocrit 38.9 (*)      (*)     MCH 34.0 (*)     RDW 15.0 (*)     MPV 8.6 (*)     Lymph # 0.5 (*)     Gran % 82.4 (*)     Lymph % 6.6 (*)     All other components within normal limits   COMPREHENSIVE METABOLIC PANEL - Abnormal; Notable for the following components:    Sodium 131 (*)     Potassium 5.9 (*)     Chloride 92 (*)     Glucose 115 (*)     BUN 75 (*)     Creatinine 4.1 (*)     Total Bilirubin 1.5 (*)      (*)      (*)     eGFR 15.3 (*)     All other components within normal limits   TROPONIN I HIGH SENSITIVITY - Abnormal; Notable for the following components:    Troponin I High Sensitivity 87.2 (*)     All other components within normal limits    Narrative:     Troponin critical result(s) repeated. Called and verbal readback   obtained from [facility], RN. by SLT1 03/29/2023 12:48   TSH - Abnormal; Notable for the following components:    TSH 22.400 (*)     All other components within normal limits   CULTURE, BLOOD    CULTURE, BLOOD   MRSA SCREEN BY PCR   SARS-COV-2 RNA AMPLIFICATION, QUAL   LACTIC ACID, PLASMA   B-TYPE NATRIURETIC PEPTIDE   LIPASE   MAGNESIUM   PROTIME-INR   TROPONIN I HIGH SENSITIVITY   TSH   B-TYPE NATRIURETIC PEPTIDE   MAGNESIUM   LIPASE   PROTIME-INR   T4, FREE   URINALYSIS, REFLEX TO URINE CULTURE   TYPE & SCREEN        ECG Results              EKG 12-lead (In process)  Result time 03/29/23 11:27:01      In process by Interface, Lab In [facility] (03/29/23 11:27:01)                   Narrative:    Test Reason : R07.9,    Vent. Rate : 067 BPM     Atrial Rate : 067 BPM     P-R Int : 194 ms          QRS Dur : 118 ms      QT Int : 432 ms       P-R-T Axes : 077 076 081 degrees     QTc Int : 456 ms    Normal sinus rhythm  Anterior infarct (cited on or before 06-FEB-2023)  Abnormal ECG  When compared with ECG of 06-FEB-2023 14:58,  Sinus rhythm has replaced Atrial flutter  Vent. rate has decreased BY  60 BPM  QRS duration has increased  Nonspecific T wave abnormality no longer evident in Inferior leads  Nonspecific T wave abnormality now evident in Anterior leads    Referred By:             Confirmed By:                                   Imaging Results              CT Head Without Contrast (Final result)  Result time 03/29/23 14:48:20      Final result by Calvin Cardozo MD (03/29/23 14:48:20)                   Narrative:    CMS MANDATED QUALITY DATA - CT RADIATION  436    All CT scans at this facility utilize dose modulation, iterative reconstruction, and/or weight based dosing when appropriate to reduce radiation dose to as low as reasonably achievable.    CT HEAD WITHOUT IV CONTRAST    CLINICAL HISTORY:  67 years Male Dizziness, persistent/recurrent, cardiac or vascular cause suspected    COMPARISON: None    FINDINGS: Negative for acute intracranial hemorrhage, midline shift, or mass effect. Focal hypoattenuation compatible with encephalomalacia/gliosis involving the superior right frontal lobe near the  precentral gyrus, as well as the lateral right frontal corona radiata extending to the insular cortex. Mild asymmetric enlargement of the right lateral ventricle relative to the left. Mild periventricular white matter hypoattenuation consistent with microangiopathic change. Small rounded focus of fluid attenuation right lentiform nucleus likely representing prominent Virchow-Zenon space or old lacunar infarction.    Cerebellar hemispheres and brainstem are unremarkable. Atherosclerotic calcification of the intracranial carotid arteries.    No calvarial lesion or fracture. Mastoid air cells are clear. Paranasal sinuses are clear.    IMPRESSION:    No CT evidence of acute intracranial pathology.    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    Electronically signed by:  Calvin Cardozo MD  3/29/2023 2:48 PM CDT Workstation: [id number]                                     CT Abdomen Pelvis  Without Contrast (Final result)  Result time 03/29/23 14:45:55      Final result by Anthony Briscoe MD (03/29/23 14:45:55)                   Narrative:    CT ABDOMEN AND PELVIS WITHOUT CONTRAST    HISTORY: Abdominal pain    CMS MANDATED QUALITY DATA - CT RADIATION  436    All CT scans at this facility utilize dose modulation, iterative reconstruction, and/or weight based dosing when appropriate to reduce radiation dose to as low as reasonably achievable    FINDINGS: Noncontrast axial images were obtained. The lack of intravenous contrast limits assessment, most notably in regards to solid organs and vascular structures.    Groundglass and reticulonodular opacities are noted at the left lung base. Correlate clinically for possible early or mild pneumonia.    Anatomic configuration in the upper abdomen is atypical, with findings of colonic interposition noted, some degree of displacement of the liver to the left, and gallbladder projecting at and to the  left of midline. There is no evidence of cholecystitis or obstruction. The left adrenal gland is mildly enlarged. The right kidney has a normal appearance. Indeterminate 10 mm hypodensity is noted at the mid pole of the left kidney. The abdominal aorta is calcified. There is no evidence of aneurysm.    There is no pathologic bowel wall thickening or evidence of obstruction. The appendix is visualized and is normal.    Images of the pelvis demonstrate sigmoid diverticula without diverticulitis. The urinary bladder is unremarkable. There is mild gaseous distention of the rectosigmoid colon. No free fluid or lymphadenopathy is identified. 3.6 cm circumscribed soft tissue density at the lower inguinal canal on the left is likely cremasteric reflex/left testicle.    No acute osseous abnormalities are demonstrated. Changes of moderate degenerative facet disease are noted at L5-S1.    IMPRESSION:      1. Mild ill-defined groundglass and reticular nodular opacities at the left lung base. Correlate for possible early or mild pneumonia.  2. Additional chronic findings as discussed. No acute intra-abdominal abnormalities are identified.  3. 10 mm hypodensity at the mid pole of the left kidney, for which follow-up nonemergent ultrasound is recommended.  4. Circumscribed soft tissue density in the lower inguinal canal on the left likely reflects the left testicle with cremasteric reflex.    Electronically signed by:  Anthony Briscoe MD  3/29/2023 2:45 PM CDT Workstation: [id number]                                     X-Ray Chest AP Portable (Final result)  Result time 03/29/23 12:29:11      Final result by Brittnee Velez MD (03/29/23 12:29:11)                   Narrative:    Portable chest x-ray at 12:15 PM is compared to prior study 2/14/2023    Clinical history is hypoxia    The heart is mildly enlarged. There are median sternotomy wires. There is a left subclavian vein pacemaker with lead tips overlying the right  ventricle.    The lungs are clear. There are no acute osseous abnormalities.    IMPRESSION: Prior CABG with mild cardiomegaly    No acute pulmonary process    Electronically signed by:  Brittnee Velez MD  3/29/2023 12:29 PM CDT Workstation: [id number]                                     Medications   vancomycin - pharmacy to dose (has no administration in time range)   NORepinephrine 4 mg in dextrose 5% 250 mL infusion (premix) (titrating) (0.02 mcg/kg/min × 44.5 kg Intravenous New Bag 3/29/23 1716)   0.9%  NaCl infusion ( Intravenous New Bag 3/29/23 1821)   albuterol-ipratropium 2.5 mg-0.5 mg/3 mL nebulizer solution 3 mL (has no administration in time range)   amiodarone tablet 200 mg (has no administration in time range)   apixaban tablet 5 mg (has no administration in time range)   atorvastatin tablet 80 mg (has no administration in time range)   carvediloL tablet 25 mg (has no administration in time range)   isosorbide mononitrate 24 hr tablet 30 mg (has no administration in time range)   latanoprost 0.005 % ophthalmic solution 1 drop (has no administration in time range)   levothyroxine tablet 75 mcg (has no administration in time range)   pantoprazole EC tablet 40 mg (has no administration in time range)   latanoprost 0.005 % ophthalmic solution 1 drop (has no administration in time range)   sodium chloride 0.9% flush 10 mL ( Intravenous Canceled Entry 3/29/23 1715)   morphine injection 2 mg (has no administration in time range)   ondansetron injection 4 mg (has no administration in time range)   prochlorperazine injection Soln 5 mg (has no administration in time range)   melatonin tablet 6 mg (has no administration in time range)   albuterol nebulizer solution 10 mg (10 mg Nebulization Given 3/29/23 1649)   sodium zirconium cyclosilicate packet 10 g (10 g Oral Given 3/29/23 1542)   levoFLOXacin 750 mg/150 mL IVPB 750 mg (0 mg Intravenous Stopped 3/29/23 1712)   0.9%  NaCl infusion (1,000 mLs Intravenous  New Bag 3/29/23 9752)   vancomycin 750 mg in dextrose 5 % 250 mL IVPB (ready to mix system) (750 mg Intravenous New Bag 3/29/23 9805)     Medical Decision Making:   History:   Old Medical Records: I decided to obtain old medical records.  Initial Assessment:   Emergent evaluation of a 67-year-old male presenting with cough and shortness of breath differential diagnosis includes infection, electrolyte abnormality, endocrine dysfunction, ACS          Attending Attestation:         Attending Critical Care:   Critical Care Times:   Direct Patient Care (initial evaluation, reassessments, and time considering the case)................................................................20 minutes.   Additional History from reviewing old medical records or taking additional history from the family, EMS, PCP, etc.......................5 minutes.   Ordering, Reviewing, and Interpreting Diagnostic Studies...............................................................................................................5 minutes.   Documentation..................................................................................................................................................................................10 minutes.   Consultation with other Physicians. .................................................................................................................................................5 minutes.   ==============================================================  Total Critical Care Time - exclusive of procedural time: 45 minutes.  ==============================================================  Critical care was necessary to treat or prevent imminent or life-threatening deterioration of the following conditions: sepsis.   Critical care was time spent personally by me on the following activities: obtaining history from patient or relative, examination of patient, review of old charts, ordering lab, x-rays, and/or  EKG, development of treatment plan with patient or relative, ordering and performing treatments and interventions, evaluation of patient's response to treatment and discussion with consultants.   Critical Care Condition: potentially life-threatening       Attending ED Notes:   This patient does have evidence of infective focus  My overall impression is sepsis.  Source: Respiratory  Antibiotics given- Antibiotics (72h ago, onward)    Start     Stop Route Frequency Ordered    03/29/23 1700  vancomycin 750 mg in dextrose 5 % 250 mL IVPB (ready to   mix system)        Note to Pharmacy: Wt: 44.5 kg (98 lb)    -- IV Once 03/29/23 1556    03/29/23 1607  vancomycin - pharmacy to dose  (vancomycin IVPB)        See Hyperspace for full Linked Orders Report.    -- IV pharmacy to manage frequency 03/29/23 1508    03/29/23 1515  levoFLOXacin 750 mg/150 mL IVPB 750 mg         03/30 0314 IV ED 1 Time 03/29/23 1508      Latest lactate reviewed-  @[id number](lactate:3,)@  Organ dysfunction indicated by Acute kidney injury    Patient given 130cc/hr secondary to CHF with 20% EF    Post- resuscitation assessment Yes Perfusion exam was performed within 6 hours of septic shock presentation after bolus shows Adequate tissue perfusion assessed by non-invasive monitoring       Will Start Pressors- Levophed for MAP of 65    Patient has pneumonia, patient consulted Internal Medicine for admission.    [facility]    MDM    Patient presents for emergent evaluation of acute complaint that poses a possible threat to life and/or bodily function.    I may have ordered labs and personally reviewed them. If applicable, Labs significant for abnormalities noted above.    I may have ordered X-rays and personally reviewed them and reviewed the radiologist interpretation.  If applicable, Xray significant for findings noted above.    I may have ordered EKG and personally reviewed it.  If applicable, EKG significant for findings noted above.    I may have ordered  CT scan and personally reviewed it and reviewed the radiologist interpretation.  If applicable, CT significant for findings noted above.      Admission MDM  I discussed the patient presentation labs, ekg, X-rays, CT findings (if applicable) with the consultant(s)   Patient was managed in the ED with IV labs, meds, fluids (if applicable).    The response to treatment was noted with improved stabilization.    Patient required emergent consultation to Hospitalist for admission.    A dictation software program was used for this note.  Please expect some simple typographical  errors in this note.                        Clinical Impression:   Final diagnoses:  [R09.02] Hypoxia  [J18.9] Pneumonia due to infectious organism, unspecified laterality, unspecified part of lung (Primary)  [E87.5] Hyperkalemia  [I95.9] Hypotension, unspecified hypotension type  [I50.9] Heart failure        ED Disposition Condition    Observation                 Ra Pfeiffer MD  03/29/23 7791

## 2023-03-29 NOTE — ASSESSMENT & PLAN NOTE
Patient with acute kidney injury likely due to IVVD/dehydration MEIR is currently worsening. Labs reviewed- Renal function/electrolytes with Estimated Creatinine Clearance: 11.2 mL/min (A) (based on SCr of 4.1 mg/dL (H)). according to latest data. Monitor urine output and serial BMP and adjust therapy as needed. Avoid nephrotoxins and renally dose meds for GFR listed above.   IV fluids at 50 cc per hour

## 2023-03-29 NOTE — PROGRESS NOTES
Subjective:    Patient ID:  Cleveland Capps is a 67 y.o. male patient here for evaluation Hypotension      History of Present Illness:     Patient is here for a checkup.  He was recently seen by nurse practitioner Trish Shultz last week.  During her appointment he was having difficulty keeping food down and had significant weight loss.  Of note, his renal function was worsened since starting the Entresto. He was reduced to amiodarone 200 mg daily.  During previous visit, CBC, BNP and thyroid panel was repeated.  He had an increase in creatinine and increase in TSH.  Per chart review, Cardiology office has attempted to reach the patient about discontinuing Entresto due to worsening renal function and increasing thyroid medication to levothyroxine 75 mcg daily.  Patient says he has difficulty getting messages on his phone and did not receive these messages.    Today in office patient's blood pressure is 90/60.  He he is not taken any of his daily medications.  Patient was not aware that he needed to stop Entresto and has been taking it as prescribed.  He has not eaten in the past 24 hours.  He states that he was having difficulty swallowing his medications and food.  He reports that it feels like it gets stuck in his throat and he has subsequent vomiting at times.  He denies chest pain, shortness of breath, palpitations, edema or bleeding.  He reports worsening generalized weakness and lightheadedness and dizziness with ambulating short distances from the wheelchair to the bathroom.  No recent falls.    At this time, recommend patient go to the ED for further evaluation.  Concern for dehydration.      CARDIOLOGY TRISH SHULTZ NOTE 3/23/23       HPI:  Patient seen today for follow-up appointment.  Reports having issues keeping his food down and states that he has lost a significant amount of weight.  He is feeling okay otherwise.  Denies any shortness of breath or orthopnea.    Continue torsemide 20 mg p.o. daily  and spironolactone 25 mg p.o. daily.  Patient was also recently started on Entresto.  His renal function was slightly worsened on labs after starting this medication.  Would like to repeat labs today including thyroid panel, CMP, CBC.  For now we will decrease his amiodarone to 200 mg p.o. daily.  Will maintain on Eliquis for now.  He is no bleeding issues, falls, or head injuries.  Need to refer to GI urgently due to difficulty keeping his food down and weight loss.    DISCHARGE SUMMARY 2/14/23     HPI:     Cleveland Capps is a 67 year male who presents emergency room for evaluation of shortness of breath.  He reports shortness of breath and tachycardia onset approximately 8 days ago.  He is a history of atrial fibrillation and reports intermittently in AFib over the past several days.  He denies any chest pain.  Reports shortness of breath is worse with exertion.  No alleviating factors.  He also endorses an approximate 10-15 lb weight gain over the past several weeks.  No reported fever chills.  Previous medical history includes CVA, Watchman left atrial closure device, hypothyroidism, coronary artery disease, stent placement, COPD, hypertension, AICD, and combined systolic/diastolic heart failure.  ER workup:  CBC with mild anemia.  CMP with BUN of 44 and creatinine of 1.8.  Bilirubin elevated 3.0,  .  BNP elevated at 1518 (baseline 400).  Troponin mildly elevated 0.077.  Last echo demonstrated ejection fraction of 20-25%.  Patient was given 20 mg of Lasix in the ER.  Patient remained in atrial fibrillation/RVR while in ER.  He had mild hypotension.  Patient was started on amiodarone infusion and bolus after consulting Dr. Calhoun with on-call Cardiology.  Patient admitted Hospital Medicine for treatment management.  Patient admitted to step-down unit.        * No surgery found *       Hospital Course:   Pt will monitor closely during his stay.  He was diuresed with IV Lasix.  Cardiology and Nephrology  was consulted.  Pt was maintained on amiodarone infusion for his atrial fibrillation with RVR.  There were some issues with hypotension requiring the Entresto and attempts at beta-blocker to be stopped.  The echocardiogram confirmed an EF of 20%.  His renal and liver function worsened during his stay.  Transfer to [facility] for advanced heart failure service as was recommended by Cardiology, however Pt declined and elected for DNR status.  He wished to optimize his care here in this hospital, but not transfer of care.  Dobutrex was added to his regimen with good result.  He did have some lingering hypotension and diuresis was stopped and required fluid bolusing with improvement hypotension.  He did convert to sinus arrhythmia on amiodarone infusion.  His liver function tests and renal function began to improve.  Pt worked with PT/OT his hospitalization.  The Dobutrex was weaned down as Pt tolerated.  He was transitioned oral amiodarone. The dobutrex was discontinued on 2/12. He maintained SA/SR.  He was resumed on some diuretic therapy with good tolerance.  Entresto was reordered by Cardiology.  Patient tolerated.  He was cleared from Cardiology and Nephrology standpoint for discharge.  Discharge instructions as well as return precautions were discussed with patient with good understanding.     Physical exam:  Awake alert oriented x4, no acute distress  Cardiac:  RRR  Pulmonary:  Clear to auscultation  Abdomen:  Soft, nontender  Extremities: normal range of motion, no appreciable edema      START taking these medications    amiodarone 400 MG tablet  Commonly known as: PACERONE  Take 1 tablet (400 mg total) by mouth once daily.      docusate sodium 50 MG capsule  Commonly known as: COLACE  Take 2 capsules (100 mg total) by mouth 2 (two) times daily as needed for Constipation.      torsemide 20 MG Tab  Commonly known as: DEMADEX  Take 1 tablet (20 mg total) by mouth once daily.          CHANGE how you take these  medications    levothyroxine 25 MCG tablet  Commonly known as: SYNTHROID  Take 2 tablets (50 mcg total) by mouth before breakfast.  What changed: how much to take          CONTINUE taking these medications    apixaban 5 mg Tab  Commonly known as: ELIQUIS  Take 5 mg by mouth 2 (two) times daily.      atorvastatin 80 MG tablet  Commonly known as: LIPITOR  Take 80 mg by mouth once daily.      COMP-AIR NEBULIZER COMPRESSOR Alfreda  Generic drug: nebulizer and compressor  use as directed      potassium chloride SA 10 MEQ tablet  Commonly known as: K-DUR,KLOR-CON M      sacubitriL-valsartan 24-26 mg per tablet  Commonly known as: ENTRESTO  Take 1 tablet by mouth 2 (two) times daily.      spironolactone 25 MG tablet  Commonly known as: ALDACTONE  Take 1 tablet (25 mg total) by mouth once daily.      travoprost (benzalkonium) 0.004 % ophthalmic solution  Commonly known as: TRAVATAN  Place 1 drop into both eyes nightly.      travoprost 0.004 % ophthalmic solution  Commonly known as: TRAVATAN Z      TRUE METRIX GLUCOSE TEST STRIP Strp  Generic drug: blood sugar diagnostic  USE 1 STRIP TO CHECK GLUCOSE ONCE DAILY          STOP taking these medications    carvediloL 3.125 MG tablet  Commonly known as: COREG      clopidogreL 75 mg tablet  Commonly known as: PLAVIX      furosemide 20 MG tablet  Commonly known as: LASIX            Review of patient's allergies indicates:   Allergen Reactions    Penicillin      Other reaction(s): anaphylaxis  Other reaction(s): anaphylaxis    Penicillins Hives       Past Medical History:   Diagnosis Date    A-fib     Acute kidney injury     COPD (chronic obstructive pulmonary disease)     Coronary artery disease     Diabetes mellitus     Encounter for blood transfusion     Former smoker     Hypertension     Myocardial infarction     Thyroid disease     Type 2 diabetes mellitus without complications      Past Surgical History:   Procedure Laterality Date    CLOSURE OF LEFT ATRIAL APPENDAGE USING DEVICE  N/A 2022    Procedure: Left atrial appendage closure device;  Surgeon: Tre Schmidt MD;  Location: [facility] CATH LAB;  Service: Cardiology;  Laterality: N/A;    COLONOSCOPY N/A 1/3/2017    Procedure: COLONOSCOPY;  Surgeon: Marcus Zarco MD;  Location: [facility] ENDO;  Service: Endoscopy;  Laterality: N/A;    CORONARY BYPASS GRAFT ANGIOGRAPHY  3/30/2021    Procedure: Bypass graft study;  Surgeon: Tre Schmidt MD;  Location: [facility] CATH LAB;  Service: Cardiology;;    CORONARY STENT PLACEMENT      GASTROSTOMY TUBE PLACEMENT      INSERTION OF PACEMAKER  2017    INTRAVASCULAR ULTRASOUND, NON-CORONARY  2022    Procedure: Intravascular Ultrasound, Non-Coronary;  Surgeon: Tre Schmidt MD;  Location: [facility] CATH LAB;  Service: Cardiology;;    LEFT HEART CATHETERIZATION N/A 3/30/2021    Procedure: Left heart cath;  Surgeon: Tre Schmidt MD;  Location: [facility] CATH LAB;  Service: Cardiology;  Laterality: N/A;    PEG TUBE REMOVAL      TRACHEOSTOMY CLOSURE      TRACHEOSTOMY TUBE PLACEMENT      TRANSESOPHAGEAL ECHOCARDIOGRAPHY N/A 2022    Procedure: ECHOCARDIOGRAM, TRANSESOPHAGEAL;  Surgeon: Yudi Diagnostic Provider;  Location: [facility] CATH LAB;  Service: Anesthesiology;  Laterality: N/A;    TREATMENT OF CARDIAC ARRHYTHMIA N/A 2022    Procedure: Cardioversion or Defibrillation;  Surgeon: Kuldeep Daniels MD;  Location: [facility] CATH LAB;  Service: Cardiology;  Laterality: N/A;    UPPER GASTROINTESTINAL ENDOSCOPY  2016    Dr. Zarco with PEG tube placement     Social History     Tobacco Use    Smoking status: Former     Types: Cigarettes     Quit date: 2005     Years since quittin.3    Smokeless tobacco: Never    Tobacco comments:     quit    Substance Use Topics    Alcohol use: No    Drug use: No        Review of Systems:    As noted in HPI in addition      REVIEW OF SYSTEMS  CARDIOVASCULAR: No recent chest pain, palpitations, arm, neck, or jaw pain  RESPIRATORY: No recent fever, cough  chills, SOB or congestion  : No blood in the urine  GI: + nausea/vomiting; cannot keep food/meds/liquid down- ? Food is getting stuck/cannot pass;  MUSCULOSKELETAL: No myalgias  NEURO: + lightheadedness or dizziness  EYES: No Double vision, blurry, vision or headache              Objective        Vitals:    03/29/23 1015   BP: 90/60   Pulse: 80   Resp: 16       LIPIDS - LAST 2   Lab Results   Component Value Date    CHOL 123 10/23/2020    HDL 27 (L) 10/23/2020    LDLCALC 47.2 (L) 10/23/2020    TRIG 244 (H) 10/23/2020    CHOLHDL 22.0 10/23/2020       CBC - LAST 2  Lab Results   Component Value Date    WBC 7.42 03/29/2023    WBC 6.96 03/23/2023    RBC 3.91 (L) 03/29/2023    RBC 4.00 (L) 03/23/2023    HGB 13.3 (L) 03/29/2023    HGB 13.5 (L) 03/23/2023    HCT 38.9 (L) 03/29/2023    HCT 40.0 03/23/2023     (H) 03/29/2023     (H) 03/23/2023    MCH 34.0 (H) 03/29/2023    MCH 33.8 (H) 03/23/2023    MCHC 34.2 03/29/2023    MCHC 33.8 03/23/2023    RDW 15.0 (H) 03/29/2023    RDW 14.5 03/23/2023     03/29/2023     03/23/2023    MPV 8.6 (L) 03/29/2023    MPV 9.0 (L) 03/23/2023    GRAN 6.1 03/29/2023    GRAN 82.4 (H) 03/29/2023    LYMPH 0.5 (L) 03/29/2023    LYMPH 6.6 (L) 03/29/2023    MONO 0.6 03/29/2023    MONO 7.7 03/29/2023    BASO 0.04 03/29/2023    BASO 0.03 03/23/2023    NRBC 0 03/29/2023    NRBC 0 03/23/2023       CHEMISTRY & LIVER FUNCTION - LAST 2  Lab Results   Component Value Date     (L) 03/29/2023     (L) 03/23/2023    K 5.9 (H) 03/29/2023    K 4.8 03/23/2023    CL 92 (L) 03/29/2023    CL 89 (L) 03/23/2023    CO2 23 03/29/2023    CO2 21 (L) 03/23/2023    ANIONGAP 16 03/29/2023    ANIONGAP 17 (H) 03/23/2023    BUN 75 (H) 03/29/2023    BUN 41 (H) 03/23/2023    CREATININE 4.1 (H) 03/29/2023    CREATININE 2.3 (H) 03/23/2023     (H) 03/29/2023     (H) 03/23/2023    CALCIUM 9.6 03/29/2023    CALCIUM 9.9 03/23/2023    PH 7.403 05/15/2016    PH 7.342 (L) 05/08/2016     MG 1.9 02/14/2023    MG 1.9 02/13/2023    ALBUMIN 4.0 03/29/2023    ALBUMIN 4.6 03/23/2023    PROT 8.2 03/29/2023    PROT 8.4 03/23/2023    ALKPHOS 90 03/29/2023    ALKPHOS 83 03/23/2023     (H) 03/29/2023    ALT 92 (H) 03/23/2023     (H) 03/29/2023     (H) 03/23/2023    BILITOT 1.5 (H) 03/29/2023    BILITOT 2.0 (H) 03/23/2023        CARDIAC PROFILE - LAST 2  Lab Results   Component Value Date    BNP 2,363 (H) 02/14/2023    BNP 1,518 (H) 02/06/2023    CPK 4475 (H) 04/29/2016     (H) 04/25/2016    CPKMB 116.8 (H) 04/25/2016     (H) 11/18/2022    TROPONINI 0.073 (H) 02/07/2023    TROPONINI 0.077 (H) 02/06/2023        COAGULATION - LAST 2  Lab Results   Component Value Date    INR 1.5 (H) 02/08/2023    INR 1.1 11/27/2022    APTT 29.5 05/17/2022    APTT 31.0 05/02/2022       ENDOCRINE & PSA - LAST 2  Lab Results   Component Value Date    HGBA1C 5.4 02/06/2023    HGBA1C 6.3 (H) 10/24/2020    TSH 27.920 (H) 03/23/2023    TSH 18.148 (H) 02/07/2023        ECHOCARDIOGRAM RESULTS  Results for orders placed during the hospital encounter of 02/06/23    Echo    Interpretation Summary  · The left ventricle is severely enlarged with eccentric hypertrophy and severely decreased systolic function.  · The estimated ejection fraction is 20%.  · Left ventricular diastolic dysfunction.  · There is left ventricular global hypokinesis. GLOBAL HYPOKINESIA WITH ANTERIOR APICAL AKINESIA  · Moderate right ventricular enlargement.  · Moderate right atrial enlargement.  · Elevated central venous pressure (15 mmHg).  · LIMITED STUDY  · Right atrial enlargement.      CURRENT/PREVIOUS VISIT EKG  Results for orders placed or performed during the hospital encounter of 03/29/23   EKG 12-lead    Collection Time: 03/29/23 11:14 AM    Narrative    Test Reason : R07.9,    Vent. Rate : 067 BPM     Atrial Rate : 067 BPM     P-R Int : 194 ms          QRS Dur : 118 ms      QT Int : 432 ms       P-R-T Axes : 077 076 081  degrees     QTc Int : 456 ms    Normal sinus rhythm  Anterior infarct (cited on or before 06-FEB-2023)  Abnormal ECG  When compared with ECG of 06-FEB-2023 14:58,  Sinus rhythm has replaced Atrial flutter  Vent. rate has decreased BY  60 BPM  QRS duration has increased  Nonspecific T wave abnormality no longer evident in Inferior leads  Nonspecific T wave abnormality now evident in Anterior leads    Referred By:             Confirmed By:      No valid procedures specified.   No results found for this or any previous visit.    No valid procedures specified.    PHYSICAL EXAM  CONSTITUTIONAL: chronically ill appearing, thin, frail, elderly male in no apparent distress  NECK: no carotid bruit, no JVD  LUNGS: CTA  CHEST WALL: no tenderness  HEART: regular rate and rhythm, S1, S2 normal  ABDOMEN: soft, non-tender; bowel sounds normal  EXTREMITIES: Extremities normal, no edema, no calf tenderness noted  NEURO: AAO X 3    I HAVE REVIEWED :    The vital signs, nurses notes, and all the pertinent radiology and labs.        Current Outpatient Medications   Medication Instructions    albuterol-ipratropium (DUO-NEB) 2.5 mg-0.5 mg/3 mL nebulizer solution 3 mLs, Nebulization, 3 times daily    amiodarone (PACERONE) 200 mg, Oral, Daily    apixaban (ELIQUIS) 5 mg, Oral, 2 times daily    atorvastatin (LIPITOR) 80 mg, Oral, Daily    carvediloL (COREG) 25 mg, Oral, 2 times daily    COMP-AIR NEBULIZER COMPRESSOR Alfreda use as directed    docusate sodium (COLACE) 100 mg, Oral, 2 times daily PRN    enalapril (VASOTEC) 20 mg, Oral, Daily    isosorbide mononitrate (IMDUR) 30 mg, Oral, Daily    latanoprost 0.005 % ophthalmic solution 1 drop, Both Eyes, Nightly    levothyroxine (SYNTHROID) 75 mcg, Oral, Before breakfast    pantoprazole (PROTONIX) 40 mg, Oral, Every morning    potassium chloride SA (K-DUR,KLOR-CON M) 10 MEQ tablet No dose, route, or frequency recorded.    spironolactone (ALDACTONE) 25 mg, Oral, Daily    torsemide (DEMADEX) 20 mg,  Oral, Daily    travoprost (TRAVATAN Z) 0.004 % ophthalmic solution No dose, route, or frequency recorded.    travoprost, benzalkonium, (TRAVATAN) 0.004 % ophthalmic solution 1 drop, Both Eyes, Nightly    TRUE METRIX GLUCOSE TEST STRIP Strp USE 1 STRIP TO CHECK GLUCOSE ONCE DAILY          Assessment & Plan     MEIR (acute kidney injury)  Patient was recently seen last week in the Cardiology office and was suggested to come off of Entresto due to worsening renal function.  Patient has not stopped Entresto at this time.  Strongly encouraged patient to stop Entresto.  Recommended patient go to the ER for further evaluation of multiple complaints.    Hypotension  Blood pressure today in office is 90/60.  Patient complains of generalized weakness, dizziness and lightheadedness with mild activity and ambulation.    Patient has not been able to eat or drink for the past 24 hours.  He has difficulty keeping liquids, food, and medications down.  He states that everything gets caught in his throat and will not pass down.  He vomits at times because he can not get the food to pass to his stomach.    Elevated LFTs  Recent blood work last week shows increase in LFTs.  Patient is also complaining of difficulty swallowing all food, medication, and liquid.  Recommended patient to proceed to the ED for multiple complaints and concern for dehydration.    Dehydration  Patient has been unable to keep down fluids, medications or food for the past 24 hours.  He has worsening LFTs and renal function.  Blood pressure today in office is 90/60.  Patient advised to go to the ED for further evaluation at this time.    Hypothyroid  TSH increased to 27.9 last week.  Patient advised to increase levothyroxine to 75 mcg daily.  Patient did not receive message and has not been taking this dose.  Encouraged patient to start levothyroxine at 75 mcg daily.    Coronary artery disease  Continue current medication regimen.  Hold antihypertensives for  systolic BP less than 100.  Patient at this time is going to the ED for further evaluation of dehydration, worsening LFTs, renal function, and inability to hold down food and fluids.  Continue statin therapy.  Continue low-sodium heart healthy diet.  Patient needs to follow up with Cardiology office within 1-2 weeks post discharge.    Paroxysmal atrial fibrillation  Patient was in a regular rate and rhythm today in office.  Recommend further evaluation in the ED for concerns for dehydration, hypotension, inability to eat or drink for the past 24 hours, worsening renal function and LFTs, and worsening TSH.  Patient was recently started on amiodarone in February.  Patient has not taken his medications today.  Continue Eliquis.  Patient may need to be reduced to low-dose in view of worsening renal function.  Continue to monitor for bleeding.          No follow-ups on file.

## 2023-03-29 NOTE — SUBJECTIVE & OBJECTIVE
Past Medical History:   Diagnosis Date    A-fib     Acute kidney injury     COPD (chronic obstructive pulmonary disease)     Coronary artery disease     Diabetes mellitus     Encounter for blood transfusion     Former smoker     Hypertension     Myocardial infarction     Thyroid disease     Type 2 diabetes mellitus without complications        Past Surgical History:   Procedure Laterality Date    CLOSURE OF LEFT ATRIAL APPENDAGE USING DEVICE N/A 5/17/2022    Procedure: Left atrial appendage closure device;  Surgeon: Tre Schmidt MD;  Location: [facility] CATH LAB;  Service: Cardiology;  Laterality: N/A;    COLONOSCOPY N/A 1/3/2017    Procedure: COLONOSCOPY;  Surgeon: Marcus Zarco MD;  Location: [facility] ENDO;  Service: Endoscopy;  Laterality: N/A;    CORONARY BYPASS GRAFT ANGIOGRAPHY  3/30/2021    Procedure: Bypass graft study;  Surgeon: Tre Schmidt MD;  Location: [facility] CATH LAB;  Service: Cardiology;;    CORONARY STENT PLACEMENT      GASTROSTOMY TUBE PLACEMENT      INSERTION OF PACEMAKER  04/2017    INTRAVASCULAR ULTRASOUND, NON-CORONARY  5/17/2022    Procedure: Intravascular Ultrasound, Non-Coronary;  Surgeon: Tre Schmidt MD;  Location: [facility] CATH LAB;  Service: Cardiology;;    LEFT HEART CATHETERIZATION N/A 3/30/2021    Procedure: Left heart cath;  Surgeon: Tre Schmidt MD;  Location: [facility] CATH LAB;  Service: Cardiology;  Laterality: N/A;    PEG TUBE REMOVAL      TRACHEOSTOMY CLOSURE      TRACHEOSTOMY TUBE PLACEMENT      TRANSESOPHAGEAL ECHOCARDIOGRAPHY N/A 5/17/2022    Procedure: ECHOCARDIOGRAM, TRANSESOPHAGEAL;  Surgeon: [facility] Diagnostic Provider;  Location: [facility] CATH LAB;  Service: Anesthesiology;  Laterality: N/A;    TREATMENT OF CARDIAC ARRHYTHMIA N/A 11/25/2022    Procedure: Cardioversion or Defibrillation;  Surgeon: Kuldeep Daniels MD;  Location: [facility] CATH LAB;  Service: Cardiology;  Laterality: N/A;    UPPER GASTROINTESTINAL ENDOSCOPY  05/2016    Dr. Zarco with PEG tube placement        Review of patient's allergies indicates:   Allergen Reactions    Penicillin      Other reaction(s): anaphylaxis  Other reaction(s): anaphylaxis    Penicillins Hives       No current facility-administered medications on file prior to encounter.     Current Outpatient Medications on File Prior to Encounter   Medication Sig    albuterol-ipratropium (DUO-NEB) 2.5 mg-0.5 mg/3 mL nebulizer solution Take 3 mLs by nebulization 3 (three) times daily.    amiodarone (PACERONE) 200 MG Tab Take 1 tablet (200 mg total) by mouth once daily.    apixaban (ELIQUIS) 5 mg Tab Take 5 mg by mouth 2 (two) times daily.    atorvastatin (LIPITOR) 80 MG tablet Take 80 mg by mouth once daily.    carvediloL (COREG) 25 MG tablet Take 25 mg by mouth 2 (two) times daily.    docusate sodium (COLACE) 50 MG capsule Take 2 capsules (100 mg total) by mouth 2 (two) times daily as needed for Constipation.    enalapril (VASOTEC) 20 MG tablet Take 20 mg by mouth once daily.    isosorbide mononitrate (IMDUR) 30 MG 24 hr tablet Take 30 mg by mouth once daily.    latanoprost 0.005 % ophthalmic solution Place 1 drop into both eyes every evening.    levothyroxine (SYNTHROID) 75 MCG tablet Take 1 tablet (75 mcg total) by mouth before breakfast.    pantoprazole (PROTONIX) 40 MG tablet Take 40 mg by mouth every morning.    potassium chloride SA (K-DUR,KLOR-CON M) 10 MEQ tablet Take 10 mEq by mouth once daily.    sacubitriL-valsartan (ENTRESTO) 24-26 mg per tablet Take 1 tablet by mouth 2 (two) times daily.    spironolactone (ALDACTONE) 25 MG tablet Take 1 tablet (25 mg total) by mouth once daily.    torsemide (DEMADEX) 20 MG Tab Take 1 tablet (20 mg total) by mouth once daily.    travoprost, benzalkonium, (TRAVATAN) 0.004 % ophthalmic solution Place 1 drop into both eyes nightly.    COMP-AIR NEBULIZER COMPRESSOR Alfreda use as directed    TRUE METRIX GLUCOSE TEST STRIP Strp USE 1 STRIP TO CHECK GLUCOSE ONCE DAILY    [DISCONTINUED] travoprost (TRAVATAN Z) 0.004  % ophthalmic solution      Family History       Problem Relation (Age of Onset)    Diabetes Mother, Brother    Glaucoma Father, Brother    Heart disease Mother    Macular degeneration Father, Brother    No Known Problems Sister, Maternal Aunt, Maternal Uncle, Paternal Aunt, Paternal Uncle, Maternal Grandmother, Maternal Grandfather, Paternal Grandmother, Paternal Grandfather          Tobacco Use    Smoking status: Former     Types: Cigarettes     Quit date: 2005     Years since quittin.3    Smokeless tobacco: Never    Tobacco comments:     quit    Substance and Sexual Activity    Alcohol use: No    Drug use: No    Sexual activity: Yes     Review of Systems   Constitutional:  Positive for activity change, appetite change, chills, diaphoresis and fatigue. Negative for fever.   HENT: Negative.     Eyes: Negative.    Respiratory:  Positive for cough, chest tightness, shortness of breath and wheezing.    Cardiovascular:  Positive for palpitations.   Gastrointestinal:  Positive for abdominal distention, nausea and vomiting. Negative for abdominal pain, anal bleeding and diarrhea.   Endocrine: Positive for heat intolerance.   Genitourinary:  Positive for decreased urine volume.   Musculoskeletal:  Positive for arthralgias, gait problem and myalgias.   Skin: Negative.    Allergic/Immunologic: Positive for immunocompromised state.   Neurological:  Positive for weakness.   Hematological:  Bruises/bleeds easily.   Psychiatric/Behavioral:  The patient is nervous/anxious.    Objective:     Vital Signs (Most Recent):  Temp: 97.6 °F (36.4 °C) (23 1056)  Pulse: 74 (23 1649)  Resp: 16 (23 1649)  BP: (!) 98/57 (23 1600)  SpO2: 100 % (23 1649)   Vital Signs (24h Range):  Temp:  [97.6 °F (36.4 °C)] 97.6 °F (36.4 °C)  Pulse:  [60-80] 74  Resp:  [16-23] 16  SpO2:  [87 %-100 %] 100 %  BP: (83-98)/(46-60) 98/57     Weight: 44.5 kg (98 lb)  Body mass index is 18.52 kg/m².    Physical Exam  Vitals  and nursing note reviewed.   Constitutional:       General: He is not in acute distress.     Appearance: He is ill-appearing and diaphoretic.   HENT:      Head: Atraumatic.      Comments: Temporal wasting     Nose: Nose normal.      Mouth/Throat:      Mouth: Mucous membranes are dry.   Eyes:      Extraocular Movements: Extraocular movements intact.      Pupils: Pupils are equal, round, and reactive to light.   Neck:      Comments: Use of accessory muscles with movement  Cardiovascular:      Rate and Rhythm: Regular rhythm. Tachycardia present.      Heart sounds:     Gallop present.   Pulmonary:      Breath sounds: Rhonchi and rales present.      Comments: Left lung base  Abdominal:      General: There is distension.      Tenderness: There is no abdominal tenderness. There is no guarding or rebound.   Musculoskeletal:      Cervical back: Normal range of motion and neck supple.      Comments: 3/5 generalized strength   Skin:     General: Skin is warm.   Neurological:      Mental Status: He is alert.      Motor: Weakness present.      Comments: Oriented to person and place   Psychiatric:      Comments: Dulled affect         CRANIAL NERVES     CN III, IV, VI   Pupils are equal, round, and reactive to light.     Significant Labs: All pertinent labs within the past 24 hours have been reviewed.  Recent Lab Results         03/29/23  1315   03/29/23  1310   03/29/23  1136        Albumin     4.0       Alkaline Phosphatase     90       ALT     143       Anion Gap     16       AST     241       Baso #     0.04       Basophil %     0.5       BILIRUBIN TOTAL     1.5  Comment: For infants and newborns, interpretation of results should be based  on gestational age, weight and in agreement with clinical  observations.    Premature Infant recommended reference ranges:  Up to 24 hours.............<8.0 mg/dL  Up to 48 hours............<12.0 mg/dL  3-5 days..................<15.0 mg/dL  6-29 days.................<15.0 mg/dL         BNP      96  Comment: Values of less than 100 pg/ml are consistent with non-CHF populations.       BUN     75       Calcium     9.6       Chloride     92       CO2     23       Creatinine     4.1       Differential Method     Automated       eGFR     15.3       Eos #     0.2       Eosinophil %     2.3       Free T4     0.96       Glucose     115       Gran # (ANC)     6.1       Gran %     82.4       Group & Rh A POS           Hematocrit     38.9       Hemoglobin     13.3       Immature Grans (Abs)     0.04  Comment: Mild elevation in immature granulocytes is non specific and   can be seen in a variety of conditions including stress response,   acute inflammation, trauma and pregnancy. Correlation with other   laboratory and clinical findings is essential.         Immature Granulocytes     0.5       INDIRECT LUCIANA NEG           INR     1.1  Comment: Coumadin Therapy:  2.0 - 3.0 for INR for all indicators except mechanical heart valves  and antiphospholipid syndromes which should use 2.5 - 3.5.         Lactate, Kalpesh 1.3  Comment: Falsely low lactic acid results can be found in samples   containing >=13.0 mg/dL total bilirubin and/or >=3.5 mg/dL   direct bilirubin.             Lipase     60       Lymph #     0.5       Lymph %     6.6       Magnesium     2.4       MCH     34.0       MCHC     34.2       MCV     100       Mono #     0.6       Mono %     7.7       MPV     8.6       nRBC     0       Platelets     186       Potassium     5.9       PROTEIN TOTAL     8.2       Protime     11.4       RBC     3.91       RDW     15.0       SARS-CoV-2 RNA, Amplification, Qual   Negative  Comment: This test utilizes isothermal nucleic acid amplification technology   to   detect the SARS-CoV-2 RdRp nucleic acid segment. The analytical   sensitivity   (limit of detection) is 500 copies/swab.     A POSITIVE result is indicative of the presence of SARS-CoV-2 RNA;   clinical   correlation with patient history and other diagnostic information  is   necessary to determine patient infection status.    A NEGATIVE result means that SARS-CoV-2 nucleic acids are not present   above   the limit of detection. A NEGATIVE result should be treated as   presumptive.   It does not rule out the possibility of COVID-19 and should not be   the sole   basis for treatment decisions. If COVID-19 is strongly suspected   based on   clinical and exposure history, re-testing using an alternate   molecular assay   should be considered.     This test is only for use under the Food and Drug Administration s   Emergency   Use Authorization (EUA).     Commercial kits are provided by [employer]. Performance   characteristics of the EUA have been independently verified by   [facility]  _________________________________________________________________   The authorized Fact Sheet for Healthcare Providers and the authorized   Fact   Sheet for Patients of the ID NOW COVID-19 are available on the FDA   website:   https://www.fda.gov/media/123293/download   https://www.fda.gov/media/067393/download           Sodium     131       Specimen Outdate 04/01/2023 23:59           Troponin I High Sensitivity     87.2  Comment: Troponin results differ between methods. Do not use   results between Troponin methods interchangeably.    Alkaline Phospatase levels above 400 U/L may   cause false positive results.    Access hsTnI should not be used for patients taking   Asfotase janette (Strensiq).  Results confirmed, test repeated  Troponin critical result(s) repeated. Called and verbal readback   obtained from [facility], [facility]. by SLT1 03/29/2023 12:48         TSH     22.400       WBC     7.42               Significant Imaging: I have reviewed all pertinent imaging results/findings within the past 24 hours.

## 2023-03-29 NOTE — NURSING
Rec'vd pt from ED close to shift change.  Pt A and O x4. Bother at bedside.  Pt req 2L of 02.  Levo at 0.03.  Pt watching TV comfortably.  Report given to night shift and all questions answered.  Vitals stable.  Will continue to monitor.

## 2023-03-29 NOTE — ASSESSMENT & PLAN NOTE
Patient was in a regular rate and rhythm today in office.  Recommend further evaluation in the ED for concerns for dehydration, hypotension, inability to eat or drink for the past 24 hours, worsening renal function and LFTs, and worsening TSH.  Patient was recently started on amiodarone in February.  Patient has not taken his medications today.  Continue Eliquis.  Patient may need to be reduced to low-dose in view of worsening renal function.  Continue to monitor for bleeding.

## 2023-03-29 NOTE — ASSESSMENT & PLAN NOTE
Recent blood work last week shows increase in LFTs.  Patient is also complaining of difficulty swallowing all food, medication, and liquid.  Recommended patient to proceed to the ED for multiple complaints and concern for dehydration.

## 2023-03-29 NOTE — CARE UPDATE
03/29/23 1649   Patient Assessment/Suction   Level of Consciousness (AVPU) alert   All Lung Fields Breath Sounds diminished;coarse   PRE-TX-O2   Device (Oxygen Therapy) nasal cannula   $ Is the patient on Low Flow Oxygen? Yes   Flow (L/min) 2   SpO2 100 %   Pulse Oximetry Type Continuous   $ Pulse Oximetry - Multiple Charge Pulse Oximetry - Multiple   Pulse 74   Resp 16   Aerosol Therapy   $ Aerosol Therapy Charges Initial Continuous   Daily Review of Necessity (SVN) completed   Respiratory Treatment Status (SVN) given   Treatment Route (SVN) mask   Patient Position (SVN) semi-Basilio's   Post Treatment Assessment (SVN) increased aeration   Signs of Intolerance (SVN) none   Breath Sounds Post-Respiratory Treatment   Post-treatment Heart Rate (beats/min) 75   Post-treatment Resp Rate (breaths/min) 15   Education   $ Education Bronchodilator;15 min   Respiratory Evaluation   $ Care Plan Tech Time 15 min

## 2023-03-29 NOTE — HPI
"ED note  67-year-old male presents with generalized weakness nausea vomiting hypotension and hypoxia patient has a history of COPD, coronary artery disease, diabetes, CHF with a low ejection fraction patient has been having nausea and vomiting and not able to keep his medicines down patient has been having fatigue patient reports shortness of breath on exertion.  Patient denies fever patient reports chronic cough patient denies chest pain    3/29/2023  Mr Capps has spent more time in the hospital than at home. Nausea and vomiting have resolved but he is very weak. He does have some SOB but notes severe amaro with movement. Pt and relative note that " he has been way sicker than this but tends to spend a lot of time in the ICU when admitted to the hospital.  "

## 2023-03-29 NOTE — ASSESSMENT & PLAN NOTE
TSH increased to 27.9 last week.  Patient advised to increase levothyroxine to 75 mcg daily.  Patient did not receive message and has not been taking this dose.  Encouraged patient to start levothyroxine at 75 mcg daily.

## 2023-03-29 NOTE — ASSESSMENT & PLAN NOTE
Patient has been unable to keep down fluids, medications or food for the past 24 hours.  He has worsening LFTs and renal function.  Blood pressure today in office is 90/60.  Patient advised to go to the ED for further evaluation at this time.

## 2023-03-29 NOTE — ASSESSMENT & PLAN NOTE
Continue current medication regimen.  Hold antihypertensives for systolic BP less than 100.  Patient at this time is going to the ED for further evaluation of dehydration, worsening LFTs, renal function, and inability to hold down food and fluids.  Continue statin therapy.  Continue low-sodium heart healthy diet.  Patient needs to follow up with Cardiology office within 1-2 weeks post discharge.

## 2023-03-29 NOTE — H&P
"[facility] - Emergency Dept  Hospital Medicine  History & Physical    Patient Name: Cleveland Capps  MRN: [id number]  Patient Class: OP- Observation  Admission Date: 3/29/2023  Attending Physician: Jaguar Gonzalez MD  Primary Care Provider: Romero Snyder MD         Patient information was obtained from patient, parent, past medical records and ER records.     Subjective:     Principal Problem:Hypotension    Chief Complaint:   Chief Complaint   Patient presents with    Weakness     Pt here for low blood pressure and weakness, pt has not been eating well.         HPI: ED note  67-year-old male presents with generalized weakness nausea vomiting hypotension and hypoxia patient has a history of COPD, coronary artery disease, diabetes, CHF with a low ejection fraction patient has been having nausea and vomiting and not able to keep his medicines down patient has been having fatigue patient reports shortness of breath on exertion.  Patient denies fever patient reports chronic cough patient denies chest pain    3/29/2023  Mr Capps has spent more time in the hospital than at home. Nausea and vomiting have resolved but he is very weak. He does have some SOB but notes severe amaro with movement. Pt and relative note that " he has been way sicker than this but tends to spend a lot of time in the ICU when admitted to the hospital.      Past Medical History:   Diagnosis Date    A-fib     Acute kidney injury     COPD (chronic obstructive pulmonary disease)     Coronary artery disease     Diabetes mellitus     Encounter for blood transfusion     Former smoker     Hypertension     Myocardial infarction     Thyroid disease     Type 2 diabetes mellitus without complications        Past Surgical History:   Procedure Laterality Date    CLOSURE OF LEFT ATRIAL APPENDAGE USING DEVICE N/A 5/17/2022    Procedure: Left atrial appendage closure device;  Surgeon: Tre Schmidt MD;  [facility];  " Service: Cardiology;  Laterality: N/A;    COLONOSCOPY N/A 1/3/2017    Procedure: COLONOSCOPY;  Surgeon: Marcus Green MD;  Location: [facility] ENDO;  Service: Endoscopy;  Laterality: N/A;    CORONARY BYPASS GRAFT ANGIOGRAPHY  3/30/2021    Procedure: Bypass graft study;  Surgeon: Tre Schmidt MD;  Location: [facility] CATH LAB;  Service: Cardiology;;    CORONARY STENT PLACEMENT      GASTROSTOMY TUBE PLACEMENT      INSERTION OF PACEMAKER  04/2017    INTRAVASCULAR ULTRASOUND, NON-CORONARY  5/17/2022    Procedure: Intravascular Ultrasound, Non-Coronary;  Surgeon: Tre Schmidt MD;  Location: [facility] CATH LAB;  Service: Cardiology;;    LEFT HEART CATHETERIZATION N/A 3/30/2021    Procedure: Left heart cath;  Surgeon: Tre Schmidt MD;  Location: [facility] CATH LAB;  Service: Cardiology;  Laterality: N/A;    PEG TUBE REMOVAL      TRACHEOSTOMY CLOSURE      TRACHEOSTOMY TUBE PLACEMENT      TRANSESOPHAGEAL ECHOCARDIOGRAPHY N/A 5/17/2022    Procedure: ECHOCARDIOGRAM, TRANSESOPHAGEAL;  Surgeon: [facility] Diagnostic Provider;  Location: [facility] CATH LAB;  Service: Anesthesiology;  Laterality: N/A;    TREATMENT OF CARDIAC ARRHYTHMIA N/A 11/25/2022    Procedure: Cardioversion or Defibrillation;  Surgeon: Kuldeep Daniels MD;  Location: [facility] CATH LAB;  Service: Cardiology;  Laterality: N/A;    UPPER GASTROINTESTINAL ENDOSCOPY  05/2016    Dr. Green with PEG tube placement       Review of patient's allergies indicates:   Allergen Reactions    Penicillin      Other reaction(s): anaphylaxis  Other reaction(s): anaphylaxis    Penicillins Hives       No current facility-administered medications on file prior to encounter.     Current Outpatient Medications on File Prior to Encounter   Medication Sig    albuterol-ipratropium (DUO-NEB) 2.5 mg-0.5 mg/3 mL nebulizer solution Take 3 mLs by nebulization 3 (three) times daily.    amiodarone (PACERONE) 200 MG Tab Take 1 tablet (200 mg total) by mouth once daily.    apixaban (ELIQUIS) 5  mg Tab Take 5 mg by mouth 2 (two) times daily.    atorvastatin (LIPITOR) 80 MG tablet Take 80 mg by mouth once daily.    carvediloL (COREG) 25 MG tablet Take 25 mg by mouth 2 (two) times daily.    docusate sodium (COLACE) 50 MG capsule Take 2 capsules (100 mg total) by mouth 2 (two) times daily as needed for Constipation.    enalapril (VASOTEC) 20 MG tablet Take 20 mg by mouth once daily.    isosorbide mononitrate (IMDUR) 30 MG 24 hr tablet Take 30 mg by mouth once daily.    latanoprost 0.005 % ophthalmic solution Place 1 drop into both eyes every evening.    levothyroxine (SYNTHROID) 75 MCG tablet Take 1 tablet (75 mcg total) by mouth before breakfast.    pantoprazole (PROTONIX) 40 MG tablet Take 40 mg by mouth every morning.    potassium chloride SA (K-DUR,KLOR-CON M) 10 MEQ tablet Take 10 mEq by mouth once daily.    sacubitriL-valsartan (ENTRESTO) 24-26 mg per tablet Take 1 tablet by mouth 2 (two) times daily.    spironolactone (ALDACTONE) 25 MG tablet Take 1 tablet (25 mg total) by mouth once daily.    torsemide (DEMADEX) 20 MG Tab Take 1 tablet (20 mg total) by mouth once daily.    travoprost, benzalkonium, (TRAVATAN) 0.004 % ophthalmic solution Place 1 drop into both eyes nightly.    COMP-AIR NEBULIZER COMPRESSOR Alfreda use as directed    TRUE METRIX GLUCOSE TEST STRIP Strp USE 1 STRIP TO CHECK GLUCOSE ONCE DAILY    [DISCONTINUED] travoprost (TRAVATAN Z) 0.004 % ophthalmic solution      Family History       Problem Relation (Age of Onset)    Diabetes Mother, Brother    Glaucoma Father, Brother    Heart disease Mother    Macular degeneration Father, Brother    No Known Problems Sister, Maternal Aunt, Maternal Uncle, Paternal Aunt, Paternal Uncle, Maternal Grandmother, Maternal Grandfather, Paternal Grandmother, Paternal Grandfather          Tobacco Use    Smoking status: Former     Types: Cigarettes     Quit date: 2005     Years since quittin.3    Smokeless tobacco: Never    Tobacco  comments:     quit 2007   Substance and Sexual Activity    Alcohol use: No    Drug use: No    Sexual activity: Yes     Review of Systems   Constitutional:  Positive for activity change, appetite change, chills, diaphoresis and fatigue. Negative for fever.   HENT: Negative.     Eyes: Negative.    Respiratory:  Positive for cough, chest tightness, shortness of breath and wheezing.    Cardiovascular:  Positive for palpitations.   Gastrointestinal:  Positive for abdominal distention, nausea and vomiting. Negative for abdominal pain, anal bleeding and diarrhea.   Endocrine: Positive for heat intolerance.   Genitourinary:  Positive for decreased urine volume.   Musculoskeletal:  Positive for arthralgias, gait problem and myalgias.   Skin: Negative.    Allergic/Immunologic: Positive for immunocompromised state.   Neurological:  Positive for weakness.   Hematological:  Bruises/bleeds easily.   Psychiatric/Behavioral:  The patient is nervous/anxious.    Objective:     Vital Signs (Most Recent):  Temp: 97.6 °F (36.4 °C) (03/29/23 1056)  Pulse: 74 (03/29/23 1649)  Resp: 16 (03/29/23 1649)  BP: (!) 98/57 (03/29/23 1600)  SpO2: 100 % (03/29/23 1649)   Vital Signs (24h Range):  Temp:  [97.6 °F (36.4 °C)] 97.6 °F (36.4 °C)  Pulse:  [60-80] 74  Resp:  [16-23] 16  SpO2:  [87 %-100 %] 100 %  BP: (83-98)/(46-60) 98/57     Weight: 44.5 kg (98 lb)  Body mass index is 18.52 kg/m².    Physical Exam  Vitals and nursing note reviewed.   Constitutional:       General: He is not in acute distress.     Appearance: He is ill-appearing and diaphoretic.   HENT:      Head: Atraumatic.      Comments: Temporal wasting     Nose: Nose normal.      Mouth/Throat:      Mouth: Mucous membranes are dry.   Eyes:      Extraocular Movements: Extraocular movements intact.      Pupils: Pupils are equal, round, and reactive to light.   Neck:      Comments: Use of accessory muscles with movement  Cardiovascular:      Rate and Rhythm: Regular rhythm.  Tachycardia present.      Heart sounds:     Gallop present.   Pulmonary:      Breath sounds: Rhonchi and rales present.      Comments: Left lung base  Abdominal:      General: There is distension.      Tenderness: There is no abdominal tenderness. There is no guarding or rebound.   Musculoskeletal:      Cervical back: Normal range of motion and neck supple.      Comments: 3/5 generalized strength   Skin:     General: Skin is warm.   Neurological:      Mental Status: He is alert.      Motor: Weakness present.      Comments: Oriented to person and place   Psychiatric:      Comments: Dulled affect         CRANIAL NERVES     CN III, IV, VI   Pupils are equal, round, and reactive to light.     Significant Labs: All pertinent labs within the past 24 hours have been reviewed.  Recent Lab Results         03/29/23  1315   03/29/23  1310   03/29/23  1136        Albumin     4.0       Alkaline Phosphatase     90       ALT     143       Anion Gap     16       AST     241       Baso #     0.04       Basophil %     0.5       BILIRUBIN TOTAL     1.5  Comment: For infants and newborns, interpretation of results should be based  on gestational age, weight and in agreement with clinical  observations.    Premature Infant recommended reference ranges:  Up to 24 hours.............<8.0 mg/dL  Up to 48 hours............<12.0 mg/dL  3-5 days..................<15.0 mg/dL  6-29 days.................<15.0 mg/dL         BNP     96  Comment: Values of less than 100 pg/ml are consistent with non-CHF populations.       BUN     75       Calcium     9.6       Chloride     92       CO2     23       Creatinine     4.1       Differential Method     Automated       eGFR     15.3       Eos #     0.2       Eosinophil %     2.3       Free T4     0.96       Glucose     115       Gran # (ANC)     6.1       Gran %     82.4       Group & Rh A POS           Hematocrit     38.9       Hemoglobin     13.3       Immature Grans (Abs)     0.04  Comment: Mild  elevation in immature granulocytes is non specific and   can be seen in a variety of conditions including stress response,   acute inflammation, trauma and pregnancy. Correlation with other   laboratory and clinical findings is essential.         Immature Granulocytes     0.5       INDIRECT LUCIANA NEG           INR     1.1  Comment: Coumadin Therapy:  2.0 - 3.0 for INR for all indicators except mechanical heart valves  and antiphospholipid syndromes which should use 2.5 - 3.5.         Lactate, Kalpesh 1.3  Comment: Falsely low lactic acid results can be found in samples   containing >=13.0 mg/dL total bilirubin and/or >=3.5 mg/dL   direct bilirubin.             Lipase     60       Lymph #     0.5       Lymph %     6.6       Magnesium     2.4       MCH     34.0       MCHC     34.2       MCV     100       Mono #     0.6       Mono %     7.7       MPV     8.6       nRBC     0       Platelets     186       Potassium     5.9       PROTEIN TOTAL     8.2       Protime     11.4       RBC     3.91       RDW     15.0       SARS-CoV-2 RNA, Amplification, Qual   Negative  Comment: This test utilizes isothermal nucleic acid amplification technology   to   detect the SARS-CoV-2 RdRp nucleic acid segment. The analytical   sensitivity   (limit of detection) is 500 copies/swab.     A POSITIVE result is indicative of the presence of SARS-CoV-2 RNA;   clinical   correlation with patient history and other diagnostic information is   necessary to determine patient infection status.    A NEGATIVE result means that SARS-CoV-2 nucleic acids are not present   above   the limit of detection. A NEGATIVE result should be treated as   presumptive.   It does not rule out the possibility of COVID-19 and should not be   the sole   basis for treatment decisions. If COVID-19 is strongly suspected   based on   clinical and exposure history, re-testing using an alternate   molecular assay   should be considered.     This test is only for use under the  Food and Drug Administration s   Emergency   Use Authorization (EUA).     Commercial kits are provided by [employer]. Performance   characteristics of the EUA have been independently verified by   [facility]  _________________________________________________________________   The authorized Fact Sheet for Healthcare Providers and the authorized   Fact   Sheet for Patients of the ID NOW COVID-19 are available on the FDA   website:   https://www.fda.gov/media/408437/download   https://www.fda.gov/media/368942/download           Sodium     131       Specimen Outdate 04/01/2023 23:59           Troponin I High Sensitivity     87.2  Comment: Troponin results differ between methods. Do not use   results between Troponin methods interchangeably.    Alkaline Phospatase levels above 400 U/L may   cause false positive results.    Access hsTnI should not be used for patients taking   Asfotase janette (Strensiq).  Results confirmed, test repeated  Troponin critical result(s) repeated. Called and verbal readback   obtained from Kavya Lan ED, RN. by SLT1 03/29/2023 12:48         TSH     22.400       WBC     7.42               Significant Imaging: I have reviewed all pertinent imaging results/findings within the past 24 hours.    Assessment/Plan:     * Hypotension  Dehydration  Pneumonia  IV fluids  Midline   Levophed drip to maintain a MAP > 65  Admit to ICU      AICD (automatic cardioverter/defibrillator) present  EF 20 % in February  Serial troponin's  Cardiology consulted with concern cardio renal syndrome      MEIR (acute kidney injury)  Patient with acute kidney injury likely due to IVVD/dehydration MEIR is currently worsening. Labs reviewed- Renal function/electrolytes with Estimated Creatinine Clearance: 11.2 mL/min (A) (based on SCr of 4.1 mg/dL (H)). according to latest data. Monitor urine output and serial BMP and adjust therapy as needed. Avoid nephrotoxins and renally dose meds  for GFR listed above.   IV fluids at 50 cc per hour   Nephrology consulted        VTE Risk Mitigation (From admission, onward)         Ordered     IP VTE HIGH RISK PATIENT  Once         03/29/23 1703     Place sequential compression device  Until discontinued         03/29/23 1703                   On 03/29/2023, patient should be placed in hospital observation services under my care.        Jaguar Gonzalez MD  Department of Hospital Medicine  [facility] - Emergency Dept

## 2023-03-30 ENCOUNTER — CLINICAL SUPPORT (OUTPATIENT)
Dept: CARDIOLOGY | Facility: HOSPITAL | Age: 68
DRG: 291 | End: 2023-03-30
Attending: INTERNAL MEDICINE
Payer: MEDICARE

## 2023-03-30 VITALS — BODY MASS INDEX: 18.81 KG/M2 | HEIGHT: 61 IN | WEIGHT: 99.63 LBS

## 2023-03-30 LAB
ALBUMIN SERPL BCP-MCNC: 3.5 G/DL (ref 3.5–5.2)
ALP SERPL-CCNC: 78 U/L (ref 55–135)
ALT SERPL W/O P-5'-P-CCNC: 141 U/L (ref 10–44)
ANION GAP SERPL CALC-SCNC: 12 MMOL/L (ref 8–16)
ANION GAP SERPL CALC-SCNC: 14 MMOL/L (ref 8–16)
AORTIC ROOT ANNULUS: 3 CM
AORTIC VALVE CUSP SEPERATION: 2.2 CM
ASCENDING AORTA: 3.1 CM
AST SERPL-CCNC: 199 U/L (ref 10–40)
AV INDEX (PROSTH): 0.54
AV MEAN GRADIENT: 6 MMHG
AV PEAK GRADIENT: 12 MMHG
AV REGURGITATION PRESSURE HALF TIME: 720 MS
AV VALVE AREA: 2.05 CM2
AV VELOCITY RATIO: 0.66
BACTERIA #/AREA URNS HPF: NEGATIVE /HPF
BASOPHILS # BLD AUTO: 0.07 K/UL (ref 0–0.2)
BASOPHILS NFR BLD: 0.6 % (ref 0–1.9)
BILIRUB SERPL-MCNC: 1.7 MG/DL (ref 0.1–1)
BILIRUB UR QL STRIP: NEGATIVE
BSA FOR ECHO PROCEDURE: 1.39 M2
BUN SERPL-MCNC: 69 MG/DL (ref 8–23)
BUN SERPL-MCNC: 71 MG/DL (ref 8–23)
CALCIUM SERPL-MCNC: 8.8 MG/DL (ref 8.7–10.5)
CALCIUM SERPL-MCNC: 8.8 MG/DL (ref 8.7–10.5)
CHLORIDE SERPL-SCNC: 95 MMOL/L (ref 95–110)
CHLORIDE SERPL-SCNC: 96 MMOL/L (ref 95–110)
CLARITY UR: CLEAR
CO2 SERPL-SCNC: 17 MMOL/L (ref 23–29)
CO2 SERPL-SCNC: 19 MMOL/L (ref 23–29)
COLOR UR: YELLOW
CREAT SERPL-MCNC: 3 MG/DL (ref 0.5–1.4)
CREAT SERPL-MCNC: 3.4 MG/DL (ref 0.5–1.4)
CREAT UR-MCNC: 91 MG/DL (ref 23–375)
CV ECHO LV RWT: 0.29 CM
DIFFERENTIAL METHOD: ABNORMAL
DOP CALC AO PEAK VEL: 1.75 M/S
DOP CALC AO VTI: 27.8 CM
DOP CALC LVOT AREA: 3.8 CM2
DOP CALC LVOT DIAMETER: 2.2 CM
DOP CALC LVOT PEAK VEL: 1.15 M/S
DOP CALC LVOT STROKE VOLUME: 56.99 CM3
DOP CALC MV VTI: 21.1 CM
DOP CALCLVOT PEAK VEL VTI: 15 CM
E WAVE DECELERATION TIME: 256 MSEC
E/A RATIO: 0.74
E/E' RATIO: 10.5 M/S
ECHO LV POSTERIOR WALL: 0.82 CM (ref 0.6–1.1)
EJECTION FRACTION: 15 %
EOSINOPHIL # BLD AUTO: 0.1 K/UL (ref 0–0.5)
EOSINOPHIL NFR BLD: 0.6 % (ref 0–8)
ERYTHROCYTE [DISTWIDTH] IN BLOOD BY AUTOMATED COUNT: 15.1 % (ref 11.5–14.5)
EST. GFR  (NO RACE VARIABLE): 19.1 ML/MIN/1.73 M^2
EST. GFR  (NO RACE VARIABLE): 22.2 ML/MIN/1.73 M^2
FRACTIONAL SHORTENING: 6 % (ref 28–44)
GLUCOSE SERPL-MCNC: 146 MG/DL (ref 70–110)
GLUCOSE SERPL-MCNC: 155 MG/DL (ref 70–110)
GLUCOSE SERPL-MCNC: 158 MG/DL (ref 70–110)
GLUCOSE SERPL-MCNC: 176 MG/DL (ref 70–110)
GLUCOSE UR QL STRIP: NEGATIVE
HCT VFR BLD AUTO: 33.9 % (ref 40–54)
HGB BLD-MCNC: 11.8 G/DL (ref 14–18)
HGB UR QL STRIP: ABNORMAL
HYALINE CASTS #/AREA URNS LPF: 1 /LPF
IMM GRANULOCYTES # BLD AUTO: 0.06 K/UL (ref 0–0.04)
IMM GRANULOCYTES NFR BLD AUTO: 0.5 % (ref 0–0.5)
INTERVENTRICULAR SEPTUM: 0.82 CM (ref 0.6–1.1)
IVC DIAMETER: 1.47 CM
IVRT: 77 MSEC
KETONES UR QL STRIP: NEGATIVE
LEFT ATRIUM SIZE: 3.1 CM
LEFT ATRIUM VOLUME INDEX MOD: 41.5 ML/M2
LEFT ATRIUM VOLUME MOD: 58.1 CM3
LEFT INTERNAL DIMENSION IN SYSTOLE: 5.2 CM (ref 2.1–4)
LEFT VENTRICLE DIASTOLIC VOLUME INDEX: 107.86 ML/M2
LEFT VENTRICLE DIASTOLIC VOLUME: 151 ML
LEFT VENTRICLE MASS INDEX: 120 G/M2
LEFT VENTRICLE SYSTOLIC VOLUME INDEX: 92.9 ML/M2
LEFT VENTRICLE SYSTOLIC VOLUME: 130 ML
LEFT VENTRICULAR INTERNAL DIMENSION IN DIASTOLE: 5.56 CM (ref 3.5–6)
LEFT VENTRICULAR MASS: 168.14 G
LEUKOCYTE ESTERASE UR QL STRIP: NEGATIVE
LV LATERAL E/E' RATIO: 10.5 M/S
LV SEPTAL E/E' RATIO: 10.5 M/S
LVOT MG: 2 MMHG
LVOT MV: 0.59 CM/S
LYMPHOCYTES # BLD AUTO: 0.6 K/UL (ref 1–4.8)
LYMPHOCYTES NFR BLD: 5.6 % (ref 18–48)
MAGNESIUM SERPL-MCNC: 2 MG/DL (ref 1.6–2.6)
MCH RBC QN AUTO: 34 PG (ref 27–31)
MCHC RBC AUTO-ENTMCNC: 34.8 G/DL (ref 32–36)
MCV RBC AUTO: 98 FL (ref 82–98)
MICROSCOPIC COMMENT: ABNORMAL
MONOCYTES # BLD AUTO: 1 K/UL (ref 0.3–1)
MONOCYTES NFR BLD: 8.5 % (ref 4–15)
MRSA SCREEN BY PCR: NEGATIVE
MV MEAN GRADIENT: 1 MMHG
MV PEAK A VEL: 0.57 M/S
MV PEAK E VEL: 0.42 M/S
MV PEAK GRADIENT: 2 MMHG
MV VALVE AREA BY CONTINUITY EQUATION: 2.7 CM2
NEUTROPHILS # BLD AUTO: 9.7 K/UL (ref 1.8–7.7)
NEUTROPHILS NFR BLD: 84.2 % (ref 38–73)
NITRITE UR QL STRIP: NEGATIVE
NRBC BLD-RTO: 0 /100 WBC
PH UR STRIP: 5 [PH] (ref 5–8)
PISA AR MAX VEL: 4.33 M/S
PISA TR MAX VEL: 2.53 M/S
PLATELET # BLD AUTO: 217 K/UL (ref 150–450)
PMV BLD AUTO: 8.3 FL (ref 9.2–12.9)
POTASSIUM SERPL-SCNC: 4.9 MMOL/L (ref 3.5–5.1)
POTASSIUM SERPL-SCNC: 5.4 MMOL/L (ref 3.5–5.1)
PROT SERPL-MCNC: 7.2 G/DL (ref 6–8.4)
PROT UR QL STRIP: ABNORMAL
PV MV: 0.68 M/S
PV PEAK VELOCITY: 1.03 CM/S
RA PRESSURE: 3 MMHG
RBC # BLD AUTO: 3.47 M/UL (ref 4.6–6.2)
RBC #/AREA URNS HPF: 6 /HPF (ref 0–4)
RIGHT VENTRICULAR END-DIASTOLIC DIMENSION: 1.83 CM
RV TISSUE DOPPLER FREE WALL SYSTOLIC VELOCITY 1 (APICAL 4 CHAMBER VIEW): 0.01 CM/S
SODIUM SERPL-SCNC: 126 MMOL/L (ref 136–145)
SODIUM SERPL-SCNC: 127 MMOL/L (ref 136–145)
SODIUM UR-SCNC: 19 MMOL/L (ref 20–250)
SP GR UR STRIP: 1.01 (ref 1–1.03)
SQUAMOUS #/AREA URNS HPF: 0 /HPF
TDI LATERAL: 0.04 M/S
TDI SEPTAL: 0.04 M/S
TDI: 0.04 M/S
TR MAX PG: 26 MMHG
TRICUSPID ANNULAR PLANE SYSTOLIC EXCURSION: 0.57 CM
TV REST PULMONARY ARTERY PRESSURE: 29 MMHG
URN SPEC COLLECT METH UR: ABNORMAL
UROBILINOGEN UR STRIP-ACNC: NEGATIVE EU/DL
UUN UR-MCNC: 665 MG/DL (ref 140–1050)
WBC # BLD AUTO: 11.47 K/UL (ref 3.9–12.7)
WBC #/AREA URNS HPF: 2 /HPF (ref 0–5)

## 2023-03-30 PROCEDURE — 84300 ASSAY OF URINE SODIUM: CPT | Performed by: INTERNAL MEDICINE

## 2023-03-30 PROCEDURE — 83735 ASSAY OF MAGNESIUM: CPT | Performed by: INTERNAL MEDICINE

## 2023-03-30 PROCEDURE — 93306 ECHO (CUPID ONLY): ICD-10-PCS | Mod: 26,,, | Performed by: SPECIALIST

## 2023-03-30 PROCEDURE — 93306 TTE W/DOPPLER COMPLETE: CPT | Mod: 26,,, | Performed by: SPECIALIST

## 2023-03-30 PROCEDURE — 96366 THER/PROPH/DIAG IV INF ADDON: CPT

## 2023-03-30 PROCEDURE — 93306 TTE W/DOPPLER COMPLETE: CPT

## 2023-03-30 PROCEDURE — 94640 AIRWAY INHALATION TREATMENT: CPT

## 2023-03-30 PROCEDURE — 80053 COMPREHEN METABOLIC PANEL: CPT | Performed by: INTERNAL MEDICINE

## 2023-03-30 PROCEDURE — 84540 ASSAY OF URINE/UREA-N: CPT | Performed by: INTERNAL MEDICINE

## 2023-03-30 PROCEDURE — 25000003 PHARM REV CODE 250: Performed by: INTERNAL MEDICINE

## 2023-03-30 PROCEDURE — 81001 URINALYSIS AUTO W/SCOPE: CPT | Performed by: INTERNAL MEDICINE

## 2023-03-30 PROCEDURE — 80048 BASIC METABOLIC PNL TOTAL CA: CPT | Performed by: INTERNAL MEDICINE

## 2023-03-30 PROCEDURE — 92610 EVALUATE SWALLOWING FUNCTION: CPT

## 2023-03-30 PROCEDURE — 85025 COMPLETE CBC W/AUTO DIFF WBC: CPT | Performed by: INTERNAL MEDICINE

## 2023-03-30 PROCEDURE — 20000000 HC ICU ROOM

## 2023-03-30 PROCEDURE — 27000221 HC OXYGEN, UP TO 24 HOURS

## 2023-03-30 PROCEDURE — 99223 1ST HOSP IP/OBS HIGH 75: CPT | Mod: 25,,, | Performed by: SPECIALIST

## 2023-03-30 PROCEDURE — 99223 PR INITIAL HOSPITAL CARE,LEVL III: ICD-10-PCS | Mod: 25,,, | Performed by: SPECIALIST

## 2023-03-30 PROCEDURE — 25000003 PHARM REV CODE 250

## 2023-03-30 PROCEDURE — 94761 N-INVAS EAR/PLS OXIMETRY MLT: CPT

## 2023-03-30 PROCEDURE — A4216 STERILE WATER/SALINE, 10 ML: HCPCS | Performed by: INTERNAL MEDICINE

## 2023-03-30 PROCEDURE — 25000242 PHARM REV CODE 250 ALT 637 W/ HCPCS: Performed by: INTERNAL MEDICINE

## 2023-03-30 PROCEDURE — 97161 PT EVAL LOW COMPLEX 20 MIN: CPT

## 2023-03-30 PROCEDURE — 82570 ASSAY OF URINE CREATININE: CPT | Performed by: INTERNAL MEDICINE

## 2023-03-30 PROCEDURE — 99223 PR INITIAL HOSPITAL CARE,LEVL III: ICD-10-PCS | Mod: ,,, | Performed by: INTERNAL MEDICINE

## 2023-03-30 PROCEDURE — 99223 1ST HOSP IP/OBS HIGH 75: CPT | Mod: ,,, | Performed by: INTERNAL MEDICINE

## 2023-03-30 PROCEDURE — 87641 MR-STAPH DNA AMP PROBE: CPT | Performed by: INTERNAL MEDICINE

## 2023-03-30 RX ORDER — CARVEDILOL 12.5 MG/1
12.5 TABLET ORAL 2 TIMES DAILY
Status: DISCONTINUED | OUTPATIENT
Start: 2023-03-30 | End: 2023-03-31

## 2023-03-30 RX ORDER — CHLORHEXIDINE GLUCONATE ORAL RINSE 1.2 MG/ML
15 SOLUTION DENTAL 2 TIMES DAILY
Status: DISCONTINUED | OUTPATIENT
Start: 2023-03-30 | End: 2023-04-03

## 2023-03-30 RX ORDER — IPRATROPIUM BROMIDE AND ALBUTEROL SULFATE 2.5; .5 MG/3ML; MG/3ML
3 SOLUTION RESPIRATORY (INHALATION)
Status: DISCONTINUED | OUTPATIENT
Start: 2023-03-30 | End: 2023-04-03

## 2023-03-30 RX ORDER — ATORVASTATIN CALCIUM 20 MG/1
20 TABLET, FILM COATED ORAL DAILY
Status: DISCONTINUED | OUTPATIENT
Start: 2023-03-31 | End: 2023-04-03

## 2023-03-30 RX ORDER — MUPIROCIN 20 MG/G
OINTMENT TOPICAL 2 TIMES DAILY
Status: DISCONTINUED | OUTPATIENT
Start: 2023-03-30 | End: 2023-04-03

## 2023-03-30 RX ADMIN — SODIUM CHLORIDE, PRESERVATIVE FREE 10 ML: 5 INJECTION INTRAVENOUS at 09:03

## 2023-03-30 RX ADMIN — CARVEDILOL 25 MG: 12.5 TABLET, FILM COATED ORAL at 12:03

## 2023-03-30 RX ADMIN — NOREPINEPHRINE BITARTRATE 0.8 MCG/KG/MIN: 8 INJECTION, SOLUTION INTRAVENOUS at 08:03

## 2023-03-30 RX ADMIN — LEVOTHYROXINE SODIUM 75 MCG: 0.03 TABLET ORAL at 12:03

## 2023-03-30 RX ADMIN — APIXABAN 2.5 MG: 2.5 TABLET, FILM COATED ORAL at 10:03

## 2023-03-30 RX ADMIN — NOREPINEPHRINE BITARTRATE 0.42 MCG/KG/MIN: 8 INJECTION, SOLUTION INTRAVENOUS at 04:03

## 2023-03-30 RX ADMIN — PANTOPRAZOLE SODIUM 40 MG: 40 TABLET, DELAYED RELEASE ORAL at 12:03

## 2023-03-30 RX ADMIN — CHLORHEXIDINE GLUCONATE 15 ML: 1.2 RINSE ORAL at 10:03

## 2023-03-30 RX ADMIN — MUPIROCIN 1 G: 20 OINTMENT TOPICAL at 10:03

## 2023-03-30 RX ADMIN — IPRATROPIUM BROMIDE AND ALBUTEROL SULFATE 3 ML: 2.5; .5 SOLUTION RESPIRATORY (INHALATION) at 07:03

## 2023-03-30 RX ADMIN — MUPIROCIN 1 G: 20 OINTMENT TOPICAL at 12:03

## 2023-03-30 RX ADMIN — LATANOPROST 1 DROP: 50 SOLUTION OPHTHALMIC at 10:03

## 2023-03-30 RX ADMIN — NOREPINEPHRINE BITARTRATE 0.43 MCG/KG/MIN: 8 INJECTION, SOLUTION INTRAVENOUS at 10:03

## 2023-03-30 RX ADMIN — APIXABAN 2.5 MG: 2.5 TABLET, FILM COATED ORAL at 12:03

## 2023-03-30 RX ADMIN — NOREPINEPHRINE BITARTRATE 0.94 MCG/KG/MIN: 1 INJECTION, SOLUTION, CONCENTRATE INTRAVENOUS at 11:03

## 2023-03-30 RX ADMIN — CHLORHEXIDINE GLUCONATE 15 ML: 1.2 RINSE ORAL at 12:03

## 2023-03-30 RX ADMIN — SODIUM ZIRCONIUM CYCLOSILICATE 10 G: 10 POWDER, FOR SUSPENSION ORAL at 12:03

## 2023-03-30 RX ADMIN — IPRATROPIUM BROMIDE AND ALBUTEROL SULFATE 3 ML: 2.5; .5 SOLUTION RESPIRATORY (INHALATION) at 01:03

## 2023-03-30 NOTE — PLAN OF CARE
Per Careport response, [facility] is accepting patient.  Case Management to update as patient progresses and discharges.        03/30/23 1506   Post-Acute Status   Post-Acute Authorization Home [facility]   Home Health Status Set-up Complete/Auth obtained

## 2023-03-30 NOTE — CARE UPDATE
03/30/23 0719   Patient Assessment/Suction   Level of Consciousness (AVPU) alert   Respiratory Effort Normal   All Lung Fields Breath Sounds diminished;crackles, fine   Cough Frequency infrequent   PRE-TX-O2   Device (Oxygen Therapy) nasal cannula   $ Is the patient on Low Flow Oxygen? Yes   Flow (L/min) 1   SpO2 99 %   Pulse Oximetry Type Continuous   $ Pulse Oximetry - Multiple Charge Pulse Oximetry - Multiple   Pulse 64   Resp 16   Aerosol Therapy   $ Aerosol Therapy Charges Aerosol Treatment   Respiratory Treatment Status (SVN) given   Treatment Route (SVN) mask;oxygen   Patient Position (SVN) HOB elevated   Signs of Intolerance (SVN) none   Breath Sounds Post-Respiratory Treatment   Throughout All Fields Post-Treatment All Fields   Throughout All Fields Post-Treatment aeration increased   Post-treatment Heart Rate (beats/min) 59   Post-treatment Resp Rate (breaths/min) 22

## 2023-03-30 NOTE — ANESTHESIA PROCEDURE NOTES
Arterial line    Diagnosis: Hypotension    Patient location during procedure: ICU  Procedure start time: 3/29/2023 8:30 PM  Timeout: 3/29/2023 8:30 PM  Procedure end time: 3/29/2023 8:35 PM    Staffing  Authorizing Provider: Kunal Arisa MD  Performing Provider: Kunal Arias MD    Anesthesiologist was present at the time of the procedure.    Preanesthetic Checklist  Completed: patient identified, risks and benefits discussed, timeout performed and anesthesia consent givenArterial line  Skin Prep: chlorhexidine gluconate  Local Infiltration: lidocaine  Orientation: right  Location: radial    Catheter Size: 20 G  Catheter placement by Ultrasound guidance. Heme positive aspiration all ports.   Vessel Caliber: small, patent, compressibility normal  Vascular Doppler:  not done  Needle advanced into vessel with real time Ultrasound guidance.  Sterile sheath used.Insertion Attempts: 1  Assessment  Dressing: sutured in place and taped and tegaderm  Patient: Tolerated well

## 2023-03-30 NOTE — PLAN OF CARE
Patient resting in bed. Orientated x4. Calm, flat affect. Watching tv.    With muscle wasting / cachexia and generalized weakness.    Levophed requirements are pretty high , 0.44 (per midline for now).    A line connections tightened, it aspirates blood. Flushed / whip , square wave.    Up in chair now, feels ok. Voiding per urinal, decreased output, yohannes. Post void residual only about 80ml.    The bed is low and alarm on. Clinical alarms reviewed and armed. Monitor strip printed and reviewed, sinus rhythm.    ...  Ok to swallow now iddsi 5 nectar thick with precautions, but will go for mbss tomorrow. I crushed his pills and mixed it with jello, patient tolerated. About 25% of lunch. Had most of a nepro.    Dr frank at bedside, patient will be dnr.    Rechecked bmp at 1400. Titrated down levophed slightly.

## 2023-03-30 NOTE — PROGRESS NOTES
Therapy with Vancomycin complete and / or consult / order discontinued by Dr Rosales on 3/30 @ 3153     Pharmacy will sign off, please re-consult as needed.  Thank you for allowing us to participate in this patient's care.  Terence Hooper 3/30/2023 1:47 PM  Dept of Pharmacy  Ext [phone]

## 2023-03-30 NOTE — PROGRESS NOTES
Pharmacist Renal Dose Adjustment Note    Cleveland Capps is a 67 y.o. male being treated with the medication APIXABAN    Patient Data:    Vital Signs (Most Recent):  Temp: 97.7 °F (36.5 °C) (03/29/23 1901)  Pulse: 65 (03/29/23 1945)  Resp: 18 (03/29/23 1945)  BP: (!) 105/57 (03/29/23 1945)  SpO2: 100 % (03/29/23 1945)   Vital Signs (72h Range):  Temp:  [97.6 °F (36.4 °C)-98.1 °F (36.7 °C)]   Pulse:  [56-80]   Resp:  [14-23]   BP: ()/(46-60)   SpO2:  [87 %-100 %]      Recent Labs   Lab 03/23/23  1612 03/29/23  1136 03/29/23  1819   CREATININE 2.3* 4.1* 3.9*     Serum creatinine: 3.9 mg/dL (H) 03/29/23 1819  Estimated creatinine clearance: 11.9 mL/min (A)    APIXABAN 5 MG BID will be changed to APIXABAN 2.5 MG BID   patient has any 2 of the following: age > 80 years, body weight < 60 kg, serum creatinine > 1.5 mg/dL:   2.5 mg PO BID with or without food   If patient has any 2 of the above and is receiving concomitant ketoconazole, itraconazole, ritonavir, or clarithromycin:    recommends to avoid co-administration.   Contact prescriber for dosing change or drug selection change        Pharmacist's Name: eHather Johns  Pharmacist's Extension: [phone]

## 2023-03-30 NOTE — PLAN OF CARE
[facility]  Initial Discharge Assessment       Primary Care Provider: Romero Snyder MD    Admission Diagnosis: Pneumonia due to infectious organism, unspecified laterality, unspecified part of lung [J18.9]    Admission Date: 3/29/2023  Expected Discharge Date: 4/1/2023    Discharge Barriers Identified: None    Patient states he lives alone but has support from brother.  States he was recently discharged from [facility].  As therapy is recommending home health services at this time, patient states he is agreeable and would like to use [facility] again, if possible.  Patient choice form signed accordingly.     Payor: [employer] MEDICARE / Plan: [employer] HMO PPO SPECIAL NEEDS / Product Type: Medicare Advantage /     Extended Emergency Contact Information  Primary Emergency Contact: Anthony Capps  Address: Unknown   [place]  Mobile Phone: [phone]  Relation: Brother  Preferred language: English   needed? No    Discharge Plan A: Home Health  Discharge Plan B: Home Health      [facility] [address] [place] [address] [address] [address]  Phone: [phone] Fax: [phone]    [facility] Mail [facility] - [facility] [address]  [address] [facility] [address]  Phone: [phone] Fax: [phone]      Initial Assessment (most recent)       Adult Discharge Assessment - 03/30/23 1415          Discharge Assessment    Assessment Type Discharge Planning Assessment     Confirmed/corrected address, phone number and insurance Yes     Confirmed Demographics Correct on Facesheet     Source of Information patient   Assessment completed at bedside    People in Home alone     Facility Arrived From: Home     Do you expect to return to your current living situation? Yes     Do you have help at home or someone to help you manage your care at home? Yes     Who are your caregiver(s) and their phone number(s)?  Anthony Capps (Brother)   [phone] (Mobile)     Prior to hospitilization cognitive status: Alert/Oriented;No Deficits     Current cognitive status: No Deficits;Alert/Oriented     Walking or Climbing Stairs ambulation difficulty, requires equipment     Mobility Management Walker and wheelchair     Equipment Currently Used at Home walker, rolling;shower chair;rollator;wheelchair;nebulizer     Readmission within 30 days? No     Patient currently being followed by outpatient case management? No     Do you currently have service(s) that help you manage your care at home? Yes     Name and Contact number of agency [facility]     Do you take prescription medications? Yes     Do you have prescription coverage? Yes     Coverage [employer]     Do you have any problems affording any of your prescribed medications? No     Is the patient taking medications as prescribed? yes     Who is going to help you get home at discharge? Brother     How do you get to doctors appointments? family or friend will provide     Are you on dialysis? No     Do you take coumadin? No     Discharge Plan A Home Health     Discharge Plan B Home Health     DME Needed Upon Discharge  none     Discharge Plan discussed with: Patient     Discharge Barriers Identified None

## 2023-03-30 NOTE — PLAN OF CARE
Referral for home health services sent via Careport to [facility], per patient preference.        03/30/23 1424   Post-Acute Status   Post-Acute Authorization Home Health   Home Health Status Referrals Sent   Patient choice form signed by patient/caregiver List with quality metrics by geographic area provided

## 2023-03-30 NOTE — CONSULTS
[facility]  Cardiology  Consult Note    Patient Name: Cleveland Capps  MRN: [id number]  Admission Date: 3/29/2023  Hospital Length of Stay: 0 days  Code Status: Full Code   Attending Provider: Tim Rosales MD   Consulting Provider: Kuldeep Daniels MD  Primary Care Physician: Romero Snyder MD  Principal Problem:Hypotension    Patient information was obtained from patient, past medical records, and ER records.     Consults  Subjective:     Chief Complaint:  Weight loss and weakness     HPI:  Pr pre oblem 1 patient has a severe dilated cardiomyopathy and has been in the hospital several times with heart failure he he was on Entresto at home in reviewing his list of medications at home which hospital he was on Ace inhibitors also he has not been significantly short of breath recently.  He denies any chest pain.  Current BNP levels are almost normal  He had bypass surgery for triple-vessel disease in 2016  .  Last year he had SANTHOSH cardioversion after he had been in atrial fibrillation for a long period of time he still is on amiodarone and TSH is high  Problem the2-he has lost weight and is anorectic possibly related to medication    Past Medical History:   Diagnosis Date    A-fib     Acute kidney injury     COPD (chronic obstructive pulmonary disease)     Coronary artery disease     Diabetes mellitus     Encounter for blood transfusion     Former smoker     Hypertension     Myocardial infarction     Thyroid disease     Type 2 diabetes mellitus without complications        Past Surgical History:   Procedure Laterality Date    CLOSURE OF LEFT ATRIAL APPENDAGE USING DEVICE N/A 5/17/2022    Procedure: Left atrial appendage closure device;  Surgeon: Tre Schmidt MD;  Location: [facility] CATH LAB;  Service: Cardiology;  Laterality: N/A;    COLONOSCOPY N/A 1/3/2017    Procedure: COLONOSCOPY;  Surgeon: Marcus Green MD;  Location: [facility];  Service: Endoscopy;  Laterality: N/A;    CORONARY BYPASS  GRAFT ANGIOGRAPHY  3/30/2021    Procedure: Bypass graft study;  Surgeon: Tre Schmidt MD;  Location: [facility] CATH LAB;  Service: Cardiology;;    CORONARY STENT PLACEMENT      GASTROSTOMY TUBE PLACEMENT      INSERTION OF PACEMAKER  04/2017    INTRAVASCULAR ULTRASOUND, NON-CORONARY  5/17/2022    Procedure: Intravascular Ultrasound, Non-Coronary;  Surgeon: Tre Schmidt MD;  Location: [facility] CATH LAB;  Service: Cardiology;;    LEFT HEART CATHETERIZATION N/A 3/30/2021    Procedure: Left heart cath;  Surgeon: Tre Schmidt MD;  Location: [facility] CATH LAB;  Service: Cardiology;  Laterality: N/A;    PEG TUBE REMOVAL      TRACHEOSTOMY CLOSURE      TRACHEOSTOMY TUBE PLACEMENT      TRANSESOPHAGEAL ECHOCARDIOGRAPHY N/A 5/17/2022    Procedure: ECHOCARDIOGRAM, TRANSESOPHAGEAL;  Surgeon: [facility] Diagnostic Provider;  Location: [facility] CATH LAB;  Service: Anesthesiology;  Laterality: N/A;    TREATMENT OF CARDIAC ARRHYTHMIA N/A 11/25/2022    Procedure: Cardioversion or Defibrillation;  Surgeon: Kuldeep Daniels MD;  Location: [facility] CATH LAB;  Service: Cardiology;  Laterality: N/A;    UPPER GASTROINTESTINAL ENDOSCOPY  05/2016    Dr. Zarco with PEG tube placement       Review of patient's allergies indicates:   Allergen Reactions    Penicillin      Other reaction(s): anaphylaxis  Other reaction(s): anaphylaxis    Penicillins Hives       No current facility-administered medications on file prior to encounter.     Current Outpatient Medications on File Prior to Encounter   Medication Sig    albuterol-ipratropium (DUO-NEB) 2.5 mg-0.5 mg/3 mL nebulizer solution Take 3 mLs by nebulization 3 (three) times daily.    amiodarone (PACERONE) 200 MG Tab Take 1 tablet (200 mg total) by mouth once daily.    apixaban (ELIQUIS) 5 mg Tab Take 5 mg by mouth 2 (two) times daily.    atorvastatin (LIPITOR) 80 MG tablet Take 80 mg by mouth once daily.    carvediloL (COREG) 25 MG tablet Take 25 mg by mouth 2 (two) times daily.    docusate sodium (COLACE)  50 MG capsule Take 2 capsules (100 mg total) by mouth 2 (two) times daily as needed for Constipation.    enalapril (VASOTEC) 20 MG tablet Take 20 mg by mouth once daily.    isosorbide mononitrate (IMDUR) 30 MG 24 hr tablet Take 30 mg by mouth once daily.    latanoprost 0.005 % ophthalmic solution Place 1 drop into both eyes every evening.    levothyroxine (SYNTHROID) 75 MCG tablet Take 1 tablet (75 mcg total) by mouth before breakfast.    pantoprazole (PROTONIX) 40 MG tablet Take 40 mg by mouth every morning.    potassium chloride SA (K-DUR,KLOR-CON M) 10 MEQ tablet Take 10 mEq by mouth once daily.    sacubitriL-valsartan (ENTRESTO) 24-26 mg per tablet Take 1 tablet by mouth 2 (two) times daily.    spironolactone (ALDACTONE) 25 MG tablet Take 1 tablet (25 mg total) by mouth once daily.    torsemide (DEMADEX) 20 MG Tab Take 1 tablet (20 mg total) by mouth once daily.    travoprost, benzalkonium, (TRAVATAN) 0.004 % ophthalmic solution Place 1 drop into both eyes nightly.    COMP-AIR NEBULIZER COMPRESSOR Alfreda use as directed    TRUE METRIX GLUCOSE TEST STRIP Strp USE 1 STRIP TO CHECK GLUCOSE ONCE DAILY     Family History       Problem Relation (Age of Onset)    Diabetes Mother, Brother    Glaucoma Father, Brother    Heart disease Mother    Macular degeneration Father, Brother    No Known Problems Sister, Maternal Aunt, Maternal Uncle, Paternal Aunt, Paternal Uncle, Maternal Grandmother, Maternal Grandfather, Paternal Grandmother, Paternal Grandfather          Tobacco Use    Smoking status: Former     Types: Cigarettes     Quit date: 2005     Years since quittin.3    Smokeless tobacco: Never    Tobacco comments:     quit    Substance and Sexual Activity    Alcohol use: No    Drug use: No    Sexual activity: Yes     Review of Systems   Constitutional: Positive for diaphoresis, malaise/fatigue and weight loss.   HENT: Negative.     Cardiovascular:  Positive for dyspnea on exertion.   Respiratory:  Positive  for shortness of breath.    Musculoskeletal:  Positive for arthritis and back pain.   Gastrointestinal:  Positive for bloating, abdominal pain, anorexia, nausea and vomiting.   Genitourinary: Negative.    Neurological: Negative.    Psychiatric/Behavioral:  Positive for depression.    Objective:     Vital Signs (Most Recent):  Temp: 97.2 °F (36.2 °C) (03/30/23 1130)  Pulse: 75 (03/30/23 1115)  Resp: (!) 22 (03/30/23 1115)  BP: (!) 86/54 (03/30/23 1100)  SpO2: 100 % (03/30/23 1115)   Vital Signs (24h Range):  Temp:  [97.2 °F (36.2 °C)-98.2 °F (36.8 °C)] 97.2 °F (36.2 °C)  Pulse:  [56-80] 75  Resp:  [14-30] 22  SpO2:  [96 %-100 %] 100 %  BP: ()/(46-65) 86/54  Arterial Line BP: ()/(41-54) 97/52     Weight: 45.2 kg (99 lb 10.4 oz)  Body mass index is 18.83 kg/m².    SpO2: 100 %         Intake/Output Summary (Last 24 hours) at 3/30/2023 1204  Last data filed at 3/30/2023 1000  Gross per 24 hour   Intake 1783.41 ml   Output 550 ml   Net 1233.41 ml       Lines/Drains/Airways       Arterial Line  Duration             Arterial Line 03/29/23 2030 Right Radial <1 day              Peripheral Intravenous Line  Duration                  Peripheral IV - Single Lumen 03/29/23 1130 20 G Anterior;Distal;Left Forearm 1 day         Midline Catheter Insertion/Assessment  - Single Lumen 03/29/23 1715 Left cephalic vein (lateral side of arm) 20g x 8cm <1 day                    Physical Exam thin male blood pressure is 105 over80  Pulse is regular at 70  Lungs are clear  Cardiac   Pres systolic heave s4 1/6 systolic murmur apex  Abdomen is soft  Extremities is exam    Significant Labs: CMP   Recent Labs   Lab 03/29/23  1136 03/29/23  1819 03/30/23  0310   * 129* 127*   K 5.9* 5.4* 5.4*   CL 92* 94* 96   CO2 23 21* 19*   * 139* 158*   BUN 75* 74* 71*   CREATININE 4.1* 3.9* 3.4*   CALCIUM 9.6 8.9 8.8   PROT 8.2  --  7.2   ALBUMIN 4.0  --  3.5   BILITOT 1.5*  --  1.7*   ALKPHOS 90  --  78   *  --  199*   ALT  143*  --  141*   ANIONGAP 16 14 12   , CBC   Recent Labs   Lab 03/29/23  1136 03/30/23  0310   WBC 7.42 11.47   HGB 13.3* 11.8*   HCT 38.9* 33.9*    217   , and Troponin No results for input(s): TROPONINI in the last 48 hours.    Significant Imaging:   Assessment and Plan:   1. Anorexia and weight loss probably related to medication  2. Chronic kidney disease besides deterioration due to dehydration  3. Ischemic cardiomyopathy with depressed ejection fraction but somewhat compensated heart failure and status post bypass 2016  4. Status post atrial fibrillation now in sinus rhythm and still on amiodarone but tsh is very high  Free T4 lower limits of normal  5. Is a severe dilated cardiomyopathy with depressed ejection fraction is doing surprisingly well clinically as far as heart maintaining sinus rhythm with a lower BNP     Plan-hold on medicines including amiodarone  Rx IV fluid  Have his family bring in the medications he is on-will readjust meds prior to discharge  Active Diagnoses:    Diagnosis Date Noted POA    PRINCIPAL PROBLEM:  Hypotension [I95.9] 02/09/2023 Yes    Dehydration [E86.0] 03/29/2023 Yes    Presence of Watchman left atrial appendage closure device [Z95.818] 11/19/2022 Yes    Status post coronary artery bypass graft [Z95.1] 11/19/2022 Not Applicable    AICD (automatic cardioverter/defibrillator) present [Z95.810] 11/18/2022 Yes    Mitral regurgitation [I34.0] 06/23/2022 Yes    MEIR (acute kidney injury) [N17.9] 04/27/2016 Yes      Problems Resolved During this Admission:     I substantially and  personally reviewed old and new ecg's, lab reports,, xray reports  and  other cardiovascular studies including  echo's, stress tests, angiogram reports, holters,and vascular studies .  In addition I evaluated original cardiac cath  __x_echo  20% ef ___x _cxr ( clear ) ______ct ____scan on [employer] or [employer] or other viewing platforms and  __x __EKG's .( Nsr (    I reviewed  office and hospital  notes Yes ____ of  referring providers and other providers.  I reviewed personally old hospital and office notes   Time spent evaluating and managing this patient:____45 ____min.     VTE Risk Mitigation (From admission, onward)           Ordered     apixaban tablet 2.5 mg  2 times daily         03/29/23 1751     Reason for No Pharmacological VTE Prophylaxis  Once        Question:  Reasons:  Answer:  Already adequately anticoagulated on oral Anticoagulants    03/29/23 1751     IP VTE HIGH RISK PATIENT  Once         03/29/23 1703     Place sequential compression device  Until discontinued         03/29/23 1703                    Thank you for your consult.     Kuldeep Daniels MD  Cardiology   [facility]

## 2023-03-30 NOTE — PT/OT/SLP EVAL
Physical Therapy Evaluation    Patient Name:  Cleveland Capps   MRN:  [id number]    Recommendations:     Discharge Recommendations: home health PT   Discharge Equipment Recommendations: none   Barriers to discharge:  decrease activity tolerance, high fall risk, new O2    Assessment:     Cleveland Capps is a 67 y.o. male admitted with a medical diagnosis of Hypotension.  He presents with the following impairments/functional limitations: weakness, impaired endurance, impaired self care skills, impaired functional mobility, gait instability, impaired balance, decreased lower extremity function, decreased safety awareness, impaired cardiopulmonary response to activity.    Pt found on bed with HOB elevated. Pt agreeable to visit. No dizziness/lightheadedness.     Rehab Prognosis: Fair; patient would benefit from acute skilled PT services to address these deficits and reach maximum level of function.    Recent Surgery: * No surgery found *      Plan:     During this hospitalization, patient to be seen 6 x/week to address the identified rehab impairments via gait training, therapeutic activities, therapeutic exercises, neuromuscular re-education and progress toward the following goals:    Plan of Care Expires:  04/30/23    Subjective     Chief Complaint: decrease mobility since hospitalization  Patient/Family Comments/goals: return home with support from brother  Pain/Comfort:  Pain Rating 1: 0/10    Patients cultural, spiritual, Zoroastrianism conflicts given the current situation: no    Living Environment:  Pt lives alone in a one story home with ramp access. No O2 at home. Brother lives next door  Prior to admission, patients level of function was MI rollato.  Equipment used at home: walker, rolling, wheelchair, rollator, shower chair.  DME owned (not currently used): none.  Upon discharge, patient will have assistance from brother.    Objective:     Communicated with RN prior to session.  Patient found HOB elevated  with peripheral IV, telemetry, blood pressure cuff, arterial line, oxygen  upon PT entry to room.    General Precautions: Standard, fall  Orthopedic Precautions:N/A   Braces: N/A  Respiratory Status: Nasal cannula, flow 1 L/min    Exams:  Cognitive Exam:  Patient is oriented to Person, Place, Time, and Situation  RLE ROM: WFL  RLE Strength: WFL  LLE ROM: WFL  LLE Strength: WFL    Functional Mobility:  Bed Mobility:     Supine to Sit: contact guard assistance  Transfers:     Sit to Stand:  contact guard assistance with no AD  Bed to Chair: contact guard assistance with  no AD  using  Stand Pivot      AM-PAC 6 CLICK MOBILITY  Total Score:17       Treatment & Education:  Pt educated on POC, discharge recommendation, importance of time OOB, effect of BP on mobility, need for assist with mobility, use of call bell to seek assistance as needed and fall prevention      Patient left up in chair with all lines intact, call button in reach, and RN present.    GOALS:   Multidisciplinary Problems       Physical Therapy Goals          Problem: Physical Therapy    Goal Priority Disciplines Outcome Goal Variances Interventions   Physical Therapy Goal     PT, PT/OT Ongoing, Progressing     Description: Goals to be met by: 23     Patient will increase functional independence with mobility by performin. Supine to sit with Supervision  2. Sit to stand transfer with Supervision  3. Bed to chair transfer with Supervision using Rolling Walker  4. Gait  x 150 feet with Supervision using Rolling Walker.                              History:     Past Medical History:   Diagnosis Date    A-fib     Acute kidney injury     COPD (chronic obstructive pulmonary disease)     Coronary artery disease     Diabetes mellitus     Encounter for blood transfusion     Former smoker     Hypertension     Myocardial infarction     Thyroid disease     Type 2 diabetes mellitus without complications        Past Surgical History:   Procedure  Laterality Date    CLOSURE OF LEFT ATRIAL APPENDAGE USING DEVICE N/A 5/17/2022    Procedure: Left atrial appendage closure device;  Surgeon: Tre Schmidt MD;  Location: [facility] CATH LAB;  Service: Cardiology;  Laterality: N/A;    COLONOSCOPY N/A 1/3/2017    Procedure: COLONOSCOPY;  Surgeon: Marcus Zarco MD;  Location: [facility];  Service: Endoscopy;  Laterality: N/A;    CORONARY BYPASS GRAFT ANGIOGRAPHY  3/30/2021    Procedure: Bypass graft study;  Surgeon: Tre Schmidt MD;  Location: [facility] CATH LAB;  Service: Cardiology;;    CORONARY STENT PLACEMENT      GASTROSTOMY TUBE PLACEMENT      INSERTION OF PACEMAKER  04/2017    INTRAVASCULAR ULTRASOUND, NON-CORONARY  5/17/2022    Procedure: Intravascular Ultrasound, Non-Coronary;  Surgeon: Tre Schmidt MD;  Location: [facility] CATH LAB;  Service: Cardiology;;    LEFT HEART CATHETERIZATION N/A 3/30/2021    Procedure: Left heart cath;  Surgeon: Tre Schmidt MD;  Location: [facility] CATH LAB;  Service: Cardiology;  Laterality: N/A;    PEG TUBE REMOVAL      TRACHEOSTOMY CLOSURE      TRACHEOSTOMY TUBE PLACEMENT      TRANSESOPHAGEAL ECHOCARDIOGRAPHY N/A 5/17/2022    Procedure: ECHOCARDIOGRAM, TRANSESOPHAGEAL;  Surgeon: [facility] Diagnostic Provider;  Location: [facility] CATH LAB;  Service: Anesthesiology;  Laterality: N/A;    TREATMENT OF CARDIAC ARRHYTHMIA N/A 11/25/2022    Procedure: Cardioversion or Defibrillation;  Surgeon: Kuldeep Daniels MD;  Location: [facility] CATH LAB;  Service: Cardiology;  Laterality: N/A;    UPPER GASTROINTESTINAL ENDOSCOPY  05/2016    Dr. Zarco with PEG tube placement       Time Tracking:     PT Received On: 03/30/23  PT Start Time: 1032     PT Stop Time: 1043  PT Total Time (min): 11 min     Billable Minutes: Evaluation 11      03/30/2023

## 2023-03-30 NOTE — PROGRESS NOTES
"[facility] Medicine Progress Note  Patient Name: Cleveland Capps MRN: [id number]   Patient Class: IP- Inpatient  Length of Stay: 0   Admission Date: 3/29/2023 10:55 AM Attending Physician: Tim Rosales MD   Primary Care Provider: Romero Snyder MD Face-to-Face encounter date: 03/30/2023   Chief Complaint: Weakness (Pt here for low blood pressure and weakness, pt has not been eating well. )      Subjective:    Interval History   Breathing is ok    Denies chest pain, palpitations, abdominal pain, nausea/vomiting.   No concerns/issues overnight reported by the patient or the nursing staff.  Reviewed the labs and discussed the plan of care.   No family present at bedside.     Review of Systems   All other Review of Systems were found to be negative expect for that mentioned already in HPI.     Hospital Course     03/30/2023     albuterol-ipratropium  3 mL Nebulization TID WAKE    apixaban  2.5 mg Oral BID    [START ON 3/31/2023] atorvastatin  20 mg Oral Daily    carvediloL  12.5 mg Oral BID    chlorhexidine  15 mL Mouth/Throat BID    latanoprost  1 drop Both Eyes QHS    levothyroxine  75 mcg Oral Before breakfast    mupirocin   Nasal BID    pantoprazole  40 mg Oral QAM    sodium chloride 0.9%  10 mL Intravenous Q12H       melatonin, morphine, ondansetron, prochlorperazine      Objective:   Physical Exam  BP (!) 94/56   Pulse 66   Temp 97.2 °F (36.2 °C) (Axillary)   Resp (!) 27   Ht 5' 1" (1.549 m)   Wt 45.2 kg (99 lb 10.4 oz)   SpO2 99%   BMI 18.83 kg/m²   Constitutional: No distress.   HENT: Atraumatic.   Cardiovascular: Normal rate, regular rhythm and normal heart sounds.   Pulmonary/Chest: Effort normal. Clear to auscultation bilaterally. No wheezes.   Abdominal: Soft. Bowel sounds are normal. Exhibits no distension and no mass. No tenderness  Neurological: Alert.   Skin: Skin is warm and dry.     Labs and Imaging    Significant Labs: All pertinent labs within the past 24 " hours have been reviewed.    Significant Imaging: I have reviewed all pertinent imaging results/findings within the past 24 hours.    I have reviewed the Vitals, labs and imaging as above.     Assessment & Plan:   Cleveland Capps is a 67 y.o. male admitted for    Active Hospital Problems    Diagnosis    *Hypotension    Dehydration    Presence of Watchman left atrial appendage closure device    Status post coronary artery bypass graft    AICD (automatic cardioverter/defibrillator) present    Mitral regurgitation    MEIR (acute kidney injury)     Atn; requiring dialysis         Plan  IV fluids as per cardiology, strict I&O  Hypotensive and requiring levophed, wean as tolerated, may require lower MAP target due to his poor cardiac function  Nephrology following, recc noted  Avoid nephrotoxic medications  Pallative care consulted for goals of care discussion  ST consulted, MBSS ordered  D/c Vancomycin    Active PT: Yes  Active OT: Yes  Active SLP: Yes    Core measures:  - Code status:   - Diet: Cardiac  VTE Risk Mitigation (From admission, onward)           Ordered     apixaban tablet 2.5 mg  2 times daily         03/29/23 1751     Reason for No Pharmacological VTE Prophylaxis  Once        Question:  Reasons:  Answer:  Already adequately anticoagulated on oral Anticoagulants    03/29/23 1751     IP VTE HIGH RISK PATIENT  Once         03/29/23 1703     Place sequential compression device  Until discontinued         03/29/23 1703                    Discharge Planning:   Discharge Planning   MACKENZIE: 04/01    Code Status: Full Code   Is the patient medically ready for discharge?: no    Reason for patient still in hospital (select all that apply): Patient trending condition  Discharge Plan A: Home with assistance from family        Above encounter included review of the medical records, interviewing and examining the patient face-to-face, discussion with family and other health care providers, ordering and interpreting  lab/test results and formulating a plan of care.     Medical Decision Making:  [] Low Complexity  [x] Moderate Complexity  [] High Complexity    Tim Rosales MD  [facility] Hospitalist  03/30/2023

## 2023-03-30 NOTE — CONSULTS
INPATIENT NEPHROLOGY CONSULT   [facility] NEPHROLOGY    Cleveland Capps  03/30/2023    Reason for consultation:    Acute kidney injury    Chief Complaint:   Chief Complaint   Patient presents with    Weakness     Pt here for low blood pressure and weakness, pt has not been eating well.           History of Present Illness:    67-year-old male presents with generalized weakness nausea vomiting hypotension and hypoxia patient has a history of COPD, coronary artery disease, diabetes, CHF with a low ejection fraction patient has been having nausea and vomiting and not able to keep his medicines down patient has been having fatigue patient reports shortness of breath on exertion.  Patient denies fever patient reports chronic cough patient denies chest pain    3/30  hypotensive, on levophed.  No nausea, chest pain, sob, fever, urinary or bowel complaint, new neurologic symptoms, new joint pain      Plan of Care:       Assessment:    Acute kidney injury likely due to intravascular volume depletion  --hold diuretics  --hold Entresto and vasotec.  Upon discharge can resume Entresto if renal function permits.  Don't resume Vasotec.  No reason to be on an ACE-I and ARB  --FEUrea  --Avoid NSAIDS, Mckeon II inhibitors, and other non-essential nephrotoxic agents  --renal dose medication for crcl 10-50  --strict I/Os    Hyperkalemia secondary to silvestre an ARB and ACE-I  --lokelma 10grams today  --low k diet  --hold entresto and vasotec and spironolactone     Weight loss--cardiac cachexia?    --TSH  --prealbumin  --age appropriate cancer screening  --per primary team    Hyponatremia  --samsca contraindicated due to elevated liver enzymes  --tsh  --uric acid  --urine osm  --avoid hypotonic iv piggybacks    Elevated liver enzymes.  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 We will continue to follow.    Vital  Signs:  Temp Readings from Last 3 Encounters:   03/30/23 98.2 °F (36.8 °C) (Axillary)   02/14/23 97.9 °F (36.6 °C) (Tympanic)   11/28/22 98.2 °F (36.8 °C)       Pulse Readings from Last 3 Encounters:   03/30/23 64   03/29/23 80   03/23/23 81       BP Readings from Last 3 Encounters:   03/30/23 (!) 108/58   03/29/23 90/60   03/23/23 108/70       Weight:  Wt Readings from Last 3 Encounters:   03/29/23 45.2 kg (99 lb 10.4 oz)   03/30/23 45.2 kg (99 lb 10.4 oz)   03/29/23 44.6 kg (98 lb 6.4 oz)       Past Medical & Surgical History:  Past Medical History:   Diagnosis Date    A-fib     Acute kidney injury     COPD (chronic obstructive pulmonary disease)     Coronary artery disease     Diabetes mellitus     Encounter for blood transfusion     Former smoker     Hypertension     Myocardial infarction     Thyroid disease     Type 2 diabetes mellitus without complications        Past Surgical History:   Procedure Laterality Date    CLOSURE OF LEFT ATRIAL APPENDAGE USING DEVICE N/A 5/17/2022    Procedure: Left atrial appendage closure device;  Surgeon: Tre Schmidt MD;  Location: [facility] CATH LAB;  Service: Cardiology;  Laterality: N/A;    COLONOSCOPY N/A 1/3/2017    Procedure: COLONOSCOPY;  Surgeon: Marcus Green MD;  Location: [facility] ENDO;  Service: Endoscopy;  Laterality: N/A;    CORONARY BYPASS GRAFT ANGIOGRAPHY  3/30/2021    Procedure: Bypass graft study;  Surgeon: Tre Schmidt MD;  Location: [facility] CATH LAB;  Service: Cardiology;;    CORONARY STENT PLACEMENT      GASTROSTOMY TUBE PLACEMENT      INSERTION OF PACEMAKER  04/2017    INTRAVASCULAR ULTRASOUND, NON-CORONARY  5/17/2022    Procedure: Intravascular Ultrasound, Non-Coronary;  Surgeon: Tre Schmidt MD;  Location: [facility] CATH LAB;  Service: Cardiology;;    LEFT HEART CATHETERIZATION N/A 3/30/2021    Procedure: Left heart cath;  Surgeon: Tre Schmidt MD;  Location: [facility] CATH LAB;  Service: Cardiology;  Laterality: N/A;    PEG TUBE REMOVAL       TRACHEOSTOMY CLOSURE      TRACHEOSTOMY TUBE PLACEMENT      TRANSESOPHAGEAL ECHOCARDIOGRAPHY N/A 2022    Procedure: ECHOCARDIOGRAM, TRANSESOPHAGEAL;  Surgeon: Yudi Diagnostic Provider;  Location: [facility] CATH LAB;  Service: Anesthesiology;  Laterality: N/A;    TREATMENT OF CARDIAC ARRHYTHMIA N/A 2022    Procedure: Cardioversion or Defibrillation;  Surgeon: Kuldeep Daniels MD;  Location: [facility] CATH LAB;  Service: Cardiology;  Laterality: N/A;    UPPER GASTROINTESTINAL ENDOSCOPY  2016    Dr. Zarco with PEG tube placement       Past Social History:  Social History     Socioeconomic History    Marital status: Single   Tobacco Use    Smoking status: Former     Types: Cigarettes     Quit date: 2005     Years since quittin.3    Smokeless tobacco: Never    Tobacco comments:     quit    Substance and Sexual Activity    Alcohol use: No    Drug use: No    Sexual activity: Yes     Social Determinants of Health     Financial Resource Strain: Low Risk     Difficulty of Paying Living Expenses: Not hard at all   Food Insecurity: No Food Insecurity    Worried About Running Out of Food in the Last Year: Never true    Ran Out of Food in the Last Year: Never true   Transportation Needs: No Transportation Needs    Lack of Transportation (Medical): No    Lack of Transportation (Non-Medical): No   Physical Activity: Inactive    Days of Exercise per Week: 0 days    Minutes of Exercise per Session: 0 min   Stress: No Stress Concern Present    Feeling of Stress : Not at all   Social Connections: Socially Isolated    Frequency of Communication with Friends and Family: Three times a week    Frequency of Social Gatherings with Friends and Family: Once a week    Attends [employer] Services: Never    Active Member of Clubs or Organizations: No    Attends Club or Organization Meetings: Never    Marital Status: Never    Housing Stability: Low Risk     Unable to Pay for Housing in the Last Year: No    Number of  Places Lived in the Last Year: 1    Unstable Housing in the Last Year: No       Medications:  No current facility-administered medications on file prior to encounter.     Current Outpatient Medications on File Prior to Encounter   Medication Sig Dispense Refill    albuterol-ipratropium (DUO-NEB) 2.5 mg-0.5 mg/3 mL nebulizer solution Take 3 mLs by nebulization 3 (three) times daily.      amiodarone (PACERONE) 200 MG Tab Take 1 tablet (200 mg total) by mouth once daily. 90 tablet 3    apixaban (ELIQUIS) 5 mg Tab Take 5 mg by mouth 2 (two) times daily.      atorvastatin (LIPITOR) 80 MG tablet Take 80 mg by mouth once daily.      carvediloL (COREG) 25 MG tablet Take 25 mg by mouth 2 (two) times daily.      docusate sodium (COLACE) 50 MG capsule Take 2 capsules (100 mg total) by mouth 2 (two) times daily as needed for Constipation. 30 capsule 0    enalapril (VASOTEC) 20 MG tablet Take 20 mg by mouth once daily.      isosorbide mononitrate (IMDUR) 30 MG 24 hr tablet Take 30 mg by mouth once daily.      latanoprost 0.005 % ophthalmic solution Place 1 drop into both eyes every evening.      levothyroxine (SYNTHROID) 75 MCG tablet Take 1 tablet (75 mcg total) by mouth before breakfast. 90 tablet 3    pantoprazole (PROTONIX) 40 MG tablet Take 40 mg by mouth every morning.      potassium chloride SA (K-DUR,KLOR-CON M) 10 MEQ tablet Take 10 mEq by mouth once daily.      sacubitriL-valsartan (ENTRESTO) 24-26 mg per tablet Take 1 tablet by mouth 2 (two) times daily.      spironolactone (ALDACTONE) 25 MG tablet Take 1 tablet (25 mg total) by mouth once daily. 30 tablet 11    torsemide (DEMADEX) 20 MG Tab Take 1 tablet (20 mg total) by mouth once daily. 30 tablet 0    travoprost, benzalkonium, (TRAVATAN) 0.004 % ophthalmic solution Place 1 drop into both eyes nightly.      COMP-AIR NEBULIZER COMPRESSOR Alfreda use as directed      TRUE METRIX GLUCOSE TEST STRIP Strp USE 1 STRIP TO CHECK GLUCOSE ONCE DAILY       Scheduled Meds:    "albuterol-ipratropium  3 mL Nebulization TID WAKE    amiodarone  200 mg Oral Daily    apixaban  2.5 mg Oral BID    atorvastatin  80 mg Oral Daily    carvediloL  25 mg Oral BID    chlorhexidine  15 mL Mouth/Throat BID    isosorbide mononitrate  30 mg Oral Daily    latanoprost  1 drop Both Eyes QHS    levothyroxine  75 mcg Oral Before breakfast    mupirocin   Nasal BID    pantoprazole  40 mg Oral QAM    sodium chloride 0.9%  10 mL Intravenous Q12H     Continuous Infusions:   sodium chloride 0.9% 50 mL/hr at 03/29/23 1821    NORepinephrine bitartrate-D5W 0.42 mcg/kg/min (03/30/23 0450)     PRN Meds:.melatonin, morphine, ondansetron, prochlorperazine, Pharmacy to dose Vancomycin consult **AND** vancomycin - pharmacy to dose    Allergies:  Penicillin and Penicillins    Past Family History:  Reviewed; refer to Hospitalist Admission Note    Review of Systems:  Review of Systems - All 14 systems reviewed and negative, except as noted in HPI    Physical Exam:    BP (!) 108/58   Pulse 64   Temp 98.2 °F (36.8 °C) (Axillary)   Resp 16   Ht 5' 1" (1.549 m)   Wt 45.2 kg (99 lb 10.4 oz)   SpO2 99%   BMI 18.83 kg/m²     General Appearance:    Alert, cooperative, no distress, appears stated age   Head:    Normocephalic, without obvious abnormality, atraumatic   Eyes:    PER, conjunctiva/corneas clear, EOM's intact in both eyes        Throat:   Lips, mucosa, and tongue normal; teeth and gums normal   Back:     Symmetric, no curvature, ROM normal, no CVA tenderness   Lungs:     Clear to auscultation bilaterally, respirations unlabored   Chest wall:    No tenderness or deformity   Heart:    Regular rate and rhythm, S1 and S2 normal, no murmur, rub   or gallop   Abdomen:     Soft, non-tender, bowel sounds active all four quadrants,     no masses, no organomegaly   Extremities:   Extremities normal, atraumatic, no cyanosis or edema   Pulses:   2+ and symmetric all extremities   MSK:   Muscular atrophy   Skin:   Skin color, " texture, turgor normal, no rashes or lesions   Neurologic:   CNII-XII intact, normal strength and sensation       Throughout.  No flap     Results:  Lab Results   Component Value Date     (L) 03/30/2023    K 5.4 (H) 03/30/2023    CL 96 03/30/2023    CO2 19 (L) 03/30/2023    BUN 71 (H) 03/30/2023    CREATININE 3.4 (H) 03/30/2023    CALCIUM 8.8 03/30/2023    ANIONGAP 12 03/30/2023    ESTGFRAFRICA >60.0 07/27/2022    EGFRNONAA 52.4 (A) 07/27/2022       Lab Results   Component Value Date    CALCIUM 8.8 03/30/2023    PHOS 2.9 02/11/2023       Recent Labs   Lab 03/30/23  0310   WBC 11.47   RBC 3.47*   HGB 11.8*   HCT 33.9*      MCV 98   MCH 34.0*   MCHC 34.8          I have personally reviewed pertinent radiological imaging and reports.    Patient care was time spent personally by me on the following activities:   Obtaining a history  Examination of patient.  Providing medical care at the patients bedside.  Developing a treatment plan with patient or surrogate and bedside caregivers  Ordering and reviewing laboratory studies, radiographic studies, pulse oximetry.  Ordering and performing treatments and interventions.  Evaluation of patient's response to treatment.  Discussions with consultants while on the unit and immediately available to the patient.  Re-evaluation of the patient's condition.  Documentation in the medical record.     eTrence Davis MD  Nephrology  [facility] [place]  [phone]

## 2023-03-30 NOTE — PROGRESS NOTES
VANCOMYCIN PHARMACOKINETIC NOTE:  Vancomycin Day # 1    Objective/Assessment:    Diagnosis/Indication for Vancomycin: Pneumonia     67 y.o., male; Actual Body Weight = 45.2 kg (99 lb 10.4 oz).    The patient has the following labs:  3/29/2023 Estimated Creatinine Clearance: 11.9 mL/min (A) (based on SCr of 3.9 mg/dL (H)). Lab Results   Component Value Date    BUN 74 (H) 03/29/2023     Lab Results   Component Value Date    WBC 7.42 03/29/2023          Plan:  Adjust vancomycin dose and/or frequency based on the patient's actual weight and renal function:  Initiate Vancomycin 750 mg IV one and then dosing by levels..  Orders have been entered into patient's chart.    Vancomycin dose = 16.6 mg/kg actual body weight    Vancomycin random level has been ordered for 3/30 at 16:30.    Pharmacy will manage vancomycin therapy, monitor serum vancomycin levels, monitor renal function and adjust regimen as necessary.  Thank you for allowing us to participate in this patient's care.     Heather Johns 3/29/2023 8:17 PM  Department of Pharmacy  Ext [phone]

## 2023-03-30 NOTE — PLAN OF CARE
Problem: Skin Injury Risk Increased  Goal: Skin Health and Integrity  Intervention: Promote and Optimize Oral Intake  Flowsheets (Taken 3/30/2023 1210)  Oral Nutrition Promotion: calorie-dense liquids provided--[place] BID     Problem: Oral Intake Inadequate  Goal: Improved Oral Intake  Intervention: Promote and Optimize Oral Intake  Flowsheets (Taken 3/30/2023 1210)  Oral Nutrition Promotion: calorie-dense liquids provided-- [place] BID

## 2023-03-30 NOTE — CARE UPDATE
03/30/23 0730   PRE-TX-O2   SpO2 100 %   Pulse (!) 59   Resp (!) 23   Aerosol Therapy   $ Aerosol Therapy Charges Aerosol Treatment   Respiratory Treatment Status (SVN) given   Treatment Route (SVN) mask;oxygen   Patient Position (SVN) HOB elevated;semi-Basilio's   Post Treatment Assessment (SVN) breath sounds improved   Signs of Intolerance (SVN) none   Breath Sounds Post-Respiratory Treatment   Throughout All Fields Post-Treatment All Fields   Throughout All Fields Post-Treatment aeration increased   Post-treatment Heart Rate (beats/min) 63   Post-treatment Resp Rate (breaths/min) 26

## 2023-03-30 NOTE — PT/OT/SLP EVAL
"Speech Language Pathology Evaluation  Bedside Swallow    Patient Name:  Cleveland Capps   MRN:  [id number]  Admitting Diagnosis: Hypotension    Recommendations:                 General Recommendations:  Dysphagia therapy and Modified barium swallow study  Diet recommendations:  Minced & Moist Diet - IDDSI Level 5, Peculiar Thick   Aspiration Precautions:  Encourage multiple swallows, 1 bite/sip at a time, Assistance with meals and Assistance with thickening liquids, HOB to 90 degrees, Small bites/sips, Strict aspiration precautions, and Wear oxygen during intake   General Precautions: Standard,    Communication strategies:  none    History:   Principal Problem:Hypotension     Chief Complaint:        Chief Complaint   Patient presents with    Weakness       Pt here for low blood pressure and weakness, pt has not been eating well.          HPI: ED note  67-year-old male presents with generalized weakness nausea vomiting hypotension and hypoxia patient has a history of COPD, coronary artery disease, diabetes, CHF with a low ejection fraction patient has been having nausea and vomiting and not able to keep his medicines down patient has been having fatigue patient reports shortness of breath on exertion.  Patient denies fever patient reports chronic cough patient denies chest pain     3/29/2023  Mr Capps has spent more time in the hospital than at home. Nausea and vomiting have resolved but he is very weak. He does have some SOB but notes severe amaro with movement. Pt and relative note that " he has been way sicker than this but tends to spend a lot of time in the ICU when admitted to the hospital.        Past Medical History:   Diagnosis Date    A-fib     Acute kidney injury     COPD (chronic obstructive pulmonary disease)     Coronary artery disease     Diabetes mellitus     Encounter for blood transfusion     Former smoker     Hypertension     Myocardial infarction     Thyroid disease     Type 2 diabetes mellitus " without complications        Past Surgical History:   Procedure Laterality Date    CLOSURE OF LEFT ATRIAL APPENDAGE USING DEVICE N/A 5/17/2022    Procedure: Left atrial appendage closure device;  Surgeon: Tre Schmidt MD;  Location: [facility] CATH LAB;  Service: Cardiology;  Laterality: N/A;    COLONOSCOPY N/A 1/3/2017    Procedure: COLONOSCOPY;  Surgeon: Marcus Zarco MD;  Location: [facility];  Service: Endoscopy;  Laterality: N/A;    CORONARY BYPASS GRAFT ANGIOGRAPHY  3/30/2021    Procedure: Bypass graft study;  Surgeon: Tre Schmidt MD;  Location: [facility] CATH LAB;  Service: Cardiology;;    CORONARY STENT PLACEMENT      GASTROSTOMY TUBE PLACEMENT      INSERTION OF PACEMAKER  04/2017    INTRAVASCULAR ULTRASOUND, NON-CORONARY  5/17/2022    Procedure: Intravascular Ultrasound, Non-Coronary;  Surgeon: Tre Schmidt MD;  Location: [facility] CATH LAB;  Service: Cardiology;;    LEFT HEART CATHETERIZATION N/A 3/30/2021    Procedure: Left heart cath;  Surgeon: Tre Schmidt MD;  Location: [facility] CATH LAB;  Service: Cardiology;  Laterality: N/A;    PEG TUBE REMOVAL      TRACHEOSTOMY CLOSURE      TRACHEOSTOMY TUBE PLACEMENT      TRANSESOPHAGEAL ECHOCARDIOGRAPHY N/A 5/17/2022    Procedure: ECHOCARDIOGRAM, TRANSESOPHAGEAL;  Surgeon: [facility] Diagnostic Provider;  Location: [facility] CATH LAB;  Service: Anesthesiology;  Laterality: N/A;    TREATMENT OF CARDIAC ARRHYTHMIA N/A 11/25/2022    Procedure: Cardioversion or Defibrillation;  Surgeon: Kuldeep Daniels MD;  Location: [facility] CATH LAB;  Service: Cardiology;  Laterality: N/A;    UPPER GASTROINTESTINAL ENDOSCOPY  05/2016    Dr. Zarco with PEG tube placement       Social History: Patient receives support from local family.    Prior Intubation HX:  N/A    Modified Barium Swallow: N/A    Chest X-Rays:   Clinical history is hypoxia     The heart is mildly enlarged. There are median sternotomy wires. There is a left subclavian vein pacemaker with lead tips overlying the right  "ventricle.     The lungs are clear. There are no acute osseous abnormalities.     IMPRESSION: Prior CABG with mild cardiomegaly     No acute pulmonary process    CT-Abdomen/Pelvis:  IMPRESSION:        1. Mild ill-defined groundglass and reticular nodular opacities at the left lung base. Correlate for possible early or mild pneumonia.  2. Additional chronic findings as discussed. No acute intra-abdominal abnormalities are identified.  3. 10 mm hypodensity at the mid pole of the left kidney, for which follow-up nonemergent ultrasound is recommended.  4. Circumscribed soft tissue density in the lower inguinal canal on the left likely reflects the left testicle with cremasteric reflex.      Prior diet: "I eat jello and once and awhile soft meats, when someone cooks it for me."    Occupation/hobbies/homemaking: N/A.    Subjective     Pt seen at b/s, while sitting upright in chair.  Pt alert and agreeable to assessment.  Discussed case with nsg.  Nsg reported ~50 lb recent weight loss.  Pt admits to premorbid hx of dysphagia, "I've had trouble eating."  Patient goals: n/A     Pain/Comfort:  Pain Rating 1: 0/10    Respiratory Status: Nasal cannula, flow 1 L/min    Objective:     Oral Musculature Evaluation  Oral Musculature: general weakness  Dentition: scattered dentition  Secretion Management: adequate  Mucosal Quality: good  Mandibular Strength and Mobility: other (see comments) (limited jaw/mouth opening -- ROM)  Oral Labial Strength and Mobility: other (see comments) (FAIR)  Lingual Strength and Mobility: impaired strength (limited ROM with laterlization)  Velar Elevation: WFL  Buccal Strength and Mobility: WFL  Volitional Cough: Weak; limited production  Volitional Swallow: Weak based on HLE  Voice Prior to PO Intake: Hoarse    Bedside Swallow Eval:   Consistencies Assessed:  Thin liquids :  Isolated small sips; 1 tsp  Nectar thick liquids :  Puree    Soft solids        Oral Phase:   Positive s/s of limited jaw " rotary motion--ROM with ineffective oral mastication with solids-to-soft solids    Pharyngeal Phase:   coughing/choking  decreased hyolaryngeal excursion to palpation  multiple spontaneous swallows  throat clearing  Note:    Multiple swallow pattern with all liquid and food boluses.  Thin liquids:  Consistent wet/voice, throat clear, and/or cough following trials; no change with bolus size modification.  NTL:  No overt s/s of aspiration/penetration    Compensatory Strategies  None    Treatment: Training on recommended diet level and precautions.    Assessment:     Clevelanddavid Capps is a 67 y.o. male with an SLP diagnosis of Dysphagia.  He presents with mild-moderate oral / moderate pharyngeal dysphagia.  See details, recommended diet level, and precautions listed above.    Goals:   Multidisciplinary Problems       SLP Goals          Problem: SLP    Goal Priority Disciplines Outcome   SLP Goal     SLP Ongoing, Progressing   Description: 1.  Pt will demonstrate no overt s/s of aspiration, >95% of the time, with least restrictive diet level.    2.  Pt will demonstrate no overt s/s of aspiration, >95% of the time, with IDDSI-5 (Minced and Moist) / NECTAR thick liquid meal intake.  3.  Pt will participate in MBSS.  4.  Pt/CG/Staff will demonstrate trained swallowing precautions with MOD Ind.                       Plan:     Patient to be seen:  5 x/week   Plan of Care expires:     Plan of Care reviewed with:      SLP Follow-Up:  Yes       Discharge recommendations:      Barriers to Discharge:  None    Time Tracking:     SLP Treatment Date:      Speech Start Time:  1150  Speech Stop Time:  1220     Speech Total Time (min):  30 min    Billable Minutes: Eval 30 mins     03/30/2023

## 2023-03-30 NOTE — CONSULTS
[facility]  Adult Nutrition   Consult Note    SUMMARY     Recommendations  Recommendation/Intervention:   1. Continue current diet. Diabetic, Low Potassium diet with cardio-renal syndrome per MD.   2. Recommend Suplena BID ( 840 kcal, 21 gm protein).  3.  to obtain daily menu choices.  4. Recommend Severe Malnutrition be added to patient's problem list.    Goals:   1. Intake to be >/= 75% EEN / EPN in 24-48 hrs.   2. Labs trend to target range.  Nutrition Goal Status: new    Communication of RD Recs: discussed on rounds    Dietitian Rounds Brief  MD consult for weight loss. RD screen for new admit to ICU. 67 yr old male with past history of COPD, coronary artery disease, diabetes, CHF with a low ejection fraction admit for hypotension. Pt with c/o abdominal pain with distention per RN. Last BM 3/28/23. Pt reports not eating well prior to admit, having N/V. Patient with electrolyte, renal and liver function lab abnormalities. Diet to Diabetic Low potassium; nephrotoxic medication addressed by nephrologist.   Palliative care consulted per rounds.  Patient with 10%, 24 lbs weight loss in 1 month per Epic. malnutrition likely secondary to chronic diseases and masked by fluid fluctuations. Poor intake for > 1 month.   Patient is thin, ill-appearing male. NFPE not performed at this time.     Malnutrition Assessment  Severe malnutrition in the context of multiple chronic disease processes -- COPD, CHF, DM, cardio-renal syndrome meeting ASPEN / AND criteria.  Severe Malnutrition related to inadequate protein-energy intake (</= 50% intake for >/= 1 month) and severe weight loss > 5% in 1 month (10%, 24 lbs weight loss in 1 month per Epic)    Diet order:   Current Diet Order: Diabetic 2000 kcal Low Potasium diet      Evaluation of Received Nutrient/Fluid Intake  Energy Calories Required: not meeting needs  Protein Required: not meeting needs  Fluid Required: not meeting needs  Tolerance: tolerating     %  "Intake of Estimated Energy Needs: 0 - 25 %  % Meal Intake: 0 - 25 %    Intake/Output Summary (Last 24 hours) at 3/30/2023 1134  Last data filed at 3/30/2023 1000  Gross per 24 hour   Intake 1783.41 ml   Output 550 ml   Net 1233.41 ml      Anthropometrics  Temp: 98.2 °F (36.8 °C)  Height: 5' 1" (154.9 cm)  Height (inches): 61 in  Weight Method: Stated  Weight: 45.2 kg (99 lb 10.4 oz)  Weight (lb): 99.65 lb  Ideal Body Weight (IBW), Male: 112 lb  % Ideal Body Weight, Male (lb): 87.5 %  BMI (Calculated): 18.8     Estimated/Assessed Needs  Weight Used For Calorie Calculations: 45.2 kg (99 lb 10.4 oz)  Energy Calorie Requirements (kcal): 5769-2219 (35-40 kcal/kg  Energy Need Method: Kcal/kg, other (see comments) (MSJ X 1.25 plus 500 kcal = 1869 kcal/day, 41 kcal/kg)  Protein Requirements: 36-45 gm/day (0.8-1.0 gm/kg)  Weight Used For Protein Calculations: 45.2 kg (99 lb 10.4 oz)     Estimated Fluid Requirement Method: RDA Method  RDA Method (mL): 1582     Reason for Assessment  Reason For Assessment: identified at risk by screening criteria  Diagnosis: cardiac disease, pulmonary disease  Relevant Medical History: COPD, coronary artery disease, diabetes, CHF with a low ejection fraction    Nutrition/Diet History  Food Allergies: NKFA  Factors Affecting Nutritional Intake: decreased appetite    Nutrition Risk Screen  Nutrition Risk Screen: unintentional loss of 10 lbs or more in the past 2 months     MST Score: 3  Have you recently lost weight without trying?: Yes: Unsure how much  Weight loss score: 2  Have you been eating poorly because of a decreased appetite?: Yes  Appetite score: 1     Weight History:  Wt Readings from Last 5 Encounters:   03/29/23 45.2 kg (99 lb 10.4 oz)   03/30/23 45.2 kg (99 lb 10.4 oz)   03/29/23 44.6 kg (98 lb 6.4 oz)   03/23/23 47.6 kg (105 lb)   02/14/23 56 kg (123 lb 7.3 oz)      Lab/Procedures/Meds: Pertinent Labs/Meds Reviewed    Medications:Pertinent Medications Reviewed  Scheduled Meds:   " albuterol-ipratropium  3 mL Nebulization TID WAKE    amiodarone  200 mg Oral Daily    apixaban  2.5 mg Oral BID    atorvastatin  80 mg Oral Daily    carvediloL  25 mg Oral BID    chlorhexidine  15 mL Mouth/Throat BID    isosorbide mononitrate  30 mg Oral Daily    latanoprost  1 drop Both Eyes QHS    levothyroxine  75 mcg Oral Before breakfast    mupirocin   Nasal BID    pantoprazole  40 mg Oral QAM    sodium chloride 0.9%  10 mL Intravenous Q12H    sodium zirconium cyclosilicate  10 g Oral Once     Continuous Infusions:   sodium chloride 0.9% 50 mL/hr at 03/30/23 1000    NORepinephrine bitartrate-D5W 0.5 mcg/kg/min (03/30/23 1125)     PRN Meds:.melatonin, morphine, ondansetron, prochlorperazine, Pharmacy to dose Vancomycin consult **AND** vancomycin - pharmacy to dose    Labs: Pertinent Labs Reviewed  Clinical Chemistry:  Recent Labs   Lab 03/29/23  1136 03/30/23  0310   * 127*   K 5.9* 5.4*   CL 92* 96   CO2 23 19*   * 158*   BUN 75* 71*   CREATININE 4.1* 3.4*   CALCIUM 9.6 8.8   PROT 8.2 7.2   ALBUMIN 4.0 3.5   BILITOT 1.5* 1.7*   ALKPHOS 90 78   * 199*   * 141*   ANIONGAP 16 12   MG 2.4 2.0   LIPASE 60  --     < > = values in this interval not displayed.     CBC:   Recent Labs   Lab 03/30/23  0310   WBC 11.47   RBC 3.47*   HGB 11.8*   HCT 33.9*      MCV 98   MCH 34.0*   MCHC 34.8     Cardiac Profile:  Recent Labs   Lab 03/29/23  1136   BNP 96     Thyroid & Parathyroid:  Recent Labs   Lab 03/23/23  1612 03/29/23  1136   TSH 27.920* 22.400*   FREET4 0.94 0.96   [id number] 51*  --      Monitor and Evaluation  Food and Nutrient Intake: energy intake, food and beverage intake  Food and Nutrient Adminstration: diet order  Knowledge/Beliefs/Attitudes: food and nutrition knowledge/skill  Physical Activity and Function: nutrition-related ADLs and IADLs  Anthropometric Measurements: weight, weight change, body mass index  Biochemical Data, Medical Tests and Procedures: electrolyte and renal  panel, gastrointestinal profile, glucose/endocrine profile, inflammatory profile, lipid profile  Nutrition-Focused Physical Findings: overall appearance     Nutrition Risk  Level of Risk/Frequency of Follow-up: high     Nutrition Follow-Up  RD Follow-up?: Yes      Meg Reyez RD, LDN 03/30/2023 11:34 AM

## 2023-03-30 NOTE — HPI
67-year-old male with multiple medical problems significant for chronic obstructive pulmonary disease, coronary artery disease complicated by heart failure with reduced ejection fraction, diabetes, hypertension, and hypothyroidism.  He is currently admitted to the ICU being treated for cardiogenic shock and cardio renal syndrome.  At this point given his age, comorbidities, and cachexia he carries a guarded and potentially very poor prognosis.  I have been asked to assist with goals of care.

## 2023-03-31 ENCOUNTER — ANESTHESIA EVENT (OUTPATIENT)
Dept: INTENSIVE CARE | Facility: HOSPITAL | Age: 68
DRG: 291 | End: 2023-03-31
Payer: MEDICARE

## 2023-03-31 ENCOUNTER — ANESTHESIA (OUTPATIENT)
Dept: INTENSIVE CARE | Facility: HOSPITAL | Age: 68
DRG: 291 | End: 2023-03-31
Payer: MEDICARE

## 2023-03-31 PROBLEM — R57.0 CARDIOGENIC SHOCK: Status: ACTIVE | Noted: 2023-03-31

## 2023-03-31 PROBLEM — Z71.89 GOALS OF CARE, COUNSELING/DISCUSSION: Status: ACTIVE | Noted: 2023-03-31

## 2023-03-31 PROBLEM — R09.02 HYPOXIA: Status: ACTIVE | Noted: 2023-03-31

## 2023-03-31 LAB
25(OH)D3+25(OH)D2 SERPL-MCNC: 13 NG/ML (ref 30–96)
ALBUMIN SERPL BCP-MCNC: 2.9 G/DL (ref 3.5–5.2)
ANION GAP SERPL CALC-SCNC: 11 MMOL/L (ref 8–16)
BASOPHILS # BLD AUTO: 0.05 K/UL (ref 0–0.2)
BASOPHILS NFR BLD: 0.6 % (ref 0–1.9)
BUN SERPL-MCNC: 57 MG/DL (ref 8–23)
CALCIUM SERPL-MCNC: 8.2 MG/DL (ref 8.7–10.5)
CHLORIDE SERPL-SCNC: 96 MMOL/L (ref 95–110)
CO2 SERPL-SCNC: 18 MMOL/L (ref 23–29)
CREAT SERPL-MCNC: 2.2 MG/DL (ref 0.5–1.4)
CRP SERPL-MCNC: 6.43 MG/DL
DIFFERENTIAL METHOD: ABNORMAL
EOSINOPHIL # BLD AUTO: 0.2 K/UL (ref 0–0.5)
EOSINOPHIL NFR BLD: 2.7 % (ref 0–8)
ERYTHROCYTE [DISTWIDTH] IN BLOOD BY AUTOMATED COUNT: 14.9 % (ref 11.5–14.5)
EST. GFR  (NO RACE VARIABLE): 32.2 ML/MIN/1.73 M^2
FERRITIN SERPL-MCNC: 290 NG/ML (ref 20–300)
GLUCOSE SERPL-MCNC: 134 MG/DL (ref 70–110)
HCT VFR BLD AUTO: 32 % (ref 40–54)
HGB BLD-MCNC: 11.2 G/DL (ref 14–18)
IMM GRANULOCYTES # BLD AUTO: 0.07 K/UL (ref 0–0.04)
IMM GRANULOCYTES NFR BLD AUTO: 0.8 % (ref 0–0.5)
IRON SERPL-MCNC: 112 UG/DL (ref 45–160)
LACTATE SERPL-SCNC: 0.9 MMOL/L (ref 0.5–1.9)
LYMPHOCYTES # BLD AUTO: 0.7 K/UL (ref 1–4.8)
LYMPHOCYTES NFR BLD: 8.4 % (ref 18–48)
MCH RBC QN AUTO: 34.1 PG (ref 27–31)
MCHC RBC AUTO-ENTMCNC: 35 G/DL (ref 32–36)
MCV RBC AUTO: 98 FL (ref 82–98)
MONOCYTES # BLD AUTO: 0.5 K/UL (ref 0.3–1)
MONOCYTES NFR BLD: 6 % (ref 4–15)
NEUTROPHILS # BLD AUTO: 7.1 K/UL (ref 1.8–7.7)
NEUTROPHILS NFR BLD: 81.5 % (ref 38–73)
NRBC BLD-RTO: 0 /100 WBC
PHOSPHATE SERPL-MCNC: 4.5 MG/DL (ref 2.7–4.5)
PLATELET # BLD AUTO: 181 K/UL (ref 150–450)
PMV BLD AUTO: 8.3 FL (ref 9.2–12.9)
POTASSIUM SERPL-SCNC: 4.6 MMOL/L (ref 3.5–5.1)
PREALB SERPL-MCNC: 11 MG/DL (ref 20–43)
PROCALCITONIN SERPL IA-MCNC: 0.5 NG/ML (ref 0–0.5)
PTH-INTACT SERPL-MCNC: 75.2 PG/ML (ref 9–77)
RBC # BLD AUTO: 3.28 M/UL (ref 4.6–6.2)
SATURATED IRON: 52 % (ref 20–50)
SODIUM SERPL-SCNC: 125 MMOL/L (ref 136–145)
T4 FREE SERPL-MCNC: 1.07 NG/DL (ref 0.71–1.51)
TOTAL IRON BINDING CAPACITY: 217 UG/DL (ref 250–450)
TRANSFERRIN SERPL-MCNC: 155 MG/DL (ref 200–375)
TSH SERPL DL<=0.005 MIU/L-ACNC: 19.06 UIU/ML (ref 0.34–5.6)
URATE SERPL-MCNC: 5.3 MG/DL (ref 3.4–7)
WBC # BLD AUTO: 8.65 K/UL (ref 3.9–12.7)

## 2023-03-31 PROCEDURE — 92526 ORAL FUNCTION THERAPY: CPT

## 2023-03-31 PROCEDURE — 63600175 PHARM REV CODE 636 W HCPCS: Performed by: INTERNAL MEDICINE

## 2023-03-31 PROCEDURE — 25000003 PHARM REV CODE 250: Performed by: SPECIALIST

## 2023-03-31 PROCEDURE — 87340 HEPATITIS B SURFACE AG IA: CPT | Performed by: INTERNAL MEDICINE

## 2023-03-31 PROCEDURE — 82728 ASSAY OF FERRITIN: CPT | Performed by: INTERNAL MEDICINE

## 2023-03-31 PROCEDURE — 25000003 PHARM REV CODE 250: Performed by: INTERNAL MEDICINE

## 2023-03-31 PROCEDURE — 99900031 HC PATIENT EDUCATION (STAT)

## 2023-03-31 PROCEDURE — 25000242 PHARM REV CODE 250 ALT 637 W/ HCPCS: Performed by: INTERNAL MEDICINE

## 2023-03-31 PROCEDURE — 84550 ASSAY OF BLOOD/URIC ACID: CPT | Performed by: INTERNAL MEDICINE

## 2023-03-31 PROCEDURE — 83970 ASSAY OF PARATHORMONE: CPT | Performed by: INTERNAL MEDICINE

## 2023-03-31 PROCEDURE — 99291 CRITICAL CARE FIRST HOUR: CPT | Mod: ,,, | Performed by: INTERNAL MEDICINE

## 2023-03-31 PROCEDURE — 84439 ASSAY OF FREE THYROXINE: CPT | Performed by: INTERNAL MEDICINE

## 2023-03-31 PROCEDURE — 99232 SBSQ HOSP IP/OBS MODERATE 35: CPT | Mod: ,,, | Performed by: INTERNAL MEDICINE

## 2023-03-31 PROCEDURE — 86140 C-REACTIVE PROTEIN: CPT | Performed by: INTERNAL MEDICINE

## 2023-03-31 PROCEDURE — 83605 ASSAY OF LACTIC ACID: CPT | Performed by: INTERNAL MEDICINE

## 2023-03-31 PROCEDURE — 86704 HEP B CORE ANTIBODY TOTAL: CPT | Performed by: INTERNAL MEDICINE

## 2023-03-31 PROCEDURE — 80069 RENAL FUNCTION PANEL: CPT | Performed by: INTERNAL MEDICINE

## 2023-03-31 PROCEDURE — 84466 ASSAY OF TRANSFERRIN: CPT | Performed by: INTERNAL MEDICINE

## 2023-03-31 PROCEDURE — 94761 N-INVAS EAR/PLS OXIMETRY MLT: CPT

## 2023-03-31 PROCEDURE — 25000003 PHARM REV CODE 250

## 2023-03-31 PROCEDURE — 82306 VITAMIN D 25 HYDROXY: CPT | Performed by: INTERNAL MEDICINE

## 2023-03-31 PROCEDURE — 99232 PR SUBSEQUENT HOSPITAL CARE,LEVL II: ICD-10-PCS | Mod: ,,, | Performed by: INTERNAL MEDICINE

## 2023-03-31 PROCEDURE — 36556 CENTRAL LINE: ICD-10-PCS | Mod: ,,, | Performed by: ANESTHESIOLOGY

## 2023-03-31 PROCEDURE — 94799 UNLISTED PULMONARY SVC/PX: CPT

## 2023-03-31 PROCEDURE — 36556 INSERT NON-TUNNEL CV CATH: CPT

## 2023-03-31 PROCEDURE — A4216 STERILE WATER/SALINE, 10 ML: HCPCS | Performed by: INTERNAL MEDICINE

## 2023-03-31 PROCEDURE — 86706 HEP B SURFACE ANTIBODY: CPT | Performed by: INTERNAL MEDICINE

## 2023-03-31 PROCEDURE — 20000000 HC ICU ROOM

## 2023-03-31 PROCEDURE — 87070 CULTURE OTHR SPECIMN AEROBIC: CPT | Performed by: INTERNAL MEDICINE

## 2023-03-31 PROCEDURE — 99291 PR CRITICAL CARE, E/M 30-74 MINUTES: ICD-10-PCS | Mod: ,,, | Performed by: INTERNAL MEDICINE

## 2023-03-31 PROCEDURE — 84134 ASSAY OF PREALBUMIN: CPT | Performed by: INTERNAL MEDICINE

## 2023-03-31 PROCEDURE — 84145 PROCALCITONIN (PCT): CPT | Performed by: INTERNAL MEDICINE

## 2023-03-31 PROCEDURE — 84443 ASSAY THYROID STIM HORMONE: CPT | Performed by: INTERNAL MEDICINE

## 2023-03-31 PROCEDURE — 99900035 HC TECH TIME PER 15 MIN (STAT)

## 2023-03-31 PROCEDURE — C1751 CATH, INF, PER/CENT/MIDLINE: HCPCS

## 2023-03-31 PROCEDURE — 27000221 HC OXYGEN, UP TO 24 HOURS

## 2023-03-31 PROCEDURE — 99232 SBSQ HOSP IP/OBS MODERATE 35: CPT | Mod: ,,, | Performed by: SPECIALIST

## 2023-03-31 PROCEDURE — 36556 INSERT NON-TUNNEL CV CATH: CPT | Mod: ,,, | Performed by: ANESTHESIOLOGY

## 2023-03-31 PROCEDURE — 94640 AIRWAY INHALATION TREATMENT: CPT

## 2023-03-31 PROCEDURE — 99232 PR SUBSEQUENT HOSPITAL CARE,LEVL II: ICD-10-PCS | Mod: ,,, | Performed by: SPECIALIST

## 2023-03-31 PROCEDURE — 85025 COMPLETE CBC W/AUTO DIFF WBC: CPT | Performed by: INTERNAL MEDICINE

## 2023-03-31 PROCEDURE — 87205 SMEAR GRAM STAIN: CPT | Performed by: INTERNAL MEDICINE

## 2023-03-31 RX ORDER — LEVOTHYROXINE SODIUM 100 UG/1
100 TABLET ORAL
Status: DISCONTINUED | OUTPATIENT
Start: 2023-04-01 | End: 2023-04-03

## 2023-03-31 RX ORDER — DOBUTAMINE HYDROCHLORIDE 400 MG/100ML
5 INJECTION INTRAVENOUS CONTINUOUS
Status: DISCONTINUED | OUTPATIENT
Start: 2023-03-31 | End: 2023-04-03

## 2023-03-31 RX ORDER — SODIUM CHLORIDE 9 MG/ML
INJECTION, SOLUTION INTRAVENOUS ONCE
Status: DISCONTINUED | OUTPATIENT
Start: 2023-03-31 | End: 2023-04-03

## 2023-03-31 RX ORDER — LEVOTHYROXINE SODIUM 25 UG/1
25 TABLET ORAL
Status: CANCELLED | OUTPATIENT
Start: 2023-03-31

## 2023-03-31 RX ORDER — DOBUTAMINE HYDROCHLORIDE 400 MG/100ML
2.5 INJECTION INTRAVENOUS CONTINUOUS
Status: CANCELLED | OUTPATIENT
Start: 2023-03-31

## 2023-03-31 RX ORDER — GUAIFENESIN 100 MG/5ML
200 SOLUTION ORAL EVERY 4 HOURS PRN
Status: DISCONTINUED | OUTPATIENT
Start: 2023-03-31 | End: 2023-04-03

## 2023-03-31 RX ORDER — POLYETHYLENE GLYCOL 3350 17 G/17G
17 POWDER, FOR SOLUTION ORAL 2 TIMES DAILY
Status: DISCONTINUED | OUTPATIENT
Start: 2023-03-31 | End: 2023-04-03

## 2023-03-31 RX ADMIN — GUAIFENESIN 200 MG: 200 SOLUTION ORAL at 10:03

## 2023-03-31 RX ADMIN — POLYETHYLENE GLYCOL 3350 17 G: 17 POWDER, FOR SOLUTION ORAL at 10:03

## 2023-03-31 RX ADMIN — IPRATROPIUM BROMIDE AND ALBUTEROL SULFATE 3 ML: 2.5; .5 SOLUTION RESPIRATORY (INHALATION) at 09:03

## 2023-03-31 RX ADMIN — SODIUM CHLORIDE, PRESERVATIVE FREE 10 ML: 5 INJECTION INTRAVENOUS at 09:03

## 2023-03-31 RX ADMIN — IPRATROPIUM BROMIDE AND ALBUTEROL SULFATE 3 ML: 2.5; .5 SOLUTION RESPIRATORY (INHALATION) at 01:03

## 2023-03-31 RX ADMIN — POLYETHYLENE GLYCOL 3350 17 G: 17 POWDER, FOR SOLUTION ORAL at 12:03

## 2023-03-31 RX ADMIN — CHLORHEXIDINE GLUCONATE 15 ML: 1.2 RINSE ORAL at 10:03

## 2023-03-31 RX ADMIN — APIXABAN 2.5 MG: 2.5 TABLET, FILM COATED ORAL at 10:03

## 2023-03-31 RX ADMIN — NOREPINEPHRINE BITARTRATE 1 MCG/KG/MIN: 1 INJECTION, SOLUTION, CONCENTRATE INTRAVENOUS at 10:03

## 2023-03-31 RX ADMIN — MUPIROCIN 1 G: 20 OINTMENT TOPICAL at 10:03

## 2023-03-31 RX ADMIN — IPRATROPIUM BROMIDE AND ALBUTEROL SULFATE 3 ML: 2.5; .5 SOLUTION RESPIRATORY (INHALATION) at 07:03

## 2023-03-31 RX ADMIN — LEVOTHYROXINE SODIUM 75 MCG: 0.03 TABLET ORAL at 06:03

## 2023-03-31 RX ADMIN — PANTOPRAZOLE SODIUM 40 MG: 40 TABLET, DELAYED RELEASE ORAL at 06:03

## 2023-03-31 RX ADMIN — SODIUM CHLORIDE: 0.9 INJECTION, SOLUTION INTRAVENOUS at 03:03

## 2023-03-31 RX ADMIN — LATANOPROST 1 DROP: 50 SOLUTION OPHTHALMIC at 10:03

## 2023-03-31 RX ADMIN — GUAIFENESIN 200 MG: 200 SOLUTION ORAL at 02:03

## 2023-03-31 RX ADMIN — ATORVASTATIN CALCIUM 20 MG: 20 TABLET, FILM COATED ORAL at 10:03

## 2023-03-31 RX ADMIN — GUAIFENESIN 200 MG: 200 SOLUTION ORAL at 06:03

## 2023-03-31 RX ADMIN — DOBUTAMINE HYDROCHLORIDE 2.5 MCG/KG/MIN: 400 INJECTION INTRAVENOUS at 10:03

## 2023-03-31 RX ADMIN — SODIUM CHLORIDE 1000 ML: 0.9 INJECTION, SOLUTION INTRAVENOUS at 10:03

## 2023-03-31 NOTE — PROGRESS NOTES
[facility]  Palliative Medicine  Progress Note    Patient Name: Cleveland Capps  MRN: [id number]  Admission Date: 3/29/2023  Hospital Length of Stay: 1 days  Code Status: DNR   Attending Provider: Tim Rosales MD  Consulting Provider: Gerry Keita MD  Primary Care Physician: Romero Snyder MD  Principal Problem:Hypotension    Patient information was obtained from patient, past medical records and primary team.      Assessment/Plan:     Palliative Care  Goals of care, counseling/discussion  I reviewed his chart and discussed his case with his team.      I examined Mr. Capps at bedside.  He continues to maintain capacity for complex medical decision-making.    He was able to accurately recall our conversation from yesterday.  His goals remain clear and unchanged.  He is willing to go through further hospitalizations and procedures to potentially get better.  He also understands he is at a high risk of death and desires to maintain a DNR code status.    He had no questions or concerns today.    I will continue to check in periodically.    I appreciate being involved.  I hope I have been helpful.        I will follow-up with patient. Please contact us if you have any additional questions.    Subjective:     Chief Complaint:   Chief Complaint   Patient presents with    Weakness     Pt here for low blood pressure and weakness, pt has not been eating well.        HPI:   67-year-old male with multiple medical problems significant for chronic obstructive pulmonary disease, coronary artery disease complicated by heart failure with reduced ejection fraction, diabetes, hypertension, and hypothyroidism.  He is currently admitted to the ICU being treated for cardiogenic shock and cardio renal syndrome.  At this point given his age, comorbidities, and cachexia he carries a guarded and potentially very poor prognosis.  I have been asked to assist with goals of care.      Hospital Course:  No notes  on file    Interval History: remains critically ill.     Medications:  Continuous Infusions:   sodium chloride 0.9% 50 mL/hr at 03/31/23 0358    DOBUTamine IV infusion (titrating) 2.5 mcg/kg/min (03/31/23 1001)    NORepinephrine bitartrate-D5W 0.8 mcg/kg/min (03/31/23 1115)     Scheduled Meds:   sodium chloride 0.9%   Intravenous Once    albuterol-ipratropium  3 mL Nebulization TID WAKE    apixaban  2.5 mg Oral BID    atorvastatin  20 mg Oral Daily    chlorhexidine  15 mL Mouth/Throat BID    latanoprost  1 drop Both Eyes QHS    [START ON 4/1/2023] levothyroxine  100 mcg Oral Before breakfast    mupirocin   Nasal BID    pantoprazole  40 mg Oral QAM    polyethylene glycol  17 g Oral BID    sodium chloride 0.9%  10 mL Intravenous Q12H     PRN Meds:guaiFENesin 100 mg/5 ml, melatonin, morphine, ondansetron, prochlorperazine    Objective:     Vital Signs (Most Recent):  Temp: 97.5 °F (36.4 °C) (03/31/23 1230)  Pulse: (!) 51 (03/31/23 1305)  Resp: 20 (03/31/23 1305)  BP: (!) 94/50 (03/31/23 0940)  SpO2: 100 % (03/31/23 1305)   Vital Signs (24h Range):  Temp:  [97 °F (36.1 °C)-98 °F (36.7 °C)] 97.5 °F (36.4 °C)  Pulse:  [50-89] 51  Resp:  [17-28] 20  SpO2:  [87 %-100 %] 100 %  BP: ()/(44-69) 94/50  Arterial Line BP: ()/(38-64) 113/38     Weight: 45.2 kg (99 lb 10.4 oz)  Body mass index is 18.83 kg/m².    Physical Exam  Constitutional:       General: He is not in acute distress.     Appearance: He is ill-appearing.   HENT:      Head: Normocephalic and atraumatic.      Right Ear: External ear normal.      Left Ear: External ear normal.      Nose: Nose normal. No congestion.      Mouth/Throat:      Mouth: Mucous membranes are moist.      Pharynx: No oropharyngeal exudate.   Eyes:      General:         Right eye: No discharge.         Left eye: No discharge.      Extraocular Movements: Extraocular movements intact.   Cardiovascular:      Rate and Rhythm: Normal rate and regular rhythm.   Pulmonary:       Effort: Pulmonary effort is normal. No respiratory distress.   Abdominal:      General: There is no distension.      Palpations: Abdomen is soft.   Skin:     General: Skin is warm.   Neurological:      General: No focal deficit present.      Mental Status: He is alert and oriented to person, place, and time.   Psychiatric:         Mood and Affect: Mood normal.         Behavior: Behavior normal.         Thought Content: Thought content normal.       Review of Symptoms      Symptom Assessment (ESAS 0-10 Scale)  Pain:  0  Dyspnea:  4  Anxiety:  0  Nausea:  0  Depression:  0  Anorexia:  0  Fatigue:  6  Insomnia:  0  Restlessness:  0  Agitation:  0         Performance Status:  40    Psychosocial/Cultural:   See Palliative Psychosocial Note: No  **Primary  to Follow**  Palliative Care  Consult: No      Advance Care Planning   Advance Directives:   Do Not Resuscitate Status: Yes      Decision Making:  Patient answered questions  Goals of Care: What is most important right now is to focus on spending time at home, improvement in condition but with limits to invasive therapies. Accordingly, we have decided that the best plan to meet the patient's goals includes continuing with treatment.       Significant Labs: BMP:   Recent Labs   Lab 03/30/23  0310 03/30/23  1407 03/31/23 0323   *   < > 134*   *   < > 125*   K 5.4*   < > 4.6   CL 96   < > 96   CO2 19*   < > 18*   BUN 71*   < > 57*   CREATININE 3.4*   < > 2.2*   CALCIUM 8.8   < > 8.2*   MG 2.0  --   --     < > = values in this interval not displayed.     CBC:   Recent Labs   Lab 03/30/23  0310 03/31/23 0323   WBC 11.47 8.65   HGB 11.8* 11.2*   HCT 33.9* 32.0*    181     CBC:   Recent Labs   Lab 03/31/23 0323   WBC 8.65   HGB 11.2*   HCT 32.0*   MCV 98        BMP:  Recent Labs   Lab 03/31/23 0323   *   *   K 4.6   CL 96   CO2 18*   BUN 57*   CREATININE 2.2*   CALCIUM 8.2*     LFT:  Lab Results   Component  Value Date     (H) 03/30/2023    ALKPHOS 78 03/30/2023    BILITOT 1.7 (H) 03/30/2023     Albumin:   Albumin   Date Value Ref Range Status   03/31/2023 2.9 (L) 3.5 - 5.2 g/dL Final     Protein:   Total Protein   Date Value Ref Range Status   03/30/2023 7.2 6.0 - 8.4 g/dL Final     Lactic acid:   Lab Results   Component Value Date    LACTATE 0.9 03/31/2023    LACTATE 1.7 03/29/2023       Significant Imaging: I have reviewed all pertinent imaging results/findings within the past 24 hours.    > 50% of 40 min visit spent in chart review, face to face discussion of goals of care,  symptom assessment, coordination of care and emotional support.    Gerry Keita MD  Palliative Medicine  [facility]

## 2023-03-31 NOTE — SUBJECTIVE & OBJECTIVE
Interval History: See HPI    Past Medical History:   Diagnosis Date    A-fib     Acute kidney injury     COPD (chronic obstructive pulmonary disease)     Coronary artery disease     Diabetes mellitus     Encounter for blood transfusion     Former smoker     Hypertension     Myocardial infarction     Thyroid disease     Type 2 diabetes mellitus without complications        Past Surgical History:   Procedure Laterality Date    CLOSURE OF LEFT ATRIAL APPENDAGE USING DEVICE N/A 5/17/2022    Procedure: Left atrial appendage closure device;  Surgeon: Tre Schmidt MD;  Location: [facility] CATH LAB;  Service: Cardiology;  Laterality: N/A;    COLONOSCOPY N/A 1/3/2017    Procedure: COLONOSCOPY;  Surgeon: Marcus Zarco MD;  Location: [facility] ENDO;  Service: Endoscopy;  Laterality: N/A;    CORONARY BYPASS GRAFT ANGIOGRAPHY  3/30/2021    Procedure: Bypass graft study;  Surgeon: Tre Schmidt MD;  Location: [facility] CATH LAB;  Service: Cardiology;;    CORONARY STENT PLACEMENT      GASTROSTOMY TUBE PLACEMENT      INSERTION OF PACEMAKER  04/2017    INTRAVASCULAR ULTRASOUND, NON-CORONARY  5/17/2022    Procedure: Intravascular Ultrasound, Non-Coronary;  Surgeon: Tre Schmidt MD;  Location: [facility] CATH LAB;  Service: Cardiology;;    LEFT HEART CATHETERIZATION N/A 3/30/2021    Procedure: Left heart cath;  Surgeon: Tre Schmidt MD;  Location: [facility] CATH LAB;  Service: Cardiology;  Laterality: N/A;    PEG TUBE REMOVAL      TRACHEOSTOMY CLOSURE      TRACHEOSTOMY TUBE PLACEMENT      TRANSESOPHAGEAL ECHOCARDIOGRAPHY N/A 5/17/2022    Procedure: ECHOCARDIOGRAM, TRANSESOPHAGEAL;  Surgeon: [facility] Diagnostic Provider;  Location: [facility] CATH LAB;  Service: Anesthesiology;  Laterality: N/A;    TREATMENT OF CARDIAC ARRHYTHMIA N/A 11/25/2022    Procedure: Cardioversion or Defibrillation;  Surgeon: Kuldeep Daniels MD;  Location: [facility] CATH LAB;  Service: Cardiology;  Laterality: N/A;    UPPER GASTROINTESTINAL ENDOSCOPY  05/2016    Dr. Zarco  with PEG tube placement       Review of patient's allergies indicates:   Allergen Reactions    Penicillin      Other reaction(s): anaphylaxis  Other reaction(s): anaphylaxis    Penicillins Hives       Medications:  Continuous Infusions:   sodium chloride 0.9% 50 mL/hr at 23 0358    NORepinephrine bitartrate-D5W 1.04 mcg/kg/min (23 0345)     Scheduled Meds:   albuterol-ipratropium  3 mL Nebulization TID WAKE    apixaban  2.5 mg Oral BID    atorvastatin  20 mg Oral Daily    carvediloL  12.5 mg Oral BID    chlorhexidine  15 mL Mouth/Throat BID    latanoprost  1 drop Both Eyes QHS    [START ON 2023] levothyroxine  100 mcg Oral Before breakfast    mupirocin   Nasal BID    pantoprazole  40 mg Oral QAM    sodium chloride 0.9%  10 mL Intravenous Q12H     PRN Meds:guaiFENesin 100 mg/5 ml, melatonin, morphine, ondansetron, prochlorperazine    Family History       Problem Relation (Age of Onset)    Diabetes Mother, Brother    Glaucoma Father, Brother    Heart disease Mother    Macular degeneration Father, Brother    No Known Problems Sister, Maternal Aunt, Maternal Uncle, Paternal Aunt, Paternal Uncle, Maternal Grandmother, Maternal Grandfather, Paternal Grandmother, Paternal Grandfather          Tobacco Use    Smoking status: Former     Types: Cigarettes     Quit date: 2005     Years since quittin.3    Smokeless tobacco: Never    Tobacco comments:     quit    Substance and Sexual Activity    Alcohol use: No    Drug use: No    Sexual activity: Yes       Review of Systems  Objective:     Vital Signs (Most Recent):  Temp: 97 °F (36.1 °C) (23 0830)  Pulse: (!) 53 (23 0744)  Resp: 18 (23 0744)  BP: (!) 118/57 (23 0600)  SpO2: 97 % (23 0744)   Vital Signs (24h Range):  Temp:  [97 °F (36.1 °C)-98 °F (36.7 °C)] 97 °F (36.1 °C)  Pulse:  [50-91] 53  Resp:  [16-28] 18  SpO2:  [96 %-100 %] 97 %  BP: ()/(44-69) 118/57  Arterial Line BP: ()/(38-64) 113/38     Weight:  45.2 kg (99 lb 10.4 oz)  Body mass index is 18.83 kg/m².    Physical Exam  Vitals reviewed.   Constitutional:       General: He is not in acute distress.     Appearance: He is ill-appearing.   HENT:      Head: Normocephalic and atraumatic.      Right Ear: External ear normal.      Left Ear: External ear normal.      Nose: Nose normal. No congestion.      Mouth/Throat:      Mouth: Mucous membranes are moist.      Pharynx: No oropharyngeal exudate.   Eyes:      General:         Right eye: No discharge.         Left eye: No discharge.      Extraocular Movements: Extraocular movements intact.   Cardiovascular:      Rate and Rhythm: Normal rate and regular rhythm.   Pulmonary:      Effort: Pulmonary effort is normal. No respiratory distress.   Abdominal:      General: There is no distension.      Palpations: Abdomen is soft.   Skin:     General: Skin is warm.   Neurological:      General: No focal deficit present.      Mental Status: He is oriented to person, place, and time.   Psychiatric:         Mood and Affect: Mood normal.         Behavior: Behavior normal.         Thought Content: Thought content normal.       Review of Symptoms      Symptom Assessment (ESAS 0-10 Scale)  Pain:  0  Dyspnea:  5  Anxiety:  0  Nausea:  0  Depression:  0  Anorexia:  0  Fatigue:  4  Insomnia:  0  Restlessness:  0  Agitation:  0         Performance Status:  50    Living Arrangements:  Lives alone    Psychosocial/Cultural:   See Palliative Psychosocial Note: No  **Primary  to Follow**  Palliative Care  Consult: No      Advance Care Planning   Advance Directives:   Do Not Resuscitate Status: Yes      Decision Making:  Patient answered questions  Goals of Care: The patient endorses that what is most important right now is to focus on spending time at home and improvement in condition but with limits to invasive therapies    Accordingly, we have decided that the best plan to meet the patient's goals includes  continuing with treatment       Significant Labs: BMP:   Recent Labs   Lab 03/30/23  0310 03/30/23  1407 03/31/23 0323   *   < > 134*   *   < > 125*   K 5.4*   < > 4.6   CL 96   < > 96   CO2 19*   < > 18*   BUN 71*   < > 57*   CREATININE 3.4*   < > 2.2*   CALCIUM 8.8   < > 8.2*   MG 2.0  --   --     < > = values in this interval not displayed.     CBC:   Recent Labs   Lab 03/29/23  1136 03/30/23  0310 03/31/23 0323   WBC 7.42 11.47 8.65   HGB 13.3* 11.8* 11.2*   HCT 38.9* 33.9* 32.0*    217 181     CBC:   Recent Labs   Lab 03/31/23 0323   WBC 8.65   HGB 11.2*   HCT 32.0*   MCV 98        BMP:  Recent Labs   Lab 03/31/23 0323   *   *   K 4.6   CL 96   CO2 18*   BUN 57*   CREATININE 2.2*   CALCIUM 8.2*     LFT:  Lab Results   Component Value Date     (H) 03/30/2023    ALKPHOS 78 03/30/2023    BILITOT 1.7 (H) 03/30/2023     Albumin:   Albumin   Date Value Ref Range Status   03/31/2023 2.9 (L) 3.5 - 5.2 g/dL Final     Protein:   Total Protein   Date Value Ref Range Status   03/30/2023 7.2 6.0 - 8.4 g/dL Final     Lactic acid:   Lab Results   Component Value Date    LACTATE 0.9 03/31/2023    LACTATE 1.7 03/29/2023       Significant Imaging: I have reviewed all pertinent imaging results/findings within the past 24 hours.

## 2023-03-31 NOTE — CARE UPDATE
03/31/23 0744   Patient Assessment/Suction   Level of Consciousness (AVPU) alert   All Lung Fields Breath Sounds coarse   PRE-TX-O2   Device (Oxygen Therapy) nasal cannula   $ Is the patient on Low Flow Oxygen? Yes   Flow (L/min) 0.5   SpO2 97 %   Pulse Oximetry Type Continuous   $ Pulse Oximetry - Multiple Charge Pulse Oximetry - Multiple   Pulse (!) 53   Resp 18   Aerosol Therapy   $ Aerosol Therapy Charges Aerosol Treatment   Respiratory Treatment Status (SVN) given   Treatment Route (SVN) mask;air   Patient Position (SVN) HOB elevated   Signs of Intolerance (SVN) none   Breath Sounds Post-Respiratory Treatment   Throughout All Fields Post-Treatment All Fields   Throughout All Fields Post-Treatment aeration increased   Post-treatment Heart Rate (beats/min) 51   Post-treatment Resp Rate (breaths/min) 18

## 2023-03-31 NOTE — CONSULTS
[facility]  Cardiology  Progress Note    Patient Name: Cleveland Capps  MRN: [id number]  Admission Date: 3/29/2023  Hospital Length of Stay: 1 days  Code Status: DNR   Attending Provider: Tim Rosales MD   Consulting Provider: Shania Willard NP  Primary Care Physician: Romero Snyder MD  Principal Problem:Hypotension    Patient information was obtained from patient, past medical records, and ER records.     Inpatient consult to Cardiology  Consult performed by: Shania Willard NP  Consult ordered by: Gabriella Fraser MD      Subjective:   INTERVAL HISTORY:  3/31/23  Pt states he is feeling a bit better today; Still on levophed 0.8 mcg and dobutamine 2.5 mcg, IV bolus infusing; Pt alert, eating lunch;   Has older and younger brother who come to visit; in good spirits    Chief Complaint:  Weight loss and weakness     HPI:  Problem 1 patient has a severe dilated cardiomyopathy and has been in the hospital several times with heart failure he he was on Entresto at home in reviewing his list of medications at home which hospital he was on Ace inhibitors also he has not been significantly short of breath recently.  He denies any chest pain.  Current BNP levels are almost normal  He had bypass surgery for triple-vessel disease in 2016  Last year he had SANTHOSH cardioversion after he had been in atrial fibrillation for a long period of time he still is on amiodarone and TSH is high  Problem the2-he has lost weight and is anorectic possibly related to medication    Past Medical History:   Diagnosis Date    A-fib     Acute kidney injury     COPD (chronic obstructive pulmonary disease)     Coronary artery disease     Diabetes mellitus     Encounter for blood transfusion     Former smoker     Hypertension     Myocardial infarction     Thyroid disease     Type 2 diabetes mellitus without complications        Past Surgical History:   Procedure Laterality Date    CLOSURE OF LEFT ATRIAL APPENDAGE USING  DEVICE N/A 5/17/2022    Procedure: Left atrial appendage closure device;  Surgeon: Tre Schmidt MD;  Location: [facility] CATH LAB;  Service: Cardiology;  Laterality: N/A;    COLONOSCOPY N/A 1/3/2017    Procedure: COLONOSCOPY;  Surgeon: Marcus Zarco MD;  Location: [facility] ENDO;  Service: Endoscopy;  Laterality: N/A;    CORONARY BYPASS GRAFT ANGIOGRAPHY  3/30/2021    Procedure: Bypass graft study;  Surgeon: Tre Schmidt MD;  Location: [facility] CATH LAB;  Service: Cardiology;;    CORONARY STENT PLACEMENT      GASTROSTOMY TUBE PLACEMENT      INSERTION OF PACEMAKER  04/2017    INTRAVASCULAR ULTRASOUND, NON-CORONARY  5/17/2022    Procedure: Intravascular Ultrasound, Non-Coronary;  Surgeon: Tre Schmidt MD;  Location: [facility] CATH LAB;  Service: Cardiology;;    LEFT HEART CATHETERIZATION N/A 3/30/2021    Procedure: Left heart cath;  Surgeon: Tre Schmidt MD;  Location: [facility] CATH LAB;  Service: Cardiology;  Laterality: N/A;    PEG TUBE REMOVAL      TRACHEOSTOMY CLOSURE      TRACHEOSTOMY TUBE PLACEMENT      TRANSESOPHAGEAL ECHOCARDIOGRAPHY N/A 5/17/2022    Procedure: ECHOCARDIOGRAM, TRANSESOPHAGEAL;  Surgeon: [facility];  Location: [facility] CATH LAB;  Service: Anesthesiology;  Laterality: N/A;    TREATMENT OF CARDIAC ARRHYTHMIA N/A 11/25/2022    Procedure: Cardioversion or Defibrillation;  Surgeon: Kuldeep Daniels MD;  Location: [facility] CATH LAB;  Service: Cardiology;  Laterality: N/A;    UPPER GASTROINTESTINAL ENDOSCOPY  05/2016    Dr. Zarco with PEG tube placement       Review of patient's allergies indicates:   Allergen Reactions    Penicillin      Other reaction(s): anaphylaxis  Other reaction(s): anaphylaxis    Penicillins Hives       No current facility-administered medications on file prior to encounter.     Current Outpatient Medications on File Prior to Encounter   Medication Sig    albuterol-ipratropium (DUO-NEB) 2.5 mg-0.5 mg/3 mL nebulizer solution Take 3 mLs by nebulization 3 (three) times  daily.    amiodarone (PACERONE) 200 MG Tab Take 1 tablet (200 mg total) by mouth once daily.    apixaban (ELIQUIS) 5 mg Tab Take 5 mg by mouth 2 (two) times daily.    atorvastatin (LIPITOR) 80 MG tablet Take 80 mg by mouth once daily.    carvediloL (COREG) 25 MG tablet Take 25 mg by mouth 2 (two) times daily.    docusate sodium (COLACE) 50 MG capsule Take 2 capsules (100 mg total) by mouth 2 (two) times daily as needed for Constipation.    enalapril (VASOTEC) 20 MG tablet Take 20 mg by mouth once daily.    isosorbide mononitrate (IMDUR) 30 MG 24 hr tablet Take 30 mg by mouth once daily.    latanoprost 0.005 % ophthalmic solution Place 1 drop into both eyes every evening.    levothyroxine (SYNTHROID) 75 MCG tablet Take 1 tablet (75 mcg total) by mouth before breakfast.    pantoprazole (PROTONIX) 40 MG tablet Take 40 mg by mouth every morning.    potassium chloride SA (K-DUR,KLOR-CON M) 10 MEQ tablet Take 10 mEq by mouth once daily.    sacubitriL-valsartan (ENTRESTO) 24-26 mg per tablet Take 1 tablet by mouth 2 (two) times daily.    spironolactone (ALDACTONE) 25 MG tablet Take 1 tablet (25 mg total) by mouth once daily.    torsemide (DEMADEX) 20 MG Tab Take 1 tablet (20 mg total) by mouth once daily.    travoprost, benzalkonium, (TRAVATAN) 0.004 % ophthalmic solution Place 1 drop into both eyes nightly.    COMP-AIR NEBULIZER COMPRESSOR Alfreda use as directed    TRUE METRIX GLUCOSE TEST STRIP Strp USE 1 STRIP TO CHECK GLUCOSE ONCE DAILY     Family History       Problem Relation (Age of Onset)    Diabetes Mother, Brother    Glaucoma Father, Brother    Heart disease Mother    Macular degeneration Father, Brother    No Known Problems Sister, Maternal Aunt, Maternal Uncle, Paternal Aunt, Paternal Uncle, Maternal Grandmother, Maternal Grandfather, Paternal Grandmother, Paternal Grandfather          Tobacco Use    Smoking status: Former     Types: Cigarettes     Quit date: 2005     Years since quittin.3    Smokeless  tobacco: Never    Tobacco comments:     quit 2007   Substance and Sexual Activity    Alcohol use: No    Drug use: No    Sexual activity: Yes     Review of Systems   Constitutional: Positive for weight loss.   HENT: Negative.     Cardiovascular:  Positive for dyspnea on exertion.        Systolic murmur   Respiratory:  Positive for cough, shortness of breath and sputum production.    Musculoskeletal:  Positive for arthritis and back pain.   Gastrointestinal:  Positive for bloating and anorexia.   Genitourinary: Negative.    Neurological: Negative.    Psychiatric/Behavioral:  Positive for depression.    Objective:     Vital Signs (Most Recent):  Temp: 97 °F (36.1 °C) (03/31/23 0830)  Pulse: (!) 53 (03/31/23 0744)  Resp: 18 (03/31/23 0744)  BP: (!) 118/57 (03/31/23 0600)  SpO2: 97 % (03/31/23 0744)   Vital Signs (24h Range):  Temp:  [97 °F (36.1 °C)-98 °F (36.7 °C)] 97 °F (36.1 °C)  Pulse:  [50-91] 53  Resp:  [16-28] 18  SpO2:  [96 %-100 %] 97 %  BP: ()/(44-69) 118/57  Arterial Line BP: ()/(38-64) 113/38     Weight: 45.2 kg (99 lb 10.4 oz)  Body mass index is 18.83 kg/m².    SpO2: 97 %         Intake/Output Summary (Last 24 hours) at 3/31/2023 0850  Last data filed at 3/31/2023 0803  Gross per 24 hour   Intake 2678.45 ml   Output 930 ml   Net 1748.45 ml         Lines/Drains/Airways       Arterial Line  Duration             Arterial Line 03/29/23 2030 Right Radial 1 day              Peripheral Intravenous Line  Duration                  Midline Catheter Insertion/Assessment  - Single Lumen 03/29/23 1715 Left cephalic vein (lateral side of arm) 20g x 8cm 1 day         Peripheral IV - Single Lumen 03/29/23 1130 20 G Anterior;Distal;Left Forearm 1 day         Peripheral IV - Single Lumen 03/30/23 1725 18 G;2 in Anterior;Right Upper Arm <1 day                    Physical Exam  Constitutional:       Comments: Thin, frail, breathing comfortably on high flow oxygen, conversant w/o dyspnea; in good spirits and states  he feels better today   Cardiovascular:      Rate and Rhythm: Regular rhythm. Bradycardia present.      Heart sounds: Murmur heard.   Pulmonary:      Effort: Pulmonary effort is normal. No respiratory distress.      Breath sounds: No wheezing or rhonchi.      Comments: 8 LPM; no tachypnea or labored breathing  Chest:      Chest wall: No tenderness.   Abdominal:      General: Abdomen is flat. There is no distension.      Palpations: Abdomen is soft.   Musculoskeletal:         General: No swelling.      Right lower leg: No edema.      Left lower leg: No edema.   Skin:     General: Skin is warm and dry.   Neurological:      General: No focal deficit present.      Mental Status: He is oriented to person, place, and time.           Significant Labs: CMP   Recent Labs   Lab 03/29/23  1136 03/29/23  1819 03/30/23  0310 03/30/23  1407 03/31/23  0323   *   < > 127* 126* 125*   K 5.9*   < > 5.4* 4.9 4.6   CL 92*   < > 96 95 96   CO2 23   < > 19* 17* 18*   *   < > 158* 176* 134*   BUN 75*   < > 71* 69* 57*   CREATININE 4.1*   < > 3.4* 3.0* 2.2*   CALCIUM 9.6   < > 8.8 8.8 8.2*   PROT 8.2  --  7.2  --   --    ALBUMIN 4.0  --  3.5  --  2.9*   BILITOT 1.5*  --  1.7*  --   --    ALKPHOS 90  --  78  --   --    *  --  199*  --   --    *  --  141*  --   --    ANIONGAP 16   < > 12 14 11    < > = values in this interval not displayed.     , CBC   Recent Labs   Lab 03/29/23  1136 03/30/23  0310 03/31/23  0323   WBC 7.42 11.47 8.65   HGB 13.3* 11.8* 11.2*   HCT 38.9* 33.9* 32.0*    217 181     , and Troponin No results for input(s): TROPONINI in the last 48 hours.    Significant Imaging:   Assessment and Plan:   1. Anorexia and weight loss probably related to medication  2. Chronic kidney disease besides deterioration due to dehydration  3. Ischemic cardiomyopathy with depressed ejection fraction but somewhat compensated heart failure and status post bypass 2016  4. Status post atrial fibrillation now in  sinus rhythm and still on amiodarone but tsh is very high; Free T4 lower limits of normal (TSH 19 today)  5. Is a severe dilated cardiomyopathy with depressed ejection fraction is doing surprisingly well clinically as far as heart maintaining sinus rhythm with a lower BNP   6. Anemia  7. Hyponatremia  8. MEIR  9. Transaminitis    Recommendations:    Palliative care consulted/following. Patient is DNR  Remains in normal sinus rhythm. Continue apixaban in history of PAF  Continue dobutamine 2.5 mcg/kg/min; Wean off levophed as tolerated  CRP elevated. Start colchicine? Vs indocin  Further recommendations per Dr. Daniels      Active Diagnoses:    Diagnosis Date Noted POA    PRINCIPAL PROBLEM:  Hypotension [I95.9] 02/09/2023 Yes    Dehydration [E86.0] 03/29/2023 Yes    Presence of Watchman left atrial appendage closure device [Z95.818] 11/19/2022 Yes    Status post coronary artery bypass graft [Z95.1] 11/19/2022 Not Applicable    AICD (automatic cardioverter/defibrillator) present [Z95.810] 11/18/2022 Yes    Mitral regurgitation [I34.0] 06/23/2022 Yes    MEIR (acute kidney injury) [N17.9] 04/27/2016 Yes      Problems Resolved During this Admission:        VTE Risk Mitigation (From admission, onward)           Ordered     apixaban tablet 2.5 mg  2 times daily         03/29/23 1751     Reason for No Pharmacological VTE Prophylaxis  Once        Question:  Reasons:  Answer:  Already adequately anticoagulated on oral Anticoagulants    03/29/23 1751     IP VTE HIGH RISK PATIENT  Once         03/29/23 1703     Place sequential compression device  Until discontinued         03/29/23 1703                        Shania Willard NP  Cardiology   [facility]    Dr. Kuldeep Daniels MD  Department of Cardiology  [facility]  Date of Service 3/31/23

## 2023-03-31 NOTE — PHYSICIAN QUERY
PT Name: Cleveland Capps  MR #: [id number]    DOCUMENTATION CLARIFICATION     CDS/: Kyleigh Wright Pe               Contact information: [phone]     This form is a permanent document in the medical record.     Query Date: March 31, 2023    By submitting this query, we are merely seeking further clarification of documentation.. Please utilize your independent clinical judgment when addressing the question(s) below.    The medical record contains the following:   Indicators  Supporting Clinical Findings Location in Medical Record    Clinical exam  Weight loss--cardiac cachexia?   Temporal wasting and abdominal distention  noted - 3/5 generalized strength  Per Nephrology Consult note  Per H&P Exam     Weight, BMI, Usual Body Weight BMI 18.8 Per RD calculation    Registered Dietician Diagnosis Registered Dietician Consult -   Recommend Severe Malnutrition be added to patient's problem list - Severe Malnutrition R/T inadequate protein-energy intake (</= 50% intake for >/= 1 month) and severe weight loss > 5% in 1 month (10%, 24 lbs weight loss in 1 month per Epic) Registered Dietician Consult  Note     Treatment/support measures  Suplena supplement BID  Per RD consult     Other Pre-albumin 11 Per Lab report      Academy of Nutrition and Dietetics (Academy) and the American Society for Parenteral and Enteral Nutrition (A.S.P.E.N.) Clinical Characteristics to support Malnutrition   Malnutrition in the Context of Acute Illness or Injury Malnutrition in the Context of Chronic Illness or Injury Malnutrition in the Context of Social or Environmental Circumstances   Malnutrition Level Moderate Severe Moderate Severe   Moderate   Severe   Energy Intake <75%                   >7 days <50%                 >5 days <75%           >1 month <75%                      >1 month   <75% for >3 months   <50% for >1 month   Weight Loss   1-2% in 1 week >2% in 1 week 5% in 1 month >5% in 1 month 5% in 1 month >5% in 1 month    5% in 1  month >5% in 1 month 7.5% in 3 months >7.5% in 3 months 7.5% in 3 months >7.5% in 3 months    7.5% in 3 months >7.5% in 3 months 10% in 6 months >10% in 6 months 10% in 6 months >10% in 6 months        20% in 1 year                    >20% in 1 year                                                                  20% in 1 year                            >20% in 1 year                                                  Subcutaneous Fat Loss Mild  Moderate  Mild  Severe    Mild   Severe   Muscle Loss Mild  Moderate  Mild  Severe    Mild   Severe   Edema/Fluid Accumulation Mild Moderate to severe  Mild  Severe   Mild   Severe   Reduced  Strength         (based on standards supplied by  of dynamometer) N/A Measurably reduced N/A Measurably reduced N/A Measurably reduced     Criteria for mild malnutrition is defined as 1 characteristic outlined above within the established moderate or severe parameters.  A minimum of 2 out of the 6 characteristics noted above are recommended for a diagnosis of moderate or severe malnutrition.  Chronic illness/injury is a disease/condition lasting 3 months or longer.    The noted clinical guidelines are only system guidelines and do not replace the providers clinical judgment.    Provider, please specify if you in agreement with dietary's recommendation of adding Severe protein calorie malnutrition to the patients diagnosis list     [  x] Severe Malnutrition - a minimum of 2 of the 6 severe malnutrition characteristics noted above    [  ] Cachexia - involuntary weight loss of greater than 10% of baseline body weight characteried by muscle atrophy and depletion of lean body mass usually associated with a chronic condition   [  ] Other Nutritional Diagnosis (please specify): _______     Please document in your progress notes daily for the duration of treatment until resolved and  include in your discharge summary.

## 2023-03-31 NOTE — CONSULTS
Pulmonary/Critical Care Consult      PATIENT NAME: Cleveland Capps  MRN: [id number]  TODAY'S DATE: 2023  9:46 AM  ADMIT DATE: 3/29/2023  AGE: 67 y.o. : 1955    CONSULT REQUESTED BY: Tim Rosales MD    REASON FOR CONSULT:   Shock    HPI:  The patient is a 67-year-old male with end-stage cardiomyopathy who is in cardiogenic shock.  Cardiology is following.  The patient is on very high doses of Levophed.  The patient states he has had 9 heart attacks.    REVIEW OF SYSTEMS  GENERAL: Feeling poorly  HEART: No chest pain or palpitations.  LUNGS:  He is coughing up clear mucus  GI: No Nausea, vomiting, constipation, diarrhea, or reflux.  : No dysuria, hesitancy, or nocturia.  SKIN: No lesions or rashes.  MUSCULOSKELETAL:  He is weak  NEURO: No headaches or neuropathy.  LYMPH: No edema or adenopathy.  PSYCH: No anxiety or depression.  ENDO:  He is losing weight steadily    ALLERGIES  Review of patient's allergies indicates:   Allergen Reactions    Penicillin      Other reaction(s): anaphylaxis  Other reaction(s): anaphylaxis    Penicillins Hives       INPATIENT SCHEDULED MEDICATIONS   sodium chloride 0.9%   Intravenous Once    albuterol-ipratropium  3 mL Nebulization TID WAKE    apixaban  2.5 mg Oral BID    atorvastatin  20 mg Oral Daily    chlorhexidine  15 mL Mouth/Throat BID    latanoprost  1 drop Both Eyes QHS    [START ON 2023] levothyroxine  100 mcg Oral Before breakfast    mupirocin   Nasal BID    pantoprazole  40 mg Oral QAM    sodium chloride 0.9%  1,000 mL Intravenous Once    sodium chloride 0.9%  10 mL Intravenous Q12H      sodium chloride 0.9% 50 mL/hr at 23 0358    DOBUTamine IV infusion (titrating)      NORepinephrine bitartrate-D5W 1.04 mcg/kg/min (23 0695)       MEDICAL AND SURGICAL HISTORY  Past Medical History:   Diagnosis Date    A-fib     Acute kidney injury     COPD (chronic obstructive pulmonary disease)     Coronary artery disease     Diabetes mellitus      Encounter for blood transfusion     Former smoker     Hypertension     Myocardial infarction     Thyroid disease     Type 2 diabetes mellitus without complications      Past Surgical History:   Procedure Laterality Date    CLOSURE OF LEFT ATRIAL APPENDAGE USING DEVICE N/A 2022    Procedure: Left atrial appendage closure device;  Surgeon: Tre Schmidt MD;  Location: [facility] CATH LAB;  Service: Cardiology;  Laterality: N/A;    COLONOSCOPY N/A 1/3/2017    Procedure: COLONOSCOPY;  Surgeon: Marcus Zarco MD;  Location: [facility];  Service: Endoscopy;  Laterality: N/A;    CORONARY BYPASS GRAFT ANGIOGRAPHY  3/30/2021    Procedure: Bypass graft study;  Surgeon: Tre Schmidt MD;  Location: [facility] CATH LAB;  Service: Cardiology;;    CORONARY STENT PLACEMENT      GASTROSTOMY TUBE PLACEMENT      INSERTION OF PACEMAKER  2017    INTRAVASCULAR ULTRASOUND, NON-CORONARY  2022    Procedure: Intravascular Ultrasound, Non-Coronary;  Surgeon: Tre Schmidt MD;  Location: [facility] CATH LAB;  Service: Cardiology;;    LEFT HEART CATHETERIZATION N/A 3/30/2021    Procedure: Left heart cath;  Surgeon: Tre Schmidt MD;  Location: [facility] CATH LAB;  Service: Cardiology;  Laterality: N/A;    PEG TUBE REMOVAL      TRACHEOSTOMY CLOSURE      TRACHEOSTOMY TUBE PLACEMENT      TRANSESOPHAGEAL ECHOCARDIOGRAPHY N/A 2022    Procedure: ECHOCARDIOGRAM, TRANSESOPHAGEAL;  Surgeon: [facility] Provider;  Location: [facility] CATH LAB;  Service: Anesthesiology;  Laterality: N/A;    TREATMENT OF CARDIAC ARRHYTHMIA N/A 2022    Procedure: Cardioversion or Defibrillation;  Surgeon: Kuldeep Daniels MD;  Location: [facility] CATH LAB;  Service: Cardiology;  Laterality: N/A;    UPPER GASTROINTESTINAL ENDOSCOPY  2016    Dr. Zarco with PEG tube placement       ALCOHOL, TOBACCO AND DRUG USE  Social History     Tobacco Use   Smoking Status Former    Types: Cigarettes    Quit date: 2005    Years since quittin.3   Smokeless  Tobacco Never   Tobacco Comments    quit 2007     Social History     Substance and Sexual Activity   Alcohol Use No     Social History     Substance and Sexual Activity   Drug Use No       FAMILY HISTORY  Family History   Problem Relation Age of Onset    Diabetes Mother     Heart disease Mother     Glaucoma Father     Macular degeneration Father     No Known Problems Sister     Diabetes Brother     Glaucoma Brother     Macular degeneration Brother     No Known Problems Maternal Aunt     No Known Problems Maternal Uncle     No Known Problems Paternal Aunt     No Known Problems Paternal Uncle     No Known Problems Maternal Grandmother     No Known Problems Maternal Grandfather     No Known Problems Paternal Grandmother     No Known Problems Paternal Grandfather     Colon cancer Neg Hx     Colon polyps Neg Hx     Crohn's disease Neg Hx     Ulcerative colitis Neg Hx     Anemia Neg Hx     Arrhythmia Neg Hx     Asthma Neg Hx     Clotting disorder Neg Hx     Fainting Neg Hx     Heart attack Neg Hx     Heart failure Neg Hx     Hyperlipidemia Neg Hx     Hypertension Neg Hx     Stroke Neg Hx     Atrial Septal Defect Neg Hx        VITAL SIGNS (MOST RECENT)  Temp: 97 °F (36.1 °C) (03/31/23 0830)  Pulse: (!) 53 (03/31/23 0744)  Resp: 18 (03/31/23 0744)  BP: (!) 118/57 (03/31/23 0600)  SpO2: 97 % (03/31/23 0744)    INTAKE AND OUTPUT (LAST 24 HOURS):  Intake/Output Summary (Last 24 hours) at 3/31/2023 0946  Last data filed at 3/31/2023 0803  Gross per 24 hour   Intake 2678.45 ml   Output 930 ml   Net 1748.45 ml       WEIGHT  Wt Readings from Last 1 Encounters:   03/29/23 45.2 kg (99 lb 10.4 oz)       PHYSICAL EXAM  GENERAL: Older patient in no distress.  HEENT: Pupils equal and reactive. Extraocular movements intact. Nose intact. Pharynx moist.  NECK: Supple.   HEART: Regular rate and rhythm. No murmur or gallop auscultated.  LUNGS: Clear to auscultation and percussion. Lung excursion symmetrical. No change in fremitus. No  adventitial noises.  ABDOMEN: Bowel sounds present. Non-tender, no masses palpated.  : Normal anatomy.  De Paz with yellow urine  EXTREMITIES: Normal muscle tone and joint movement, no cyanosis or clubbing.   LYMPHATICS: No adenopathy palpated, no edema.  SKIN: Dry, intact, no lesions.   NEURO: Cranial nerves II-XII intact. Motor strength 5/5 bilaterally, upper and lower extremities.  PSYCH: Appropriate affect    ACUTE PHASE REACTANT (LAST 24 HOURS)  Recent Labs   Lab 03/31/23  0323 03/31/23  0743   FERRITIN 290  --    CRP  --  6.43*       CBC LAST (LAST 24 HOURS)  Recent Labs   Lab 03/31/23  0323   WBC 8.65   RBC 3.28*   HGB 11.2*   HCT 32.0*   MCV 98   MCH 34.1*   MCHC 35.0   RDW 14.9*      MPV 8.3*   GRAN 81.5*  7.1   LYMPH 8.4*  0.7*   MONO 6.0  0.5   BASO 0.05   NRBC 0       CHEMISTRY LAST (LAST 24 HOURS)  Recent Labs   Lab 03/31/23  0323   *   K 4.6   CL 96   CO2 18*   ANIONGAP 11   BUN 57*   CREATININE 2.2*   *   CALCIUM 8.2*   ALBUMIN 2.9*         CARDIAC PROFILE (LAST 24 HOURS)  Recent Labs   Lab 03/29/23  1136   BNP 96   TROPONINIHS 87.2*       LAST 7 DAYS MICROBIOLOGY   Microbiology Results (last 7 days)       Procedure Component Value Units Date/Time    Culture, Respiratory with Gram Stain [[id number]] Collected: 03/31/23 0824    Order Status: Sent Specimen: Respiratory from Sputum Updated: 03/31/23 0832    Blood culture #1 **CANNOT BE ORDERED STAT** [[id number]] Collected: 03/29/23 1310    Order Status: Completed Specimen: Blood from Peripheral, Forearm, Left Updated: 03/30/23 1432     Blood Culture, Routine No Growth to date      No Growth to date    Blood culture #2 **CANNOT BE ORDERED STAT** [[id number]] Collected: 03/29/23 1305    Order Status: Completed Specimen: Blood from Peripheral, Antecubital, Left Updated: 03/30/23 1432     Blood Culture, Routine No Growth to date      No Growth to date    MRSA Screen by PCR [[id number]] Collected: 03/30/23 0311    Order Status: Completed  Specimen: Nasopharyngeal Swab from Nasal Updated: 03/30/23 0540     MRSA SCREEN BY PCR Negative            MOST RECENT IMAGING  X-Ray Chest AP Portable  Reason: pneumonia    FINDINGS:    Portable chest with comparison chest x-ray March 29, 2023 show normal cardiomediastinal silhouette. Median sternotomy wires and left AICD again noted. Right internal jugular central venous catheter tip is in the anatomic region of the SVC. No pneumothorax.  Lungs are clear. Pulmonary vasculature is normal. No acute osseous abnormality.    IMPRESSION:    Right internal jugular central venous catheter tip is in the anatomic region of the SVC. No pneumothorax.    Electronically signed by:  Fahad Silver DO  3/31/2023 9:39 AM CDT Workstation: [id number]      CURRENT VISIT EKG  Results for orders placed or performed during the hospital encounter of 03/29/23   EKG 12-lead    Narrative    Test Reason : R09.02,    Vent. Rate : 069 BPM     Atrial Rate : 069 BPM     P-R Int : 222 ms          QRS Dur : 116 ms      QT Int : 442 ms       P-R-T Axes : 060 051 084 degrees     QTc Int : 473 ms    Sinus rhythm with 1st degree A-V block  Anterior infarct (cited on or before 06-FEB-2023)  Abnormal ECG  When compared with ECG of 29-MAR-2023 11:14,  T wave inversion less evident in Anterior-lateral leads  Confirmed by Kuldeep Daniels MD ([id number]) on 3/30/2023 5:44:57 PM    Referred By: [email or web address]   SELF           Confirmed By:Kuldeep Daniels MD       ECHOCARDIOGRAM RESULTS  Results for orders placed during the hospital encounter of 03/29/23    Echo    Interpretation Summary  · The left ventricle is mildly enlarged with eccentric hypertrophy and severely decreased systolic function.  · The estimated ejection fraction is 15%.  · Grade I left ventricular diastolic dysfunction.  · There are segmental left ventricular wall motion abnormalities.  · There is abnormal septal wall motion consistent with right ventricular pacemaker.  · Normal right ventricular  size with low normal right ventricular systolic function.  · Mild left atrial enlargement.  · Mild-to-moderate aortic regurgitation.  · Normal central venous pressure (3 mmHg).  · The estimated PA systolic pressure is 29 mmHg.  · Evidence of anterior apical infarction old is present        Oxygen INFORMATION   15 L             IMPRESSION AND PLAN  Cardiogenic shock  Anemia, progressive  Hyponatremia, progressive   Acute renal failure, improving   Hyperglycemia, mild   Moderate hypoalbuminemia  Transaminitis and hyperbilirubinemia   Vitamin-D deficiency  Elevated TSH secondary to amiodarone  DNR status noted      Discussed with Dr. Daniels who suggests giving fluids and adding Dobutrex.  Will give 1 L of normal saline as his potassium is high normal, will start Dobutrex at 2.5 mics per kilos per minute  Accept a mean of 50  Do not have much more to offer, the patient appears to be at the end of his life, Dr. Keita's consult noted    Critical care time spent reviewing the chart, examining the patient, reviewing the labs, reviewing the radiological findings, discussing care with nursing, physicians, and respiratory and creating the note and  has been greater than 35 minutes    Miesha Umaña MD  Date of Service: 03/31/2023  9:46 AM

## 2023-03-31 NOTE — PT/OT/SLP PROGRESS
Occupational Therapy      Patient Name:  Cleveland Capps   MRN:  [id number]    Patient not seen today secondary to Nurse/ SALVADOR hold. Will follow-up next service date.    3/31/2023

## 2023-03-31 NOTE — ASSESSMENT & PLAN NOTE
I reviewed his chart and discussed his case with his team.      I examined Mr. Capps at bedside.  He continues to maintain capacity for complex medical decision-making.    He was able to accurately recall our conversation from yesterday.  His goals remain clear and unchanged.  He is willing to go through further hospitalizations and procedures to potentially get better.  He also understands he is at a high risk of death and desires to maintain a DNR code status.    He had no questions or concerns today.    I will continue to check in periodically.    I appreciate being involved.  I hope I have been helpful.

## 2023-03-31 NOTE — SUBJECTIVE & OBJECTIVE
Interval History: remains critically ill.     Medications:  Continuous Infusions:   sodium chloride 0.9% 50 mL/hr at 03/31/23 0358    DOBUTamine IV infusion (titrating) 2.5 mcg/kg/min (03/31/23 1001)    NORepinephrine bitartrate-D5W 0.8 mcg/kg/min (03/31/23 1115)     Scheduled Meds:   sodium chloride 0.9%   Intravenous Once    albuterol-ipratropium  3 mL Nebulization TID WAKE    apixaban  2.5 mg Oral BID    atorvastatin  20 mg Oral Daily    chlorhexidine  15 mL Mouth/Throat BID    latanoprost  1 drop Both Eyes QHS    [START ON 4/1/2023] levothyroxine  100 mcg Oral Before breakfast    mupirocin   Nasal BID    pantoprazole  40 mg Oral QAM    polyethylene glycol  17 g Oral BID    sodium chloride 0.9%  10 mL Intravenous Q12H     PRN Meds:guaiFENesin 100 mg/5 ml, melatonin, morphine, ondansetron, prochlorperazine    Objective:     Vital Signs (Most Recent):  Temp: 97.5 °F (36.4 °C) (03/31/23 1230)  Pulse: (!) 51 (03/31/23 1305)  Resp: 20 (03/31/23 1305)  BP: (!) 94/50 (03/31/23 0940)  SpO2: 100 % (03/31/23 1305)   Vital Signs (24h Range):  Temp:  [97 °F (36.1 °C)-98 °F (36.7 °C)] 97.5 °F (36.4 °C)  Pulse:  [50-89] 51  Resp:  [17-28] 20  SpO2:  [87 %-100 %] 100 %  BP: ()/(44-69) 94/50  Arterial Line BP: ()/(38-64) 113/38     Weight: 45.2 kg (99 lb 10.4 oz)  Body mass index is 18.83 kg/m².    Physical Exam  Constitutional:       General: He is not in acute distress.     Appearance: He is ill-appearing.   HENT:      Head: Normocephalic and atraumatic.      Right Ear: External ear normal.      Left Ear: External ear normal.      Nose: Nose normal. No congestion.      Mouth/Throat:      Mouth: Mucous membranes are moist.      Pharynx: No oropharyngeal exudate.   Eyes:      General:         Right eye: No discharge.         Left eye: No discharge.      Extraocular Movements: Extraocular movements intact.   Cardiovascular:      Rate and Rhythm: Normal rate and regular rhythm.   Pulmonary:      Effort: Pulmonary  effort is normal. No respiratory distress.   Abdominal:      General: There is no distension.      Palpations: Abdomen is soft.   Skin:     General: Skin is warm.   Neurological:      General: No focal deficit present.      Mental Status: He is alert and oriented to person, place, and time.   Psychiatric:         Mood and Affect: Mood normal.         Behavior: Behavior normal.         Thought Content: Thought content normal.       Review of Symptoms      Symptom Assessment (ESAS 0-10 Scale)  Pain:  0  Dyspnea:  4  Anxiety:  0  Nausea:  0  Depression:  0  Anorexia:  0  Fatigue:  6  Insomnia:  0  Restlessness:  0  Agitation:  0         Performance Status:  40    Psychosocial/Cultural:   See Palliative Psychosocial Note: No  **Primary  to Follow**  Palliative Care  Consult: No      Advance Care Planning   Advance Directives:   Do Not Resuscitate Status: Yes      Decision Making:  Patient answered questions  Goals of Care: What is most important right now is to focus on spending time at home, improvement in condition but with limits to invasive therapies. Accordingly, we have decided that the best plan to meet the patient's goals includes continuing with treatment.       Significant Labs: BMP:   Recent Labs   Lab 03/30/23  0310 03/30/23  1407 03/31/23 0323   *   < > 134*   *   < > 125*   K 5.4*   < > 4.6   CL 96   < > 96   CO2 19*   < > 18*   BUN 71*   < > 57*   CREATININE 3.4*   < > 2.2*   CALCIUM 8.8   < > 8.2*   MG 2.0  --   --     < > = values in this interval not displayed.     CBC:   Recent Labs   Lab 03/30/23  0310 03/31/23 0323   WBC 11.47 8.65   HGB 11.8* 11.2*   HCT 33.9* 32.0*    181     CBC:   Recent Labs   Lab 03/31/23 0323   WBC 8.65   HGB 11.2*   HCT 32.0*   MCV 98        BMP:  Recent Labs   Lab 03/31/23 0323   *   *   K 4.6   CL 96   CO2 18*   BUN 57*   CREATININE 2.2*   CALCIUM 8.2*     LFT:  Lab Results   Component Value Date    AST  199 (H) 03/30/2023    ALKPHOS 78 03/30/2023    BILITOT 1.7 (H) 03/30/2023     Albumin:   Albumin   Date Value Ref Range Status   03/31/2023 2.9 (L) 3.5 - 5.2 g/dL Final     Protein:   Total Protein   Date Value Ref Range Status   03/30/2023 7.2 6.0 - 8.4 g/dL Final     Lactic acid:   Lab Results   Component Value Date    LACTATE 0.9 03/31/2023    LACTATE 1.7 03/29/2023       Significant Imaging: I have reviewed all pertinent imaging results/findings within the past 24 hours.

## 2023-03-31 NOTE — PT/OT/SLP PROGRESS
Physical Therapy      Patient Name:  Cleveland Capps   MRN:  [id number]    Patient not seen today secondary to Nurse/ SALVADOR hold. Will follow-up 4/1/23.

## 2023-03-31 NOTE — PROGRESS NOTES
[facility]  Adult Nutrition   Progress Note (Follow-Up)    SUMMARY     Recommendations  Recommendation/Intervention:   Continue current diet. Texture per SLP recommendations.   RD increased frequency of Suplena to TID. Suplena TID (to provide 1275 kcal/day and 31.8 g/day protein)    If PO intake fails to meet at least 50% of EEN/EPN, recommend supplemental enteral nutrition.  Recommend severe malnutrition diagnosis be added to problem list.   Goals: 1. PO intake to meet at least 50% of EEN/EPN.  Nutrition Goal Status: new  Communication of RD Recs: reviewed with RN    Dietitian Rounds Brief  PO intake of meals is ~10%. Pt having issues swallowing, working with SLP. SLP recommending MBSS. Pt is trying to eat what he can. RD educated pt on importance of nutrition and need to treat malnutrition, preserve LBM and prevent further weight loss/ strive for weight restoration. Pt states he will try to drink ONS, increased frequency to TID to better meet needs. Patient states he has a good appetite, but just cannot eat as much.   LBM: 03/28/23. Plans to start bowel regimen per MD and RN discussion.     Nutritional Diagnosis (PES Statement)   Severe malnutrition in the context of chronic illness related to inadequate protin and energy intake in the presence of CHF and COPD as evidence by severe weight loss of >5% in <1 month (actual weight loss of 10% in 1 month), decreased PO intake estimated to meet <75% needs >/= 1 month; severe visible muscle (wasting temples, clavicles); severe fat wasting (orbital; triceps).     Reason for Assessment  Reason For Assessment: RD follow-up  Diagnosis: cardiac disease, pulmonary disease  Relevant Medical History: COPD, coronary artery disease, diabetes, CHF with a low ejection fraction  Interdisciplinary Rounds: attended    Nutrition Risk Screen  Nutrition Risk Screen: unintentional loss of 10 lbs or more in the past 2 months     MST Score: 3  Have you recently lost weight  "without trying?: Yes: Unsure how much  Weight loss score: 2  Have you been eating poorly because of a decreased appetite?: Yes  Appetite score: 1       Nutrition/Diet History  Spiritual, Cultural Beliefs, Mandaeism Practices, Values that Affect Care: no  Food Allergies: NKFA  Factors Affecting Nutritional Intake: difficulty/impaired swallowing    Anthropometrics  Temp: 97 °F (36.1 °C)  Height: 5' 1" (154.9 cm)  Height (inches): 61 in  Weight Method: Stated  Weight: 45.2 kg (99 lb 10.4 oz)  Weight (lb): 99.65 lb  Ideal Body Weight (IBW), Male: 112 lb  % Ideal Body Weight, Male (lb): 87.5 %  BMI (Calculated): 18.8       Weight History:  Wt Readings from Last 10 Encounters:   03/29/23 45.2 kg (99 lb 10.4 oz)   03/30/23 45.2 kg (99 lb 10.4 oz)   03/29/23 44.6 kg (98 lb 6.4 oz)   03/23/23 47.6 kg (105 lb)   02/14/23 56 kg (123 lb 7.3 oz)   01/09/23 52.6 kg (116 lb)   12/20/22 52.9 kg (116 lb 8.2 oz)   11/25/22 60.3 kg (133 lb)   11/13/22 61.7 kg (136 lb)   09/19/22 66.2 kg (146 lb)       Lab/Procedures/Meds: Pertinent Labs Reviewed    Clinical Chemistry:  Recent Labs   Lab 03/29/23  1136 03/29/23  1819 03/30/23  0310 03/30/23  1407 03/31/23  0323   *   < > 127*   < > 125*   K 5.9*   < > 5.4*   < > 4.6   CL 92*   < > 96   < > 96   CO2 23   < > 19*   < > 18*   *   < > 158*   < > 134*   BUN 75*   < > 71*   < > 57*   CREATININE 4.1*   < > 3.4*   < > 2.2*   CALCIUM 9.6   < > 8.8   < > 8.2*   PROT 8.2  --  7.2  --   --    ALBUMIN 4.0  --  3.5  --  2.9*   BILITOT 1.5*  --  1.7*  --   --    ALKPHOS 90  --  78  --   --    *  --  199*  --   --    *  --  141*  --   --    ANIONGAP 16   < > 12   < > 11   MG 2.4  --  2.0  --   --    PHOS  --   --   --   --  4.5   LIPASE 60  --   --   --   --     < > = values in this interval not displayed.       CBC:   Recent Labs   Lab 03/31/23  0323   WBC 8.65   RBC 3.28*   HGB 11.2*   HCT 32.0*      MCV 98   MCH 34.1*   MCHC 35.0       Cardiac Profile:  Recent Labs "   Lab 03/29/23  1136   BNP 96       Inflammatory Labs:  Recent Labs   Lab 03/31/23  0743   CRP 6.43*       Thyroid & Parathyroid:  Recent Labs   Lab 03/31/23  0323   TSH 19.060*   FREET4 1.07       Vitamins:  Recent Labs   Lab 03/31/23  0323   [id number]*         Medications: Pertinent Medications reviewed    Scheduled Meds:   sodium chloride 0.9%   Intravenous Once    albuterol-ipratropium  3 mL Nebulization TID WAKE    apixaban  2.5 mg Oral BID    atorvastatin  20 mg Oral Daily    chlorhexidine  15 mL Mouth/Throat BID    latanoprost  1 drop Both Eyes QHS    [START ON 4/1/2023] levothyroxine  100 mcg Oral Before breakfast    mupirocin   Nasal BID    pantoprazole  40 mg Oral QAM    polyethylene glycol  17 g Oral BID    sodium chloride 0.9%  1,000 mL Intravenous Once    sodium chloride 0.9%  10 mL Intravenous Q12H       Continuous Infusions:   sodium chloride 0.9% 50 mL/hr at 03/31/23 0358    DOBUTamine IV infusion (titrating) 2.5 mcg/kg/min (03/31/23 1001)    NORepinephrine bitartrate-D5W 0.8 mcg/kg/min (03/31/23 1115)       PRN Meds:.guaiFENesin 100 mg/5 ml, melatonin, morphine, ondansetron, prochlorperazine    Estimated/Assessed Needs  Weight Used For Calorie Calculations: 45.2 kg (99 lb 10.4 oz)  Energy Calorie Requirements (kcal): 9247-4675 (35-40 kcal/kg  Energy Need Method: Kcal/kg, other (see comments) (MSJ X 1.25 plus 500 kcal = 1869 kcal/day, 41 kcal/kg)  Protein Requirements: 36-45 gm/day (0.8-1.0 gm/kg)  Weight Used For Protein Calculations: 45.2 kg (99 lb 10.4 oz)     Estimated Fluid Requirement Method: RDA Method  RDA Method (mL): 1582       Nutrition Prescription Ordered  Current Diet Order: Dysphagia Soft (IDDSI Level 5); Nectar Thick Liquid  Oral Nutrition Supplement: Suplena BID    Evaluation of Received Nutrient/Fluid Intake  Energy Calories Required: not meeting needs  Protein Required: not meeting needs  Fluid Required: meeting needs  Tolerance: tolerating     Intake/Output Summary (Last 24  hours) at 3/31/2023 1141  Last data filed at 3/31/2023 1130  Gross per 24 hour   Intake 2422.04 ml   Output 1060 ml   Net 1362.04 ml      % Intake of Estimated Energy Needs: 0 - 25 %  % Meal Intake: 0 - 25 %    Nutrition Risk  Level of Risk/Frequency of Follow-up: high     Monitor and Evaluation  Food and Nutrient Intake: energy intake, food and beverage intake  Food and Nutrient Adminstration: diet order  Knowledge/Beliefs/Attitudes: food and nutrition knowledge/skill  Physical Activity and Function: nutrition-related ADLs and IADLs, factors affecting access to physical activity  Anthropometric Measurements: weight, weight change, body mass index  Biochemical Data, Medical Tests and Procedures: electrolyte and renal panel, inflammatory profile, gastrointestinal profile, lipid profile, glucose/endocrine profile  Nutrition-Focused Physical Findings: overall appearance     Nutrition Follow-Up  RD Follow-up?: Yes    Marissa Umaña RD 03/31/2023 11:42 AM

## 2023-03-31 NOTE — PHYSICIAN QUERY
4/3 Query withdrawn sub documentation noted 3/31 PN - Cardiogenic shock - Query signed by [facility]       PT Name: Cleveland Capps  MR #: [id number]     DOCUMENTATION CLARIFICATION     CDS/: Kyleigh Wright Pe               Contact information:  This form is a permanent document in the medical record.     Query Date: March 31, 2023    By submitting this query, we are merely seeking further clarification of documentation.  Please utilize your independent clinical judgment when addressing the question(s) below.  The Medical Record contains the following:  Indicators Supporting Clinical Findings Location in Medical Record    Acute Illness (e.g. AMI, Sepsis, etc.) MEIR likely due to dehydration  Per Progress notes     Vital Signs  ER BP 83/47>77/50 Per ER Flow sheets     Hypotension or Low Blood Pressure documented Documentation of hypotension noted  Per ER and H&P    Diaphoresis, Cold Extremities or Cyanosis H&P Exam ill-appearing and diaphoretic Per H&P exam    Oliguria Decreased urine output  Per nursing note 3/29    Medication/Treatment  -Vasopressors  -Inotropic Drugs  -IV Fluids   -IV Antibiotics  -Cardiac Assist Devices  -Hemodynamic Monitoring  -Blood/Blood Products 1 L NS   Levophed gtt  IVF 50ml/hr  Per MAR     Provider, please specify the diagnosis being treated with Levophed associated with above clinical findings.  [    ] Hypovolemic Shock   [    ] Other Shock (please specify): __________   [    ] Other Condition (please specify): _________         Please document in your progress notes daily for the duration of treatment until resolved and include in your discharge summary.

## 2023-03-31 NOTE — PT/OT/SLP PROGRESS
"Speech Language Pathology Treatment    Patient Name:  Cleveland Capps   MRN:  [id number]  Admitting Diagnosis: Hypotension    Recommendations:                 General Recommendations:  Dysphagia therapy and Modified barium swallow study  Diet recommendations:  Minced & Moist Diet - IDDSI Level 5, Liquid Diet Level: Nectar Thick   Aspiration Precautions:  Encourage multiple swallows, 1 bite/sip at a time, Assistance with meals and Assistance with thickening liquids, HOB to 90 degrees, Small bites/sips, Strict aspiration precautions, and Wear oxygen during intake     General Precautions: Standard,    Communication strategies:  none    Subjective     Pt seen at b/s.  Nsg present.  Patient goals: " I want to be able to drink thin liquids.  I can just take small sips of it.  I think I will be ok."     Pain/Comfort:  Pain Rating 1: 0/10    Respiratory Status: Nasal cannula, flow .5 L/min    Objective:     Has the patient been evaluated by SLP for swallowing?   Yes  Keep patient NPO? No   Current Respiratory Status:        HOB elevated to 90 degrees.  Pt and RN utilizing above swallowing precautions (e.g., small isolated sips; thickening all liquids--including liquid medicine) with MOD Ind.  No overt s/s of aspiration with nectar-thickened liquid trials.  Nsg reports no observed swallowing difficulty with current meal intake, with implementation of above swallowing precautions.    Education:  RE-education on purpose of thickening liquid, swallow precautions, and recommended MBSS.    Assessment:     Pt demonstrates safety with current modified diet level , and implementation of recommended swallowing precautions.  Pt is scheduled for MBSS on 4/1.  Await results for further recommendations.    Goals:   Multidisciplinary Problems       SLP Goals          Problem: SLP    Goal Priority Disciplines Outcome   SLP Goal     SLP Ongoing, Progressing   Description: 1.  Pt will demonstrate no overt s/s of aspiration, >95% of the " time, with least restrictive diet level.    2.  Pt will demonstrate no overt s/s of aspiration, >95% of the time, with IDDSI-5 (Minced and Moist) / NECTAR thick liquid meal intake.  3.  Pt will participate in MBSS.  4.  Pt/CG/Staff will demonstrate trained swallowing precautions with MOD Ind.                       Plan:     Patient to be seen:  5 x/week   Plan of Care expires:     Plan of Care reviewed with:      SLP Follow-Up:  Yes       Discharge recommendations:      Barriers to Discharge:  None    Time Tracking:     SLP Treatment Date:   03/31/23  Speech Start Time:  1025  Speech Stop Time:  1035     Speech Total Time (min):  10 min    Billable Minutes: Treatment Swallowing Dysfunction 10mins    03/31/2023

## 2023-03-31 NOTE — ANESTHESIA PROCEDURE NOTES
Central Line    Diagnosis: hypotension  Patient location during procedure: ICU  Timeout: 3/31/2023 8:55 AM  Procedure end time: 3/31/2023 9:06 AM    Staffing  Authorizing Provider: Kishan Laboy Jr., MD  Performing Provider: Kishan Laboy Jr., MD    Staffing  Performed: anesthesiologist   Anesthesiologist: Kishan Laboy Jr., MD  Anesthesiologist was present at the time of the procedure.  Preanesthetic Checklist  Completed: patient identified, IV checked, site marked, risks and benefits discussed, surgical consent, monitors and equipment checked, pre-op evaluation, timeout performed and anesthesia consent given  Indication   Indication: hemodynamic monitoring, vascular access, med administration     Anesthesia   local infiltration    Central Line   Skin Prep: skin prepped with ChloraPrep, skin prep agent completely dried prior to procedure  Sterile Barriers Followed: Yes    All five maximal barriers used- gloves, gown, cap, mask, and large sterile sheet    hand hygiene performed prior to central venous catheter insertion  Location: right internal jugular.   Catheter type: triple lumen  Catheter Size: 7 Fr  Inserted Catheter Length: 15 cm  Ultrasound: vascular probe with ultrasound   Vessel Caliber: medium, patent, compressibility normal  Needle advanced into vessel with real time Ultrasound guidance.  Guidewire confirmed in vessel.  sterile gel and probe cover used in ultrasound-guided central venous catheter insertion  Manometry: none  Insertion Attempts: 1   Securement:line sutured, chlorhexidine patch, sterile dressing applied and blood return through all ports    Post-Procedure   X-Ray: no pneumothorax on x-ray, placement verified by x-ray, tip termination and successful placement  Tip termination comments: SVC   Adverse Events:none      Guidewire Guidewire removed intact. Guidewire removed intact, verified with nurse.  Additional Notes  Guidewire removed. Sutured at 15 cm at the skin. All ports  aspirate dark venous non pulsatile blood that falls with gravity. All ports flush easily. Chest x- ray revealed good placement and no pneumothorax noted. Ok to use central line.

## 2023-03-31 NOTE — ASSESSMENT & PLAN NOTE
I reviewed his chart discussed his case with his team.      I examined Mr. Capps at bedside.  Maintains capacity complex medical decision-making.      I introduced myself and my role as palliative care physician.  He was agreeable to speaking.      We discussed his medical illness, prognosis, and values in detail.      First, I took a moment to get to know him.  He currently lives at home alone.  He was not .  He does not have any grown children.  His 2 brothers help care for him.  He was able to complete most of his ADLs on his own; he does have help cooking from 1 of his brothers.  He is not working due to his medical illness.  He enjoys spending time at home with his cat.  He also enjoys going to the race track with his brothers.  Marilin is part of his life.  He is Yazidism.      We then moved onto his medical illness.  He was able to provide a very succinct in accurate understanding of his current situation.  He discussed his severe heart disease and heart failure.  He also understands that there is concern that his kidneys are failing.  I affirmed his understanding.      We discussed prognosis.  I explained given his very advanced disease, I worry he was at risk of a life-threatening complication at any moment in the coming days, weeks, or months.  In the best case scenario, I would not be surprised if he  during the coming year.  He understood and was not at all surprised by this news.    We discussed his values.  He explains that he was living a very reasonable quality of life at.  He is still able to do things that he enjoys.  He is willing to undergo prolonged hospitalizations and more procedures in order to prolong his current quality of life.  He worries about his cat at home.  He does not have any other fears.  In particular he does not fear dying.  He finds strength in his marilin.    We spoke a bit back and forth.  He asked many good questions.    I did not discuss hospice today.  Hospice is  not aligned with his goals today.    I did take a moment to discuss code status.  Based on his medical illness and values, I made a recommendation of a DNR code status after discussing the clinical realities of a code and his situation.  He understood and agreed with my recommendation.  He understands that I will place a DNR code status in his chart.    Ultimately, I recommended to continue with current plan of care being hope for the best and prepared for the worst.  He understood and agreed.    I updated his nursing team.    I appreciate being involved.  I hope I have been helpful.

## 2023-03-31 NOTE — CONSULTS
INPATIENT NEPHROLOGY CONSULT   [facility] NEPHROLOGY    Cleveland Capps  03/31/2023    Reason for consultation:    Acute kidney injury    Chief Complaint:   Chief Complaint   Patient presents with    Weakness     Pt here for low blood pressure and weakness, pt has not been eating well.           History of Present Illness:    67-year-old male presents with generalized weakness nausea vomiting hypotension and hypoxia patient has a history of COPD, coronary artery disease, diabetes, CHF with a low ejection fraction patient has been having nausea and vomiting and not able to keep his medicines down patient has been having fatigue patient reports shortness of breath on exertion.  Patient denies fever patient reports chronic cough patient denies chest pain    3/30  hypotensive, on levophed.  No nausea, chest pain, sob, fever, urinary or bowel complaint, new neurologic symptoms, new joint pain  3/31  no chest pain, sob, or nausea.  Alert.        Plan of Care:       Assessment:    Acute kidney injury likely due to intravascular volume depletion  --hold diuretics  --hold Entresto and vasotec.  Upon discharge can resume Entresto if renal function permits.  Don't resume Vasotec.  No reason to be on an ACE-I and ARB  --FEUrea 35%--borderline pre-renal  --Avoid NSAIDS, Mckeon II inhibitors, and other non-essential nephrotoxic agents  --renal dose medication for crcl 10-50  --strict I/Os  --given his end stage cardiac disease I don't feel dialysis would provide meaningful benefit.  I don't think her would tolerate it from a hemodynamic standpoint    Hyperkalemia secondary to silvestre an ARB and ACE-I  --lokelma given 3/30  --low k diet  --hold entresto and vasotec and spironolactone     Weight loss--cardiac cachexia?    --TSH elevated  --prealbumin low  --age appropriate cancer screening  --per primary team    Hyponatremia  --samsca contraindicated due to elevated liver enzymes  --tsh--elevated.  Synthroid increased  --uric  acid  --urine osm  --avoid hypotonic iv piggybacks    Elevated liver enzymes.  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 We will continue to follow.    Vital Signs:  Temp Readings from Last 3 Encounters:   03/31/23 97 °F (36.1 °C) (Axillary)   02/14/23 97.9 °F (36.6 °C) (Tympanic)   11/28/22 98.2 °F (36.8 °C)       Pulse Readings from Last 3 Encounters:   03/31/23 (!) 53   03/29/23 80   03/23/23 81       BP Readings from Last 3 Encounters:   03/31/23 (!) 118/57   03/29/23 90/60   03/23/23 108/70       Weight:  Wt Readings from Last 3 Encounters:   03/29/23 45.2 kg (99 lb 10.4 oz)   03/30/23 45.2 kg (99 lb 10.4 oz)   03/29/23 44.6 kg (98 lb 6.4 oz)       Past Medical & Surgical History:  Past Medical History:   Diagnosis Date    A-fib     Acute kidney injury     COPD (chronic obstructive pulmonary disease)     Coronary artery disease     Diabetes mellitus     Encounter for blood transfusion     Former smoker     Hypertension     Myocardial infarction     Thyroid disease     Type 2 diabetes mellitus without complications        Past Surgical History:   Procedure Laterality Date    CLOSURE OF LEFT ATRIAL APPENDAGE USING DEVICE N/A 5/17/2022    Procedure: Left atrial appendage closure device;  Surgeon: Tre Schmidt MD;  Location: [facility] CATH LAB;  Service: Cardiology;  Laterality: N/A;    COLONOSCOPY N/A 1/3/2017    Procedure: COLONOSCOPY;  Surgeon: Marcus Green MD;  Location: [facility];  Service: Endoscopy;  Laterality: N/A;    CORONARY BYPASS GRAFT ANGIOGRAPHY  3/30/2021    Procedure: Bypass graft study;  Surgeon: Tre Schmidt MD;  Location: [facility] CATH LAB;  Service: Cardiology;;    CORONARY STENT PLACEMENT      GASTROSTOMY TUBE PLACEMENT      INSERTION OF PACEMAKER  04/2017    INTRAVASCULAR ULTRASOUND, NON-CORONARY  5/17/2022    Procedure: Intravascular Ultrasound, Non-Coronary;   Surgeon: Tre Schmidt MD;  Location: [facility] CATH LAB;  Service: Cardiology;;    LEFT HEART CATHETERIZATION N/A 3/30/2021    Procedure: Left heart cath;  Surgeon: Tre Schmidt MD;  Location: [facility] CATH LAB;  Service: Cardiology;  Laterality: N/A;    PEG TUBE REMOVAL      TRACHEOSTOMY CLOSURE      TRACHEOSTOMY TUBE PLACEMENT      TRANSESOPHAGEAL ECHOCARDIOGRAPHY N/A 2022    Procedure: ECHOCARDIOGRAM, TRANSESOPHAGEAL;  Surgeon: [facility] Diagnostic Provider;  Location: [facility] CATH LAB;  Service: Anesthesiology;  Laterality: N/A;    TREATMENT OF CARDIAC ARRHYTHMIA N/A 2022    Procedure: Cardioversion or Defibrillation;  Surgeon: Kuldeep Daniels MD;  Location: [facility] CATH LAB;  Service: Cardiology;  Laterality: N/A;    UPPER GASTROINTESTINAL ENDOSCOPY  2016    Dr. Zarco with PEG tube placement       Past Social History:  Social History     Socioeconomic History    Marital status: Single   Tobacco Use    Smoking status: Former     Types: Cigarettes     Quit date: 2005     Years since quittin.3    Smokeless tobacco: Never    Tobacco comments:     quit    Substance and Sexual Activity    Alcohol use: No    Drug use: No    Sexual activity: Yes     Social Determinants of Health     Financial Resource Strain: Low Risk     Difficulty of Paying Living Expenses: Not hard at all   Food Insecurity: No Food Insecurity    Worried About Running Out of Food in the Last Year: Never true    Ran Out of Food in the Last Year: Never true   Transportation Needs: No Transportation Needs    Lack of Transportation (Medical): No    Lack of Transportation (Non-Medical): No   Physical Activity: Inactive    Days of Exercise per Week: 0 days    Minutes of Exercise per Session: 0 min   Stress: No Stress Concern Present    Feeling of Stress : Not at all   Social Connections: Socially Isolated    Frequency of Communication with Friends and Family: Three times a week    Frequency of Social Gatherings with Friends and  Family: Once a week    Attends Restorationist Services: Never    Active Member of Clubs or Organizations: No    Attends Club or Organization Meetings: Never    Marital Status: Never    Housing Stability: Low Risk     Unable to Pay for Housing in the Last Year: No    Number of Places Lived in the Last Year: 1    Unstable Housing in the Last Year: No       Medications:  No current facility-administered medications on file prior to encounter.     Current Outpatient Medications on File Prior to Encounter   Medication Sig Dispense Refill    albuterol-ipratropium (DUO-NEB) 2.5 mg-0.5 mg/3 mL nebulizer solution Take 3 mLs by nebulization 3 (three) times daily.      amiodarone (PACERONE) 200 MG Tab Take 1 tablet (200 mg total) by mouth once daily. 90 tablet 3    apixaban (ELIQUIS) 5 mg Tab Take 5 mg by mouth 2 (two) times daily.      atorvastatin (LIPITOR) 80 MG tablet Take 80 mg by mouth once daily.      carvediloL (COREG) 25 MG tablet Take 25 mg by mouth 2 (two) times daily.      docusate sodium (COLACE) 50 MG capsule Take 2 capsules (100 mg total) by mouth 2 (two) times daily as needed for Constipation. 30 capsule 0    enalapril (VASOTEC) 20 MG tablet Take 20 mg by mouth once daily.      isosorbide mononitrate (IMDUR) 30 MG 24 hr tablet Take 30 mg by mouth once daily.      latanoprost 0.005 % ophthalmic solution Place 1 drop into both eyes every evening.      levothyroxine (SYNTHROID) 75 MCG tablet Take 1 tablet (75 mcg total) by mouth before breakfast. 90 tablet 3    pantoprazole (PROTONIX) 40 MG tablet Take 40 mg by mouth every morning.      potassium chloride SA (K-DUR,KLOR-CON M) 10 MEQ tablet Take 10 mEq by mouth once daily.      sacubitriL-valsartan (ENTRESTO) 24-26 mg per tablet Take 1 tablet by mouth 2 (two) times daily.      spironolactone (ALDACTONE) 25 MG tablet Take 1 tablet (25 mg total) by mouth once daily. 30 tablet 11    torsemide (DEMADEX) 20 MG Tab Take 1 tablet (20 mg total) by mouth once daily. 30  "tablet 0    travoprost, benzalkonium, (TRAVATAN) 0.004 % ophthalmic solution Place 1 drop into both eyes nightly.      COMP-AIR NEBULIZER COMPRESSOR Alfreda use as directed      TRUE METRIX GLUCOSE TEST STRIP Strp USE 1 STRIP TO CHECK GLUCOSE ONCE DAILY       Scheduled Meds:   sodium chloride 0.9%   Intravenous Once    albuterol-ipratropium  3 mL Nebulization TID WAKE    apixaban  2.5 mg Oral BID    atorvastatin  20 mg Oral Daily    chlorhexidine  15 mL Mouth/Throat BID    latanoprost  1 drop Both Eyes QHS    [START ON 4/1/2023] levothyroxine  100 mcg Oral Before breakfast    mupirocin   Nasal BID    pantoprazole  40 mg Oral QAM    polyethylene glycol  17 g Oral BID    sodium chloride 0.9%  1,000 mL Intravenous Once    sodium chloride 0.9%  10 mL Intravenous Q12H     Continuous Infusions:   sodium chloride 0.9% 50 mL/hr at 03/31/23 0358    DOBUTamine IV infusion (titrating) 2.5 mcg/kg/min (03/31/23 1001)    NORepinephrine bitartrate-D5W 0.8 mcg/kg/min (03/31/23 1115)     PRN Meds:.guaiFENesin 100 mg/5 ml, melatonin, morphine, ondansetron, prochlorperazine    Allergies:  Penicillin and Penicillins    Past Family History:  Reviewed; refer to Hospitalist Admission Note    Review of Systems:  Review of Systems - All 14 systems reviewed and negative, except as noted in HPI    Physical Exam:    BP (!) 118/57   Pulse (!) 53   Temp 97 °F (36.1 °C) (Axillary)   Resp 18   Ht 5' 1" (1.549 m)   Wt 45.2 kg (99 lb 10.4 oz)   SpO2 97%   BMI 18.83 kg/m²     General Appearance:    Alert, cooperative, no distress, appears stated age   Head:    Normocephalic, without obvious abnormality, atraumatic   Eyes:    PER, conjunctiva/corneas clear, EOM's intact in both eyes        Throat:   Lips, mucosa, and tongue normal; teeth and gums normal   Back:     Symmetric, no curvature, ROM normal, no CVA tenderness   Lungs:     Clear to auscultation bilaterally, respirations unlabored   Chest wall:    No tenderness or deformity   Heart:    " Regular rate and rhythm, S1 and S2 normal, no murmur, rub   or gallop   Abdomen:     Soft, non-tender, bowel sounds active all four quadrants,     no masses, no organomegaly   Extremities:   Extremities normal, atraumatic, no cyanosis or edema   Pulses:   2+ and symmetric all extremities   MSK:   Muscular atrophy   Skin:   Skin color, texture, turgor normal, no rashes or lesions   Neurologic:   CNII-XII intact, normal strength and sensation       Throughout.  No flap     Results:  Lab Results   Component Value Date     (L) 03/31/2023    K 4.6 03/31/2023    CL 96 03/31/2023    CO2 18 (L) 03/31/2023    BUN 57 (H) 03/31/2023    CREATININE 2.2 (H) 03/31/2023    CALCIUM 8.2 (L) 03/31/2023    ANIONGAP 11 03/31/2023    ESTGFRAFRICA >60.0 07/27/2022    EGFRNONAA 52.4 (A) 07/27/2022       Lab Results   Component Value Date    CALCIUM 8.2 (L) 03/31/2023    PHOS 4.5 03/31/2023       Recent Labs   Lab 03/31/23  0323   WBC 8.65   RBC 3.28*   HGB 11.2*   HCT 32.0*      MCV 98   MCH 34.1*   MCHC 35.0            I have personally reviewed pertinent radiological imaging and reports.    Patient care was time spent personally by me on the following activities:   Obtaining a history  Examination of patient.  Providing medical care at the patients bedside.  Developing a treatment plan with patient or surrogate and bedside caregivers  Ordering and reviewing laboratory studies, radiographic studies, pulse oximetry.  Ordering and performing treatments and interventions.  Evaluation of patient's response to treatment.  Discussions with consultants while on the unit and immediately available to the patient.  Re-evaluation of the patient's condition.  Documentation in the medical record.     Terence Davis MD  Nephrology  [facility] [place]  [phone]

## 2023-03-31 NOTE — PHYSICIAN QUERY
PT Name: Cleveland Capps  MR #: [id number]     DOCUMENTATION CLARIFICATION     CDS/: Kyleigh Wright Pe               Contact information: [phone]  This form is a permanent document in the medical record.    Query Date: March 31, 2023    By submitting this query, we are merely seeking further clarification of documentation.  Please utilize your independent clinical judgment when addressing the question(s) below.    The Medical Record contains the following:   Indicator Supporting Clinical Findings Location in Medical Record    Documentation  Patient admitted with MEIR likely due to dehydration  - pt was hypotensive on presentation ER SBP 83/47>77/50 -patient started on Levophed gtt  Per progress notes and ER documentation     Documentation  MEIR (acute kidney injury)        Atn; requiring dialysis    3/30 Progress note    BUN/Creatinine/GFR Bun 75 / Cr 4.1>3.9 / GFR 15.3 (2/23 Cr 1.2)  Per Lab report        Provider, please clarify if the documentation of ATN is a current diagnosis or history .      [    ] Acute Kidney Failure/Injury with Tubular Necrosis  - Damage to the tubule cells of the kidney. Common triggers: shock, hypotension, IV contrast, rhabdomyolysis, medications   [    x] Acute Kidney Failure/Injury  without ATN (ATN is noted as History)        Please document in your progress notes daily for the duration of treatment until resolved and include in your discharge summary.

## 2023-03-31 NOTE — PLAN OF CARE
Problem: Oral Intake Inadequate  Goal: Improved Oral Intake  Outcome: Ongoing, Progressing  Intervention: Promote and Optimize Oral Intake  Flowsheets (Taken 3/31/2023 1126)  Oral Nutrition Promotion: calorie-dense liquids provided     Changed ONS frequency to TID. RD added ONS, [place] TID (to provide 1275 kcal/day and 31.8 g/day protein)

## 2023-03-31 NOTE — CONSULTS
[facility]  Cardiology  Progress Note    Patient Name: Cleveland Capps  MRN: [id number]  Admission Date: 3/29/2023  Hospital Length of Stay: 1 days  Code Status: DNR   Attending Physician: Tim Rosales MD   Primary Care Physician: Romero Snyder MD  Expected Discharge Date: 4/1/2023  Principal Problem:Hypotension    Subjective:     Hospital Course:  Patient is feeling little bit better    Interval History:  Problem 1 he has bradycardia and is on inotrope  He has a pacemaker(exact type not known) and he is in sinus rhythm with a heart rate of 50 while on carvedilol  He is not in overt failure at this time  2. Free T4 levels are normal but TSH level significantly elevated due to amiodarone which has been discontinued  3. He has heart failure reduced ejection fraction although it seems to be compensated   BNP is 96-  4. Chronic kidney disease with acute renal failure he is getting rehydrated   ROS  Objective:     Vital Signs (Most Recent):  Temp: 97 °F (36.1 °C) (03/31/23 0830)  Pulse: (!) 53 (03/31/23 0744)  Resp: 18 (03/31/23 0744)  BP: (!) 118/57 (03/31/23 0600)  SpO2: 97 % (03/31/23 0744)   Vital Signs (24h Range):  Temp:  [97 °F (36.1 °C)-98 °F (36.7 °C)] 97 °F (36.1 °C)  Pulse:  [50-91] 53  Resp:  [16-28] 18  SpO2:  [96 %-100 %] 97 %  BP: ()/(44-69) 118/57  Arterial Line BP: ()/(38-64) 113/38     Weight: 45.2 kg (99 lb 10.4 oz)  Body mass index is 18.83 kg/m².    SpO2: 97 %         Intake/Output Summary (Last 24 hours) at 3/31/2023 0914  Last data filed at 3/31/2023 0803  Gross per 24 hour   Intake 2678.45 ml   Output 930 ml   Net 1748.45 ml       Lines/Drains/Airways       Arterial Line  Duration             Arterial Line 03/29/23 2030 Right Radial 1 day              Peripheral Intravenous Line  Duration                  Midline Catheter Insertion/Assessment  - Single Lumen 03/29/23 1715 Left cephalic vein (lateral side of arm) 20g x 8cm 1 day         Peripheral IV - Single  Lumen 03/29/23 1130 20 G Anterior;Distal;Left Forearm 1 day         Peripheral IV - Single Lumen 03/30/23 1725 18 G;2 in Anterior;Right Upper Arm <1 day                    Physical Exam thin male  Lungs are clear  Cardiac regular    Significant Labs: CMP   Recent Labs   Lab 03/29/23  1136 03/29/23  1819 03/30/23  0310 03/30/23  1407 03/31/23  0323   *   < > 127* 126* 125*   K 5.9*   < > 5.4* 4.9 4.6   CL 92*   < > 96 95 96   CO2 23   < > 19* 17* 18*   *   < > 158* 176* 134*   BUN 75*   < > 71* 69* 57*   CREATININE 4.1*   < > 3.4* 3.0* 2.2*   CALCIUM 9.6   < > 8.8 8.8 8.2*   PROT 8.2  --  7.2  --   --    ALBUMIN 4.0  --  3.5  --  2.9*   BILITOT 1.5*  --  1.7*  --   --    ALKPHOS 90  --  78  --   --    *  --  199*  --   --    *  --  141*  --   --    ANIONGAP 16   < > 12 14 11    < > = values in this interval not displayed.    and CBC   Recent Labs   Lab 03/29/23  1136 03/30/23  0310 03/31/23  0323   WBC 7.42 11.47 8.65   HGB 13.3* 11.8* 11.2*   HCT 38.9* 33.9* 32.0*    217 181     The left ventricle is mildly enlarged with eccentric hypertrophy and severely decreased systolic function.  The estimated ejection fraction is 15%.  Grade I left ventricular diastolic dysfunction.  There are segmental left ventricular wall motion abnormalities.  There is abnormal septal wall motion consistent with right ventricular pacemaker.  Normal right ventricular size with low normal right ventricular systolic function.  Mild left atrial enlargement.  Mild-to-moderate aortic regurgitation.  Normal central venous pressure (3 mmHg).  The estimated PA systolic pressure is 29 mmHg.  Evidence of anterior apical infarction old is present     Significant Imaging:   Assessment and Plan:   1. Recommend increasing IV fluids slightly  2. He has a VVI pacemaker so increasing atrial rates not an option-DC Coreg for the time being receiving get his heart rate up  3. Add low-dose Synthroid the try to get his heart  rate up  4. Cachexia-had appetite stimulants such as Periactin   Brief HPI:     Active Diagnoses:    Diagnosis Date Noted POA    PRINCIPAL PROBLEM:  Hypotension [I95.9] 02/09/2023 Yes    Dehydration [E86.0] 03/29/2023 Yes    Presence of Watchman left atrial appendage closure device [Z95.818] 11/19/2022 Yes    Status post coronary artery bypass graft [Z95.1] 11/19/2022 Not Applicable    AICD (automatic cardioverter/defibrillator) present [Z95.810] 11/18/2022 Yes    Mitral regurgitation [I34.0] 06/23/2022 Yes    MEIR (acute kidney injury) [N17.9] 04/27/2016 Yes      Problems Resolved During this Admission:       VTE Risk Mitigation (From admission, onward)           Ordered     apixaban tablet 2.5 mg  2 times daily         03/29/23 1751     Reason for No Pharmacological VTE Prophylaxis  Once        Question:  Reasons:  Answer:  Already adequately anticoagulated on oral Anticoagulants    03/29/23 1751     IP VTE HIGH RISK PATIENT  Once         03/29/23 1703     Place sequential compression device  Until discontinued         03/29/23 1703                    Kuldeep Daniels MD  Cardiology  [facility]

## 2023-03-31 NOTE — CONSULTS
[facility]  Palliative Medicine  Consult Note    Patient Name: Cleveland Capps  MRN: [id number]  Admission Date: 3/29/2023  Hospital Length of Stay: 1 days  Code Status: DNR   Attending Provider: Tim Rosales MD  Consulting Provider: Gerry Keita MD  Primary Care Physician: Romero Snyder MD  Principal Problem:Hypotension    Patient information was obtained from patient, past medical records and primary team.      Inpatient consult to Palliative Care  Consult performed by: Gerry Keita MD  Consult ordered by: Miesha Umaña MD        Assessment/Plan:     Palliative Care  Goals of care, counseling/discussion  I reviewed his chart discussed his case with his team.      I examined Mr. Capps at bedside.  Maintains capacity complex medical decision-making.      I introduced myself and my role as palliative care physician.  He was agreeable to speaking.      We discussed his medical illness, prognosis, and values in detail.      First, I took a moment to get to know him.  He currently lives at home alone.  He was not .  He does not have any grown children.  His 2 brothers help care for him.  He was able to complete most of his ADLs on his own; he does have help cooking from 1 of his brothers.  He is not working due to his medical illness.  He enjoys spending time at home with his cat.  He also enjoys going to the race track with his brothers.  [place] is part of his life.  He is [employer].      We then moved onto his medical illness.  He was able to provide a very succinct in accurate understanding of his current situation.  He discussed his severe heart disease and heart failure.  He also understands that there is concern that his kidneys are failing.  I affirmed his understanding.      We discussed prognosis.  I explained given his very advanced disease, I worry he was at risk of a life-threatening complication at any moment in the coming days, weeks, or months.  In the best  case scenario, I would not be surprised if he  during the coming year.  He understood and was not at all surprised by this news.    We discussed his values.  He explains that he was living a very reasonable quality of life at.  He is still able to do things that he enjoys.  He is willing to undergo prolonged hospitalizations and more procedures in order to prolong his current quality of life.  He worries about his cat at home.  He does not have any other fears.  In particular he does not fear dying.  He finds strength in his sarah.    We spoke a bit back and forth.  He asked many good questions.    I did not discuss hospice today.  Hospice is not aligned with his goals today.    I did take a moment to discuss code status.  Based on his medical illness and values, I made a recommendation of a DNR code status after discussing the clinical realities of a code and his situation.  He understood and agreed with my recommendation.  He understands that I will place a DNR code status in his chart.    Ultimately, I recommended to continue with current plan of care being hope for the best and prepared for the worst.  He understood and agreed.    I updated his nursing team.    I appreciate being involved.  I hope I have been helpful.        Thank you for your consult. I will follow-up with patient. Please contact us if you have any additional questions.    Subjective:     HPI:   67-year-old male with multiple medical problems significant for chronic obstructive pulmonary disease, coronary artery disease complicated by heart failure with reduced ejection fraction, diabetes, hypertension, and hypothyroidism.  He is currently admitted to the ICU being treated for cardiogenic shock and cardio renal syndrome.  At this point given his age, comorbidities, and cachexia he carries a guarded and potentially very poor prognosis.  I have been asked to assist with goals of care.      Hospital Course:  No notes on file    Interval  History: See HPI    Past Medical History:   Diagnosis Date    A-fib     Acute kidney injury     COPD (chronic obstructive pulmonary disease)     Coronary artery disease     Diabetes mellitus     Encounter for blood transfusion     Former smoker     Hypertension     Myocardial infarction     Thyroid disease     Type 2 diabetes mellitus without complications        Past Surgical History:   Procedure Laterality Date    CLOSURE OF LEFT ATRIAL APPENDAGE USING DEVICE N/A 5/17/2022    Procedure: Left atrial appendage closure device;  Surgeon: Tre Schmidt MD;  Location: [facility] CATH LAB;  Service: Cardiology;  Laterality: N/A;    COLONOSCOPY N/A 1/3/2017    Procedure: COLONOSCOPY;  Surgeon: Marcus Green MD;  Location: [facility] ENDO;  Service: Endoscopy;  Laterality: N/A;    CORONARY BYPASS GRAFT ANGIOGRAPHY  3/30/2021    Procedure: Bypass graft study;  Surgeon: Tre Schmidt MD;  Location: [facility] CATH LAB;  Service: Cardiology;;    CORONARY STENT PLACEMENT      GASTROSTOMY TUBE PLACEMENT      INSERTION OF PACEMAKER  04/2017    INTRAVASCULAR ULTRASOUND, NON-CORONARY  5/17/2022    Procedure: Intravascular Ultrasound, Non-Coronary;  Surgeon: Tre Schmidt MD;  Location: [facility] CATH LAB;  Service: Cardiology;;    LEFT HEART CATHETERIZATION N/A 3/30/2021    Procedure: Left heart cath;  Surgeon: Tre Schmidt MD;  Location: [facility] CATH LAB;  Service: Cardiology;  Laterality: N/A;    PEG TUBE REMOVAL      TRACHEOSTOMY CLOSURE      TRACHEOSTOMY TUBE PLACEMENT      TRANSESOPHAGEAL ECHOCARDIOGRAPHY N/A 5/17/2022    Procedure: ECHOCARDIOGRAM, TRANSESOPHAGEAL;  Surgeon: Yudi Diagnostic Provider;  Location: [facility] CATH LAB;  Service: Anesthesiology;  Laterality: N/A;    TREATMENT OF CARDIAC ARRHYTHMIA N/A 11/25/2022    Procedure: Cardioversion or Defibrillation;  Surgeon: Kuldeep Daniels MD;  Location: [facility] CATH LAB;  Service: Cardiology;  Laterality: N/A;    UPPER GASTROINTESTINAL ENDOSCOPY  05/2016     Dr. Zarco with PEG tube placement       Review of patient's allergies indicates:   Allergen Reactions    Penicillin      Other reaction(s): anaphylaxis  Other reaction(s): anaphylaxis    Penicillins Hives       Medications:  Continuous Infusions:   sodium chloride 0.9% 50 mL/hr at 23 0358    NORepinephrine bitartrate-D5W 1.04 mcg/kg/min (23 0345)     Scheduled Meds:   albuterol-ipratropium  3 mL Nebulization TID WAKE    apixaban  2.5 mg Oral BID    atorvastatin  20 mg Oral Daily    carvediloL  12.5 mg Oral BID    chlorhexidine  15 mL Mouth/Throat BID    latanoprost  1 drop Both Eyes QHS    [START ON 2023] levothyroxine  100 mcg Oral Before breakfast    mupirocin   Nasal BID    pantoprazole  40 mg Oral QAM    sodium chloride 0.9%  10 mL Intravenous Q12H     PRN Meds:guaiFENesin 100 mg/5 ml, melatonin, morphine, ondansetron, prochlorperazine    Family History       Problem Relation (Age of Onset)    Diabetes Mother, Brother    Glaucoma Father, Brother    Heart disease Mother    Macular degeneration Father, Brother    No Known Problems Sister, Maternal Aunt, Maternal Uncle, Paternal Aunt, Paternal Uncle, Maternal Grandmother, Maternal Grandfather, Paternal Grandmother, Paternal Grandfather          Tobacco Use    Smoking status: Former     Types: Cigarettes     Quit date: 2005     Years since quittin.3    Smokeless tobacco: Never    Tobacco comments:     quit    Substance and Sexual Activity    Alcohol use: No    Drug use: No    Sexual activity: Yes       Review of Systems  Objective:     Vital Signs (Most Recent):  Temp: 97 °F (36.1 °C) (23 0830)  Pulse: (!) 53 (23 0744)  Resp: 18 (23 07)  BP: (!) 118/57 (23 0600)  SpO2: 97 % (23 07)   Vital Signs (24h Range):  Temp:  [97 °F (36.1 °C)-98 °F (36.7 °C)] 97 °F (36.1 °C)  Pulse:  [50-91] 53  Resp:  [16-28] 18  SpO2:  [96 %-100 %] 97 %  BP: ()/(44-69) 118/57  Arterial Line BP:  ()/(38-64) 113/38     Weight: 45.2 kg (99 lb 10.4 oz)  Body mass index is 18.83 kg/m².    Physical Exam  Vitals reviewed.   Constitutional:       General: He is not in acute distress.     Appearance: He is ill-appearing.   HENT:      Head: Normocephalic and atraumatic.      Right Ear: External ear normal.      Left Ear: External ear normal.      Nose: Nose normal. No congestion.      Mouth/Throat:      Mouth: Mucous membranes are moist.      Pharynx: No oropharyngeal exudate.   Eyes:      General:         Right eye: No discharge.         Left eye: No discharge.      Extraocular Movements: Extraocular movements intact.   Cardiovascular:      Rate and Rhythm: Normal rate and regular rhythm.   Pulmonary:      Effort: Pulmonary effort is normal. No respiratory distress.   Abdominal:      General: There is no distension.      Palpations: Abdomen is soft.   Skin:     General: Skin is warm.   Neurological:      General: No focal deficit present.      Mental Status: He is oriented to person, place, and time.   Psychiatric:         Mood and Affect: Mood normal.         Behavior: Behavior normal.         Thought Content: Thought content normal.       Review of Symptoms      Symptom Assessment (ESAS 0-10 Scale)  Pain:  0  Dyspnea:  5  Anxiety:  0  Nausea:  0  Depression:  0  Anorexia:  0  Fatigue:  4  Insomnia:  0  Restlessness:  0  Agitation:  0         Performance Status:  50    Living Arrangements:  Lives alone    Psychosocial/Cultural:   See Palliative Psychosocial Note: No  **Primary  to Follow**  Palliative Care  Consult: No      Advance Care Planning   Advance Directives:   Do Not Resuscitate Status: Yes      Decision Making:  Patient answered questions  Goals of Care: The patient endorses that what is most important right now is to focus on spending time at home and improvement in condition but with limits to invasive therapies    Accordingly, we have decided that the best plan to meet  the patient's goals includes continuing with treatment       Significant Labs: BMP:   Recent Labs   Lab 03/30/23  0310 03/30/23  1407 03/31/23 0323   *   < > 134*   *   < > 125*   K 5.4*   < > 4.6   CL 96   < > 96   CO2 19*   < > 18*   BUN 71*   < > 57*   CREATININE 3.4*   < > 2.2*   CALCIUM 8.8   < > 8.2*   MG 2.0  --   --     < > = values in this interval not displayed.     CBC:   Recent Labs   Lab 03/29/23  1136 03/30/23  0310 03/31/23 0323   WBC 7.42 11.47 8.65   HGB 13.3* 11.8* 11.2*   HCT 38.9* 33.9* 32.0*    217 181     CBC:   Recent Labs   Lab 03/31/23 0323   WBC 8.65   HGB 11.2*   HCT 32.0*   MCV 98        BMP:  Recent Labs   Lab 03/31/23 0323   *   *   K 4.6   CL 96   CO2 18*   BUN 57*   CREATININE 2.2*   CALCIUM 8.2*     LFT:  Lab Results   Component Value Date     (H) 03/30/2023    ALKPHOS 78 03/30/2023    BILITOT 1.7 (H) 03/30/2023     Albumin:   Albumin   Date Value Ref Range Status   03/31/2023 2.9 (L) 3.5 - 5.2 g/dL Final     Protein:   Total Protein   Date Value Ref Range Status   03/30/2023 7.2 6.0 - 8.4 g/dL Final     Lactic acid:   Lab Results   Component Value Date    LACTATE 0.9 03/31/2023    LACTATE 1.7 03/29/2023       Significant Imaging: I have reviewed all pertinent imaging results/findings within the past 24 hours.        > 50% of 95 min visit spent in chart review, face to face discussion of goals of care,  symptom assessment, coordination of care and emotional support.    Gerry Keita MD  Palliative Medicine  [facility]

## 2023-03-31 NOTE — PHYSICIAN QUERY
PT Name: Cleveland Capps  MR #: [id number]     DOCUMENTATION CLARIFICATION      CDS/: Kyleigh Wright Pe               Contact information: [phone]     This form is a permanent document in the medical record.     Query Date: March 31, 2023    Dear Provider,  By submitting this query, we are merely seeking further clarification of documentation.  Please utilize your independent clinical judgment when addressing the question(s) below.     The Medical Record contains the following:    Supporting Clinical Findings Location in Medical Record   Diagnosis of Pneumonia     The diagnosis is no longer noted in subsequent documentation Per H&P     3/30 Progress Note    Levaquin and Vancomycin were both DC'd following doses in ER Per MAR      Please clarify if the Pneumonia diagnosis has been:    [  ] Ruled In   [  x] Ruled Out   [  ] Other/Clarification of findings (please specify): _______________     Please document in your progress notes daily for the duration of treatment, until resolved, and include in your discharge summary.

## 2023-03-31 NOTE — PHYSICIAN QUERY
4/3 Query withdrawn - sub documentation per Cardiology note 4/1 - HFrEF -query signed iby CDI     PT Name: Cleveland Capps  MR #: [id number]     DOCUMENTATION CLARIFICATION     CDS/: Kyleigh Wright Pe               Contact information: [phone]   This form is a permanent document in the medical record.     Query Date: March 31, 2023    By submitting this query, we are merely seeking further clarification of documentation.  Please utilize your independent clinical judgment when addressing the question(s) below.    The Medical Record contains the following   Indicators Supporting Clinical Findings Location in Medical Record    Heart Failure documented Patient is noted to have a history of CHF low EF  Per H&P     EF/Echo Echo  EF 15% w/ grade I Diastolic dysfunction  Per 3/30 Echo     Diuretics/Meds Patient is on Torsemide at home  Per H&P      Heart failure is a clinical diagnosis which includes symptomatic fluid retention, elevated intracardiac pressures, and/or the inability of the heart to deliver adequate blood flow.    Heart Failure with reduced Ejection Fraction (HFrEF) or Systolic Heart Failure (loses ability to contract normally, EF is <40%)    Heart Failure with preserved Ejection Fraction (HFpEF) or Diastolic Heart Failure (stiff ventricles, does not relax properly, EF is >50%)     Heart Failure with Combined Systolic and Diastolic Failure (stiff ventricles, does not relax properly and EF is <50%)    Mid-range or mildly reduced ejection fraction (HFmrEF) is classified as systolic heart failure.  Congestive heart failure with a recovered EF is classified as Diastolic Heart Failure.  Common clues to acute exacerbation:  Rapidly progressive symptoms (w/in 2 weeks of presentation), using IV diuretics, using supplemental O2, pulmonary edema on Xray, new or worsening pleural effusion, +JVD or other signs of volume overload, MI w/in 4 weeks, and/or BNP >500  The clinical guidelines noted are only system  guidelines, and do not replace the providers clinical judgment.    Provider, please specify the type of CHF associated with the above clinical findings.    [   ]  Chronic Combined Systolic and Diastolic Heart Failure - pre-existing and stable   [   ]  Other (please specify): ___________________________________     Please document in your progress notes daily for the duration of treatment until resolved and include in your discharge summary.    References:  American Heart Association editorial staff. (2017, May). Ejection Fraction Heart Failure Measurement. American Heart Association. https://www.heart.org/en/health-topics/heart-failure/diagnosing-heart-failure/ejection-fraction-heart-failure-measurement#:~:text=Ejection%20fraction%20(EF)%20is%20a,pushed%20out%20with%20each%20heartbeat  KYLE Andrade (2020, December 15). Heart failure with preserved ejection fraction: Clinical manifestations and diagnosis. [employer]. https://www.[employer].Nuggeta/contents/heart-failure-with-preserved-ejection-fraction-clinical-manifestations-and-diagnosis.  ICD-10-CM/PCS Coding Clinic Third Quarter ICD-10, Effective with discharges: September 8, 2020 [facility] § Heart failure with mid-range or mildly reduced ejection fraction (2020).  ICD-10-CM/PCS Coding Clinic Third Quarter ICD-10, Effective with discharges: September 8, 2020 [facility] § Heart failure with recovered ejection fraction (2020).  Form No. [id number]

## 2023-03-31 NOTE — PROGRESS NOTES
"[facility] Medicine Progress Note  Patient Name: Cleveland Capps MRN: [id number]   Patient Class: IP- Inpatient  Length of Stay: 1   Admission Date: 3/29/2023 10:55 AM Attending Physician: Tim Rosales MD   Primary Care Provider: Romero Snyder MD Face-to-Face encounter date: 03/31/2023   Chief Complaint: Weakness (Pt here for low blood pressure and weakness, pt has not been eating well. )      Subjective:    Interval History   Hypotensive all day requiring increasing doses of pressors  Denies chest pain, palpitations, abdominal pain, nausea/vomiting.   No concerns/issues overnight reported by the patient or the nursing staff.  Reviewed the labs and discussed the plan of care.   No family present at bedside.     Review of Systems   All other Review of Systems were found to be negative expect for that mentioned already in HPI.     Hospital Course     03/31/2023     sodium chloride 0.9%   Intravenous Once    albuterol-ipratropium  3 mL Nebulization TID WAKE    apixaban  2.5 mg Oral BID    atorvastatin  20 mg Oral Daily    chlorhexidine  15 mL Mouth/Throat BID    latanoprost  1 drop Both Eyes QHS    [START ON 4/1/2023] levothyroxine  100 mcg Oral Before breakfast    mupirocin   Nasal BID    pantoprazole  40 mg Oral QAM    polyethylene glycol  17 g Oral BID    sodium chloride 0.9%  10 mL Intravenous Q12H       guaiFENesin 100 mg/5 ml, melatonin, morphine, ondansetron, prochlorperazine      Objective:   Physical Exam  /60   Pulse (!) 55   Temp 97.6 °F (36.4 °C) (Axillary)   Resp (!) 26   Ht 5' 1" (1.549 m)   Wt 45.2 kg (99 lb 10.4 oz)   SpO2 100%   BMI 18.83 kg/m²   Constitutional: No distress.   HENT: Atraumatic.   Cardiovascular: Normal rate, regular rhythm and normal heart sounds.   Pulmonary/Chest: Effort normal. Clear to auscultation bilaterally. No wheezes.   Abdominal: Soft. Bowel sounds are normal. Exhibits no distension and no mass. No tenderness  Neurological: " Alert.   Skin: Skin is warm and dry.     Labs and Imaging    Significant Labs: All pertinent labs within the past 24 hours have been reviewed.    Significant Imaging: I have reviewed all pertinent imaging results/findings within the past 24 hours.    I have reviewed the Vitals, labs and imaging as above.     Assessment & Plan:   Cleveland Capps is a 67 y.o. male admitted for    Active Hospital Problems    Diagnosis    *Hypotension    Goals of care, counseling/discussion    Cardiogenic shock    Hypoxia    Dehydration    Presence of Watchman left atrial appendage closure device    Status post coronary artery bypass graft    AICD (automatic cardioverter/defibrillator) present    Mitral regurgitation    MEIR (acute kidney injury)     Atn; requiring dialysis         Plan  IV fluids as per cardiology, strict I&O  Discussed with nursing staff to changed levophed to dobutamine   CVP -ve, started IV fluids, strict I&O  Consulted intensivist for further input  Titrate for MAP of 50 as per Dr. Umaña  Nephrology following, recc noted  Avoid nephrotoxic medications  Pallative care consulted for goals of care discussion  ST consulted, MBSS ordered  Discussed with Dr. Dhillon    Active PT: Yes  Active OT: Yes  Active SLP: Yes    Core measures:  - Code status:   - Diet: Cardiac  VTE Risk Mitigation (From admission, onward)           Ordered     apixaban tablet 2.5 mg  2 times daily         03/29/23 1751     Reason for No Pharmacological VTE Prophylaxis  Once        Question:  Reasons:  Answer:  Already adequately anticoagulated on oral Anticoagulants    03/29/23 1751     IP VTE HIGH RISK PATIENT  Once         03/29/23 1703     Place sequential compression device  Until discontinued         03/29/23 1703                    Discharge Planning:   Discharge Planning   MACKENZIE: 04/03    Code Status: Full Code   Is the patient medically ready for discharge?: no    Reason for patient still in hospital (select all that apply): Patient  trending condition  Discharge Plan A: Home with assistance from family        Above encounter included review of the medical records, interviewing and examining the patient face-to-face, discussion with family and other health care providers, ordering and interpreting lab/test results and formulating a plan of care.     Medical Decision Making:  [] Low Complexity  [] Moderate Complexity  [x] High Complexity    Tim Rosales MD  [facility] Hospitalist  03/31/2023

## 2023-04-01 LAB
ALBUMIN SERPL BCP-MCNC: 2.8 G/DL (ref 3.5–5.2)
ALP SERPL-CCNC: 65 U/L (ref 55–135)
ALT SERPL W/O P-5'-P-CCNC: 130 U/L (ref 10–44)
ANION GAP SERPL CALC-SCNC: 8 MMOL/L (ref 8–16)
AST SERPL-CCNC: 168 U/L (ref 10–40)
BASOPHILS # BLD AUTO: 0.03 K/UL (ref 0–0.2)
BASOPHILS NFR BLD: 0.3 % (ref 0–1.9)
BILIRUB SERPL-MCNC: 0.9 MG/DL (ref 0.1–1)
BUN SERPL-MCNC: 42 MG/DL (ref 8–23)
CALCIUM SERPL-MCNC: 8.4 MG/DL (ref 8.7–10.5)
CHLORIDE SERPL-SCNC: 105 MMOL/L (ref 95–110)
CO2 SERPL-SCNC: 20 MMOL/L (ref 23–29)
CREAT SERPL-MCNC: 1.5 MG/DL (ref 0.5–1.4)
DIFFERENTIAL METHOD: ABNORMAL
EOSINOPHIL # BLD AUTO: 0.2 K/UL (ref 0–0.5)
EOSINOPHIL NFR BLD: 2.6 % (ref 0–8)
ERYTHROCYTE [DISTWIDTH] IN BLOOD BY AUTOMATED COUNT: 15.2 % (ref 11.5–14.5)
EST. GFR  (NO RACE VARIABLE): 51 ML/MIN/1.73 M^2
GLUCOSE SERPL-MCNC: 173 MG/DL (ref 70–110)
HBV CORE AB SERPL QL IA: NEGATIVE
HBV SURFACE AB SER QL: NON REACTIVE
HBV SURFACE AG SERPL QL IA: NEGATIVE
HCT VFR BLD AUTO: 30.9 % (ref 40–54)
HGB BLD-MCNC: 10.5 G/DL (ref 14–18)
IMM GRANULOCYTES # BLD AUTO: 0.06 K/UL (ref 0–0.04)
IMM GRANULOCYTES NFR BLD AUTO: 0.7 % (ref 0–0.5)
LYMPHOCYTES # BLD AUTO: 0.2 K/UL (ref 1–4.8)
LYMPHOCYTES NFR BLD: 2.6 % (ref 18–48)
MCH RBC QN AUTO: 33.7 PG (ref 27–31)
MCHC RBC AUTO-ENTMCNC: 34 G/DL (ref 32–36)
MCV RBC AUTO: 99 FL (ref 82–98)
MONOCYTES # BLD AUTO: 0.4 K/UL (ref 0.3–1)
MONOCYTES NFR BLD: 3.9 % (ref 4–15)
NEUTROPHILS # BLD AUTO: 8 K/UL (ref 1.8–7.7)
NEUTROPHILS NFR BLD: 89.9 % (ref 38–73)
NRBC BLD-RTO: 0 /100 WBC
PLATELET # BLD AUTO: 118 K/UL (ref 150–450)
PMV BLD AUTO: 8.7 FL (ref 9.2–12.9)
POTASSIUM SERPL-SCNC: 4.3 MMOL/L (ref 3.5–5.1)
PROT SERPL-MCNC: 6.1 G/DL (ref 6–8.4)
RBC # BLD AUTO: 3.12 M/UL (ref 4.6–6.2)
SODIUM SERPL-SCNC: 133 MMOL/L (ref 136–145)
WBC # BLD AUTO: 8.92 K/UL (ref 3.9–12.7)

## 2023-04-01 PROCEDURE — 25000003 PHARM REV CODE 250: Performed by: INTERNAL MEDICINE

## 2023-04-01 PROCEDURE — 97535 SELF CARE MNGMENT TRAINING: CPT

## 2023-04-01 PROCEDURE — 94761 N-INVAS EAR/PLS OXIMETRY MLT: CPT

## 2023-04-01 PROCEDURE — 85025 COMPLETE CBC W/AUTO DIFF WBC: CPT | Performed by: INTERNAL MEDICINE

## 2023-04-01 PROCEDURE — 92611 MOTION FLUOROSCOPY/SWALLOW: CPT

## 2023-04-01 PROCEDURE — 92526 ORAL FUNCTION THERAPY: CPT

## 2023-04-01 PROCEDURE — 99232 PR SUBSEQUENT HOSPITAL CARE,LEVL II: ICD-10-PCS | Mod: ,,, | Performed by: INTERNAL MEDICINE

## 2023-04-01 PROCEDURE — 25000003 PHARM REV CODE 250: Performed by: SPECIALIST

## 2023-04-01 PROCEDURE — 25000242 PHARM REV CODE 250 ALT 637 W/ HCPCS: Performed by: INTERNAL MEDICINE

## 2023-04-01 PROCEDURE — 99233 SBSQ HOSP IP/OBS HIGH 50: CPT | Mod: ,,, | Performed by: INTERNAL MEDICINE

## 2023-04-01 PROCEDURE — 99233 PR SUBSEQUENT HOSPITAL CARE,LEVL III: ICD-10-PCS | Mod: ,,, | Performed by: INTERNAL MEDICINE

## 2023-04-01 PROCEDURE — 99232 SBSQ HOSP IP/OBS MODERATE 35: CPT | Mod: ,,, | Performed by: INTERNAL MEDICINE

## 2023-04-01 PROCEDURE — 20000000 HC ICU ROOM

## 2023-04-01 PROCEDURE — 94640 AIRWAY INHALATION TREATMENT: CPT

## 2023-04-01 PROCEDURE — 94799 UNLISTED PULMONARY SVC/PX: CPT

## 2023-04-01 PROCEDURE — 97167 OT EVAL HIGH COMPLEX 60 MIN: CPT

## 2023-04-01 PROCEDURE — 80053 COMPREHEN METABOLIC PANEL: CPT | Performed by: INTERNAL MEDICINE

## 2023-04-01 PROCEDURE — 36415 COLL VENOUS BLD VENIPUNCTURE: CPT | Performed by: INTERNAL MEDICINE

## 2023-04-01 PROCEDURE — 99900031 HC PATIENT EDUCATION (STAT)

## 2023-04-01 PROCEDURE — 99900035 HC TECH TIME PER 15 MIN (STAT)

## 2023-04-01 PROCEDURE — 25000003 PHARM REV CODE 250

## 2023-04-01 PROCEDURE — S0179 MEGESTROL 20 MG: HCPCS | Performed by: INTERNAL MEDICINE

## 2023-04-01 RX ORDER — MEGESTROL ACETATE 40 MG/ML
200 SUSPENSION ORAL DAILY
Status: DISCONTINUED | OUTPATIENT
Start: 2023-04-01 | End: 2023-04-03

## 2023-04-01 RX ADMIN — Medication 6 MG: at 12:04

## 2023-04-01 RX ADMIN — MEGESTROL ACETATE 200 MG: 400 SUSPENSION ORAL at 02:04

## 2023-04-01 RX ADMIN — POLYETHYLENE GLYCOL 3350 17 G: 17 POWDER, FOR SOLUTION ORAL at 08:04

## 2023-04-01 RX ADMIN — LATANOPROST 1 DROP: 50 SOLUTION OPHTHALMIC at 09:04

## 2023-04-01 RX ADMIN — IPRATROPIUM BROMIDE AND ALBUTEROL SULFATE 3 ML: 2.5; .5 SOLUTION RESPIRATORY (INHALATION) at 07:04

## 2023-04-01 RX ADMIN — PANTOPRAZOLE SODIUM 40 MG: 40 TABLET, DELAYED RELEASE ORAL at 06:04

## 2023-04-01 RX ADMIN — POLYETHYLENE GLYCOL 3350 17 G: 17 POWDER, FOR SOLUTION ORAL at 09:04

## 2023-04-01 RX ADMIN — ATORVASTATIN CALCIUM 20 MG: 20 TABLET, FILM COATED ORAL at 08:04

## 2023-04-01 RX ADMIN — GUAIFENESIN 200 MG: 200 SOLUTION ORAL at 12:04

## 2023-04-01 RX ADMIN — LEVOTHYROXINE SODIUM 100 MCG: 0.1 TABLET ORAL at 06:04

## 2023-04-01 RX ADMIN — CHLORHEXIDINE GLUCONATE 15 ML: 1.2 RINSE ORAL at 08:04

## 2023-04-01 RX ADMIN — IPRATROPIUM BROMIDE AND ALBUTEROL SULFATE 3 ML: 2.5; .5 SOLUTION RESPIRATORY (INHALATION) at 08:04

## 2023-04-01 RX ADMIN — IPRATROPIUM BROMIDE AND ALBUTEROL SULFATE 3 ML: 2.5; .5 SOLUTION RESPIRATORY (INHALATION) at 01:04

## 2023-04-01 RX ADMIN — MUPIROCIN 1 G: 20 OINTMENT TOPICAL at 08:04

## 2023-04-01 RX ADMIN — MUPIROCIN 1 G: 20 OINTMENT TOPICAL at 09:04

## 2023-04-01 RX ADMIN — GUAIFENESIN 200 MG: 200 SOLUTION ORAL at 08:04

## 2023-04-01 RX ADMIN — CHLORHEXIDINE GLUCONATE 15 ML: 1.2 RINSE ORAL at 09:04

## 2023-04-01 RX ADMIN — APIXABAN 2.5 MG: 2.5 TABLET, FILM COATED ORAL at 08:04

## 2023-04-01 RX ADMIN — APIXABAN 2.5 MG: 2.5 TABLET, FILM COATED ORAL at 09:04

## 2023-04-01 NOTE — PT/OT/SLP PROGRESS
Physical Therapy      Patient Name:  Cleveland Capps   MRN:  [id number]    Patient not seen today secondary to Off the floor for procedure/surgery (MBS this afternoon). Will follow-up tomorrow.

## 2023-04-01 NOTE — PROGRESS NOTES
[facility]  Department of Cardiology  Progress Note    PATIENT NAME: Cleveland Capps  MRN: [id number]  TODAY'S DATE: 04/01/2023  ADMIT DATE: 3/29/2023    SUBJECTIVE     PRINCIPLE PROBLEM: Hypotension    INTERVAL HISTORY:    4/1/2023  Seen and examined patient at bedside in ICU.  Denies any shortness of breath, chest pain.  On dobutamine and Levophed infusions    Review of patient's allergies indicates:   Allergen Reactions    Penicillin      Other reaction(s): anaphylaxis  Other reaction(s): anaphylaxis    Penicillins Hives       REVIEW OF SYSTEMS  Negative except as mentioned above     OBJECTIVE     VITAL SIGNS (Most Recent)  Temp: 98.5 °F (36.9 °C) (04/01/23 1200)  Pulse: 66 (04/01/23 1328)  Resp: 18 (04/01/23 1328)  BP: 136/61 (04/01/23 0630)  SpO2: 100 % (04/01/23 1328)    VENTILATION STATUS  Resp: 18 (04/01/23 1328)  SpO2: 100 % (04/01/23 1328)           I & O (Last 24H):  Intake/Output Summary (Last 24 hours) at 4/1/2023 1756  Last data filed at 4/1/2023 1100  Gross per 24 hour   Intake 2057.32 ml   Output 1150 ml   Net 907.32 ml       WEIGHTS  Wt Readings from Last 1 Encounters:   03/29/23 1815 45.2 kg (99 lb 10.4 oz)   03/29/23 1056 44.5 kg (98 lb)       PHYSICAL EXAM  CONSTITUTIONAL: no apparent distress  NECK:  no JVD  LUNGS: CTA b/l  HEART: regular rate and rhythm, S1, S2 normal, no murmur  ABDOMEN: soft, non-tender  EXTREMITIES:  no edema  NEURO: AAO X 3    SCHEDULED MEDS:   sodium chloride 0.9%   Intravenous Once    albuterol-ipratropium  3 mL Nebulization TID WAKE    apixaban  2.5 mg Oral BID    atorvastatin  20 mg Oral Daily    chlorhexidine  15 mL Mouth/Throat BID    latanoprost  1 drop Both Eyes QHS    levothyroxine  100 mcg Oral Before breakfast    megestroL  200 mg Oral Daily    mupirocin   Nasal BID    pantoprazole  40 mg Oral QAM    polyethylene glycol  17 g Oral BID    sodium chloride 0.9%  10 mL Intravenous Q12H       CONTINUOUS INFUSIONS:   sodium chloride 0.9% 50 mL/hr at  03/31/23 1806    DOBUTamine IV infusion (titrating) 5 mcg/kg/min (04/01/23 0800)    NORepinephrine bitartrate-D5W 0.8 mcg/kg/min (04/01/23 1100)       PRN MEDS:guaiFENesin 100 mg/5 ml, melatonin, morphine, ondansetron, prochlorperazine    LABS AND DIAGNOSTICS     CBC LAST 3 DAYS  Recent Labs   Lab 03/30/23  0310 03/31/23  0323 04/01/23  0435   WBC 11.47 8.65 8.92   RBC 3.47* 3.28* 3.12*   HGB 11.8* 11.2* 10.5*   HCT 33.9* 32.0* 30.9*   MCV 98 98 99*   MCH 34.0* 34.1* 33.7*   MCHC 34.8 35.0 34.0   RDW 15.1* 14.9* 15.2*    181 118*   MPV 8.3* 8.3* 8.7*   GRAN 84.2*  9.7* 81.5*  7.1 89.9*  8.0*   LYMPH 5.6*  0.6* 8.4*  0.7* 2.6*  0.2*   MONO 8.5  1.0 6.0  0.5 3.9*  0.4   BASO 0.07 0.05 0.03   NRBC 0 0 0       COAGULATION LAST 3 DAYS  Recent Labs   Lab 03/29/23  1136   INR 1.1       CHEMISTRY LAST 3 DAYS  Recent Labs   Lab 03/29/23  1136 03/29/23  1819 03/30/23  0310 03/30/23  1407 03/31/23  0323 04/01/23  0435   *   < > 127* 126* 125* 133*   K 5.9*   < > 5.4* 4.9 4.6 4.3   CL 92*   < > 96 95 96 105   CO2 23   < > 19* 17* 18* 20*   ANIONGAP 16   < > 12 14 11 8   BUN 75*   < > 71* 69* 57* 42*   CREATININE 4.1*   < > 3.4* 3.0* 2.2* 1.5*   *   < > 158* 176* 134* 173*   CALCIUM 9.6   < > 8.8 8.8 8.2* 8.4*   MG 2.4  --  2.0  --   --   --    ALBUMIN 4.0  --  3.5  --  2.9* 2.8*   PROT 8.2  --  7.2  --   --  6.1   ALKPHOS 90  --  78  --   --  65   *  --  141*  --   --  130*   *  --  199*  --   --  168*   BILITOT 1.5*  --  1.7*  --   --  0.9    < > = values in this interval not displayed.       CARDIAC PROFILE LAST 3 DAYS  Recent Labs   Lab 03/29/23  1136   BNP 96   TROPONINIHS 87.2*       ENDOCRINE LAST 3 DAYS  Recent Labs   Lab 03/29/23  1136 03/31/23  0323 03/31/23  0743   TSH 22.400* 19.060*  --    PROCAL  --   --  0.50       LAST ARTERIAL BLOOD GAS  ABG  No results for input(s): PH, PO2, PCO2, HCO3, BE in the last 168 hours.    LAST 7 DAYS MICROBIOLOGY   Microbiology Results (last  7 days)       Procedure Component Value Units Date/Time    Blood culture #1 **CANNOT BE ORDERED STAT** [[id number]] Collected: 03/29/23 1310    Order Status: Completed Specimen: Blood from Peripheral, Forearm, Left Updated: 04/01/23 1432     Blood Culture, Routine No Growth to date      No Growth to date      No Growth to date      No Growth to date    Blood culture #2 **CANNOT BE ORDERED STAT** [[id number]] Collected: 03/29/23 1305    Order Status: Completed Specimen: Blood from Peripheral, Antecubital, Left Updated: 04/01/23 1432     Blood Culture, Routine No Growth to date      No Growth to date      No Growth to date      No Growth to date    Culture, Respiratory with Gram Stain [[id number]] Collected: 03/31/23 0824    Order Status: Completed Specimen: Respiratory from Sputum Updated: 04/01/23 0659     Respiratory Culture Normal respiratory malik     Gram Stain (Respiratory) <10 epithelial cells per low power field.     Gram Stain (Respiratory) Many WBC's     Gram Stain (Respiratory) Moderate Gram positive cocci     Gram Stain (Respiratory) Moderate Gram negative rods     Gram Stain (Respiratory) Few Gram positive rods    MRSA Screen by PCR [[id number]] Collected: 03/30/23 0311    Order Status: Completed Specimen: Nasopharyngeal Swab from Nasal Updated: 03/30/23 0540     MRSA SCREEN BY PCR Negative            MOST RECENT IMAGING  Fl Modified Barium Swallow Speech  Modified barium swallow    Fluoroscopy time was 3.5 minutes    Multiple video fluoroscopic images were obtained.    Clinical history is dysphagia    Fluoroscopic assistance was given to the speech therapist's is patient ingested various consistencies of barium. There is moderate to severe or retropharyngeal dysphagia. There is pharyngeal delay and stasis with laryngeal aspiration and penetration with thin barium, nectar thick barium and pudding. There is laryngeal aspiration secondary to pharyngeal residue. There is moderate aspiration with thin barium  with chin tuck.    There are degenerative changes of the spine. There is bony fusion at C4-5.    IMPRESSION: Multiple episodes of laryngeal penetration and aspiration    Pharyngeal stasis.    Electronically signed by:  Brittnee Velez MD  4/1/2023 3:15 PM CDT Workstation: [id number]      LASTRacine  Results for orders placed during the hospital encounter of 03/29/23    Echo    Interpretation Summary  · The left ventricle is mildly enlarged with eccentric hypertrophy and severely decreased systolic function.  · The estimated ejection fraction is 15%.  · Grade I left ventricular diastolic dysfunction.  · There are segmental left ventricular wall motion abnormalities.  · There is abnormal septal wall motion consistent with right ventricular pacemaker.  · Normal right ventricular size with low normal right ventricular systolic function.  · Mild left atrial enlargement.  · Mild-to-moderate aortic regurgitation.  · Normal central venous pressure (3 mmHg).  · The estimated PA systolic pressure is 29 mmHg.  · Evidence of anterior apical infarction old is present      CURRENT/PREVIOUS VISIT EKG  Results for orders placed or performed during the hospital encounter of 03/29/23   EKG 12-lead    Collection Time: 03/29/23  7:02 PM    Narrative    Test Reason : R09.02,    Vent. Rate : 069 BPM     Atrial Rate : 069 BPM     P-R Int : 222 ms          QRS Dur : 116 ms      QT Int : 442 ms       P-R-T Axes : 060 051 084 degrees     QTc Int : 473 ms    Sinus rhythm with 1st degree A-V block  Anterior infarct (cited on or before 06-FEB-2023)  Abnormal ECG  When compared with ECG of 29-MAR-2023 11:14,  T wave inversion less evident in Anterior-lateral leads  Confirmed by Jeremiah RICE, Kuldeep BEACH ([id number]) on 3/30/2023 5:44:57 PM    Referred By: JOSE G   SELF           Confirmed By:Kuldeep Daniels MD       ASSESSMENT/PLAN:     Active Hospital Problems    Diagnosis    *Hypotension    Goals of care, counseling/discussion    Cardiogenic shock     Hypoxia    Dehydration    Presence of Watchman left atrial appendage closure device    Status post coronary artery bypass graft    AICD (automatic cardioverter/defibrillator) present    Mitral regurgitation    MEIR (acute kidney injury)     Atn; requiring dialysis           ASSESSMENT & PLAN:   Hypotension-possibly cardiogenic/ dehydration   MEIR  Transaminitis  Severe cardiomyopathy, HFrEF status post AICD  CAD status post CABG  History of Watchman device    -patient is clinically dry  -continue gentle IV hydration  -continue dobutamine.  Wean off Levophed  -creatinine improved, monitor urine output and BUN/creatinine  -will follow      Juliette Ross MD  [facility]  Department of Cardiology  Date of Service: 04/01/2023  5:56 PM

## 2023-04-01 NOTE — CARE UPDATE
03/31/23 2125   Patient Assessment/Suction   Level of Consciousness (AVPU) alert   Respiratory Effort Normal;Unlabored   Expansion/Accessory Muscles/Retractions no use of accessory muscles   All Lung Fields Breath Sounds equal bilaterally;coarse   Rhythm/Pattern, Respiratory unlabored   Cough Frequency frequent   Cough Type fair;loose;nonproductive   PRE-TX-O2   Device (Oxygen Therapy) nasal cannula with humidification   Flow (L/min) 2   SpO2 98 %   Pulse Oximetry Type Continuous   $ Pulse Oximetry - Multiple Charge Pulse Oximetry - Multiple   Pulse (!) 55   Resp 16   Aerosol Therapy   $ Aerosol Therapy Charges Aerosol Treatment   Daily Review of Necessity (SVN) completed   Respiratory Treatment Status (SVN) given   Treatment Route (SVN) mask   Patient Position (SVN) HOB elevated   Post Treatment Assessment (SVN) breath sounds unchanged   Signs of Intolerance (SVN) none   Breath Sounds Post-Respiratory Treatment   Post-treatment Heart Rate (beats/min) 53   Post-treatment Resp Rate (breaths/min) 16   Education   $ Education Bronchodilator;15 min   Respiratory Evaluation   $ Care Plan Tech Time 15 min   $ Eval/Re-eval Charges Evaluation   Evaluation For New Orders

## 2023-04-01 NOTE — PLAN OF CARE
Pt resting well, vitals stable on Levo and Dobutamine gtts. Pt medicated with Robitussin for frequent coughing, UOP adequate overnight, SR up x 3 and call bell within reach. Bed alarm on and pt without complaints.

## 2023-04-01 NOTE — PLAN OF CARE
Problem: SLP  Goal: SLP Goal  Description:     1.  Pt will participate in MBSS. (COMPLETED 4/1/23)  2.  Speech pathology will determine  goals for further intervention based on patient/MD decision as to plan of care (pt to discuss results of MBSS with MD).  4/1/2023 1421 by Jenni Scott CCC-SLP  Outcome: Ongoing, Progressing (Modified barium swallow completed)

## 2023-04-01 NOTE — PROGRESS NOTES
"[facility] Medicine Progress Note  Patient Name: Cleveland Capps MRN: [id number]   Patient Class: IP- Inpatient  Length of Stay: 2   Admission Date: 3/29/2023 10:55 AM Attending Physician: Tim Rosales MD   Primary Care Provider: Romero Snyder MD Face-to-Face encounter date: 04/01/2023   Chief Complaint: Weakness (Pt here for low blood pressure and weakness, pt has not been eating well. )      Subjective:    Interval History   Hypotensive all day requiring increasing doses of pressors  Denies chest pain, palpitations, abdominal pain, nausea/vomiting.   No concerns/issues overnight reported by the patient or the nursing staff.  Reviewed the labs and discussed the plan of care.   No family present at bedside.     Review of Systems   All other Review of Systems were found to be negative expect for that mentioned already in HPI.     Hospital Course     04/01/2023     sodium chloride 0.9%   Intravenous Once    albuterol-ipratropium  3 mL Nebulization TID WAKE    apixaban  2.5 mg Oral BID    atorvastatin  20 mg Oral Daily    chlorhexidine  15 mL Mouth/Throat BID    latanoprost  1 drop Both Eyes QHS    levothyroxine  100 mcg Oral Before breakfast    megestroL  200 mg Oral Daily    mupirocin   Nasal BID    pantoprazole  40 mg Oral QAM    polyethylene glycol  17 g Oral BID    sodium chloride 0.9%  10 mL Intravenous Q12H       guaiFENesin 100 mg/5 ml, melatonin, morphine, ondansetron, prochlorperazine      Objective:   Physical Exam  /61   Pulse 83   Temp 98.5 °F (36.9 °C) (Oral)   Resp 20   Ht 5' 1" (1.549 m)   Wt 45.2 kg (99 lb 10.4 oz)   SpO2 97%   BMI 18.83 kg/m²   Constitutional: No distress.   HENT: Atraumatic.   Cardiovascular: Normal rate, regular rhythm and normal heart sounds.   Pulmonary/Chest: Effort normal. Clear to auscultation bilaterally. No wheezes.   Abdominal: Soft. Bowel sounds are normal. Exhibits no distension and no mass. No tenderness  Neurological: " Alert.   Skin: Skin is warm and dry.     Labs and Imaging    Significant Labs: All pertinent labs within the past 24 hours have been reviewed.    Significant Imaging: I have reviewed all pertinent imaging results/findings within the past 24 hours.    I have reviewed the Vitals, labs and imaging as above.     Assessment & Plan:   Cleveland Capps is a 67 y.o. male admitted for    Active Hospital Problems    Diagnosis    *Hypotension    Goals of care, counseling/discussion    Cardiogenic shock    Hypoxia    Dehydration    Presence of Watchman left atrial appendage closure device    Status post coronary artery bypass graft    AICD (automatic cardioverter/defibrillator) present    Mitral regurgitation    MEIR (acute kidney injury)     Atn; requiring dialysis           Plan  IV fluids as per cardiology, strict I&O  Discussed with nursing staff to changed levophed to dobutamine   CVP -ve, started IV fluids, strict I&O  Consulted intensivist for further input  Titrate for MAP of 50 as per Dr. Umaña  Wean Levophed, may need home dobutamine infusion   Nephrology following, recc noted  Avoid nephrotoxic medications  Pallative care consulted for goals of care discussion  ST consulted, MBSS ordered, will be done Monday  Discussed with Dr. Dhillon    Active PT: Yes  Active OT: Yes  Active SLP: Yes    Core measures:  - Code status:   - Diet: Cardiac  VTE Risk Mitigation (From admission, onward)           Ordered     apixaban tablet 2.5 mg  2 times daily         03/29/23 1751     Reason for No Pharmacological VTE Prophylaxis  Once        Question:  Reasons:  Answer:  Already adequately anticoagulated on oral Anticoagulants    03/29/23 1751     IP VTE HIGH RISK PATIENT  Once         03/29/23 1703     Place sequential compression device  Until discontinued         03/29/23 1703                    Discharge Planning:   Discharge Planning   MACKENZIE: 04/03    Code Status: Full Code   Is the patient medically ready for discharge?: no     Reason for patient still in hospital (select all that apply): Patient trending condition  Discharge Plan A: Home with assistance from family        Above encounter included review of the medical records, interviewing and examining the patient face-to-face, discussion with family and other health care providers, ordering and interpreting lab/test results and formulating a plan of care.     Medical Decision Making:  [] Low Complexity  [] Moderate Complexity  [x] High Complexity    Tim Rosales MD  [facility] Hospitalist  04/01/2023

## 2023-04-01 NOTE — PROGRESS NOTES
Pulmonary/Critical Care Consult      PATIENT NAME: Cleveland Capps  MRN: [id number]  TODAY'S DATE: 2023  9:46 AM  ADMIT DATE: 3/29/2023  AGE: 67 y.o. : 1955    CONSULT REQUESTED BY: Tim Rosales MD    REASON FOR CONSULT:   Shock    HPI:  The patient is a 67-year-old male with end-stage cardiomyopathy who is in cardiogenic shock.  Cardiology is following.  The patient is on very high doses of Levophed.  The patient states he has had 9 heart attacks.    REVIEW OF SYSTEMS  GENERAL: Feeling poorly  HEART: No chest pain or palpitations.  LUNGS:  He is coughing up clear mucus  GI: No Nausea, vomiting, constipation, diarrhea, or reflux.  : No dysuria, hesitancy, or nocturia.  SKIN: No lesions or rashes.  MUSCULOSKELETAL:  He is weak  NEURO: No headaches or neuropathy.  LYMPH: No edema or adenopathy.  PSYCH: No anxiety or depression.  ENDO:  He is losing weight steadily    ALLERGIES  Review of patient's allergies indicates:   Allergen Reactions    Penicillin      Other reaction(s): anaphylaxis  Other reaction(s): anaphylaxis    Penicillins Hives       INPATIENT SCHEDULED MEDICATIONS   sodium chloride 0.9%   Intravenous Once    albuterol-ipratropium  3 mL Nebulization TID WAKE    apixaban  2.5 mg Oral BID    atorvastatin  20 mg Oral Daily    chlorhexidine  15 mL Mouth/Throat BID    latanoprost  1 drop Both Eyes QHS    levothyroxine  100 mcg Oral Before breakfast    mupirocin   Nasal BID    pantoprazole  40 mg Oral QAM    polyethylene glycol  17 g Oral BID    sodium chloride 0.9%  10 mL Intravenous Q12H      sodium chloride 0.9% 50 mL/hr at 23 708    DOBUTamine IV infusion (titrating) 3 mcg/kg/min (23 8978)    NORepinephrine bitartrate-D5W 0.6 mcg/kg/min (23 31)       MEDICAL AND SURGICAL HISTORY  Past Medical History:   Diagnosis Date    A-fib     Acute kidney injury     COPD (chronic obstructive pulmonary disease)     Coronary artery disease     Diabetes mellitus     Encounter  for blood transfusion     Former smoker     Hypertension     Myocardial infarction     Thyroid disease     Type 2 diabetes mellitus without complications      Past Surgical History:   Procedure Laterality Date    CLOSURE OF LEFT ATRIAL APPENDAGE USING DEVICE N/A 2022    Procedure: Left atrial appendage closure device;  Surgeon: Tre Schmidt MD;  Location: [facility] CATH LAB;  Service: Cardiology;  Laterality: N/A;    COLONOSCOPY N/A 1/3/2017    Procedure: COLONOSCOPY;  Surgeon: Marcus Zarco MD;  Location: [facility];  Service: Endoscopy;  Laterality: N/A;    CORONARY BYPASS GRAFT ANGIOGRAPHY  3/30/2021    Procedure: Bypass graft study;  Surgeon: Tre Schmidt MD;  Location: [facility] CATH LAB;  Service: Cardiology;;    CORONARY STENT PLACEMENT      GASTROSTOMY TUBE PLACEMENT      INSERTION OF PACEMAKER  2017    INTRAVASCULAR ULTRASOUND, NON-CORONARY  2022    Procedure: Intravascular Ultrasound, Non-Coronary;  Surgeon: Tre Schmidt MD;  Location: [facility] CATH LAB;  Service: Cardiology;;    LEFT HEART CATHETERIZATION N/A 3/30/2021    Procedure: Left heart cath;  Surgeon: Tre Schmidt MD;  Location: [facility] CATH LAB;  Service: Cardiology;  Laterality: N/A;    PEG TUBE REMOVAL      TRACHEOSTOMY CLOSURE      TRACHEOSTOMY TUBE PLACEMENT      TRANSESOPHAGEAL ECHOCARDIOGRAPHY N/A 2022    Procedure: ECHOCARDIOGRAM, TRANSESOPHAGEAL;  Surgeon: [facility];  Location: [facility] CATH LAB;  Service: Anesthesiology;  Laterality: N/A;    TREATMENT OF CARDIAC ARRHYTHMIA N/A 2022    Procedure: Cardioversion or Defibrillation;  Surgeon: Kuldeep Daniels MD;  Location: [facility] CATH LAB;  Service: Cardiology;  Laterality: N/A;    UPPER GASTROINTESTINAL ENDOSCOPY  2016    Dr. Zarco with PEG tube placement       ALCOHOL, TOBACCO AND DRUG USE  Social History     Tobacco Use   Smoking Status Former    Types: Cigarettes    Quit date: 2005    Years since quittin.3   Smokeless Tobacco Never    Tobacco Comments    quit 2007     Social History     Substance and Sexual Activity   Alcohol Use No     Social History     Substance and Sexual Activity   Drug Use No       FAMILY HISTORY  Family History   Problem Relation Age of Onset    Diabetes Mother     Heart disease Mother     Glaucoma Father     Macular degeneration Father     No Known Problems Sister     Diabetes Brother     Glaucoma Brother     Macular degeneration Brother     No Known Problems Maternal Aunt     No Known Problems Maternal Uncle     No Known Problems Paternal Aunt     No Known Problems Paternal Uncle     No Known Problems Maternal Grandmother     No Known Problems Maternal Grandfather     No Known Problems Paternal Grandmother     No Known Problems Paternal Grandfather     Colon cancer Neg Hx     Colon polyps Neg Hx     Crohn's disease Neg Hx     Ulcerative colitis Neg Hx     Anemia Neg Hx     Arrhythmia Neg Hx     Asthma Neg Hx     Clotting disorder Neg Hx     Fainting Neg Hx     Heart attack Neg Hx     Heart failure Neg Hx     Hyperlipidemia Neg Hx     Hypertension Neg Hx     Stroke Neg Hx     Atrial Septal Defect Neg Hx        VITAL SIGNS (MOST RECENT)  Temp: 97.5 °F (36.4 °C) (04/01/23 0800)  Pulse: 83 (04/01/23 0747)  Resp: 20 (04/01/23 0747)  BP: 136/61 (04/01/23 0630)  SpO2: 97 % (04/01/23 0747)    INTAKE AND OUTPUT (LAST 24 HOURS):  Intake/Output Summary (Last 24 hours) at 4/1/2023 0817  Last data filed at 4/1/2023 0758  Gross per 24 hour   Intake 2457.32 ml   Output 1180 ml   Net 1277.32 ml       WEIGHT  Wt Readings from Last 1 Encounters:   03/29/23 45.2 kg (99 lb 10.4 oz)       PHYSICAL EXAM  GENERAL: Older patient in no distress.  HEENT: Pupils equal and reactive. Extraocular movements intact. Nose intact. Pharynx moist.  NECK: Supple.   HEART: Regular rate and rhythm. No murmur or gallop auscultated.  LUNGS: Clear to auscultation and percussion. Lung excursion symmetrical. No change in fremitus. No adventitial  noises.  ABDOMEN: Bowel sounds present. Non-tender, no masses palpated.  : Normal anatomy.  De Paz with yellow urine  EXTREMITIES: Normal muscle tone and joint movement, no cyanosis or clubbing.   LYMPHATICS: No adenopathy palpated, no edema.  SKIN: Dry, intact, no lesions.   NEURO: Cranial nerves II-XII intact. Motor strength 5/5 bilaterally, upper and lower extremities.  PSYCH: Appropriate affect    ACUTE PHASE REACTANT (LAST 24 HOURS)  No results for input(s): FERRITIN, CRP, LDH, DDIMER in the last 24 hours.      CBC LAST (LAST 24 HOURS)  Recent Labs   Lab 04/01/23  0435   WBC 8.92   RBC 3.12*   HGB 10.5*   HCT 30.9*   MCV 99*   MCH 33.7*   MCHC 34.0   RDW 15.2*   *   MPV 8.7*   GRAN 89.9*  8.0*   LYMPH 2.6*  0.2*   MONO 3.9*  0.4   BASO 0.03   NRBC 0       CHEMISTRY LAST (LAST 24 HOURS)  Recent Labs   Lab 04/01/23  0435   *   K 4.3      CO2 20*   ANIONGAP 8   BUN 42*   CREATININE 1.5*   *   CALCIUM 8.4*   ALBUMIN 2.8*   PROT 6.1   ALKPHOS 65   *   *   BILITOT 0.9         CARDIAC PROFILE (LAST 24 HOURS)  Recent Labs   Lab 03/29/23  1136   BNP 96   TROPONINIHS 87.2*       LAST 7 DAYS MICROBIOLOGY   Microbiology Results (last 7 days)       Procedure Component Value Units Date/Time    Culture, Respiratory with Gram Stain [[id number]] Collected: 03/31/23 0824    Order Status: Completed Specimen: Respiratory from Sputum Updated: 04/01/23 0659     Respiratory Culture Normal respiratory malik     Gram Stain (Respiratory) <10 epithelial cells per low power field.     Gram Stain (Respiratory) Many WBC's     Gram Stain (Respiratory) Moderate Gram positive cocci     Gram Stain (Respiratory) Moderate Gram negative rods     Gram Stain (Respiratory) Few Gram positive rods    Blood culture #1 **CANNOT BE ORDERED STAT** [[id number]] Collected: 03/29/23 1310    Order Status: Completed Specimen: Blood from Peripheral, Forearm, Left Updated: 03/31/23 1432     Blood Culture, Routine No  Growth to date      No Growth to date      No Growth to date    Blood culture #2 **CANNOT BE ORDERED STAT** [[id number]] Collected: 03/29/23 1305    Order Status: Completed Specimen: Blood from Peripheral, Antecubital, Left Updated: 03/31/23 1432     Blood Culture, Routine No Growth to date      No Growth to date      No Growth to date    MRSA Screen by PCR [[id number]] Collected: 03/30/23 0311    Order Status: Completed Specimen: Nasopharyngeal Swab from Nasal Updated: 03/30/23 0540     MRSA SCREEN BY PCR Negative            MOST RECENT IMAGING  X-Ray Chest AP Portable  Reason: pneumonia    FINDINGS:    Portable chest with comparison chest x-ray March 29, 2023 show normal cardiomediastinal silhouette. Median sternotomy wires and left AICD again noted. Right internal jugular central venous catheter tip is in the anatomic region of the SVC. No pneumothorax.  Lungs are clear. Pulmonary vasculature is normal. No acute osseous abnormality.    IMPRESSION:    Right internal jugular central venous catheter tip is in the anatomic region of the SVC. No pneumothorax.    Electronically signed by:  Fahad Silver DO  3/31/2023 9:39 AM CDT Workstation: [id number]      CURRENT VISIT EKG  Results for orders placed or performed during the hospital encounter of 03/29/23   EKG 12-lead    Narrative    Test Reason : R09.02,    Vent. Rate : 069 BPM     Atrial Rate : 069 BPM     P-R Int : 222 ms          QRS Dur : 116 ms      QT Int : 442 ms       P-R-T Axes : 060 051 084 degrees     QTc Int : 473 ms    Sinus rhythm with 1st degree A-V block  Anterior infarct (cited on or before 06-FEB-2023)  Abnormal ECG  When compared with ECG of 29-MAR-2023 11:14,  T wave inversion less evident in Anterior-lateral leads  Confirmed by Jeremiah RICE, Kuldeep BEACH ([id number]) on 3/30/2023 5:44:57 PM    Referred By: [email or web address]   SELF           Confirmed By:Kuldeep Daniels MD       ECHOCARDIOGRAM RESULTS  Results for orders placed during the hospital encounter of  03/29/23    Echo    Interpretation Summary  · The left ventricle is mildly enlarged with eccentric hypertrophy and severely decreased systolic function.  · The estimated ejection fraction is 15%.  · Grade I left ventricular diastolic dysfunction.  · There are segmental left ventricular wall motion abnormalities.  · There is abnormal septal wall motion consistent with right ventricular pacemaker.  · Normal right ventricular size with low normal right ventricular systolic function.  · Mild left atrial enlargement.  · Mild-to-moderate aortic regurgitation.  · Normal central venous pressure (3 mmHg).  · The estimated PA systolic pressure is 29 mmHg.  · Evidence of anterior apical infarction old is present        Oxygen INFORMATION   Room air         Dobutamine 5, Levophed 0.6    IMPRESSION AND PLAN  Cardiogenic shock  Anemia, progressive  Hyponatremia, improving   Acute renal failure, improving   Hyperglycemia, mild   Moderate hypoalbuminemia  Transaminitis, improving  Vitamin-D deficiency  Elevated TSH secondary to amiodarone  DNR status noted      Dobutrex 5 mics per kilos per minute  Levophed as necessary to keep his mean blood pressure at least 50 (the patient's normal blood pressure is 80/50)  Encourage protein consumption  Do not have much more to offer, the patient appears to be near the end of his life, Dr. Keita's consult noted        Miesha Umaña MD  Date of Service: 04/01/2023  9:46 AM

## 2023-04-01 NOTE — PROCEDURES
MODIFIED BARIUM SWALLOW STUDY  (MBSS)    Date of Evaluation: 04/01/2023     Name: Cleveland Capps   MRN: [id number]    Diagnosis: Hypotension    Recommendations:     Consistency Recommendations: Given pharyngeal dysphagia and aspiration documented on today's study, recommend that pt discuss plan of care/wishes with his MD.    There is no fully safe oral diet at this time.   Consistency modification did not eliminate aspiration.  Given aspiration documented in study, recommend that tray be held until pt can discuss plan of care/wishes with MD (discussed with nursing).    Options:    A) if goal is avoidance of all aspiration at this time, consider nutrition/hydration via alternate (non-traditional) source; NPO with ice chips for comfort as tolerated (do NOT tuck chin).  If strength is expected to improve, speech pathology can initiate dysphagia exercises targeting pt's swallowing deficits and reassess when appropriate.    B) if goal is comfort measures type oral intake with understanding that pt is currently aspirating and is at risk for related medical complications, the safest consistencies are thin or nectar liquid (thicker items result in increasing amounts of pharyngeal residue).  He should NOT tuck chin.    Aspiration Precautions:   If pt chooses to continue with oral nutrition and hydration at this time, it should be with understanding that he is aspirating and that consistency modification does not fully eliminate aspiration.    Do NOT use chin tuck.  Sit up at 90 degrees.  Small bolus size.  Multiple swallows.  Safer with meds via alternate route.      Specialist Referrals: Dietician consult secondary to risk for malnutrition and dehydration.   May also benefit from GI consult if pt wants to pursue reasons for feeling full quickly or, if appropriate, for further assessment of esophageal motility  Therapy: Ongoing education, depending on patient wishes/plan of care; patient should discuss wishes/plan of care  with MD.  If improvement in strength is anticipated/possible, speech pathology can initiate targeted dysphagia exercises.  Communication Strategies: provide increased time to answer; confirm understanding of new or personally significant medical information    Subjective   Information from medical record:  67 year-old male with multiple medical problems significant for chronic obstructive pulmonary disease, coronary artery disease complicated by heart failure with reduced ejection fraction, diabetes, hypertension, and hypothyroidism.  He is currently admitted to the ICU being treated for cardiogenic shock and cardio renal syndrome.     He came to ED with weakness, nausea, vomiting, cough, SOB.  He has reported hx of stroke, trach, peg.  Clinician Impression in ED doctor note indicates pneumonia.  He is being seen by Pulmonary.  He is coughing up mucous and self-suctioning with yankauer.  He has lost considerable weight in recent months.  His brother reports choking on food at home, although he is using  to process food to softer consistency.    Past Medical History: Cleveland Capps  has a past medical history of A-fib, Acute kidney injury, COPD (chronic obstructive pulmonary disease), Coronary artery disease, Diabetes mellitus, Encounter for blood transfusion, Former smoker, Hypertension, Myocardial infarction, Thyroid disease, and Type 2 diabetes mellitus without complications.  Cleveland Capps  has a past surgical history that includes Coronary stent placement; Gastrostomy tube placement; Tracheostomy tube placement; Upper gastrointestinal endoscopy (05/2016); Tracheostomy closure; PEG tube removal; Colonoscopy (N/A, 1/3/2017); Left heart catheterization (N/A, 3/30/2021); Coronary bypass graft angiography (3/30/2021); Insertion of pacemaker (04/2017); Closure of left atrial appendage using device (N/A, 5/17/2022); intravascular ultrasound, non-coronary (5/17/2022); Transesophageal echocardiography  "(N/A, 5/17/2022); and Treatment of cardiac arrhythmia (N/A, 11/25/2022).    The patient is a 67 y.o. male who initially denied eating/drinking difficulty other than as it relates to feeling full quickly and significant weight loss.  He subsequently reported  and family member confirmed "choking" on soft/blended foods at home, as well as nausea and vomiting.    History was provided by patient and brother.  -Current diet at home: described as soft/blended with thin liquids  -Recommended diet from previous study and/or clinical swallow evaluation: N/A; no prior modified barium swallow study reported.  Pt reports hx of stroke multiple years ago.  He reports he subsequently had trach and peg (both removed multiple years ago per his report).      The following observations were made:   -Mental status: Alert, Cooperative; but sometimes required clarification and needed information/questions repeated; clinician suspects memory deficits.  -Factors affecting performance: fatigue may have negatively impacted study results  -Feeding Method: needs some assistance  (during this study patient self-fed occasionally; clinician provided trials during majority of study).  -ICU nurse accompanied patient to study    Respiratory Status:   -Respiratory Status: room air      Pain Scale:  0/10   Pain Location: no pain    Objective     Modified Barium Swallow Study  Purpose: to evaluate anatomy and physiology of the oropharyngeal swallow, to determine effectiveness of rehabilitation strategies, and to determine diet consistency and intervention recommendations.     The patient was seen in radiology seated upright at 80-90 degree position in a video imaging chair for lateral views of the larynx. The study was conducted using Varibar thin liquid, nectar consistency liquid and pudding.  The patient tolerated the procedure well.     Oral Musculature Evaluation:    Oral Musculature: general weakness  Secretion Management: other (see comments) " (occasional cough during conversation when not swallowing; using yankauer for secretions at bedside that pt says he is coughing up from chest)  Mucosal Quality: appeared adequate but limited view due to decreased oral cavity opening during oral mechanism exam  Mandibular Strength and Mobility: other (see comments) (limited jaw/mouth opening -- ROM)  Oral Labial Strength and Mobility: other (see comments) (fair)  Lingual Strength and Mobility: impaired strength; decreased rates of motion; grossly midline on protrusion; able to lateralize slowly  Velar Elevation: functional but appeared decreased in briskness of movement  Buccal Strength and Mobility: WFL, other (see comments) (decreased movement on R)  Volitional Cough: demonstrated  Volitional Swallow: with delay, patient performs dry swallow attempts but laryngeal elevation is impaired  Voice Prior to PO Intake: voice remained dry/functional before, during, after study       CONSISTENCIES ADMINISTERED:    Consistency  Presentation  Findings Rosenbeck's Penetration/Aspiration Scale (PAS) Strategy Attempted    Thin (IDDSI 0) 5ml by tsp, 10 ml via medicine cup, self-regulated cup sip, pinched straw sip    Views:  - Lateral view   Oral phase: decreased bolus control and delayed anterior-posterior movement    Pharyngeal phase: delayed initiation of pharyngeal swallow, decreased laryngeal elevation. With head level, penetration during and after swallows and ultimately mild aspiration from pharyngeal residue.  When attempting chin tuck, moderate aspiration with delayed/ineffective cough response.     Mild to moderate vallecular residue, moderate pyriform residue after swallows.    Esophageal sweep:  residue above UES Best: (3) Material enters the airway, remains above the vocal folds, and is not ejected from the airway    Worst: (8) Material enters the airway, passes below the vocal folds, and no effort is made to eject   Effortful swallow-not demonstrated to be helpful    Multiple swallows per bolus-helpful in reducing quantity of pharyngeal residue    Chin tuck-resulted in reduced airway protection/aspiration   Nectar thick (mildly thick/IDDSI 2) 5ml by tsp    Views:  - Lateral view   Oral phase: decreased bolus control and delayed anterior-posterior movement    Pharyngeal phase: delayed initiation of pharyngeal swallow; decreased laryngeal elevation;  increased pharyngeal residue as compared with thin liquid  ; penetration after swallow from residue.    Moderate residue in pyriform and valleculae.    Pharyngeal residue of nectar and other consistencies built up during study, ultimately resulting in mild aspiration after swallows of multiple consistencies    Esophageal sweep: Not applicable     Best: (3) Material enters the airway, remains above the vocal folds, and is not ejected from the airway    Worst:  after thin and then nectar liquid trial, increasing quantity of penetrated contrast appeared to be close to reaching vocal cords;  during subsequent swallows pt demonstrated ongoing trace-mild aspiration from residue of multiple mixed consistencies, residue of which he could not fully clear   Multiple swallows per bolus-did not fully clear residue   Puree (extremely thick/ IDDSI 4) 3/4 teaspoon    Views:  - Lateral view   Oral phase: decreased bolus control and piecemeal swallow    Pharyngeal phase:  delayed initiation of pharyngeal swallow; delayed and reduced laryngeal elevation;  Severe vallecular residue was difficult to clear with multiple swallows. Penetration after swallows; increasing quantity of pharyngeal residue and multiple swallow attempts to clear residue (including liquid wash) resulted in ongoing trickle of contrast toward vocal cords (ultimately with some aspiration of residue visible)    Esophageal sweep:  N/A   Best: (3) Material enters the airway, remains above the vocal folds, and is not ejected from the airway    Worst:  pt did not immediately aspirate  during/after puree swallow; however increasing vallecular and pyriform residue combined with residue of other consistencies and progressively trickled under vocal cords to anterior tracheal wall   Multiple swallows per bolus-helped to reduce quantity of pharyngeal residue;  Thin liquid wash by tsp and straw -- helped to reduce quantity of pharyngeal residue but resulted in increasing trickle down underside of epiglottis and/or spillage from pyriform into airway in small amounts   Other consistencies were deferred due to risk                         Treatment   Total Treatment Time:  Study time 19 minutes; clinician  reviewed study results with patient (and approved family members) in his room after the study  Patient educated regarding results and recommendations of the evaluation. See the recommendations section below.    Education: Plan of Care, role of SLP in care, and anatomy and physiology of swallow mechanism as it relates to MBSS findings and recommendations were discussed with the patient. Patient expressed understanding. All questions were answered.     Assessment     Cleveland Capps is a 67 y.o. male referred for Modified Barium Swallow Study.  The patient presents with mild to mild-moderate oral dysphagia and moderate to mod-severe pharyngeal dysphagia.   Problems include delayed and ineffective cough response to aspiration.  Oral stage of swallow was characterized by difficulty gathering bolus for posterior transport, more than one swallow to fully clear oral cavity, impaired bolus control.  Tongue base retraction against posterior pharyngeal was was noted to be reduced.  Pharyngeal stage of swallow was primarily characterized by delayed initiation of pharyngeal swallow, reduced laryngeal elevation, reduced epiglottic closure over airway.  Significant pharyngeal residue was noted after all consistencies; however residue was worst (primarily in valleculae) after puree/pudding consistency.  Solids not  tested due to risk.    Pt is at risk for aspiration and related complications on all consistencies.  Moderate aspiration noted associated with thin liquid in chin tuck position (delayed, ineffective cough).  Other instances of aspiration were trace-mild but chronic through study as quantity of pharyngeal residue increased and residue began trickling toward airway from valleculae, pyriform.  No cough was demonstrated with smaller amounts of aspiration from pharyngeal residue during this study.    Residue of multiple consistencies was noted in cervical esophagus above level of UES.   Clinician cannot rule out possibility of reduced lumen of cervical esophagus negatively impacting swallow.    Prognosis: Guarded, based on reported significant weight loss and multiple medical issues    Barriers:  concerns include   Fatigue  Cognitive status  Poor intake    Note: Conditions during a Modified barium Swallow Study are often considered optimal. This includes position of patient, timing and control of bolus, environment, and cueing for any necessary strategies    Results of study were discussed with nursing and patient.  Nurse indicated that he had conveyed results to MD.          Goals:  Multidisciplinary Problems       SLP Goals          Problem: SLP    Goal Priority Disciplines Outcome   SLP Goal     SLP Ongoing, Progressing   Description: 1.  Pt will participate in MBSS. (COMPLETED 4/1/23)  2.  Clinician will determine further intervention based on patient/MD decision as to plan of care (pt to discuss results of MBSS with MD).                       Plan:   Patient to be seen:  Therapy Frequency: 5 x/week (to be further determined based on plan of care when updated by pt/doctor)   Plan of Care expires:   suggest 2 weeks  Plan of Care reviewed with:  patient, other (see comments), sibling (nursing, also siblings with pt approval)        Discharge recommendations:  other (see comments) (to be determined)   Barriers to  Discharge:   to be determined    Time Tracking:   SLP Treatment Date:   04/01/23  Speech Start Time:  1250  Speech Stop Time:  1309     Speech Total Time (min):  19 min      04/01/2023  Therapist's Name:   Jenni Scott CCC-SLP   Speech Language Pathologist

## 2023-04-01 NOTE — PROGRESS NOTES
"[facility]  Adult Nutrition   Progress Note (Follow-Up)    SUMMARY     Recommendations  1. Continue current diet. Texture per SLP recommendations.   2. RD increased frequency of Suplena to TID. Suplena TID (to provide 1275 kcal/day and 31.8 g/day protein)    3. If PO intake fails to meet at least 50% of EEN/EPN, recommend supplemental enteral nutrition.    Goals:   1. PO intake to meet at least 50% of EEN/EPN.  Nutrition Goal Status: progressing towards goal  Communication of RD Recs: reviewed with RN    Dietitian Rounds Brief   Follow up: MBSS planned for today. Per nsg, pt with fair intake of meals yesterday. Pt consumed 25% lunch, 75% dinner with 3/4 of Suplena consumed within 24hr. LBM 3/28.    Diet order:   Current Diet Order: Dysphagia Soft (IDDSI Level 5); Nectar Thick Liquid   Oral Nutrition Supplement: Suplena TID      Evaluation of Received Nutrient/Fluid Intake        % Intake of Estimated Energy Needs: 25 - 50 %  % Meal Intake: 25 - 50 %      Intake/Output Summary (Last 24 hours) at 4/1/2023 1331  Last data filed at 4/1/2023 0758  Gross per 24 hour   Intake 2057.32 ml   Output 1050 ml   Net 1007.32 ml        Anthropometrics  Temp: 98.5 °F (36.9 °C)  Height: 5' 1" (154.9 cm)  Height (inches): 61 in  Weight Method: Stated  Weight: 45.2 kg (99 lb 10.4 oz)  Weight (lb): 99.65 lb  Ideal Body Weight (IBW), Male: 112 lb  % Ideal Body Weight, Male (lb): 87.5 %  BMI (Calculated): 18.8       Estimated/Assessed Needs  Weight Used For Calorie Calculations: 45.2 kg (99 lb 10.4 oz)  Energy Calorie Requirements (kcal): 3233-4411 (35-40 kcal/kg  Energy Need Method: Kcal/kg, other (see comments) (MSJ X 1.25 plus 500 kcal = 1869 kcal/day, 41 kcal/kg)  Protein Requirements: 36-45 gm/day (0.8-1.0 gm/kg)  Weight Used For Protein Calculations: 45.2 kg (99 lb 10.4 oz)     Estimated Fluid Requirement Method: RDA Method  RDA Method (mL): 1582       Reason for Assessment  Reason For Assessment: RD " follow-up  Diagnosis: cardiac disease, pulmonary disease  Relevant Medical History: COPD, coronary artery disease, diabetes, CHF with a low ejection fraction  Interdisciplinary Rounds: attended    Nutrition/Diet History  Spiritual, Cultural Beliefs, Restoration Practices, Values that Affect Care: no  Food Allergies: NKFA  Factors Affecting Nutritional Intake: difficulty/impaired swallowing    Nutrition Risk Screen  Nutrition Risk Screen: unintentional loss of 10 lbs or more in the past 2 months     MST Score: 3  Have you recently lost weight without trying?: Yes: Unsure how much  Weight loss score: 2  Have you been eating poorly because of a decreased appetite?: Yes  Appetite score: 1       Weight History:  Wt Readings from Last 5 Encounters:   03/29/23 45.2 kg (99 lb 10.4 oz)   03/30/23 45.2 kg (99 lb 10.4 oz)   03/29/23 44.6 kg (98 lb 6.4 oz)   03/23/23 47.6 kg (105 lb)   02/14/23 56 kg (123 lb 7.3 oz)        Lab/Procedures/Meds: Pertinent Labs/Meds Reviewed    Medications:Pertinent Medications Reviewed  Scheduled Meds:   sodium chloride 0.9%   Intravenous Once    albuterol-ipratropium  3 mL Nebulization TID WAKE    apixaban  2.5 mg Oral BID    atorvastatin  20 mg Oral Daily    chlorhexidine  15 mL Mouth/Throat BID    latanoprost  1 drop Both Eyes QHS    levothyroxine  100 mcg Oral Before breakfast    megestroL  200 mg Oral Daily    mupirocin   Nasal BID    pantoprazole  40 mg Oral QAM    polyethylene glycol  17 g Oral BID    sodium chloride 0.9%  10 mL Intravenous Q12H     Continuous Infusions:   sodium chloride 0.9% 50 mL/hr at 03/31/23 1806    DOBUTamine IV infusion (titrating) 5 mcg/kg/min (04/01/23 0800)    NORepinephrine bitartrate-D5W 0.8 mcg/kg/min (04/01/23 1100)     PRN Meds:.guaiFENesin 100 mg/5 ml, melatonin, morphine, ondansetron, prochlorperazine    Labs: Pertinent Labs Reviewed  Clinical Chemistry:  Recent Labs   Lab 03/29/23  1136 03/29/23  1819 03/30/23  0310 03/30/23  1407 03/31/23  0323  04/01/23  0435   *   < > 127*   < > 125* 133*   K 5.9*   < > 5.4*   < > 4.6 4.3   CL 92*   < > 96   < > 96 105   CO2 23   < > 19*   < > 18* 20*   *   < > 158*   < > 134* 173*   BUN 75*   < > 71*   < > 57* 42*   CREATININE 4.1*   < > 3.4*   < > 2.2* 1.5*   CALCIUM 9.6   < > 8.8   < > 8.2* 8.4*   PROT 8.2  --  7.2  --   --  6.1   ALBUMIN 4.0  --  3.5  --  2.9* 2.8*   BILITOT 1.5*  --  1.7*  --   --  0.9   ALKPHOS 90  --  78  --   --  65   *  --  199*  --   --  168*   *  --  141*  --   --  130*   ANIONGAP 16   < > 12   < > 11 8   MG 2.4  --  2.0  --   --   --    PHOS  --   --   --   --  4.5  --    LIPASE 60  --   --   --   --   --     < > = values in this interval not displayed.     CBC:   Recent Labs   Lab 04/01/23  0435   WBC 8.92   RBC 3.12*   HGB 10.5*   HCT 30.9*   *   MCV 99*   MCH 33.7*   MCHC 34.0     Cardiac Profile:  Recent Labs   Lab 03/29/23  1136   BNP 96     Inflammatory Labs:  Recent Labs   Lab 03/31/23  0743   CRP 6.43*     Thyroid & Parathyroid:  Recent Labs   Lab 03/31/23  0323   TSH 19.060*   FREET4 1.07       Monitor and Evaluation  Food and Nutrient Intake: energy intake, food and beverage intake  Food and Nutrient Adminstration: diet order  Knowledge/Beliefs/Attitudes: food and nutrition knowledge/skill  Physical Activity and Function: nutrition-related ADLs and IADLs, factors affecting access to physical activity  Anthropometric Measurements: weight, weight change, body mass index  Biochemical Data, Medical Tests and Procedures: electrolyte and renal panel, inflammatory profile, gastrointestinal profile, lipid profile, glucose/endocrine profile  Nutrition-Focused Physical Findings: overall appearance     Nutrition Risk  Level of Risk/Frequency of Follow-up: high     Nutrition Follow-Up  RD Follow-up?: Yes      Cheryl Montalvo, KI 04/01/2023 1:31 PM

## 2023-04-01 NOTE — PLAN OF CARE
Patient resting in bed. Orientated x4. Calm, flat affect. Watching tv.     With muscle wasting / cachexia and generalized weakness.     Levophed requirements are pretty high , around 0.8-1.2 (currently 1) (per central line that was placed today).    A line connections tightened, it aspirates blood. (was changed yesterday) Flushed / whip , square wave. With some wet drainage under the dressing, stable.     Voiding per urinal, light yohanens urine.     The bed is low and alarm on. Clinical alarms reviewed and armed. Monitor strip printed and reviewed, adin. cardiology notified, was bradycardic with pacing spikes rate 50.     Ok to swallow now iddsi 5 nectar thick with precautions, but will go for mbss tomorrow. I crushed his pills and mixed it with jello, patient tolerated. About 25% of lunch, 75% dinner. Had most of a suplena.     Patient dnr. Turning q2h, lines and extremities padded. range of motion  exercises in bed as tolerated. sent sputum sample (light yellow), encourage pulmonary toilet. doing well on room air 94%. Gave bowel regimen (miralax) d/t no bm for (3 days?), md aware    Yesterday, and earlier, had discussed with avelina, re: cough, ongoing hypotension (need for increased levophed).

## 2023-04-01 NOTE — CONSULTS
INPATIENT NEPHROLOGY CONSULT   [facility] NEPHROLOGY    Cleveland Capps  04/01/2023    Reason for consultation:    Acute kidney injury    Chief Complaint:   Chief Complaint   Patient presents with    Weakness     Pt here for low blood pressure and weakness, pt has not been eating well.           History of Present Illness:    67-year-old male presents with generalized weakness nausea vomiting hypotension and hypoxia patient has a history of COPD, coronary artery disease, diabetes, CHF with a low ejection fraction patient has been having nausea and vomiting and not able to keep his medicines down patient has been having fatigue patient reports shortness of breath on exertion.  Patient denies fever patient reports chronic cough patient denies chest pain    3/30  hypotensive, on levophed.  No nausea, chest pain, sob, fever, urinary or bowel complaint, new neurologic symptoms, new joint pain  3/31  no chest pain, sob, or nausea.  Alert.    4/1  on dobutamine and levophed.  AFVSS on vasopressor support.  1060 cc uop      Plan of Care:       Assessment:    Acute kidney injury likely due to intravascular volume depletion  --hold diuretics  --hold Entresto and vasotec.  Upon discharge can resume Entresto if renal function permits.  Don't resume Vasotec.  No reason to be on an ACE-I and ARB  --FEUrea 35%--borderline pre-renal  --Avoid NSAIDS, Mckeon II inhibitors, and other non-essential nephrotoxic agents  --renal dose medication for crcl 10-50  --strict I/Os  --given his end stage cardiac disease I don't feel dialysis would provide meaningful benefit.  I don't think her would tolerate it from a hemodynamic standpoint    Hyperkalemia secondary to silvestre an ARB and ACE-I  --lokelma given 3/30  --low k diet  --hold entresto and vasotec and spironolactone     Weight loss--cardiac cachexia?    --TSH elevated  --prealbumin low  --age appropriate cancer screening  --per primary team    Hyponatremia--better  --samsca contraindicated  due to elevated liver enzymes  --tsh--elevated.  Synthroid increased  --uric acid low consistent with adh effect  --avoid hypotonic iv piggybacks    Elevated liver enzymes.  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 We will continue to follow.    Vital Signs:  Temp Readings from Last 3 Encounters:   04/01/23 98.5 °F (36.9 °C) (Oral)   02/14/23 97.9 °F (36.6 °C) (Tympanic)   11/28/22 98.2 °F (36.8 °C)       Pulse Readings from Last 3 Encounters:   04/01/23 83   03/29/23 80   03/23/23 81       BP Readings from Last 3 Encounters:   04/01/23 136/61   03/29/23 90/60   03/23/23 108/70       Weight:  Wt Readings from Last 3 Encounters:   03/29/23 45.2 kg (99 lb 10.4 oz)   03/30/23 45.2 kg (99 lb 10.4 oz)   03/29/23 44.6 kg (98 lb 6.4 oz)       Past Medical & Surgical History:  Past Medical History:   Diagnosis Date    A-fib     Acute kidney injury     COPD (chronic obstructive pulmonary disease)     Coronary artery disease     Diabetes mellitus     Encounter for blood transfusion     Former smoker     Hypertension     Myocardial infarction     Thyroid disease     Type 2 diabetes mellitus without complications        Past Surgical History:   Procedure Laterality Date    CLOSURE OF LEFT ATRIAL APPENDAGE USING DEVICE N/A 5/17/2022    Procedure: Left atrial appendage closure device;  Surgeon: Tre Schmidt MD;  Location: [facility] CATH LAB;  Service: Cardiology;  Laterality: N/A;    COLONOSCOPY N/A 1/3/2017    Procedure: COLONOSCOPY;  Surgeon: Marcus Green MD;  Location: [facility] ENDO;  Service: Endoscopy;  Laterality: N/A;    CORONARY BYPASS GRAFT ANGIOGRAPHY  3/30/2021    Procedure: Bypass graft study;  Surgeon: Tre Schmidt MD;  Location: [facility] CATH LAB;  Service: Cardiology;;    CORONARY STENT PLACEMENT      GASTROSTOMY TUBE PLACEMENT      INSERTION OF PACEMAKER  04/2017    INTRAVASCULAR ULTRASOUND,  NON-CORONARY  2022    Procedure: Intravascular Ultrasound, Non-Coronary;  Surgeon: Tre Schmidt MD;  Location: [facility] CATH LAB;  Service: Cardiology;;    LEFT HEART CATHETERIZATION N/A 3/30/2021    Procedure: Left heart cath;  Surgeon: Tre Schmidt MD;  Location: [facility] CATH LAB;  Service: Cardiology;  Laterality: N/A;    PEG TUBE REMOVAL      TRACHEOSTOMY CLOSURE      TRACHEOSTOMY TUBE PLACEMENT      TRANSESOPHAGEAL ECHOCARDIOGRAPHY N/A 2022    Procedure: ECHOCARDIOGRAM, TRANSESOPHAGEAL;  Surgeon: Yudi Diagnostic Provider;  Location: [facility] CATH LAB;  Service: Anesthesiology;  Laterality: N/A;    TREATMENT OF CARDIAC ARRHYTHMIA N/A 2022    Procedure: Cardioversion or Defibrillation;  Surgeon: Kuldeep Daniels MD;  Location: [facility] CATH LAB;  Service: Cardiology;  Laterality: N/A;    UPPER GASTROINTESTINAL ENDOSCOPY  2016    Dr. Zarco with PEG tube placement       Past Social History:  Social History     Socioeconomic History    Marital status: Single   Tobacco Use    Smoking status: Former     Types: Cigarettes     Quit date: 2005     Years since quittin.3    Smokeless tobacco: Never    Tobacco comments:     quit    Substance and Sexual Activity    Alcohol use: No    Drug use: No    Sexual activity: Yes     Social Determinants of Health     Financial Resource Strain: Low Risk     Difficulty of Paying Living Expenses: Not hard at all   Food Insecurity: No Food Insecurity    Worried About Running Out of Food in the Last Year: Never true    Ran Out of Food in the Last Year: Never true   Transportation Needs: No Transportation Needs    Lack of Transportation (Medical): No    Lack of Transportation (Non-Medical): No   Physical Activity: Inactive    Days of Exercise per Week: 0 days    Minutes of Exercise per Session: 0 min   Stress: No Stress Concern Present    Feeling of Stress : Not at all   Social Connections: Socially Isolated    Frequency of Communication with Friends and  Family: Three times a week    Frequency of Social Gatherings with Friends and Family: Once a week    Attends Buddhist Services: Never    Active Member of Clubs or Organizations: No    Attends Club or Organization Meetings: Never    Marital Status: Never    Housing Stability: Low Risk     Unable to Pay for Housing in the Last Year: No    Number of Places Lived in the Last Year: 1    Unstable Housing in the Last Year: No       Medications:  No current facility-administered medications on file prior to encounter.     Current Outpatient Medications on File Prior to Encounter   Medication Sig Dispense Refill    albuterol-ipratropium (DUO-NEB) 2.5 mg-0.5 mg/3 mL nebulizer solution Take 3 mLs by nebulization 3 (three) times daily.      amiodarone (PACERONE) 200 MG Tab Take 1 tablet (200 mg total) by mouth once daily. 90 tablet 3    apixaban (ELIQUIS) 5 mg Tab Take 5 mg by mouth 2 (two) times daily.      atorvastatin (LIPITOR) 80 MG tablet Take 80 mg by mouth once daily.      carvediloL (COREG) 25 MG tablet Take 25 mg by mouth 2 (two) times daily.      docusate sodium (COLACE) 50 MG capsule Take 2 capsules (100 mg total) by mouth 2 (two) times daily as needed for Constipation. 30 capsule 0    enalapril (VASOTEC) 20 MG tablet Take 20 mg by mouth once daily.      isosorbide mononitrate (IMDUR) 30 MG 24 hr tablet Take 30 mg by mouth once daily.      latanoprost 0.005 % ophthalmic solution Place 1 drop into both eyes every evening.      levothyroxine (SYNTHROID) 75 MCG tablet Take 1 tablet (75 mcg total) by mouth before breakfast. 90 tablet 3    pantoprazole (PROTONIX) 40 MG tablet Take 40 mg by mouth every morning.      potassium chloride SA (K-DUR,KLOR-CON M) 10 MEQ tablet Take 10 mEq by mouth once daily.      sacubitriL-valsartan (ENTRESTO) 24-26 mg per tablet Take 1 tablet by mouth 2 (two) times daily.      spironolactone (ALDACTONE) 25 MG tablet Take 1 tablet (25 mg total) by mouth once daily. 30 tablet 11     "torsemide (DEMADEX) 20 MG Tab Take 1 tablet (20 mg total) by mouth once daily. 30 tablet 0    travoprost, benzalkonium, (TRAVATAN) 0.004 % ophthalmic solution Place 1 drop into both eyes nightly.      COMP-AIR NEBULIZER COMPRESSOR Alfreda use as directed      TRUE METRIX GLUCOSE TEST STRIP Strp USE 1 STRIP TO CHECK GLUCOSE ONCE DAILY       Scheduled Meds:   sodium chloride 0.9%   Intravenous Once    albuterol-ipratropium  3 mL Nebulization TID WAKE    apixaban  2.5 mg Oral BID    atorvastatin  20 mg Oral Daily    chlorhexidine  15 mL Mouth/Throat BID    latanoprost  1 drop Both Eyes QHS    levothyroxine  100 mcg Oral Before breakfast    megestroL  200 mg Oral Daily    mupirocin   Nasal BID    pantoprazole  40 mg Oral QAM    polyethylene glycol  17 g Oral BID    sodium chloride 0.9%  10 mL Intravenous Q12H     Continuous Infusions:   sodium chloride 0.9% 50 mL/hr at 03/31/23 1806    DOBUTamine IV infusion (titrating) 5 mcg/kg/min (04/01/23 0800)    NORepinephrine bitartrate-D5W 0.8 mcg/kg/min (04/01/23 1100)     PRN Meds:.guaiFENesin 100 mg/5 ml, melatonin, morphine, ondansetron, prochlorperazine    Allergies:  Penicillin and Penicillins    Past Family History:  Reviewed; refer to Hospitalist Admission Note    Review of Systems:  Review of Systems - All 14 systems reviewed and negative, except as noted in HPI    Physical Exam:    /61   Pulse 83   Temp 98.5 °F (36.9 °C) (Oral)   Resp 20   Ht 5' 1" (1.549 m)   Wt 45.2 kg (99 lb 10.4 oz)   SpO2 97%   BMI 18.83 kg/m²     General Appearance:    Alert, cooperative, no distress, appears stated age   Head:    Normocephalic, without obvious abnormality, atraumatic   Eyes:    PER, conjunctiva/corneas clear, EOM's intact in both eyes        Throat:   Lips, mucosa, and tongue normal; teeth and gums normal   Back:     Symmetric, no curvature, ROM normal, no CVA tenderness   Lungs:     Clear to auscultation bilaterally, respirations unlabored   Chest wall:    No " tenderness or deformity   Heart:    Regular rate and rhythm, S1 and S2 normal, no murmur, rub   or gallop   Abdomen:     Soft, non-tender, bowel sounds active all four quadrants,     no masses, no organomegaly   Extremities:   Extremities normal, atraumatic, no cyanosis or edema   Pulses:   2+ and symmetric all extremities   MSK:   Muscular atrophy   Skin:   Skin color, texture, turgor normal, no rashes or lesions   Neurologic:   CNII-XII intact, normal strength and sensation       Throughout.  No flap     Results:  Lab Results   Component Value Date     (L) 04/01/2023    K 4.3 04/01/2023     04/01/2023    CO2 20 (L) 04/01/2023    BUN 42 (H) 04/01/2023    CREATININE 1.5 (H) 04/01/2023    CALCIUM 8.4 (L) 04/01/2023    ANIONGAP 8 04/01/2023    ESTGFRAFRICA >60.0 07/27/2022    EGFRNONAA 52.4 (A) 07/27/2022       Lab Results   Component Value Date    CALCIUM 8.4 (L) 04/01/2023    PHOS 4.5 03/31/2023       Recent Labs   Lab 04/01/23  0435   WBC 8.92   RBC 3.12*   HGB 10.5*   HCT 30.9*   *   MCV 99*   MCH 33.7*   MCHC 34.0            I have personally reviewed pertinent radiological imaging and reports.    Patient care was time spent personally by me on the following activities:   Obtaining a history  Examination of patient.  Providing medical care at the patients bedside.  Developing a treatment plan with patient or surrogate and bedside caregivers  Ordering and reviewing laboratory studies, radiographic studies, pulse oximetry.  Ordering and performing treatments and interventions.  Evaluation of patient's response to treatment.  Discussions with consultants while on the unit and immediately available to the patient.  Re-evaluation of the patient's condition.  Documentation in the medical record.     Terence Davis MD  Nephrology  [facility]  [phone]

## 2023-04-01 NOTE — PT/OT/SLP PROGRESS
"Speech Language Pathology Treatment    Patient Name:  Cleveland Capps   MRN:  [id number]  Admitting Diagnosis: Hypotension    Recommendations:                 General Recommendations:   awaiting xray and icu nursing ok to take pt to modified barium swallow  Diet recommendations:  pending modified barium swallow study; pt currently on IDDSI 5 minced/moist and nectar liquid  Aspiration Precautions: see yesterday's note (Encourage multiple swallows, 1 bite/sip at a time, Assistance with meals and Assistance with thickening liquids, HOB to 90 degrees, Small bites/sips, Strict aspiration precautions, and Wear oxygen during intake  )  General Precautions: Standard, aspiration, fall  Communication strategies:  none    Subjective     "I eat baked fish." (At home)  Patient goals: would like to feel better     Pain/Comfort:  Pain Rating 1: 0/10    Respiratory Status: Room air    Objective:     Has the patient been evaluated by SLP for swallowing?   Yes  Keep patient NPO?  (MBSS planned for today)   Current Respiratory Status:    room air    Plan for modified barium swallow study today (ordered -- awaiting ok from nursing and xray staff to take patient to xray).  Pt seen bedside for therapeutic non-nutritive reassessment of readiness for modified barium swallow.  He denies feeling of choking but reports feeling full quickly, becoming nauseated after eating sometimes.  He denies nausea or pain at this time.  He is using yankauer to suction secretions.  He is  following basic instructions, alert and verbal at conversation level.  Able to demonstrate dry swallow and volitional cough; voice quality dry.  He was educated regarding plan/purpose of modified barium swallow study.  He verbalized understanding; no objection to test.  He is alert enough to participate in study.      Assessment:     Cleveland Capps is a 67 y.o. male with an SLP diagnosis of Dysphagia.  MBSS planned for today.  Await nursing/xray staff authorization " to take patient to xray.    Goals:   Multidisciplinary Problems       SLP Goals          Problem: SLP    Goal Priority Disciplines Outcome   SLP Goal     SLP Ongoing, Progressing   Description: 1.  Pt will demonstrate no overt s/s of aspiration, >95% of the time, with least restrictive diet level.    2.  Pt will demonstrate no overt s/s of aspiration, >95% of the time, with IDDSI-5 (Minced and Moist) / NECTAR thick liquid meal intake.  3.  Pt will participate in MBSS.  4.  Pt/CG/Staff will demonstrate trained swallowing precautions with MOD Ind.                       Plan:     Patient to be seen:  5 x/week   Plan of Care expires:     Plan of Care reviewed with:  patient, other (see comments) (nursing)   SLP Follow-Up:  Yes       Discharge recommendations:  other (see comments) (to be determined)   Barriers to Discharge:   to be determined    Time Tracking:     SLP Treatment Date:   04/01/23  Speech Start Time:  0944  Speech Stop Time:  0954     Speech Total Time (min):  10 min    Billable Minutes: Treatment Swallowing Dysfunction 10    04/01/2023

## 2023-04-01 NOTE — PLAN OF CARE
04/01/23 0747   Patient Assessment/Suction   Level of Consciousness (AVPU) alert   Respiratory Effort Normal;Unlabored   Expansion/Accessory Muscles/Retractions no use of accessory muscles;expansion symmetric   VETO Breath Sounds clear   LLL Breath Sounds diminished;coarse   RUL Breath Sounds clear   RML Breath Sounds clear   RLL Breath Sounds diminished;coarse   Rhythm/Pattern, Respiratory depth regular;pattern regular;unlabored   Cough Type nonproductive   PRE-TX-O2   Device (Oxygen Therapy) room air   SpO2 97 %   Pulse Oximetry Type Continuous   $ Pulse Oximetry - Multiple Charge Pulse Oximetry - Multiple   Pulse 83   Resp 20   Aerosol Therapy   $ Aerosol Therapy Charges Aerosol Treatment   Daily Review of Necessity (SVN) completed   Respiratory Treatment Status (SVN) given   Treatment Route (SVN) mask;air   Patient Position (SVN) Basilio's;HOB elevated   Post Treatment Assessment (SVN) increased aeration   Signs of Intolerance (SVN) none   Education   $ Education Bronchodilator;15 min   Respiratory Evaluation   $ Care Plan Tech Time 15 min   $ Eval/Re-eval Charges Re-evaluation   Home Oxygen   Has Home Oxygen? No

## 2023-04-01 NOTE — PT/OT/SLP EVAL
Occupational Therapy   Evaluation    Name: Cleveland Capps  MRN: [id number]  Admitting Diagnosis: Hypotension  Recent Surgery: * No surgery found *      Recommendations:     Discharge Recommendations: nursing facility, skilled  Discharge Equipment Recommendations:  none  Barriers to discharge:  Decreased caregiver support    Assessment:     Cleveland Capps is a 67 y.o. male with a medical diagnosis of Hypotension.  Performance deficits affecting function: weakness, impaired endurance, impaired self care skills, impaired functional mobility, gait instability, impaired balance, impaired cardiopulmonary response to activity.      Pt presents with generalized weakness requiring Mod A for bed mobility and transfers today; max A required for toileting on bedside commode; BP running low with nurse present/monitoring throughout session; pt would benefit from SNF at d/c as he lives alone.      Rehab Prognosis: Good; patient would benefit from acute skilled OT services to address these deficits and reach maximum level of function.       Plan:     Patient to be seen 5 x/week to address the above listed problems via self-care/home management, therapeutic activities, therapeutic exercises, wheelchair management/training  Plan of Care Expires: 05/01/23  Plan of Care Reviewed with: patient    Subjective     Chief Complaint: deconditioning   Patient/Family Comments/goals: return home    Occupational Profile:  Living Environment: Lives alone [facility] ramp to enter; walk-in shower with seat and grab bars  Previous level of function: Mod (I) for ADLs with RW/wheelchair  Equipment Used at Home: walker, rolling, wheelchair, rollator, shower chair  Assistance upon Discharge: brother lives next door    Pain/Comfort:  Pain Rating 1: 0/10  Pain Rating Post-Intervention 1: 0/10    Objective:     Communicated with: nurse prior to session.  Patient found supine with arterial line, telemetry, pulse ox (continuous), peripheral IV, blood  pressure cuff upon OT entry to room.    General Precautions: Standard, fall  Orthopedic Precautions: N/A  Braces: N/A  Respiratory Status: Room air    Occupational Performance:    Bed Mobility:    Patient completed Supine to Sit with moderate assistance    Functional Mobility/Transfers:  Patient completed Sit <> Stand Transfer with moderate assistance  with  hand-held assist   Patient completed Bedside commode -> Chair Transfer using Stand Pivot technique with moderate assistance with hand-held assist  Patient completed Toilet Transfer Stand Pivot technique with moderate assistance with  hand-held assist and bedside commode    Activities of Daily Living:  Toileting: maximal assistance on bedside commode    Cognitive/Visual Perceptual:  Cognitive/Psychosocial Skills:     -       Oriented to: Person, Place, Time, and Situation   -       Follows Commands/attention:Follows multistep  commands    Physical Exam:  Upper Extremity Range of Motion:     -       Right Upper Extremity: WFL  -       Left Upper Extremity: WFL  Upper Extremity Strength:    -       Right Upper Extremity: 4/5  -       Left Upper Extremity: 4/5   Strength:    -       Right Upper Extremity: fair  -       Left Upper Extremity: fair  Fine Motor Coordination:    -       Intact  Gross motor coordination:   WFL    AMPAC 6 Click ADL:  AMPAC Total Score: 15    Treatment & Education:  Pt educated on OT role/POC    Patient left up in chair with all lines intact, call button in reach, and nurse present    GOALS:   Multidisciplinary Problems       Occupational Therapy Goals          Problem: Occupational Therapy    Goal Priority Disciplines Outcome Interventions   Occupational Therapy Goal     OT, PT/OT     Description: Goals to be met by: 5/1/23     Patient will increase functional independence with ADLs by performing:    UE Dressing with Modified [place].  LE Dressing with Modified [place].  Grooming while standing at sink with Modified  [place].  Toileting from toilet with Modified [place] for hygiene and clothing management.   Toilet transfer to toilet with Modified [place].                         History:     Past Medical History:   Diagnosis Date    A-fib     Acute kidney injury     COPD (chronic obstructive pulmonary disease)     Coronary artery disease     Diabetes mellitus     Encounter for blood transfusion     Former smoker     Hypertension     Myocardial infarction     Thyroid disease     Type 2 diabetes mellitus without complications          Past Surgical History:   Procedure Laterality Date    CLOSURE OF LEFT ATRIAL APPENDAGE USING DEVICE N/A 5/17/2022    Procedure: Left atrial appendage closure device;  Surgeon: Tre Schmidt MD;  Location: [facility] CATH LAB;  Service: Cardiology;  Laterality: N/A;    COLONOSCOPY N/A 1/3/2017    Procedure: COLONOSCOPY;  Surgeon: Marcus Green MD;  Location: [facility] ENDO;  Service: Endoscopy;  Laterality: N/A;    CORONARY BYPASS GRAFT ANGIOGRAPHY  3/30/2021    Procedure: Bypass graft study;  Surgeon: Tre Schmidt MD;  Location: [facility] CATH LAB;  Service: Cardiology;;    CORONARY STENT PLACEMENT      GASTROSTOMY TUBE PLACEMENT      INSERTION OF PACEMAKER  04/2017    INTRAVASCULAR ULTRASOUND, NON-CORONARY  5/17/2022    Procedure: Intravascular Ultrasound, Non-Coronary;  Surgeon: Tre Schmidt MD;  Location: [facility] CATH LAB;  Service: Cardiology;;    LEFT HEART CATHETERIZATION N/A 3/30/2021    Procedure: Left heart cath;  Surgeon: Tre Schmidt MD;  Location: [facility] CATH LAB;  Service: Cardiology;  Laterality: N/A;    PEG TUBE REMOVAL      TRACHEOSTOMY CLOSURE      TRACHEOSTOMY TUBE PLACEMENT      TRANSESOPHAGEAL ECHOCARDIOGRAPHY N/A 5/17/2022    Procedure: ECHOCARDIOGRAM, TRANSESOPHAGEAL;  Surgeon: [facility] Diagnostic Provider;  Location: [facility] CATH LAB;  Service: Anesthesiology;  Laterality: N/A;    TREATMENT OF CARDIAC ARRHYTHMIA N/A 11/25/2022    Procedure: Cardioversion or  Defibrillation;  Surgeon: Kuldeep Daniels MD;  Location: [facility] CATH LAB;  Service: Cardiology;  Laterality: N/A;    UPPER GASTROINTESTINAL ENDOSCOPY  05/2016    Dr. Zarco with PEG tube placement       Time Tracking:     OT Date of Treatment: 04/01/23  OT Start Time: 1039  OT Stop Time: 1104  OT Total Time (min): 25 min    Billable Minutes:Evaluation 10  Self Care/Home Management 15    4/1/2023

## 2023-04-02 LAB
ANION GAP SERPL CALC-SCNC: 5 MMOL/L (ref 8–16)
BACTERIA SPEC AEROBE CULT: NORMAL
BUN SERPL-MCNC: 24 MG/DL (ref 8–23)
CALCIUM SERPL-MCNC: 8 MG/DL (ref 8.7–10.5)
CHLORIDE SERPL-SCNC: 107 MMOL/L (ref 95–110)
CO2 SERPL-SCNC: 20 MMOL/L (ref 23–29)
CREAT SERPL-MCNC: 1 MG/DL (ref 0.5–1.4)
EST. GFR  (NO RACE VARIABLE): >60 ML/MIN/1.73 M^2
GLUCOSE SERPL-MCNC: 172 MG/DL (ref 70–110)
GRAM STN SPEC: NORMAL
POTASSIUM SERPL-SCNC: 3.7 MMOL/L (ref 3.5–5.1)
SODIUM SERPL-SCNC: 132 MMOL/L (ref 136–145)

## 2023-04-02 PROCEDURE — 94799 UNLISTED PULMONARY SVC/PX: CPT

## 2023-04-02 PROCEDURE — 97530 THERAPEUTIC ACTIVITIES: CPT

## 2023-04-02 PROCEDURE — 94761 N-INVAS EAR/PLS OXIMETRY MLT: CPT

## 2023-04-02 PROCEDURE — 25000003 PHARM REV CODE 250: Performed by: INTERNAL MEDICINE

## 2023-04-02 PROCEDURE — 25000003 PHARM REV CODE 250: Performed by: SPECIALIST

## 2023-04-02 PROCEDURE — 94640 AIRWAY INHALATION TREATMENT: CPT

## 2023-04-02 PROCEDURE — A4216 STERILE WATER/SALINE, 10 ML: HCPCS | Performed by: INTERNAL MEDICINE

## 2023-04-02 PROCEDURE — 20000000 HC ICU ROOM

## 2023-04-02 PROCEDURE — 25000242 PHARM REV CODE 250 ALT 637 W/ HCPCS: Performed by: INTERNAL MEDICINE

## 2023-04-02 PROCEDURE — 99233 SBSQ HOSP IP/OBS HIGH 50: CPT | Mod: ,,, | Performed by: INTERNAL MEDICINE

## 2023-04-02 PROCEDURE — 99233 PR SUBSEQUENT HOSPITAL CARE,LEVL III: ICD-10-PCS | Mod: ,,, | Performed by: INTERNAL MEDICINE

## 2023-04-02 PROCEDURE — 99900035 HC TECH TIME PER 15 MIN (STAT)

## 2023-04-02 PROCEDURE — 97116 GAIT TRAINING THERAPY: CPT

## 2023-04-02 PROCEDURE — 99232 SBSQ HOSP IP/OBS MODERATE 35: CPT | Mod: ,,, | Performed by: INTERNAL MEDICINE

## 2023-04-02 PROCEDURE — 99900031 HC PATIENT EDUCATION (STAT)

## 2023-04-02 PROCEDURE — 25000003 PHARM REV CODE 250

## 2023-04-02 PROCEDURE — 36555 INSERT NON-TUNNEL CV CATH: CPT

## 2023-04-02 PROCEDURE — 99232 PR SUBSEQUENT HOSPITAL CARE,LEVL II: ICD-10-PCS | Mod: ,,, | Performed by: INTERNAL MEDICINE

## 2023-04-02 PROCEDURE — S0179 MEGESTROL 20 MG: HCPCS | Performed by: INTERNAL MEDICINE

## 2023-04-02 PROCEDURE — 80048 BASIC METABOLIC PNL TOTAL CA: CPT | Performed by: INTERNAL MEDICINE

## 2023-04-02 RX ORDER — MIDODRINE HYDROCHLORIDE 2.5 MG/1
5 TABLET ORAL 2 TIMES DAILY
Status: DISCONTINUED | OUTPATIENT
Start: 2023-04-02 | End: 2023-04-02

## 2023-04-02 RX ORDER — MIDODRINE HYDROCHLORIDE 2.5 MG/1
5 TABLET ORAL
Status: DISCONTINUED | OUTPATIENT
Start: 2023-04-02 | End: 2023-04-03

## 2023-04-02 RX ADMIN — PANTOPRAZOLE SODIUM 40 MG: 40 TABLET, DELAYED RELEASE ORAL at 08:04

## 2023-04-02 RX ADMIN — MIDODRINE HYDROCHLORIDE 5 MG: 2.5 TABLET ORAL at 03:04

## 2023-04-02 RX ADMIN — CHLORHEXIDINE GLUCONATE 15 ML: 1.2 RINSE ORAL at 08:04

## 2023-04-02 RX ADMIN — NOREPINEPHRINE BITARTRATE 0.7 MCG/KG/MIN: 1 INJECTION, SOLUTION, CONCENTRATE INTRAVENOUS at 04:04

## 2023-04-02 RX ADMIN — MUPIROCIN 1 G: 20 OINTMENT TOPICAL at 09:04

## 2023-04-02 RX ADMIN — MEGESTROL ACETATE 200 MG: 400 SUSPENSION ORAL at 08:04

## 2023-04-02 RX ADMIN — APIXABAN 2.5 MG: 2.5 TABLET, FILM COATED ORAL at 08:04

## 2023-04-02 RX ADMIN — SODIUM CHLORIDE, PRESERVATIVE FREE 10 ML: 5 INJECTION INTRAVENOUS at 09:04

## 2023-04-02 RX ADMIN — IPRATROPIUM BROMIDE AND ALBUTEROL SULFATE 3 ML: 2.5; .5 SOLUTION RESPIRATORY (INHALATION) at 07:04

## 2023-04-02 RX ADMIN — ATORVASTATIN CALCIUM 20 MG: 20 TABLET, FILM COATED ORAL at 08:04

## 2023-04-02 RX ADMIN — MUPIROCIN 1 G: 20 OINTMENT TOPICAL at 08:04

## 2023-04-02 RX ADMIN — LEVOTHYROXINE SODIUM 100 MCG: 0.1 TABLET ORAL at 07:04

## 2023-04-02 RX ADMIN — LATANOPROST 1 DROP: 50 SOLUTION OPHTHALMIC at 09:04

## 2023-04-02 RX ADMIN — CHLORHEXIDINE GLUCONATE 15 ML: 1.2 RINSE ORAL at 09:04

## 2023-04-02 RX ADMIN — APIXABAN 2.5 MG: 2.5 TABLET, FILM COATED ORAL at 09:04

## 2023-04-02 RX ADMIN — IPRATROPIUM BROMIDE AND ALBUTEROL SULFATE 3 ML: 2.5; .5 SOLUTION RESPIRATORY (INHALATION) at 01:04

## 2023-04-02 RX ADMIN — POLYETHYLENE GLYCOL 3350 17 G: 17 POWDER, FOR SOLUTION ORAL at 08:04

## 2023-04-02 NOTE — PROGRESS NOTES
Pulmonary/Critical Care Consult      PATIENT NAME: Cleveland Capps  MRN: [id number]  TODAY'S DATE: 2023  9:46 AM  ADMIT DATE: 3/29/2023  AGE: 67 y.o. : 1955    CONSULT REQUESTED BY: Tim Rosales MD    REASON FOR CONSULT:   Shock    HPI:  The patient is a 67-year-old male with end-stage cardiomyopathy who is in cardiogenic shock.  Cardiology is following.  The patient is on very high doses of Levophed.  The patient states he has had 9 heart attacks.     the patient can not come off of 0.5 of Levophed and 5 of dobutamine.  He is aspirating everything he eats.  Discussed with him the fact that he can not live in the ICU nor is he capable of existing outside of the ICU.  His brothers her here with him.  He has had his friends come in to tell them good bye.    REVIEW OF SYSTEMS  GENERAL: Feeling poorly  HEART: No chest pain or palpitations.  LUNGS:  He is coughing up clear mucus  GI: No Nausea, vomiting, constipation, diarrhea, or reflux.  : No dysuria, hesitancy, or nocturia.  SKIN: No lesions or rashes.  MUSCULOSKELETAL:  He is weak  NEURO: No headaches or neuropathy.  LYMPH: No edema or adenopathy.  PSYCH: No anxiety or depression.  ENDO:  He is losing weight steadily    ALLERGIES  Review of patient's allergies indicates:   Allergen Reactions    Penicillin      Other reaction(s): anaphylaxis  Other reaction(s): anaphylaxis    Penicillins Hives       INPATIENT SCHEDULED MEDICATIONS   sodium chloride 0.9%   Intravenous Once    albuterol-ipratropium  3 mL Nebulization TID WAKE    apixaban  2.5 mg Oral BID    atorvastatin  20 mg Oral Daily    chlorhexidine  15 mL Mouth/Throat BID    latanoprost  1 drop Both Eyes QHS    levothyroxine  100 mcg Oral Before breakfast    megestroL  200 mg Oral Daily    midodrine  5 mg Oral BID AC    mupirocin   Nasal BID    pantoprazole  40 mg Oral QAM    polyethylene glycol  17 g Oral BID    sodium chloride 0.9%  10 mL Intravenous Q12H      sodium chloride 0.9%  Stopped (04/02/23 1403)    DOBUTamine IV infusion (titrating) 5 mcg/kg/min (04/01/23 1917)    NORepinephrine bitartrate-D5W 0.5 mcg/kg/min (04/02/23 1034)       MEDICAL AND SURGICAL HISTORY  Past Medical History:   Diagnosis Date    A-fib     Acute kidney injury     COPD (chronic obstructive pulmonary disease)     Coronary artery disease     Diabetes mellitus     Encounter for blood transfusion     Former smoker     Hypertension     Myocardial infarction     Thyroid disease     Type 2 diabetes mellitus without complications      Past Surgical History:   Procedure Laterality Date    CLOSURE OF LEFT ATRIAL APPENDAGE USING DEVICE N/A 5/17/2022    Procedure: Left atrial appendage closure device;  Surgeon: Tre Schmidt MD;  Location: [facility] CATH LAB;  Service: Cardiology;  Laterality: N/A;    COLONOSCOPY N/A 1/3/2017    Procedure: COLONOSCOPY;  Surgeon: Marcus Green MD;  Location: [facility] ENDO;  Service: Endoscopy;  Laterality: N/A;    CORONARY BYPASS GRAFT ANGIOGRAPHY  3/30/2021    Procedure: Bypass graft study;  Surgeon: Tre Schmidt MD;  Location: [facility] CATH LAB;  Service: Cardiology;;    CORONARY STENT PLACEMENT      GASTROSTOMY TUBE PLACEMENT      INSERTION OF PACEMAKER  04/2017    INTRAVASCULAR ULTRASOUND, NON-CORONARY  5/17/2022    Procedure: Intravascular Ultrasound, Non-Coronary;  Surgeon: Tre Schmidt MD;  Location: [facility] CATH LAB;  Service: Cardiology;;    LEFT HEART CATHETERIZATION N/A 3/30/2021    Procedure: Left heart cath;  Surgeon: Tre Schmidt MD;  Location: [facility] CATH LAB;  Service: Cardiology;  Laterality: N/A;    PEG TUBE REMOVAL      TRACHEOSTOMY CLOSURE      TRACHEOSTOMY TUBE PLACEMENT      TRANSESOPHAGEAL ECHOCARDIOGRAPHY N/A 5/17/2022    Procedure: ECHOCARDIOGRAM, TRANSESOPHAGEAL;  Surgeon: [facility] Diagnostic Provider;  Location: [facility] CATH LAB;  Service: Anesthesiology;  Laterality: N/A;    TREATMENT OF CARDIAC ARRHYTHMIA N/A 11/25/2022    Procedure: Cardioversion or Defibrillation;   Surgeon: Kuldeep Daniels MD;  Location: [facility] CATH LAB;  Service: Cardiology;  Laterality: N/A;    UPPER GASTROINTESTINAL ENDOSCOPY  2016    Dr. Zarco with PEG tube placement       ALCOHOL, TOBACCO AND DRUG USE  Social History     Tobacco Use   Smoking Status Former    Types: Cigarettes    Quit date: 2005    Years since quittin.3   Smokeless Tobacco Never   Tobacco Comments    quit      Social History     Substance and Sexual Activity   Alcohol Use No     Social History     Substance and Sexual Activity   Drug Use No       FAMILY HISTORY  Family History   Problem Relation Age of Onset    Diabetes Mother     Heart disease Mother     Glaucoma Father     Macular degeneration Father     No Known Problems Sister     Diabetes Brother     Glaucoma Brother     Macular degeneration Brother     No Known Problems Maternal Aunt     No Known Problems Maternal Uncle     No Known Problems Paternal Aunt     No Known Problems Paternal Uncle     No Known Problems Maternal Grandmother     No Known Problems Maternal Grandfather     No Known Problems Paternal Grandmother     No Known Problems Paternal Grandfather     Colon cancer Neg Hx     Colon polyps Neg Hx     Crohn's disease Neg Hx     Ulcerative colitis Neg Hx     Anemia Neg Hx     Arrhythmia Neg Hx     Asthma Neg Hx     Clotting disorder Neg Hx     Fainting Neg Hx     Heart attack Neg Hx     Heart failure Neg Hx     Hyperlipidemia Neg Hx     Hypertension Neg Hx     Stroke Neg Hx     Atrial Septal Defect Neg Hx        VITAL SIGNS (MOST RECENT)  Temp: 98.1 °F (36.7 °C) (23 0901)  Pulse: 63 (23 1354)  Resp: 20 (23 1354)  BP: 123/60 (23 0901)  SpO2: 99 % (23 1354)    INTAKE AND OUTPUT (LAST 24 HOURS):  Intake/Output Summary (Last 24 hours) at 2023 1404  Last data filed at 2023 1155  Gross per 24 hour   Intake 3137.94 ml   Output 1150 ml   Net 1987.94 ml       WEIGHT  Wt Readings from Last 1 Encounters:   23 45.2 kg (99  lb 10.4 oz)       PHYSICAL EXAM  GENERAL: Older patient in no distress.  HEENT: Pupils equal and reactive. Extraocular movements intact. Nose intact. Pharynx moist.  NECK: Supple.   HEART: Regular rate and rhythm. No murmur or gallop auscultated.  LUNGS:  Diffuse rhonchi. Lung excursion symmetrical. No change in fremitus. No adventitial noises.  ABDOMEN: Bowel sounds present. Non-tender, no masses palpated.  : Normal anatomy.  De Paz with yellow urine.  Urine output has dropped off dramatically today  EXTREMITIES: Normal muscle tone and joint movement, no cyanosis or clubbing.   LYMPHATICS: No adenopathy palpated, no edema.  SKIN: Dry, intact, no lesions.   NEURO: Cranial nerves II-XII intact. Motor strength 5/5 bilaterally, upper and lower extremities.  PSYCH: Appropriate affect        CBC LAST (LAST 24 HOURS)  No results for input(s): WBC, RBC, HGB, HCT, MCV, MCH, MCHC, RDW, PLT, MPV, GRAN, LYMPH, MONO, BASO, NRBC in the last 24 hours.      CHEMISTRY LAST (LAST 24 HOURS)  Recent Labs   Lab 04/02/23  1200   *   K 3.7      CO2 20*   ANIONGAP 5*   BUN 24*   CREATININE 1.0   *   CALCIUM 8.0*         CARDIAC PROFILE (LAST 24 HOURS)  Recent Labs   Lab 03/29/23  1136   BNP 96   TROPONINIHS 87.2*       LAST 7 DAYS MICROBIOLOGY   Microbiology Results (last 7 days)       Procedure Component Value Units Date/Time    Culture, Respiratory with Gram Stain [[id number]] Collected: 03/31/23 0824    Order Status: Completed Specimen: Respiratory from Sputum Updated: 04/02/23 1254     Respiratory Culture Normal respiratory malik     Gram Stain (Respiratory) <10 epithelial cells per low power field.     Gram Stain (Respiratory) Many WBC's     Gram Stain (Respiratory) Moderate Gram positive cocci     Gram Stain (Respiratory) Moderate Gram negative rods     Gram Stain (Respiratory) Few Gram positive rods    Blood culture #1 **CANNOT BE ORDERED STAT** [[id number]] Collected: 03/29/23 1310    Order Status: Completed  Specimen: Blood from Peripheral, Forearm, Left Updated: 04/01/23 1432     Blood Culture, Routine No Growth to date      No Growth to date      No Growth to date      No Growth to date    Blood culture #2 **CANNOT BE ORDERED STAT** [[id number]] Collected: 03/29/23 1305    Order Status: Completed Specimen: Blood from Peripheral, Antecubital, Left Updated: 04/01/23 1432     Blood Culture, Routine No Growth to date      No Growth to date      No Growth to date      No Growth to date    MRSA Screen by PCR [[id number]] Collected: 03/30/23 0311    Order Status: Completed Specimen: Nasopharyngeal Swab from Nasal Updated: 03/30/23 0540     MRSA SCREEN BY PCR Negative            MOST RECENT IMAGING  Fl Modified Barium Swallow Speech  Modified barium swallow    Fluoroscopy time was 3.5 minutes    Multiple video fluoroscopic images were obtained.    Clinical history is dysphagia    Fluoroscopic assistance was given to the speech therapist's is patient ingested various consistencies of barium. There is moderate to severe or retropharyngeal dysphagia. There is pharyngeal delay and stasis with laryngeal aspiration and penetration with thin barium, nectar thick barium and pudding. There is laryngeal aspiration secondary to pharyngeal residue. There is moderate aspiration with thin barium with chin tuck.    There are degenerative changes of the spine. There is bony fusion at C4-5.    IMPRESSION: Multiple episodes of laryngeal penetration and aspiration    Pharyngeal stasis.    Electronically signed by:  Brittnee Velez MD  4/1/2023 3:15 PM CDT Workstation: [id number]      CURRENT VISIT EKG  Results for orders placed or performed during the hospital encounter of 03/29/23   EKG 12-lead    Narrative    Test Reason : R09.02,    Vent. Rate : 069 BPM     Atrial Rate : 069 BPM     P-R Int : 222 ms          QRS Dur : 116 ms      QT Int : 442 ms       P-R-T Axes : 060 051 084 degrees     QTc Int : 473 ms    Sinus rhythm with 1st degree A-V  block  Anterior infarct (cited on or before 06-FEB-2023)  Abnormal ECG  When compared with ECG of 29-MAR-2023 11:14,  T wave inversion less evident in Anterior-lateral leads  Confirmed by Kuldeep Daniels MD ([id number]) on 3/30/2023 5:44:57 PM    Referred By: [email or web address]   SELF           Confirmed By:Kuldeep Daniels MD       ECHOCARDIOGRAM RESULTS  Results for orders placed during the hospital encounter of 03/29/23    Echo    Interpretation Summary  · The left ventricle is mildly enlarged with eccentric hypertrophy and severely decreased systolic function.  · The estimated ejection fraction is 15%.  · Grade I left ventricular diastolic dysfunction.  · There are segmental left ventricular wall motion abnormalities.  · There is abnormal septal wall motion consistent with right ventricular pacemaker.  · Normal right ventricular size with low normal right ventricular systolic function.  · Mild left atrial enlargement.  · Mild-to-moderate aortic regurgitation.  · Normal central venous pressure (3 mmHg).  · The estimated PA systolic pressure is 29 mmHg.  · Evidence of anterior apical infarction old is present        Oxygen INFORMATION  Resp Rate Total:  [18 br/min-38 br/min] 18 br/minRoom air         Dobutamine 5, Levophed 0.6    IMPRESSION AND PLAN  Cardiogenic shock  Anemia, progressive  Hyponatremia  Acute renal failure, improving   Hyperglycemia, mild   Moderate hypoalbuminemia  Transaminitis, improving  Vitamin-D deficiency  Elevated TSH secondary to amiodarone  DNR status noted  Discussed with patient and his siblings and their wives and nursing and Respiratory    The patient is ready for last rites.  We will move to comfort care after that and consult hospice.        Miesha Umaña MD  Date of Service: 04/02/2023  9:46 AM

## 2023-04-02 NOTE — CARE UPDATE
04/02/23 0708   Patient Assessment/Suction   Level of Consciousness (AVPU) alert   Respiratory Effort Unlabored;Normal   Expansion/Accessory Muscles/Retractions no use of accessory muscles   All Lung Fields Breath Sounds equal bilaterally;clear   Rhythm/Pattern, Respiratory depth regular;pattern regular   PRE-TX-O2   Device (Oxygen Therapy) room air   SpO2 97 %   Pulse Oximetry Type Continuous   $ Pulse Oximetry - Multiple Charge Pulse Oximetry - Multiple   Pulse 79   Resp (!) 21   Aerosol Therapy   $ Aerosol Therapy Charges Aerosol Treatment   Respiratory Treatment Status (SVN) given   Treatment Route (SVN) mask;air   Patient Position (SVN) HOB elevated   Signs of Intolerance (SVN) none   Breath Sounds Post-Respiratory Treatment   Throughout All Fields Post-Treatment All Fields   Throughout All Fields Post-Treatment no change   Post-treatment Heart Rate (beats/min) 82   Post-treatment Resp Rate (breaths/min) 20

## 2023-04-02 NOTE — PLAN OF CARE
Patient resting in bed. Orientated x4. Calm, no complaint. Watching tv.     With muscle wasting / cachexia and generalized weakness.     Levophed requirements are pretty high , around 0.6-1. (per central line that was placed yesterday).     A line connections tightened, it aspirates blood. (was changed yesterday) Flushed / whip , square wave. Dried blood under the dressing, I changed the dressing with aseptic technique, cleaned scrubbed with CHG, changed the tubing.     Voiding per urinal, light yohannes urine.     The bed is low and alarm on. Clinical alarms reviewed and armed. Monitor strip printed and reviewed, sinus adin-sinus rhythm. occasional pacing spikes.     Ok to swallow now iddsi 5 nectar thick with precautions, went for MBSS today, reported to have aspirated on pretty much everything to a degree (see speech note). MD notified, advised, patient wuoldn't be candidate for peg tube etc, likely has been microaspirating to a degree for some time now, not expected to have much improvement in the long term, may give diet as tolerated..  About 25% of lunch, 25% dinner. Had most of a suplena. Gave bowel regimen as advised, thought he had to have bm, placed on commode, no success yet..     Patient dnr, band on. Turning q2h, lines and extremities padded. range of motion  exercises in bed as tolerated. sent sputum sample yesterday (light yellow), encourage pulmonary toilet. doing well on room air 98%.

## 2023-04-02 NOTE — PLAN OF CARE
Pt continues to require levo and dobutamine.  He is resting comfortably surrounded by family.  Pt voiced that he wishes to wait until tomorrow to discontinue support because he has some friends who are making arrangements to see him tomorrow.  Vitals stable, resting comfortably.  Will continue to monitor.   Problem: Adult Inpatient Plan of Care  Goal: Plan of Care Review  Outcome: Ongoing, Progressing  Goal: Patient-Specific Goal (Individualized)  Outcome: Ongoing, Progressing  Goal: Absence of Hospital-Acquired Illness or Injury  Outcome: Ongoing, Progressing  Goal: Optimal Comfort and Wellbeing  Outcome: Ongoing, Progressing  Goal: Readiness for Transition of Care  Outcome: Ongoing, Progressing

## 2023-04-02 NOTE — CONSULTS
INPATIENT NEPHROLOGY CONSULT   [facility] NEPHROLOGY    Cleveland Capps  04/02/2023    Reason for consultation:    Acute kidney injury    Chief Complaint:   Chief Complaint   Patient presents with    Weakness     Pt here for low blood pressure and weakness, pt has not been eating well.           History of Present Illness:    67-year-old male presents with generalized weakness nausea vomiting hypotension and hypoxia patient has a history of COPD, coronary artery disease, diabetes, CHF with a low ejection fraction patient has been having nausea and vomiting and not able to keep his medicines down patient has been having fatigue patient reports shortness of breath on exertion.  Patient denies fever patient reports chronic cough patient denies chest pain    3/30  hypotensive, on levophed.  No nausea, chest pain, sob, fever, urinary or bowel complaint, new neurologic symptoms, new joint pain  3/31  no chest pain, sob, or nausea.  Alert.    4/1  on dobutamine and levophed.  AFVSS on vasopressor support.  1060 cc uop    4/2  sitting up in a chair.  No chest pain or sob.  Can't swallow solids food without it getting hung up.  He failed the barium swallow test    Addendum:      I met with the patient's brother aPncho.  I updated him on the patient's condition.  I relayed that given his dependence on vasopressor support, his end stage cardiomyopathy, and his dramatic weight loss he was likely going to pass away in the near future.        Plan of Care:       Assessment:    Acute kidney injury likely due to intravascular volume depletion  --hold diuretics  --hold Entresto and vasotec.  Upon discharge can resume Entresto if renal function permits.  Don't resume Vasotec.  No reason to be on an ACE-I and ARB  --FEUrea 35%--borderline pre-renal  --Avoid NSAIDS, Mckeon II inhibitors, and other non-essential nephrotoxic agents  --renal dose medication for crcl 10-50  --strict I/Os  --given his end stage cardiac disease I don't feel  dialysis would provide meaningful benefit.  I don't think her would tolerate it from a hemodynamic standpoint    Hyperkalemia secondary to silvestre an ARB and ACE-I  --lokelma given 3/30  --low k diet  --hold entresto and vasotec and spironolactone     Weight loss--cardiac cachexia?    --TSH elevated  --prealbumin low  --age appropriate cancer screening  --per primary team    Hyponatremia--better  --samsca contraindicated due to elevated liver enzymes  --tsh--elevated.  Synthroid increased  --uric acid low consistent with adh effect  --avoid hypotonic iv piggybacks    Elevated liver enzymes.  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 We will continue to follow.    Vital Signs:  Temp Readings from Last 3 Encounters:   04/02/23 98 °F (36.7 °C)   02/14/23 97.9 °F (36.6 °C) (Tympanic)   11/28/22 98.2 °F (36.8 °C)       Pulse Readings from Last 3 Encounters:   04/02/23 80   03/29/23 80   03/23/23 81       BP Readings from Last 3 Encounters:   04/02/23 (!) 125/58   03/29/23 90/60   03/23/23 108/70       Weight:  Wt Readings from Last 3 Encounters:   04/02/23 45.2 kg (99 lb 10.4 oz)   03/30/23 45.2 kg (99 lb 10.4 oz)   03/29/23 44.6 kg (98 lb 6.4 oz)       Past Medical & Surgical History:  Past Medical History:   Diagnosis Date    A-fib     Acute kidney injury     COPD (chronic obstructive pulmonary disease)     Coronary artery disease     Diabetes mellitus     Encounter for blood transfusion     Former smoker     Hypertension     Myocardial infarction     Thyroid disease     Type 2 diabetes mellitus without complications        Past Surgical History:   Procedure Laterality Date    CLOSURE OF LEFT ATRIAL APPENDAGE USING DEVICE N/A 5/17/2022    Procedure: Left atrial appendage closure device;  Surgeon: Tre Schmidt MD;  Location: [facility]  LAB;  Service: Cardiology;  Laterality: N/A;    COLONOSCOPY N/A 1/3/2017    Procedure: COLONOSCOPY;  Surgeon: Marcus Green MD;  Location: [facility] ENDO;  Service: Endoscopy;  Laterality: N/A;    CORONARY BYPASS GRAFT ANGIOGRAPHY  3/30/2021    Procedure: Bypass graft study;  Surgeon: Tre Schmidt MD;  Location: [facility] CATH LAB;  Service: Cardiology;;    CORONARY STENT PLACEMENT      GASTROSTOMY TUBE PLACEMENT      INSERTION OF PACEMAKER  2017    INTRAVASCULAR ULTRASOUND, NON-CORONARY  2022    Procedure: Intravascular Ultrasound, Non-Coronary;  Surgeon: Tre Schmidt MD;  Location: [facility] CATH LAB;  Service: Cardiology;;    LEFT HEART CATHETERIZATION N/A 3/30/2021    Procedure: Left heart cath;  Surgeon: Tre Schmidt MD;  Location: [facility] CATH LAB;  Service: Cardiology;  Laterality: N/A;    PEG TUBE REMOVAL      TRACHEOSTOMY CLOSURE      TRACHEOSTOMY TUBE PLACEMENT      TRANSESOPHAGEAL ECHOCARDIOGRAPHY N/A 2022    Procedure: ECHOCARDIOGRAM, TRANSESOPHAGEAL;  Surgeon: [facility] Diagnostic Provider;  Location: [facility] CATH LAB;  Service: Anesthesiology;  Laterality: N/A;    TREATMENT OF CARDIAC ARRHYTHMIA N/A 2022    Procedure: Cardioversion or Defibrillation;  Surgeon: Kuldeep Daniels MD;  Location: [facility] CATH LAB;  Service: Cardiology;  Laterality: N/A;    UPPER GASTROINTESTINAL ENDOSCOPY  2016    Dr. Green with PEG tube placement       Past Social History:  Social History     Socioeconomic History    Marital status: Single   Tobacco Use    Smoking status: Former     Types: Cigarettes     Quit date: 2005     Years since quittin.3    Smokeless tobacco: Never    Tobacco comments:     quit    Substance and Sexual Activity    Alcohol use: No    Drug use: No    Sexual activity: Yes     Social Determinants of Health     Financial Resource Strain: Low Risk     Difficulty of Paying Living Expenses: Not hard at all   Food Insecurity: No Food Insecurity    Worried About Running Out of  Food in the Last Year: Never true    Ran Out of Food in the Last Year: Never true   Transportation Needs: No Transportation Needs    Lack of Transportation (Medical): No    Lack of Transportation (Non-Medical): No   Physical Activity: Inactive    Days of Exercise per Week: 0 days    Minutes of Exercise per Session: 0 min   Stress: No Stress Concern Present    Feeling of Stress : Not at all   Social Connections: Socially Isolated    Frequency of Communication with Friends and Family: Three times a week    Frequency of Social Gatherings with Friends and Family: Once a week    Attends Quaker Services: Never    Active Member of Clubs or Organizations: No    Attends Club or Organization Meetings: Never    Marital Status: Never    Housing Stability: Low Risk     Unable to Pay for Housing in the Last Year: No    Number of Places Lived in the Last Year: 1    Unstable Housing in the Last Year: No       Medications:  No current facility-administered medications on file prior to encounter.     Current Outpatient Medications on File Prior to Encounter   Medication Sig Dispense Refill    albuterol-ipratropium (DUO-NEB) 2.5 mg-0.5 mg/3 mL nebulizer solution Take 3 mLs by nebulization 3 (three) times daily.      amiodarone (PACERONE) 200 MG Tab Take 1 tablet (200 mg total) by mouth once daily. 90 tablet 3    apixaban (ELIQUIS) 5 mg Tab Take 5 mg by mouth 2 (two) times daily.      atorvastatin (LIPITOR) 80 MG tablet Take 80 mg by mouth once daily.      carvediloL (COREG) 25 MG tablet Take 25 mg by mouth 2 (two) times daily.      docusate sodium (COLACE) 50 MG capsule Take 2 capsules (100 mg total) by mouth 2 (two) times daily as needed for Constipation. 30 capsule 0    enalapril (VASOTEC) 20 MG tablet Take 20 mg by mouth once daily.      isosorbide mononitrate (IMDUR) 30 MG 24 hr tablet Take 30 mg by mouth once daily.      latanoprost 0.005 % ophthalmic solution Place 1 drop into both eyes every evening.       "levothyroxine (SYNTHROID) 75 MCG tablet Take 1 tablet (75 mcg total) by mouth before breakfast. 90 tablet 3    pantoprazole (PROTONIX) 40 MG tablet Take 40 mg by mouth every morning.      potassium chloride SA (K-DUR,KLOR-CON M) 10 MEQ tablet Take 10 mEq by mouth once daily.      sacubitriL-valsartan (ENTRESTO) 24-26 mg per tablet Take 1 tablet by mouth 2 (two) times daily.      spironolactone (ALDACTONE) 25 MG tablet Take 1 tablet (25 mg total) by mouth once daily. 30 tablet 11    torsemide (DEMADEX) 20 MG Tab Take 1 tablet (20 mg total) by mouth once daily. 30 tablet 0    travoprost, benzalkonium, (TRAVATAN) 0.004 % ophthalmic solution Place 1 drop into both eyes nightly.      COMP-AIR NEBULIZER COMPRESSOR Alfreda use as directed      TRUE METRIX GLUCOSE TEST STRIP Strp USE 1 STRIP TO CHECK GLUCOSE ONCE DAILY       Scheduled Meds:   sodium chloride 0.9%   Intravenous Once    albuterol-ipratropium  3 mL Nebulization TID WAKE    apixaban  2.5 mg Oral BID    atorvastatin  20 mg Oral Daily    chlorhexidine  15 mL Mouth/Throat BID    latanoprost  1 drop Both Eyes QHS    levothyroxine  100 mcg Oral Before breakfast    megestroL  200 mg Oral Daily    mupirocin   Nasal BID    pantoprazole  40 mg Oral QAM    polyethylene glycol  17 g Oral BID    sodium chloride 0.9%  10 mL Intravenous Q12H     Continuous Infusions:   sodium chloride 0.9% 50 mL/hr at 04/01/23 1917    DOBUTamine IV infusion (titrating) 5 mcg/kg/min (04/01/23 1917)    NORepinephrine bitartrate-D5W 0.7 mcg/kg/min (04/02/23 0422)     PRN Meds:.guaiFENesin 100 mg/5 ml, melatonin, morphine, ondansetron, prochlorperazine    Allergies:  Penicillin and Penicillins    Past Family History:  Reviewed; refer to Hospitalist Admission Note    Review of Systems:  Review of Systems - All 14 systems reviewed and negative, except as noted in HPI    Physical Exam:    BP (!) 125/58   Pulse 80   Temp 98 °F (36.7 °C)   Resp 20   Ht 5' 1" (1.549 m)   Wt 45.2 kg (99 lb 10.4 oz)  "  SpO2 97%   BMI 18.83 kg/m²     General Appearance:    Alert, cooperative, no distress, appears stated age   Head:    Normocephalic, without obvious abnormality, atraumatic   Eyes:    PER, conjunctiva/corneas clear, EOM's intact in both eyes        Throat:   Lips, mucosa, and tongue normal; teeth and gums normal   Back:     Symmetric, no curvature, ROM normal, no CVA tenderness   Lungs:     Clear to auscultation bilaterally, respirations unlabored   Chest wall:    No tenderness or deformity   Heart:    Regular rate and rhythm, S1 and S2 normal, no murmur, rub   or gallop   Abdomen:     Soft, non-tender, bowel sounds active all four quadrants,     no masses, no organomegaly   Extremities:   Extremities normal, atraumatic, no cyanosis or edema   Pulses:   2+ and symmetric all extremities   MSK:   Muscular atrophy   Skin:   Skin color, texture, turgor normal, no rashes or lesions   Neurologic:   CNII-XII intact, normal strength and sensation       Throughout.  No flap     Results:  Lab Results   Component Value Date     (L) 04/01/2023    K 4.3 04/01/2023     04/01/2023    CO2 20 (L) 04/01/2023    BUN 42 (H) 04/01/2023    CREATININE 1.5 (H) 04/01/2023    CALCIUM 8.4 (L) 04/01/2023    ANIONGAP 8 04/01/2023    ESTGFRAFRICA >60.0 07/27/2022    EGFRNONAA 52.4 (A) 07/27/2022       Lab Results   Component Value Date    CALCIUM 8.4 (L) 04/01/2023    PHOS 4.5 03/31/2023       No results for input(s): WBC, RBC, HGB, HCT, PLT, MCV, MCH, MCHC in the last 24 hours.         I have personally reviewed pertinent radiological imaging and reports.    Patient care was time spent personally by me on the following activities:   Obtaining a history  Examination of patient.  Providing medical care at the patients bedside.  Developing a treatment plan with patient or surrogate and bedside caregivers  Ordering and reviewing laboratory studies, radiographic studies, pulse oximetry.  Ordering and performing treatments and  interventions.  Evaluation of patient's response to treatment.  Discussions with consultants while on the unit and immediately available to the patient.  Re-evaluation of the patient's condition.  Documentation in the medical record.     Terence Davis MD  Nephrology  [facility]  [phone]

## 2023-04-02 NOTE — PLAN OF CARE
Hospice consult sent via care port to [facility], attn Brian Champion ph#[phone].    [facility] stating they are unable to service in [place].  Referral sent to [facility] via care port.         04/02/23 1530   Post-Acute Status   Post-Acute Authorization Hospice   Hospice Status Referrals Sent   Discharge Plan   Discharge Plan A Hospice/home   Discharge Plan B Home with family

## 2023-04-02 NOTE — PROGRESS NOTES
[facility]  Department of Cardiology  Progress Note    PATIENT NAME: Cleveland Capps  MRN: [id number]  TODAY'S DATE: 04/02/2023  ADMIT DATE: 3/29/2023    SUBJECTIVE     PRINCIPLE PROBLEM: Hypotension    INTERVAL HISTORY:    4/2/2023  Seen and examined patient at bedside in ICU.  Denies any shortness of breath, chest pain.  On dobutamine and Levophed infusions. Clinically dry.     4/1/23:  Seen and examined patient at bedside in ICU.  Denies any shortness of breath, chest pain.  On dobutamine and Levophed infusions    Review of patient's allergies indicates:   Allergen Reactions    Penicillin      Other reaction(s): anaphylaxis  Other reaction(s): anaphylaxis    Penicillins Hives       REVIEW OF SYSTEMS  Negative except as mentioned above     OBJECTIVE     VITAL SIGNS (Most Recent)  Temp: 98 °F (36.7 °C) (04/02/23 0800)  Pulse: 80 (04/02/23 0800)  Resp: 20 (04/02/23 0800)  BP: (!) 125/58 (04/02/23 0800)  SpO2: 97 % (04/02/23 0800)    VENTILATION STATUS  Resp: 20 (04/02/23 0800)  SpO2: 97 % (04/02/23 0800)  Resp Rate Total:  [18 br/min-38 br/min] 18 br/min        I & O (Last 24H):  Intake/Output Summary (Last 24 hours) at 4/2/2023 1059  Last data filed at 4/2/2023 0628  Gross per 24 hour   Intake 2417.94 ml   Output 1400 ml   Net 1017.94 ml       WEIGHTS  Wt Readings from Last 1 Encounters:   04/02/23 0604 45.2 kg (99 lb 10.4 oz)   03/29/23 1815 45.2 kg (99 lb 10.4 oz)   03/29/23 1056 44.5 kg (98 lb)       PHYSICAL EXAM  CONSTITUTIONAL: no apparent distress, appears weak   NECK:  no JVD  LUNGS: CTA b/l  HEART: regular rate and rhythm, S1, S2 normal, no murmur  ABDOMEN: soft, non-tender  EXTREMITIES:  no edema  NEURO: AAO X 3    SCHEDULED MEDS:   sodium chloride 0.9%   Intravenous Once    albuterol-ipratropium  3 mL Nebulization TID WAKE    apixaban  2.5 mg Oral BID    atorvastatin  20 mg Oral Daily    chlorhexidine  15 mL Mouth/Throat BID    latanoprost  1 drop Both Eyes QHS    levothyroxine  100 mcg  Oral Before breakfast    megestroL  200 mg Oral Daily    mupirocin   Nasal BID    pantoprazole  40 mg Oral QAM    polyethylene glycol  17 g Oral BID    sodium chloride 0.9%  10 mL Intravenous Q12H       CONTINUOUS INFUSIONS:   sodium chloride 0.9% 50 mL/hr at 04/01/23 1917    DOBUTamine IV infusion (titrating) 5 mcg/kg/min (04/01/23 1917)    NORepinephrine bitartrate-D5W 0.5 mcg/kg/min (04/02/23 1034)       PRN MEDS:guaiFENesin 100 mg/5 ml, melatonin, morphine, ondansetron, prochlorperazine    LABS AND DIAGNOSTICS     CBC LAST 3 DAYS  Recent Labs   Lab 03/30/23  0310 03/31/23  0323 04/01/23  0435   WBC 11.47 8.65 8.92   RBC 3.47* 3.28* 3.12*   HGB 11.8* 11.2* 10.5*   HCT 33.9* 32.0* 30.9*   MCV 98 98 99*   MCH 34.0* 34.1* 33.7*   MCHC 34.8 35.0 34.0   RDW 15.1* 14.9* 15.2*    181 118*   MPV 8.3* 8.3* 8.7*   GRAN 84.2*  9.7* 81.5*  7.1 89.9*  8.0*   LYMPH 5.6*  0.6* 8.4*  0.7* 2.6*  0.2*   MONO 8.5  1.0 6.0  0.5 3.9*  0.4   BASO 0.07 0.05 0.03   NRBC 0 0 0       COAGULATION LAST 3 DAYS  Recent Labs   Lab 03/29/23  1136   INR 1.1       CHEMISTRY LAST 3 DAYS  Recent Labs   Lab 03/29/23  1136 03/29/23  1819 03/30/23  0310 03/30/23  1407 03/31/23  0323 04/01/23  0435   *   < > 127* 126* 125* 133*   K 5.9*   < > 5.4* 4.9 4.6 4.3   CL 92*   < > 96 95 96 105   CO2 23   < > 19* 17* 18* 20*   ANIONGAP 16   < > 12 14 11 8   BUN 75*   < > 71* 69* 57* 42*   CREATININE 4.1*   < > 3.4* 3.0* 2.2* 1.5*   *   < > 158* 176* 134* 173*   CALCIUM 9.6   < > 8.8 8.8 8.2* 8.4*   MG 2.4  --  2.0  --   --   --    ALBUMIN 4.0  --  3.5  --  2.9* 2.8*   PROT 8.2  --  7.2  --   --  6.1   ALKPHOS 90  --  78  --   --  65   *  --  141*  --   --  130*   *  --  199*  --   --  168*   BILITOT 1.5*  --  1.7*  --   --  0.9    < > = values in this interval not displayed.       CARDIAC PROFILE LAST 3 DAYS  Recent Labs   Lab 03/29/23  1136   BNP 96   TROPONINIHS 87.2*       ENDOCRINE LAST 3 DAYS  Recent Labs   Lab  03/29/23  1136 03/31/23  0323 03/31/23  0743   TSH 22.400* 19.060*  --    PROCAL  --   --  0.50       LAST ARTERIAL BLOOD GAS  ABG  No results for input(s): PH, PO2, PCO2, HCO3, BE in the last 168 hours.    LAST 7 DAYS MICROBIOLOGY   Microbiology Results (last 7 days)       Procedure Component Value Units Date/Time    Culture, Respiratory with Gram Stain [[id number]] Collected: 03/31/23 0824    Order Status: Completed Specimen: Respiratory from Sputum Updated: 04/02/23 0707     Respiratory Culture Normal respiratory malik     Gram Stain (Respiratory) <10 epithelial cells per low power field.     Gram Stain (Respiratory) Many WBC's     Gram Stain (Respiratory) Moderate Gram positive cocci     Gram Stain (Respiratory) Moderate Gram negative rods     Gram Stain (Respiratory) Few Gram positive rods    Blood culture #1 **CANNOT BE ORDERED STAT** [[id number]] Collected: 03/29/23 1310    Order Status: Completed Specimen: Blood from Peripheral, Forearm, Left Updated: 04/01/23 1432     Blood Culture, Routine No Growth to date      No Growth to date      No Growth to date      No Growth to date    Blood culture #2 **CANNOT BE ORDERED STAT** [[id number]] Collected: 03/29/23 1305    Order Status: Completed Specimen: Blood from Peripheral, Antecubital, Left Updated: 04/01/23 1432     Blood Culture, Routine No Growth to date      No Growth to date      No Growth to date      No Growth to date    MRSA Screen by PCR [[id number]] Collected: 03/30/23 0311    Order Status: Completed Specimen: Nasopharyngeal Swab from Nasal Updated: 03/30/23 0540     MRSA SCREEN BY PCR Negative            MOST RECENT IMAGING  Fl Modified Barium Swallow Speech  Modified barium swallow    Fluoroscopy time was 3.5 minutes    Multiple video fluoroscopic images were obtained.    Clinical history is dysphagia    Fluoroscopic assistance was given to the speech therapist's is patient ingested various consistencies of barium. There is moderate to severe or  retropharyngeal dysphagia. There is pharyngeal delay and stasis with laryngeal aspiration and penetration with thin barium, nectar thick barium and pudding. There is laryngeal aspiration secondary to pharyngeal residue. There is moderate aspiration with thin barium with chin tuck.    There are degenerative changes of the spine. There is bony fusion at C4-5.    IMPRESSION: Multiple episodes of laryngeal penetration and aspiration    Pharyngeal stasis.    Electronically signed by:  Brittnee Velez MD  4/1/2023 3:15 PM CDT Workstation: [id number]      [facility]  Results for orders placed during the hospital encounter of 03/29/23    Echo    Interpretation Summary  · The left ventricle is mildly enlarged with eccentric hypertrophy and severely decreased systolic function.  · The estimated ejection fraction is 15%.  · Grade I left ventricular diastolic dysfunction.  · There are segmental left ventricular wall motion abnormalities.  · There is abnormal septal wall motion consistent with right ventricular pacemaker.  · Normal right ventricular size with low normal right ventricular systolic function.  · Mild left atrial enlargement.  · Mild-to-moderate aortic regurgitation.  · Normal central venous pressure (3 mmHg).  · The estimated PA systolic pressure is 29 mmHg.  · Evidence of anterior apical infarction old is present      CURRENT/PREVIOUS VISIT EKG  Results for orders placed or performed during the hospital encounter of 03/29/23   EKG 12-lead    Collection Time: 03/29/23  7:02 PM    Narrative    Test Reason : R09.02,    Vent. Rate : 069 BPM     Atrial Rate : 069 BPM     P-R Int : 222 ms          QRS Dur : 116 ms      QT Int : 442 ms       P-R-T Axes : 060 051 084 degrees     QTc Int : 473 ms    Sinus rhythm with 1st degree A-V block  Anterior infarct (cited on or before 06-FEB-2023)  Abnormal ECG  When compared with ECG of 29-MAR-2023 11:14,  T wave inversion less evident in Anterior-lateral leads  Confirmed by Jeremiah  MD, Kuldeep BEACH ([id number]) on 3/30/2023 5:44:57 PM    Referred By: [email or web address]   SELF           Confirmed By:Kuldeep Daniels MD       ASSESSMENT/PLAN:     Active Hospital Problems    Diagnosis    *Hypotension    Goals of care, counseling/discussion    Cardiogenic shock    Hypoxia    Dehydration    Presence of Watchman left atrial appendage closure device    Status post coronary artery bypass graft    AICD (automatic cardioverter/defibrillator) present    Mitral regurgitation    MEIR (acute kidney injury)     Atn; requiring dialysis           ASSESSMENT & PLAN:   Hypotension-possibly cardiogenic/ dehydration   MEIR- improving   Failure to thrive  Transaminitis- likely due to hepatic congestion  Severe cardiomyopathy, HFrEF status post AICD  CAD status post CABG  History of Watchman device    -continue gentle IV hydration  -repeat BMP today   -continue dobutamine.  Wean off Levophed as tolerated   -monitor urine output and BUN/creatinine  -f/u palliative care   -will follow        Juliette Ross MD  [facility]  Department of Cardiology  Date of Service: 04/02/2023

## 2023-04-02 NOTE — PROGRESS NOTES
"[facility] Medicine Progress Note  Patient Name: Cleveland Capps MRN: [id number]   Patient Class: IP- Inpatient  Length of Stay: 3   Admission Date: 3/29/2023 10:55 AM Attending Physician: Tim Rosales MD   Primary Care Provider: Romero Snyder MD Face-to-Face encounter date: 04/02/2023   Chief Complaint: Weakness (Pt here for low blood pressure and weakness, pt has not been eating well. )      Subjective:    Interval History   Unable to tolerate weaning of pressors  Denies chest pain, palpitations, abdominal pain, nausea/vomiting.  Reviewed the labs and discussed the plan of care.   D/w family at bedside    Review of Systems   All other Review of Systems were found to be negative expect for that mentioned already in HPI.     Hospital Course     04/02/2023     sodium chloride 0.9%   Intravenous Once    albuterol-ipratropium  3 mL Nebulization TID WAKE    apixaban  2.5 mg Oral BID    atorvastatin  20 mg Oral Daily    chlorhexidine  15 mL Mouth/Throat BID    latanoprost  1 drop Both Eyes QHS    levothyroxine  100 mcg Oral Before breakfast    megestroL  200 mg Oral Daily    mupirocin   Nasal BID    pantoprazole  40 mg Oral QAM    polyethylene glycol  17 g Oral BID    sodium chloride 0.9%  10 mL Intravenous Q12H       guaiFENesin 100 mg/5 ml, melatonin, morphine, ondansetron, prochlorperazine      Objective:   Physical Exam  /60   Pulse 86   Temp 98.1 °F (36.7 °C)   Resp (!) 25   Ht 5' 1" (1.549 m)   Wt 45.2 kg (99 lb 10.4 oz)   SpO2 97%   BMI 18.83 kg/m²   Constitutional: critically ill  HENT: Atraumatic.   Cardiovascular: Normal rate, regular rhythm and normal heart sounds.   Pulmonary/Chest: Effort normal. Clear to auscultation bilaterally. No wheezes.   Abdominal: Soft. Bowel sounds are normal. Exhibits no distension and no mass. No tenderness  Neurological: \alert  Skin: Skin is warm and dry.     Labs and Imaging    Significant Labs: All pertinent labs within the " past 24 hours have been reviewed.    Significant Imaging: I have reviewed all pertinent imaging results/findings within the past 24 hours.    I have reviewed the Vitals, labs and imaging as above.     Assessment & Plan:   Cleveland Capps is a 67 y.o. male admitted for    Active Hospital Problems    Diagnosis    *Hypotension    Goals of care, counseling/discussion    Cardiogenic shock    Hypoxia    Dehydration    Presence of Watchman left atrial appendage closure device    Status post coronary artery bypass graft    AICD (automatic cardioverter/defibrillator) present    Mitral regurgitation    MEIR (acute kidney injury)     Atn; requiring dialysis           Plan  D/w patient and then later with family members about end stage heart disease requiring multiple pressors and outcomes.   Offered them home with hospice. They would like to discuss before taking any decision  Discussed with family members, nurse and Dr. Umaña.     Active PT: Yes  Active OT: Yes  Active SLP: Yes    Core measures:  - Code status:   - Diet: Cardiac  VTE Risk Mitigation (From admission, onward)           Ordered     apixaban tablet 2.5 mg  2 times daily         03/29/23 1751     Reason for No Pharmacological VTE Prophylaxis  Once        Question:  Reasons:  Answer:  Already adequately anticoagulated on oral Anticoagulants    03/29/23 1751     IP VTE HIGH RISK PATIENT  Once         03/29/23 1703     Place sequential compression device  Until discontinued         03/29/23 1703                    Discharge Planning:   Discharge Planning   MACKENZIE: 04/03    Code Status: Full Code   Is the patient medically ready for discharge?: no    Reason for patient still in hospital (select all that apply): Patient trending condition  Discharge Plan A: Home with assistance from family        Above encounter included review of the medical records, interviewing and examining the patient face-to-face, discussion with family and other health care providers, ordering  and interpreting lab/test results and formulating a plan of care.     Medical Decision Making:  [] Low Complexity  [] Moderate Complexity  [x] High Complexity    Tim Rosales MD  [facility] Hospitalist  04/02/2023

## 2023-04-02 NOTE — CARE UPDATE
04/01/23 2004   Patient Assessment/Suction   Level of Consciousness (AVPU) alert   Respiratory Effort Normal;Unlabored   Expansion/Accessory Muscles/Retractions no use of accessory muscles   All Lung Fields Breath Sounds equal bilaterally;coarse   Rhythm/Pattern, Respiratory unlabored   Cough Frequency frequent   Cough Type good;loose;nonproductive   PRE-TX-O2   Device (Oxygen Therapy) room air   SpO2 95 %   Pulse Oximetry Type Continuous   $ Pulse Oximetry - Multiple Charge Pulse Oximetry - Multiple   Pulse 65   Resp 16   BP (!) 114/58   Aerosol Therapy   $ Aerosol Therapy Charges Aerosol Treatment   Daily Review of Necessity (SVN) completed   Respiratory Treatment Status (SVN) given   Treatment Route (SVN) mask   Patient Position (SVN) semi-Basilio's   Post Treatment Assessment (SVN) breath sounds unchanged   Signs of Intolerance (SVN) none   Breath Sounds Post-Respiratory Treatment   Post-treatment Heart Rate (beats/min) 63   Post-treatment Resp Rate (breaths/min) 16   Education   $ Education Bronchodilator;15 min   Respiratory Evaluation   $ Care Plan Tech Time 15 min   $ Eval/Re-eval Charges Re-evaluation   Evaluation For Re-Eval 3 day

## 2023-04-02 NOTE — NURSING
Notified MD of decrease in urine output (only 50 for today).  Advised to stop maintenance fluids.

## 2023-04-02 NOTE — PT/OT/SLP PROGRESS
Speech Language Pathology      Cleveland Capps  MRN: [id number]    Patient not seen today secondary to Nurse/ SALVADOR hold. Nursing informed SLP upon attempt that MD waiting for family to arrive to discuss transition to comfort care measures. Family arriving as SLP and RN discussing pt being seen today. RN requesting to hold ST until MD discussed POC w/ family. Will follow-up 4/3/23 if pt not transitioned to comfort care.

## 2023-04-02 NOTE — PLAN OF CARE
Problem: Adult Inpatient Plan of Care  Goal: Plan of Care Review  Outcome: Ongoing, Progressing  Goal: Patient-Specific Goal (Individualized)  Outcome: Ongoing, Progressing  Goal: Absence of Hospital-Acquired Illness or Injury  Outcome: Ongoing, Progressing  Goal: Optimal Comfort and Wellbeing  Outcome: Ongoing, Progressing  Goal: Readiness for Transition of Care  Outcome: Ongoing, Progressing     Problem: Fluid and Electrolyte Imbalance (Acute Kidney Injury/Impairment)  Goal: Fluid and Electrolyte Balance  Outcome: Ongoing, Progressing     Problem: Oral Intake Inadequate (Acute Kidney Injury/Impairment)  Goal: Optimal Nutrition Intake  Outcome: Ongoing, Progressing     Problem: Renal Function Impairment (Acute Kidney Injury/Impairment)  Goal: Effective Renal Function  Outcome: Ongoing, Progressing     Problem: Infection  Goal: Absence of Infection Signs and Symptoms  Outcome: Ongoing, Progressing     Problem: Impaired Wound Healing  Goal: Optimal Wound Healing  Outcome: Ongoing, Progressing     Problem: Skin Injury Risk Increased  Goal: Skin Health and Integrity  Outcome: Ongoing, Progressing     Problem: Fall Injury Risk  Goal: Absence of Fall and Fall-Related Injury  Outcome: Ongoing, Progressing     Problem: Coping Ineffective  Goal: Effective Coping  Outcome: Ongoing, Progressing     Problem: Oral Intake Inadequate  Goal: Improved Oral Intake  Outcome: Ongoing, Progressing

## 2023-04-03 ENCOUNTER — HOSPITAL ENCOUNTER (INPATIENT)
Facility: HOSPITAL | Age: 68
LOS: 2 days | Discharge: HOSPICE | DRG: 951 | End: 2023-04-05
Attending: INTERNAL MEDICINE | Admitting: INTERNAL MEDICINE
Payer: OTHER MISCELLANEOUS

## 2023-04-03 VITALS
RESPIRATION RATE: 16 BRPM | HEIGHT: 61 IN | HEART RATE: 74 BPM | BODY MASS INDEX: 19.35 KG/M2 | TEMPERATURE: 98 F | DIASTOLIC BLOOD PRESSURE: 46 MMHG | WEIGHT: 102.5 LBS | SYSTOLIC BLOOD PRESSURE: 77 MMHG | OXYGEN SATURATION: 99 %

## 2023-04-03 DIAGNOSIS — N18.6 END STAGE RENAL DISEASE ON DIALYSIS: ICD-10-CM

## 2023-04-03 DIAGNOSIS — R57.0 CARDIOGENIC SHOCK: Primary | ICD-10-CM

## 2023-04-03 DIAGNOSIS — Z99.2 END STAGE RENAL DISEASE ON DIALYSIS: ICD-10-CM

## 2023-04-03 PROBLEM — Z51.5 END OF LIFE CARE: Status: ACTIVE | Noted: 2023-03-31

## 2023-04-03 LAB
ALBUMIN SERPL BCP-MCNC: 2.4 G/DL (ref 3.5–5.2)
ANION GAP SERPL CALC-SCNC: 7 MMOL/L (ref 8–16)
BACTERIA BLD CULT: NORMAL
BACTERIA BLD CULT: NORMAL
BASOPHILS # BLD AUTO: 0.02 K/UL (ref 0–0.2)
BASOPHILS NFR BLD: 0.3 % (ref 0–1.9)
BUN SERPL-MCNC: 22 MG/DL (ref 8–23)
CALCIUM SERPL-MCNC: 8 MG/DL (ref 8.7–10.5)
CHLORIDE SERPL-SCNC: 106 MMOL/L (ref 95–110)
CO2 SERPL-SCNC: 20 MMOL/L (ref 23–29)
CREAT SERPL-MCNC: 1.1 MG/DL (ref 0.5–1.4)
DIFFERENTIAL METHOD: ABNORMAL
EOSINOPHIL # BLD AUTO: 0.4 K/UL (ref 0–0.5)
EOSINOPHIL NFR BLD: 5.7 % (ref 0–8)
ERYTHROCYTE [DISTWIDTH] IN BLOOD BY AUTOMATED COUNT: 15.5 % (ref 11.5–14.5)
EST. GFR  (NO RACE VARIABLE): >60 ML/MIN/1.73 M^2
GLUCOSE SERPL-MCNC: 125 MG/DL (ref 70–110)
HCT VFR BLD AUTO: 27.4 % (ref 40–54)
HGB BLD-MCNC: 9.3 G/DL (ref 14–18)
IMM GRANULOCYTES # BLD AUTO: 0.07 K/UL (ref 0–0.04)
IMM GRANULOCYTES NFR BLD AUTO: 1 % (ref 0–0.5)
LYMPHOCYTES # BLD AUTO: 0.5 K/UL (ref 1–4.8)
LYMPHOCYTES NFR BLD: 6.9 % (ref 18–48)
MCH RBC QN AUTO: 34.1 PG (ref 27–31)
MCHC RBC AUTO-ENTMCNC: 33.9 G/DL (ref 32–36)
MCV RBC AUTO: 100 FL (ref 82–98)
MONOCYTES # BLD AUTO: 0.4 K/UL (ref 0.3–1)
MONOCYTES NFR BLD: 4.9 % (ref 4–15)
NEUTROPHILS # BLD AUTO: 5.9 K/UL (ref 1.8–7.7)
NEUTROPHILS NFR BLD: 81.2 % (ref 38–73)
NRBC BLD-RTO: 0 /100 WBC
PHOSPHATE SERPL-MCNC: 2.2 MG/DL (ref 2.7–4.5)
PLATELET # BLD AUTO: 117 K/UL (ref 150–450)
PMV BLD AUTO: 8.7 FL (ref 9.2–12.9)
POTASSIUM SERPL-SCNC: 4.2 MMOL/L (ref 3.5–5.1)
RBC # BLD AUTO: 2.73 M/UL (ref 4.6–6.2)
SODIUM SERPL-SCNC: 133 MMOL/L (ref 136–145)
WBC # BLD AUTO: 7.2 K/UL (ref 3.9–12.7)

## 2023-04-03 PROCEDURE — 80069 RENAL FUNCTION PANEL: CPT | Performed by: INTERNAL MEDICINE

## 2023-04-03 PROCEDURE — 63600175 PHARM REV CODE 636 W HCPCS: Performed by: INTERNAL MEDICINE

## 2023-04-03 PROCEDURE — 12000002 HC ACUTE/MED SURGE SEMI-PRIVATE ROOM

## 2023-04-03 PROCEDURE — 99232 SBSQ HOSP IP/OBS MODERATE 35: CPT | Mod: ,,, | Performed by: INTERNAL MEDICINE

## 2023-04-03 PROCEDURE — 85025 COMPLETE CBC W/AUTO DIFF WBC: CPT | Performed by: INTERNAL MEDICINE

## 2023-04-03 PROCEDURE — 99232 PR SUBSEQUENT HOSPITAL CARE,LEVL II: ICD-10-PCS | Mod: ,,, | Performed by: INTERNAL MEDICINE

## 2023-04-03 RX ORDER — HYDROMORPHONE HYDROCHLORIDE 1 MG/ML
0.5 INJECTION, SOLUTION INTRAMUSCULAR; INTRAVENOUS; SUBCUTANEOUS EVERY 4 HOURS PRN
Status: DISCONTINUED | OUTPATIENT
Start: 2023-04-03 | End: 2023-04-05

## 2023-04-03 RX ORDER — IPRATROPIUM BROMIDE AND ALBUTEROL SULFATE 2.5; .5 MG/3ML; MG/3ML
3 SOLUTION RESPIRATORY (INHALATION) EVERY 6 HOURS PRN
Status: CANCELLED | OUTPATIENT
Start: 2023-04-03

## 2023-04-03 RX ORDER — LORAZEPAM 2 MG/ML
2 INJECTION INTRAMUSCULAR
Status: DISCONTINUED | OUTPATIENT
Start: 2023-04-03 | End: 2023-04-05 | Stop reason: HOSPADM

## 2023-04-03 RX ORDER — FENTANYL CITRATE 50 UG/ML
25 INJECTION, SOLUTION INTRAMUSCULAR; INTRAVENOUS
Status: DISCONTINUED | OUTPATIENT
Start: 2023-04-03 | End: 2023-04-03

## 2023-04-03 RX ORDER — FENTANYL CITRATE 50 UG/ML
25 INJECTION, SOLUTION INTRAMUSCULAR; INTRAVENOUS
Status: DISCONTINUED | OUTPATIENT
Start: 2023-04-03 | End: 2023-04-03 | Stop reason: HOSPADM

## 2023-04-03 RX ORDER — LORAZEPAM 2 MG/ML
2 INJECTION INTRAMUSCULAR
Status: DISCONTINUED | OUTPATIENT
Start: 2023-04-03 | End: 2023-04-03

## 2023-04-03 RX ORDER — MORPHINE SULFATE 2 MG/ML
2 INJECTION, SOLUTION INTRAMUSCULAR; INTRAVENOUS EVERY 4 HOURS PRN
Status: CANCELLED | OUTPATIENT
Start: 2023-04-03

## 2023-04-03 RX ORDER — LORAZEPAM 2 MG/ML
2 INJECTION INTRAMUSCULAR
Status: DISCONTINUED | OUTPATIENT
Start: 2023-04-03 | End: 2023-04-03 | Stop reason: HOSPADM

## 2023-04-03 RX ORDER — ACETAMINOPHEN 325 MG/1
650 TABLET ORAL EVERY 6 HOURS PRN
Status: DISCONTINUED | OUTPATIENT
Start: 2023-04-03 | End: 2023-04-05 | Stop reason: HOSPADM

## 2023-04-03 RX ORDER — DOBUTAMINE HYDROCHLORIDE 400 MG/100ML
5 INJECTION INTRAVENOUS CONTINUOUS
Status: CANCELLED | OUTPATIENT
Start: 2023-04-03

## 2023-04-03 RX ORDER — ONDANSETRON 2 MG/ML
8 INJECTION INTRAMUSCULAR; INTRAVENOUS EVERY 6 HOURS PRN
Status: DISCONTINUED | OUTPATIENT
Start: 2023-04-03 | End: 2023-04-03 | Stop reason: HOSPADM

## 2023-04-03 RX ORDER — MORPHINE SULFATE 10 MG/ML
10 INJECTION INTRAMUSCULAR; INTRAVENOUS; SUBCUTANEOUS
Status: CANCELLED | OUTPATIENT
Start: 2023-04-03

## 2023-04-03 RX ORDER — IPRATROPIUM BROMIDE AND ALBUTEROL SULFATE 2.5; .5 MG/3ML; MG/3ML
3 SOLUTION RESPIRATORY (INHALATION) EVERY 6 HOURS PRN
Status: DISCONTINUED | OUTPATIENT
Start: 2023-04-03 | End: 2023-04-05 | Stop reason: HOSPADM

## 2023-04-03 RX ORDER — LORAZEPAM 2 MG/ML
2 INJECTION INTRAMUSCULAR
Status: CANCELLED | OUTPATIENT
Start: 2023-04-03

## 2023-04-03 RX ORDER — MORPHINE SULFATE 10 MG/ML
10 INJECTION INTRAMUSCULAR; INTRAVENOUS; SUBCUTANEOUS
Status: DISCONTINUED | OUTPATIENT
Start: 2023-04-03 | End: 2023-04-03

## 2023-04-03 RX ORDER — ONDANSETRON 2 MG/ML
4 INJECTION INTRAMUSCULAR; INTRAVENOUS EVERY 8 HOURS PRN
Status: CANCELLED | OUTPATIENT
Start: 2023-04-03

## 2023-04-03 RX ORDER — LORAZEPAM 2 MG/ML
INJECTION INTRAMUSCULAR
Status: DISPENSED
Start: 2023-04-03 | End: 2023-04-04

## 2023-04-03 RX ORDER — IPRATROPIUM BROMIDE AND ALBUTEROL SULFATE 2.5; .5 MG/3ML; MG/3ML
3 SOLUTION RESPIRATORY (INHALATION) EVERY 6 HOURS PRN
Status: DISCONTINUED | OUTPATIENT
Start: 2023-04-03 | End: 2023-04-03 | Stop reason: HOSPADM

## 2023-04-03 RX ADMIN — MORPHINE SULFATE 2 MG: 2 INJECTION, SOLUTION INTRAMUSCULAR; INTRAVENOUS at 02:04

## 2023-04-03 RX ADMIN — LORAZEPAM 2 MG: 2 INJECTION INTRAMUSCULAR; INTRAVENOUS at 07:04

## 2023-04-03 RX ADMIN — LORAZEPAM 2 MG: 2 INJECTION INTRAMUSCULAR; INTRAVENOUS at 02:04

## 2023-04-03 NOTE — PROGRESS NOTES
Pulmonary/Critical Care Consult      PATIENT NAME: Cleveland Capps  MRN: [id number]  TODAY'S DATE: 2023  9:46 AM  ADMIT DATE: 3/29/2023  AGE: 67 y.o. : 1955    CONSULT REQUESTED BY: Tim Rosales MD    REASON FOR CONSULT:   Shock    HPI:  The patient is a 67-year-old male with end-stage cardiomyopathy who is in cardiogenic shock.  Cardiology is following.  The patient is on very high doses of Levophed.  The patient states he has had 9 heart attacks.     the patient can not come off of 0.5 of Levophed and 5 of dobutamine.  He is aspirating everything he eats.  Discussed with him the fact that he can not live in the ICU nor is he capable of existing outside of the ICU.  His brothers her here with him.  He has had his friends come in to tell them good bye.    4/3 the patient remains on Levophed and Dobutrex.  He has diffuse rhonchi as he is been drinking coffee.  He is had last rites.  He is awaiting a few more friends to come in.  We discussed trying to go home on his drips and then remove the drips at home verses removing the drips here so that we can make sure he is comfortable.  He and his brother would like to stay here.    REVIEW OF SYSTEMS  GENERAL: Feeling ok  HEART: No chest pain or palpitations.  LUNGS:  He is coughing up clear mucus  GI: No Nausea, vomiting, constipation, diarrhea, or reflux.  : No dysuria, hesitancy, or nocturia.  SKIN: No lesions or rashes.  MUSCULOSKELETAL:  He is weak  NEURO: No headaches or neuropathy.  LYMPH: No edema or adenopathy.  PSYCH: No anxiety or depression.  ENDO:  He is losing weight steadily    ALLERGIES  Review of patient's allergies indicates:   Allergen Reactions    Penicillin      Other reaction(s): anaphylaxis  Other reaction(s): anaphylaxis    Penicillins Hives       INPATIENT SCHEDULED MEDICATIONS   sodium chloride 0.9%   Intravenous Once    albuterol-ipratropium  3 mL Nebulization TID WAKE    apixaban  2.5 mg Oral BID    atorvastatin  20  mg Oral Daily    chlorhexidine  15 mL Mouth/Throat BID    latanoprost  1 drop Both Eyes QHS    levothyroxine  100 mcg Oral Before breakfast    megestroL  200 mg Oral Daily    midodrine  5 mg Oral BID AC    mupirocin   Nasal BID    pantoprazole  40 mg Oral QAM    polyethylene glycol  17 g Oral BID    sodium chloride 0.9%  10 mL Intravenous Q12H      DOBUTamine IV infusion (titrating) 5 mcg/kg/min (04/01/23 1917)    NORepinephrine bitartrate-D5W 0.5 mcg/kg/min (04/02/23 1900)       MEDICAL AND SURGICAL HISTORY  Past Medical History:   Diagnosis Date    A-fib     Acute kidney injury     COPD (chronic obstructive pulmonary disease)     Coronary artery disease     Diabetes mellitus     Encounter for blood transfusion     Former smoker     Hypertension     Myocardial infarction     Thyroid disease     Type 2 diabetes mellitus without complications      Past Surgical History:   Procedure Laterality Date    CLOSURE OF LEFT ATRIAL APPENDAGE USING DEVICE N/A 5/17/2022    Procedure: Left atrial appendage closure device;  Surgeon: Tre Schmidt MD;  Location: [facility] CATH LAB;  Service: Cardiology;  Laterality: N/A;    COLONOSCOPY N/A 1/3/2017    Procedure: COLONOSCOPY;  Surgeon: Marcus Green MD;  Location: [facility] ENDO;  Service: Endoscopy;  Laterality: N/A;    CORONARY BYPASS GRAFT ANGIOGRAPHY  3/30/2021    Procedure: Bypass graft study;  Surgeon: Tre Schmidt MD;  Location: [facility] CATH LAB;  Service: Cardiology;;    CORONARY STENT PLACEMENT      GASTROSTOMY TUBE PLACEMENT      INSERTION OF PACEMAKER  04/2017    INTRAVASCULAR ULTRASOUND, NON-CORONARY  5/17/2022    Procedure: Intravascular Ultrasound, Non-Coronary;  Surgeon: Tre Schmidt MD;  Location: [facility] CATH LAB;  Service: Cardiology;;    LEFT HEART CATHETERIZATION N/A 3/30/2021    Procedure: Left heart cath;  Surgeon: Tre Schmidt MD;  Location: [facility] CATH LAB;  Service: Cardiology;  Laterality: N/A;    PEG TUBE REMOVAL      TRACHEOSTOMY CLOSURE       TRACHEOSTOMY TUBE PLACEMENT      TRANSESOPHAGEAL ECHOCARDIOGRAPHY N/A 2022    Procedure: ECHOCARDIOGRAM, TRANSESOPHAGEAL;  Surgeon: Yudi Diagnostic Provider;  Location: [facility] CATH LAB;  Service: Anesthesiology;  Laterality: N/A;    TREATMENT OF CARDIAC ARRHYTHMIA N/A 2022    Procedure: Cardioversion or Defibrillation;  Surgeon: Kuldeep Daniels MD;  Location: [facility] CATH LAB;  Service: Cardiology;  Laterality: N/A;    UPPER GASTROINTESTINAL ENDOSCOPY  2016    Dr. Zarco with PEG tube placement       ALCOHOL, TOBACCO AND DRUG USE  Social History     Tobacco Use   Smoking Status Former    Types: Cigarettes    Quit date: 2005    Years since quittin.3   Smokeless Tobacco Never   Tobacco Comments    quit      Social History     Substance and Sexual Activity   Alcohol Use No     Social History     Substance and Sexual Activity   Drug Use No       FAMILY HISTORY  Family History   Problem Relation Age of Onset    Diabetes Mother     Heart disease Mother     Glaucoma Father     Macular degeneration Father     No Known Problems Sister     Diabetes Brother     Glaucoma Brother     Macular degeneration Brother     No Known Problems Maternal Aunt     No Known Problems Maternal Uncle     No Known Problems Paternal Aunt     No Known Problems Paternal Uncle     No Known Problems Maternal Grandmother     No Known Problems Maternal Grandfather     No Known Problems Paternal Grandmother     No Known Problems Paternal Grandfather     Colon cancer Neg Hx     Colon polyps Neg Hx     Crohn's disease Neg Hx     Ulcerative colitis Neg Hx     Anemia Neg Hx     Arrhythmia Neg Hx     Asthma Neg Hx     Clotting disorder Neg Hx     Fainting Neg Hx     Heart attack Neg Hx     Heart failure Neg Hx     Hyperlipidemia Neg Hx     Hypertension Neg Hx     Stroke Neg Hx     Atrial Septal Defect Neg Hx        VITAL SIGNS (MOST RECENT)  Temp: 98 °F (36.7 °C) (23 0701)  Pulse: 65 (23 0701)  Resp: (!) 26 (23  0701)  BP: 127/68 (04/03/23 0701)  SpO2: 99 % (04/03/23 0701)    INTAKE AND OUTPUT (LAST 24 HOURS):  Intake/Output Summary (Last 24 hours) at 4/3/2023 0747  Last data filed at 4/3/2023 0742  Gross per 24 hour   Intake 1435.09 ml   Output 1100 ml   Net 335.09 ml       WEIGHT  Wt Readings from Last 1 Encounters:   04/03/23 46.5 kg (102 lb 8.2 oz)       PHYSICAL EXAM  GENERAL: Older patient in no distress.  HEENT: Pupils equal and reactive. Extraocular movements intact. Nose intact. Pharynx moist.  NECK: Supple.   HEART: Regular rate and rhythm. No murmur or gallop auscultated.  LUNGS:  Diffuse rhonchi. Lung excursion symmetrical. No change in fremitus. No adventitial noises.  ABDOMEN: Bowel sounds present. Non-tender, no masses palpated.  : Normal anatomy.  De Paz with yellow urine.    EXTREMITIES: Normal muscle tone and joint movement, no cyanosis or clubbing.   LYMPHATICS: No adenopathy palpated, no edema.  SKIN: Dry, intact, no lesions.   NEURO: Cranial nerves II-XII intact. Motor strength 5/5 bilaterally, upper and lower extremities.  PSYCH: Appropriate affect        CBC LAST (LAST 24 HOURS)  Recent Labs   Lab 04/03/23  0324   WBC 7.20   RBC 2.73*   HGB 9.3*   HCT 27.4*   *   MCH 34.1*   MCHC 33.9   RDW 15.5*   *   MPV 8.7*   GRAN 81.2*  5.9   LYMPH 6.9*  0.5*   MONO 4.9  0.4   BASO 0.02   NRBC 0         CHEMISTRY LAST (LAST 24 HOURS)  Recent Labs   Lab 04/03/23  0324   *   K 4.2      CO2 20*   ANIONGAP 7*   BUN 22   CREATININE 1.1   *   CALCIUM 8.0*   ALBUMIN 2.4*         CARDIAC PROFILE (LAST 24 HOURS)  Recent Labs   Lab 03/29/23  1136   BNP 96   TROPONINIHS 87.2*       LAST 7 DAYS MICROBIOLOGY   Microbiology Results (last 7 days)       Procedure Component Value Units Date/Time    Blood culture #1 **CANNOT BE ORDERED STAT** [[id number]] Collected: 03/29/23 1310    Order Status: Completed Specimen: Blood from Peripheral, Forearm, Left Updated: 04/02/23 1432     Blood Culture,  Routine No Growth to date      No Growth to date      No Growth to date      No Growth to date      No Growth to date    Blood culture #2 **CANNOT BE ORDERED STAT** [[id number]] Collected: 03/29/23 1305    Order Status: Completed Specimen: Blood from Peripheral, Antecubital, Left Updated: 04/02/23 1432     Blood Culture, Routine No Growth to date      No Growth to date      No Growth to date      No Growth to date      No Growth to date    Culture, Respiratory with Gram Stain [[id number]] Collected: 03/31/23 0824    Order Status: Completed Specimen: Respiratory from Sputum Updated: 04/02/23 1254     Respiratory Culture Normal respiratory malik     Gram Stain (Respiratory) <10 epithelial cells per low power field.     Gram Stain (Respiratory) Many WBC's     Gram Stain (Respiratory) Moderate Gram positive cocci     Gram Stain (Respiratory) Moderate Gram negative rods     Gram Stain (Respiratory) Few Gram positive rods    MRSA Screen by PCR [[id number]] Collected: 03/30/23 0311    Order Status: Completed Specimen: Nasopharyngeal Swab from Nasal Updated: 03/30/23 0540     MRSA SCREEN BY PCR Negative            MOST RECENT IMAGING  Fl Modified Barium Swallow Speech  Modified barium swallow    Fluoroscopy time was 3.5 minutes    Multiple video fluoroscopic images were obtained.    Clinical history is dysphagia    Fluoroscopic assistance was given to the speech therapist's is patient ingested various consistencies of barium. There is moderate to severe or retropharyngeal dysphagia. There is pharyngeal delay and stasis with laryngeal aspiration and penetration with thin barium, nectar thick barium and pudding. There is laryngeal aspiration secondary to pharyngeal residue. There is moderate aspiration with thin barium with chin tuck.    There are degenerative changes of the spine. There is bony fusion at C4-5.    IMPRESSION: Multiple episodes of laryngeal penetration and aspiration    Pharyngeal stasis.    Electronically signed  by:  Brittnee Velez MD  4/1/2023 3:15 PM CDT Workstation: [id number]      CURRENT VISIT EKG  Results for orders placed or performed during the hospital encounter of 03/29/23   EKG 12-lead    Narrative    Test Reason : R09.02,    Vent. Rate : 069 BPM     Atrial Rate : 069 BPM     P-R Int : 222 ms          QRS Dur : 116 ms      QT Int : 442 ms       P-R-T Axes : 060 051 084 degrees     QTc Int : 473 ms    Sinus rhythm with 1st degree A-V block  Anterior infarct (cited on or before 06-FEB-2023)  Abnormal ECG  When compared with ECG of 29-MAR-2023 11:14,  T wave inversion less evident in Anterior-lateral leads  Confirmed by Kuldeep Daniels MD ([id number]) on 3/30/2023 5:44:57 PM    Referred By: [id number]   SELF           Confirmed By:Kuldeep Daniels MD       ECHOCARDIOGRAM RESULTS  Results for orders placed during the hospital encounter of 03/29/23    Echo    Interpretation Summary  · The left ventricle is mildly enlarged with eccentric hypertrophy and severely decreased systolic function.  · The estimated ejection fraction is 15%.  · Grade I left ventricular diastolic dysfunction.  · There are segmental left ventricular wall motion abnormalities.  · There is abnormal septal wall motion consistent with right ventricular pacemaker.  · Normal right ventricular size with low normal right ventricular systolic function.  · Mild left atrial enlargement.  · Mild-to-moderate aortic regurgitation.  · Normal central venous pressure (3 mmHg).  · The estimated PA systolic pressure is 29 mmHg.  · Evidence of anterior apical infarction old is present        Oxygen INFORMATION   Room air         Dobutamine 5, Levophed 0.4    IMPRESSION AND PLAN  Cardiogenic shock  Anemia, progressive  Hyponatremia  Acute renal failure, improving   Hyperglycemia, mild   Moderate hypoalbuminemia  Transaminitis, improving  Vitamin-D deficiency  Elevated TSH secondary to amiodarone  DNR status noted  Discussed with patient and his brother and  nursing    The patient has had last rites.  He is awaiting a few friends to come and visit today and then we will move to comfort care and consult hospice.        Miesha Umaña MD  Date of Service: 04/03/2023  9:46 AM

## 2023-04-03 NOTE — PT/OT/SLP PROGRESS
Occupational Therapy      Patient Name:  Cleveland Capps   MRN:  [id number]    Patient not seen today secondary to Other (Comment) (Pt transitioning to hospice.Orders d/c'd by MD).    4/3/2023

## 2023-04-03 NOTE — CARE UPDATE
04/02/23 1944   Patient Assessment/Suction   Level of Consciousness (AVPU) alert   Respiratory Effort Normal;Unlabored   Expansion/Accessory Muscles/Retractions no use of accessory muscles   All Lung Fields Breath Sounds equal bilaterally;diminished;coarse   Rhythm/Pattern, Respiratory unlabored   Cough Frequency frequent   Cough Type good;loose;nonproductive   PRE-TX-O2   Device (Oxygen Therapy) room air   SpO2 96 %   Pulse Oximetry Type Continuous   $ Pulse Oximetry - Multiple Charge Pulse Oximetry - Multiple   Pulse 73   Resp 18   Aerosol Therapy   $ Aerosol Therapy Charges Aerosol Treatment   Daily Review of Necessity (SVN) completed   Respiratory Treatment Status (SVN) given   Treatment Route (SVN) mask   Patient Position (SVN) HOB elevated   Post Treatment Assessment (SVN) breath sounds unchanged   Signs of Intolerance (SVN) none   Breath Sounds Post-Respiratory Treatment   Post-treatment Heart Rate (beats/min) 60   Post-treatment Resp Rate (breaths/min) 18   Education   $ Education Bronchodilator;15 min   Respiratory Evaluation   $ Care Plan Tech Time 15 min   $ Eval/Re-eval Charges Re-evaluation   Evaluation For Re-Eval 3 day

## 2023-04-03 NOTE — PLAN OF CARE
Met with patient brothleoncio Cruz at bedside to discuss hospice options.  Family would like to pursue inpatient hospice at this time with the understanding that a backup plan is needed should patient survive inpatient hospice timeframe of approximately five days.  Agreement and understanding verbalized.  Referral sent to [facility] via [employer] to [phone] and liaison Nirav notified. Family requests meeting with agency after oldest brother arrives at bedside later today and this request was relayed to Nirav.        04/03/23 1101   Discharge Reassessment   Assessment Type Discharge Planning Reassessment   Did the patient's condition or plan change since previous assessment? Yes   Discharge Plan discussed with: Caregiver   Name(s) and Number(s) Anthony Capps (Brother)   [phone]   Communicated MACKENZIE with patient/caregiver Yes   Discharge Plan A Inpatient Hospice   Discharge Plan B Hospice/home   DME Needed Upon Discharge  none   Discharge Barriers Identified None   Why the patient remains in the hospital Requires continued medical care   Post-Acute Status   Post-Acute Authorization Hospice   Home Health Status Discharge Plan Changed   Hospice Status Referrals Sent   Discharge Delays None known at this time

## 2023-04-03 NOTE — PROGRESS NOTES
Patient is transitioning to comfort care. Will sign off. Please call with questions.    Adelaida Carrasco MD MPH  [facility] [address] [address]  [phone] (p)  [phone] (f)

## 2023-04-03 NOTE — PLAN OF CARE
Patient to admit to inpatient hospice service under [facility].  Liaison Merly meeting with patient and family at bedside to complete paperwork.  Once paperwork completed, patient may discharge and readmit to inpatient hospice.     Discharge orders and chart reviewed with no further post-acute discharge needs identified at this time.  At this time, patient is cleared for discharge from Case Management standpoint.        04/03/23 1439   Final Note   Assessment Type Final Discharge Note   Anticipated Discharge Disposition [facility]   Post-Acute Status   Home Health Status Discharge Plan Changed   Hospice Status Set-up Complete/Auth obtained   Discharge Delays None known at this time

## 2023-04-03 NOTE — DISCHARGE SUMMARY
[facility]  Discharge Summary  Patient Name: Cleveland Capps MRN: [id number]   Patient Class: IP- Inpatient  Length of Stay: 4   Admission Date: 3/29/2023 10:55 AM Attending Physician: Tim Rosales MD   Primary Care Provider: Romero Snyder MD Face-to-Face encounter date: 04/03/2023   Chief Complaint: Weakness (Pt here for low blood pressure and weakness, pt has not been eating well. )    Date of Discharge: 4/3/2023  Discharge Disposition:  Discharge to hospice  Condition: Stable       Reason for Hospitalization     Active Hospital Problems    Diagnosis    *Cardiogenic shock    Goals of care, counseling/discussion    Hypoxia    Dehydration    Hypotension    Presence of Watchman left atrial appendage closure device    Status post coronary artery bypass graft    AICD (automatic cardioverter/defibrillator) present    Mitral regurgitation    MEIR (acute kidney injury)     Atn; requiring dialysis             Brief History of Present Illness    Cleveland Capps is a 67 y.o.  male who  has a past medical history of A-fib, Acute kidney injury, COPD (chronic obstructive pulmonary disease), Coronary artery disease, Diabetes mellitus, Encounter for blood transfusion, Former smoker, Hypertension, Myocardial infarction, Thyroid disease, and Type 2 diabetes mellitus without complications.. The [facility] with a primary complaint of Weakness (Pt here for low blood pressure and weakness, pt has not been eating well. )  .     For the full HPI please refer to the History & Physical from this admission.    Hospital Course By Problem with Pertinent Findings     Admitted for cardiogenic shock secondary to End stage heart disease and MEIR on CKD. Evaluated by Nephrology, cardiology, pulmonary medicine. He was monitored in ICU and required pressor support including dobutamine and levophed infusions. He was not able to tolerate any pressor weaning. ST evaluated and  "recommended only pleasure feeding due to ongoing aspiration. Prognosis discussed with the patient and all family members and they elected to go with inpatient hospice that has been arranged.     Patient was seen and examined on the date of discharge and determined to be suitable for discharge to hospice.     Physical Exam  /61   Pulse 72   Temp 98 °F (36.7 °C)   Resp (!) 26   Ht 5' 1" (1.549 m)   Wt 46.5 kg (102 lb 8.2 oz)   SpO2 99%   BMI 19.37 kg/m²   Vitals reviewed.    Constitutional: critically sick  HENT: Atraumatic.   Cardiovascular: Normal rate, regular rhythm and normal heart sounds.   Pulmonary/Chest: coarse breath sounds  Abdominal: Soft. Bowel sounds are normal. Exhibits no distension and no mass. No tenderness  Neurological: Alert.   Skin: Skin is warm and dry.     Following labs were Reviewed   Recent Labs   Lab 04/03/23  0324   WBC 7.20   HGB 9.3*   HCT 27.4*   *   CALCIUM 8.0*   ALBUMIN 2.4*   *   K 4.2   CO2 20*      BUN 22   CREATININE 1.1     No results found for: POCTGLUCOSE     All labs within the past 24 hours have been reviewed    Microbiology Results (last 7 days)       Procedure Component Value Units Date/Time    Blood culture #1 **CANNOT BE ORDERED STAT** [[id number]] Collected: 03/29/23 1310    Order Status: Completed Specimen: Blood from Peripheral, Forearm, Left Updated: 04/03/23 1432     Blood Culture, Routine No growth after 5 days.    Blood culture #2 **CANNOT BE ORDERED STAT** [[id number]] Collected: 03/29/23 1305    Order Status: Completed Specimen: Blood from Peripheral, Antecubital, Left Updated: 04/03/23 1432     Blood Culture, Routine No growth after 5 days.    Culture, Respiratory with Gram Stain [[id number]] Collected: 03/31/23 0824    Order Status: Completed Specimen: Respiratory from Sputum Updated: 04/02/23 1254     Respiratory Culture Normal respiratory malik     Gram Stain (Respiratory) <10 epithelial cells per low power field.     Gram Stain " (Respiratory) Many WBC's     Gram Stain (Respiratory) Moderate Gram positive cocci     Gram Stain (Respiratory) Moderate Gram negative rods     Gram Stain (Respiratory) Few Gram positive rods    MRSA Screen by PCR [[id number]] Collected: 03/30/23 0311    Order Status: Completed Specimen: Nasopharyngeal Swab from Nasal Updated: 03/30/23 0540     MRSA SCREEN BY PCR Negative          Fl Modified Barium Swallow Speech   Final Result      X-Ray Chest AP Portable   Final Result      CT Head Without Contrast   Final Result      CT Abdomen Pelvis  Without Contrast   Final Result      X-Ray Chest AP Portable   Final Result          No results found for this or any previous visit.      Consultants and Procedures   Consultants:  Consults (From admission, onward)          Status Ordering Provider     Inpatient consult to Social Work/Case Management  Once        Provider:  (Not yet assigned)    Acknowledged TERESO MCCRACKEN     Inpatient consult to   Once        Provider:  (Not yet assigned)    Acknowledged BRITTA UMAÑA     Inpatient consult to Pulmonology  Once        Provider:  Britta Umaña MD    Completed TERESO MCCRACKEN     Inpatient consult to Palliative Care  Once        Provider:  Gerry Keita MD    Completed BRITTA UMAÑA     Inpatient consult to Nephrology  Once        Provider:  Adelaida Carrasco MD    Completed SARITA BLANCO     Inpatient consult to Cardiology  Once        Provider:  Juliette Ross MD    Completed SARITA BLANCO     Inpatient consult to Midline team  Once        Provider:  (Not yet assigned)    Acknowledged FIONA MULLINS     Inpatient consult to Internal Medicine  Once        Provider:  (Not yet assigned)    Acknowledged FIONA MULLINS            Procedures:   * No surgery found *     I spent 40 minutes preparing the discharge including reviewing records from previous encounters, preparation of discharge summary, assessing and final examination of  the patient, discharge medicine reconciliation, discussing plan of care, follow up and education and prescriptions.       Tim Rosales  [facility] Hospitalist  04/03/2023

## 2023-04-03 NOTE — PT/OT/SLP DISCHARGE
Occupational Therapy Discharge Summary    Cleveland Capps  MRN: [id number]   Principal Problem: Hypotension      Patient Discharged from acute Occupational Therapy on 4/3/23.  Please refer to prior OT note dated 4/1/23 for functional status.    Assessment:       Pt transitioning to hospice care    Objective:     GOALS:   Multidisciplinary Problems       Occupational Therapy Goals          Problem: Occupational Therapy    Goal Priority Disciplines Outcome Interventions   Occupational Therapy Goal     OT, PT/OT     Description: Goals to be met by: 5/1/23     Patient will increase functional independence with ADLs by performing:    UE Dressing with Modified [place].  LE Dressing with Modified [place].  Grooming while standing at sink with Modified [place].  Toileting from toilet with Modified [place] for hygiene and clothing management.   Toilet transfer to toilet with Modified [place].                         Reasons for Discontinuation of Therapy Services  Patient is unable to continue work toward goals because of medical or psychosocial complications.      Plan:     Patient Discharged to: Palliative Care/Hospice    4/3/2023

## 2023-04-03 NOTE — PT/OT/SLP DISCHARGE
Physical Therapy Discharge Summary    Name: Cleveland Capps  MRN: [id number]   Principal Problem: Hypotension     Patient Discharged from acute Physical Therapy on .  Please refer to prior PT noted date on 2023 for functional status.     Assessment:     Patient appropriate for care in another setting.    Objective:     GOALS:   Multidisciplinary Problems       Physical Therapy Goals          Problem: Physical Therapy    Goal Priority Disciplines Outcome Goal Variances Interventions   Physical Therapy Goal     PT, PT/OT Ongoing, Progressing     Description: Goals to be met by: 23     Patient will increase functional independence with mobility by performin. Supine to sit with Supervision  2. Sit to stand transfer with Supervision  3. Bed to chair transfer with Supervision using Rolling Walker  4. Gait  x 150 feet with Supervision using Rolling Walker.                              Reasons for Discontinuation of Therapy Services  Patient is unable to continue work toward goals because of medical or psychosocial complications.      Plan:     Patient Discharged to: Palliative Care/Hospice.      4/3/2023

## 2023-04-03 NOTE — PLAN OF CARE
Pt transitioning to comfort care/hospice  Pt brother at bedside   Pt remains on levophed and dobutamine while visiting with friends

## 2023-04-04 PROCEDURE — 99900035 HC TECH TIME PER 15 MIN (STAT)

## 2023-04-04 PROCEDURE — 12000002 HC ACUTE/MED SURGE SEMI-PRIVATE ROOM

## 2023-04-04 PROCEDURE — 94761 N-INVAS EAR/PLS OXIMETRY MLT: CPT

## 2023-04-04 PROCEDURE — 27000221 HC OXYGEN, UP TO 24 HOURS

## 2023-04-04 PROCEDURE — 99900031 HC PATIENT EDUCATION (STAT)

## 2023-04-04 NOTE — CARE UPDATE
04/04/23 0814   Patient Assessment/Suction   Level of Consciousness (AVPU) responds to voice   Respiratory Effort Normal;Unlabored   Expansion/Accessory Muscles/Retractions no use of accessory muscles;no retractions;expansion symmetric   All Lung Fields Breath Sounds clear   Rhythm/Pattern, Respiratory unlabored;pattern regular;depth regular;chest wiggle adequate;no shortness of breath reported   Cough Frequency infrequent   Cough Type good   PRE-TX-O2   Device (Oxygen Therapy) room air   $ Is the patient on Low Flow Oxygen? Yes   SpO2 (!) 94 %   Pulse Oximetry Type Intermittent   $ Pulse Oximetry - Multiple Charge Pulse Oximetry - Multiple   Pulse 66   Resp 18   Aerosol Therapy   $ Aerosol Therapy Charges PRN treatment not required   Education   $ Education Bronchodilator;15 min   Respiratory Evaluation   $ Care Plan Tech Time 15 min

## 2023-04-04 NOTE — SUBJECTIVE & OBJECTIVE
Interval History: comfort care    Review of Systems   Constitutional:  Positive for activity change, diaphoresis and fatigue.   HENT: Negative.     Eyes: Negative.    Respiratory:  Positive for chest tightness and shortness of breath.    Cardiovascular: Negative.  Negative for chest pain.   Gastrointestinal:  Positive for abdominal distention. Negative for abdominal pain.   Endocrine: Positive for heat intolerance.   Genitourinary:  Positive for decreased urine volume.   Musculoskeletal:  Positive for arthralgias, gait problem and myalgias.   Skin: Negative.    Allergic/Immunologic: Positive for immunocompromised state.   Neurological:  Positive for weakness.   Hematological:  Bruises/bleeds easily.   Psychiatric/Behavioral:  The patient is nervous/anxious.    Objective:     Vital Signs (Most Recent):  Pulse: 66 (04/03/23 1901)  Resp: 17 (04/03/23 1901)  BP: (!) 78/48 (04/03/23 1901)   Vital Signs (24h Range):  Temp:  [98 °F (36.7 °C)-98.1 °F (36.7 °C)] 98 °F (36.7 °C)  Pulse:  [59-74] 66  Resp:  [14-27] 17  SpO2:  [89 %-99 %] 99 %  BP: ()/(42-72) 78/48        There is no height or weight on file to calculate BMI.  No intake or output data in the 24 hours ending 04/03/23 1939   Physical Exam  Vitals and nursing note reviewed.   Constitutional:       General: He is not in acute distress.  HENT:      Head: Normocephalic and atraumatic.      Nose: Nose normal.      Mouth/Throat:      Mouth: Mucous membranes are moist.   Eyes:      Extraocular Movements: Extraocular movements intact.      Pupils: Pupils are equal, round, and reactive to light.   Cardiovascular:      Rate and Rhythm: Normal rate. Rhythm irregular.      Heart sounds: Murmur heard.     Gallop present.   Pulmonary:      Breath sounds: Rales present.   Abdominal:      General: There is distension.      Tenderness: There is no abdominal tenderness.   Musculoskeletal:         General: Normal range of motion.      Cervical back: Normal range of motion and  neck supple.   Skin:     General: Skin is warm.   Neurological:      Mental Status: He is alert and oriented to person, place, and time.   Psychiatric:      Comments: anxious       Significant Labs: All pertinent labs within the past 24 hours have been reviewed.  Recent Lab Results         04/03/23  0324        Albumin 2.4       Anion Gap 7       Baso # 0.02       Basophil % 0.3       BUN 22       Calcium 8.0       Chloride 106       CO2 20       Creatinine 1.1       Differential Method Automated       eGFR >60.0       Eos # 0.4       Eosinophil % 5.7       Glucose 125       Gran # (ANC) 5.9       Gran % 81.2       Hematocrit 27.4       Hemoglobin 9.3       Immature Grans (Abs) 0.07  Comment: Mild elevation in immature granulocytes is non specific and   can be seen in a variety of conditions including stress response,   acute inflammation, trauma and pregnancy. Correlation with other   laboratory and clinical findings is essential.         Immature Granulocytes 1.0       Lymph # 0.5       Lymph % 6.9       MCH 34.1       MCHC 33.9              Mono # 0.4       Mono % 4.9       MPV 8.7       nRBC 0       Phosphorus 2.2       Platelets 117       Potassium 4.2       RBC 2.73       RDW 15.5       Sodium 133       WBC 7.20               Significant Imaging: I have reviewed all pertinent imaging results/findings within the past 24 hours.

## 2023-04-04 NOTE — ASSESSMENT & PLAN NOTE
Discussed the goals of care with the patient and family  All agreed on comfort care  O 2 ativan and fentanyl for comfort  Discontinue fluids and pressors

## 2023-04-04 NOTE — H&P
[facility]  History & Physical    Patient Name: Cleveland Capps  MRN: [id number]  Patient Class: IP- Hospice  Admission Date: 4/3/2023  Attending Physician: Jaguar Gonzalez MD  Primary Care Provider: Romero Snyder MD         Patient information was obtained from patient, parent, relative(s), past medical records and ER records.     Subjective:     Principal Problem:End of life care    Chief Complaint: No chief complaint on file.       HPI: Admission  Cleveland Capps is a 67 y.o.  male who  has a past medical history of A-fib, Acute kidney injury, COPD (chronic obstructive pulmonary disease), Coronary artery disease, Diabetes mellitus, Encounter for blood transfusion, Former smoker, Hypertension, Myocardial infarction, Thyroid disease, and Type 2 diabetes mellitus without complications.. The patient presented to [facility] on 3/29/2023 with a primary complaint of Weakness (Pt here for low blood pressure and weakness, pt has not been eating well. )    4/3/2023    4/3/2023  Pt accepts that he will not survive and desires hospice care. All of his friend and family have visited and said goodbye.   Pressor drips have been discontinued. His MAP is < 65 and he is resting comfortably      .       Interval History: comfort care    Review of Systems   Constitutional:  Positive for activity change, diaphoresis and fatigue.   HENT: Negative.     Eyes: Negative.    Respiratory:  Positive for chest tightness and shortness of breath.    Cardiovascular: Negative.  Negative for chest pain.   Gastrointestinal:  Positive for abdominal distention. Negative for abdominal pain.   Endocrine: Positive for heat intolerance.   Genitourinary:  Positive for decreased urine volume.   Musculoskeletal:  Positive for arthralgias, gait problem and myalgias.   Skin: Negative.    Allergic/Immunologic: Positive for immunocompromised state.   Neurological:  Positive for weakness.   Hematological:   Bruises/bleeds easily.   Psychiatric/Behavioral:  The patient is nervous/anxious.    Objective:     Vital Signs (Most Recent):  Pulse: 66 (04/03/23 1901)  Resp: 17 (04/03/23 1901)  BP: (!) 78/48 (04/03/23 1901)   Vital Signs (24h Range):  Temp:  [98 °F (36.7 °C)-98.1 °F (36.7 °C)] 98 °F (36.7 °C)  Pulse:  [59-74] 66  Resp:  [14-27] 17  SpO2:  [89 %-99 %] 99 %  BP: ()/(42-72) 78/48        There is no height or weight on file to calculate BMI.  No intake or output data in the 24 hours ending 04/03/23 1939   Physical Exam  Vitals and nursing note reviewed.   Constitutional:       General: He is not in acute distress.  HENT:      Head: Normocephalic and atraumatic.      Nose: Nose normal.      Mouth/Throat:      Mouth: Mucous membranes are moist.   Eyes:      Extraocular Movements: Extraocular movements intact.      Pupils: Pupils are equal, round, and reactive to light.   Cardiovascular:      Rate and Rhythm: Normal rate. Rhythm irregular.      Heart sounds: Murmur heard.     Gallop present.   Pulmonary:      Breath sounds: Rales present.   Abdominal:      General: There is distension.      Tenderness: There is no abdominal tenderness.   Musculoskeletal:         General: Normal range of motion.      Cervical back: Normal range of motion and neck supple.   Skin:     General: Skin is warm.   Neurological:      Mental Status: He is alert and oriented to person, place, and time.   Psychiatric:      Comments: anxious       Significant Labs: All pertinent labs within the past 24 hours have been reviewed.  Recent Lab Results         04/03/23  0324        Albumin 2.4       Anion Gap 7       Baso # 0.02       Basophil % 0.3       BUN 22       Calcium 8.0       Chloride 106       CO2 20       Creatinine 1.1       Differential Method Automated       eGFR >60.0       Eos # 0.4       Eosinophil % 5.7       Glucose 125       Gran # (ANC) 5.9       Gran % 81.2       Hematocrit 27.4       Hemoglobin 9.3       Immature Grans  (Abs) 0.07  Comment: Mild elevation in immature granulocytes is non specific and   can be seen in a variety of conditions including stress response,   acute inflammation, trauma and pregnancy. Correlation with other   laboratory and clinical findings is essential.         Immature Granulocytes 1.0       Lymph # 0.5       Lymph % 6.9       MCH 34.1       MCHC 33.9              Mono # 0.4       Mono % 4.9       MPV 8.7       nRBC 0       Phosphorus 2.2       Platelets 117       Potassium 4.2       RBC 2.73       RDW 15.5       Sodium 133       WBC 7.20               Significant Imaging: I have reviewed all pertinent imaging results/findings within the past 24 hours.    Assessment/Plan:     * End of life care  Discussed the goals of care with the patient and family  All agreed on comfort care  O 2 ativan and fentanyl for comfort  Discontinue fluids and pressors          VTE Risk Mitigation (From admission, onward)    None                     Jaguar Gonzalez MD  Department of Hospital Medicine  [facility]

## 2023-04-04 NOTE — PLAN OF CARE
Received call from Merly with [facility] saying patient no longer meets inpatient criteria and will be discharged from GIP services.  Met with brother Anthony and family at bedside who state they have no way to provide 24/7 care for patient and patient typically lives alone.  [facility] placement with hospice services was discussed and ultimately agreed upon by patient and family.  Preference is [facility] but open to other facilities if needed.  Referrals placed in [facility] for 20 mile radius of patient's address.         04/04/23 1307   Post-Acute Status   Post-Acute Authorization Placement   Post-Acute Placement Status Referrals Sent

## 2023-04-04 NOTE — HOSPITAL COURSE
Dr Rosales  Admitted for cardiogenic shock secondary to End stage heart disease and MEIR on CKD. Evaluated by Nephrology, cardiology, pulmonary medicine. He was monitored in ICU and required pressor support including dobutamine and levophed infusions. He was not able to tolerate any pressor weaning. ST evaluated and recommended only pleasure feeding due to ongoing aspiration. Prognosis discussed with the patient and all family members and they elected to go with inpatient hospice that has been arranged.     4/3/2023  Pt accepts that he will not survive and desires hospice care. All of his friend and family have visited and said goodbye.   Pressor drips have been discontinued. His MAP is < 65 and he is resting comfortably    4/4/2023  Mr Capps is feeling better and was able to eat all of a pureed meal. He has survived without pressors but his family is unable to care for him and he will require [facility] care for hospice care.   .   4/5/2023  Pt is feeling ok. He is not short of breath and was able to eat today. He is willing to go to Hospice house [place] no complaints HEENT Neck Lungs Heart Abdomen  Skin no nomplaint  Neuro weakness in general  PE in no distress HEENT wet mucus membranes Neck supple  Lungs no crackles wheezes or rhonchi Heart S 1 S 2 + murmug and gallop Abdomen BS+ nontender Ext 1+ edema Skin no acute finding Neuro no acute sensory or motor defict

## 2023-04-04 NOTE — PLAN OF CARE
[facility]  Initial Discharge Assessment       Primary Care Provider: Romero Snyder MD    Admission Diagnosis: End stage renal disease on dialysis [N18.6, Z99.2]    Admission Date: 4/3/2023  Expected Discharge Date: Unknown    Discharge Barriers Identified: None    Patient admitted for inpatient hospice services through [facility].  Backup plan is to discharge home with hospice services, if necessary.     Payor: [facility] / Plan: [facility] / Product Type: Guarantor 3rd Party /     Extended Emergency Contact Information  Primary Emergency Contact: Anthony Capps  Address: Unknown   [place]  Mobile Phone: [phone]  Relation: Brother  Preferred language: English   needed? No    Discharge Plan A: Inpatient Hospice  Discharge Plan B: Hospice/home      [facility] - [place], [place] - [address]  [address]  [place] [address]  Phone: [phone] Fax: [phone]    [facility] - [facility] [address]  [address] [address]  Phone: [phone] Fax: [phone]      Initial Assessment (most recent)       Adult Discharge Assessment - 04/04/23 0841          Discharge Assessment    Assessment Type Discharge Planning Assessment     People in Home alone     Facility Arrived From: Home     Who are your caregiver(s) and their phone number(s)? Anthony Capps (Brother)   [phone] (Mobile)     Prior to hospitilization cognitive status: Alert/Oriented;No Deficits     Equipment Currently Used at Home wheelchair;walker, rolling     Readmission within 30 days? No     Patient currently being followed by outpatient case management? No     Do you currently have service(s) that help you manage your care at home? Yes     Is the pt/caregiver preference to resume services with current agency No     Do you have prescription coverage? Yes     Coverage [employer]     Are you on dialysis? No     Do you take  coumadin? No     Discharge Plan A Inpatient Hospice     Discharge Plan B Hospice/home     DME Needed Upon Discharge  none     Discharge Plan discussed with: Caregiver     Discharge Barriers Identified None

## 2023-04-04 NOTE — HPI
Admission  Cleveland Capps is a 67 y.o.  male who  has a past medical history of A-fib, Acute kidney injury, COPD (chronic obstructive pulmonary disease), Coronary artery disease, Diabetes mellitus, Encounter for blood transfusion, Former smoker, Hypertension, Myocardial infarction, Thyroid disease, and Type 2 diabetes mellitus without complications.. The patient presented to [facility] on 3/29/2023 with a primary complaint of Weakness (Pt here for low blood pressure and weakness, pt has not been eating well. )    He was admitted to the hospital medicine service and then discharged with inpatient hospice with [facility] and Dr Garcia initially managed. He has improved, he is still maintaining hospice goals of care but was discharged [facility] hosp[facility]. The patient is unable to return home, and will need to go to shelter placement with hospice care. This is in process. He is now readmitted to hospital medicine pending the shelter placement.

## 2023-04-05 VITALS
TEMPERATURE: 98 F | BODY MASS INDEX: 19.26 KG/M2 | SYSTOLIC BLOOD PRESSURE: 101 MMHG | HEIGHT: 61 IN | HEART RATE: 66 BPM | DIASTOLIC BLOOD PRESSURE: 50 MMHG | OXYGEN SATURATION: 95 % | RESPIRATION RATE: 18 BRPM | WEIGHT: 102 LBS

## 2023-04-05 RX ORDER — HYDROMORPHONE HYDROCHLORIDE 1 MG/ML
0.5 INJECTION, SOLUTION INTRAMUSCULAR; INTRAVENOUS; SUBCUTANEOUS
Status: DISCONTINUED | OUTPATIENT
Start: 2023-04-05 | End: 2023-04-05 | Stop reason: HOSPADM

## 2023-04-05 RX ORDER — ACETAMINOPHEN 325 MG/1
650 TABLET ORAL EVERY 6 HOURS PRN
Refills: 0
Start: 2023-04-05

## 2023-04-05 NOTE — PLAN OF CARE
Patient denied by [facility] and [facility].  Spoke with Darlene at [facility] who states patient is in review.     Referral sent to the [facility] in [place].  Received call from liaison Jazmine who states patient is in review and they are submitting to medical director to review.     Brothleoncio Cruz and family updated at bedside and verbalize agreement and understanding.        04/05/23 1036   Post-Acute Status   Post-Acute Authorization Placement   Post-Acute Placement Status Referrals Sent

## 2023-04-05 NOTE — ASSESSMENT & PLAN NOTE
Will continue hospice goals of care  Comfort measures  Morphine and ativan for comfort        4/5/2023 Pt is being transferred to Hospice [facility]

## 2023-04-05 NOTE — PLAN OF CARE
Patient discharging to [facility] at the [facility] [place]. CTI signed and completed by Dr. Gonzalez, scanned in  and original given to nurse Sherwin to send with patient.  Brother Anthony updated and verbalizes agreement to discharge plan.     Discharge orders and chart reviewed with no further post-acute discharge needs identified at this time.  At this time, patient is cleared for discharge from Case Management standpoint.        04/05/23 1626   Final Note   Assessment Type Final Discharge Note   Anticipated Discharge Disposition [facility]

## 2023-04-05 NOTE — DISCHARGE SUMMARY
[facility] Medicine  Discharge Summary      Patient Name: Cleveland Capps  MRN: [id number]  JUSTYN: [id number]  Patient Class: IP- Hospice  Admission Date: 4/3/2023  Hospital Length of Stay: 2 days  Discharge Date and Time:  04/05/2023 4:25 PM  Attending Physician: Uri Arizmendi MD   Discharging Provider: Jaguar Gonzalez MD  Primary Care Provider: Romero Snyder MD    Primary Care Team: Networked reference to record PCT     HPI:   Admission  Cleveland Capps is a 67 y.o.  male who  has a past medical history of A-fib, Acute kidney injury, COPD (chronic obstructive pulmonary disease), Coronary artery disease, Diabetes mellitus, Encounter for blood transfusion, Former smoker, Hypertension, Myocardial infarction, Thyroid disease, and Type 2 diabetes mellitus without complications.. The patient presented to [facility] on 3/29/2023 with a primary complaint of Weakness (Pt here for low blood pressure and weakness, pt has not been eating well. )    He was admitted to the hospital medicine service and then discharged with inpatient hospice with [facility] and Dr Gonzalez initially managed. He has improved, he is still maintaining hospice goals of care but was discharged from inpatient hospice by [facility]. The patient is unable to return home, and will need to go to [facility] placement with hospice care. This is in process. He is now readmitted to hospital medicine pending the [facility] placement.           * No surgery found *      Hospital Course:   Dr Rosales  Admitted for cardiogenic shock secondary to End stage heart disease and MEIR on CKD. Evaluated by Nephrology, cardiology, pulmonary medicine. He was monitored in ICU and required pressor support including dobutamine and levophed infusions. He was not able to tolerate any pressor weaning. ST evaluated and recommended only pleasure feeding due to ongoing aspiration. Prognosis discussed with the patient and  all family members and they elected to go with inpatient hospice that has been arranged.     4/3/2023  Pt accepts that he will not survive and desires hospice care. All of his friend and family have visited and said goodbye.   Pressor drips have been discontinued. His MAP is < 65 and he is resting comfortably    4/4/2023  Mr Capps is feeling better and was able to eat all of a pureed meal. He has survived without pressors but his family is unable to care for him and he will require prison care for hospice care.   .   4/5/2023  Pt is feeling ok. He is not short of breath and was able to eat today. He is willing to go to [facility] [place].  ROS no complaints HEENT Neck Lungs Heart Abdomen  Skin no nomplaint  Neuro weakness in general  PE in no distress HEENT wet mucus membranes Neck supple  Lungs no crackles wheezes or rhonchi Heart S 1 S 2 + murmug and gallop Abdomen BS+ nontender Ext 1+ edema Skin no acute finding Neuro no acute sensory or motor defict       Goals of Care Treatment Preferences:  Code Status: DNR          What is most important right now is to focus on spending time at home, improvement in condition but with limits to invasive therapies.  Accordingly, we have decided that the best plan to meet the patient's goals includes continuing with treatment.      Consults:     Other  * End of life care  Will continue hospice goals of care  Comfort measures  Morphine and ativan for comfort        4/5/2023 Pt is being transferred to [facility]        Final Active Diagnoses:    Diagnosis Date Noted POA    PRINCIPAL PROBLEM:  End of life care [Z51.5] 03/31/2023 Not Applicable      Problems Resolved During this Admission:       Discharged Condition: poor    Disposition:     Follow Up:   Follow-up Information     [facility] Follow up.                     Patient Instructions:   No discharge procedures on file.    Significant Diagnostic Studies: Labs:   BMP: No  results for input(s): GLU, NA, K, CL, CO2, BUN, CREATININE, CALCIUM, MG in the last 48 hours., CMP No results for input(s): NA, K, CL, CO2, GLU, BUN, CREATININE, CALCIUM, PROT, ALBUMIN, BILITOT, ALKPHOS, AST, ALT, ANIONGAP, ESTGFRAFRICA, EGFRNONAA in the last 48 hours., CBC No results for input(s): WBC, HGB, HCT, PLT in the last 48 hours., INR   Lab Results   Component Value Date    INR 1.1 03/29/2023    INR 1.5 (H) 02/08/2023    INR 1.1 11/27/2022   , Troponin No results for input(s): TROPONINI in the last 168 hours. and All labs within the past 24 hours have been reviewed  Microbiology:   Blood Culture   Lab Results   Component Value Date    LABBLOO No growth after 5 days. 03/29/2023   , Sputum Culture   Lab Results   Component Value Date    GSRESP <10 epithelial cells per low power field. 03/31/2023    GSRESP Many WBC's 03/31/2023    GSRESP Moderate Gram positive cocci 03/31/2023    GSRESP Moderate Gram negative rods 03/31/2023    GSRESP Few Gram positive rods 03/31/2023    RESPIRATORYC Normal respiratory malik 03/31/2023    and Urine Culture  No results found for: LABURIN    Pending Diagnostic Studies:     None         Medications:  Reconciled Home Medications:      Medication List      START taking these medications    acetaminophen 325 MG tablet  Commonly known as: TYLENOL  Take 2 tablets (650 mg total) by mouth every 6 (six) hours as needed.        CONTINUE taking these medications    albuterol-ipratropium 2.5 mg-0.5 mg/3 mL nebulizer solution  Commonly known as: DUO-NEB  Take 3 mLs by nebulization 3 (three) times daily.     docusate sodium 50 MG capsule  Commonly known as: COLACE  Take 2 capsules (100 mg total) by mouth 2 (two) times daily as needed for Constipation.     isosorbide mononitrate 30 MG 24 hr tablet  Commonly known as: IMDUR  Take 30 mg by mouth once daily.     pantoprazole 40 MG tablet  Commonly known as: PROTONIX  Take 40 mg by mouth every morning.        STOP taking these medications     amiodarone 200 MG Tab  Commonly known as: PACERONE     apixaban 5 mg Tab  Commonly known as: ELIQUIS     atorvastatin 80 MG tablet  Commonly known as: LIPITOR     carvediloL 25 MG tablet  Commonly known as: COREG     COMP-AIR NEBULIZER COMPRESSOR Alfreda  Generic drug: nebulizer and compressor     enalapril 20 MG tablet  Commonly known as: VASOTEC     latanoprost 0.005 % ophthalmic solution     levothyroxine 75 MCG tablet  Commonly known as: SYNTHROID     potassium chloride SA 10 MEQ tablet  Commonly known as: K-DUR,KLOR-CON M     sacubitriL-valsartan 24-26 mg per tablet  Commonly known as: ENTRESTO     spironolactone 25 MG tablet  Commonly known as: ALDACTONE     torsemide 20 MG Tab  Commonly known as: DEMADEX     travoprost (benzalkonium) 0.004 % ophthalmic solution  Commonly known as: TRAVATAN     TRUE METRIX GLUCOSE TEST STRIP Strp  Generic drug: blood sugar diagnostic            Indwelling Lines/Drains at time of discharge:   Lines/Drains/Airways     None                 Time spent on the discharge of patient: 22 minutes         Jaguar Gonzalez MD  Department of Hospital Medicine  [facility]

## 2023-04-05 NOTE — PLAN OF CARE
Problem: Adult Inpatient Plan of Care  Goal: Plan of Care Review  Outcome: Ongoing, Progressing  Goal: Patient-Specific Goal (Individualized)  Outcome: Ongoing, Progressing  Goal: Absence of Hospital-Acquired Illness or Injury  Outcome: Ongoing, Progressing  Goal: Optimal Comfort and Wellbeing  Outcome: Ongoing, Progressing  Goal: Readiness for Transition of Care  Outcome: Ongoing, Progressing     Problem: Fluid and Electrolyte Imbalance (Acute Kidney Injury/Impairment)  Goal: Fluid and Electrolyte Balance  Outcome: Ongoing, Progressing     Problem: Oral Intake Inadequate (Acute Kidney Injury/Impairment)  Goal: Optimal Nutrition Intake  Outcome: Ongoing, Progressing     Problem: Renal Function Impairment (Acute Kidney Injury/Impairment)  Goal: Effective Renal Function  Outcome: Ongoing, Progressing     Problem: Fall Injury Risk  Goal: Absence of Fall and Fall-Related Injury  Outcome: Ongoing, Progressing     Problem: Infection  Goal: Absence of Infection Signs and Symptoms  Outcome: Ongoing, Progressing     Problem: Impaired Wound Healing  Goal: Optimal Wound Healing  Outcome: Ongoing, Progressing     Problem: Skin Injury Risk Increased  Goal: Skin Health and Integrity  Outcome: Ongoing, Progressing

## 2023-04-05 NOTE — PROGRESS NOTES
[facility] Medicine  Progress Note    Patient Name: Cleveland Capps  MRN: [id number]  Patient Class: IP- Hospice   Admission Date: 4/3/2023  Length of Stay: 1 days  Attending Physician: Jaguar Gonzalez MD  Primary Care Provider: Romero Snyder MD        Subjective:     Principal Problem:End of life care        HPI:  Admission  Cleveland Capps is a 67 y.o.  male who  has a past medical history of A-fib, Acute kidney injury, COPD (chronic obstructive pulmonary disease), Coronary artery disease, Diabetes mellitus, Encounter for blood transfusion, Former smoker, Hypertension, Myocardial infarction, Thyroid disease, and Type 2 diabetes mellitus without complications.. The patient presented to [facility] on 3/29/2023 with a primary complaint of Weakness (Pt here for low blood pressure and weakness, pt has not been eating well. )    4/3/2023    4/3/2023  Pt accepts that he will not survive and desires hospice care. All of his friend and family have visited and said goodbye.   Pressor drips have been discontinued. His MAP is < 65 and he is resting comfortably          Overview/Hospital Course:  Dr Rosales  Admitted for cardiogenic shock secondary to End stage heart disease and MEIR on CKD. Evaluated by Nephrology, cardiology, pulmonary medicine. He was monitored in ICU and required pressor support including dobutamine and levophed infusions. He was not able to tolerate any pressor weaning. ST evaluated and recommended only pleasure feeding due to ongoing aspiration. Prognosis discussed with the patient and all family members and they elected to go with inpatient hospice that has been arranged.     4/3/2023  Pt accepts that he will not survive and desires hospice care. All of his friend and family have visited and said goodbye.   Pressor drips have been discontinued. His MAP is < 65 and he is resting comfortably    4/4/2023  Mr Capps is feeling better and was able to eat all of  a pureed meal. He has survived without pressors but his family is unable to care for him and he will require prison care for hospice care.   .       Interval History: Mr Capps no longer meets inpatient hospice criteria    Review of Systems   Constitutional:  Positive for activity change and fatigue.   HENT: Negative.     Eyes: Negative.    Respiratory:  Positive for shortness of breath.    Cardiovascular: Negative.    Gastrointestinal:  Positive for abdominal distention. Negative for abdominal pain.   Endocrine: Positive for heat intolerance.   Genitourinary: Negative.    Musculoskeletal:  Positive for arthralgias, gait problem and myalgias.   Skin: Negative.    Allergic/Immunologic: Positive for immunocompromised state.   Neurological:  Positive for weakness.   Hematological:  Bruises/bleeds easily.   Psychiatric/Behavioral:  The patient is nervous/anxious.    Objective:     Vital Signs (Most Recent):  Temp: 98.8 °F (37.1 °C) (04/04/23 1543)  Pulse: 66 (04/04/23 1543)  Resp: 20 (04/04/23 1543)  BP: 109/65 (04/04/23 1543)  SpO2: 97 % (04/04/23 1543) Vital Signs (24h Range):  Temp:  [97.4 °F (36.3 °C)-98.8 °F (37.1 °C)] 98.8 °F (37.1 °C)  Pulse:  [65-74] 66  Resp:  [17-20] 20  SpO2:  [88 %-98 %] 97 %  BP: ()/(49-65) 109/65        There is no height or weight on file to calculate BMI.  No intake or output data in the 24 hours ending 04/04/23 1918   Physical Exam  Vitals and nursing note reviewed.   Constitutional:       General: He is not in acute distress.     Appearance: He is not ill-appearing.   HENT:      Nose: Nose normal.      Mouth/Throat:      Mouth: Mucous membranes are moist.   Eyes:      Extraocular Movements: Extraocular movements intact.      Pupils: Pupils are equal, round, and reactive to light.   Cardiovascular:      Rate and Rhythm: Normal rate and regular rhythm.      Heart sounds: Murmur heard.   Pulmonary:      Breath sounds: Rales present.   Abdominal:      General: There is distension.       Tenderness: There is no abdominal tenderness. There is no guarding or rebound.   Musculoskeletal:         General: Swelling present.      Cervical back: Normal range of motion and neck supple.      Right lower leg: Edema present.      Left lower leg: Edema present.   Skin:     General: Skin is warm.   Neurological:      Mental Status: He is alert and oriented to person, place, and time.   Psychiatric:      Comments: Anxious mildly dysphoric       Significant Labs: All pertinent labs within the past 24 hours have been reviewed.  Recent Lab Results       None            Significant Imaging: I have reviewed all pertinent imaging results/findings within the past 24 hours.      Assessment/Plan:      * End of life care  Discussed the goals of care with the patient and family  All agreed on comfort care  O 2 ativan and fentanyl for comfort  Discontinue fluids and pressors    4/4/23  Mr Capps no longer meets inpatient Hospice criteria. He will be placed in group home care on hospice ASAP. This has been discussed with the patient  His family Hospice administration and the patient's family        VTE Risk Mitigation (From admission, onward)      None            Discharge Planning   MACKENZIE:      Code Status: DNR   Is the patient medically ready for discharge?:     Reason for patient still in hospital (select all that apply): Patient unstable  Discharge Plan A: Inpatient Hospice                  Jaguar Gonzalez MD  Department of Hospital Medicine   [facility]

## 2023-04-05 NOTE — SUBJECTIVE & OBJECTIVE
Interval History: Mr Capps no longer meets inpatient hospice criteria    Review of Systems   All other systems reviewed and are negative.  Objective:     Vital Signs (Most Recent):  Temp: 98.8 °F (37.1 °C) (04/04/23 1543)  Pulse: 66 (04/04/23 1543)  Resp: 20 (04/04/23 1543)  BP: 109/65 (04/04/23 1543)  SpO2: 97 % (04/04/23 1543) Vital Signs (24h Range):  Temp:  [97.4 °F (36.3 °C)-98.8 °F (37.1 °C)] 98.8 °F (37.1 °C)  Pulse:  [65-74] 66  Resp:  [17-20] 20  SpO2:  [88 %-98 %] 97 %  BP: ()/(49-65) 109/65        There is no height or weight on file to calculate BMI.  No intake or output data in the 24 hours ending 04/04/23 1918   Physical Exam  Vitals reviewed.   Constitutional:       Appearance: Normal appearance.   HENT:      Head: Normocephalic and atraumatic.      Nose: Nose normal.      Mouth/Throat:      Mouth: Mucous membranes are moist.   Eyes:      Pupils: Pupils are equal, round, and reactive to light.   Cardiovascular:      Rate and Rhythm: Normal rate and regular rhythm.      Pulses: Normal pulses.      Heart sounds: Normal heart sounds. No murmur heard.    No friction rub. No gallop.   Pulmonary:      Effort: Pulmonary effort is normal. No respiratory distress.      Breath sounds: Normal breath sounds.   Abdominal:      General: Abdomen is flat. Bowel sounds are normal. There is no distension.      Palpations: Abdomen is soft.      Tenderness: There is no abdominal tenderness.   Musculoskeletal:         General: No swelling. Normal range of motion.      Cervical back: Normal range of motion and neck supple.      Right lower leg: Edema present.      Left lower leg: Edema present.   Skin:     General: Skin is warm and dry.   Neurological:      General: No focal deficit present.      Mental Status: He is alert and oriented to person, place, and time.   Psychiatric:         Mood and Affect: Mood normal.         Behavior: Behavior normal.         Thought Content: Thought content normal.         Judgment:  Judgment normal.       Significant Labs: All pertinent labs within the past 24 hours have been reviewed.  Recent Lab Results       None            Significant Imaging: I have reviewed all pertinent imaging results/findings within the past 24 hours.

## 2023-04-05 NOTE — PLAN OF CARE
Update 1985: per Jazmine they have received all needed paperwork from [facility] except CTI, which Dr. Gonzalez is currently working on.  Will send once complete.  Current med list faxed to [facility] at [phone] via [employer].  [id number] order placed for [facility] arranged ambulance transportation to the [facility] in [place], [place]. [place] with [facility] given number [phone] to call report.     Per Jazmine with [facility] [phone], patient is accepted for inpatient hospice services at [facility] in [place], [place].  Received paperwork from Jazmine for hospice transfer, signed by patient and faxed to [facility] office at [phone].        04/05/23 1205   Post-Acute Status   Post-Acute Authorization Placement   Post-Acute Placement Status Set-up Complete/Auth obtained

## 2023-05-24 ENCOUNTER — OFFICE VISIT (OUTPATIENT)
Dept: CARDIOLOGY | Facility: CLINIC | Age: 68
End: 2023-05-24
Payer: MEDICARE

## 2023-05-24 DIAGNOSIS — Z95.818 PRESENCE OF WATCHMAN LEFT ATRIAL APPENDAGE CLOSURE DEVICE: ICD-10-CM

## 2023-05-24 DIAGNOSIS — I48.11 LONGSTANDING PERSISTENT ATRIAL FIBRILLATION: ICD-10-CM

## 2023-05-24 PROCEDURE — 3044F HG A1C LEVEL LT 7.0%: CPT | Mod: CPTII,95,, | Performed by: PHYSICIAN ASSISTANT

## 2023-05-24 PROCEDURE — 99441 PR PHYSICIAN TELEPHONE EVALUATION 5-10 MIN: ICD-10-PCS | Mod: 95,,, | Performed by: PHYSICIAN ASSISTANT

## 2023-05-24 PROCEDURE — 4010F PR ACE/ARB THEARPY RXD/TAKEN: ICD-10-PCS | Mod: CPTII,95,, | Performed by: PHYSICIAN ASSISTANT

## 2023-05-24 PROCEDURE — 4010F ACE/ARB THERAPY RXD/TAKEN: CPT | Mod: CPTII,95,, | Performed by: PHYSICIAN ASSISTANT

## 2023-05-24 PROCEDURE — 3044F PR MOST RECENT HEMOGLOBIN A1C LEVEL <7.0%: ICD-10-PCS | Mod: CPTII,95,, | Performed by: PHYSICIAN ASSISTANT

## 2023-05-24 PROCEDURE — 99441 PR PHYSICIAN TELEPHONE EVALUATION 5-10 MIN: CPT | Mod: 95,,, | Performed by: PHYSICIAN ASSISTANT

## 2023-05-24 NOTE — ASSESSMENT & PLAN NOTE
See above  
Successful STANLEY closure with Watchman. SANTHOSH with no device leak. Continue ASA.   
1

## 2023-05-24 NOTE — PROGRESS NOTES
Established Patient - Audio Only Telehealth Visit     The patient location is: home  The chief complaint leading to consultation is: follow up  Visit type: Virtual visit with audio only (telephone)  Total time spent with patient: 10 min       The reason for the audio only service rather than synchronous audio and video virtual visit was related to technical difficulties or patient preference/necessity.     Each patient to whom I provide medical services by telemedicine is:  (1) informed of the relationship between the physician and patient and the respective role of any other health care provider with respect to management of the patient; and (2) notified that they may decline to receive medical services by telemedicine and may withdraw from such care at any time. Patient verbally consented to receive this service via voice-only telephone call.       HPI: Mr. Capps is a 67 year old male who is 1 year s/p Watchman device. 45 day SANTHOSH showed no peridevice leak. He continues on ASA alone. He was recently admitted with cardiogenic shock. He is now in a nursing home working on his strength. He is overall in good spirits.      Assessment and plan:      Longstanding persistent atrial fibrillation  Successful STANLEY closure with Watchman. SANTHOSH with no device leak. Continue ASA.     Presence of Watchman left atrial appendage closure device  See above           This service was not originating from a related E/M service provided within the previous 7 days nor will  to an E/M service or procedure within the next 24 hours or my soonest available appointment.  Prevailing standard of care was able to be met in this audio-only visit.

## 2023-06-26 ENCOUNTER — EXTERNAL HOME HEALTH (OUTPATIENT)
Dept: HOME HEALTH SERVICES | Facility: HOSPITAL | Age: 68
End: 2023-06-26
Payer: MEDICARE

## 2023-07-30 ENCOUNTER — HOSPITAL ENCOUNTER (EMERGENCY)
Facility: HOSPITAL | Age: 68
Discharge: HOME | End: 2023-07-30
Attending: EMERGENCY MEDICINE
Payer: MEDICARE

## 2023-07-30 VITALS
RESPIRATION RATE: 19 BRPM | OXYGEN SATURATION: 94 % | HEART RATE: 97 BPM | WEIGHT: 122 LBS | SYSTOLIC BLOOD PRESSURE: 150 MMHG | TEMPERATURE: 98 F | DIASTOLIC BLOOD PRESSURE: 95 MMHG | BODY MASS INDEX: 23.03 KG/M2 | HEIGHT: 61 IN

## 2023-07-30 DIAGNOSIS — I50.9 CONGESTIVE HEART FAILURE, UNSPECIFIED HF CHRONICITY, UNSPECIFIED HEART FAILURE TYPE: ICD-10-CM

## 2023-07-30 DIAGNOSIS — R06.02 SOB (SHORTNESS OF BREATH): ICD-10-CM

## 2023-07-30 DIAGNOSIS — J44.1 COPD EXACERBATION: ICD-10-CM

## 2023-07-30 DIAGNOSIS — I21.4 NSTEMI (NON-ST ELEVATED MYOCARDIAL INFARCTION): Primary | ICD-10-CM

## 2023-07-30 LAB
ALBUMIN SERPL BCP-MCNC: 3.7 G/DL (ref 3.5–5.2)
ALP SERPL-CCNC: 107 U/L (ref 55–135)
ALT SERPL W/O P-5'-P-CCNC: 29 U/L (ref 10–44)
ANION GAP SERPL CALC-SCNC: 10 MMOL/L (ref 8–16)
AST SERPL-CCNC: 23 U/L (ref 10–40)
BASOPHILS # BLD AUTO: 0.06 K/UL (ref 0–0.2)
BASOPHILS NFR BLD: 0.7 % (ref 0–1.9)
BILIRUB SERPL-MCNC: 1.6 MG/DL (ref 0.1–1)
BNP SERPL-MCNC: 1597 PG/ML (ref 0–99)
BUN SERPL-MCNC: 26 MG/DL (ref 8–23)
CALCIUM SERPL-MCNC: 8.9 MG/DL (ref 8.7–10.5)
CHLORIDE SERPL-SCNC: 101 MMOL/L (ref 95–110)
CO2 SERPL-SCNC: 21 MMOL/L (ref 23–29)
CREAT SERPL-MCNC: 1.2 MG/DL (ref 0.5–1.4)
DIFFERENTIAL METHOD: ABNORMAL
EOSINOPHIL # BLD AUTO: 0.1 K/UL (ref 0–0.5)
EOSINOPHIL NFR BLD: 0.7 % (ref 0–8)
ERYTHROCYTE [DISTWIDTH] IN BLOOD BY AUTOMATED COUNT: 16.1 % (ref 11.5–14.5)
EST. GFR  (NO RACE VARIABLE): >60 ML/MIN/1.73 M^2
GLUCOSE SERPL-MCNC: 108 MG/DL (ref 70–110)
HCT VFR BLD AUTO: 33.2 % (ref 40–54)
HGB BLD-MCNC: 10 G/DL (ref 14–18)
IMM GRANULOCYTES # BLD AUTO: 0.04 K/UL (ref 0–0.04)
IMM GRANULOCYTES NFR BLD AUTO: 0.5 % (ref 0–0.5)
LACTATE SERPL-SCNC: 1.1 MMOL/L (ref 0.5–2.2)
LYMPHOCYTES # BLD AUTO: 1.2 K/UL (ref 1–4.8)
LYMPHOCYTES NFR BLD: 15 % (ref 18–48)
MAGNESIUM SERPL-MCNC: 2 MG/DL (ref 1.6–2.6)
MCH RBC QN AUTO: 26.2 PG (ref 27–31)
MCHC RBC AUTO-ENTMCNC: 30.1 G/DL (ref 32–36)
MCV RBC AUTO: 87 FL (ref 82–98)
MONOCYTES # BLD AUTO: 0.9 K/UL (ref 0.3–1)
MONOCYTES NFR BLD: 10.6 % (ref 4–15)
NEUTROPHILS # BLD AUTO: 5.8 K/UL (ref 1.8–7.7)
NEUTROPHILS NFR BLD: 72.5 % (ref 38–73)
NRBC BLD-RTO: 0 /100 WBC
PLATELET # BLD AUTO: 218 K/UL (ref 150–450)
PMV BLD AUTO: 7.9 FL (ref 9.2–12.9)
POTASSIUM SERPL-SCNC: 5.1 MMOL/L (ref 3.5–5.1)
PROT SERPL-MCNC: 7.1 G/DL (ref 6–8.4)
RBC # BLD AUTO: 3.82 M/UL (ref 4.6–6.2)
SODIUM SERPL-SCNC: 132 MMOL/L (ref 136–145)
TROPONIN I SERPL DL<=0.01 NG/ML-MCNC: 0.03 NG/ML (ref 0–0.03)
WBC # BLD AUTO: 8.01 K/UL (ref 3.9–12.7)

## 2023-07-30 PROCEDURE — 93005 ELECTROCARDIOGRAM TRACING: CPT

## 2023-07-30 PROCEDURE — 83605 ASSAY OF LACTIC ACID: CPT | Performed by: EMERGENCY MEDICINE

## 2023-07-30 PROCEDURE — 99285 EMERGENCY DEPT VISIT HI MDM: CPT | Mod: 25

## 2023-07-30 PROCEDURE — 71045 X-RAY EXAM CHEST 1 VIEW: CPT | Mod: 26,GW,, | Performed by: RADIOLOGY

## 2023-07-30 PROCEDURE — 93010 ELECTROCARDIOGRAM REPORT: CPT | Mod: GW,,, | Performed by: INTERNAL MEDICINE

## 2023-07-30 PROCEDURE — 71045 XR CHEST 1 VIEW: ICD-10-PCS | Mod: 26,GW,, | Performed by: RADIOLOGY

## 2023-07-30 PROCEDURE — 71045 X-RAY EXAM CHEST 1 VIEW: CPT | Mod: TC

## 2023-07-30 PROCEDURE — 96375 TX/PRO/DX INJ NEW DRUG ADDON: CPT

## 2023-07-30 PROCEDURE — 80053 COMPREHEN METABOLIC PANEL: CPT | Performed by: EMERGENCY MEDICINE

## 2023-07-30 PROCEDURE — 25000003 PHARM REV CODE 250: Performed by: EMERGENCY MEDICINE

## 2023-07-30 PROCEDURE — 36415 COLL VENOUS BLD VENIPUNCTURE: CPT | Performed by: EMERGENCY MEDICINE

## 2023-07-30 PROCEDURE — 83735 ASSAY OF MAGNESIUM: CPT | Performed by: EMERGENCY MEDICINE

## 2023-07-30 PROCEDURE — 83880 ASSAY OF NATRIURETIC PEPTIDE: CPT | Performed by: EMERGENCY MEDICINE

## 2023-07-30 PROCEDURE — 93010 EKG 12-LEAD: ICD-10-PCS | Mod: GW,,, | Performed by: INTERNAL MEDICINE

## 2023-07-30 PROCEDURE — 63600175 PHARM REV CODE 636 W HCPCS: Mod: UD | Performed by: EMERGENCY MEDICINE

## 2023-07-30 PROCEDURE — 84484 ASSAY OF TROPONIN QUANT: CPT | Performed by: EMERGENCY MEDICINE

## 2023-07-30 PROCEDURE — 94640 AIRWAY INHALATION TREATMENT: CPT

## 2023-07-30 PROCEDURE — 96374 THER/PROPH/DIAG INJ IV PUSH: CPT

## 2023-07-30 PROCEDURE — 25000242 PHARM REV CODE 250 ALT 637 W/ HCPCS: Performed by: EMERGENCY MEDICINE

## 2023-07-30 PROCEDURE — 85025 COMPLETE CBC W/AUTO DIFF WBC: CPT | Performed by: EMERGENCY MEDICINE

## 2023-07-30 RX ORDER — FUROSEMIDE 20 MG/1
20 TABLET ORAL DAILY
Qty: 7 TABLET | Refills: 0 | Status: SHIPPED | OUTPATIENT
Start: 2023-07-30 | End: 2023-08-06

## 2023-07-30 RX ORDER — FUROSEMIDE 10 MG/ML
20 INJECTION INTRAMUSCULAR; INTRAVENOUS
Status: COMPLETED | OUTPATIENT
Start: 2023-07-30 | End: 2023-07-30

## 2023-07-30 RX ORDER — METHYLPREDNISOLONE SOD SUCC 125 MG
80 VIAL (EA) INJECTION
Status: COMPLETED | OUTPATIENT
Start: 2023-07-30 | End: 2023-07-30

## 2023-07-30 RX ORDER — IPRATROPIUM BROMIDE AND ALBUTEROL SULFATE 2.5; .5 MG/3ML; MG/3ML
3 SOLUTION RESPIRATORY (INHALATION)
Status: COMPLETED | OUTPATIENT
Start: 2023-07-30 | End: 2023-07-30

## 2023-07-30 RX ORDER — NAPROXEN SODIUM 220 MG/1
324 TABLET, FILM COATED ORAL
Status: COMPLETED | OUTPATIENT
Start: 2023-07-30 | End: 2023-07-30

## 2023-07-30 RX ORDER — PREDNISONE 20 MG/1
40 TABLET ORAL DAILY
Qty: 10 TABLET | Refills: 0 | Status: SHIPPED | OUTPATIENT
Start: 2023-07-30 | End: 2023-08-04

## 2023-07-30 RX ADMIN — METHYLPREDNISOLONE SODIUM SUCCINATE 80 MG: 125 INJECTION, POWDER, FOR SOLUTION INTRAMUSCULAR; INTRAVENOUS at 08:07

## 2023-07-30 RX ADMIN — IPRATROPIUM BROMIDE AND ALBUTEROL SULFATE 3 ML: .5; 3 SOLUTION RESPIRATORY (INHALATION) at 08:07

## 2023-07-30 RX ADMIN — ASPIRIN 81 MG 324 MG: 81 TABLET ORAL at 09:07

## 2023-07-30 RX ADMIN — FUROSEMIDE 20 MG: 10 INJECTION, SOLUTION INTRAVENOUS at 09:07

## 2023-07-30 NOTE — CARE UPDATE
07/30/23 0816   Patient Assessment/Suction   Level of Consciousness (AVPU) alert   Respiratory Effort Unlabored;Short of breath   Expansion/Accessory Muscles/Retractions expansion symmetric;no retractions;no use of accessory muscles   All Lung Fields Breath Sounds diminished;clear   Rhythm/Pattern, Respiratory shortness of breath;unlabored;pattern regular   PRE-TX-O2   Device (Oxygen Therapy) room air   SpO2 95 %   Pulse 92   Resp 20   Aerosol Therapy   $ Aerosol Therapy Charges Aerosol Treatment  (3 ml Duoneb)   Respiratory Treatment Status (SVN) given   Treatment Route (SVN) mouthpiece;oxygen   Patient Position (SVN) HOB elevated   Post Treatment Assessment (SVN) breath sounds improved;increased aeration   Signs of Intolerance (SVN) none   Breath Sounds Post-Respiratory Treatment   Throughout All Fields Post-Treatment All Fields   Throughout All Fields Post-Treatment aeration increased   Post-treatment Heart Rate (beats/min) 87   Post-treatment Resp Rate (breaths/min) 20

## 2023-07-30 NOTE — ED PROVIDER NOTES
Encounter Date: 7/30/2023       History     Chief Complaint   Patient presents with    Cough    Weakness     Patient reports cough and weakness x 4 days .      [age] year old male presents with increased shortness of breath and productive cough for approximately 12 hours.  Patient has long COVID is in a nursing home.  Is not requiring oxygen at baseline.  He does use breathing treatments at baseline.  He denies any fevers no chest pain no nausea or vomiting no changes in his urine or bowel habits.    The history is provided by the patient and the EMS personnel. No  was used.     Review of patient's allergies indicates:   Allergen Reactions    Penicillin      Other reaction(s): anaphylaxis  Other reaction(s): anaphylaxis    Penicillins Hives     Past Medical History:   Diagnosis Date    A-fib     Acute kidney injury     COPD (chronic obstructive pulmonary disease)     Coronary artery disease     Diabetes mellitus     Encounter for blood transfusion     Former smoker     Hypertension     Myocardial infarction     Thyroid disease     Type 2 diabetes mellitus without complications      Past Surgical History:   Procedure Laterality Date    CLOSURE OF LEFT ATRIAL APPENDAGE USING DEVICE N/A 5/17/2022    Procedure: Left atrial appendage closure device;  Surgeon: Tre Schmidt MD;  Location: [facility] CATH LAB;  Service: Cardiology;  Laterality: N/A;    COLONOSCOPY N/A 1/3/2017    Procedure: COLONOSCOPY;  Surgeon: Marcus Green MD;  Location: [facility] ENDO;  Service: Endoscopy;  Laterality: N/A;    CORONARY BYPASS GRAFT ANGIOGRAPHY  3/30/2021    Procedure: Bypass graft study;  Surgeon: Tre Schmidt MD;  Location: [facility] CATH LAB;  Service: Cardiology;;    CORONARY STENT PLACEMENT      GASTROSTOMY TUBE PLACEMENT      INSERTION OF PACEMAKER  04/2017    INTRAVASCULAR ULTRASOUND, NON-CORONARY  5/17/2022    Procedure: Intravascular Ultrasound, Non-Coronary;  Surgeon: Tre Schmidt MD;  Location: [facility]  CATH LAB;  Service: Cardiology;;    LEFT HEART CATHETERIZATION N/A 3/30/2021    Procedure: Left heart cath;  Surgeon: Tre Schmidt MD;  Location: [facility] CATH LAB;  Service: Cardiology;  Laterality: N/A;    PEG TUBE REMOVAL      TRACHEOSTOMY CLOSURE      TRACHEOSTOMY TUBE PLACEMENT      TRANSESOPHAGEAL ECHOCARDIOGRAPHY N/A 2022    Procedure: ECHOCARDIOGRAM, TRANSESOPHAGEAL;  Surgeon: Yudi Diagnostic Provider;  Location: [facility] CATH LAB;  Service: Anesthesiology;  Laterality: N/A;    TREATMENT OF CARDIAC ARRHYTHMIA N/A 2022    Procedure: Cardioversion or Defibrillation;  Surgeon: Kuldeep Daniels MD;  Location: [facility] CATH LAB;  Service: Cardiology;  Laterality: N/A;    UPPER GASTROINTESTINAL ENDOSCOPY  2016    Dr. Zarco with PEG tube placement     Family History   Problem Relation Age of Onset    Diabetes Mother     Heart disease Mother     Glaucoma Father     Macular degeneration Father     No Known Problems Sister     Diabetes Brother     Glaucoma Brother     Macular degeneration Brother     No Known Problems Maternal Aunt     No Known Problems Maternal Uncle     No Known Problems Paternal Aunt     No Known Problems Paternal Uncle     No Known Problems Maternal Grandmother     No Known Problems Maternal Grandfather     No Known Problems Paternal Grandmother     No Known Problems Paternal Grandfather     Colon cancer Neg Hx     Colon polyps Neg Hx     Crohn's disease Neg Hx     Ulcerative colitis Neg Hx     Anemia Neg Hx     Arrhythmia Neg Hx     Asthma Neg Hx     Clotting disorder Neg Hx     Fainting Neg Hx     Heart attack Neg Hx     Heart failure Neg Hx     Hyperlipidemia Neg Hx     Hypertension Neg Hx     Stroke Neg Hx     Atrial Septal Defect Neg Hx      Social History     Tobacco Use    Smoking status: Former     Current packs/day: 0.00     Types: Cigarettes     Quit date: 2005     Years since quittin.6    Smokeless tobacco: Never    Tobacco comments:     quit    Substance Use  Topics    Alcohol use: No    Drug use: No     Review of Systems   Constitutional:  Negative for fever.   HENT:  Negative for sore throat.    Respiratory:  Positive for cough and shortness of breath.    Cardiovascular:  Negative for chest pain.   Gastrointestinal:  Negative for nausea.   Genitourinary:  Negative for dysuria.   Musculoskeletal:  Negative for back pain.   Skin:  Negative for rash.   Neurological:  Negative for weakness.   Hematological:  Does not bruise/bleed easily.   All other systems reviewed and are negative.      Physical Exam     Initial Vitals [07/30/23 0759]   BP Pulse Resp Temp SpO2   (!) 148/88 92 18 98.4 °F (36.9 °C) (!) 94 %      MAP       --         Physical Exam    Nursing note and vitals reviewed.  HENT:   Head: Normocephalic and atraumatic.   Eyes: EOM are normal. Pupils are equal, round, and reactive to light.   Neck:   Normal range of motion.  Cardiovascular:  Normal rate and regular rhythm.           Pulmonary/Chest: He is in respiratory distress. He has wheezes. He has rales.   Mild respiratory distress   Abdominal: Abdomen is soft.   Musculoskeletal:         General: Normal range of motion.      Cervical back: Normal range of motion.     Neurological: He is alert and oriented to person, place, and time.   Skin: Skin is warm and dry. Capillary refill takes less than 2 seconds.   Psychiatric: He has a normal mood and affect. Thought content normal.         ED Course   Procedures  Labs Reviewed   CBC W/ AUTO DIFFERENTIAL - Abnormal; Notable for the following components:       Result Value    RBC 3.82 (*)     Hemoglobin 10.0 (*)     Hematocrit 33.2 (*)     MCH 26.2 (*)     MCHC 30.1 (*)     RDW 16.1 (*)     MPV 7.9 (*)     Lymph % 15.0 (*)     All other components within normal limits   COMPREHENSIVE METABOLIC PANEL - Abnormal; Notable for the following components:    Sodium 132 (*)     CO2 21 (*)     BUN 26 (*)     Total Bilirubin 1.6 (*)     All other components within normal limits    TROPONIN I - Abnormal; Notable for the following components:    Troponin I 0.027 (*)     All other components within normal limits   B-TYPE NATRIURETIC PEPTIDE - Abnormal; Notable for the following components:    BNP 1,597 (*)     All other components within normal limits   LACTIC ACID, PLASMA   MAGNESIUM   B-TYPE NATRIURETIC PEPTIDE          Imaging Results              X-Ray Chest 1 View (Final result)  Result time 07/30/23 09:45:55      Final result by Sabine Vera MD (07/30/23 09:45:55)                   Impression:      Cardiomegaly pulmonary edema type pattern.  Small right pleural effusion.    Left basilar consolidation, possibly due to effusion, atelectasis and/or infection.      Electronically signed by: Sabine Vera  Date:    07/30/2023  Time:    09:45               Narrative:    EXAMINATION:  XR CHEST 1 VIEW    CLINICAL HISTORY:  Shortness of breath    TECHNIQUE:  Single view of the chest was obtained.    COMPARISON:  Multiple priors, most recent 03/31/2023    FINDINGS:  Left chest wall AICD in unchanged position.  Median sternotomy wires are intact and aligned.    Unchanged cardiomegaly.  Left basilar consolidation.  Small right pleural effusion.  Bilateral interstitial opacities.  No pneumothorax                                       Medications   albuterol-ipratropium 2.5 mg-0.5 mg/3 mL nebulizer solution 3 mL (3 mLs Nebulization Given 7/30/23 0816)   methylPREDNISolone sodium succinate injection 80 mg (80 mg Intravenous Given 7/30/23 0816)   aspirin chewable tablet 324 mg (324 mg Oral Given 7/30/23 0918)   furosemide injection 20 mg (20 mg Intravenous Given 7/30/23 0948)     Medical Decision Making:   Initial Assessment:   Productive cough and shortness of breath for less than 24 hours no fever  Differential Diagnosis:   Pneumonia, bronchitis, COPD exacerbation, CHF, pulmonary embolism  ED Management:  Patient had very mild respiratory distress he is able to speak in full sentences, he is not  hypoxic here satting 94-95%, he is not tachycardic he is not hypotensive.    0900:  Troponin comes back mildly elevated at 0.026.  I went into discuss this with the patient he is aware of how very bad his heart function is.  In chart review he has been diagnosed with severe dilated cardiomyopathy, end-stage cardiomyopathy atrial fibrillation he has an ejection fraction of approximately 15-20% on his echo done from March of this year.  Patient knows that there is little to be done he is currently still DNR and as far as he is aware he is still on hospice status.  He just would like some medication to help him breathe better and to go back to the nursing home.    Nursing home states that he is already on Levaquin he was started on that 2 days ago    BNP elevated to 1500 we will give patient a dose of IV Lasix here as it does not appear that he is on any diuretic at the nursing home.  Again reiterated with the patient that it looks like his heart is under stress and potentially the failure and dilated cardiomyopathy is worsening.  Patient understands and knows his heart is failing that is why he is in hospice.  Patient would just like me to try to get him breathing better so that he can go back.    Patient's brother is now at bedside I went over the patient's presentation his results and the plan that the patient had discussed earlier with him.  He is also agreeable to this plan.  I will recommend that the patient go to daily Lasix for the next week that he go to q.4 hour nebulizer treatments for the next week and that he have prednisone daily for the next week as well they are both agreeable to this plan.             ED Course as of 07/30/23 1154   Sun Jul 30, 2023   0944 BNP(!) [KS]      ED Course User Index  [KS] Aimee Guardado MD                 Clinical Impression:   Final diagnoses:  [R06.02] SOB (shortness of breath)  [I21.4] NSTEMI (non-ST elevated myocardial infarction) (Primary)  [I50.9] Congestive heart  failure, unspecified HF chronicity, unspecified heart failure type  [J44.1] COPD exacerbation        ED Disposition Condition    Discharge Stable          ED Prescriptions       Medication Sig Dispense Start Date End Date Auth. Provider    furosemide (LASIX) 20 MG tablet Take 1 tablet (20 mg total) by mouth once daily. for 7 days 7 tablet 7/30/2023 8/6/2023 Aimee Guardado MD    predniSONE (DELTASONE) 20 MG tablet Take 2 tablets (40 mg total) by mouth once daily. for 5 days 10 tablet 7/30/2023 8/4/2023 Aimee Guardado MD          Follow-up Information       Follow up With Specialties Details Why Contact Info    Romero Snyder MD Family Medicine Schedule an appointment as soon as possible for a visit   [address] [place] [address] [phone]               Aimee Guardado MD  07/30/23 7082

## 2023-07-30 NOTE — DISCHARGE INSTRUCTIONS
Please take 20 mg of Lasix by mouth daily for the next week, please take 40 mg of prednisone by mouth daily for the next week and please use scheduled breathing treatments with the DuoNeb every 4 hours for at least the next 48 hours while awake and then it can return to q.4 PRN.  Please follow-up with your primary care provider as soon as possible.  Return to the emergency department with any new or worsening symptoms.

## 2023-08-16 NOTE — PROGRESS NOTES
[facility] Hospital Medicine  Progress Note    Patient Name: Cleveland Capps  MRN: [id number]  Patient Class: IP- Inpatient   Admission Date: 11/16/2022  Length of Stay: 4 days  Attending Physician: Fernando Bryson MD  Primary Care Provider: Romero Snyder MD        Subjective:     Principal Problem:Acute on chronic combined systolic and diastolic congestive heart failure        HPI:  Pt presented to ED with shortness of breath.  Began a few days prior.  Associated with cough and congestion.   He sought evaluation from his primary care provider and was diagnosed with SARS-CoV-2 infection.  He was put on Paxlovid, of which he took a couple of days' worth.  He didn't improve.  Cough is nonproductive.  No chest pain.  No fevers.  No chills. No orthopnea.  He has CAD, COPD, and is a former smoker.  Upon presenting to the ED, he was hypotensive but responded to crystalloid infusions.  Hospital observation requested for more hydration and treatment of mild COPD exacerbation.      Overview/Hospital Course:  Cleveland Capps is a 67 year old male with a past medical history of combined CHF s/p AICD, CAD s/p PCI, Afib, HTN, HLD, DM, and hypothyroidism who presented with shortness of breath secondary to an acute COPD exacerbation in the setting of COVID-19 infection as well an acute on chronic CHF exacerbation. He is currently on one liter NC. He completed a course of prednisone; levalbuterol and ipratropium nebulizers are ordered PRN. He was also placed on IV Lasix which has since been changed to PO dosing. Remdesivir was not started given elevated LFTs for which a RUQ ultrasound was unremarkable for acute pathology. His liver enzymes improved with diuresis as they were likely elevated secondary to hepatic congestion. He was monitored for an episode of NSVT without any shock of his AICD. He was given a 250 cc NS bolus. He developed Afib with RVR 11/22 for which Cardiology has been consulted. Entresto and  "Aldactone have also been started; Entresto was held for hypotension. An amiodarone infusion has also been started and Cardiology is planning for cardioversion 11/25/2022.       Interval History: see "Hospital Course"    Review of Systems   Constitutional:  Negative for chills, fatigue and fever.   HENT:  Negative for congestion and sinus pressure.    Eyes:  Negative for pain and visual disturbance.   Respiratory:  Negative for cough, shortness of breath and wheezing.    Cardiovascular:  Negative for chest pain, palpitations and leg swelling.   Gastrointestinal:  Negative for abdominal pain, diarrhea, nausea and vomiting.   Genitourinary:  Negative for difficulty urinating, dysuria and hematuria.   Musculoskeletal:  Negative for arthralgias and myalgias.   Skin:  Negative for rash and wound.   Allergic/Immunologic: Positive for immunocompromised state.   Neurological:  Negative for dizziness, weakness, light-headedness and headaches.   Hematological:  Negative for adenopathy. Does not bruise/bleed easily.   Psychiatric/Behavioral:  Negative for confusion and dysphoric mood. The patient is not nervous/anxious.    All other systems reviewed and are negative.  Objective:     Vital Signs (Most Recent):  Temp: 97 °F (36.1 °C) (11/25/22 0736)  Pulse: 72 (11/25/22 0754)  Resp: 18 (11/25/22 0754)  BP: 120/75 (11/25/22 0754)  SpO2: (!) 94 % (11/24/22 2341)   Vital Signs (24h Range):  Temp:  [96.3 °F (35.7 °C)-97.9 °F (36.6 °C)] 97 °F (36.1 °C)  Pulse:  [] 72  Resp:  [16-18] 18  SpO2:  [93 %-97 %] 94 %  BP: ()/(63-93) 120/75     Weight: 60.6 kg (133 lb 9.6 oz)  Body mass index is 25.24 kg/m².  No intake or output data in the 24 hours ending 11/25/22 0803   Physical Exam  Vitals and nursing note reviewed.   Constitutional:       General: He is not in acute distress.     Appearance: He is not ill-appearing.   HENT:      Head: Normocephalic and atraumatic.      Right Ear: External ear normal.      Left Ear: External " [Time Spent: ___ minutes] : I have spent [unfilled] minutes of time on the encounter. ear normal.      Nose: Nose normal.      Mouth/Throat:      Mouth: Mucous membranes are moist.      Pharynx: Oropharynx is clear. No oropharyngeal exudate.   Eyes:      General: No scleral icterus.        Right eye: No discharge.         Left eye: No discharge.      Extraocular Movements: Extraocular movements intact.      Conjunctiva/sclera: Conjunctivae normal.   Neck:      Thyroid: No thyromegaly.      Vascular: No JVD.   Cardiovascular:      Rate and Rhythm: Normal rate. Rhythm irregular.      Pulses: Normal pulses.      Heart sounds: Normal heart sounds.     No gallop.   Pulmonary:      Effort: Pulmonary effort is normal. No respiratory distress.      Breath sounds: No wheezing or rales.      Comments: NC.  Abdominal:      General: Bowel sounds are normal. There is no distension.      Palpations: Abdomen is soft. There is no mass.      Tenderness: There is no abdominal tenderness.   Musculoskeletal:         General: No deformity. Normal range of motion.      Cervical back: Normal range of motion and neck supple.   Lymphadenopathy:      Cervical: No cervical adenopathy.   Skin:     General: Skin is warm and dry.      Findings: No rash.   Neurological:      General: No focal deficit present.      Mental Status: He is alert and oriented to person, place, and time. Mental status is at baseline.   Psychiatric:         Mood and Affect: Mood normal.         Behavior: Behavior normal.       Significant Labs: All pertinent labs within the past 24 hours have been reviewed.    Significant Imaging: I have reviewed all pertinent imaging results/findings within the past 24 hours.      Assessment/Plan:      * Acute on chronic combined systolic and diastolic congestive heart failure  -Telemetry  -Entresto held  -Coreg decreased to 6.25 BID  -PO Lasix held per Cardiology  -Aldactone  -Monitor I's and O's  -Keep K > 4 and Mg > 2    COPD exacerbation  In setting of COVID-19 infection.  -S/p prednisone  -Held remdesivir given LFT  elevation  -Trended inflammatory markers  -Airborne and isolation precautions  -O2 PRN  -Levalbuterol and ipratropium Q6H PRN      Macrocytic anemia  B12, folic acid and TSH within normal limits.  -Trend Hgb with CBC      Dyslipidemia  -Continue ASA and statin      Presence of Watchman left atrial appendage closure device  Noted.      Elevated LFTs  Unclear etiology. Possible hepatic congestion. Improved with diuresis.  -Trended LFTs with CMP    AICD (automatic cardioverter/defibrillator) present  Noted.  -Telemetry    Hypothyroid  -Continue home Synthroid    COVID-19  -Airborne and isolation precautions  -Decadron 6 transitioned to prednisone for COPD exacerbation; since discontinued  -Eliquis  -Held remdesivir  -Trended inflammatory markers    PAF (paroxysmal atrial fibrillation)  -Eliquis  -Coreg 6.25 BID  -Telemetry  -Cardiology consulted; scheduled for cardioversion  -Amiodarone infusion      Benign essential HTN  -Continue Coreg at 6.25 BID  -Aldactone  -Entresto held  -Continue to monitor    Coronary artery disease  -Telemetry  -Coreg 6.25 BID  -ASA and Plavix  -Statin        VTE Risk Mitigation (From admission, onward)         Ordered     apixaban tablet 5 mg  2 times daily         11/17/22 2011     IP VTE HIGH RISK PATIENT  Once         11/16/22 1935     Place sequential compression device  Until discontinued         11/16/22 1935     Reason for No Pharmacological VTE Prophylaxis  Once        Question:  Reasons:  Answer:  Already adequately anticoagulated on oral Anticoagulants    11/16/22 1935                Discharge Planning   MACKENZIE: 11/27/2022     Code Status: Full Code   Is the patient medically ready for discharge?:     Reason for patient still in hospital (select all that apply): Patient trending condition, Treatment and Consult recommendations  Discharge Plan A: Home                  Fernando Bryson MD  Department of Hospital Medicine   [facility]

## 2023-12-22 NOTE — PT/OT/SLP PROGRESS
Occupational Therapy   Treatment    Name: Cleveland Capps  MRN: [id number]  Admitting Diagnosis:  Acute on chronic combined systolic and diastolic congestive heart failure     Recommendations:     Discharge Recommendations: home health OT  Discharge Equipment Recommendations:  none  Barriers to discharge:  None    Assessment:     Cleveland Capps is a 67 y.o. male with a medical diagnosis of Acute on chronic combined systolic and diastolic congestive heart failure.  He presents with performance deficits affecting function are weakness, impaired endurance, impaired self care skills, impaired functional mobility, gait instability, impaired balance and impaired cardiopulmonary response to activity.     Rehab Prognosis:  Fair; patient would benefit from acute skilled OT services to address these deficits and reach maximum level of function.       Plan:     Patient to be seen 5 x/week to address the above listed problems via self-care/home management, therapeutic activities, therapeutic exercises  Plan of Care Expires: 03/09/23  Plan of Care Reviewed with: patient    Subjective     Pain/Comfort:  Pain Rating 1: 0/10    Objective:     Communicated with: nurse prior to session.  Patient found HOB elevated with bed alarm, blood pressure cuff, peripheral IV, PICC line, pulse ox (continuous) and telemetry upon OT entry to room.    General Precautions: Standard, fall    Orthopedic Precautions:N/A  Braces: N/A  Respiratory Status: Room air     Occupational Performance:     Bed Mobility:    Patient completed Scooting/Bridging with stand by assistance  Patient completed Supine to Sit with stand by assistance  Patient completed Sit to Supine with stand by assistance     Functional Mobility/Transfers:  Patient completed Sit <> Stand Transfer with stand by assistance with rolling walker   Patient completed Toilet Transfer Step Transfer technique with stand by assistance with rolling walker  Functional Mobility: Pt ambulated  from the bed <> bathroom with SBA and RW.     Activities of Daily Living:  Toileting: modified independence      Treatment & Education:  Educated on safety with functional mobility; hand placement to ensure safe transfers to various surfaces in prep for ADLs    Patient left HOB elevated with all lines intact, call button in reach and bed alarm on.    GOALS:   Multidisciplinary Problems       Occupational Therapy Goals          Problem: Occupational Therapy    Goal Priority Disciplines Outcome Interventions   Occupational Therapy Goal     OT, PT/OT     Description: Goals to be met by: 3/9/23     Patient will increase functional independence with ADLs by performing:    UE Dressing with Modified [place].  LE Dressing with Modified [place].  Grooming while seated with Modified [place].  Toileting from raised toilet with Modified [place] for hygiene and clothing management.   Bathing from  shower chair/bench with Stand-by Assistance.  Toilet transfer to raised toilet with Modified [place].  Increased functional strength to WFL for ADL's/IADL's.                         Time Tracking:     OT Date of Treatment: 02/13/23  OT Start Time: 1148  OT Stop Time: 1200  OT Total Time (min): 12 min    Billable Minutes:Self Care/Home Management 12               2/13/2023   normal

## 2024-04-02 NOTE — ANESTHESIA POSTPROCEDURE EVALUATION
Anesthesia Post Evaluation    Patient: Cleveland Capps    Procedure(s) Performed: Procedure(s) (LRB):  Cardioversion or Defibrillation (N/A)  Transesophageal echo ([facility]) intra-procedure log documentation (N/A)    Final Anesthesia Type: general      Patient location during evaluation: PACU  Patient participation: Yes- Able to Participate  Level of consciousness: awake and alert  Post-procedure vital signs: reviewed and stable  Pain management: adequate  Airway patency: patent    PONV status at discharge: No PONV  Anesthetic complications: no      Cardiovascular status: hemodynamically stable  Respiratory status: nasal cannula  Hydration status: euvolemic  Follow-up not needed.          Vitals Value Taken Time   /65 11/25/22 1317   Temp 36.1 °C (97 °F) 11/25/22 0736   Pulse 51 11/25/22 1330   Resp 16 11/25/22 1330   SpO2 99 % 11/25/22 1330   Vitals shown include unvalidated device data.      No case tracking events are documented in the log.      Pain/Marilyn Score: No data recorded       Patient called saying that he is returning a phone to Leonor regarding his prescription and the information she wanted him to get for [facility]. Patient stated that the information is NPI:[id number], NCPDP:[id number]. Patient is asking if Leonor can give him back a call regarding the matter.

## (undated) DEVICE — SEE MEDLINE ITEM 156894

## (undated) DEVICE — SPIKE CONTRAST CONTROLLER

## (undated) DEVICE — KIT MICROINTRO 4F .018X40X7CM

## (undated) DEVICE — CATH JL5 4FR INFINITY

## (undated) DEVICE — DEVICE PERCLOSE SUT CLSR 6FR

## (undated) DEVICE — KIT CUSTOM MANIFOLD

## (undated) DEVICE — CATH INFINITI JUDKINS JR4

## (undated) DEVICE — GUIDEWIRE TORAY INOUE

## (undated) DEVICE — CATH MPA2 INFINITI 4FR 100CM

## (undated) DEVICE — CATH SUPER TORQUE MP 4FRX80CM

## (undated) DEVICE — CATH DXTERITY PG145 110CM 6FR

## (undated) DEVICE — CATH VISIONS PV IVUS .035

## (undated) DEVICE — PAD RADIOLUCENT STAT ADULT

## (undated) DEVICE — NDL BROCKENBROUGH CRV 71CM

## (undated) DEVICE — INTRO FAST-CATH SL1 8.5FR 63CM

## (undated) DEVICE — CATH AL1 4FR

## (undated) DEVICE — GUIDEWIRE SUPRA CORE 035 190CM

## (undated) DEVICE — PROTECTION STATION PLUS

## (undated) DEVICE — SHEATH INTRODUCER 8FR 11CM

## (undated) DEVICE — OMNIPAQUE 350MG 150ML VIAL

## (undated) DEVICE — OMNIPAQUE 350 200ML

## (undated) DEVICE — GUIDEWIRE AMPLATZ .035X260

## (undated) DEVICE — GUIDEWIRE AMPLATZ STF .032X260

## (undated) DEVICE — SHEATH INTRODUCER 6FR 11CM

## (undated) DEVICE — SYS WATCHMAN FXD CURVE DBL US